# Patient Record
Sex: FEMALE | Race: WHITE | NOT HISPANIC OR LATINO | Employment: OTHER | URBAN - METROPOLITAN AREA
[De-identification: names, ages, dates, MRNs, and addresses within clinical notes are randomized per-mention and may not be internally consistent; named-entity substitution may affect disease eponyms.]

---

## 2017-01-24 DIAGNOSIS — Z00.00 PREVENTATIVE HEALTH CARE: ICD-10-CM

## 2017-01-24 DIAGNOSIS — I10 ESSENTIAL HYPERTENSION: Primary | ICD-10-CM

## 2017-01-24 DIAGNOSIS — E78.5 HYPERLIPIDEMIA, UNSPECIFIED HYPERLIPIDEMIA TYPE: ICD-10-CM

## 2017-01-29 ENCOUNTER — RESULTS ENCOUNTER (OUTPATIENT)
Dept: INTERNAL MEDICINE | Facility: CLINIC | Age: 57
End: 2017-01-29

## 2017-01-29 DIAGNOSIS — I10 ESSENTIAL HYPERTENSION: ICD-10-CM

## 2017-01-29 DIAGNOSIS — E78.5 HYPERLIPIDEMIA: ICD-10-CM

## 2017-01-29 DIAGNOSIS — N95.1 MENOPAUSAL STATE: ICD-10-CM

## 2017-01-29 DIAGNOSIS — E55.9 VITAMIN D DEFICIENCY: ICD-10-CM

## 2017-01-29 DIAGNOSIS — E78.5 HYPERLIPIDEMIA, UNSPECIFIED HYPERLIPIDEMIA TYPE: ICD-10-CM

## 2017-01-29 DIAGNOSIS — Z00.00 PREVENTATIVE HEALTH CARE: ICD-10-CM

## 2017-01-29 DIAGNOSIS — K21.00 GASTROESOPHAGEAL REFLUX DISEASE WITH ESOPHAGITIS: ICD-10-CM

## 2017-01-30 ENCOUNTER — APPOINTMENT (OUTPATIENT)
Dept: LAB | Facility: HOSPITAL | Age: 57
End: 2017-01-30

## 2017-01-30 ENCOUNTER — LAB (OUTPATIENT)
Dept: LAB | Facility: HOSPITAL | Age: 57
End: 2017-01-30

## 2017-01-30 DIAGNOSIS — M51.9 DISC DISORDER OF THORACIC REGION: ICD-10-CM

## 2017-01-30 DIAGNOSIS — R93.89 ABNORMAL RADIOGRAPHIC EXAMINATION: ICD-10-CM

## 2017-01-30 DIAGNOSIS — R94.31 ABNORMAL ELECTROCARDIOGRAM: ICD-10-CM

## 2017-01-30 DIAGNOSIS — R10.13 EPIGASTRIC PAIN: Primary | ICD-10-CM

## 2017-01-30 LAB
25(OH)D3 SERPL-MCNC: 19.3 NG/ML
ALBUMIN SERPL-MCNC: 4 G/DL (ref 3.5–5.2)
ALBUMIN/GLOB SERPL: 1.6 G/DL
ALP SERPL-CCNC: 66 U/L (ref 40–129)
ALT SERPL W P-5'-P-CCNC: 12 U/L (ref 5–33)
ANION GAP SERPL CALCULATED.3IONS-SCNC: 16 MMOL/L
AST SERPL-CCNC: 16 U/L (ref 5–32)
BASOPHILS # BLD AUTO: 0.06 10*3/MM3 (ref 0–0.2)
BASOPHILS NFR BLD AUTO: 1.1 % (ref 0–2)
BILIRUB SERPL-MCNC: 0.7 MG/DL (ref 0.2–1.2)
BUN BLD-MCNC: 11 MG/DL (ref 6–20)
BUN/CREAT SERPL: 15.1 (ref 7–25)
CALCIUM SPEC-SCNC: 9.6 MG/DL (ref 8.6–10.5)
CHLORIDE SERPL-SCNC: 99 MMOL/L (ref 98–107)
CHOLEST SERPL-MCNC: 152 MG/DL (ref 0–200)
CO2 SERPL-SCNC: 29 MMOL/L (ref 22–29)
CREAT BLD-MCNC: 0.73 MG/DL (ref 0.57–1)
DEPRECATED RDW RBC AUTO: 46.5 FL (ref 37–54)
EOSINOPHIL # BLD AUTO: 0.14 10*3/MM3 (ref 0.1–0.3)
EOSINOPHIL NFR BLD AUTO: 2.5 % (ref 0–4)
ERYTHROCYTE [DISTWIDTH] IN BLOOD BY AUTOMATED COUNT: 14.5 % (ref 11.5–14.5)
ERYTHROCYTE [SEDIMENTATION RATE] IN BLOOD: 16 MM/HR (ref 0–30)
GFR SERPL CREATININE-BSD FRML MDRD: 83 ML/MIN/1.73
GLOBULIN UR ELPH-MCNC: 2.5 GM/DL
GLUCOSE BLD-MCNC: 107 MG/DL (ref 65–99)
HCT VFR BLD AUTO: 37.4 % (ref 37–47)
HDLC SERPL-MCNC: 55 MG/DL (ref 40–60)
HGB BLD-MCNC: 12.6 G/DL (ref 12–16)
IMM GRANULOCYTES # BLD: 0.01 10*3/MM3 (ref 0–0.03)
IMM GRANULOCYTES NFR BLD: 0.2 % (ref 0–0.5)
LDLC SERPL CALC-MCNC: 70 MG/DL (ref 0–100)
LDLC/HDLC SERPL: 1.28 {RATIO}
LYMPHOCYTES # BLD AUTO: 2.66 10*3/MM3 (ref 0.6–4.8)
LYMPHOCYTES NFR BLD AUTO: 47.1 % (ref 20–45)
MCH RBC QN AUTO: 29.5 PG (ref 27–31)
MCHC RBC AUTO-ENTMCNC: 33.7 G/DL (ref 31–37)
MCV RBC AUTO: 87.6 FL (ref 81–99)
MONOCYTES # BLD AUTO: 0.28 10*3/MM3 (ref 0–1)
MONOCYTES NFR BLD AUTO: 5 % (ref 3–8)
NEUTROPHILS # BLD AUTO: 2.5 10*3/MM3 (ref 1.5–8.3)
NEUTROPHILS NFR BLD AUTO: 44.1 % (ref 45–70)
NRBC BLD MANUAL-RTO: 0 /100 WBC (ref 0–0)
PLATELET # BLD AUTO: 220 10*3/MM3 (ref 140–500)
PMV BLD AUTO: 9.5 FL (ref 7.4–10.4)
POTASSIUM BLD-SCNC: 2.8 MMOL/L (ref 3.5–5.2)
PROT SERPL-MCNC: 6.5 G/DL (ref 6–8.5)
RBC # BLD AUTO: 4.27 10*6/MM3 (ref 4.2–5.4)
SODIUM BLD-SCNC: 144 MMOL/L (ref 136–145)
T4 SERPL-MCNC: 8.64 MCG/DL (ref 4.5–11.7)
TRIGL SERPL-MCNC: 134 MG/DL (ref 0–150)
TSH SERPL DL<=0.05 MIU/L-ACNC: 3.5 MIU/ML (ref 0.27–4.2)
VLDLC SERPL-MCNC: 26.8 MG/DL (ref 7–27)
WBC NRBC COR # BLD: 5.65 10*3/MM3 (ref 4.8–10.8)

## 2017-01-30 PROCEDURE — 85025 COMPLETE CBC W/AUTO DIFF WBC: CPT

## 2017-01-30 PROCEDURE — 80053 COMPREHEN METABOLIC PANEL: CPT

## 2017-01-30 PROCEDURE — 82652 VIT D 1 25-DIHYDROXY: CPT | Performed by: NURSE PRACTITIONER

## 2017-01-30 PROCEDURE — 80061 LIPID PANEL: CPT

## 2017-01-30 PROCEDURE — 85651 RBC SED RATE NONAUTOMATED: CPT

## 2017-01-30 PROCEDURE — 36415 COLL VENOUS BLD VENIPUNCTURE: CPT

## 2017-01-30 PROCEDURE — 84443 ASSAY THYROID STIM HORMONE: CPT

## 2017-01-30 PROCEDURE — 82306 VITAMIN D 25 HYDROXY: CPT

## 2017-01-30 PROCEDURE — 84436 ASSAY OF TOTAL THYROXINE: CPT

## 2017-02-01 LAB — 1,25(OH)2D3 SERPL-MCNC: 57.4 PG/ML (ref 19.9–79.3)

## 2017-02-13 RX ORDER — ESOMEPRAZOLE MAGNESIUM 40 MG/1
CAPSULE, DELAYED RELEASE ORAL
Qty: 60 CAPSULE | Refills: 6 | Status: SHIPPED | OUTPATIENT
Start: 2017-02-13 | End: 2017-10-05 | Stop reason: SDUPTHER

## 2017-03-16 ENCOUNTER — OFFICE VISIT (OUTPATIENT)
Dept: INTERNAL MEDICINE | Facility: CLINIC | Age: 57
End: 2017-03-16

## 2017-03-16 VITALS
HEART RATE: 70 BPM | HEIGHT: 60 IN | BODY MASS INDEX: 33.38 KG/M2 | WEIGHT: 170 LBS | DIASTOLIC BLOOD PRESSURE: 82 MMHG | OXYGEN SATURATION: 98 % | SYSTOLIC BLOOD PRESSURE: 124 MMHG

## 2017-03-16 DIAGNOSIS — I10 ESSENTIAL HYPERTENSION: Primary | ICD-10-CM

## 2017-03-16 DIAGNOSIS — Z12.39 SCREENING FOR BREAST CANCER: ICD-10-CM

## 2017-03-16 DIAGNOSIS — K21.9 GASTROESOPHAGEAL REFLUX DISEASE WITHOUT ESOPHAGITIS: ICD-10-CM

## 2017-03-16 DIAGNOSIS — E78.5 HYPERLIPIDEMIA, UNSPECIFIED HYPERLIPIDEMIA TYPE: ICD-10-CM

## 2017-03-16 DIAGNOSIS — M85.80 OSTEOPENIA: ICD-10-CM

## 2017-03-16 PROCEDURE — 99214 OFFICE O/P EST MOD 30 MIN: CPT | Performed by: NURSE PRACTITIONER

## 2017-03-16 RX ORDER — ERGOCALCIFEROL 1.25 MG/1
50000 CAPSULE ORAL WEEKLY
Qty: 12 CAPSULE | Refills: 3 | Status: SHIPPED | OUTPATIENT
Start: 2017-03-16 | End: 2018-02-17 | Stop reason: SDUPTHER

## 2017-03-16 NOTE — PROGRESS NOTES
Chief Complaint   Patient presents with   • Hyperlipidemia   • Hypertension       Subjective     Shaylee Wadsworth is a 56 y.o. female being seen for a follow up appointment today regarding HTN, hyperlipidemia, osteopenia, and GERD. She had labs done 1-30-17. She has only been taking her potassium once daily. She has not been checking her BP at home. She denies CP, SOA, edema. She has not had any abd pain. Her reflux has been well controlled.       History of Present Illness     Allergies   Allergen Reactions   • Meloxicam Swelling     EYES AND FACE SWELLING  EYES AND FACE SWELLING         Current Outpatient Prescriptions:   •  calcium-vitamin D (OSCAL-500) 500-200 MG-UNIT per tablet, Take 1 tablet by mouth., Disp: , Rfl:   •  clindamycin (CLEOCIN) 300 MG capsule, Take 1 capsule by mouth 3 (three) times a day., Disp: 30 capsule, Rfl: 0  •  ENBREL SURECLICK 50 MG/ML solution auto-injector, , Disp: , Rfl:   •  esomeprazole (nexIUM) 40 MG capsule, Take one po bid, Disp: 60 capsule, Rfl: 6  •  estrogen, conjugated,-medroxyprogesterone (PREMPRO) 0.45-1.5 MG per tablet, Take 1 tablet by mouth daily., Disp: 84 tablet, Rfl: 3  •  estrogens, conjugated, (PREMARIN) 0.3 MG tablet, Take 0.3 mg by mouth., Disp: , Rfl:   •  etanercept (ENBREL) 50 MG/ML solution prefilled syringe injection, Inject 1 mL under the skin., Disp: , Rfl:   •  folic acid (FOLVITE) 1 MG tablet, Take by mouth., Disp: , Rfl:   •  HYDROcodone-acetaminophen (NORCO) 5-325 MG per tablet, Take 1-2 tablets by mouth every 6 (six) hours., Disp: , Rfl:   •  methotrexate 2.5 MG tablet, TAKE 8 TABS PO ONCE WEEKLY ON SAME DAY, Disp: 32 tablet, Rfl: 11  •  potassium chloride (K-DUR,KLOR-CON) 20 MEQ CR tablet, Take 1 tablet by mouth every 12 (twelve) hours., Disp: 180 tablet, Rfl: 3  •  POTASSIUM CHLORIDE PO, Take 20 mEq by mouth., Disp: , Rfl:   •  simvastatin (ZOCOR) 80 MG tablet, Take 1 tablet by mouth Every Night., Disp: 90 tablet, Rfl: 1  •   triamterene-hydrochlorothiazide (MAXZIDE-25) 37.5-25 MG per tablet, Take 1 tablet by mouth Daily., Disp: 90 tablet, Rfl: 1  •  vitamin D (ERGOCALCIFEROL) 65266 UNITS capsule capsule, Take 1 capsule (50,000 Units total) by mouth once a week., Disp: 30 capsule, Rfl: 2  •  folic acid (FOLVITE) 1 MG tablet, Take 1 mg by mouth., Disp: , Rfl:     The following portions of the patient's history were reviewed and updated as appropriate: allergies, current medications, past family history, past medical history, past social history, past surgical history and problem list.    Review of Systems   Constitutional: Negative.    HENT: Negative.    Eyes: Positive for visual disturbance.   Respiratory: Negative.    Cardiovascular: Negative for chest pain, palpitations and leg swelling.   Gastrointestinal: Negative.    Endocrine: Negative.    Genitourinary: Negative.    Musculoskeletal: Positive for arthralgias and neck pain. Negative for joint swelling.   Allergic/Immunologic: Positive for environmental allergies.   Neurological: Negative.    Hematological: Negative.    Psychiatric/Behavioral: Negative.        Assessment     Physical Exam   Constitutional: She is oriented to person, place, and time. She appears well-developed and well-nourished.   HENT:   Head: Normocephalic.   Right Ear: External ear normal.   Left Ear: External ear normal.   Mouth/Throat: Oropharynx is clear and moist.   Neck: Neck supple. No thyromegaly present.   Cardiovascular: Normal rate, regular rhythm and normal heart sounds.    No murmur heard.  Pulmonary/Chest: Effort normal and breath sounds normal. No respiratory distress. She has no wheezes.   Abdominal: Soft. Bowel sounds are normal. She exhibits no distension. There is no tenderness.   Musculoskeletal:        Right shoulder: She exhibits decreased range of motion. She exhibits normal pulse and normal strength.   Hands without swan neck deformity. Herbene's nodes present to both hands.   Neurological:  She is alert and oriented to person, place, and time.   Skin: Skin is dry.   Psychiatric: She has a normal mood and affect. Her behavior is normal.   Vitals reviewed.      Plan     Her fasting labs were reviewed with the patient from 1-30-17.     Shaylee was seen today for hyperlipidemia and hypertension.    Diagnoses and all orders for this visit:    Essential hypertension    Hyperlipidemia, unspecified hyperlipidemia type    Gastroesophageal reflux disease without esophagitis    Osteopenia  -     DEXA Bone Density Axial; Future  -     vitamin D (ERGOCALCIFEROL) 16996 UNITS capsule capsule; Take 1 capsule by mouth 1 (One) Time Per Week.    Screening for breast cancer  -     Mammo Screening Bilateral With CAD; Future       Diagnosis Plan   1. Essential hypertension  Comprehensive metabolic panel    Conv Lipid Panel w/ Chol/HDL Ratio    CBC & Differential   2. Hyperlipidemia, unspecified hyperlipidemia type  Comprehensive metabolic panel    Conv Lipid Panel w/ Chol/HDL Ratio    CBC & Differential   3. Gastroesophageal reflux disease without esophagitis  CBC & Differential   4. Osteopenia  DEXA Bone Density Axial    vitamin D (ERGOCALCIFEROL) 66994 UNITS capsule capsule    Vitamin D 1,25 Dihydroxy   5. Screening for breast cancer  Mammo Screening Bilateral With CAD     She will continue the other current medications she takes.

## 2017-03-21 DIAGNOSIS — Z00.00 PREVENTATIVE HEALTH CARE: ICD-10-CM

## 2017-03-21 DIAGNOSIS — I10 ESSENTIAL HYPERTENSION: Primary | ICD-10-CM

## 2017-03-21 DIAGNOSIS — K21.9 GASTROESOPHAGEAL REFLUX DISEASE, ESOPHAGITIS PRESENCE NOT SPECIFIED: ICD-10-CM

## 2017-03-21 DIAGNOSIS — E78.5 HYPERLIPIDEMIA, UNSPECIFIED HYPERLIPIDEMIA TYPE: ICD-10-CM

## 2017-03-22 ENCOUNTER — HOSPITAL ENCOUNTER (OUTPATIENT)
Dept: MAMMOGRAPHY | Facility: HOSPITAL | Age: 57
Discharge: HOME | End: 2017-03-22
Admitting: NURSE PRACTITIONER

## 2017-03-22 ENCOUNTER — APPOINTMENT (OUTPATIENT)
Dept: BONE DENSITY | Facility: HOSPITAL | Age: 57
End: 2017-03-22

## 2017-03-22 DIAGNOSIS — M85.80 OSTEOPENIA: ICD-10-CM

## 2017-03-22 DIAGNOSIS — Z12.39 SCREENING FOR BREAST CANCER: ICD-10-CM

## 2017-03-22 PROCEDURE — 77080 DXA BONE DENSITY AXIAL: CPT

## 2017-03-22 PROCEDURE — G0202 SCR MAMMO BI INCL CAD: HCPCS

## 2017-03-22 PROCEDURE — 77063 BREAST TOMOSYNTHESIS BI: CPT

## 2017-03-28 ENCOUNTER — TELEPHONE (OUTPATIENT)
Dept: INTERNAL MEDICINE | Facility: CLINIC | Age: 57
End: 2017-03-28

## 2017-03-28 NOTE — TELEPHONE ENCOUNTER
----- Message from KAITLYNN Choi sent at 3/27/2017 12:49 PM EDT -----  Slight bone thinning in lumber spine, but hip is in normal range. Repeat DXA scan in 2 years.   ----- Message -----     From: Interface, Rad Results [place] In     Sent: 3/23/2017   7:38 AM       To: KAITLYNN Choi

## 2017-03-30 ENCOUNTER — TELEPHONE (OUTPATIENT)
Dept: GASTROENTEROLOGY | Facility: CLINIC | Age: 57
End: 2017-03-30

## 2017-04-15 RX ORDER — TRIAMTERENE AND HYDROCHLOROTHIAZIDE 37.5; 25 MG/1; MG/1
TABLET ORAL
Qty: 90 TABLET | Refills: 0 | Status: CANCELLED | OUTPATIENT
Start: 2017-04-15

## 2017-04-15 RX ORDER — SIMVASTATIN 80 MG
TABLET ORAL
Qty: 90 TABLET | Refills: 0 | Status: CANCELLED | OUTPATIENT
Start: 2017-04-15

## 2017-04-24 RX ORDER — TRIAMTERENE AND HYDROCHLOROTHIAZIDE 37.5; 25 MG/1; MG/1
1 TABLET ORAL DAILY
Qty: 90 TABLET | Refills: 1 | Status: SHIPPED | OUTPATIENT
Start: 2017-04-24 | End: 2017-05-18

## 2017-04-24 RX ORDER — SIMVASTATIN 80 MG
80 TABLET ORAL NIGHTLY
Qty: 90 TABLET | Refills: 1 | Status: SHIPPED | OUTPATIENT
Start: 2017-04-24 | End: 2017-10-05 | Stop reason: SDUPTHER

## 2017-05-10 ENCOUNTER — TRANSCRIBE ORDERS (OUTPATIENT)
Dept: ADMINISTRATIVE | Facility: HOSPITAL | Age: 57
End: 2017-05-10

## 2017-05-10 ENCOUNTER — LAB (OUTPATIENT)
Dept: LAB | Facility: HOSPITAL | Age: 57
End: 2017-05-10

## 2017-05-10 ENCOUNTER — TELEPHONE (OUTPATIENT)
Dept: INTERNAL MEDICINE | Facility: CLINIC | Age: 57
End: 2017-05-10

## 2017-05-10 DIAGNOSIS — E78.5 HYPERLIPIDEMIA, UNSPECIFIED HYPERLIPIDEMIA TYPE: ICD-10-CM

## 2017-05-10 DIAGNOSIS — I10 ESSENTIAL HYPERTENSION: ICD-10-CM

## 2017-05-10 LAB
25(OH)D3 SERPL-MCNC: 23.4 NG/ML
ALBUMIN SERPL-MCNC: 4.1 G/DL (ref 3.5–5.2)
ALBUMIN/GLOB SERPL: 1.5 G/DL
ALP SERPL-CCNC: 64 U/L (ref 40–129)
ALT SERPL W P-5'-P-CCNC: 8 U/L (ref 5–33)
ANION GAP SERPL CALCULATED.3IONS-SCNC: 17.2 MMOL/L
AST SERPL-CCNC: 15 U/L (ref 5–32)
BILIRUB SERPL-MCNC: 0.5 MG/DL (ref 0.2–1.2)
BUN BLD-MCNC: 14 MG/DL (ref 6–20)
BUN/CREAT SERPL: 20.3 (ref 7–25)
CALCIUM SPEC-SCNC: 9.1 MG/DL (ref 8.6–10.5)
CHLORIDE SERPL-SCNC: 99 MMOL/L (ref 98–107)
CHOLEST SERPL-MCNC: 166 MG/DL (ref 0–200)
CO2 SERPL-SCNC: 26.8 MMOL/L (ref 22–29)
CREAT BLD-MCNC: 0.69 MG/DL (ref 0.57–1)
GFR SERPL CREATININE-BSD FRML MDRD: 89 ML/MIN/1.73
GLOBULIN UR ELPH-MCNC: 2.8 GM/DL
GLUCOSE BLD-MCNC: 104 MG/DL (ref 65–99)
HDLC SERPL-MCNC: 62 MG/DL (ref 40–60)
LDLC SERPL CALC-MCNC: 87 MG/DL (ref 0–100)
LDLC/HDLC SERPL: 1.4 {RATIO}
POTASSIUM BLD-SCNC: 2.9 MMOL/L (ref 3.5–5.2)
PROT SERPL-MCNC: 6.9 G/DL (ref 6–8.5)
SODIUM BLD-SCNC: 143 MMOL/L (ref 136–145)
TRIGL SERPL-MCNC: 86 MG/DL (ref 0–150)
VLDLC SERPL-MCNC: 17.2 MG/DL (ref 7–27)

## 2017-05-10 PROCEDURE — 80061 LIPID PANEL: CPT

## 2017-05-10 PROCEDURE — 82306 VITAMIN D 25 HYDROXY: CPT

## 2017-05-10 PROCEDURE — 36415 COLL VENOUS BLD VENIPUNCTURE: CPT

## 2017-05-10 PROCEDURE — 80053 COMPREHEN METABOLIC PANEL: CPT

## 2017-05-16 ENCOUNTER — RESULTS ENCOUNTER (OUTPATIENT)
Dept: INTERNAL MEDICINE | Facility: CLINIC | Age: 57
End: 2017-05-16

## 2017-05-16 ENCOUNTER — TELEPHONE (OUTPATIENT)
Dept: INTERNAL MEDICINE | Facility: CLINIC | Age: 57
End: 2017-05-16

## 2017-05-16 DIAGNOSIS — E78.5 HYPERLIPIDEMIA, UNSPECIFIED HYPERLIPIDEMIA TYPE: ICD-10-CM

## 2017-05-16 DIAGNOSIS — I10 ESSENTIAL HYPERTENSION: ICD-10-CM

## 2017-05-16 DIAGNOSIS — K21.9 GASTROESOPHAGEAL REFLUX DISEASE WITHOUT ESOPHAGITIS: ICD-10-CM

## 2017-05-18 ENCOUNTER — OFFICE VISIT (OUTPATIENT)
Dept: INTERNAL MEDICINE | Facility: CLINIC | Age: 57
End: 2017-05-18

## 2017-05-18 ENCOUNTER — APPOINTMENT (OUTPATIENT)
Dept: LAB | Facility: HOSPITAL | Age: 57
End: 2017-05-18

## 2017-05-18 VITALS
HEART RATE: 70 BPM | SYSTOLIC BLOOD PRESSURE: 128 MMHG | HEIGHT: 60 IN | WEIGHT: 172 LBS | OXYGEN SATURATION: 98 % | DIASTOLIC BLOOD PRESSURE: 84 MMHG | BODY MASS INDEX: 33.77 KG/M2

## 2017-05-18 DIAGNOSIS — M51.9 DISC DISORDER OF THORACIC REGION: Primary | ICD-10-CM

## 2017-05-18 DIAGNOSIS — Z00.00 HEALTHCARE MAINTENANCE: ICD-10-CM

## 2017-05-18 DIAGNOSIS — I10 ESSENTIAL HYPERTENSION: ICD-10-CM

## 2017-05-18 DIAGNOSIS — K21.9 GASTROESOPHAGEAL REFLUX DISEASE WITHOUT ESOPHAGITIS: ICD-10-CM

## 2017-05-18 DIAGNOSIS — E78.5 HYPERLIPIDEMIA, UNSPECIFIED HYPERLIPIDEMIA TYPE: ICD-10-CM

## 2017-05-18 LAB
BASOPHILS # BLD AUTO: 0.07 10*3/MM3 (ref 0–0.2)
BASOPHILS NFR BLD AUTO: 0.9 % (ref 0–2)
DEPRECATED RDW RBC AUTO: 44.6 FL (ref 37–54)
EOSINOPHIL # BLD AUTO: 0.15 10*3/MM3 (ref 0.1–0.3)
EOSINOPHIL NFR BLD AUTO: 2 % (ref 0–4)
ERYTHROCYTE [DISTWIDTH] IN BLOOD BY AUTOMATED COUNT: 14.3 % (ref 11.5–14.5)
HCT VFR BLD AUTO: 37.4 % (ref 37–47)
HGB BLD-MCNC: 12.9 G/DL (ref 12–16)
IMM GRANULOCYTES # BLD: 0.01 10*3/MM3 (ref 0–0.03)
IMM GRANULOCYTES NFR BLD: 0.1 % (ref 0–0.5)
LYMPHOCYTES # BLD AUTO: 2.77 10*3/MM3 (ref 0.6–4.8)
LYMPHOCYTES NFR BLD AUTO: 36.8 % (ref 20–45)
MCH RBC QN AUTO: 29.5 PG (ref 27–31)
MCHC RBC AUTO-ENTMCNC: 34.5 G/DL (ref 31–37)
MCV RBC AUTO: 85.6 FL (ref 81–99)
MONOCYTES # BLD AUTO: 0.52 10*3/MM3 (ref 0–1)
MONOCYTES NFR BLD AUTO: 6.9 % (ref 3–8)
NEUTROPHILS # BLD AUTO: 4.01 10*3/MM3 (ref 1.5–8.3)
NEUTROPHILS NFR BLD AUTO: 53.3 % (ref 45–70)
NRBC BLD MANUAL-RTO: 0 /100 WBC (ref 0–0)
PLATELET # BLD AUTO: 251 10*3/MM3 (ref 140–500)
PMV BLD AUTO: 9.8 FL (ref 7.4–10.4)
RBC # BLD AUTO: 4.37 10*6/MM3 (ref 4.2–5.4)
WBC NRBC COR # BLD: 7.53 10*3/MM3 (ref 4.8–10.8)

## 2017-05-18 PROCEDURE — 85025 COMPLETE CBC W/AUTO DIFF WBC: CPT | Performed by: NURSE PRACTITIONER

## 2017-05-18 PROCEDURE — 93000 ELECTROCARDIOGRAM COMPLETE: CPT | Performed by: NURSE PRACTITIONER

## 2017-05-18 PROCEDURE — 99396 PREV VISIT EST AGE 40-64: CPT | Performed by: NURSE PRACTITIONER

## 2017-05-18 PROCEDURE — 36415 COLL VENOUS BLD VENIPUNCTURE: CPT | Performed by: NURSE PRACTITIONER

## 2017-05-18 PROCEDURE — 90471 IMMUNIZATION ADMIN: CPT | Performed by: NURSE PRACTITIONER

## 2017-05-18 PROCEDURE — 90670 PCV13 VACCINE IM: CPT | Performed by: NURSE PRACTITIONER

## 2017-05-18 RX ORDER — LOSARTAN POTASSIUM 50 MG/1
50 TABLET ORAL DAILY
Qty: 30 TABLET | Refills: 1 | Status: SHIPPED | OUTPATIENT
Start: 2017-05-18 | End: 2017-05-23 | Stop reason: DRUGHIGH

## 2017-05-18 RX ORDER — OCTISALATE, AVOBENZONE, HOMOSALATE, AND OCTOCRYLENE 29.4; 29.4; 49; 25.48 MG/ML; MG/ML; MG/ML; MG/ML
1 LOTION TOPICAL DAILY
Qty: 30 CAPSULE | Refills: 1
Start: 2017-05-18 | End: 2017-07-03

## 2017-05-18 RX ORDER — POTASSIUM CHLORIDE 20 MEQ/1
40 TABLET, EXTENDED RELEASE ORAL DAILY
Qty: 180 TABLET | Refills: 3
Start: 2017-05-18 | End: 2017-10-09

## 2017-05-23 ENCOUNTER — TELEPHONE (OUTPATIENT)
Dept: INTERNAL MEDICINE | Facility: CLINIC | Age: 57
End: 2017-05-23

## 2017-05-23 RX ORDER — LOSARTAN POTASSIUM 100 MG/1
100 TABLET ORAL DAILY
Qty: 30 TABLET | Refills: 2 | Status: SHIPPED | OUTPATIENT
Start: 2017-05-23 | End: 2017-06-19 | Stop reason: DRUGHIGH

## 2017-06-13 ENCOUNTER — OFFICE VISIT (OUTPATIENT)
Dept: INTERNAL MEDICINE | Facility: CLINIC | Age: 57
End: 2017-06-13

## 2017-06-13 ENCOUNTER — LAB (OUTPATIENT)
Dept: LAB | Facility: HOSPITAL | Age: 57
End: 2017-06-13

## 2017-06-13 ENCOUNTER — HOSPITAL ENCOUNTER (EMERGENCY)
Facility: HOSPITAL | Age: 57
End: 2017-06-13

## 2017-06-13 ENCOUNTER — TRANSCRIBE ORDERS (OUTPATIENT)
Dept: ADMINISTRATIVE | Facility: HOSPITAL | Age: 57
End: 2017-06-13

## 2017-06-13 VITALS
DIASTOLIC BLOOD PRESSURE: 88 MMHG | OXYGEN SATURATION: 97 % | HEART RATE: 72 BPM | SYSTOLIC BLOOD PRESSURE: 142 MMHG | HEIGHT: 60 IN | BODY MASS INDEX: 33.57 KG/M2 | WEIGHT: 171 LBS

## 2017-06-13 DIAGNOSIS — M05.79 RHEUMATOID ARTHRITIS WITH RHEUMATOID FACTOR OF MULTIPLE SITES WITHOUT ORGAN OR SYSTEMS INVOLVEMENT (HCC): ICD-10-CM

## 2017-06-13 DIAGNOSIS — Z79.899 OTHER LONG TERM (CURRENT) DRUG THERAPY: ICD-10-CM

## 2017-06-13 DIAGNOSIS — Z79.899 OTHER LONG TERM (CURRENT) DRUG THERAPY: Primary | ICD-10-CM

## 2017-06-13 DIAGNOSIS — I10 ESSENTIAL HYPERTENSION: Primary | ICD-10-CM

## 2017-06-13 LAB
ALBUMIN SERPL-MCNC: 4 G/DL (ref 3.5–5.2)
ALP SERPL-CCNC: 66 U/L (ref 40–129)
ALT SERPL W P-5'-P-CCNC: 15 U/L (ref 5–33)
AST SERPL-CCNC: 17 U/L (ref 5–32)
BASOPHILS # BLD AUTO: 0.06 10*3/MM3 (ref 0–0.2)
BASOPHILS NFR BLD AUTO: 0.8 % (ref 0–2)
BILIRUB CONJ SERPL-MCNC: <0.2 MG/DL (ref 0.2–0.3)
BILIRUB INDIRECT SERPL-MCNC: ABNORMAL MG/DL
BILIRUB SERPL-MCNC: 0.5 MG/DL (ref 0.2–1.2)
DEPRECATED RDW RBC AUTO: 45 FL (ref 37–54)
EOSINOPHIL # BLD AUTO: 0.15 10*3/MM3 (ref 0.1–0.3)
EOSINOPHIL NFR BLD AUTO: 1.9 % (ref 0–4)
ERYTHROCYTE [DISTWIDTH] IN BLOOD BY AUTOMATED COUNT: 14.4 % (ref 11.5–14.5)
HCT VFR BLD AUTO: 37.9 % (ref 37–47)
HGB BLD-MCNC: 12.9 G/DL (ref 12–16)
IMM GRANULOCYTES # BLD: 0.01 10*3/MM3 (ref 0–0.03)
IMM GRANULOCYTES NFR BLD: 0.1 % (ref 0–0.5)
LYMPHOCYTES # BLD AUTO: 3.49 10*3/MM3 (ref 0.6–4.8)
LYMPHOCYTES NFR BLD AUTO: 43.8 % (ref 20–45)
MCH RBC QN AUTO: 29.3 PG (ref 27–31)
MCHC RBC AUTO-ENTMCNC: 34 G/DL (ref 31–37)
MCV RBC AUTO: 86.1 FL (ref 81–99)
MONOCYTES # BLD AUTO: 0.52 10*3/MM3 (ref 0–1)
MONOCYTES NFR BLD AUTO: 6.5 % (ref 3–8)
NEUTROPHILS # BLD AUTO: 3.74 10*3/MM3 (ref 1.5–8.3)
NEUTROPHILS NFR BLD AUTO: 46.9 % (ref 45–70)
NRBC BLD MANUAL-RTO: 0 /100 WBC (ref 0–0)
PLATELET # BLD AUTO: 284 10*3/MM3 (ref 140–500)
PMV BLD AUTO: 9.6 FL (ref 7.4–10.4)
PROT SERPL-MCNC: 7.1 G/DL (ref 6–8.5)
RBC # BLD AUTO: 4.4 10*6/MM3 (ref 4.2–5.4)
WBC NRBC COR # BLD: 7.97 10*3/MM3 (ref 4.8–10.8)

## 2017-06-13 PROCEDURE — 36415 COLL VENOUS BLD VENIPUNCTURE: CPT

## 2017-06-13 PROCEDURE — 80076 HEPATIC FUNCTION PANEL: CPT

## 2017-06-13 PROCEDURE — 85025 COMPLETE CBC W/AUTO DIFF WBC: CPT

## 2017-06-13 PROCEDURE — 99213 OFFICE O/P EST LOW 20 MIN: CPT | Performed by: NURSE PRACTITIONER

## 2017-06-13 RX ORDER — METOPROLOL SUCCINATE 50 MG/1
50 TABLET, EXTENDED RELEASE ORAL DAILY
Qty: 30 TABLET | Refills: 6 | Status: SHIPPED | OUTPATIENT
Start: 2017-06-13 | End: 2017-10-09 | Stop reason: SDUPTHER

## 2017-06-13 NOTE — PROGRESS NOTES
Chief Complaint   Patient presents with   • Hypertension       Subjective     Shaylee Wadsworth is a 56 y.o. female being seen for a follow up appointment today regarding HTN. She had stopped the Maxzide on 5- due to low potassium. She was started on losartan 100mg daily. BP is running up to 180/100. Last check at [facility] 160/96. Associted Headache. .       History of Present Illness     Allergies   Allergen Reactions   • Meloxicam Swelling     EYES AND FACE SWELLING  EYES AND FACE SWELLING         Current Outpatient Prescriptions:   •  calcium-vitamin D (OSCAL-500) 500-200 MG-UNIT per tablet, Take 1 tablet by mouth., Disp: , Rfl:   •  clindamycin (CLEOCIN) 300 MG capsule, Take 1 capsule by mouth 3 (three) times a day., Disp: 30 capsule, Rfl: 0  •  ENBREL SURECLICK 50 MG/ML solution auto-injector, , Disp: , Rfl:   •  esomeprazole (nexIUM) 40 MG capsule, Take one po bid, Disp: 60 capsule, Rfl: 6  •  estrogen, conjugated,-medroxyprogesterone (PREMPRO) 0.45-1.5 MG per tablet, Take 1 tablet by mouth daily., Disp: 84 tablet, Rfl: 3  •  folic acid (FOLVITE) 1 MG tablet, Take by mouth., Disp: , Rfl:   •  losartan (COZAAR) 100 MG tablet, Take 1 tablet by mouth Daily., Disp: 30 tablet, Rfl: 2  •  methotrexate 2.5 MG tablet, TAKE 4 TABS PO ONCE WEEKLY ON SAME DAY, Disp: 32 tablet, Rfl: 11  •  potassium chloride (K-DUR,KLOR-CON) 20 MEQ CR tablet, Take 2 tablets by mouth Daily., Disp: 180 tablet, Rfl: 3  •  Probiotic Product (ALIGN) 4 MG capsule, Take 1 capsule by mouth Daily., Disp: 30 capsule, Rfl: 1  •  simvastatin (ZOCOR) 80 MG tablet, Take 1 tablet by mouth Every Night., Disp: 90 tablet, Rfl: 1  •  vitamin D (ERGOCALCIFEROL) 36314 UNITS capsule capsule, Take 1 capsule by mouth 1 (One) Time Per Week., Disp: 12 capsule, Rfl: 3    The following portions of the patient's history were reviewed and updated as appropriate: allergies, current medications, past family history, past medical history, past social history, past  surgical history and problem list.    Review of Systems   Constitutional: Negative.    Eyes: Negative.    Respiratory: Negative.  Negative for shortness of breath, wheezing and stridor.    Cardiovascular: Negative for chest pain, palpitations and leg swelling.   Gastrointestinal: Negative.  Negative for abdominal distention and abdominal pain.   Endocrine: Negative.    Genitourinary: Negative.    Musculoskeletal: Positive for arthralgias.   Skin: Negative.    Neurological: Positive for headaches. Negative for dizziness and facial asymmetry.   Hematological: Negative.    Psychiatric/Behavioral: Negative.        Assessment     Physical Exam   Constitutional: She is oriented to person, place, and time. She appears well-developed and well-nourished.   HENT:   Head: Normocephalic.   Right Ear: External ear normal.   Left Ear: External ear normal.   Neck: Neck supple. No thyromegaly present.   Cardiovascular: Normal rate, regular rhythm and normal heart sounds.    No murmur heard.  Pulmonary/Chest: Effort normal and breath sounds normal. No respiratory distress. She has no wheezes.   Musculoskeletal: She exhibits no edema.   Neurological: She is alert and oriented to person, place, and time. No cranial nerve deficit.   Psychiatric: She has a normal mood and affect. Her behavior is normal.   Vitals reviewed.      Plan     Her fasting labs were reviewed with the patient from last week.     Shaylee was seen today for hypertension.    Diagnoses and all orders for this visit:    Essential hypertension  -     Comprehensive Metabolic Panel  -     metoprolol succinate XL (TOPROL XL) 50 MG 24 hr tablet; Take 1 tablet by mouth Daily.      Follow up next week as scheduled.

## 2017-06-16 ENCOUNTER — TELEPHONE (OUTPATIENT)
Dept: INTERNAL MEDICINE | Facility: CLINIC | Age: 57
End: 2017-06-16

## 2017-06-16 NOTE — TELEPHONE ENCOUNTER
----- Message from Amparo Stallworth MA sent at 6/15/2017  3:49 PM EDT -----  Regarding: FW: METOPROLOL  Per lalo pt has to be seen.  She can not address this over the phone  ----- Message -----     From: Monica Perry     Sent: 6/15/2017   3:39 PM       To: Grazyna Duffy [facility] Clinical Pool  Subject: METOPROLOL                                       MANOLO    BP METOPROLOL - having some issues with this new medication and doesn't know if it is something she needs to get used to?    Patient says she sent message to Lalo through [employer] today but hasn't heard anything.  I told her it may be after 4:30 before she gets called back.  She doesn't want to go into the weekend and not know.    Lalo is out of office on June 16th.    [phone]    PT WILL NOT ANSWER HER PHONE,  SHE WAS ANSWERED ON HER EMAIL NOTE THAT SHE HAD TO BE SEEN, MUST MAKE APPT

## 2017-06-19 ENCOUNTER — OFFICE VISIT (OUTPATIENT)
Dept: INTERNAL MEDICINE | Facility: CLINIC | Age: 57
End: 2017-06-19

## 2017-06-19 VITALS
DIASTOLIC BLOOD PRESSURE: 84 MMHG | OXYGEN SATURATION: 99 % | HEIGHT: 60 IN | WEIGHT: 175 LBS | HEART RATE: 73 BPM | BODY MASS INDEX: 34.36 KG/M2 | SYSTOLIC BLOOD PRESSURE: 144 MMHG

## 2017-06-19 DIAGNOSIS — I10 ESSENTIAL HYPERTENSION: Primary | ICD-10-CM

## 2017-06-19 DIAGNOSIS — E87.6 HYPOKALEMIA: ICD-10-CM

## 2017-06-19 LAB
ALBUMIN SERPL-MCNC: 4.2 G/DL (ref 3.5–5.2)
ALBUMIN/GLOB SERPL: 1.8 G/DL
ALP SERPL-CCNC: 66 U/L (ref 40–129)
ALT SERPL-CCNC: 8 U/L (ref 5–33)
AST SERPL-CCNC: 12 U/L (ref 5–32)
BILIRUB SERPL-MCNC: 0.5 MG/DL (ref 0.2–1.2)
BUN SERPL-MCNC: 12 MG/DL (ref 6–20)
BUN/CREAT SERPL: 18.5 (ref 7–25)
CALCIUM SERPL-MCNC: 9.1 MG/DL (ref 8.6–10.5)
CHLORIDE SERPL-SCNC: 102 MMOL/L (ref 98–107)
CO2 SERPL-SCNC: 26.3 MMOL/L (ref 22–29)
CREAT SERPL-MCNC: 0.65 MG/DL (ref 0.57–1)
GLOBULIN SER CALC-MCNC: 2.3 GM/DL
GLUCOSE SERPL-MCNC: 87 MG/DL (ref 65–99)
POTASSIUM SERPL-SCNC: 3.8 MMOL/L (ref 3.5–5.2)
PROT SERPL-MCNC: 6.5 G/DL (ref 6–8.5)
SODIUM SERPL-SCNC: 141 MMOL/L (ref 136–145)

## 2017-06-19 PROCEDURE — 99213 OFFICE O/P EST LOW 20 MIN: CPT | Performed by: NURSE PRACTITIONER

## 2017-06-19 RX ORDER — LOSARTAN POTASSIUM AND HYDROCHLOROTHIAZIDE 25; 100 MG/1; MG/1
1 TABLET ORAL DAILY
Qty: 30 TABLET | Refills: 0 | Status: SHIPPED | OUTPATIENT
Start: 2017-06-19 | End: 2017-07-13 | Stop reason: SDUPTHER

## 2017-06-19 NOTE — PROGRESS NOTES
Chief Complaint   Patient presents with   • Hypertension       Subjective     Shaylee Wadsworth is a 56 y.o. female being seen for a follow up appointment today regarding  HTN. She was switched from Maxzide to losartan with Toprol XL 50mg. Since starting the Toprol, she has been checking her BP, and it is running 140s/80s. Associted headaches. Denies fatigue. She had chest pressure last week before starting new medication, but this has resolved.       History of Present Illness     Allergies   Allergen Reactions   • Meloxicam Swelling     EYES AND FACE SWELLING  EYES AND FACE SWELLING         Current Outpatient Prescriptions:   •  calcium-vitamin D (OSCAL-500) 500-200 MG-UNIT per tablet, Take 1 tablet by mouth., Disp: , Rfl:   •  ENBREL SURECLICK 50 MG/ML solution auto-injector, , Disp: , Rfl:   •  esomeprazole (nexIUM) 40 MG capsule, Take one po bid, Disp: 60 capsule, Rfl: 6  •  estrogen, conjugated,-medroxyprogesterone (PREMPRO) 0.45-1.5 MG per tablet, Take 1 tablet by mouth daily., Disp: 84 tablet, Rfl: 3  •  folic acid (FOLVITE) 1 MG tablet, Take by mouth., Disp: , Rfl:   •  losartan (COZAAR) 100 MG tablet, Take 1 tablet by mouth Daily., Disp: 30 tablet, Rfl: 2  •  methotrexate 2.5 MG tablet, TAKE 4 TABS PO ONCE WEEKLY ON SAME DAY, Disp: 32 tablet, Rfl: 11  •  metoprolol succinate XL (TOPROL XL) 50 MG 24 hr tablet, Take 1 tablet by mouth Daily., Disp: 30 tablet, Rfl: 6  •  potassium chloride (K-DUR,KLOR-CON) 20 MEQ CR tablet, Take 2 tablets by mouth Daily., Disp: 180 tablet, Rfl: 3  •  Probiotic Product (ALIGN) 4 MG capsule, Take 1 capsule by mouth Daily., Disp: 30 capsule, Rfl: 1  •  simvastatin (ZOCOR) 80 MG tablet, Take 1 tablet by mouth Every Night., Disp: 90 tablet, Rfl: 1  •  vitamin D (ERGOCALCIFEROL) 26507 UNITS capsule capsule, Take 1 capsule by mouth 1 (One) Time Per Week., Disp: 12 capsule, Rfl: 3  •  clindamycin (CLEOCIN) 300 MG capsule, Take 1 capsule by mouth 3 (three) times a day., Disp: 30 capsule,  Rfl: 0    The following portions of the patient's history were reviewed and updated as appropriate: allergies, current medications, past family history, past medical history, past social history, past surgical history and problem list.    Review of Systems   Constitutional: Negative.  Negative for diaphoresis and fatigue.   HENT: Negative.    Eyes: Negative.    Respiratory: Negative.    Cardiovascular: Negative.  Negative for chest pain, palpitations and leg swelling.   Gastrointestinal: Negative.    Endocrine: Negative.    Genitourinary: Negative.    Musculoskeletal: Negative.    Neurological: Negative.    Hematological: Negative.  Negative for adenopathy. Does not bruise/bleed easily.   Psychiatric/Behavioral: Negative.        Assessment     Physical Exam   Constitutional: She is oriented to person, place, and time. She appears well-developed and well-nourished.   HENT:   Head: Normocephalic.   Right Ear: External ear normal.   Left Ear: External ear normal.   Neck: Neck supple. No thyromegaly present.   Cardiovascular: Normal rate, regular rhythm and normal heart sounds.    No murmur heard.  Pulmonary/Chest: Effort normal and breath sounds normal. No respiratory distress. She has no wheezes.   Neurological: She is alert and oriented to person, place, and time. No cranial nerve deficit.   Skin: Skin is warm and dry.   Psychiatric: She has a normal mood and affect. Her behavior is normal.   Vitals reviewed.      Plan     Her fasting labs were reviewed with the patient from last week.     Shaylee was seen today for hypertension.    Diagnoses and all orders for this visit:    Essential hypertension  -     Comprehensive Metabolic Panel  -     losartan-hydrochlorothiazide (HYZAAR) 100-25 MG per tablet; Take 1 tablet by mouth Daily.    Hypokalemia  -     Comprehensive Metabolic Panel    Will check her potassium today. Change Losartan 100 to Hyzaar 100/25mg daily. Change Toprol XL 50mg to a night time dosage. Repeat BP  check in 2 weeks.

## 2017-06-20 ENCOUNTER — TELEPHONE (OUTPATIENT)
Dept: INTERNAL MEDICINE | Facility: CLINIC | Age: 57
End: 2017-06-20

## 2017-06-20 DIAGNOSIS — E87.6 POTASSIUM (K) DEFICIENCY: Primary | ICD-10-CM

## 2017-06-20 NOTE — TELEPHONE ENCOUNTER
Pt. Notified     ----- Message from KAITLYNN Choi sent at 6/20/2017  7:50 AM EDT -----  Potassium level is normal. Reduce potassium to 20 meq once daily. Repeat CMP in 2 weeks.   ----- Message -----     From: Joel, Reflab Results In     Sent: 6/19/2017   8:08 PM       To: KAITLYNN Choi

## 2017-06-25 ENCOUNTER — RESULTS ENCOUNTER (OUTPATIENT)
Dept: INTERNAL MEDICINE | Facility: CLINIC | Age: 57
End: 2017-06-25

## 2017-06-25 DIAGNOSIS — E87.6 POTASSIUM (K) DEFICIENCY: ICD-10-CM

## 2017-06-29 ENCOUNTER — TELEPHONE (OUTPATIENT)
Dept: INTERNAL MEDICINE | Facility: CLINIC | Age: 57
End: 2017-06-29

## 2017-06-29 DIAGNOSIS — I10 ESSENTIAL HYPERTENSION: Primary | ICD-10-CM

## 2017-06-29 NOTE — TELEPHONE ENCOUNTER
done    ----- Message from Lily Whelan MA sent at 6/28/2017  4:49 PM EDT -----      ----- Message -----     From: Brianne Villa     Sent: 6/28/2017   3:57 PM       To: [facility] Clinical Pool    PATIENT NEEDS LAB ORDERS PUT IN TO BE DONE DOWNSTAIRS .  SHE IS COMING IN TO SEE RYANN ON Monday.  THESE NEED TO BE DONE ASAP.

## 2017-07-02 ENCOUNTER — LAB (OUTPATIENT)
Dept: LAB | Facility: HOSPITAL | Age: 57
End: 2017-07-02

## 2017-07-02 ENCOUNTER — APPOINTMENT (OUTPATIENT)
Dept: LAB | Facility: HOSPITAL | Age: 57
End: 2017-07-02

## 2017-07-02 DIAGNOSIS — Z79.899 DRUG THERAPY: Primary | ICD-10-CM

## 2017-07-02 LAB
ALBUMIN SERPL-MCNC: 4.1 G/DL (ref 3.5–5.2)
ALBUMIN/GLOB SERPL: 1.6 G/DL
ALP SERPL-CCNC: 66 U/L (ref 40–129)
ALT SERPL W P-5'-P-CCNC: 10 U/L (ref 5–33)
ANION GAP SERPL CALCULATED.3IONS-SCNC: 13.2 MMOL/L
AST SERPL-CCNC: 17 U/L (ref 5–32)
BILIRUB SERPL-MCNC: 0.6 MG/DL (ref 0.2–1.2)
BUN BLD-MCNC: 15 MG/DL (ref 6–20)
BUN/CREAT SERPL: 22.1 (ref 7–25)
CALCIUM SPEC-SCNC: 9.1 MG/DL (ref 8.6–10.5)
CHLORIDE SERPL-SCNC: 97 MMOL/L (ref 98–107)
CO2 SERPL-SCNC: 29.8 MMOL/L (ref 22–29)
CREAT BLD-MCNC: 0.68 MG/DL (ref 0.57–1)
GFR SERPL CREATININE-BSD FRML MDRD: 90 ML/MIN/1.73
GLOBULIN UR ELPH-MCNC: 2.6 GM/DL
GLUCOSE BLD-MCNC: 99 MG/DL (ref 65–99)
POTASSIUM BLD-SCNC: 3.2 MMOL/L (ref 3.5–5.2)
PROT SERPL-MCNC: 6.7 G/DL (ref 6–8.5)
SODIUM BLD-SCNC: 140 MMOL/L (ref 136–145)

## 2017-07-02 PROCEDURE — 80053 COMPREHEN METABOLIC PANEL: CPT

## 2017-07-02 PROCEDURE — 36415 COLL VENOUS BLD VENIPUNCTURE: CPT

## 2017-07-03 ENCOUNTER — OFFICE VISIT (OUTPATIENT)
Dept: INTERNAL MEDICINE | Facility: CLINIC | Age: 57
End: 2017-07-03

## 2017-07-03 VITALS
OXYGEN SATURATION: 96 % | HEIGHT: 60 IN | WEIGHT: 171.2 LBS | HEART RATE: 59 BPM | SYSTOLIC BLOOD PRESSURE: 116 MMHG | DIASTOLIC BLOOD PRESSURE: 78 MMHG | TEMPERATURE: 98 F | BODY MASS INDEX: 33.61 KG/M2

## 2017-07-03 DIAGNOSIS — I10 ESSENTIAL HYPERTENSION: Primary | ICD-10-CM

## 2017-07-03 DIAGNOSIS — E55.9 VITAMIN D DEFICIENCY: ICD-10-CM

## 2017-07-03 DIAGNOSIS — E87.6 HYPOKALEMIA: ICD-10-CM

## 2017-07-03 DIAGNOSIS — E78.5 HYPERLIPIDEMIA, UNSPECIFIED HYPERLIPIDEMIA TYPE: ICD-10-CM

## 2017-07-03 PROCEDURE — 99213 OFFICE O/P EST LOW 20 MIN: CPT | Performed by: NURSE PRACTITIONER

## 2017-07-03 NOTE — PROGRESS NOTES
Chief Complaint   Patient presents with   • Hyperlipidemia   • Hypertension       Subjective     Shaylee Wadsworth is a 56 y.o. female being seen for a follow up appointment today regarding  HTN. She is currently on Losartan 100/25mg and Toprol XL 50 mg once daily.  She was taken off Maxzide due to chronic hypokalemia. BP at home running 110s/70s. Denies headaches, muscle cramps. She is currently on Potassium 20 meq daily.       History of Present Illness     Allergies   Allergen Reactions   • Meloxicam Swelling     EYES AND FACE SWELLING  EYES AND FACE SWELLING         Current Outpatient Prescriptions:   •  calcium-vitamin D (OSCAL-500) 500-200 MG-UNIT per tablet, Take 1 tablet by mouth., Disp: , Rfl:   •  ENBREL SURECLICK 50 MG/ML solution auto-injector, , Disp: , Rfl:   •  esomeprazole (nexIUM) 40 MG capsule, Take one po bid, Disp: 60 capsule, Rfl: 6  •  estrogen, conjugated,-medroxyprogesterone (PREMPRO) 0.45-1.5 MG per tablet, Take 1 tablet by mouth daily., Disp: 84 tablet, Rfl: 3  •  folic acid (FOLVITE) 1 MG tablet, Take by mouth., Disp: , Rfl:   •  losartan-hydrochlorothiazide (HYZAAR) 100-25 MG per tablet, Take 1 tablet by mouth Daily., Disp: 30 tablet, Rfl: 0  •  methotrexate 2.5 MG tablet, TAKE 4 TABS PO ONCE WEEKLY ON SAME DAY, Disp: 32 tablet, Rfl: 11  •  metoprolol succinate XL (TOPROL XL) 50 MG 24 hr tablet, Take 1 tablet by mouth Daily., Disp: 30 tablet, Rfl: 6  •  potassium chloride (K-DUR,KLOR-CON) 20 MEQ CR tablet, Take 2 tablets by mouth Daily., Disp: 180 tablet, Rfl: 3  •  simvastatin (ZOCOR) 80 MG tablet, Take 1 tablet by mouth Every Night., Disp: 90 tablet, Rfl: 1  •  vitamin D (ERGOCALCIFEROL) 09621 UNITS capsule capsule, Take 1 capsule by mouth 1 (One) Time Per Week., Disp: 12 capsule, Rfl: 3    The following portions of the patient's history were reviewed and updated as appropriate: allergies, current medications, past family history, past medical history, past social history, past surgical  history and problem list.    Review of Systems   Constitutional: Negative.    Eyes: Negative.    Respiratory: Negative.    Cardiovascular: Negative.    Gastrointestinal: Negative.    Genitourinary: Negative.    Musculoskeletal: Negative.    Allergic/Immunologic: Negative.    Neurological: Negative.    Hematological: Negative.    Psychiatric/Behavioral: Negative.        Assessment     Physical Exam   Constitutional: She is oriented to person, place, and time. She appears well-developed and well-nourished.   HENT:   Head: Normocephalic.   Right Ear: External ear normal.   Left Ear: External ear normal.   Nose: Nose normal.   Mouth/Throat: Oropharynx is clear and moist. No oropharyngeal exudate.   Neck: Neck supple. No thyromegaly present.   Cardiovascular: Normal rate, regular rhythm and normal heart sounds.    No murmur heard.  Pulmonary/Chest: Effort normal and breath sounds normal. No respiratory distress. She has no wheezes.   Musculoskeletal: She exhibits no edema.   Neurological: She is alert and oriented to person, place, and time. No cranial nerve deficit.   Psychiatric: She has a normal mood and affect. Her behavior is normal.   Vitals reviewed.      Plan     Her fasting labs were reviewed with the patient from last week.     Shaylee was seen today for hyperlipidemia and hypertension.    Diagnoses and all orders for this visit:    Essential hypertension  -     Comprehensive Metabolic Panel; Future    Hypokalemia  -     Comprehensive Metabolic Panel; Future    Trial of Toprol XL reduction to 25 mg daily and check BP daily. Stay on Losartan HCTZ as prescribed. Check CMP in 2 weeks, then we will plan on stopping potassium.

## 2017-07-04 ENCOUNTER — RESULTS ENCOUNTER (OUTPATIENT)
Dept: INTERNAL MEDICINE | Facility: CLINIC | Age: 57
End: 2017-07-04

## 2017-07-04 DIAGNOSIS — I10 ESSENTIAL HYPERTENSION: ICD-10-CM

## 2017-07-13 DIAGNOSIS — I10 ESSENTIAL HYPERTENSION: ICD-10-CM

## 2017-07-13 RX ORDER — LOSARTAN POTASSIUM AND HYDROCHLOROTHIAZIDE 25; 100 MG/1; MG/1
TABLET ORAL
Qty: 30 TABLET | Refills: 6 | Status: SHIPPED | OUTPATIENT
Start: 2017-07-13 | End: 2018-01-08 | Stop reason: SDUPTHER

## 2017-07-25 ENCOUNTER — LAB (OUTPATIENT)
Dept: LAB | Facility: HOSPITAL | Age: 57
End: 2017-07-25

## 2017-07-25 DIAGNOSIS — I10 ESSENTIAL HYPERTENSION: ICD-10-CM

## 2017-07-25 DIAGNOSIS — E87.6 HYPOKALEMIA: ICD-10-CM

## 2017-07-25 LAB
ALBUMIN SERPL-MCNC: 4.1 G/DL (ref 3.5–5.2)
ALBUMIN/GLOB SERPL: 1.6 G/DL
ALP SERPL-CCNC: 67 U/L (ref 40–129)
ALT SERPL W P-5'-P-CCNC: 11 U/L (ref 5–33)
ANION GAP SERPL CALCULATED.3IONS-SCNC: 12.8 MMOL/L
AST SERPL-CCNC: 15 U/L (ref 5–32)
BILIRUB SERPL-MCNC: 0.5 MG/DL (ref 0.2–1.2)
BUN BLD-MCNC: 11 MG/DL (ref 6–20)
BUN/CREAT SERPL: 15.3 (ref 7–25)
CALCIUM SPEC-SCNC: 9.2 MG/DL (ref 8.6–10.5)
CHLORIDE SERPL-SCNC: 99 MMOL/L (ref 98–107)
CO2 SERPL-SCNC: 29.2 MMOL/L (ref 22–29)
CREAT BLD-MCNC: 0.72 MG/DL (ref 0.57–1)
GFR SERPL CREATININE-BSD FRML MDRD: 84 ML/MIN/1.73
GLOBULIN UR ELPH-MCNC: 2.5 GM/DL
GLUCOSE BLD-MCNC: 112 MG/DL (ref 65–99)
POTASSIUM BLD-SCNC: 2.9 MMOL/L (ref 3.5–5.2)
PROT SERPL-MCNC: 6.6 G/DL (ref 6–8.5)
SODIUM BLD-SCNC: 141 MMOL/L (ref 136–145)

## 2017-07-25 PROCEDURE — 80053 COMPREHEN METABOLIC PANEL: CPT

## 2017-07-27 ENCOUNTER — TELEPHONE (OUTPATIENT)
Dept: INTERNAL MEDICINE | Facility: CLINIC | Age: 57
End: 2017-07-27

## 2017-07-27 NOTE — TELEPHONE ENCOUNTER
Pt. Notified     ----- Message from KAITLYNN Choi sent at 7/26/2017 10:16 PM EDT -----  Regarding: FW:  Her potassium is dropping. I cannot wean her off the potassium pills. Verify her current dose of 40 meQ daily. (2, 20 meQ tabs).  ----- Message -----     From: Lab, Background User     Sent: 7/25/2017   5:18 AM       To: KAITLYNN Choi

## 2017-08-09 ENCOUNTER — TELEPHONE (OUTPATIENT)
Dept: INTERNAL MEDICINE | Facility: CLINIC | Age: 57
End: 2017-08-09

## 2017-08-09 ENCOUNTER — LAB (OUTPATIENT)
Dept: LAB | Facility: HOSPITAL | Age: 57
End: 2017-08-09

## 2017-08-09 DIAGNOSIS — R53.82 CHRONIC FATIGUE: Primary | ICD-10-CM

## 2017-08-09 DIAGNOSIS — R53.82 CHRONIC FATIGUE: ICD-10-CM

## 2017-08-09 LAB
25(OH)D3 SERPL-MCNC: 35.8 NG/ML
ALBUMIN SERPL-MCNC: 4.3 G/DL (ref 3.5–5.2)
ALBUMIN/GLOB SERPL: 1.7 G/DL
ALP SERPL-CCNC: 69 U/L (ref 40–129)
ALT SERPL W P-5'-P-CCNC: 10 U/L (ref 5–33)
ANION GAP SERPL CALCULATED.3IONS-SCNC: 12.8 MMOL/L
AST SERPL-CCNC: 14 U/L (ref 5–32)
BASOPHILS # BLD AUTO: 0.05 10*3/MM3 (ref 0–0.2)
BASOPHILS NFR BLD AUTO: 0.6 % (ref 0–2)
BILIRUB SERPL-MCNC: 0.4 MG/DL (ref 0.2–1.2)
BUN BLD-MCNC: 14 MG/DL (ref 6–20)
BUN/CREAT SERPL: 21.9 (ref 7–25)
CALCIUM SPEC-SCNC: 9.2 MG/DL (ref 8.6–10.5)
CHLORIDE SERPL-SCNC: 99 MMOL/L (ref 98–107)
CO2 SERPL-SCNC: 28.2 MMOL/L (ref 22–29)
CREAT BLD-MCNC: 0.64 MG/DL (ref 0.57–1)
DEPRECATED RDW RBC AUTO: 48 FL (ref 37–54)
EOSINOPHIL # BLD AUTO: 0.15 10*3/MM3 (ref 0.1–0.3)
EOSINOPHIL NFR BLD AUTO: 1.8 % (ref 0–4)
ERYTHROCYTE [DISTWIDTH] IN BLOOD BY AUTOMATED COUNT: 15 % (ref 11.5–14.5)
GFR SERPL CREATININE-BSD FRML MDRD: 97 ML/MIN/1.73
GLOBULIN UR ELPH-MCNC: 2.6 GM/DL
GLUCOSE BLD-MCNC: 105 MG/DL (ref 65–99)
HCT VFR BLD AUTO: 37.7 % (ref 37–47)
HGB BLD-MCNC: 12.6 G/DL (ref 12–16)
IMM GRANULOCYTES # BLD: 0.03 10*3/MM3 (ref 0–0.03)
IMM GRANULOCYTES NFR BLD: 0.4 % (ref 0–0.5)
LYMPHOCYTES # BLD AUTO: 3.76 10*3/MM3 (ref 0.6–4.8)
LYMPHOCYTES NFR BLD AUTO: 46.1 % (ref 20–45)
MCH RBC QN AUTO: 29.2 PG (ref 27–31)
MCHC RBC AUTO-ENTMCNC: 33.4 G/DL (ref 31–37)
MCV RBC AUTO: 87.5 FL (ref 81–99)
MONOCYTES # BLD AUTO: 0.56 10*3/MM3 (ref 0–1)
MONOCYTES NFR BLD AUTO: 6.9 % (ref 3–8)
NEUTROPHILS # BLD AUTO: 3.61 10*3/MM3 (ref 1.5–8.3)
NEUTROPHILS NFR BLD AUTO: 44.2 % (ref 45–70)
NRBC BLD MANUAL-RTO: 0 /100 WBC (ref 0–0)
PLATELET # BLD AUTO: 286 10*3/MM3 (ref 140–500)
PMV BLD AUTO: 9.6 FL (ref 7.4–10.4)
POTASSIUM BLD-SCNC: 3.2 MMOL/L (ref 3.5–5.2)
PROT SERPL-MCNC: 6.9 G/DL (ref 6–8.5)
RBC # BLD AUTO: 4.31 10*6/MM3 (ref 4.2–5.4)
SODIUM BLD-SCNC: 140 MMOL/L (ref 136–145)
TSH SERPL DL<=0.05 MIU/L-ACNC: 3.23 MIU/ML (ref 0.27–4.2)
VIT B12 BLD-MCNC: 323 PG/ML (ref 211–946)
WBC NRBC COR # BLD: 8.16 10*3/MM3 (ref 4.8–10.8)

## 2017-08-09 PROCEDURE — 80053 COMPREHEN METABOLIC PANEL: CPT

## 2017-08-09 PROCEDURE — 36415 COLL VENOUS BLD VENIPUNCTURE: CPT

## 2017-08-09 PROCEDURE — 82607 VITAMIN B-12: CPT

## 2017-08-09 PROCEDURE — 82306 VITAMIN D 25 HYDROXY: CPT

## 2017-08-09 PROCEDURE — 85025 COMPLETE CBC W/AUTO DIFF WBC: CPT

## 2017-08-09 PROCEDURE — 84443 ASSAY THYROID STIM HORMONE: CPT

## 2017-08-09 NOTE — TELEPHONE ENCOUNTER
Orders entered.  LM on patient VM to call and set up appt for labs.    ----- Message from Lily Whelan MA sent at 8/9/2017  9:07 AM EDT -----  Regarding: FW: WHEN TO HAVE LABS      ----- Message -----     From: KAITLYNN Choi     Sent: 8/9/2017   8:21 AM       To: MOVL  Subject: FW: WHEN TO HAVE LABS                            Have her come in for a CMP, CBC. TSH. B12 , Vit D  ----- Message -----     From: Lily Whelan MA     Sent: 8/8/2017   5:07 PM       To: KAITLYNN Choi  Subject: FW: WHEN TO HAVE LABS                                ----- Message -----     From: Monica Perry     Sent: 8/8/2017  12:30 PM       To: MOVL  Subject: WHEN TO HAVE LABS                                MANOLO    Patient phoned asking when Laura wants her to have lab work again.  She said she is feeling extremely tired all the time.    [phone]

## 2017-08-14 ENCOUNTER — TELEPHONE (OUTPATIENT)
Dept: INTERNAL MEDICINE | Facility: CLINIC | Age: 57
End: 2017-08-14

## 2017-08-14 DIAGNOSIS — F51.01 PRIMARY INSOMNIA: ICD-10-CM

## 2017-08-14 DIAGNOSIS — I10 ESSENTIAL HYPERTENSION: Primary | ICD-10-CM

## 2017-08-14 DIAGNOSIS — R06.83 PRIMARY SNORING: ICD-10-CM

## 2017-08-14 DIAGNOSIS — R53.82 CHRONIC FATIGUE: ICD-10-CM

## 2017-08-14 NOTE — TELEPHONE ENCOUNTER
Patient advised labs stable per Laura and that hospital will be calling her to set up sleep study.  Patient voiced understanding and agreed.    ----- Message from Lily Whelan MA sent at 8/14/2017  9:20 AM EDT -----  Regarding: FW: lab results please      ----- Message -----     From: KAITLYNN Choi     Sent: 8/14/2017   9:15 AM       To: [employer]  [facility] Clinical Pool  Subject: FW: lab results please                           Her labs are stable. Let's evaluate her fatigue, insomnia, snoring with a sleep study.  ----- Message -----     From: Lily Whelan MA     Sent: 8/11/2017   9:45 AM       To: KAITLYNN Choi  Subject: FW: lab results please                           Made sure patient was aware that Laura is not in office today and had not personally reviewed labs yet.  Reviewed lab values with patient.  Advised patient result values all basically within range with just a few out-of-range but that these were also close to normal range.    Questioned patient about sleep.  She states that she does snore a lot, wakes frequently during the night, and has a lot of daytime sleepiness.  She is agreeable to sleep study if you agree.  ----- Message -----     From: Lily Wehlan MA     Sent: 8/11/2017   9:39 AM       To: Lily Whelan MA  Subject: FW: lab results please                               ----- Message -----     From: Monica Perry     Sent: 8/11/2017   9:18 AM       To: [employer]  [employer]  Subject: lab results please                               MANOLO    [phone]    Please call patient on Monday with lab results from Wednesday's draw.  Laura hasn't reviewed results yet.

## 2017-08-29 ENCOUNTER — HOSPITAL ENCOUNTER (OUTPATIENT)
Dept: SLEEP MEDICINE | Facility: HOSPITAL | Age: 57
Discharge: HOME | End: 2017-08-29
Admitting: INTERNAL MEDICINE

## 2017-08-29 DIAGNOSIS — G47.33 OSA (OBSTRUCTIVE SLEEP APNEA): Primary | ICD-10-CM

## 2017-08-29 PROCEDURE — G0463 HOSPITAL OUTPT CLINIC VISIT: HCPCS

## 2017-08-29 NOTE — CONSULTS
[facility]  Telephone: [phone] / Fax: [phone] [place]  Telephone: [phone] / Fax: [phone] Claritza Baxter    Referring Physician: KAITLYNN Choi  PCP: KAITLYNN Choi    Reason for consult:  Sleep maintenance insomnia  Shaylee Wadsworth was seen in the Sleep Disorders Center today. 56-year-old female who has had difficulty staying asleep for several years.  Initially she ascribed to menopausal symptoms.  She was on hormone treatment for the same with relief of symptoms.  She was subsequently taken off HRT.  She feels this has made her symptoms worse.  She occasionally uses Tylenol PM to help her fall asleep.  She states her sleep time is 8 PM and wake time is 4 AM.  She falls asleep quickly but is unable to stay asleep.  She has multiple arousals at night but is able to fall back to sleep again.  However she feels her sleep is very restless and frequently interrupted.  She reports excessive sleepiness during the day.  She denies being drowsy while driving.  However she generally feels exhausted by the time she gets home at night.  She has gained 20 pounds last 5 years.  Her  reports snoring at night.  However there are no history of witnessed apneas.  She tends to have some hip pains at night and grinds her teeth at night.  Her medical history is significant for poor memory and high blood pressure.  She is a smoker and denies any history of COPD.  She previously smoked 1 pack of cigarettes a day since age 22.  She has recently cut down to half pack a day.  She plans to quit smoking at the end of this year.  She drinks one cup of coffee a day.  She denies use of alcohol.      Shaylee Wadsworth  has a past medical history of Anxiety; Lainez esophagus; Esophageal reflux; Fibromyositis; H/O colonoscopy; H/O mammogram (08/31/2011); bone density study (08/31/2011); Hyperlipidemia; Hyperparathyroidism; Hypertension; Menopause; Osteopenia; and Pancreatitis. - patient states  "Rheumatoid Arthritis    Current Medications:    Current Outpatient Prescriptions:   •  calcium-vitamin D (OSCAL-500) 500-200 MG-UNIT per tablet, Take 1 tablet by mouth., Disp: , Rfl:   •  ENBREL SURECLICK 50 MG/ML solution auto-injector, , Disp: , Rfl:   •  esomeprazole (nexIUM) 40 MG capsule, Take one po bid, Disp: 60 capsule, Rfl: 6  •  estrogen, conjugated,-medroxyprogesterone (PREMPRO) 0.45-1.5 MG per tablet, Take 1 tablet by mouth daily., Disp: 84 tablet, Rfl: 3  •  folic acid (FOLVITE) 1 MG tablet, Take by mouth., Disp: , Rfl:   •  losartan-hydrochlorothiazide (HYZAAR) 100-25 MG per tablet, TAKE ONE TABLET BY MOUTH ONCE DAILY, Disp: 30 tablet, Rfl: 6  •  methotrexate 2.5 MG tablet, TAKE 4 TABS PO ONCE WEEKLY ON SAME DAY, Disp: 32 tablet, Rfl: 11  •  metoprolol succinate XL (TOPROL XL) 50 MG 24 hr tablet, Take 1 tablet by mouth Daily., Disp: 30 tablet, Rfl: 6  •  potassium chloride (K-DUR,KLOR-CON) 20 MEQ CR tablet, Take 2 tablets by mouth Daily., Disp: 180 tablet, Rfl: 3  •  simvastatin (ZOCOR) 80 MG tablet, Take 1 tablet by mouth Every Night., Disp: 90 tablet, Rfl: 1  •  vitamin D (ERGOCALCIFEROL) 69687 UNITS capsule capsule, Take 1 capsule by mouth 1 (One) Time Per Week., Disp: 12 capsule, Rfl: 3   also listed in Sleep Questionnaire.    I have reviewed Past Medical History, Past Surgical History, Medication List, Social History and Family History as entered in Sleep Questionnaire and EPIC.     Pertinent Positive Review of Systems of ear pain, painful joints, diarrhea  Rest of Review of Systems was negative as recorded in Sleep Questionnaire.    Vital Signs: Ht. 61.5\"; Wt. 173lbs;  / 61; HR 70; BMI:32; Neck circumference: 14\"        General: Alert. Cooperative. Well developed. No acute distress.             Head:  Normocephalic. Symmetrical. Atraumatic.              Eyes: Sclera clear. No icterus. PERRLA. Normal EOM.             Ears: No deformities. Normal hearing.             Nose: No septal " deviation. No drainage.          Throat: No oral lesions. No thrush. Moist mucous membranes.    Tongue is normal and dentition is normal       Pharynx: Posterior pharyngeal pillars are wide with Mallampati score of Class III     Chest Wall:  Normal shape. Symmetric expansion with respiration. No tenderness.             Neck:  Trachea midline.           Lungs:  Clear to auscultation bilaterally. No wheezes. No rhonchi. No rales. Respirations regular, even and unlabored.            Heart:  Regular rhythm and normal rate. Normal S1 and S2. No murmur.     Abdomen:  Soft, non-tender and non-distended. Normal bowel sounds. No masses.  Extremities:  Moves all extremities well. No edema.           Pulses: Pulses palpable and equal bilaterally.               Skin: Dry. Intact. No bleeding. No rash.           Neuro: Moves all 4 extremities and cranial nerves grossly intact.  Psychiatric: Normal mood and affect.    Impression:  · Hypersomnolence, snoring  · Sleep maintenance insomnia  · Obesity  · Current cigarette smoker  · Hypertension that is well controlled    Plan:  Patient reports sleep maintenance insomnia as well as excessive daytime sleepiness.  Associated symptoms of snoring noted.  This is likely a consequence of sleep-related disordered breathing.  Unclear if this is due to obstructive sleep apnea or a consequence of lifelong smoking.  A home sleep test will allow determination of apneas and hypopneas.  It will also report any sleep-related hypoxemia.  Treatment options would include oral appliance, CPAP machine.  If sleep-related hypoxemia only then patient will require treatment with a supplemental oxygen.    Other causes for sleep maintenance insomnia are possible.  Patient relates some hip pain though this does not appear very severe.  These may require further investigation with an in lab polysomnogram study if above is unrevealing.    Weight loss would be beneficial especially in setting of obstructive sleep  apnea.  Patient appears to have gained weight recently.    Her hypertension is well controlled.  I discussed cardiovascular health effects of untreated sleep apnea.    This patient has typical features of obstructive sleep apnea with hypersomnolence snoring and apneas along with morbid obesity and classic oropharyngeal structure.  Likelihood of obstructive sleep apnea is high and a polysomnogram study will therefore be requested.      Possible diagnosis and pathophysiology of obstructive sleep apnea was discussed with the patient.  Health risks of untreated obstructive sleep apnea including cardiovascular risks were discussed.  Patient was cautioned about activities requiring full concentration especially driving at night or for longer distances, and until his hypersomnolence is corrected.    Results of sleep testing will be reviewed to see if patient is a candidate for CPAP machine.  Alternatives to therapy include oral mandibular advancing device (OMAD) were discussed. However OMAD is usually only beneficial in mild obstructive sleep apnea.  The benefit of weight loss in reducing severity of obstructive sleep apnea was discussed.  Patient would benefit from adhering to a strict diet to achieve ideal BMI.     She will return to the office after completion of home sleep test.    Thank you for allowing me to participate in your patient's care.    Kurt Covington MD  [facility] SLEEP LAB PROVIDER SCHEDULE  8/29/2017    Part of this note may be an electronic transcription/translation of spoken language to printed text using the Dragon Dictation System.

## 2017-09-06 ENCOUNTER — HOSPITAL ENCOUNTER (OUTPATIENT)
Dept: SLEEP MEDICINE | Facility: HOSPITAL | Age: 57
Discharge: HOME | End: 2017-09-06
Admitting: INTERNAL MEDICINE

## 2017-09-06 DIAGNOSIS — G47.33 OSA (OBSTRUCTIVE SLEEP APNEA): ICD-10-CM

## 2017-09-06 PROCEDURE — 95806 SLEEP STUDY UNATT&RESP EFFT: CPT

## 2017-09-12 ENCOUNTER — TELEPHONE (OUTPATIENT)
Dept: SLEEP MEDICINE | Facility: HOSPITAL | Age: 57
End: 2017-09-12

## 2017-09-26 ENCOUNTER — HOSPITAL ENCOUNTER (OUTPATIENT)
Dept: SLEEP MEDICINE | Facility: HOSPITAL | Age: 57
Discharge: HOME | End: 2017-09-26
Admitting: INTERNAL MEDICINE

## 2017-09-26 PROCEDURE — G0463 HOSPITAL OUTPT CLINIC VISIT: HCPCS

## 2017-09-29 NOTE — PROGRESS NOTES
[facility]  Telephone: [phone] / Fax: [phone] [place]  Telephone: [phone] / Fax: [phone] Claritza Baxter    PCP: KAITLYNN Choi    Reason for visit: DERRICK f/u    Shaylee Wadsworth was last seen at [facility] sleep lab on 8/29/17 for initial consultation and was scheduled for polysomnography.  Mild obstructive sleep apnea was identified on a home sleep study performed on the 9/8/17.  Patient is here today to discuss treatment options.  She goes to bed at 8 PM and awakens at 3:30 AM.  Her ESS is 6.  She prefers to try the oral mandibular advancement device option rather than CPAP device. Medications are unchanged.    Social history, smokes cigarettes, no alcohol, no energy drinks, 1 caffeine a day.    Review of systems, unremarkable    Current Medications:    Current Outpatient Prescriptions:   •  calcium-vitamin D (OSCAL-500) 500-200 MG-UNIT per tablet, Take 1 tablet by mouth., Disp: , Rfl:   •  ENBREL SURECLICK 50 MG/ML solution auto-injector, , Disp: , Rfl:   •  esomeprazole (nexIUM) 40 MG capsule, Take one po bid, Disp: 60 capsule, Rfl: 6  •  estrogen, conjugated,-medroxyprogesterone (PREMPRO) 0.45-1.5 MG per tablet, Take 1 tablet by mouth daily., Disp: 84 tablet, Rfl: 3  •  folic acid (FOLVITE) 1 MG tablet, Take by mouth., Disp: , Rfl:   •  losartan-hydrochlorothiazide (HYZAAR) 100-25 MG per tablet, TAKE ONE TABLET BY MOUTH ONCE DAILY, Disp: 30 tablet, Rfl: 6  •  methotrexate 2.5 MG tablet, TAKE 4 TABS PO ONCE WEEKLY ON SAME DAY, Disp: 32 tablet, Rfl: 11  •  metoprolol succinate XL (TOPROL XL) 50 MG 24 hr tablet, Take 1 tablet by mouth Daily., Disp: 30 tablet, Rfl: 6  •  potassium chloride (K-DUR,KLOR-CON) 20 MEQ CR tablet, Take 2 tablets by mouth Daily., Disp: 180 tablet, Rfl: 3  •  simvastatin (ZOCOR) 80 MG tablet, Take 1 tablet by mouth Every Night., Disp: 90 tablet, Rfl: 1  •  vitamin D (ERGOCALCIFEROL) 77609 UNITS capsule capsule, Take 1 capsule by mouth 1 (One) Time Per  Week., Disp: 12 capsule, Rfl: 3   also entered in Sleep Questionnaire    Patient  has a past medical history of Anxiety; Lainez esophagus; Esophageal reflux; Fibromyositis; H/O colonoscopy; H/O mammogram (08/31/2011); bone density study (08/31/2011); Hyperlipidemia; Hyperparathyroidism; Hypertension; Menopause; Osteopenia; and Pancreatitis.    I have reviewed the Past Medical History, Past Surgical History, Social History and Family History.             Vital Signs: Height 62 inches, weight 275 pounds, BMI 32, blood pressure 103/57, heart rate 66           General: Alert. Cooperative. Well developed. No acute distress.           Throat: No oral lesions. No thrush. Moist mucous membranes.           Pharynx- Class 3 Mallampati airway, large tongue, no evidence of redundant lateral pharyngeal tissue             Head:  Normocephalic. Symmetrical. Atraumatic.              Nose: No septal deviation. No drainage.            Chest Wall -Normal shape. Symmetric expansion with respiration. No tenderness.             Neck:  Trachea midline.           Lungs:  Clear to auscultation bilaterally. No wheezes. No rhonchi. No rales. Respirations regular, even and unlabored.            Heart:  Regular rhythm and normal rate. Normal S1 and S2. No murmur.     Abdomen:  Soft, non-tender and non-distended. Normal bowel sounds. No masses.  Extremities:  Moves all extremities well. No edema.    Psychiatric: Normal mood and affect.    Testing:  AHI 7.2 indicating mild obstructive sleep apnea.  In supine position AHI 11.9.  Snoring for 4.5% of total sleep time. Saturation remained above 88%.    Impression:  Mild sleep apnea  Obesity  Current smoker  Sleep maintenance insomnia  Hypertension    Plan:    She has overall mild sleep apnea without significant hypoxemia.  Saturation remained above 88% throughout her study.  She prefers to use the mouth appliance over the CPAP device.  I will refer her to sleep dentistry for evaluation.  She will  require follow-up sleep study after the mouth appliance has been maximally adjusted.  Hopefully sleep maintenance insomnia will improve after sleep apnea is appropriately treated.  She was strongly advised to quit smoking.  Her blood pressure is currently controlled. Caution during activities that require prolonged concentration is strongly advised if sleepiness returns.      Follow up in 4 months.    Thank you for allowing me to participate in your patient's care.      KAITLYNN Horn  Dictating for Dr. [facility]  Phone: [phone]      Part of this note may be an electronic transcription/translation of spoken language to printed text using the Dragon Dictation System.

## 2017-10-03 DIAGNOSIS — I10 ESSENTIAL HYPERTENSION: ICD-10-CM

## 2017-10-03 RX ORDER — POTASSIUM CHLORIDE 1500 MG/1
TABLET, EXTENDED RELEASE ORAL
Qty: 60 TABLET | Refills: 11 | Status: SHIPPED | OUTPATIENT
Start: 2017-10-03 | End: 2017-10-09 | Stop reason: SDUPTHER

## 2017-10-04 ENCOUNTER — TRANSCRIBE ORDERS (OUTPATIENT)
Dept: ADMINISTRATIVE | Facility: HOSPITAL | Age: 57
End: 2017-10-04

## 2017-10-04 ENCOUNTER — LAB (OUTPATIENT)
Dept: LAB | Facility: HOSPITAL | Age: 57
End: 2017-10-04

## 2017-10-04 ENCOUNTER — APPOINTMENT (OUTPATIENT)
Dept: LAB | Facility: HOSPITAL | Age: 57
End: 2017-10-04

## 2017-10-04 DIAGNOSIS — I10 ESSENTIAL HYPERTENSION: Primary | ICD-10-CM

## 2017-10-04 DIAGNOSIS — I10 ESSENTIAL HYPERTENSION: ICD-10-CM

## 2017-10-04 LAB
ALBUMIN SERPL-MCNC: 4.1 G/DL (ref 3.5–5.2)
ALBUMIN/GLOB SERPL: 1.5 G/DL
ALP SERPL-CCNC: 65 U/L (ref 40–129)
ALT SERPL W P-5'-P-CCNC: 10 U/L (ref 5–33)
ANION GAP SERPL CALCULATED.3IONS-SCNC: 13.5 MMOL/L
AST SERPL-CCNC: 14 U/L (ref 5–32)
BILIRUB SERPL-MCNC: 0.4 MG/DL (ref 0.2–1.2)
BUN BLD-MCNC: 14 MG/DL (ref 6–20)
BUN/CREAT SERPL: 21.2 (ref 7–25)
CALCIUM SPEC-SCNC: 8.9 MG/DL (ref 8.6–10.5)
CHLORIDE SERPL-SCNC: 100 MMOL/L (ref 98–107)
CO2 SERPL-SCNC: 28.5 MMOL/L (ref 22–29)
CREAT BLD-MCNC: 0.66 MG/DL (ref 0.57–1)
GFR SERPL CREATININE-BSD FRML MDRD: 93 ML/MIN/1.73
GLOBULIN UR ELPH-MCNC: 2.7 GM/DL
GLUCOSE BLD-MCNC: 114 MG/DL (ref 65–99)
POTASSIUM BLD-SCNC: 2.9 MMOL/L (ref 3.5–5.2)
PROT SERPL-MCNC: 6.8 G/DL (ref 6–8.5)
SODIUM BLD-SCNC: 142 MMOL/L (ref 136–145)

## 2017-10-04 PROCEDURE — 36415 COLL VENOUS BLD VENIPUNCTURE: CPT

## 2017-10-04 PROCEDURE — 80053 COMPREHEN METABOLIC PANEL: CPT

## 2017-10-05 RX ORDER — ESOMEPRAZOLE MAGNESIUM 40 MG/1
CAPSULE, DELAYED RELEASE ORAL
Qty: 60 CAPSULE | Refills: 6 | Status: SHIPPED | OUTPATIENT
Start: 2017-10-05 | End: 2018-11-03 | Stop reason: SDUPTHER

## 2017-10-05 RX ORDER — SIMVASTATIN 80 MG
TABLET ORAL
Qty: 90 TABLET | Refills: 1 | Status: SHIPPED | OUTPATIENT
Start: 2017-10-05 | End: 2018-04-04 | Stop reason: SDUPTHER

## 2017-10-09 ENCOUNTER — OFFICE VISIT (OUTPATIENT)
Dept: INTERNAL MEDICINE | Facility: CLINIC | Age: 57
End: 2017-10-09

## 2017-10-09 VITALS
HEART RATE: 82 BPM | DIASTOLIC BLOOD PRESSURE: 80 MMHG | SYSTOLIC BLOOD PRESSURE: 124 MMHG | BODY MASS INDEX: 34.95 KG/M2 | HEIGHT: 60 IN | RESPIRATION RATE: 18 BRPM | WEIGHT: 178 LBS | OXYGEN SATURATION: 100 %

## 2017-10-09 DIAGNOSIS — E87.6 HYPOKALEMIA: ICD-10-CM

## 2017-10-09 DIAGNOSIS — I10 ESSENTIAL HYPERTENSION: Primary | ICD-10-CM

## 2017-10-09 PROCEDURE — 99213 OFFICE O/P EST LOW 20 MIN: CPT | Performed by: NURSE PRACTITIONER

## 2017-10-09 PROCEDURE — 90686 IIV4 VACC NO PRSV 0.5 ML IM: CPT | Performed by: NURSE PRACTITIONER

## 2017-10-09 PROCEDURE — 90471 IMMUNIZATION ADMIN: CPT | Performed by: NURSE PRACTITIONER

## 2017-10-09 RX ORDER — METOPROLOL SUCCINATE 25 MG/1
25 TABLET, EXTENDED RELEASE ORAL DAILY
Qty: 30 TABLET | Refills: 3 | Status: SHIPPED | OUTPATIENT
Start: 2017-10-09 | End: 2017-11-06

## 2017-10-09 RX ORDER — POTASSIUM CHLORIDE 20 MEQ/1
20 TABLET, EXTENDED RELEASE ORAL 2 TIMES DAILY
Qty: 180 TABLET | Refills: 11 | Status: SHIPPED | OUTPATIENT
Start: 2017-10-09 | End: 2018-11-03 | Stop reason: SDUPTHER

## 2017-10-09 NOTE — PATIENT INSTRUCTIONS
Steps to Quit Smoking   Smoking tobacco can be harmful to your health and can affect almost every organ in your body. Smoking puts you, and those around you, at risk for developing many serious chronic diseases. Quitting smoking is difficult, but it is one of the best things that you can do for your health. It is never too late to quit.  WHAT ARE THE BENEFITS OF QUITTING SMOKING?  When you quit smoking, you lower your risk of developing serious diseases and conditions, such as:  · Lung cancer or lung disease, such as COPD.  · Heart disease.  · Stroke.  · Heart attack.  · Infertility.  · Osteoporosis and bone fractures.  Additionally, symptoms such as coughing, wheezing, and shortness of breath may get better when you quit. You may also find that you get sick less often because your body is stronger at fighting off colds and infections. If you are pregnant, quitting smoking can help to reduce your chances of having a baby of low birth weight.  HOW DO I GET READY TO QUIT?  When you decide to quit smoking, create a plan to make sure that you are successful. Before you quit:  · Pick a date to quit. Set a date within the next two weeks to give you time to prepare.  · Write down the reasons why you are quitting. Keep this list in places where you will see it often, such as on your bathroom mirror or in your car or wallet.  · Identify the people, places, things, and activities that make you want to smoke (triggers) and avoid them. Make sure to take these actions:    Throw away all cigarettes at home, at work, and in your car.    Throw away smoking accessories, such as ashtrays and lighters.    Clean your car and make sure to empty the ashtray.    Clean your home, including curtains and carpets.  · Tell your family, friends, and coworkers that you are quitting. Support from your loved ones can make quitting easier.  · Talk with your health care provider about your options for quitting smoking.  · Find out what treatment  "options are covered by your health insurance.  WHAT STRATEGIES CAN I USE TO QUIT SMOKING?   Talk with your healthcare provider about different strategies to quit smoking. Some strategies include:  · Quitting smoking altogether instead of gradually lessening how much you smoke over a period of time. Research shows that quitting \"cold turkey\" is more successful than gradually quitting.  · Attending in-person counseling to help you build problem-solving skills. You are more likely to have success in quitting if you attend several counseling sessions. Even short sessions of 10 minutes can be effective.  · Finding resources and support systems that can help you to quit smoking and remain smoke-free after you quit. These resources are most helpful when you use them often. They can include:    Online chats with a counselor.    Telephone quitlines.    Printed self-help materials.    Support groups or group counseling.    Text messaging programs.    Mobile phone applications.  · Taking medicines to help you quit smoking. (If you are pregnant or breastfeeding, talk with your health care provider first.) Some medicines contain nicotine and some do not. Both types of medicines help with cravings, but the medicines that include nicotine help to relieve withdrawal symptoms. Your health care provider may recommend:    Nicotine patches, gum, or lozenges.    Nicotine inhalers or sprays.    Non-nicotine medicine that is taken by mouth.  Talk with your health care provider about combining strategies, such as taking medicines while you are also receiving in-person counseling. Using these two strategies together makes you more likely to succeed in quitting than if you used either strategy on its own.  If you are pregnant or breastfeeding, talk with your health care provider about finding counseling or other support strategies to quit smoking. Do not take medicine to help you quit smoking unless told to do so by your health care " provider.  WHAT THINGS CAN I DO TO MAKE IT EASIER TO QUIT?  Quitting smoking might feel overwhelming at first, but there is a lot that you can do to make it easier. Take these important actions:  · Reach out to your family and friends and ask that they support and encourage you during this time. Call telephone quitlines, reach out to support groups, or work with a counselor for support.  · Ask people who smoke to avoid smoking around you.  · Avoid places that trigger you to smoke, such as bars, parties, or smoke-break areas at work.  · Spend time around people who do not smoke.  · Lessen stress in your life, because stress can be a smoking trigger for some people. To lessen stress, try:    Exercising regularly.    Deep-breathing exercises.    Yoga.    Meditating.    Performing a body scan. This involves closing your eyes, scanning your body from head to toe, and noticing which parts of your body are particularly tense. Purposefully relax the muscles in those areas.  · Download or purchase mobile phone or tablet apps (applications) that can help you stick to your quit plan by providing reminders, tips, and encouragement. There are many free apps, such as QuitGuide from the CDC (Centers for Disease Control and Prevention). You can find other support for quitting smoking (smoking cessation) through smokefree.gov and other websites.  HOW WILL I FEEL WHEN I QUIT SMOKING?  Within the first 24 hours of quitting smoking, you may start to feel some withdrawal symptoms. These symptoms are usually most noticeable 2-3 days after quitting, but they usually do not last beyond 2-3 weeks. Changes or symptoms that you might experience include:  · Mood swings.  · Restlessness, anxiety, or irritation.  · Difficulty concentrating.  · Dizziness.  · Strong cravings for sugary foods in addition to nicotine.  · Mild weight gain.  · Constipation.  · Nausea.  · Coughing or a sore throat.  · Changes in how your medicines work in your  body.  · A depressed mood.  · Difficulty sleeping (insomnia).  After the first 2-3 weeks of quitting, you may start to notice more positive results, such as:  · Improved sense of smell and taste.  · Decreased coughing and sore throat.  · Slower heart rate.  · Lower blood pressure.  · Clearer skin.  · The ability to breathe more easily.  · Fewer sick days.  Quitting smoking is very challenging for most people. Do not get discouraged if you are not successful the first time. Some people need to make many attempts to quit before they achieve long-term success. Do your best to stick to your quit plan, and talk with your health care provider if you have any questions or concerns.     This information is not intended to replace advice given to you by your health care provider. Make sure you discuss any questions you have with your health care provider.     Document Released: 12/12/2002 Document Revised: 05/03/2016 Document Reviewed: 05/03/2016  [employer] Interactive Patient Education ©2017 [employer]

## 2017-10-09 NOTE — PROGRESS NOTES
Chief Complaint   Patient presents with   • Hypertension       Subjective     Shaylee Wadsworth is a 56 y.o. female being seen for a follow up appointment today regarding HTN and hypokalemia. She  Has been checking her BP at home, and it is running 100/60. She has had bloating from Toprol XL.       History of Present Illness     Allergies   Allergen Reactions   • Meloxicam Swelling     EYES AND FACE SWELLING  EYES AND FACE SWELLING         Current Outpatient Prescriptions:   •  calcium-vitamin D (OSCAL-500) 500-200 MG-UNIT per tablet, Take 1 tablet by mouth., Disp: , Rfl:   •  ENBREL SURECLICK 50 MG/ML solution auto-injector, , Disp: , Rfl:   •  esomeprazole (nexIUM) 40 MG capsule, TAKE ONE CAPSULE BY MOUTH TWICE DAILY, Disp: 60 capsule, Rfl: 6  •  estrogen, conjugated,-medroxyprogesterone (PREMPRO) 0.45-1.5 MG per tablet, Take 1 tablet by mouth daily., Disp: 84 tablet, Rfl: 3  •  folic acid (FOLVITE) 1 MG tablet, Take by mouth., Disp: , Rfl:   •  KLOR-CON 20 MEQ CR tablet, TAKE ONE TABLET BY MOUTH EVERY 12 HOURS, Disp: 60 tablet, Rfl: 11  •  losartan-hydrochlorothiazide (HYZAAR) 100-25 MG per tablet, TAKE ONE TABLET BY MOUTH ONCE DAILY, Disp: 30 tablet, Rfl: 6  •  methotrexate 2.5 MG tablet, TAKE 4 TABS PO ONCE WEEKLY ON SAME DAY, Disp: 32 tablet, Rfl: 11  •  metoprolol succinate XL (TOPROL XL) 50 MG 24 hr tablet, Take 1 tablet by mouth Daily., Disp: 30 tablet, Rfl: 6  •  potassium chloride (K-DUR,KLOR-CON) 20 MEQ CR tablet, Take 2 tablets by mouth Daily., Disp: 180 tablet, Rfl: 3  •  simvastatin (ZOCOR) 80 MG tablet, TAKE ONE TABLET BY MOUTH ONCE DAILY IN THE EVENING, Disp: 90 tablet, Rfl: 1  •  vitamin D (ERGOCALCIFEROL) 78377 UNITS capsule capsule, Take 1 capsule by mouth 1 (One) Time Per Week., Disp: 12 capsule, Rfl: 3    The following portions of the patient's history were reviewed and updated as appropriate: allergies, current medications, past family history, past medical history, past social history, past  surgical history and problem list.    Review of Systems   Constitutional: Negative.    HENT: Negative.    Eyes: Negative.    Respiratory: Negative.    Cardiovascular: Negative.    Gastrointestinal: Positive for abdominal distention.   Endocrine: Negative.    Genitourinary: Negative.    Musculoskeletal: Positive for arthralgias.   Allergic/Immunologic: Negative.    Neurological: Negative.    Hematological: Negative.    Psychiatric/Behavioral: Negative.        Assessment     Physical Exam   Constitutional: She is oriented to person, place, and time. She appears well-developed and well-nourished.   Neck: Neck supple. No thyromegaly present.   Cardiovascular: Normal rate, regular rhythm and normal heart sounds.    No murmur heard.  Pulmonary/Chest: Effort normal and breath sounds normal. No respiratory distress. She has no wheezes.   Musculoskeletal: She exhibits no edema.   Neurological: She is alert and oriented to person, place, and time.   Skin: Skin is warm and dry.   Psychiatric: She has a normal mood and affect. Her behavior is normal.   Vitals reviewed.      Plan     Her fasting labs were reviewed with the patient from last week.     Shaylee was seen today for hypertension.    Diagnoses and all orders for this visit:    Essential hypertension  -     metoprolol succinate XL (TOPROL-XL) 25 MG 24 hr tablet; Take 1 tablet by mouth Daily.  -     potassium chloride (KLOR-CON) 20 MEQ CR tablet; Take 1 tablet by mouth 2 (Two) Times a Day.  -     Comprehensive Metabolic Panel; Future  -     Comprehensive metabolic panel; Future  -     Conv Lipid Panel w/ Chol/HDL Ratio; Future    Hypokalemia  -     potassium chloride (KLOR-CON) 20 MEQ CR tablet; Take 1 tablet by mouth 2 (Two) Times a Day.  -     Comprehensive Metabolic Panel; Future  -     Comprehensive metabolic panel; Future      Follow up in 1 month, and split K-dur to twice daily. I want her to increase walking to 5 miles a day.

## 2017-10-14 ENCOUNTER — RESULTS ENCOUNTER (OUTPATIENT)
Dept: INTERNAL MEDICINE | Facility: CLINIC | Age: 57
End: 2017-10-14

## 2017-10-14 DIAGNOSIS — E87.6 HYPOKALEMIA: ICD-10-CM

## 2017-10-14 DIAGNOSIS — I10 ESSENTIAL HYPERTENSION: ICD-10-CM

## 2017-11-03 ENCOUNTER — LAB (OUTPATIENT)
Dept: LAB | Facility: HOSPITAL | Age: 57
End: 2017-11-03

## 2017-11-03 DIAGNOSIS — M51.9 DISC DISORDER OF THORACIC REGION: ICD-10-CM

## 2017-11-03 DIAGNOSIS — R10.13 EPIGASTRIC PAIN: Primary | ICD-10-CM

## 2017-11-03 DIAGNOSIS — R94.31 ABNORMAL ELECTROCARDIOGRAM: ICD-10-CM

## 2017-11-03 DIAGNOSIS — R93.89 ABNORMAL RADIOGRAPHIC EXAMINATION: ICD-10-CM

## 2017-11-03 LAB
ALBUMIN SERPL-MCNC: 4 G/DL (ref 3.5–5.2)
ALBUMIN/GLOB SERPL: 1.6 G/DL
ALP SERPL-CCNC: 59 U/L (ref 40–129)
ALT SERPL W P-5'-P-CCNC: 9 U/L (ref 5–33)
ANION GAP SERPL CALCULATED.3IONS-SCNC: 14.8 MMOL/L
AST SERPL-CCNC: 13 U/L (ref 5–32)
BILIRUB SERPL-MCNC: 0.5 MG/DL (ref 0.2–1.2)
BUN BLD-MCNC: 18 MG/DL (ref 6–20)
BUN/CREAT SERPL: 24.3 (ref 7–25)
CALCIUM SPEC-SCNC: 9 MG/DL (ref 8.6–10.5)
CHLORIDE SERPL-SCNC: 98 MMOL/L (ref 98–107)
CHOLEST SERPL-MCNC: 175 MG/DL (ref 0–200)
CO2 SERPL-SCNC: 28.2 MMOL/L (ref 22–29)
CREAT BLD-MCNC: 0.74 MG/DL (ref 0.57–1)
GFR SERPL CREATININE-BSD FRML MDRD: 82 ML/MIN/1.73
GLOBULIN UR ELPH-MCNC: 2.5 GM/DL
GLUCOSE BLD-MCNC: 166 MG/DL (ref 65–99)
HDLC SERPL-MCNC: 56 MG/DL (ref 40–60)
LDLC SERPL CALC-MCNC: 88 MG/DL (ref 0–100)
LDLC/HDLC SERPL: 1.57 {RATIO}
POTASSIUM BLD-SCNC: 3 MMOL/L (ref 3.5–5.2)
PROT SERPL-MCNC: 6.5 G/DL (ref 6–8.5)
SODIUM BLD-SCNC: 141 MMOL/L (ref 136–145)
TRIGL SERPL-MCNC: 155 MG/DL (ref 0–150)
VLDLC SERPL-MCNC: 31 MG/DL (ref 7–27)

## 2017-11-03 PROCEDURE — 36415 COLL VENOUS BLD VENIPUNCTURE: CPT | Performed by: NURSE PRACTITIONER

## 2017-11-03 PROCEDURE — 80053 COMPREHEN METABOLIC PANEL: CPT | Performed by: NURSE PRACTITIONER

## 2017-11-03 PROCEDURE — 80061 LIPID PANEL: CPT

## 2017-11-06 ENCOUNTER — OFFICE VISIT (OUTPATIENT)
Dept: INTERNAL MEDICINE | Facility: CLINIC | Age: 57
End: 2017-11-06

## 2017-11-06 VITALS
WEIGHT: 177 LBS | DIASTOLIC BLOOD PRESSURE: 98 MMHG | BODY MASS INDEX: 33.42 KG/M2 | OXYGEN SATURATION: 96 % | HEIGHT: 61 IN | SYSTOLIC BLOOD PRESSURE: 130 MMHG | HEART RATE: 79 BPM | RESPIRATION RATE: 16 BRPM | TEMPERATURE: 98 F

## 2017-11-06 DIAGNOSIS — E87.6 HYPOKALEMIA: Primary | ICD-10-CM

## 2017-11-06 DIAGNOSIS — I10 ESSENTIAL HYPERTENSION: ICD-10-CM

## 2017-11-06 PROCEDURE — 99213 OFFICE O/P EST LOW 20 MIN: CPT | Performed by: NURSE PRACTITIONER

## 2017-11-06 NOTE — PROGRESS NOTES
Chief Complaint   Patient presents with   • Hypertension     stopped metoprolol due to fatigue-bp up   • Abnormal Lab     hypokalemia       Subjective     Shaylee Wadsworth is a 56 y.o. female being seen for a follow up appointment today regarding HTN and hypokalemia.  She also has a history of hyperparathyroidism and RA.  She stopped Toprol XL due to fatigue and weight gain. She is currently on losartan 100/25mg daily. Her BP at home in 130/80s. She tosin headaches. She does report joint pains, but assumes it is related to       History of Present Illness     Allergies   Allergen Reactions   • Meloxicam Swelling     EYES AND FACE SWELLING  EYES AND FACE SWELLING         Current Outpatient Prescriptions:   •  calcium-vitamin D (OSCAL-500) 500-200 MG-UNIT per tablet, Take 1 tablet by mouth., Disp: , Rfl:   •  ENBREL SURECLICK 50 MG/ML solution auto-injector, , Disp: , Rfl:   •  esomeprazole (nexIUM) 40 MG capsule, TAKE ONE CAPSULE BY MOUTH TWICE DAILY, Disp: 60 capsule, Rfl: 6  •  estrogen, conjugated,-medroxyprogesterone (PREMPRO) 0.45-1.5 MG per tablet, Take 1 tablet by mouth daily., Disp: 84 tablet, Rfl: 3  •  folic acid (FOLVITE) 1 MG tablet, Take by mouth., Disp: , Rfl:   •  losartan-hydrochlorothiazide (HYZAAR) 100-25 MG per tablet, TAKE ONE TABLET BY MOUTH ONCE DAILY, Disp: 30 tablet, Rfl: 6  •  methotrexate 2.5 MG tablet, TAKE 4 TABS PO ONCE WEEKLY ON SAME DAY, Disp: 32 tablet, Rfl: 11  •  potassium chloride (KLOR-CON) 20 MEQ CR tablet, Take 1 tablet by mouth 2 (Two) Times a Day., Disp: 180 tablet, Rfl: 11  •  simvastatin (ZOCOR) 80 MG tablet, TAKE ONE TABLET BY MOUTH ONCE DAILY IN THE EVENING, Disp: 90 tablet, Rfl: 1  •  vitamin D (ERGOCALCIFEROL) 05199 UNITS capsule capsule, Take 1 capsule by mouth 1 (One) Time Per Week., Disp: 12 capsule, Rfl: 3  •  metoprolol succinate XL (TOPROL-XL) 25 MG 24 hr tablet, Take 1 tablet by mouth Daily., Disp: 30 tablet, Rfl: 3    The following portions of the patient's history  were reviewed and updated as appropriate: allergies, current medications, past family history, past medical history, past social history, past surgical history and problem list.    Review of Systems   Constitutional: Positive for fatigue.   HENT: Negative.    Eyes: Negative.    Respiratory: Negative.    Cardiovascular: Negative.    Gastrointestinal: Negative.    Endocrine: Negative.    Genitourinary: Negative.    Musculoskeletal: Positive for arthralgias. Negative for myalgias and neck stiffness.   Skin: Negative.    Allergic/Immunologic: Negative.    Neurological: Negative.  Negative for dizziness.   Hematological: Negative.    Psychiatric/Behavioral: Negative.        Assessment     Physical Exam   Constitutional: She is oriented to person, place, and time. She appears well-developed and well-nourished.   HENT:   Head: Normocephalic.   Right Ear: External ear normal.   Left Ear: External ear normal.   Nose: Nose normal.   Mouth/Throat: Oropharynx is clear and moist.   Neck: Neck supple. No thyromegaly present.   Cardiovascular: Normal rate, regular rhythm and normal heart sounds.    No murmur heard.  Pulmonary/Chest: Effort normal and breath sounds normal. No respiratory distress. She has no wheezes.   Musculoskeletal: Normal range of motion. She exhibits no edema.   Lymphadenopathy:     She has no cervical adenopathy.   Neurological: She is alert and oriented to person, place, and time.   Skin: Skin is warm and dry.   Psychiatric: She has a normal mood and affect. Her behavior is normal.   Vitals reviewed.      Plan     Her fasting labs were reviewed with the patient from last week.     Shaylee was seen today for hypertension and abnormal lab.    Diagnoses and all orders for this visit:    Hypokalemia  -     Magnesium  -     Aldosterone  -     Ambulatory Referral to Nephrology    Essential hypertension  -     Magnesium  -     Aldosterone  -     Ambulatory Referral to Nephrology    Her potassium continues to run low  despite a potassium sparing diuretic and supplementation. I am going to get Dr. Willis's opinion on renal function.

## 2017-11-07 ENCOUNTER — TELEPHONE (OUTPATIENT)
Dept: INTERNAL MEDICINE | Facility: CLINIC | Age: 57
End: 2017-11-07

## 2017-11-07 DIAGNOSIS — E87.6 HYPOKALEMIA: Primary | ICD-10-CM

## 2017-11-09 ENCOUNTER — TRANSCRIBE ORDERS (OUTPATIENT)
Dept: ADMINISTRATIVE | Facility: HOSPITAL | Age: 57
End: 2017-11-09

## 2017-11-09 ENCOUNTER — LAB (OUTPATIENT)
Dept: LAB | Facility: HOSPITAL | Age: 57
End: 2017-11-09
Attending: INTERNAL MEDICINE

## 2017-11-09 DIAGNOSIS — I10 ESSENTIAL HYPERTENSION, MALIGNANT: ICD-10-CM

## 2017-11-09 DIAGNOSIS — E87.6 HYPOPOTASSEMIA: Primary | ICD-10-CM

## 2017-11-09 DIAGNOSIS — E87.6 HYPOPOTASSEMIA: ICD-10-CM

## 2017-11-09 LAB
ALDOST SERPL-MCNC: 1.3 NG/DL (ref 0–30)
ANION GAP SERPL CALCULATED.3IONS-SCNC: 10.5 MMOL/L
BACTERIA UR QL AUTO: ABNORMAL /HPF
BILIRUB UR QL STRIP: ABNORMAL
BUN BLD-MCNC: 11 MG/DL (ref 6–20)
BUN/CREAT SERPL: 13.9 (ref 7–25)
CALCIUM SPEC-SCNC: 9.7 MG/DL (ref 8.6–10.5)
CHLORIDE SERPL-SCNC: 99 MMOL/L (ref 98–107)
CLARITY UR: CLEAR
CO2 SERPL-SCNC: 33.5 MMOL/L (ref 22–29)
COLOR UR: YELLOW
CREAT BLD-MCNC: 0.79 MG/DL (ref 0.57–1)
GFR SERPL CREATININE-BSD FRML MDRD: 76 ML/MIN/1.73
GLUCOSE BLD-MCNC: 94 MG/DL (ref 65–99)
GLUCOSE UR STRIP-MCNC: NEGATIVE MG/DL
HGB UR QL STRIP.AUTO: ABNORMAL
HYALINE CASTS UR QL AUTO: ABNORMAL /LPF
KETONES UR QL STRIP: ABNORMAL
LEUKOCYTE ESTERASE UR QL STRIP.AUTO: ABNORMAL
MAGNESIUM SERPL-MCNC: 1.6 MG/DL (ref 1.7–2.5)
MAGNESIUM SERPL-MCNC: 1.8 MG/DL (ref 1.7–2.5)
NITRITE UR QL STRIP: NEGATIVE
PH UR STRIP.AUTO: 5.5 [PH] (ref 4.5–8)
POTASSIUM BLD-SCNC: 3.1 MMOL/L (ref 3.5–5.2)
PROT UR QL STRIP: NEGATIVE
RBC # UR: ABNORMAL /HPF
REF LAB TEST METHOD: ABNORMAL
SODIUM BLD-SCNC: 143 MMOL/L (ref 136–145)
SP GR UR STRIP: 1.02 (ref 1–1.03)
SQUAMOUS #/AREA URNS HPF: ABNORMAL /HPF
UROBILINOGEN UR QL STRIP: ABNORMAL
WBC UR QL AUTO: ABNORMAL /HPF

## 2017-11-09 PROCEDURE — 80048 BASIC METABOLIC PNL TOTAL CA: CPT

## 2017-11-09 PROCEDURE — 87147 CULTURE TYPE IMMUNOLOGIC: CPT

## 2017-11-09 PROCEDURE — 36415 COLL VENOUS BLD VENIPUNCTURE: CPT

## 2017-11-09 PROCEDURE — 87086 URINE CULTURE/COLONY COUNT: CPT

## 2017-11-09 PROCEDURE — 81001 URINALYSIS AUTO W/SCOPE: CPT

## 2017-11-09 PROCEDURE — 83735 ASSAY OF MAGNESIUM: CPT

## 2017-11-11 LAB
BACTERIA SPEC AEROBE CULT: ABNORMAL
STREP GROUPING: ABNORMAL

## 2017-11-17 ENCOUNTER — TELEPHONE (OUTPATIENT)
Dept: INTERNAL MEDICINE | Facility: CLINIC | Age: 57
End: 2017-11-17

## 2017-11-17 NOTE — TELEPHONE ENCOUNTER
Faxed.   ----- Message from KAITLYNN Choi sent at 11/14/2017  8:20 AM EST -----  Potassium level in urine is normal. Fax to nephrology for appt.   ----- Message -----     From: Rajni Maldonado Results In     Sent: 11/13/2017   4:14 PM       To: KAITLYNN Choi

## 2017-11-30 ENCOUNTER — LAB (OUTPATIENT)
Dept: LAB | Facility: HOSPITAL | Age: 57
End: 2017-11-30
Attending: INTERNAL MEDICINE

## 2017-11-30 ENCOUNTER — TRANSCRIBE ORDERS (OUTPATIENT)
Dept: ADMINISTRATIVE | Facility: HOSPITAL | Age: 57
End: 2017-11-30

## 2017-11-30 DIAGNOSIS — E87.6 HYPOKALEMIA: ICD-10-CM

## 2017-11-30 DIAGNOSIS — E87.6 HYPOKALEMIA: Primary | ICD-10-CM

## 2017-11-30 DIAGNOSIS — I10 ESSENTIAL HYPERTENSION, MALIGNANT: ICD-10-CM

## 2017-11-30 DIAGNOSIS — E55.9 VITAMIN D DEFICIENCY DISEASE: ICD-10-CM

## 2017-11-30 LAB
ANION GAP SERPL CALCULATED.3IONS-SCNC: 10.7 MMOL/L
BASOPHILS # BLD AUTO: 0.05 10*3/MM3 (ref 0–0.2)
BASOPHILS NFR BLD AUTO: 0.7 % (ref 0–2)
BUN BLD-MCNC: 14 MG/DL (ref 6–20)
BUN/CREAT SERPL: 21.2 (ref 7–25)
CALCIUM SPEC-SCNC: 9.4 MG/DL (ref 8.6–10.5)
CHLORIDE SERPL-SCNC: 101 MMOL/L (ref 98–107)
CO2 SERPL-SCNC: 28.3 MMOL/L (ref 22–29)
CREAT BLD-MCNC: 0.66 MG/DL (ref 0.57–1)
DEPRECATED RDW RBC AUTO: 46.6 FL (ref 37–54)
EOSINOPHIL # BLD AUTO: 0.1 10*3/MM3 (ref 0.1–0.3)
EOSINOPHIL NFR BLD AUTO: 1.5 % (ref 0–4)
ERYTHROCYTE [DISTWIDTH] IN BLOOD BY AUTOMATED COUNT: 14.3 % (ref 11.5–14.5)
GFR SERPL CREATININE-BSD FRML MDRD: 93 ML/MIN/1.73
GLUCOSE BLD-MCNC: 107 MG/DL (ref 65–99)
HCT VFR BLD AUTO: 37.1 % (ref 37–47)
HGB BLD-MCNC: 12.5 G/DL (ref 12–16)
IMM GRANULOCYTES # BLD: 0.01 10*3/MM3 (ref 0–0.03)
IMM GRANULOCYTES NFR BLD: 0.1 % (ref 0–0.5)
LYMPHOCYTES # BLD AUTO: 3.21 10*3/MM3 (ref 0.6–4.8)
LYMPHOCYTES NFR BLD AUTO: 47.8 % (ref 20–45)
MCH RBC QN AUTO: 30.4 PG (ref 27–31)
MCHC RBC AUTO-ENTMCNC: 33.7 G/DL (ref 31–37)
MCV RBC AUTO: 90.3 FL (ref 81–99)
MONOCYTES # BLD AUTO: 0.46 10*3/MM3 (ref 0–1)
MONOCYTES NFR BLD AUTO: 6.9 % (ref 3–8)
NEUTROPHILS # BLD AUTO: 2.88 10*3/MM3 (ref 1.5–8.3)
NEUTROPHILS NFR BLD AUTO: 43 % (ref 45–70)
NRBC BLD MANUAL-RTO: 0 /100 WBC (ref 0–0)
PLATELET # BLD AUTO: 223 10*3/MM3 (ref 140–500)
PMV BLD AUTO: 9.4 FL (ref 7.4–10.4)
POTASSIUM BLD-SCNC: 3.9 MMOL/L (ref 3.5–5.2)
RBC # BLD AUTO: 4.11 10*6/MM3 (ref 4.2–5.4)
SODIUM BLD-SCNC: 140 MMOL/L (ref 136–145)
WBC NRBC COR # BLD: 6.71 10*3/MM3 (ref 4.8–10.8)

## 2017-11-30 PROCEDURE — 80048 BASIC METABOLIC PNL TOTAL CA: CPT

## 2017-11-30 PROCEDURE — 36415 COLL VENOUS BLD VENIPUNCTURE: CPT

## 2017-11-30 PROCEDURE — 85025 COMPLETE CBC W/AUTO DIFF WBC: CPT

## 2017-12-04 ENCOUNTER — LAB (OUTPATIENT)
Dept: LAB | Facility: HOSPITAL | Age: 57
End: 2017-12-04
Attending: INTERNAL MEDICINE

## 2017-12-04 ENCOUNTER — TRANSCRIBE ORDERS (OUTPATIENT)
Dept: ADMINISTRATIVE | Facility: HOSPITAL | Age: 57
End: 2017-12-04

## 2017-12-04 DIAGNOSIS — I10 ESSENTIAL HYPERTENSION, MALIGNANT: Primary | ICD-10-CM

## 2017-12-04 DIAGNOSIS — E87.6 HYPOPOTASSEMIA: ICD-10-CM

## 2017-12-04 DIAGNOSIS — E55.9 AVITAMINOSIS D: ICD-10-CM

## 2017-12-04 DIAGNOSIS — I10 ESSENTIAL HYPERTENSION, MALIGNANT: ICD-10-CM

## 2017-12-04 PROCEDURE — 81050 URINALYSIS VOLUME MEASURE: CPT

## 2017-12-04 PROCEDURE — 84560 ASSAY OF URINE/URIC ACID: CPT

## 2017-12-05 LAB
URATE 24H UR-MRATE: 453.2 MG/24 HR (ref 250–750)
URATE UR-MCNC: 41.2 MG/DL

## 2017-12-16 DIAGNOSIS — N95.1 MENOPAUSAL STATE: ICD-10-CM

## 2017-12-18 RX ORDER — ESTROGEN,CON/M-PROGEST ACET 0.45-1.5MG
TABLET ORAL
Qty: 28 TABLET | Refills: 11 | Status: SHIPPED | OUTPATIENT
Start: 2017-12-18 | End: 2019-06-03 | Stop reason: SDUPTHER

## 2018-01-08 DIAGNOSIS — I10 ESSENTIAL HYPERTENSION: ICD-10-CM

## 2018-01-08 RX ORDER — LOSARTAN POTASSIUM 100 MG/1
TABLET ORAL
Qty: 30 TABLET | Refills: 2 | OUTPATIENT
Start: 2018-01-08

## 2018-01-08 RX ORDER — LOSARTAN POTASSIUM AND HYDROCHLOROTHIAZIDE 25; 100 MG/1; MG/1
1 TABLET ORAL DAILY
Qty: 30 TABLET | Refills: 6 | Status: SHIPPED | OUTPATIENT
Start: 2018-01-08 | End: 2018-01-09 | Stop reason: DRUGHIGH

## 2018-01-09 ENCOUNTER — TELEPHONE (OUTPATIENT)
Dept: INTERNAL MEDICINE | Facility: CLINIC | Age: 58
End: 2018-01-09

## 2018-01-09 RX ORDER — LOSARTAN POTASSIUM 100 MG/1
100 TABLET ORAL DAILY
Qty: 90 TABLET | Refills: 1 | Status: SHIPPED | OUTPATIENT
Start: 2018-01-09 | End: 2018-03-30 | Stop reason: SINTOL

## 2018-01-09 NOTE — TELEPHONE ENCOUNTER
----- Message from KAITLYNN Choi sent at 1/9/2018 11:16 AM EST -----  Yes, the HCTZ should be removed. Please correct script for Losartan without diuretic  ----- Message -----     From: Amparo Stallworth MA     Sent: 1/9/2018  10:46 AM       To: KAITLYNN Choi    PT CALLED TO FIND OUT IF SHE IS SUPPOSE TO BE TAKING  LOSARTAN W/HCTZ THAT WE SENT TO PHARMACY.  SPECIALIST HAS  ORDER  LOSARTAN  W/O  HCTZ AND SHE DOES NOT DO VERY WELL ON THE LOSARTAN W/HCTZ    Pt aware,   Corrected   losartan

## 2018-02-05 RX ORDER — AMLODIPINE BESYLATE 5 MG/1
TABLET ORAL
Qty: 30 TABLET | Refills: 2 | OUTPATIENT
Start: 2018-02-05

## 2018-02-05 NOTE — TELEPHONE ENCOUNTER
Received refill request for Amlodipine, but she is not a pt of Dr Lara.  It looks like request should have been sent to Alvarado Velasquez with Nephrology.  Sent request back to pharmacy.

## 2018-02-12 RX ORDER — AMLODIPINE BESYLATE 5 MG/1
TABLET ORAL
Qty: 30 TABLET | Refills: 2 | OUTPATIENT
Start: 2018-02-12

## 2018-02-16 ENCOUNTER — TELEPHONE (OUTPATIENT)
Dept: INTERNAL MEDICINE | Facility: CLINIC | Age: 58
End: 2018-02-16

## 2018-02-16 RX ORDER — AMLODIPINE BESYLATE 5 MG/1
5 TABLET ORAL DAILY
Qty: 30 TABLET | Refills: 5 | Status: SHIPPED | OUTPATIENT
Start: 2018-02-16 | End: 2018-04-10 | Stop reason: SDUPTHER

## 2018-02-16 NOTE — TELEPHONE ENCOUNTER
----- Message from KAITLYNN Choi sent at 2/16/2018  7:50 AM EST -----  Regarding: FW: AMLODOPINE / NORVASC  Contact: [phone]  This is not on her med list. Please verify medication and dosage from pharmacy. We can refill enough for 6 months after clarification.   ----- Message -----     From: Amparo Stallworth MA     Sent: 2/15/2018   4:20 PM       To: KAITLYNN Choi  Subject: FW: AMLODOPINE / NORVASC                             ----- Message -----     From: Jimmy Crocker     Sent: 2/15/2018   4:00 PM       To: [facility]  Subject: AMLODOPINE / NORVASC                             [facility] PT    Patient called to see if lalo would write her a script for this med. She was getting it from dr mac and now they refused to fill it.    [facility] [place]    Thanks!  jimmy      Called and  Verified  From  Kathy at [facility]   amlodopine   5 mg take one po daily    Sent to pharmacy pt aware

## 2018-02-17 DIAGNOSIS — N95.1 MENOPAUSAL STATE: ICD-10-CM

## 2018-02-17 DIAGNOSIS — M85.80 OSTEOPENIA: ICD-10-CM

## 2018-02-19 RX ORDER — ESTROGEN,CON/M-PROGEST ACET 0.45-1.5MG
TABLET ORAL
Qty: 28 TABLET | Refills: 11 | Status: SHIPPED | OUTPATIENT
Start: 2018-02-19 | End: 2018-04-10 | Stop reason: SDUPTHER

## 2018-02-19 RX ORDER — ERGOCALCIFEROL 1.25 MG/1
CAPSULE ORAL
Qty: 12 CAPSULE | Refills: 3 | Status: SHIPPED | OUTPATIENT
Start: 2018-02-19 | End: 2018-12-23 | Stop reason: SDUPTHER

## 2018-03-22 ENCOUNTER — TRANSCRIBE ORDERS (OUTPATIENT)
Dept: ADMINISTRATIVE | Facility: HOSPITAL | Age: 58
End: 2018-03-22

## 2018-03-22 ENCOUNTER — LAB (OUTPATIENT)
Dept: LAB | Facility: HOSPITAL | Age: 58
End: 2018-03-22

## 2018-03-22 DIAGNOSIS — E87.6 HYPOKALEMIA: Primary | ICD-10-CM

## 2018-03-22 DIAGNOSIS — E87.6 HYPOKALEMIA: ICD-10-CM

## 2018-03-22 LAB
ALBUMIN SERPL-MCNC: 4 G/DL (ref 3.5–5.2)
ALBUMIN/GLOB SERPL: 1.4 G/DL
ALP SERPL-CCNC: 73 U/L (ref 40–129)
ALT SERPL W P-5'-P-CCNC: 9 U/L (ref 5–33)
ANION GAP SERPL CALCULATED.3IONS-SCNC: 10.5 MMOL/L
AST SERPL-CCNC: 11 U/L (ref 5–32)
BILIRUB SERPL-MCNC: 0.2 MG/DL (ref 0.2–1.2)
BUN BLD-MCNC: 11 MG/DL (ref 6–20)
BUN/CREAT SERPL: 16.9 (ref 7–25)
CALCIUM SPEC-SCNC: 9.6 MG/DL (ref 8.6–10.5)
CHLORIDE SERPL-SCNC: 104 MMOL/L (ref 98–107)
CO2 SERPL-SCNC: 29.5 MMOL/L (ref 22–29)
CREAT BLD-MCNC: 0.65 MG/DL (ref 0.57–1)
GFR SERPL CREATININE-BSD FRML MDRD: 95 ML/MIN/1.73
GLOBULIN UR ELPH-MCNC: 2.8 GM/DL
GLUCOSE BLD-MCNC: 101 MG/DL (ref 65–99)
POTASSIUM BLD-SCNC: 3.2 MMOL/L (ref 3.5–5.2)
PROT SERPL-MCNC: 6.8 G/DL (ref 6–8.5)
SODIUM BLD-SCNC: 144 MMOL/L (ref 136–145)

## 2018-03-22 PROCEDURE — 80053 COMPREHEN METABOLIC PANEL: CPT

## 2018-03-22 PROCEDURE — 36415 COLL VENOUS BLD VENIPUNCTURE: CPT

## 2018-03-30 ENCOUNTER — TELEPHONE (OUTPATIENT)
Dept: INTERNAL MEDICINE | Facility: CLINIC | Age: 58
End: 2018-03-30

## 2018-03-30 RX ORDER — VALSARTAN 320 MG/1
320 TABLET ORAL DAILY
Qty: 30 TABLET | Refills: 3 | Status: SHIPPED | OUTPATIENT
Start: 2018-03-30 | End: 2018-04-10 | Stop reason: SDUPTHER

## 2018-03-30 NOTE — TELEPHONE ENCOUNTER
PT AWARE,   START DIOVAN INT HE MORNING    ----- Message from KAITLYNN Choi sent at 3/30/2018 11:02 AM EDT -----  Stay on Diovan WITH Amlodipine    ----- Message -----  From: Amparo Stallowrth MA  Sent: 3/30/2018   9:50 AM  To: KAITLYNN Choi    DO YOU WANT HER TO STOP THE AMLODIPINE  5MG OR STAY ON THIS W/THE DIOVAN  ----- Message -----  From: KAITLYNN Choi  Sent: 3/30/2018   9:17 AM  To: Amparo Stallworth MA    Stop losartan 100mg to     Diovan 320mg   Si po once daily  Disp #30, 3 refills    BP Follow up will be needed in 2 weeks.    ----- Message -----  From: Brianne Villa  Sent: 3/30/2018   9:03 AM  To: KAITLYNN Choi    PATIENT CALLED TO SAY YOU CHANGED YOU CHANGED HER BP MEDS  AND SOMETHING ISN'T RIGHT BECAUSE HER BLOOD PRESSURE IS RUNNING 160/100 AND SHE IS WAKING UP WITH HEADACHES EVERYDAY NOW.      [phone]

## 2018-04-04 ENCOUNTER — APPOINTMENT (OUTPATIENT)
Dept: CT IMAGING | Facility: HOSPITAL | Age: 58
End: 2018-04-04

## 2018-04-04 ENCOUNTER — HOSPITAL ENCOUNTER (EMERGENCY)
Facility: HOSPITAL | Age: 58
Discharge: HOME | End: 2018-04-04
Attending: EMERGENCY MEDICINE | Admitting: EMERGENCY MEDICINE

## 2018-04-04 ENCOUNTER — TELEPHONE (OUTPATIENT)
Dept: INTERNAL MEDICINE | Facility: CLINIC | Age: 58
End: 2018-04-04

## 2018-04-04 ENCOUNTER — APPOINTMENT (OUTPATIENT)
Dept: MRI IMAGING | Facility: HOSPITAL | Age: 58
End: 2018-04-04

## 2018-04-04 VITALS
HEIGHT: 61 IN | DIASTOLIC BLOOD PRESSURE: 82 MMHG | OXYGEN SATURATION: 100 % | HEART RATE: 67 BPM | SYSTOLIC BLOOD PRESSURE: 146 MMHG | WEIGHT: 180 LBS | RESPIRATION RATE: 18 BRPM | TEMPERATURE: 98 F | BODY MASS INDEX: 33.99 KG/M2

## 2018-04-04 DIAGNOSIS — I10 HYPERTENSION, UNSPECIFIED TYPE: ICD-10-CM

## 2018-04-04 DIAGNOSIS — R42 LOSS OF EQUILIBRIUM: Primary | ICD-10-CM

## 2018-04-04 LAB
ALBUMIN SERPL-MCNC: 4.5 G/DL (ref 3.5–5.2)
ALBUMIN/GLOB SERPL: 1.8 G/DL
ALP SERPL-CCNC: 70 U/L (ref 40–129)
ALT SERPL W P-5'-P-CCNC: 11 U/L (ref 5–33)
ANION GAP SERPL CALCULATED.3IONS-SCNC: 13.4 MMOL/L
APTT PPP: 40.6 SECONDS (ref 24.3–38.1)
AST SERPL-CCNC: 13 U/L (ref 5–32)
BACTERIA UR QL AUTO: ABNORMAL /HPF
BASOPHILS # BLD AUTO: 0.05 10*3/MM3 (ref 0–0.2)
BASOPHILS NFR BLD AUTO: 0.6 % (ref 0–2)
BILIRUB SERPL-MCNC: 0.3 MG/DL (ref 0.2–1.2)
BILIRUB UR QL STRIP: NEGATIVE
BUN BLD-MCNC: 13 MG/DL (ref 6–20)
BUN/CREAT SERPL: 20.6 (ref 7–25)
CALCIUM SPEC-SCNC: 9.6 MG/DL (ref 8.6–10.5)
CHLORIDE SERPL-SCNC: 102 MMOL/L (ref 98–107)
CLARITY UR: CLEAR
CO2 SERPL-SCNC: 25.6 MMOL/L (ref 22–29)
COLOR UR: YELLOW
CREAT BLD-MCNC: 0.63 MG/DL (ref 0.57–1)
DEPRECATED RDW RBC AUTO: 45 FL (ref 37–54)
EOSINOPHIL # BLD AUTO: 0.11 10*3/MM3 (ref 0.1–0.3)
EOSINOPHIL NFR BLD AUTO: 1.4 % (ref 0–4)
ERYTHROCYTE [DISTWIDTH] IN BLOOD BY AUTOMATED COUNT: 14 % (ref 11.5–14.5)
GFR SERPL CREATININE-BSD FRML MDRD: 98 ML/MIN/1.73
GLOBULIN UR ELPH-MCNC: 2.5 GM/DL
GLUCOSE BLD-MCNC: 104 MG/DL (ref 65–99)
GLUCOSE BLDC GLUCOMTR-MCNC: 105 MG/DL (ref 70–130)
GLUCOSE UR STRIP-MCNC: NEGATIVE MG/DL
HCT VFR BLD AUTO: 37.5 % (ref 37–47)
HGB BLD-MCNC: 12.8 G/DL (ref 12–16)
HGB UR QL STRIP.AUTO: ABNORMAL
HYALINE CASTS UR QL AUTO: ABNORMAL /LPF
IMM GRANULOCYTES # BLD: 0.01 10*3/MM3 (ref 0–0.03)
IMM GRANULOCYTES NFR BLD: 0.1 % (ref 0–0.5)
INR PPP: 1 (ref 0.9–1.1)
KETONES UR QL STRIP: NEGATIVE
LEUKOCYTE ESTERASE UR QL STRIP.AUTO: ABNORMAL
LYMPHOCYTES # BLD AUTO: 3.49 10*3/MM3 (ref 0.6–4.8)
LYMPHOCYTES NFR BLD AUTO: 45.2 % (ref 20–45)
MCH RBC QN AUTO: 30.3 PG (ref 27–31)
MCHC RBC AUTO-ENTMCNC: 34.1 G/DL (ref 31–37)
MCV RBC AUTO: 88.9 FL (ref 81–99)
MONOCYTES # BLD AUTO: 0.49 10*3/MM3 (ref 0–1)
MONOCYTES NFR BLD AUTO: 6.3 % (ref 3–8)
NEUTROPHILS # BLD AUTO: 3.57 10*3/MM3 (ref 1.5–8.3)
NEUTROPHILS NFR BLD AUTO: 46.4 % (ref 45–70)
NITRITE UR QL STRIP: NEGATIVE
NRBC BLD MANUAL-RTO: 0 /100 WBC (ref 0–0)
PH UR STRIP.AUTO: 5.5 [PH] (ref 4.5–8)
PLATELET # BLD AUTO: 260 10*3/MM3 (ref 140–500)
PMV BLD AUTO: 9.5 FL (ref 7.4–10.4)
POTASSIUM BLD-SCNC: 4 MMOL/L (ref 3.5–5.2)
PROT SERPL-MCNC: 7 G/DL (ref 6–8.5)
PROT UR QL STRIP: NEGATIVE
PROTHROMBIN TIME: 13.2 SECONDS (ref 12.1–15)
RBC # BLD AUTO: 4.22 10*6/MM3 (ref 4.2–5.4)
RBC # UR: ABNORMAL /HPF
REF LAB TEST METHOD: ABNORMAL
SODIUM BLD-SCNC: 141 MMOL/L (ref 136–145)
SP GR UR STRIP: 1.01 (ref 1–1.03)
SQUAMOUS #/AREA URNS HPF: ABNORMAL /HPF
TROPONIN T SERPL-MCNC: <0.01 NG/ML (ref 0–0.03)
UROBILINOGEN UR QL STRIP: ABNORMAL
WBC NRBC COR # BLD: 7.72 10*3/MM3 (ref 4.8–10.8)
WBC UR QL AUTO: ABNORMAL /HPF

## 2018-04-04 PROCEDURE — 93005 ELECTROCARDIOGRAM TRACING: CPT | Performed by: EMERGENCY MEDICINE

## 2018-04-04 PROCEDURE — 85025 COMPLETE CBC W/AUTO DIFF WBC: CPT | Performed by: EMERGENCY MEDICINE

## 2018-04-04 PROCEDURE — 85730 THROMBOPLASTIN TIME PARTIAL: CPT | Performed by: EMERGENCY MEDICINE

## 2018-04-04 PROCEDURE — 99285 EMERGENCY DEPT VISIT HI MDM: CPT | Performed by: EMERGENCY MEDICINE

## 2018-04-04 PROCEDURE — 80053 COMPREHEN METABOLIC PANEL: CPT | Performed by: EMERGENCY MEDICINE

## 2018-04-04 PROCEDURE — 99284 EMERGENCY DEPT VISIT MOD MDM: CPT

## 2018-04-04 PROCEDURE — 84484 ASSAY OF TROPONIN QUANT: CPT | Performed by: EMERGENCY MEDICINE

## 2018-04-04 PROCEDURE — 85610 PROTHROMBIN TIME: CPT | Performed by: EMERGENCY MEDICINE

## 2018-04-04 PROCEDURE — 70450 CT HEAD/BRAIN W/O DYE: CPT

## 2018-04-04 PROCEDURE — 70551 MRI BRAIN STEM W/O DYE: CPT

## 2018-04-04 PROCEDURE — 93010 ELECTROCARDIOGRAM REPORT: CPT | Performed by: INTERNAL MEDICINE

## 2018-04-04 PROCEDURE — 82962 GLUCOSE BLOOD TEST: CPT

## 2018-04-04 PROCEDURE — 81001 URINALYSIS AUTO W/SCOPE: CPT | Performed by: EMERGENCY MEDICINE

## 2018-04-04 RX ORDER — METHYLPREDNISOLONE 4 MG/1
TABLET ORAL
Qty: 21 TABLET | Refills: 0 | Status: SHIPPED | OUTPATIENT
Start: 2018-04-04 | End: 2018-04-10

## 2018-04-04 RX ORDER — SIMVASTATIN 80 MG
TABLET ORAL
Qty: 90 TABLET | Refills: 1 | Status: SHIPPED | OUTPATIENT
Start: 2018-04-04 | End: 2018-07-20 | Stop reason: ALTCHOICE

## 2018-04-04 RX ORDER — ASPIRIN 325 MG
325 TABLET ORAL ONCE
Status: COMPLETED | OUTPATIENT
Start: 2018-04-04 | End: 2018-04-04

## 2018-04-04 RX ORDER — SODIUM CHLORIDE 0.9 % (FLUSH) 0.9 %
10 SYRINGE (ML) INJECTION AS NEEDED
Status: DISCONTINUED | OUTPATIENT
Start: 2018-04-04 | End: 2018-04-04 | Stop reason: HOSPADM

## 2018-04-04 RX ORDER — MECLIZINE HYDROCHLORIDE 25 MG/1
25 TABLET ORAL 3 TIMES DAILY PRN
Qty: 20 TABLET | Refills: 0 | Status: SHIPPED | OUTPATIENT
Start: 2018-04-04 | End: 2018-04-10

## 2018-04-04 RX ORDER — MECLIZINE HYDROCHLORIDE 25 MG/1
25 TABLET ORAL ONCE
Status: COMPLETED | OUTPATIENT
Start: 2018-04-04 | End: 2018-04-04

## 2018-04-04 RX ADMIN — ASPIRIN 325 MG: 325 TABLET, COATED ORAL at 14:13

## 2018-04-04 RX ADMIN — MECLIZINE HYDROCHLORIDE 25 MG: 25 TABLET ORAL at 14:12

## 2018-04-04 NOTE — ED PROVIDER NOTES
"Subjective   History of Present Illness  History of Present Illness    Chief complaint: Weakness, difficulty walking, \"I don't feel right\"    Location: None    Quality/Severity:  Mild to moderate symptoms    Timing/Duration: \"Sometime this morning after I started at work.\"    Modifying Factors: None    Narrative: This patient presents for evaluation of some vague symptoms that concerned her this morning.  She said that she woke up feeling fine and started her normal routine.  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  Sometime after shutting her work day she began to feel \"not right.\"  She said that she had the feeling of some generalized weakness all over her body and as she was talking to people on the phone, one person asked her if she \"was all right\" as if to indicate the patient was not speaking very well.  The patient does not recall any specific instance where her speech was slurred or she could not produce the words that she wanted to say.  She did remark that she felt \"weak all over her body\" and when she was trying to walk around at work she felt like she kept veering off to the left side.  She also complained of some head \"pressure.\"  She says the hip pressure is still present.  She denies any chest pain or abdominal pain or nausea or vomiting symptoms.  She denies any vision changes.  She denies any recent fevers or illnesses.  She tells me that she has been having some troubles with her blood pressure management in the past few months.  Apparently she has had multiple doctors appointments to change her pressure medications and also to address her potassium levels.  She tells me that her potassium level has been persistently low in recent months.  She just started taking a new medication, valsartan, on Saturday for her blood pressure also.  She has never had any heart attacks or strokes.    Associated Symptoms: As above    Review of Systems   Constitutional: Positive for fatigue. " Negative for activity change, appetite change, diaphoresis and fever.   Eyes: Negative for pain and visual disturbance.   Respiratory: Negative for cough and shortness of breath.    Cardiovascular: Negative for chest pain.   Gastrointestinal: Negative for abdominal pain, nausea and vomiting.   Genitourinary: Negative for dysuria and flank pain.   Musculoskeletal: Negative for back pain and neck pain.   Skin: Negative for color change and rash.   Neurological: Positive for weakness (all over), light-headedness and headaches. Negative for tremors, seizures, syncope, facial asymmetry, speech difficulty (Not that she can recall) and numbness.   Psychiatric/Behavioral: Negative for behavioral problems. The patient is nervous/anxious.    All other systems reviewed and are negative.      Past Medical History:   Diagnosis Date   • Anxiety    • Arzate esophagus    • Esophageal reflux    • Fibromyositis    • H/O colonoscopy    • H/O mammogram 08/31/2011    NORMAL    • Hx of bone density study 08/31/2011    OSTEOPENIA    • Hyperlipidemia    • Hyperparathyroidism    • Hypertension    • Menopause    • Osteopenia    • Pancreatitis        Allergies   Allergen Reactions   • Meloxicam Swelling     EYES AND FACE SWELLING  EYES AND FACE SWELLING       Past Surgical History:   Procedure Laterality Date   • CHOLECYSTECTOMY     • COLONOSCOPY  2014   • DILATATION AND CURETTAGE     • HYSTERECTOMY     • MOUTH SURGERY      TOOTH EXTRACTION   • OTHER SURGICAL HISTORY  03/27/2015    DIAGNOSTIC ESOPHAGOSCOPY TRANSNASAL FLEXIBLE WITH BIOPSY; ARZATE ESO. REPEAT EGD 2 YR    • OVARY SURGERY Left     NORMAL    • PAP SMEAR  08/23/2010    NORMAL    • PARATHYROIDECTOMY Right 08/17/2016    Dr. Muchael Flynn at [place]       Family History   Problem Relation Age of Onset   • Diabetes Mother    • Hyperlipidemia Mother    • Hypertension Mother    • Arthritis Father    • Anxiety disorder Father    • COPD Father    • Glaucoma Father    • Hyperlipidemia  Father    • Hypertension Father    • Heart disease Sister        Social History     Social History   • Marital status:      Social History Main Topics   • Smoking status: Current Every Day Smoker     Packs/day: 0.50     Years: 40.00   • Alcohol use No   • Drug use: No     Other Topics Concern   • Not on file     ED Triage Vitals [04/04/18 1138]   Temp Heart Rate Resp BP SpO2   98.1 °F (36.7 °C) 76 18 154/85 100 %      Temp src Heart Rate Source Patient Position BP Location FiO2 (%)   -- -- -- -- --           Objective   Physical Exam   Constitutional: She is oriented to person, place, and time. She appears well-developed and well-nourished. No distress.   HENT:   Head: Normocephalic and atraumatic.   Eyes: EOM are normal. Pupils are equal, round, and reactive to light. Right eye exhibits no discharge. Left eye exhibits no discharge.   Neck: Normal range of motion. Neck supple. No tracheal deviation present.   Cardiovascular: Normal rate, regular rhythm, normal heart sounds and intact distal pulses.  Exam reveals no gallop and no friction rub.    No murmur heard.  Pulmonary/Chest: Effort normal. No respiratory distress. She has no wheezes. She has no rales. She exhibits no tenderness.   Abdominal: Soft. She exhibits no mass. There is no tenderness. There is no rebound and no guarding.   Musculoskeletal: Normal range of motion. She exhibits no edema, tenderness or deformity.   Neurological: She is alert and oriented to person, place, and time. No cranial nerve deficit. She exhibits normal muscle tone. Coordination normal.   No focal deficits evident anywhere on her neurologic evaluation.  The patient has 5 out of 5 motor strength to all 4 extremities.  There is a mild tremor noted to both upper extremities during motor function testing but no obvious deficit apparent. There is no facial droop or weakness at all.  Her sensory exam is entirely normal throughout the whole body.  She has a normal finger to nose to  finger bilaterally as well as a normal heel-to-shin maneuver bilaterally.  Her speech is clear and coherent without any impairment at all   Skin: Skin is warm and dry. No rash noted. She is not diaphoretic. No erythema. No pallor.   Psychiatric: She has a normal mood and affect. Her behavior is normal. Judgment and thought content normal.   Nursing note and vitals reviewed.    EKG           EKG time/Interp time: 1156/1158  Rhythm/Rate: Normal sinus rhythm, 68 bpm  P waves and NE: P waves are present, 184 ms  QRS, axis: 86 ms, normal axis   ST and T waves: No acute ischemic changes are evident     Independently interpreted by me contemporaneously with treatment    Results for orders placed or performed during the hospital encounter of 04/04/18   Comprehensive Metabolic Panel   Result Value Ref Range    Glucose 104 (H) 65 - 99 mg/dL    BUN 13 6 - 20 mg/dL    Creatinine 0.63 0.57 - 1.00 mg/dL    Sodium 141 136 - 145 mmol/L    Potassium 4.0 3.5 - 5.2 mmol/L    Chloride 102 98 - 107 mmol/L    CO2 25.6 22.0 - 29.0 mmol/L    Calcium 9.6 8.6 - 10.5 mg/dL    Total Protein 7.0 6.0 - 8.5 g/dL    Albumin 4.50 3.50 - 5.20 g/dL    ALT (SGPT) 11 5 - 33 U/L    AST (SGOT) 13 5 - 32 U/L    Alkaline Phosphatase 70 40 - 129 U/L    Total Bilirubin 0.3 0.2 - 1.2 mg/dL    eGFR Non African Amer 98 >60 mL/min/1.73    Globulin 2.5 gm/dL    A/G Ratio 1.8 g/dL    BUN/Creatinine Ratio 20.6 7.0 - 25.0    Anion Gap 13.4 mmol/L   Protime-INR   Result Value Ref Range    Protime 13.2 12.1 - 15.0 Seconds    INR 1.00 0.90 - 1.10   aPTT   Result Value Ref Range    PTT 40.6 (H) 24.3 - 38.1 seconds   Troponin   Result Value Ref Range    Troponin T <0.010 0.000 - 0.030 ng/mL   Urinalysis With / Culture If Indicated - Urine, Clean Catch   Result Value Ref Range    Color, UA Yellow Yellow, Straw    Appearance, UA Clear Clear    pH, UA 5.5 4.5 - 8.0    Specific Gravity, UA 1.010 1.003 - 1.030    Glucose, UA Negative Negative    Ketones, UA Negative Negative,  80 mg/dL (3+), >=160 mg/dL (4+)    Bilirubin, UA Negative Negative    Blood, UA Moderate (2+) (A) Negative    Protein, UA Negative Negative    Leuk Esterase, UA Trace (A) Negative    Nitrite, UA Negative Negative    Urobilinogen, UA 0.2 E.U./dL 0.2 - 1.0 E.U./dL   CBC Auto Differential   Result Value Ref Range    WBC 7.72 4.80 - 10.80 10*3/mm3    RBC 4.22 4.20 - 5.40 10*6/mm3    Hemoglobin 12.8 12.0 - 16.0 g/dL    Hematocrit 37.5 37.0 - 47.0 %    MCV 88.9 81.0 - 99.0 fL    MCH 30.3 27.0 - 31.0 pg    MCHC 34.1 31.0 - 37.0 g/dL    RDW 14.0 11.5 - 14.5 %    RDW-SD 45.0 37.0 - 54.0 fl    MPV 9.5 7.4 - 10.4 fL    Platelets 260 140 - 500 10*3/mm3    Neutrophil % 46.4 45.0 - 70.0 %    Lymphocyte % 45.2 (H) 20.0 - 45.0 %    Monocyte % 6.3 3.0 - 8.0 %    Eosinophil % 1.4 0.0 - 4.0 %    Basophil % 0.6 0.0 - 2.0 %    Immature Grans % 0.1 0.0 - 0.5 %    Neutrophils, Absolute 3.57 1.50 - 8.30 10*3/mm3    Lymphocytes, Absolute 3.49 0.60 - 4.80 10*3/mm3    Monocytes, Absolute 0.49 0.00 - 1.00 10*3/mm3    Eosinophils, Absolute 0.11 0.10 - 0.30 10*3/mm3    Basophils, Absolute 0.05 0.00 - 0.20 10*3/mm3    Immature Grans, Absolute 0.01 0.00 - 0.03 10*3/mm3    nRBC 0.0 0.0 - 0.0 /100 WBC   Urinalysis, Microscopic Only - Urine, Clean Catch   Result Value Ref Range    RBC, UA 13-20 (A) None Seen /HPF    WBC, UA 3-5 (A) None Seen /HPF    Bacteria, UA Trace (A) None Seen /HPF    Squamous Epithelial Cells, UA 7-12 (A) None Seen, 0-2 /HPF    Hyaline Casts, UA None Seen None Seen /LPF    Methodology Manual Light Microscopy    POC Glucose Once   Result Value Ref Range    Glucose 105 70 - 130 mg/dL         RADIOLOGY        Study: CT head without contrast    Findings: FINDINGS:  No acute intracranial hemorrhage, mass lesion, mass effect or midline  shift. Gray matter-white matter junction distinction is preserved,  without CT evidence of acute or evolving infarct. Normal ventricular  configuration. Paranasal sinuses and mastoid air cells are  clear.  Calvarium is within normal limits.   Report Conclusions  IMPRESSION:   No acute intracranial findings.    This report was finalized on 4/4/2018 12:33 PM by Dr. Rosamaria Fenton MD.     Interpreted contemporaneously with treatment by Dr. Fenton, independently viewed by me        RADIOLOGY        Study: MRI brain without contrast    Findings: IMPRESSION:   1. No evidence of the recent or evolving ischemic insult.  2. Mild FLAIR signal intensity changes in the deep white matter of the  brain are nonspecific and may reflect changes of mild chronic  microvascular disease. Other etiologies such as demyelinating process,  changes related to migraine headache, Lyme's disease, etc., could have a  similar appearance, but are statistically less likely.  3. No acute findings.    This report was finalized on 4/4/2018 2:16 PM by Dr. Rosamaria Fenton MD.     Interpreted contemporaneously with treatment by Dr. Fenton, independently viewed by me          Procedures         ED Course  ED Course   Comment By Time   I have reviewed the EKG and labs and head CT and MRI of the brain.  At this point, I am satisfied that there is no acute ischemic central neurologic problem behind her symptoms today.  Rather I think she is having some nonspecific disequilibrium possibly related to an inner ear disturbance.  The patient would like to go home at this time.  I think that is a reasonable disposition given her findings.  Her blood pressure has been a little bit elevated here but not to a worrisome level.  I do note that she is recently started a new hypertension medicine, and I spoke with her about the possibility that some of her symptoms are related to a side effect of that medication.  However I am not inclined to stop her medicine just yet.  I would prefer that she talk with her primary care provider about her medications before they make any further adjustments.  We'll give her prescription for meclizine and a Medrol Dosepak.  I  "discussed at length with her and her  all of the usual \"return to ER\" instructions for any worsening signs or symptoms.  Patient agreeable to this plan as outlined.  Discharged home in good condition. Harjit Vu MD 04/04 [id number]                  [facility]    Final diagnoses:   Loss of equilibrium   Hypertension, unspecified type            Harjit Vu MD  04/04/18 [id number]    "

## 2018-04-04 NOTE — TELEPHONE ENCOUNTER
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  Spoke with patient.  She states that the dizziness and weakness just started today.  BP issue has been going on for a while.  Advised patient to proceed to  for eval, as this may be a viral issue rather than related to BP.  Patient voiced understanding and agreed.      ----- Message from Lily Whelan MA sent at 4/4/2018 10:01 AM EDT -----  Regarding: FW: BP ISSUES ?      ----- Message -----  From: Monica Perry  Sent: 4/4/2018   9:29 AM  To: Grazyna Zarate [facility] Clinical Pool  Subject: BP ISSUES ?                                      MANOLO    BP running 140/95 in mornings and slowly lowers through the day.  Patient takes BP med in morning.    Patient is feeling light headed and weak.  She has f/up on BP with Laura on 4/13.      [phone]    She is available to come in this week, was hoping for today.

## 2018-04-04 NOTE — DISCHARGE INSTRUCTIONS
Rest as needed. Please return to the ER for any worsening pain, fevers, weakness, numbness, vision changes, dizziness, speech difficulties or any other concerns.

## 2018-04-10 ENCOUNTER — OFFICE VISIT (OUTPATIENT)
Dept: INTERNAL MEDICINE | Facility: CLINIC | Age: 58
End: 2018-04-10

## 2018-04-10 VITALS
OXYGEN SATURATION: 97 % | HEART RATE: 86 BPM | HEIGHT: 61 IN | WEIGHT: 182 LBS | TEMPERATURE: 98.4 F | RESPIRATION RATE: 16 BRPM | BODY MASS INDEX: 34.36 KG/M2 | DIASTOLIC BLOOD PRESSURE: 90 MMHG | SYSTOLIC BLOOD PRESSURE: 162 MMHG

## 2018-04-10 DIAGNOSIS — I10 ESSENTIAL HYPERTENSION: Primary | ICD-10-CM

## 2018-04-10 DIAGNOSIS — R25.1 TREMORS OF NERVOUS SYSTEM: ICD-10-CM

## 2018-04-10 PROCEDURE — 99214 OFFICE O/P EST MOD 30 MIN: CPT | Performed by: NURSE PRACTITIONER

## 2018-04-10 PROCEDURE — 99406 BEHAV CHNG SMOKING 3-10 MIN: CPT | Performed by: NURSE PRACTITIONER

## 2018-04-10 RX ORDER — VALSARTAN 320 MG/1
320 TABLET ORAL NIGHTLY
Qty: 30 TABLET | Refills: 3
Start: 2018-04-10 | End: 2018-04-27 | Stop reason: SDUPTHER

## 2018-04-10 RX ORDER — AMLODIPINE BESYLATE 5 MG/1
5 TABLET ORAL DAILY
Qty: 30 TABLET | Refills: 5
Start: 2018-04-10 | End: 2019-01-10 | Stop reason: ALTCHOICE

## 2018-04-10 RX ORDER — BISOPROLOL FUMARATE AND HYDROCHLOROTHIAZIDE 5; 6.25 MG/1; MG/1
1 TABLET ORAL DAILY
Qty: 30 TABLET | Refills: 0 | Status: SHIPPED | OUTPATIENT
Start: 2018-04-10 | End: 2018-04-26 | Stop reason: SDUPTHER

## 2018-04-10 NOTE — PATIENT INSTRUCTIONS
Parkinson Disease  Parkinson disease is a long-term (chronic) condition that gets worse over time (is progressive). Parkinson disease limits your ability to control your movements and move your body normally. This condition is a type of movement disorder.  Each person with Parkinson disease is affected differently. The condition can range from mild to severe. Parkinson disease tends to progress slowly over several years.  What are the causes?  Parkinson disease results from a loss of brain cells (neuTremor  A tremor is trembling or shaking that you cannot control. Most tremors affect the hands or arms. Tremors can also affect the head, vocal cords, face, and other parts of the body. There are many types of tremors. Common types include:  · Essential tremor. These usually occur in people over the age of 40. It may run in families and can happen in otherwise healthy people.  · Resting tremor. These occur when the muscles are at rest, such as when your hands are resting in your lap. People with Parkinson disease often have resting tremors.  · Postural tremor. These occur when you try to hold a pose, such as keeping your hands outstretched.  · Kinetic tremor. These occur during purposeful movement, such as trying to touch a finger to your nose.  · Task-specific tremor. These may occur when you perform tasks such as handwriting, speaking, or standing.  · Psychogenic tremor. These dramatically lessen or disappear when you are distracted. They can happen in people of all ages.  Some types of tremors have no known cause. Tremors can also be a symptom of nervous system problems (neurological disorders) that may occur with aging. Some tremors go away with treatment while others do not.  Follow these instructions at home:  Watch your tremor for any changes. The following actions may help to lessen any discomfort you are feeling:  · Take medicines only as directed by your health care provider.  · Limit alcohol intake to no more  than 1 drink per day for nonpregnant women and 2 drinks per day for men. One drink equals 12 oz of beer, 5 oz of wine, or 1½ oz of hard liquor.  · Do not use any tobacco products, including cigarettes, chewing tobacco, or electronic cigarettes. If you need help quitting, ask your health care provider.  · Avoid extreme heat or cold.  · Limit the amount of caffeine you consume as directed by your health care provider.  · Try to get 8 hours of sleep each night.  · Find ways to manage your stress, such as meditation or yoga.  · Keep all follow-up visits as directed by your health care provider. This is important.  Contact a health care provider if:  · You start having a tremor after starting a new medicine.  · You have tremor with other symptoms such as:  ¨ Numbness.  ¨ Tingling.  ¨ Pain.  ¨ Weakness.  · Your tremor gets worse.  · Your tremor interferes with your day-to-day life.  This information is not intended to replace advice given to you by your health care provider. Make sure you discuss any questions you have with your health care provider.  Document Released: 12/08/2003 Document Revised: 08/20/2017 Document Reviewed: 06/15/2015  [employer] Interactive Patient Education © 2017 [employer]  rons) in a specific part of the brain (substantia nigra). Some of the neurons in the substantia nigra make an important brain chemical (dopamine). Dopamine is needed to control movement. As the condition gets worse, neurons make less dopamine. This makes it hard to move or control your movements.  The exact cause of why neurons are lost or produce less dopamine is not known. Genetic and environmental factors may contribute to the cause of Parkinson disease.  What increases the risk?  This condition is more likely to develop in:  · Men.  · People who are 60 years of age or older.  · People who have a family history of Parkinson disease.  What are the signs or symptoms?  Symptoms of this condition can vary from person to person.  The main (primary) symptoms are related to movement (motor symptoms). These include:  · Uncontrolled shaking movements (tremor). Tremors usually start in a hand or foot when you are resting (resting tremor). The tremor may stop when you move around.  · Slowing of movement. You may lose facial expression and have trouble making the small movements that are needed to button clothing or brush your teeth. You may walk with short, shuffling steps.  · Stiff movement (rigidity). This mostly affects your arms, legs, neck, and upper body. You may walk without swinging your arms. Rigidity can be painful.  · Loss of balance and stability when standing. You may sway, fall backward, and have trouble making turns.  Secondary motor symptoms of this condition include:  · Shrinking handwriting.  · Stooped posture.  · Slowed speech.  · Trouble swallowing.  · Drooling.  · Sexual dysfunction.  · Muscle cramps.  · Loss of smell.  Additional symptoms that are not related to movement include:  · Constipation.  · Mood swings.  · Depression or anxiety.  · Sleep disturbances.  · Confusion.  · Loss of mental abilities (dementia).  · Low blood pressure.  · Trouble concentrating.  How is this diagnosed?  Parkinson disease can be hard to diagnose in its early stages. A diagnosis may be made based on symptoms, a medical history, and physical exam. During your exam, your health care provider will look for:  · Lack of facial expression.  · Resting tremor.  · Stiffness in your neck, arms, and legs.  · Abnormal walk.  · Trouble with balance.  You may have brain imaging tests done to check for a loss of dopamine-producing areas of the brain. Your healthcare provider may also grade the severity of your condition as mild, moderate, or advanced. Parkinson disease progression is different for everyone. You may not progress to the advanced stage.  Mild Parkinson disease involves:  · Movement problems that do not affect daily activities.  · Movement  problems on one side of the body.  · Movement problems that are controlled with medicines.  · Good response to exercise.  Moderate Parkinson disease involves:  · Movement problems on both sides of the body.  · Slowing of movement.  · Coordination and balance problems.  · Less of a response to medicine.  · More side effects from medicines.  Advanced Parkinson disease involves:  · Extreme difficulty walking.  · Inability to live alone safely.  · Signs of dementia.  · Difficulty controlling symptoms with medicine.  How is this treated?  There is no cure for Parkinson disease. Treatment focuses on relieving your symptoms. Treatment may include:  · Medicines.  · Speech, occupational, and physical therapy.  · Surgery.  Everyone responds to medicines differently. Your response may change over time. Work with your health care provider to find the best medicines for you. These may include:  · Dopamine replacement drugs. These are the most effective medicines. A long-term side effect of these medicines is uncontrolled movements (dyskinesias).  · Dopamine agonists. These drugs act like dopamine to stimulate dopamine receptors in the brain. Side effects include nausea and sleepiness, but they cause less dyskinesia.  · Other medicines to reduce tremor, prevent dopamine breakdown, reduce dyskinesia, and reduce dementia that is related to Parkinson disease.  Another treatment is deep brain stimulation surgery to reduce tremors and dyskinesia. This procedure involves placing electrodes in the brain. The electrodes are attached to an electric pulse generator that acts like a pacemaker for your brain. This may be an option if you have had the condition for at least four years and are not responding well to medicines.  Follow these instructions at home:  · Take over-the-counter and prescription medicines only as told by your health care provider.  · Install grab bars and railings in your home to prevent falls.  · Follow instructions  from your health care provider about eating or drinking restrictions.  · Return to your normal activities as told by your health care provider. Ask your health care provider what activities are safe for you.  · Get regular exercise as told by your health care provider or a physical therapist.  · Keep all follow-up visits as told by your health care provider. This is important. These include any visits with a speech therapist or occupational therapist.  · Consider joining a support group for people with Parkinson disease.  Contact a health care provider if:  · Medicines do not help your symptoms.  · You are unsteady or have fallen at home.  · You need more support to function well at home.  · You have trouble swallowing.  · You have severe constipation.  · You are struggling with side effects from your medicines.  · You see or hear things that are not real (hallucinate).  · You feel confused, anxious, or depressed.  Get help right away if:  · You are injured after a fall.  · You cannot swallow without choking.  · You have chest pain or trouble breathing.  · You do not feel safe at home.  This information is not intended to replace advice given to you by your health care provider. Make sure you discuss any questions you have with your health care provider.  Document Released: 12/15/2001 Document Revised: 05/22/2017 Document Reviewed: 10/07/2016  [employer] Interactive Patient Education © 2017 [employer]

## 2018-04-10 NOTE — PROGRESS NOTES
"Chief Complaint   Patient presents with   • Follow-up     Hospital f/u    • Hypertension   • Dizziness       Subjective     Shaylee Wadsworth is a 57 y.o. female being seen for a follow up appointment today regarding HTN, tremors, and \"not feeling right.\"  She  Was in the ER 4-4-2018 complaining of \"i just don't feel right.\" It started while she was at work on the morning of 4-4-2018 as some generalized weakness and \"veering off to her left\" while walking. Denied changes in speech, unilateral weakness, or visual changes at that time. In the ER, an MRI and CT brain showed NO acute ischemic or acute neurological problems. She was sent home on a Medrol dose pack and meclizine for vestibular dizziness. She is currently on Diovan 320 mg and norvasc 5 mg for BP control. She has tried triamterene/HCTZ in the past but her potassium kept dropping. Of note, her potassium was 4.0 in the ER, troponin negative.     Today, she is complaining of right hand shaking. She reports that it began 4-4-2018, and has increased in intensity, so much that she can no longer write or work. She works in a dental office. Tremors worse at rest and with steroid pack. Denies any difficulty walking since ER visit, but she was \"walking to the left\" on 4-4-2018. Associated headaches. Denies dizziness, visual changes, unilateral weakness, ear pain, gait disturabnce. Denies CP, SOA. She has 1 cup of coffee a day. She is a daily smoker ogf 1/2 PPD. No known FH of parkinson's disease.       History of Present Illness     Allergies   Allergen Reactions   • Meloxicam Swelling     EYES AND FACE SWELLING  EYES AND FACE SWELLING         Current Outpatient Prescriptions:   •  amLODIPine (NORVASC) 5 MG tablet, Take 1 tablet by mouth Daily., Disp: 30 tablet, Rfl: 5  •  calcium-vitamin D (OSCAL-500) 500-200 MG-UNIT per tablet, Take 1 tablet by mouth., Disp: , Rfl:   •  ENBREL SURECLICK 50 MG/ML solution auto-injector, , Disp: , Rfl:   •  esomeprazole (nexIUM) 40 MG " capsule, TAKE ONE CAPSULE BY MOUTH TWICE DAILY (Patient taking differently: TAKE ONE CAPSULE BY MOUTH DAILY), Disp: 60 capsule, Rfl: 6  •  folic acid (FOLVITE) 1 MG tablet, Take by mouth., Disp: , Rfl:   •  meclizine (ANTIVERT) 25 MG tablet, Take 1 tablet by mouth 3 (Three) Times a Day As Needed for dizziness., Disp: 20 tablet, Rfl: 0  •  methotrexate 2.5 MG tablet, TAKE 4 TABS PO ONCE WEEKLY ON SAME DAY, Disp: 32 tablet, Rfl: 11  •  methotrexate 2.5 MG tablet, TAKE 8 TABLETS BY MOUTH ONCE WEEKLY ON THE SAME DAY, Disp: 32 tablet, Rfl: 11  •  MethylPREDNISolone (MEDROL, SHAUNA,) 4 MG tablet, Take as directed on package instructions., Disp: 21 tablet, Rfl: 0  •  potassium chloride (KLOR-CON) 20 MEQ CR tablet, Take 1 tablet by mouth 2 (Two) Times a Day., Disp: 180 tablet, Rfl: 11  •  PREMPRO 0.45-1.5 MG per tablet, TAKE ONE TABLET BY MOUTH ONCE DAILY (Patient taking differently: TAKE ONE TABLET BY MOUTH on mon wed fri), Disp: 28 tablet, Rfl: 11  •  simvastatin (ZOCOR) 80 MG tablet, TAKE ONE TABLET BY MOUTH IN THE EVENING, Disp: 90 tablet, Rfl: 1  •  valsartan (DIOVAN) 320 MG tablet, Take 1 tablet by mouth Daily., Disp: 30 tablet, Rfl: 3  •  vitamin D (ERGOCALCIFEROL) 69290 units capsule capsule, TAKE ONE CAPSULE BY MOUTH ONCE A WEEK, Disp: 12 capsule, Rfl: 3    The following portions of the patient's history were reviewed and updated as appropriate: allergies, current medications, past family history, past medical history, past social history, past surgical history and problem list.    Review of Systems   Constitutional: Negative.    Eyes: Negative for photophobia, redness and visual disturbance.   Respiratory: Negative.  Negative for cough, choking, chest tightness and wheezing.    Cardiovascular: Positive for palpitations and leg swelling. Negative for chest pain.   Gastrointestinal: Negative.    Endocrine: Negative.    Genitourinary: Negative.    Musculoskeletal: Positive for arthralgias.   Skin: Negative.     Allergic/Immunologic: Negative.    Neurological: Positive for headaches. Negative for light-headedness and numbness.   Hematological: Negative.    Psychiatric/Behavioral: Negative.        Assessment     Physical Exam   Constitutional: She is oriented to person, place, and time. She appears well-developed and well-nourished.   HENT:   Head: Normocephalic.   Right Ear: External ear normal.   Left Ear: External ear normal.   Nose: Nose normal.   Mouth/Throat: Oropharynx is clear and moist.   Neck: Neck supple. No thyromegaly present.   Cardiovascular: Normal rate, regular rhythm and normal heart sounds.    No murmur heard.  Pulmonary/Chest: Effort normal and breath sounds normal. No respiratory distress. She has no wheezes.   Musculoskeletal: She exhibits edema.   Lymphadenopathy:     She has no cervical adenopathy.   Neurological: She is alert and oriented to person, place, and time. She has normal reflexes. She displays normal reflexes. She exhibits normal muscle tone. Coordination normal.   Speech clear. Facial movements symmetrical. Right upper extremity with fine motor tremors. Positive for cogwheel rigidity.    Skin: Skin is warm and dry.   Psychiatric: She has a normal mood and affect. Her behavior is normal.   Vitals reviewed.      Plan     Her MRI and CT brain and labs were reviewed with the patient from 4-4-2018.      Diagnosis Plan   1. Essential hypertension  bisoprolol-hydrochlorothiazide (ZIAC) 5-6.25 MG per tablet    valsartan (DIOVAN) 320 MG tablet    amLODIPine (NORVASC) 5 MG tablet   2. Tremors of nervous system  Ambulatory Referral to Neurology     Spent 3 minutes discussing smoking cessation.     Switch Diovan to night time dosing. Smoking cessation, avoid caffeine to reduce trmors. Follow up in 2 weeks.

## 2018-04-26 DIAGNOSIS — I10 ESSENTIAL HYPERTENSION: ICD-10-CM

## 2018-04-26 RX ORDER — BISOPROLOL FUMARATE AND HYDROCHLOROTHIAZIDE 5; 6.25 MG/1; MG/1
TABLET ORAL
Qty: 30 TABLET | Refills: 6 | Status: SHIPPED | OUTPATIENT
Start: 2018-04-26 | End: 2018-04-27 | Stop reason: SDUPTHER

## 2018-04-27 ENCOUNTER — OFFICE VISIT (OUTPATIENT)
Dept: INTERNAL MEDICINE | Facility: CLINIC | Age: 58
End: 2018-04-27

## 2018-04-27 VITALS
HEART RATE: 55 BPM | SYSTOLIC BLOOD PRESSURE: 124 MMHG | TEMPERATURE: 98 F | WEIGHT: 189 LBS | RESPIRATION RATE: 16 BRPM | HEIGHT: 61 IN | OXYGEN SATURATION: 98 % | DIASTOLIC BLOOD PRESSURE: 74 MMHG | BODY MASS INDEX: 35.68 KG/M2

## 2018-04-27 DIAGNOSIS — I10 ESSENTIAL HYPERTENSION: Primary | ICD-10-CM

## 2018-04-27 PROCEDURE — 99213 OFFICE O/P EST LOW 20 MIN: CPT | Performed by: NURSE PRACTITIONER

## 2018-04-27 RX ORDER — BISOPROLOL FUMARATE AND HYDROCHLOROTHIAZIDE 5; 6.25 MG/1; MG/1
1 TABLET ORAL DAILY
Qty: 90 TABLET | Refills: 3 | Status: SHIPPED | OUTPATIENT
Start: 2018-04-27 | End: 2018-11-13 | Stop reason: HOSPADM

## 2018-04-27 RX ORDER — TOPIRAMATE 50 MG/1
TABLET, FILM COATED ORAL
COMMUNITY
Start: 2018-04-26 | End: 2018-07-20

## 2018-04-27 RX ORDER — VALSARTAN 320 MG/1
320 TABLET ORAL NIGHTLY
Qty: 90 TABLET | Refills: 3 | Status: SHIPPED | OUTPATIENT
Start: 2018-04-27 | End: 2018-07-20 | Stop reason: RX

## 2018-04-27 NOTE — PROGRESS NOTES
"Chief Complaint   Patient presents with   • Follow-up   • Hypertension       Subjective     Shaylee Wadsworth is a 57 y.o. female being seen for a follow up appointment today regarding  HTN and tremors. She was in the office 2 weeks ago after an ER visit for \"just not feeling right.\" and tremors in her right hand. I asked her to switch her Diovan to night time dosing and add bisprolol HCTZ 5/6.25 to medication regimen. She was seen by neurology yesterday, and he wants her to get a Holter and carotid doppler, but her does not believe she has Parkinson's disease. He started her on Topamax 50mg to help, but she has not started it yet.  She is tolerating the Ziac well, and her BP is 120s/70s.       History of Present Illness     Allergies   Allergen Reactions   • Meloxicam Swelling     EYES AND FACE SWELLING  EYES AND FACE SWELLING         Current Outpatient Prescriptions:   •  amLODIPine (NORVASC) 5 MG tablet, Take 1 tablet by mouth Daily., Disp: 30 tablet, Rfl: 5  •  aspirin 81 MG tablet, Take 81 mg by mouth Daily., Disp: , Rfl:   •  bisoprolol-hydrochlorothiazide (ZIAC) 5-6.25 MG per tablet, TAKE 1 TABLET BY MOUTH ONCE DAILY, Disp: 30 tablet, Rfl: 6  •  calcium-vitamin D (OSCAL-500) 500-200 MG-UNIT per tablet, Take 1 tablet by mouth., Disp: , Rfl:   •  esomeprazole (nexIUM) 40 MG capsule, TAKE ONE CAPSULE BY MOUTH TWICE DAILY (Patient taking differently: TAKE ONE CAPSULE BY MOUTH DAILY), Disp: 60 capsule, Rfl: 6  •  folic acid (FOLVITE) 1 MG tablet, Take by mouth., Disp: , Rfl:   •  methotrexate 2.5 MG tablet, TAKE 4 TABS PO ONCE WEEKLY ON SAME DAY, Disp: 32 tablet, Rfl: 11  •  potassium chloride (KLOR-CON) 20 MEQ CR tablet, Take 1 tablet by mouth 2 (Two) Times a Day., Disp: 180 tablet, Rfl: 11  •  PREMPRO 0.45-1.5 MG per tablet, TAKE ONE TABLET BY MOUTH ONCE DAILY (Patient taking differently: TAKE ONE TABLET BY MOUTH on mon wed fri), Disp: 28 tablet, Rfl: 11  •  simvastatin (ZOCOR) 80 MG tablet, TAKE ONE TABLET BY MOUTH " IN THE EVENING, Disp: 90 tablet, Rfl: 1  •  valsartan (DIOVAN) 320 MG tablet, Take 1 tablet by mouth Every Night., Disp: 30 tablet, Rfl: 3  •  vitamin D (ERGOCALCIFEROL) 21686 units capsule capsule, TAKE ONE CAPSULE BY MOUTH ONCE A WEEK, Disp: 12 capsule, Rfl: 3  •  ENBREL SURECLICK 50 MG/ML solution auto-injector, , Disp: , Rfl:   •  topiramate (TOPAMAX) 50 MG tablet, , Disp: , Rfl:     The following portions of the patient's history were reviewed and updated as appropriate: allergies, current medications, past family history, past medical history, past social history, past surgical history and problem list.    Review of Systems   Constitutional: Negative.    Eyes: Negative.    Respiratory: Negative.    Cardiovascular: Negative.  Negative for chest pain, palpitations and leg swelling.   Gastrointestinal: Negative.    Endocrine: Negative.    Genitourinary: Negative.    Musculoskeletal: Positive for arthralgias.   Skin: Negative.    Allergic/Immunologic: Negative.    Neurological: Positive for tremors and headaches. Negative for dizziness, seizures and weakness.   Psychiatric/Behavioral: Negative.        Assessment     Physical Exam   Constitutional: She is oriented to person, place, and time. She appears well-developed and well-nourished.   HENT:   Head: Normocephalic.   Right Ear: External ear normal.   Left Ear: External ear normal.   Nose: Nose normal.   Mouth/Throat: Oropharynx is clear and moist.   Neck: Neck supple. No thyromegaly present.   Cardiovascular: Normal rate, regular rhythm and normal heart sounds.    No murmur heard.  Pulmonary/Chest: Effort normal and breath sounds normal. No respiratory distress. She has no wheezes.   Neurological: She is alert and oriented to person, place, and time.   Right hand with fine motor tremors   Psychiatric: She has a normal mood and affect. Her behavior is normal.   Vitals reviewed.      Plan     Her fasting labs were reviewed with the patient from last week.      Shaylee was seen today for follow-up and hypertension.    Diagnoses and all orders for this visit:    Essential hypertension  -     Comprehensive Metabolic Panel       Diagnosis Plan   1. Essential hypertension  Comprehensive Metabolic Panel    bisoprolol-hydrochlorothiazide (ZIAC) 5-6.25 MG per tablet    valsartan (DIOVAN) 320 MG tablet     Follow up in 3 months

## 2018-04-30 ENCOUNTER — TELEPHONE (OUTPATIENT)
Dept: INTERNAL MEDICINE | Facility: CLINIC | Age: 58
End: 2018-04-30

## 2018-04-30 DIAGNOSIS — I10 HYPERTENSION, ESSENTIAL: ICD-10-CM

## 2018-04-30 DIAGNOSIS — Z20.5 EXPOSURE TO HEPATITIS A: Primary | ICD-10-CM

## 2018-04-30 NOTE — TELEPHONE ENCOUNTER
Orders entered.  Patient advised.    ----- Message from KAITLYNN Choi sent at 4/30/2018  9:33 AM EDT -----  Yes, she usually gets labs at the hospital. Please confirm CMP, and Hep A IgM.  ----- Message -----  From: Lily Whelan MA  Sent: 4/30/2018   9:26 AM  To: KAITLYNN Choi    I'm not finding a Hep A antibody test in the system....  ----- Message -----  From: Brianne Villa  Sent: 4/30/2018   9:13 AM  To: Lily Whelan MA    PATIENT HAS LABS TO BE DONE DOWN STAIRS IN THE MORNING HOWEVER SHE ATE AT A [facility] OVER THE WEEKEND WITH OUTBREAK OF HEP A.  SHE WANTS US TO ADD TO HER ORDERS TO BE TESTED FOR THIS PLEASE.  SHE IS DOING ALL OF THIS IN THE EARLY AM TOMORROW.

## 2018-05-01 ENCOUNTER — TELEPHONE (OUTPATIENT)
Dept: INTERNAL MEDICINE | Facility: CLINIC | Age: 58
End: 2018-05-01

## 2018-05-01 ENCOUNTER — LAB (OUTPATIENT)
Dept: LAB | Facility: HOSPITAL | Age: 58
End: 2018-05-01

## 2018-05-01 DIAGNOSIS — Z20.5 EXPOSURE TO HEPATITIS A: ICD-10-CM

## 2018-05-01 DIAGNOSIS — I10 HYPERTENSION, ESSENTIAL: ICD-10-CM

## 2018-05-01 LAB
ALBUMIN SERPL-MCNC: 3.8 G/DL (ref 3.5–5.2)
ALBUMIN/GLOB SERPL: 1.3 G/DL
ALP SERPL-CCNC: 66 U/L (ref 40–129)
ALT SERPL W P-5'-P-CCNC: 9 U/L (ref 5–33)
ANION GAP SERPL CALCULATED.3IONS-SCNC: 11.5 MMOL/L
AST SERPL-CCNC: 11 U/L (ref 5–32)
BILIRUB SERPL-MCNC: 0.3 MG/DL (ref 0.2–1.2)
BUN BLD-MCNC: 13 MG/DL (ref 6–20)
BUN/CREAT SERPL: 20 (ref 7–25)
CALCIUM SPEC-SCNC: 9 MG/DL (ref 8.6–10.5)
CHLORIDE SERPL-SCNC: 104 MMOL/L (ref 98–107)
CO2 SERPL-SCNC: 27.5 MMOL/L (ref 22–29)
CREAT BLD-MCNC: 0.65 MG/DL (ref 0.57–1)
GFR SERPL CREATININE-BSD FRML MDRD: 95 ML/MIN/1.73
GLOBULIN UR ELPH-MCNC: 3 GM/DL
GLUCOSE BLD-MCNC: 122 MG/DL (ref 65–99)
POTASSIUM BLD-SCNC: 3.8 MMOL/L (ref 3.5–5.2)
PROT SERPL-MCNC: 6.8 G/DL (ref 6–8.5)
SODIUM BLD-SCNC: 143 MMOL/L (ref 136–145)

## 2018-05-01 PROCEDURE — 36415 COLL VENOUS BLD VENIPUNCTURE: CPT

## 2018-05-01 PROCEDURE — 80053 COMPREHEN METABOLIC PANEL: CPT

## 2018-05-01 NOTE — TELEPHONE ENCOUNTER
----- Message from KAITLYNN Choi sent at 5/1/2018  8:21 AM EDT -----  Her potassium remains normal.    Pt given lab results.katie

## 2018-05-03 ENCOUNTER — TRANSCRIBE ORDERS (OUTPATIENT)
Dept: ADMINISTRATIVE | Facility: HOSPITAL | Age: 58
End: 2018-05-03

## 2018-05-03 DIAGNOSIS — R55 SYNCOPE AND COLLAPSE: Primary | ICD-10-CM

## 2018-05-08 ENCOUNTER — HOSPITAL ENCOUNTER (OUTPATIENT)
Dept: CARDIOLOGY | Facility: HOSPITAL | Age: 58
Discharge: HOME | End: 2018-05-08

## 2018-05-08 ENCOUNTER — HOSPITAL ENCOUNTER (OUTPATIENT)
Dept: CARDIOLOGY | Facility: HOSPITAL | Age: 58
Discharge: HOME | End: 2018-05-08
Admitting: PSYCHIATRY & NEUROLOGY

## 2018-05-08 DIAGNOSIS — R55 SYNCOPE AND COLLAPSE: ICD-10-CM

## 2018-05-08 LAB
BH CV XLRA MEAS LEFT DIST CCA EDV: 23.5 CM/SEC
BH CV XLRA MEAS LEFT DIST CCA PSV: 66.8 CM/SEC
BH CV XLRA MEAS LEFT DIST ICA EDV: -27 CM/SEC
BH CV XLRA MEAS LEFT DIST ICA PSV: -87.9 CM/SEC
BH CV XLRA MEAS LEFT MID ICA EDV: -28.7 CM/SEC
BH CV XLRA MEAS LEFT MID ICA PSV: -61.6 CM/SEC
BH CV XLRA MEAS LEFT PROX CCA EDV: 27 CM/SEC
BH CV XLRA MEAS LEFT PROX CCA PSV: 96.2 CM/SEC
BH CV XLRA MEAS LEFT PROX ECA EDV: -15.2 CM/SEC
BH CV XLRA MEAS LEFT PROX ECA PSV: -78.6 CM/SEC
BH CV XLRA MEAS LEFT PROX ICA EDV: -24.6 CM/SEC
BH CV XLRA MEAS LEFT PROX ICA PSV: -59.8 CM/SEC
BH CV XLRA MEAS LEFT PROX SCLA PSV: 107 CM/SEC
BH CV XLRA MEAS LEFT VERTEBRAL A EDV: 21.1 CM/SEC
BH CV XLRA MEAS LEFT VERTEBRAL A PSV: 56.9 CM/SEC
BH CV XLRA MEAS RIGHT DIST CCA EDV: -24.6 CM/SEC
BH CV XLRA MEAS RIGHT DIST CCA PSV: -71.5 CM/SEC
BH CV XLRA MEAS RIGHT DIST ICA EDV: -29.9 CM/SEC
BH CV XLRA MEAS RIGHT DIST ICA PSV: -78 CM/SEC
BH CV XLRA MEAS RIGHT MID ICA EDV: 21.1 CM/SEC
BH CV XLRA MEAS RIGHT MID ICA PSV: 70.9 CM/SEC
BH CV XLRA MEAS RIGHT PROX CCA EDV: -19.3 CM/SEC
BH CV XLRA MEAS RIGHT PROX CCA PSV: -83.3 CM/SEC
BH CV XLRA MEAS RIGHT PROX ECA EDV: -13.5 CM/SEC
BH CV XLRA MEAS RIGHT PROX ECA PSV: -66.8 CM/SEC
BH CV XLRA MEAS RIGHT PROX ICA EDV: -19.9 CM/SEC
BH CV XLRA MEAS RIGHT PROX ICA PSV: -68 CM/SEC
BH CV XLRA MEAS RIGHT PROX SCLA PSV: 86.2 CM/SEC
BH CV XLRA MEAS RIGHT VERTEBRAL A EDV: 16.4 CM/SEC
BH CV XLRA MEAS RIGHT VERTEBRAL A PSV: 55.1 CM/SEC
LEFT ARM BP: NORMAL MMHG
RIGHT ARM BP: NORMAL MMHG

## 2018-05-08 PROCEDURE — 93880 EXTRACRANIAL BILAT STUDY: CPT

## 2018-05-08 PROCEDURE — 93225 XTRNL ECG REC<48 HRS REC: CPT

## 2018-05-08 PROCEDURE — 93226 XTRNL ECG REC<48 HR SCAN A/R: CPT

## 2018-05-11 PROCEDURE — 93227 XTRNL ECG REC<48 HR R&I: CPT | Performed by: INTERNAL MEDICINE

## 2018-06-01 ENCOUNTER — TELEPHONE (OUTPATIENT)
Dept: INTERNAL MEDICINE | Facility: CLINIC | Age: 58
End: 2018-06-01

## 2018-06-01 RX ORDER — NAPROXEN 500 MG/1
500 TABLET ORAL 2 TIMES DAILY WITH MEALS
Qty: 60 TABLET | Refills: 0 | Status: SHIPPED | OUTPATIENT
Start: 2018-06-01 | End: 2018-07-20

## 2018-06-01 NOTE — TELEPHONE ENCOUNTER
PER  PT SHE CAN TAKE ALEVE.  SENT TO [facility]          ----- Message from KAITLYNN Choi sent at 2018  2:49 PM EDT -----   Naproxen 500mg  Si po q12hrs prn back pain  Disp #60  ----- Message -----  From: Amparo Stallworth MA  Sent: 2018   2:46 PM  To: KAITLYNN Choi    WILL YOU LOOK AT THIS    ----- Message -----  From: Jodi Aguilera MD  Sent: 2018  12:43 PM  To: Amparo Stallworth MA    If she can take NSAID's like Motrin or Aleve, I would just have her take Aleve or Naproxen 1 tablet PO BID x 7 days with food and water and stop if stomach irritation. Apply heat to area and try to stretch as tolerated. Avoid exercises that hurt her back. If not better, then needs to be seen next week.     ----- Message -----  From: Amparo Stallworth MA  Sent: 2018  11:55 AM  To: Jodi Aguilera MD        ----- Message -----  From: Brianne Villa  Sent: 2018  11:33 AM  To: Amparo Stallworth MA    PATIENT SAID SHE IS ALLERGIC TO MELOXICAN, SO SHE CAN NOT GET THAT ONE.    ----- Message -----  From: Brianne Villa  Sent: 2018   8:11 AM  To: PO Oh PT PULLED MUSCLE IN BACK AND WANTS TO SEE IF WE CAN CALL MELOX[facility] TO [facility] IN [place] OR WILL SHE NEED TO BE SEEN?    [phone]

## 2018-06-12 ENCOUNTER — OFFICE VISIT (OUTPATIENT)
Dept: INTERNAL MEDICINE | Facility: CLINIC | Age: 58
End: 2018-06-12

## 2018-06-12 VITALS
BODY MASS INDEX: 33.23 KG/M2 | OXYGEN SATURATION: 98 % | RESPIRATION RATE: 18 BRPM | TEMPERATURE: 98.1 F | SYSTOLIC BLOOD PRESSURE: 144 MMHG | WEIGHT: 176 LBS | DIASTOLIC BLOOD PRESSURE: 90 MMHG | HEART RATE: 62 BPM | HEIGHT: 61 IN

## 2018-06-12 DIAGNOSIS — G43.009 MIGRAINE WITHOUT AURA AND WITHOUT STATUS MIGRAINOSUS, NOT INTRACTABLE: ICD-10-CM

## 2018-06-12 DIAGNOSIS — M05.9 RHEUMATOID ARTHRITIS WITH POSITIVE RHEUMATOID FACTOR, INVOLVING UNSPECIFIED SITE (HCC): ICD-10-CM

## 2018-06-12 DIAGNOSIS — R25.1 TREMORS OF NERVOUS SYSTEM: Primary | ICD-10-CM

## 2018-06-12 DIAGNOSIS — R25.1 TREMOR: Primary | ICD-10-CM

## 2018-06-12 PROCEDURE — 96372 THER/PROPH/DIAG INJ SC/IM: CPT | Performed by: INTERNAL MEDICINE

## 2018-06-12 PROCEDURE — 99214 OFFICE O/P EST MOD 30 MIN: CPT | Performed by: INTERNAL MEDICINE

## 2018-06-12 RX ORDER — CYANOCOBALAMIN 1000 UG/ML
1000 INJECTION, SOLUTION INTRAMUSCULAR; SUBCUTANEOUS
Status: DISCONTINUED | OUTPATIENT
Start: 2018-06-12 | End: 2019-01-11

## 2018-06-12 RX ADMIN — CYANOCOBALAMIN 1000 MCG: 1000 INJECTION, SOLUTION INTRAMUSCULAR; SUBCUTANEOUS at 16:32

## 2018-06-12 NOTE — PROGRESS NOTES
Shaylee Wadsworth is a 57 y.o. female, who presents with a chief complaint of   Chief Complaint   Patient presents with   • Tremors       HPI   58 yo female wt tremor she claims started in April. She initially went to [facility] 4/4/18. Has underlying arthritis on enbrel. Her medication was stopped due to concerns about MS.   She did see Dr. Salazar who is adjusting her topamax for underlying headache. Since that visit she went from R arm tremor to pronounced axial tremor. Her  has asked for referral to movement disorder clinic.    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     No pain in feet or hands, but does get tingling in her hands intermittent with numbness and in changing locations.   That just started in last 3 days.  She does have access to SDS sheets.   No headache. No family history.        The following portions of the patient's history were reviewed and updated as appropriate: allergies, current medications, past family history, past medical history, past social history, past surgical history and problem list.    Allergies: Meloxicam    Current Outpatient Prescriptions:   •  amLODIPine (NORVASC) 5 MG tablet, Take 1 tablet by mouth Daily., Disp: 30 tablet, Rfl: 5  •  aspirin 81 MG tablet, Take 81 mg by mouth Daily., Disp: , Rfl:   •  bisoprolol-hydrochlorothiazide (ZIAC) 5-6.25 MG per tablet, Take 1 tablet by mouth Daily., Disp: 90 tablet, Rfl: 3  •  calcium-vitamin D (OSCAL-500) 500-200 MG-UNIT per tablet, Take 1 tablet by mouth., Disp: , Rfl:   •  esomeprazole (nexIUM) 40 MG capsule, TAKE ONE CAPSULE BY MOUTH TWICE DAILY (Patient taking differently: TAKE ONE CAPSULE BY MOUTH DAILY), Disp: 60 capsule, Rfl: 6  •  folic acid (FOLVITE) 1 MG tablet, Take by mouth., Disp: , Rfl:   •  methotrexate 2.5 MG tablet, TAKE 4 TABS PO ONCE WEEKLY ON SAME DAY, Disp: 32 tablet, Rfl: 11  •  naproxen (NAPROSYN) 500 MG tablet, Take 1 tablet by mouth 2 (Two) Times a Day With Meals., Disp: 60 tablet,  "Rfl: 0  •  potassium chloride (KLOR-CON) 20 MEQ CR tablet, Take 1 tablet by mouth 2 (Two) Times a Day., Disp: 180 tablet, Rfl: 11  •  PREMPRO 0.45-1.5 MG per tablet, TAKE ONE TABLET BY MOUTH ONCE DAILY (Patient taking differently: TAKE ONE TABLET BY MOUTH on mon wed fri), Disp: 28 tablet, Rfl: 11  •  simvastatin (ZOCOR) 80 MG tablet, TAKE ONE TABLET BY MOUTH IN THE EVENING, Disp: 90 tablet, Rfl: 1  •  topiramate (TOPAMAX) 50 MG tablet, , Disp: , Rfl:   •  valsartan (DIOVAN) 320 MG tablet, Take 1 tablet by mouth Every Night., Disp: 90 tablet, Rfl: 3  •  vitamin D (ERGOCALCIFEROL) 82694 units capsule capsule, TAKE ONE CAPSULE BY MOUTH ONCE A WEEK, Disp: 12 capsule, Rfl: 3  Medications Discontinued During This Encounter   Medication Reason   • ENBREL SURECLICK 50 MG/ML solution auto-injector        Review of Systems   Constitutional: Negative.  Negative for appetite change, fatigue, fever and unexpected weight change.   HENT: Negative.  Negative for sinus pressure and trouble swallowing.    Respiratory: Negative.  Negative for cough and chest tightness.    Cardiovascular: Negative.  Negative for chest pain, palpitations and leg swelling.   Gastrointestinal: Negative.  Negative for constipation and diarrhea.   Genitourinary: Negative for dysuria, frequency, hematuria, pelvic pain and vaginal discharge.   Musculoskeletal: Negative.    Skin: Negative.    Neurological: Positive for tremors. Negative for dizziness, weakness, light-headedness and headaches.   Hematological: Negative.    Psychiatric/Behavioral: Negative.              /90   Pulse 62   Temp 98.1 °F (36.7 °C)   Resp 18   Ht 154.9 cm (61\")   Wt 79.8 kg (176 lb)   SpO2 98%   BMI 33.25 kg/m²       Physical Exam   Constitutional: She is oriented to person, place, and time. She appears well-developed and well-nourished.   HENT:   Head: Normocephalic and atraumatic.   Eyes: EOM are normal. Pupils are equal, round, and reactive to light.   Neck: Normal " range of motion. No thyromegaly present.   Cardiovascular: Normal rate and regular rhythm.    No murmur heard.  Pulmonary/Chest: Effort normal and breath sounds normal. No respiratory distress.   Abdominal: Soft. Bowel sounds are normal. She exhibits no distension.   Musculoskeletal: She exhibits no edema.   Neurological: She is alert and oriented to person, place, and time.   Resting tremor not pill with R hand, axial tremor   Skin: Skin is warm. Capillary refill takes less than 2 seconds. No rash noted.   Psychiatric: She has a normal mood and affect. Her behavior is normal.   Tearful today   Nursing note and vitals reviewed.      Lab Results (most recent)     None          Results for orders placed or performed during the hospital encounter of 05/08/18   Duplex carotid ultrasound CAR   Result Value Ref Range    Prox CCA PSV 96.2 cm/sec    Prox CCA PSV -83.3 cm/sec    Prox CCA EDV 27.0 cm/sec    Prox CCA EDV -19.3 cm/sec    Dist CCA PSV 66.8 cm/sec    Dist CCA PSV -71.5 cm/sec    Dist CCA EDV 23.5 cm/sec    Dist CCA EDV -24.6 cm/sec    Prox ECA PSV -78.6 cm/sec    Prox ECA PSV -66.8 cm/sec    Prox ECA EDV -15.2 cm/sec    Prox ECA EDV -13.5 cm/sec    Prox ICA PSV -59.8 cm/sec    Prox ICA PSV -68.0 cm/sec    Prox ICA EDV -24.6 cm/sec    Prox ICA EDV -19.9 cm/sec    Mid ICA PSV -61.6 cm/sec    Mid ICA PSV 70.9 cm/sec    Mid ICA EDV -28.7 cm/sec    Mid ICA EDV 21.1 cm/sec    Dist ICA PSV -87.9 cm/sec    Dist ICA PSV -78.0 cm/sec    Dist ICA EDV -27.0 cm/sec    Dist ICA EDV -29.9 cm/sec    Vertebral A PSV 56.9 cm/sec    Vertebral A PSV 55.1 cm/sec    Vertebral A EDV 21.1 cm/sec    Vertebral A EDV 16.4 cm/sec    Prox SCLA .0 cm/sec    Prox SCLA PSV 86.2 cm/sec    Left arm /64 mmHg    Right arm /75 mmHg     MRI witih some demyelinating disease.       Shaylee was seen today for tremors.    Diagnoses and all orders for this visit:    Tremors of nervous system  -     Vitamin B12  -     Ehrlichia Antibody  Panel  -     [facility] (IgG / M)  -     Lyme Disease IgG/IgM Antibodies  -     Ambulatory Referral to Neurology    Rheumatoid arthritis with positive rheumatoid factor, involving unspecified site    Migraine without aura and without status migrainosus, not intractable    Weight loss may be due to increased tremor.  Still holding enbrel  Very tearful today  FMLA with [employer] pending  Will check B12 and give shot today so she doesnot have to return.  Refer Samir Spring with movement disorder clinic per patient request.   Get Dr. Cosme records for review.     Return if symptoms worsen or fail to improve.    Saurabh Ferreira MD  06/12/2018

## 2018-06-14 ENCOUNTER — TELEPHONE (OUTPATIENT)
Dept: INTERNAL MEDICINE | Facility: CLINIC | Age: 58
End: 2018-06-14

## 2018-06-14 NOTE — TELEPHONE ENCOUNTER
Last 3 office visits have been printed, along with most recent labs. Pt informed and will pick them up at the office today.     ----- Message from Lily Whelan MA sent at 6/13/2018  4:34 PM EDT -----  Regarding: FW: FRIDAY APPT WITH DR SOLER      ----- Message -----  From: Monica Perry  Sent: 6/13/2018   4:25 PM  To: Grazyna Zarate [facility]  Subject: FRIDAY APPT WITH DR RYDER SANDERSON    Patient calls to tell Dr. Sanderson that she is going to try and come by and get records from our office for her Friday with Samir Soler at [facility].    Patient is at [phone] if Dr Sanderson wants to call her.    I explained to patient if we referred her to Dr. Soler that any records they needed would be sent.  She stated she thought it would be good to take as much of her records to him as she could.

## 2018-06-15 LAB
A PHAGOCYTOPH IGG TITR SER IF: NEGATIVE {TITER}
A PHAGOCYTOPH IGM TITR SER IF: NEGATIVE {TITER}
E CHAFFEENSIS IGG TITR SER IF: NEGATIVE {TITER}
E CHAFFEENSIS IGM TITR SER IF: NEGATIVE {TITER}
R RICKETTSI IGG SER QL IA: NEGATIVE
R RICKETTSI IGM SER-ACNC: 1.39 INDEX (ref 0–0.89)
VIT B12 SERPL-MCNC: 412 PG/ML

## 2018-06-15 RX ORDER — DOXYCYCLINE HYCLATE 50 MG/1
100 CAPSULE ORAL EVERY 12 HOURS SCHEDULED
Qty: 40 CAPSULE | Refills: 2 | Status: SHIPPED | OUTPATIENT
Start: 2018-06-15 | End: 2018-07-20

## 2018-06-19 DIAGNOSIS — A77.0 RMSF (ROCKY MOUNTAIN SPOTTED FEVER): Primary | ICD-10-CM

## 2018-06-20 ENCOUNTER — TELEPHONE (OUTPATIENT)
Dept: INTERNAL MEDICINE | Facility: CLINIC | Age: 58
End: 2018-06-20

## 2018-06-20 NOTE — TELEPHONE ENCOUNTER
----- Message from KAITLYNN Choi sent at 6/20/2018  8:46 AM EDT -----  Regarding: FW: REQUESTING CALL BACK FROM [facility]  I spoke with Dr. Salazar, and he apologized for any mis communication or delayed appointments for Mrs. Wadsworth.  ----- Message -----  From: Amparo Stallworth MA  Sent: 6/15/2018   9:57 AM  To: KAITLYNN Choi  Subject: FW: REQUESTING CALL BACK FROM [facility]                   ----- Message -----  From: Jimmy Crocker  Sent: 6/15/2018   9:06 AM  To: Grazyna Duffy [address]  Subject: REQUESTING CALL BACK FROM [facility] PT    I received a call back from Macey, at dr Cee's office. Dr cee would like for lalo to call him back at [phone] regarding this patient.    Thanks!  jimmy

## 2018-06-29 ENCOUNTER — TELEPHONE (OUTPATIENT)
Dept: INTERNAL MEDICINE | Facility: CLINIC | Age: 58
End: 2018-06-29

## 2018-06-29 NOTE — TELEPHONE ENCOUNTER
7/2/18 Patient called and states that [facility] never received the 5/4/18 FMLA fax.  Refaxed today and advised patient to check with them to make sure they received it today.      ----- Message from Lily Whelan MA sent at 6/29/2018  1:58 PM EDT -----  Received another FMLA form from [facility] [employer].  Completed and faxed same form on 5/4/2018.  Patient to check with employer to see if this form is necessary or just duplicate. Copy scanned to Media tab.

## 2018-07-02 ENCOUNTER — TELEPHONE (OUTPATIENT)
Dept: INTERNAL MEDICINE | Facility: CLINIC | Age: 58
End: 2018-07-02

## 2018-07-02 NOTE — TELEPHONE ENCOUNTER
Copy already resent.    ----- Message from Monica Perry sent at 7/2/2018 12:10 PM EDT -----  Regarding: que  No need to send QUE paperwork again, she received it.

## 2018-07-11 ENCOUNTER — OFFICE VISIT (OUTPATIENT)
Dept: INFECTIOUS DISEASES | Facility: CLINIC | Age: 58
End: 2018-07-11

## 2018-07-11 ENCOUNTER — APPOINTMENT (OUTPATIENT)
Dept: LAB | Facility: HOSPITAL | Age: 58
End: 2018-07-11

## 2018-07-11 VITALS
BODY MASS INDEX: 33.38 KG/M2 | DIASTOLIC BLOOD PRESSURE: 88 MMHG | WEIGHT: 176.8 LBS | TEMPERATURE: 97.7 F | SYSTOLIC BLOOD PRESSURE: 150 MMHG | HEIGHT: 61 IN | HEART RATE: 59 BPM

## 2018-07-11 DIAGNOSIS — R25.1 TREMOR: ICD-10-CM

## 2018-07-11 DIAGNOSIS — A77.0 RMSF (ROCKY MOUNTAIN SPOTTED FEVER): Primary | ICD-10-CM

## 2018-07-11 PROCEDURE — 86618 LYME DISEASE ANTIBODY: CPT | Performed by: INTERNAL MEDICINE

## 2018-07-11 PROCEDURE — 99204 OFFICE O/P NEW MOD 45 MIN: CPT | Performed by: INTERNAL MEDICINE

## 2018-07-11 PROCEDURE — 36415 COLL VENOUS BLD VENIPUNCTURE: CPT | Performed by: INTERNAL MEDICINE

## 2018-07-11 PROCEDURE — 86757 RICKETTSIA ANTIBODY: CPT | Performed by: INTERNAL MEDICINE

## 2018-07-11 NOTE — PROGRESS NOTES
Referring Provider: Saurabh Ferreira MD  [address] [place] [address]    Reason for Consultation: RMSF + IgM    History of present illness:  Ms Wadsworth is a very nice 58 YO who I am asked to evaluate and give opinion for  RMSF + IgM. History is obtained from the patient and review of the old medical records which I summarize/synthesize as follows: She says that on April 4th, 2018 she began to have some lightheadedness and right-sided neglect. A co-worker told her she wasn't quite acting like herself. She went to [facility] and a clear cause was not found. Since that time she has had a R hand tremor that comes and goes and also has some tremor in her voice and trunk. She has seen 2 neurologists and was diagnosed with tremor and was started on Topamax without much benefit over about a 3 week period so stopped it. She went to another neurologist who diagnosed her with FND or Functional Neurologic Disorder. The tremor will only stop when she distracts her self by performing another fine motor skill.    There is some concern that Enbrel for her RA could have caused the tremor so she is not longer taking that.    On 6/12 she saw her PCP's partner who ordered RMSF, Ehrlichia AB panel, and a Lyme disease test. The RMSF IgM was positive at 1.39 so started on doxycycline 100 mg PO BID which she has been on for nearly 1 month now.     She does not recall any tick bites. No fevers or sweats. She has not had any rash.    PMH:  Arzate's esophagus  Tremor  GERD  Fibromyositis  Osteopenia  HTN  Hyperparathyroidism  Pancreatitis  Anxiety    Past Surgical History:   Procedure Laterality Date   • CHOLECYSTECTOMY     • COLONOSCOPY  2014   • DILATATION AND CURETTAGE     • HYSTERECTOMY     • MOUTH SURGERY      TOOTH EXTRACTION   • OTHER SURGICAL HISTORY  03/27/2015    DIAGNOSTIC ESOPHAGOSCOPY TRANSNASAL FLEXIBLE WITH BIOPSY; ARZATE ESO. REPEAT EGD 2 YR    • OVARY SURGERY Left     NORMAL    • PAP SMEAR  08/23/2010     NORMAL    • PARATHYROIDECTOMY Right 2016    Dr. Muchael Flynn at [place]       Social History:  Works in 3-D printing  Lived in [place] all of life except about 18 years  5 cats   1 dog    Family History:  Mom: DM2, pacemaker present  Dad: , COPD, OA    Allergies:  meloxicam    Medications:    Current Outpatient Prescriptions:   •  amLODIPine (NORVASC) 5 MG tablet, Take 1 tablet by mouth Daily., Disp: 30 tablet, Rfl: 5  •  aspirin 81 MG tablet, Take 81 mg by mouth Daily., Disp: , Rfl:   •  bisoprolol-hydrochlorothiazide (ZIAC) 5-6.25 MG per tablet, Take 1 tablet by mouth Daily., Disp: 90 tablet, Rfl: 3  •  calcium-vitamin D (OSCAL-500) 500-200 MG-UNIT per tablet, Take 1 tablet by mouth., Disp: , Rfl:   •  doxycycline (VIBRAMYCIN) 50 MG capsule, Take 2 capsules by mouth Every 12 (Twelve) Hours., Disp: 40 capsule, Rfl: 2  •  esomeprazole (nexIUM) 40 MG capsule, TAKE ONE CAPSULE BY MOUTH TWICE DAILY (Patient taking differently: TAKE ONE CAPSULE BY MOUTH DAILY), Disp: 60 capsule, Rfl: 6  •  folic acid (FOLVITE) 1 MG tablet, Take by mouth., Disp: , Rfl:   •  methotrexate 2.5 MG tablet, TAKE 4 TABS PO ONCE WEEKLY ON SAME DAY, Disp: 32 tablet, Rfl: 11  •  naproxen (NAPROSYN) 500 MG tablet, Take 1 tablet by mouth 2 (Two) Times a Day With Meals., Disp: 60 tablet, Rfl: 0  •  potassium chloride (KLOR-CON) 20 MEQ CR tablet, Take 1 tablet by mouth 2 (Two) Times a Day., Disp: 180 tablet, Rfl: 11  •  PREMPRO 0.45-1.5 MG per tablet, TAKE ONE TABLET BY MOUTH ONCE DAILY (Patient taking differently: TAKE ONE TABLET BY MOUTH on ), Disp: 28 tablet, Rfl: 11  •  simvastatin (ZOCOR) 80 MG tablet, TAKE ONE TABLET BY MOUTH IN THE EVENING, Disp: 90 tablet, Rfl: 1  •  topiramate (TOPAMAX) 50 MG tablet, , Disp: , Rfl:   •  valsartan (DIOVAN) 320 MG tablet, Take 1 tablet by mouth Every Night., Disp: 90 tablet, Rfl: 3  •  vitamin D (ERGOCALCIFEROL) 94587 units capsule capsule, TAKE ONE CAPSULE BY MOUTH ONCE A WEEK, Disp: 12  "capsule, Rfl: 3    Current Facility-Administered Medications:   •  cyanocobalamin injection 1,000 mcg, 1,000 mcg, Intramuscular, Q28 Days, Saurabh Ferreira MD, 1,000 mcg at 06/12/18 1632      Review of Systems  All systems were reviewed and are negative unless otherwise stated above in the HPI    Objective   Vital Signs   /88   Pulse 59   Temp 97.7 °F (36.5 °C)   Ht 154.9 cm (60.98\")   Wt 80.2 kg (176 lb 12.8 oz)   BMI 33.42 kg/m²     Physical Exam:   General: awake, alert, NAD   Head: Normocephalic  Eyes:  no scleral icterus  ENT: MMM, OP clear, no thrush.   Neck: Supple, no visible thyromegaly  Cardiovascular: mild bradycardia, RR, no murmurs, rubs, or gallops; no LE edema  Respiratory:  normal work of breathing on ambient air  GI: Abdomen is soft, non-tender, non-distended  : no Hayward catheter present  Musculoskeletal: normal musculature  Skin: No rashes, lesions, or embolic phenomenon  Neurological: Alert and oriented x 3,  motor strength 5/5 in all four extremities, + tremor  Psychiatric: Normal mood and affect   Vasc: no cyanosis;    Labs:     Lab Results   Component Value Date    WBC 7.72 04/04/2018    HGB 12.8 04/04/2018    HCT 37.5 04/04/2018    MCV 88.9 04/04/2018     04/04/2018       Lab Results   Component Value Date    GLUCOSE 122 (H) 05/01/2018    BUN 13 05/01/2018    CREATININE 0.65 05/01/2018    EGFRIFNONA 95 05/01/2018    EGFRIFAFRI 115 06/19/2017    BCR 20.0 05/01/2018    CO2 27.5 05/01/2018    CALCIUM 9.0 05/01/2018    PROTENTOTREF 6.5 06/19/2017    ALBUMIN 3.80 05/01/2018    LABIL2 1.3 05/01/2018    AST 11 05/01/2018    ALT 9 05/01/2018 6/12/18:   RMSF IgM 1.39  RMSF IgG negative  Ehrlichia and Anaplasmosis Ab negative    Radiology (personally reviewed images/report):  No relevant imaging    Assessment/Plan   1. Tremor  2. RMSF + IgM    I doubt she has or had RMSF based on history. No fevers, headaches, rash, or myalgias above her baseline. I am not familiar with RMSF " causing tremors. The IgM was slightly positive but this is a test with poor specificity. Additionally, she has already received nearly one month of doxycycline which is above and beyond the 7-14 days normally given as treatment course. We can test convalescent serologies today. If the IgG is highly positive this would confirm the diagnosis but there would be nothing more to do. I think neuroborreliosis is unlikely in [place] but will try to help her as much as I can from an ID standpoint and will order Lyme serologies since they weren't sent with her original testing for some reason (ordered but not drawn).     I did suggest she consider going to an academic quaternary referral center for further evaluation re: her tremor as it is causing significant changes to her quality of life.      Thank you for this consult. RTC as needed. I will call her with test results.

## 2018-07-12 LAB
B BURGDOR IGG+IGM SER-ACNC: <0.91 ISR (ref 0–0.9)
B BURGDOR IGM SER-ACNC: <0.8 INDEX (ref 0–0.79)

## 2018-07-13 LAB
R RICKETTSI IGG SER QL IA: NEGATIVE
R RICKETTSI IGM TITR SER: 1.68 INDEX (ref 0–0.89)

## 2018-07-17 ENCOUNTER — TELEPHONE (OUTPATIENT)
Dept: INFECTIOUS DISEASES | Facility: CLINIC | Age: 58
End: 2018-07-17

## 2018-07-18 ENCOUNTER — LAB (OUTPATIENT)
Dept: LAB | Facility: HOSPITAL | Age: 58
End: 2018-07-18

## 2018-07-18 ENCOUNTER — TRANSCRIBE ORDERS (OUTPATIENT)
Dept: ADMINISTRATIVE | Facility: HOSPITAL | Age: 58
End: 2018-07-18

## 2018-07-18 ENCOUNTER — APPOINTMENT (OUTPATIENT)
Dept: LAB | Facility: HOSPITAL | Age: 58
End: 2018-07-18

## 2018-07-18 DIAGNOSIS — Z79.899 DRUG THERAPY: ICD-10-CM

## 2018-07-18 DIAGNOSIS — M05.79 SEROPOSITIVE RHEUMATOID ARTHRITIS OF MULTIPLE SITES (HCC): Primary | ICD-10-CM

## 2018-07-18 DIAGNOSIS — M05.79 SEROPOSITIVE RHEUMATOID ARTHRITIS OF MULTIPLE SITES (HCC): ICD-10-CM

## 2018-07-18 LAB
ALBUMIN SERPL-MCNC: 4.2 G/DL (ref 3.5–5.2)
ALP SERPL-CCNC: 72 U/L (ref 40–129)
ALT SERPL W P-5'-P-CCNC: 9 U/L (ref 5–33)
AST SERPL-CCNC: 14 U/L (ref 5–32)
BASOPHILS # BLD AUTO: 0.07 10*3/MM3 (ref 0–0.2)
BASOPHILS NFR BLD AUTO: 0.9 % (ref 0–2)
BILIRUB CONJ SERPL-MCNC: <0.2 MG/DL (ref 0.2–0.3)
BILIRUB INDIRECT SERPL-MCNC: ABNORMAL MG/DL
BILIRUB SERPL-MCNC: 0.3 MG/DL (ref 0.2–1.2)
DEPRECATED RDW RBC AUTO: 50.1 FL (ref 37–54)
EOSINOPHIL # BLD AUTO: 0.12 10*3/MM3 (ref 0.1–0.3)
EOSINOPHIL NFR BLD AUTO: 1.6 % (ref 0–4)
ERYTHROCYTE [DISTWIDTH] IN BLOOD BY AUTOMATED COUNT: 14.8 % (ref 11.5–14.5)
HCT VFR BLD AUTO: 38.6 % (ref 37–47)
HGB BLD-MCNC: 12.7 G/DL (ref 12–16)
IMM GRANULOCYTES # BLD: 0.01 10*3/MM3 (ref 0–0.03)
IMM GRANULOCYTES NFR BLD: 0.1 % (ref 0–0.5)
LYMPHOCYTES # BLD AUTO: 3.05 10*3/MM3 (ref 0.6–4.8)
LYMPHOCYTES NFR BLD AUTO: 40.7 % (ref 20–45)
MCH RBC QN AUTO: 30 PG (ref 27–31)
MCHC RBC AUTO-ENTMCNC: 32.9 G/DL (ref 31–37)
MCV RBC AUTO: 91 FL (ref 81–99)
MONOCYTES # BLD AUTO: 0.46 10*3/MM3 (ref 0–1)
MONOCYTES NFR BLD AUTO: 6.1 % (ref 3–8)
NEUTROPHILS # BLD AUTO: 3.78 10*3/MM3 (ref 1.5–8.3)
NEUTROPHILS NFR BLD AUTO: 50.6 % (ref 45–70)
NRBC BLD MANUAL-RTO: 0 /100 WBC (ref 0–0)
PLATELET # BLD AUTO: 274 10*3/MM3 (ref 140–500)
PMV BLD AUTO: 9.9 FL (ref 7.4–10.4)
PROT SERPL-MCNC: 7.1 G/DL (ref 6–8.5)
RBC # BLD AUTO: 4.24 10*6/MM3 (ref 4.2–5.4)
WBC NRBC COR # BLD: 7.49 10*3/MM3 (ref 4.8–10.8)

## 2018-07-18 PROCEDURE — 36415 COLL VENOUS BLD VENIPUNCTURE: CPT

## 2018-07-18 PROCEDURE — 80076 HEPATIC FUNCTION PANEL: CPT

## 2018-07-18 PROCEDURE — 85025 COMPLETE CBC W/AUTO DIFF WBC: CPT

## 2018-07-20 ENCOUNTER — OFFICE VISIT (OUTPATIENT)
Dept: INTERNAL MEDICINE | Facility: CLINIC | Age: 58
End: 2018-07-20

## 2018-07-20 VITALS
TEMPERATURE: 97.8 F | BODY MASS INDEX: 33.61 KG/M2 | OXYGEN SATURATION: 98 % | HEART RATE: 63 BPM | HEIGHT: 61 IN | RESPIRATION RATE: 16 BRPM | DIASTOLIC BLOOD PRESSURE: 82 MMHG | SYSTOLIC BLOOD PRESSURE: 138 MMHG | WEIGHT: 178 LBS

## 2018-07-20 DIAGNOSIS — E78.2 MIXED HYPERLIPIDEMIA: ICD-10-CM

## 2018-07-20 DIAGNOSIS — F44.4 FUNCTIONAL NEUROLOGICAL SYMPTOM DISORDER WITH ABNORMAL MOVEMENT: ICD-10-CM

## 2018-07-20 DIAGNOSIS — F17.200 NEEDS SMOKING CESSATION EDUCATION: ICD-10-CM

## 2018-07-20 DIAGNOSIS — I10 ESSENTIAL HYPERTENSION: Primary | ICD-10-CM

## 2018-07-20 DIAGNOSIS — Z11.59 NEED FOR HEPATITIS C SCREENING TEST: ICD-10-CM

## 2018-07-20 DIAGNOSIS — E53.8 B12 DEFICIENCY: ICD-10-CM

## 2018-07-20 DIAGNOSIS — M06.9 RHEUMATOID ARTHRITIS INVOLVING MULTIPLE SITES, UNSPECIFIED RHEUMATOID FACTOR PRESENCE: ICD-10-CM

## 2018-07-20 PROCEDURE — 99214 OFFICE O/P EST MOD 30 MIN: CPT | Performed by: NURSE PRACTITIONER

## 2018-07-20 PROCEDURE — 96372 THER/PROPH/DIAG INJ SC/IM: CPT | Performed by: NURSE PRACTITIONER

## 2018-07-20 RX ORDER — CYANOCOBALAMIN 1000 UG/ML
1000 INJECTION, SOLUTION INTRAMUSCULAR; SUBCUTANEOUS ONCE
Status: COMPLETED | OUTPATIENT
Start: 2018-07-20 | End: 2018-07-20

## 2018-07-20 RX ORDER — CYANOCOBALAMIN 1000 UG/ML
INJECTION, SOLUTION INTRAMUSCULAR; SUBCUTANEOUS
COMMUNITY
Start: 2018-06-12 | End: 2018-11-13 | Stop reason: HOSPADM

## 2018-07-20 RX ORDER — OLMESARTAN MEDOXOMIL 40 MG/1
40 TABLET ORAL DAILY
Qty: 90 TABLET | Refills: 3 | Status: SHIPPED | OUTPATIENT
Start: 2018-07-20 | End: 2019-01-10 | Stop reason: ALTCHOICE

## 2018-07-20 RX ORDER — BUPROPION HYDROCHLORIDE 150 MG/1
150 TABLET ORAL EVERY MORNING
Qty: 30 TABLET | Refills: 6 | Status: SHIPPED | OUTPATIENT
Start: 2018-07-20 | End: 2019-01-11

## 2018-07-20 RX ADMIN — CYANOCOBALAMIN 1000 MCG: 1000 INJECTION, SOLUTION INTRAMUSCULAR; SUBCUTANEOUS at 13:21

## 2018-07-20 NOTE — PROGRESS NOTES
Chief Complaint   Patient presents with   • Follow-up   • Tremors       Subjective     Shaylee Wadsworth is a 57 y.o. female being seen for a follow up appointment today regarding  ***. She ***.       History of Present Illness     Allergies   Allergen Reactions   • Meloxicam Swelling     EYES AND FACE SWELLING  EYES AND FACE SWELLING         Current Outpatient Prescriptions:   •  amLODIPine (NORVASC) 5 MG tablet, Take 1 tablet by mouth Daily., Disp: 30 tablet, Rfl: 5  •  aspirin 81 MG tablet, Take 81 mg by mouth Daily., Disp: , Rfl:   •  bisoprolol-hydrochlorothiazide (ZIAC) 5-6.25 MG per tablet, Take 1 tablet by mouth Daily., Disp: 90 tablet, Rfl: 3  •  cyanocobalamin 1000 MCG/ML injection, Inject  into the appropriate muscle as directed by prescriber., Disp: , Rfl:   •  esomeprazole (nexIUM) 40 MG capsule, TAKE ONE CAPSULE BY MOUTH TWICE DAILY (Patient taking differently: TAKE ONE CAPSULE BY MOUTH DAILY), Disp: 60 capsule, Rfl: 6  •  folic acid (FOLVITE) 1 MG tablet, Take by mouth., Disp: , Rfl:   •  methotrexate 2.5 MG tablet, TAKE 4 TABS PO ONCE WEEKLY ON SAME DAY, Disp: 32 tablet, Rfl: 11  •  potassium chloride (KLOR-CON) 20 MEQ CR tablet, Take 1 tablet by mouth 2 (Two) Times a Day., Disp: 180 tablet, Rfl: 11  •  PREMPRO 0.45-1.5 MG per tablet, TAKE ONE TABLET BY MOUTH ONCE DAILY (Patient taking differently: TAKE ONE TABLET BY MOUTH on mon wed fri), Disp: 28 tablet, Rfl: 11  •  simvastatin (ZOCOR) 80 MG tablet, TAKE ONE TABLET BY MOUTH IN THE EVENING, Disp: 90 tablet, Rfl: 1  •  valsartan (DIOVAN) 320 MG tablet, Take 1 tablet by mouth Every Night., Disp: 90 tablet, Rfl: 3  •  vitamin D (ERGOCALCIFEROL) 91676 units capsule capsule, TAKE ONE CAPSULE BY MOUTH ONCE A WEEK, Disp: 12 capsule, Rfl: 3    Current Facility-Administered Medications:   •  cyanocobalamin injection 1,000 mcg, 1,000 mcg, Intramuscular, Q28 Days, Saurabh Ferreira MD, 1,000 mcg at 06/12/18 1632    {Common H&P Review Areas:65971}    Review of  Systems    Assessment     Physical Exam    Plan     Her fasting labs were reviewed with the patient from last week.     {Assess/Plan SmartLinks:39327}    No Follow-up on file.

## 2018-07-20 NOTE — PROGRESS NOTES
"CC: follow up on HTN    Subjective     Shaylee Wadsworth is a 57 y.o. female being seen for a follow up appointment today regarding  HTN, Hyperiodemia, tremor of CNS. Since her last visit here, she has been seen by 2 neurologists for tremors. She saw Dr. Vinny butler, who tried Topomax with side effects. This prompted her to get a second opinion form Dr. Spring with [facility].  He diagnosed her with functional movement disorder, and sent her to PT and the [facility] ceneer at [facility].     She has also been to ID recently due to a recent positive RMSF panel with ID. On 7-, she saw Dr. Boykin, who did not believe that she had RMSF was contributing to the tremors, and believed this was a false positive result on serology for RMSF.     Today, her main concern is controlling tremors. Her tremors are effecting the \"entire body.\" She is having pausing with speech, difficulty picking her feet. She has not fallen, but has a \"slower walk.\" Tremors are Worse with poor sleep, stress. She has not found any relieving factors.       History of Present Illness     Allergies   Allergen Reactions   • Meloxicam Swelling     EYES AND FACE SWELLING  EYES AND FACE SWELLING         Current Outpatient Prescriptions:   •  amLODIPine (NORVASC) 5 MG tablet, Take 1 tablet by mouth Daily., Disp: 30 tablet, Rfl: 5  •  aspirin 81 MG tablet, Take 81 mg by mouth Daily., Disp: , Rfl:   •  bisoprolol-hydrochlorothiazide (ZIAC) 5-6.25 MG per tablet, Take 1 tablet by mouth Daily., Disp: 90 tablet, Rfl: 3  •  calcium-vitamin D (OSCAL-500) 500-200 MG-UNIT per tablet, Take 1 tablet by mouth., Disp: , Rfl:   •  doxycycline (VIBRAMYCIN) 50 MG capsule, Take 2 capsules by mouth Every 12 (Twelve) Hours., Disp: 40 capsule, Rfl: 2  •  esomeprazole (nexIUM) 40 MG capsule, TAKE ONE CAPSULE BY MOUTH TWICE DAILY (Patient taking differently: TAKE ONE CAPSULE BY MOUTH DAILY), Disp: 60 capsule, Rfl: 6  •  folic acid (FOLVITE) 1 MG tablet, " Take by mouth., Disp: , Rfl:   •  methotrexate 2.5 MG tablet, TAKE 4 TABS PO ONCE WEEKLY ON SAME DAY, Disp: 32 tablet, Rfl: 11  •  naproxen (NAPROSYN) 500 MG tablet, Take 1 tablet by mouth 2 (Two) Times a Day With Meals., Disp: 60 tablet, Rfl: 0  •  potassium chloride (KLOR-CON) 20 MEQ CR tablet, Take 1 tablet by mouth 2 (Two) Times a Day., Disp: 180 tablet, Rfl: 11  •  PREMPRO 0.45-1.5 MG per tablet, TAKE ONE TABLET BY MOUTH ONCE DAILY (Patient taking differently: TAKE ONE TABLET BY MOUTH on mon wed fri), Disp: 28 tablet, Rfl: 11  •  simvastatin (ZOCOR) 80 MG tablet, TAKE ONE TABLET BY MOUTH IN THE EVENING, Disp: 90 tablet, Rfl: 1  •  topiramate (TOPAMAX) 50 MG tablet, , Disp: , Rfl:   •  valsartan (DIOVAN) 320 MG tablet, Take 1 tablet by mouth Every Night., Disp: 90 tablet, Rfl: 3  •  vitamin D (ERGOCALCIFEROL) 96109 units capsule capsule, TAKE ONE CAPSULE BY MOUTH ONCE A WEEK, Disp: 12 capsule, Rfl: 3    Current Facility-Administered Medications:   •  cyanocobalamin injection 1,000 mcg, 1,000 mcg, Intramuscular, Q28 Days, Saurabh Ferreira MD, 1,000 mcg at 06/12/18 1632    The following portions of the patient's history were reviewed and updated as appropriate: allergies, current medications, past family history, past medical history, past social history, past surgical history and problem list.    Review of Systems   Constitutional: Negative.    HENT: Negative.    Eyes: Negative.    Respiratory: Negative for cough, shortness of breath and stridor.    Cardiovascular: Negative.  Negative for chest pain, palpitations and leg swelling.   Gastrointestinal: Negative.    Endocrine: Negative.    Genitourinary: Negative.  Negative for difficulty urinating.   Musculoskeletal: Positive for arthralgias and gait problem. Negative for back pain, myalgias, neck pain and neck stiffness.   Skin: Negative.    Neurological: Positive for tremors, syncope and speech difficulty. Negative for dizziness, seizures, facial asymmetry and  weakness.   Hematological: Negative.  Negative for adenopathy.   Psychiatric/Behavioral: Negative.        Assessment     Physical Exam   Constitutional: She is oriented to person, place, and time. She appears well-developed and well-nourished.   HENT:   Head: Normocephalic.   Right Ear: External ear normal.   Left Ear: External ear normal.   Nose: Nose normal.   Mouth/Throat: Oropharynx is clear and moist. No oropharyngeal exudate.   Neck: Neck supple. No thyromegaly present.   Cardiovascular: Normal rate, regular rhythm and normal heart sounds.    No murmur heard.  Pulmonary/Chest: Effort normal and breath sounds normal. No respiratory distress. She has no wheezes.   Musculoskeletal: She exhibits no edema.   Neurological: She is alert and oriented to person, place, and time. She displays normal reflexes. No cranial nerve deficit. She exhibits normal muscle tone. Coordination abnormal.   Skin: Skin is warm and dry.   Psychiatric: Her behavior is normal. Judgment normal. Her mood appears anxious. Her speech is not rapid and/or pressured. Cognition and memory are normal.   Gross and fine motor tremors. Speech with robotic pauses.    Vitals reviewed.      Plan     Her fasting labs were reviewed with the patient from last week.     Shaylee was seen today for follow-up and tremors.    Diagnoses and all orders for this visit:    Essential hypertension  -     olmesartan (BENICAR) 40 MG tablet; Take 1 tablet by mouth Daily.  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    Need for hepatitis C screening test  -     Hepatitis C Antibody; Future      Hold Zocor 80 mg to see if it affects the tremors.     I do not want her working overtime for the next 4 weeks.     Due to her tremors, I have highly recommended smoking cessation and caffeine throttling. Smoking cessation for 3 minutues. Will use Wellbutrin daily.     Follow up in 4 weeks

## 2018-07-23 ENCOUNTER — HOSPITAL ENCOUNTER (OUTPATIENT)
Dept: PHYSICAL THERAPY | Facility: HOSPITAL | Age: 58
Setting detail: THERAPIES SERIES
Discharge: HOME | End: 2018-07-23

## 2018-07-23 ENCOUNTER — HOSPITAL ENCOUNTER (OUTPATIENT)
Dept: OCCUPATIONAL THERAPY | Facility: HOSPITAL | Age: 58
Setting detail: THERAPIES SERIES
Discharge: HOME | End: 2018-07-23

## 2018-07-23 DIAGNOSIS — G25.9 FUNCTIONAL MOVEMENT DISORDER: Primary | ICD-10-CM

## 2018-07-23 PROCEDURE — 97163 PT EVAL HIGH COMPLEX 45 MIN: CPT

## 2018-07-23 PROCEDURE — 97167 OT EVAL HIGH COMPLEX 60 MIN: CPT

## 2018-07-23 NOTE — THERAPY EVALUATION
Outpatient Occupational Therapy Neuro Initial Evaluation   [place]     Patient Name: Shaylee Wadsworth  : 1960  MRN: [id number]  Today's Date: 2018      Visit Date: 2018    Patient Active Problem List   Diagnosis   • Epigastric pain   • Abnormal electrocardiogram   • Abnormal radiographic examination   • Disc disorder of thoracic region   • Gastroesophageal reflux disease   • Chronic gastritis   • Hyperlipidemia   • Hypertension   • Menopausal symptom   • Migraine   • Osteoarthritis of shoulder   • Osteopenia   • Rheumatoid arthritis (CMS/HCC)   • Menopausal state   • Multiple pulmonary nodules determined by computed tomography of lung   • H/O hyperparathyroidism   • Tendinitis of left shoulder   • Healthcare maintenance   • Hypokalemia   • Tremors of nervous system        Past Medical History:   Diagnosis Date   • Anxiety    • Arzate esophagus    • Esophageal reflux    • Fibromyositis    • Functional movement disorder    • H/O colonoscopy    • H/O mammogram 2011    NORMAL    • Hx of bone density study 2011    OSTEOPENIA    • Hyperlipidemia    • Hyperparathyroidism (CMS/HCC)    • Hypertension    • Menopause    • Osteopenia    • Pancreatitis         Past Surgical History:   Procedure Laterality Date   • CHOLECYSTECTOMY     • COLONOSCOPY     • DILATATION AND CURETTAGE     • HYSTERECTOMY     • MOUTH SURGERY      TOOTH EXTRACTION   • OTHER SURGICAL HISTORY  2015    DIAGNOSTIC ESOPHAGOSCOPY TRANSNASAL FLEXIBLE WITH BIOPSY; ARZATE ESO. REPEAT EGD 2 YR    • OVARY SURGERY Left     NORMAL    • PAP SMEAR  2010    NORMAL    • PARATHYROIDECTOMY Right 2016    Dr. Muchael Flynn at [facility]         Visit Dx:      ICD-10-CM ICD-9-CM   1. Functional movement disorder G25.9 333.99             Patient History     Row Name 18 1100          History    Chief Complaint Other 1 (comment)   tremors  -EN     Date Current Problem(s) Began 18  -EN     Brief Description of  "Current Complaint Patient reported in April of this year she had sudden onset of R hand tremors.  Patient went to the ED and had a CT and MRI which came back normal.  Since then has seen 2 neurologist and was given the diagnosis of Functional Movement Disorder.  [place] [facility] [place] has been unable to make an appointment.  Patient reported over time her right hand tremor has worsened and now includes her entire body.  Patient stated sometimes she has no tremors or shaking (yesterday up to 8 hours without sxs).  Patient reported the neurologist put her on Topamax for her tremors but she took herself off of it due to worsening sxs.   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  Reports she uses a computer most of the day.  Patient reported she is able to fulfill her job duties but going to meetings and tours is \"emotional\".  -EN     Patient seeing anyone else for problem(s)? Patient has seen 2 neurologist and a \"neurological chiropractor.  Patient reported she has tried to get in to the [facility] which specializes in FMD.    -EN     How has patient tried to help current problem? Placed on Topamax however took self off due to worsening symptoms.    -EN     What clinical tests have you had for this problem? CT scan;MRI  -EN        Pain     Pain Location Hip   B Hip pain with touch only  -EN     Pain at Present 0  -EN     Pain at Best 0  -EN     Pain at Worst 4  -EN     Pain Frequency --   only with touch  -EN     Pain Description Tender  -EN     What Performance Factors Make the Current Problem(s) WORSE? Patient reports anything that makes her anxious or nervous worsens tremors  -EN     What Performance Factors Make the Current " "Problem(s) BETTER? Fine motor acitivites usually stop the tremors and sometimes she has no tremors at all.  -EN     Is your sleep disturbed? No   reported spouse stated no shaking or tremors when asleep  -EN     Difficulties with ADL's? Patient reports she is independent with All ADLs and IADLs.    -EN        Fall Risk Assessment    Any falls in the past year: No  -EN        Daily Activities    Primary Language English  -EN     Are you able to read Yes  -EN     Are you able to write Yes  -EN     How does patient learn best? Listening  -EN     Patient is concerned about/has problems with --   tremors worsening  -EN     Does patient have problems with the following? Other (comment)   \"emotional problems\"  -EN     Barriers to learning None  -EN     Pt Participated in POC and Goals Yes  -EN        Safety    Are you being hurt, hit, or frightened by anyone at home or in your life? No  -EN     Are you being neglected by a caregiver No  -EN       User Key  (r) = Recorded By, (t) = Taken By, (c) = Cosigned By    Initials Name Provider Type     Megan Parham, PT Physical Therapist    EN Danica Mehta OTR Occupational Therapist                OT Neuro     Row Name 07/23/18 1100             Subjective Comments    Subjective Comments Patient reports she will continue to call to get an appointment at the [facility].    -EN         Precautions and Contraindications    Precautions/Limitations fall precautions  -EN         Subjective Pain    Able to rate subjective pain? yes  -EN      Pre-Treatment Pain Level 0  -EN      Post-Treatment Pain Level 0  -EN      Subjective Pain Comment Reports pain in hips to touch only.  -EN         Home Living    Living Arrangements house  -EN      Home Accessibility stairs within home  -EN      Stairs, Within Home, Primary --   flight of steps up to bedroom  -EN      Living Environment Comment Patient lives in a 2 level home with a flight of steps with a handrail up to bedroom.  " Patient lives with spouse and her mother.  -EN         Vision- Basic    Current Vision No visual deficits  -EN         Cognitive Assessment/Intervention    Current Cognitive/Communication Assessment functional  -EN      Follows Commands (Cognition) WNL  -EN      Cognition Comments Patient reported no changes in cognition.  Able to perform job duties.  -EN         Sensation    Sensation WNL? WNL  -EN      Light Touch No apparent deficits   [place]-Ed intact monofilament 3.61 throughout B hands  -EN         Posture/Observations    Observations Other (comment)   tremors/shaking  -EN      Posture/Observations Comments Patient with involuntary hand tremors.  Patient's head and trunk shaking throughout evaluation.  -EN         Coordination    Coordination Observations Resting Tremor;Involuntary Movements  -EN      Resting Tremor Bilteral:   worse in right hand  -EN      Involuntary Movements Bilteral:  -EN      Other Coordination Observations Good control and coordination in B hands during fine motor acitivies and when using theraputty.  -EN      Coordination Tests 9-Hole Peg  -EN      9-Hole Peg Left 21.63  -EN      9-Hole Peg Right 20.12  -EN         General ROM    GENERAL ROM COMMENTS B UE AROM WFL  -EN         General Assessment (Manual Muscle Testing)    Comment, General Manual Muscle Testing (MMT) Assessment B UE strength 5/5  -EN        User Key  (r) = Recorded By, (t) = Taken By, (c) = Cosigned By    Initials Name Provider Type    YEMI Mehta, OTR Occupational Therapist             Hand Therapy (last 24 hours)      Hand Eval     Row Name 07/23/18 1500              Strength Right    # Reps 1  -EN      Right Rung 2  -EN      Right  Test 1 64  -EN       Strength Average Right 64  -EN          Strength Left    # Reps 1  -EN      Left Rung 2  -EN      Left  Test 1 70  -EN       Strength Average Left 70  -EN         Right Hand Strength - Pinch (lbs)    Lateral 8 lbs  -EN          Left Hand Strength - Pinch (lbs)    Lateral 4 lbs  -EN         Therapy Education    Education Details Discussed compensatory strategies if tremors worsen and patient has difficulty with ADL/IADL tasks.  Attempted use of weighted wrist cuffs however involuntary tremors did not improve.  Issued yellow theraputty for hand strengthening. Patient encouraged to continue to call to be seen at the [facility] which specializes in Functional Movement Disorder.  Patient to call back in one week to to notify therapist if she will be returning here for therapy or at [facility].  -EN      Given HEP  -EN      Program New  -EN      How Provided Verbal;Demonstration  -EN      Provided to Patient  -EN      Level of Understanding Verbalized;Demonstrated;Teach back education performed  -EN        User Key  (r) = Recorded By, (t) = Taken By, (c) = Cosigned By    Initials Name Provider Type    YEMI Mehta OTR Occupational Therapist              Therapy Education  Education Details: Discussed compensatory strategies if tremors worsen and patient has difficulty with ADL/IADL tasks.  Attempted use of weighted wrist cuffs however involuntary tremors did not improve.  Issued yellow theraputty for hand strengthening. Patient encouraged to continue to call to be seen at the [facility] clinic which specializes in Functional Movement Disorder.  Patient to call back in one week to to notify therapist if she will be returning here for therapy or at [facility].  Given: HEP  Program: New  How Provided: Verbal, Demonstration  Provided to: Patient  Level of Understanding: Verbalized, Demonstrated, Teach back education performed              OT Assessment/Plan     Row Name 07/23/18 1604          OT Assessment    Functional Limitations Performance in work activities   Reports emotional stress when tours come in and when in meetings at work  -EN     Impairments Other (comment)   tremors and entire body shaking  -EN     Assessment Comments   Ananth presents with a diagnosis of Functional Movement Disorder with onset of tremors 4/4/18.   Patient reports tremors started in April but have worsened and now has entire body shaking which seems to worsen when she is nervous.  Patient's UE AROM and strength was WNL.    When performing fine motor activities patient did not have tremors but did have head and neck shaking.  Patient reported sometimes she has no shaking or tremors (yesterday had no symptoms for 8 hours).   Weighted wrist cuffs attempted to reduce hand tremors but symptoms did not improve.  Educated patient on compensatory strategies if tremors worsen and patient has difficulty with ADL/IADL tasks.  Patient has been [facility] where they specialize in this disorder and is waiting for an appointment with them.  She would benefit most from specialists in this area.  -EN     Patient/caregiver participated in establishment of treatment plan and goals Yes  -EN        OT Plan    OT Plan Comments Patient to continue to call [facility] and try to get appointment .  Patient unsure if she will return here for therapy or not but will call in approximately one week and let us know.  Hold on developing goals/plan of care until further contact from patient  -EN       User Key  (r) = Recorded By, (t) = Taken By, (c) = Cosigned By    Initials Name Provider Type    YEMI Mehta, OTR Occupational Therapist                  Outcome Measure Options: Quick DASH  9 Hole Peg  9-Hole Peg Left: 21.63  9-Hole Peg Right: 20.12  Quick DASH  Open a tight or new jar.: Moderate Difficulty  Do heavy household chores (e.g., wash walls, wash floors): Moderate Difficulty  Carry a shopping bag or briefcase: Mild Difficulty  Use a knife to cut food: No Difficulty  Recreational activities in which you take some force or impact through your arm, should or hand (e.g. golf, hammering, tennis, etc.): Mild Difficulty  During the past week, to what  extent has your arm, shoulder, or hand problem interfered with your normal social activites with family, friends, neighbors or groups?: Quite a bit  During the past week, were you limited in your work or other regular daily activities as a result of your arm, shoulder or hand problem?: Moderately Limited  Arm, Shoulder, or hand pain: None  Tingling (pins and needles) in your arm, shoulder, or hand: None  During the past week, how much difficulty have you had sleeping because of the pain in your arm, shoulder or hand?: No difficulty  Number of Questions Answered: 10  Quick DASH Score: 27.5         Time Calculation:   OT Start Time: 1111  OT Stop Time: 1200  OT Time Calculation (min): 49 min     Therapy Suggested Charges     Code   Minutes Charges    None             Therapy Charges for Today     Code Description Service Date Service Provider Modifiers Qty    [id number]  OT EVAL HIGH COMPLEXITY 3 7/23/2018 MARTÍN Torres GO 1                     MARTÍN Rivera  7/23/2018

## 2018-07-23 NOTE — THERAPY EVALUATION
.Outpatient Physical Therapy Neuro Initial Evaluation   [place]     Patient Name: Shaylee Wadsworth  : 1960  MRN: [id number]  Today's Date: 2018      Visit Date: 2018    Patient Active Problem List   Diagnosis   • Epigastric pain   • Abnormal electrocardiogram   • Abnormal radiographic examination   • Disc disorder of thoracic region   • Gastroesophageal reflux disease   • Chronic gastritis   • Hyperlipidemia   • Hypertension   • Menopausal symptom   • Migraine   • Osteoarthritis of shoulder   • Osteopenia   • Rheumatoid arthritis (CMS/HCC)   • Menopausal state   • Multiple pulmonary nodules determined by computed tomography of lung   • H/O hyperparathyroidism   • Tendinitis of left shoulder   • Healthcare maintenance   • Hypokalemia   • Tremors of nervous system        Past Medical History:   Diagnosis Date   • Anxiety    • Arzate esophagus    • Esophageal reflux    • Fibromyositis    • Functional movement disorder    • H/O colonoscopy    • H/O mammogram 2011    NORMAL    • Hx of bone density study 2011    OSTEOPENIA    • Hyperlipidemia    • Hyperparathyroidism (CMS/HCC)    • Hypertension    • Menopause    • Osteopenia    • Pancreatitis         Past Surgical History:   Procedure Laterality Date   • CHOLECYSTECTOMY     • COLONOSCOPY     • DILATATION AND CURETTAGE     • HYSTERECTOMY     • MOUTH SURGERY      TOOTH EXTRACTION   • OTHER SURGICAL HISTORY  2015    DIAGNOSTIC ESOPHAGOSCOPY TRANSNASAL FLEXIBLE WITH BIOPSY; ARZATE ESO. REPEAT EGD 2 YR    • OVARY SURGERY Left     NORMAL    • PAP SMEAR  2010    NORMAL    • PARATHYROIDECTOMY Right 2016    Dr. Muchael Flynn at [place]         Visit Dx:     ICD-10-CM ICD-9-CM   1. Functional movement disorder G25.9 333.99             Patient History     Row Name 18 1300 18 1100          History    Chief Complaint Other 1 (comment)   tremor  -LH Other 1 (comment)   tremors  -EN     Date Current Problem(s) Began  "04/04/18  - 04/04/18  -EN     Brief Description of Current Complaint Patient reports sudden onset of right hand tremor 4/4/18 while at work.  Went to ER and CT scan and MRI all normal.  Since then, has seen 2 neurologists and was diagnosed with Functional Movement Disorder.  Tremor has worsened significantly and now includes entire body.  Patient reports neurologist referred her to [facility] who specialize in this disorder, however she has not been able to get appointment yet, so she requested referral here for PT/OT.  Patient reports tremor medication made tremors worse so she took herself off of those meds.   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  Reports she uses a computer most of the day and does walk long distances.  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  Reports she uses a computer most of the day.    -EN     Patient seeing anyone else for problem(s)?  -- Patient has seen 2 neurologist and a \"neurological chiropractor.  Patient reported she has tried to get in to the [facility] which specializes in FMD.    -EN     How has patient tried to help current problem?  -- Placed on Topamax however took self off due to worsening symptoms.    -EN     What clinical tests have you had for this problem?  -- CT scan;MRI  -EN        Pain     Pain Location Hip  -LH Hip   B Hip pain with touch only  -EN     Pain at Present 0  -LH 0  -EN     Pain at Best 0  -LH 0  -EN     Pain " "at Worst 4  -LH 4  -EN     Pain Frequency Other (Comment);Intermittent   only with touch  -LH --   only with touch  -EN     Pain Description Tender  -LH Tender  -EN     What Performance Factors Make the Current Problem(s) WORSE? In regards to tremor - patient reports anything that causes her to be nervous or anxious worsens tremor, having to sit still worsens tremor, \"anything awkward\" worsens tremor  -LH  --     What Performance Factors Make the Current Problem(s) BETTER? fine motor tasks usually stop tremor in hands.  Intermittently, she will have days with almost no tremor at all.  Yesterday no tremors for 8 hours.  She has kept a journal and reports no pattern or consistency with when tremor occurs and when it doesn't.  -LH  --     Is your sleep disturbed? No   reports no tremors when asleep  - No   reported spouse stated no shaking or tremors when asleep  -EN        Fall Risk Assessment    Any falls in the past year: No  -LH No  -EN        Daily Activities    Primary Language English  -  --     Are you able to read Yes  -LH  --     Are you able to write Yes  -LH  --     How does patient learn best? Listening  -LH  --     Patient is concerned about/has problems with Walking;Performing home management (household chores, shopping, care of dependents);Performing job responsibilities/community activities (work, school,   due to tremors  -LH  --     Does patient have problems with the following? Other (comment)   \"emotional problems\"  -LH  --     Barriers to learning None  -LH  --     Pt Participated in POC and Goals Yes  -LH  --        Safety    Are you being hurt, hit, or frightened by anyone at home or in your life? No  -LH  --     Are you being neglected by a caregiver No  -LH  --       User Key  (r) = Recorded By, (t) = Taken By, (c) = Cosigned By    Initials Name Provider Type    LH Megan Parham, PT Physical Therapist    EN Danica Mehta, OTR Occupational Therapist                    PT Neuro     " "Row Name 07/23/18 1300             Precautions and Contraindications    Precautions/Limitations fall precautions  -         Home Living    Living Arrangements house  -      Home Accessibility stairs within home  -      Living Environment Comment lives with  in 2 level home, bedroom is upstairs.  There is one handrail.  -         Cognition    Overall Cognitive Status WFL  -      Comments Emotional, becoming tearful when discussing impact of tremors on her job/life  -         Sensation    Sensation WNL? WNL  -      Light Touch No apparent deficits  -         Proprioception    Proprioception intact bilateral ankles  -         DTR- Lower Quarter Clearing    Patellar tendon (L2-4) Bilateral:;2- Normal response  -         Posture/Observations    Posture/Observations Comments Mild forward head and bilateral rounded shoulders.  Stands with wide base of support.  Dislikes standing still - prefers to constantly weight shift left to right \"to hold my balance\"  With cues and CGA, was able to stand statically x 60 seconds with feet together continued trunk/upper body tremors noted  -         Coordination    Coordination WNL? --   heel-shin impaired bilaterally. Improved with 4# ankleweight  -      Coordination Observations Resting Tremor;Involuntary Movements  -      Resting Tremor Bilteral:;Yes   UE, trunk and head/neck, voice  -      Coordination Tests Rapid Alternating  -      Rapid Alternating Bilteral:;Impaired   hypometric. Improved with 4# ankle weight.  -         General ROM    GENERAL ROM COMMENTS AROM B LE WFL, no deficits noted  -         General Assessment (Manual Muscle Testing)    Comment, General Manual Muscle Testing (MMT) Assessment B LE grossly 5/5 with exception of bilateral hip abduction and adduction 4/5  -         Transfers    Sit-Stand [place] (Transfers) independent  -      Stand-Sit [place] (Transfers) independent  -      Comment (Transfers) " prefers UE assist, but able to perform without it upon request.  -         Gait/Stairs Assessment/Training    Jack Level (Gait) independent  -      Deviations/Abnormal Patterns (Gait) ataxic;base of support, wide;alina decreased;gait speed decreased;stride length decreased  -      Comment (Gait/Stairs) Patient ambulates with wide base of support, variable foot placement.  Decreased heel strike bilaterally.  Trunk/upper body tremor noted throughout with patient extending arms out to side at times for additional balance.  Backward walking:  decreased speed and step length, requires CGA/,min A due to tremor/instability.  4# ankle weights placed and gait forward/back repeated - decline in gait mechanics noted with decreased speed, widened base of support, increased trunk movements.  -         Balance Skills Training    Rhomberg Feet together eyes open and eyes closed on solid surface with CGA - increased sway/trunk tremor noted.  Patient prefers to shift weight left/right in stance but with cues was able to still this movement and stand statically with continued upper body tremors.  -        User Key  (r) = Recorded By, (t) = Taken By, (c) = Cosigned By    Initials Name Provider Type     Megan Parham, PT Physical Therapist          Therapy Education  Education Details: Discussed possible PT plan of care to include hip strengthening, BBTW vest.  However, discussed areas of expertise/specialization and no therapist in this clinic specializes in Functional Movement Disorder, as they do at the [facility] where patient is waiting to be seen.  REcommend patient contact them again in attempt to get appointment scheduled.  Patient in agreement - will call back in approximately one week to notify therapist if she will be returning here or not.  How Provided: Verbal  Provided to: Patient  Level of Understanding: Verbalized              PT Assessment/Plan     Row Name 07/23/18 1300          PT  Assessment    Functional Limitations Decreased safety during functional activities;Impaired gait  -     Impairments Balance;Gait;Other (comment)   tremors  -     Assessment Comments Mrs. Wadsworth presents with a diagnosis of Functional Movement Disorder with onset of tremors 4/4/18.  As a result of these tremors, she demonstrates decreased standing balance and impaired gait.  Gait and balance were worsened with trial of ankle weights for increased proprioceptive input.  She demonstrates good LE strength bilaterally with only mild hip weakness in abduction and adduction.  Discussed possible trial of Balance Based Torso Weighted Vest.  Mrs. Wadsworth has been referred to [facility] where they specialize in this disorder and is waiting for an appointment with them.  She would benefit most from specialists this area.    -     Please refer to paper survey for additional self-reported information Yes  -     Rehab Potential Fair  -        PT Plan    PT Plan Comments Patient reports she is going to continue to call [facility] and try to get appointment with them.  She is unsure if she will return here for therapy or not but will call in approximately one week and let us know.  Hold on developing goals/plan of care until further contact from patient.  -       User Key  (r) = Recorded By, (t) = Taken By, (c) = Cosigned By    Initials Name Provider Type     Megan Parham, ERIKA Physical Therapist             Time Calculation:   Start Time: 1035  Stop Time: 1110  Time Calculation (min): 35 min   Therapy Suggested Charges     Code   Minutes Charges    None           Therapy Charges for Today     Code Description Service Date Service Provider Modifiers Qty    06354299425 HC PT EVAL HIGH COMPLEXITY 2 7/23/2018 Megan Parham, PT GP 1                    Megan Parham PT  7/23/2018

## 2018-08-06 ENCOUNTER — OFFICE VISIT (OUTPATIENT)
Dept: INTERNAL MEDICINE | Facility: CLINIC | Age: 58
End: 2018-08-06

## 2018-08-06 VITALS
HEIGHT: 61 IN | TEMPERATURE: 98.3 F | RESPIRATION RATE: 16 BRPM | WEIGHT: 169 LBS | OXYGEN SATURATION: 98 % | HEART RATE: 57 BPM | BODY MASS INDEX: 31.91 KG/M2 | SYSTOLIC BLOOD PRESSURE: 152 MMHG | DIASTOLIC BLOOD PRESSURE: 80 MMHG

## 2018-08-06 DIAGNOSIS — E53.8 B12 DEFICIENCY: ICD-10-CM

## 2018-08-06 DIAGNOSIS — E78.5 HYPERLIPIDEMIA LDL GOAL <100: ICD-10-CM

## 2018-08-06 DIAGNOSIS — R25.1 ANXIETY RELATED TREMOR: ICD-10-CM

## 2018-08-06 DIAGNOSIS — G25.9 FUNCTIONAL MOVEMENT DISORDER: Primary | ICD-10-CM

## 2018-08-06 DIAGNOSIS — F41.9 ANXIETY RELATED TREMOR: ICD-10-CM

## 2018-08-06 PROCEDURE — 99214 OFFICE O/P EST MOD 30 MIN: CPT | Performed by: NURSE PRACTITIONER

## 2018-08-06 PROCEDURE — 96372 THER/PROPH/DIAG INJ SC/IM: CPT | Performed by: NURSE PRACTITIONER

## 2018-08-06 RX ORDER — CYANOCOBALAMIN 1000 UG/ML
1000 INJECTION, SOLUTION INTRAMUSCULAR; SUBCUTANEOUS ONCE
Status: COMPLETED | OUTPATIENT
Start: 2018-08-06 | End: 2018-08-06

## 2018-08-06 RX ORDER — ATORVASTATIN CALCIUM 40 MG/1
40 TABLET, FILM COATED ORAL DAILY
Qty: 90 TABLET | Refills: 3 | Status: SHIPPED | OUTPATIENT
Start: 2018-08-06 | End: 2019-07-28 | Stop reason: SDUPTHER

## 2018-08-06 RX ORDER — DIAZEPAM 5 MG/1
5 TABLET ORAL NIGHTLY PRN
Qty: 10 TABLET | Refills: 0 | Status: ON HOLD | OUTPATIENT
Start: 2018-08-06 | End: 2018-11-13 | Stop reason: SDUPTHER

## 2018-08-06 RX ADMIN — CYANOCOBALAMIN 1000 MCG: 1000 INJECTION, SOLUTION INTRAMUSCULAR; SUBCUTANEOUS at 09:26

## 2018-08-06 NOTE — PROGRESS NOTES
"Chief Complaint   Patient presents with   • Follow-up   • Tremors       Subjective     Shaylee Wadsworth is a 57 y.o. female being seen for a follow up appointment today regarding Tremors.  She has been diagnosed with functional movement disorder by Dr. Spring at the [facility]. She was in the office here on 7-, and advised to stop smoking and caffeine. She was stated on Wellbutrin  mg daily to aid in smoking cessation. She continues to smoke.  She was also sent for PT/OT eval. She had 1 PT appt here, but prefers to go to the [facility] clinic for movement disorder. She is waiting for a call back from [facility], and she is to be contacted today. Additionally, she was removed from Overtime at work for 4 weeks, and her Zocor was held. She was also started on B12 injections.     She self referred to a hypnotist and accupunturist which she reports \"it helped me for about 20 minutes, I stopped shaking.\" She is considering CBD oil. She also has an appt with Dr. Gonzalez (psych) to discuss stress.       History of Present Illness     Allergies   Allergen Reactions   • Meloxicam Swelling     EYES AND FACE SWELLING  EYES AND FACE SWELLING         Current Outpatient Prescriptions:   •  amLODIPine (NORVASC) 5 MG tablet, Take 1 tablet by mouth Daily., Disp: 30 tablet, Rfl: 5  •  aspirin 81 MG tablet, Take 81 mg by mouth Daily., Disp: , Rfl:   •  bisoprolol-hydrochlorothiazide (ZIAC) 5-6.25 MG per tablet, Take 1 tablet by mouth Daily., Disp: 90 tablet, Rfl: 3  •  buPROPion XL (WELLBUTRIN XL) 150 MG 24 hr tablet, Take 1 tablet by mouth Every Morning., Disp: 30 tablet, Rfl: 6  •  cyanocobalamin 1000 MCG/ML injection, Inject  into the appropriate muscle as directed by prescriber., Disp: , Rfl:   •  esomeprazole (nexIUM) 40 MG capsule, TAKE ONE CAPSULE BY MOUTH TWICE DAILY (Patient taking differently: TAKE ONE CAPSULE BY MOUTH DAILY), Disp: 60 capsule, Rfl: 6  •  folic acid (FOLVITE) 1 MG tablet, Take by mouth., " Disp: , Rfl:   •  methotrexate 2.5 MG tablet, TAKE 4 TABS PO ONCE WEEKLY ON SAME DAY, Disp: 32 tablet, Rfl: 11  •  olmesartan (BENICAR) 40 MG tablet, Take 1 tablet by mouth Daily., Disp: 90 tablet, Rfl: 3  •  potassium chloride (KLOR-CON) 20 MEQ CR tablet, Take 1 tablet by mouth 2 (Two) Times a Day., Disp: 180 tablet, Rfl: 11  •  PREMPRO 0.45-1.5 MG per tablet, TAKE ONE TABLET BY MOUTH ONCE DAILY (Patient taking differently: TAKE ONE TABLET BY MOUTH on mon wed fri), Disp: 28 tablet, Rfl: 11  •  vitamin D (ERGOCALCIFEROL) 33596 units capsule capsule, TAKE ONE CAPSULE BY MOUTH ONCE A WEEK, Disp: 12 capsule, Rfl: 3    Current Facility-Administered Medications:   •  cyanocobalamin injection 1,000 mcg, 1,000 mcg, Intramuscular, Q28 Days, Saurabh Ferreira MD, 1,000 mcg at 06/12/18 1632    The following portions of the patient's history were reviewed and updated as appropriate: allergies, current medications, past family history, past medical history, past social history, past surgical history and problem list.    Review of Systems   Constitutional: Positive for activity change.   HENT: Negative.    Eyes: Negative.    Respiratory: Negative.    Cardiovascular: Negative.  Negative for chest pain, palpitations and leg swelling.   Endocrine: Negative.    Genitourinary: Negative.    Musculoskeletal: Negative.    Allergic/Immunologic: Negative.    Neurological: Positive for tremors and speech difficulty. Negative for seizures and numbness.   Hematological: Negative.        Assessment     Physical Exam   Constitutional: She is oriented to person, place, and time. She appears well-developed and well-nourished.   HENT:   Head: Normocephalic.   Right Ear: External ear normal.   Left Ear: External ear normal.   Nose: Nose normal.   Mouth/Throat: Oropharynx is clear and moist.   Cardiovascular: Normal rate, regular rhythm and normal heart sounds.    No murmur heard.  Pulmonary/Chest: Effort normal and breath sounds normal. No  respiratory distress. She has no wheezes.   Musculoskeletal: She exhibits no edema.   Neurological: She is alert and oriented to person, place, and time. She displays normal reflexes. No cranial nerve deficit or sensory deficit. She exhibits normal muscle tone. Coordination abnormal.   Gait antalgic with slapping of right foot. Gross motor tremor to head and RUE. Tremors resolve with fine motor movement in hands.   Psychiatric: She has a normal mood and affect. Her behavior is normal.   Vitals reviewed.      Plan         Shaylee was seen today for follow-up and tremors.    Diagnoses and all orders for this visit:    Functional movement disorder    Hyperlipidemia LDL goal <100  -     atorvastatin (LIPITOR) 40 MG tablet; Take 1 tablet by mouth Daily.    Anxiety related tremor  -     diazePAM (VALIUM) 5 MG tablet; Take 1 tablet by mouth At Night As Needed for Anxiety.    B12 deficiency  -     cyanocobalamin injection 1,000 mcg; Inject 1 mL into the appropriate muscle as directed by prescriber 1 (One) Time.      Will resume Lipitor instead of Zocor due to Norvasc use and lack of benefit from holding.     Smoking cessation discussed again in relation to tremors. She will continue Wellbutrin.    Trial of Valium for tremors as needed.     A head to toe isometric chair weight lifting regimen discussed with a gait training program for maneuvering steps.     Follow up in 4 weeks after PT and psych eval.

## 2018-08-07 ENCOUNTER — DOCUMENTATION (OUTPATIENT)
Dept: OCCUPATIONAL THERAPY | Facility: HOSPITAL | Age: 58
End: 2018-08-07

## 2018-08-07 DIAGNOSIS — G25.9 FUNCTIONAL MOVEMENT DISORDER: Primary | ICD-10-CM

## 2018-08-07 NOTE — THERAPY DISCHARGE NOTE
Outpatient Occupational Therapy Discharge Summary         Patient Name: Shaylee Wadsworth  : 1960  MRN: [id number]    Today's Date: 2018      Visit Date: 2018           OP OT Discharge Summary  Date of Discharge: 18  Reason for Discharge: other (comment) (Patient was encouraged at OT evaluation to continue to call to be seen at the [facility] clinic which specializes in Functional Movement Disorder. Patient was to call back in one week to to notify therapist if she will be returning here for therapy or be seen at the [facility].   Pt has not called to schedule further OT appointments.  Outcomes Achieved:  (evaluation only)  Discharge Instructions: see reason for discharge      Time Calculation:         Therapy Suggested Charges     Code   Minutes Charges    None                             Danica Mehta, OTR   2018

## 2018-08-14 DIAGNOSIS — F44.4 FUNCTIONAL NEUROLOGICAL SYMPTOM DISORDER WITH ABNORMAL MOVEMENT: Primary | ICD-10-CM

## 2018-08-17 ENCOUNTER — RESULTS ENCOUNTER (OUTPATIENT)
Dept: INTERNAL MEDICINE | Facility: CLINIC | Age: 58
End: 2018-08-17

## 2018-08-17 DIAGNOSIS — M06.9 RHEUMATOID ARTHRITIS INVOLVING MULTIPLE SITES, UNSPECIFIED RHEUMATOID FACTOR PRESENCE: ICD-10-CM

## 2018-08-20 ENCOUNTER — DOCUMENTATION (OUTPATIENT)
Dept: PHYSICAL THERAPY | Facility: HOSPITAL | Age: 58
End: 2018-08-20

## 2018-08-20 ENCOUNTER — RESULTS ENCOUNTER (OUTPATIENT)
Dept: INTERNAL MEDICINE | Facility: CLINIC | Age: 58
End: 2018-08-20

## 2018-08-20 DIAGNOSIS — G25.9 FUNCTIONAL MOVEMENT DISORDER: Primary | ICD-10-CM

## 2018-08-20 DIAGNOSIS — F44.4 FUNCTIONAL NEUROLOGICAL SYMPTOM DISORDER WITH ABNORMAL MOVEMENT: ICD-10-CM

## 2018-08-20 DIAGNOSIS — Z11.59 NEED FOR HEPATITIS C SCREENING TEST: ICD-10-CM

## 2018-08-20 DIAGNOSIS — I10 ESSENTIAL HYPERTENSION: ICD-10-CM

## 2018-08-20 DIAGNOSIS — E78.2 MIXED HYPERLIPIDEMIA: ICD-10-CM

## 2018-08-20 RX ORDER — AMLODIPINE BESYLATE 5 MG/1
TABLET ORAL
Qty: 90 TABLET | Refills: 1 | Status: SHIPPED | OUTPATIENT
Start: 2018-08-20 | End: 2018-09-05 | Stop reason: SDUPTHER

## 2018-08-20 NOTE — THERAPY DISCHARGE NOTE
Outpatient Physical Therapy Discharge Summary         Patient Name: Shaylee Wadsworth  : 1960  MRN: [id number]    Today's Date: 2018    Visit Dx:    ICD-10-CM ICD-9-CM   1. Functional movement disorder G25.9 333.99           OP PT Discharge Summary  Date of Discharge: 18  Reason for Discharge: Transfer to other facility/level of care  Discharge Instructions/Additional Comments: Patient seen for PT evaluation only.  At evaluation, encouraged to continue contacting [facility] to schedule therapy evaluations - due to their specialization in Functional Movement Disorder.  patient was to call back in one week to discuss plan and notify therapists if she would be returning here or transferring care to [facility].  Patient did not call back to schedule any additional appointments.      Megan Parham, PT  2018

## 2018-08-30 ENCOUNTER — APPOINTMENT (OUTPATIENT)
Dept: LAB | Facility: HOSPITAL | Age: 58
End: 2018-08-30

## 2018-08-30 LAB
ALBUMIN SERPL-MCNC: 4.1 G/DL (ref 3.5–5.2)
ALBUMIN/GLOB SERPL: 1.5 G/DL
ALP SERPL-CCNC: 79 U/L (ref 40–129)
ALT SERPL W P-5'-P-CCNC: 12 U/L (ref 5–33)
ANION GAP SERPL CALCULATED.3IONS-SCNC: 13.6 MMOL/L
AST SERPL-CCNC: 15 U/L (ref 5–32)
BASOPHILS # BLD AUTO: 0.05 10*3/MM3 (ref 0–0.2)
BASOPHILS NFR BLD AUTO: 0.8 % (ref 0–2)
BILIRUB SERPL-MCNC: 0.4 MG/DL (ref 0.2–1.2)
BUN BLD-MCNC: 17 MG/DL (ref 6–20)
BUN/CREAT SERPL: 21.5 (ref 7–25)
CALCIUM SPEC-SCNC: 9.1 MG/DL (ref 8.6–10.5)
CHLORIDE SERPL-SCNC: 100 MMOL/L (ref 98–107)
CHOLEST SERPL-MCNC: 153 MG/DL (ref 0–200)
CO2 SERPL-SCNC: 27.4 MMOL/L (ref 22–29)
CREAT BLD-MCNC: 0.79 MG/DL (ref 0.57–1)
DEPRECATED RDW RBC AUTO: 44.5 FL (ref 37–54)
EOSINOPHIL # BLD AUTO: 0.08 10*3/MM3 (ref 0.1–0.3)
EOSINOPHIL NFR BLD AUTO: 1.3 % (ref 0–4)
ERYTHROCYTE [DISTWIDTH] IN BLOOD BY AUTOMATED COUNT: 14.2 % (ref 11.5–14.5)
GFR SERPL CREATININE-BSD FRML MDRD: 76 ML/MIN/1.73
GLOBULIN UR ELPH-MCNC: 2.7 GM/DL
GLUCOSE BLD-MCNC: 122 MG/DL (ref 65–99)
HCT VFR BLD AUTO: 33.9 % (ref 37–47)
HCV AB SER DONR QL: NORMAL
HDLC SERPL QL: 2.78
HDLC SERPL-MCNC: 55 MG/DL (ref 40–60)
HGB BLD-MCNC: 11.6 G/DL (ref 12–16)
IMM GRANULOCYTES # BLD: 0.01 10*3/MM3 (ref 0–0.03)
IMM GRANULOCYTES NFR BLD: 0.2 % (ref 0–0.5)
LDLC SERPL CALC-MCNC: 66 MG/DL (ref 0–100)
LYMPHOCYTES # BLD AUTO: 2.8 10*3/MM3 (ref 0.6–4.8)
LYMPHOCYTES NFR BLD AUTO: 44.6 % (ref 20–45)
MCH RBC QN AUTO: 30 PG (ref 27–31)
MCHC RBC AUTO-ENTMCNC: 34.2 G/DL (ref 31–37)
MCV RBC AUTO: 87.6 FL (ref 81–99)
MONOCYTES # BLD AUTO: 0.57 10*3/MM3 (ref 0–1)
MONOCYTES NFR BLD AUTO: 9.1 % (ref 3–8)
NEUTROPHILS # BLD AUTO: 2.77 10*3/MM3 (ref 1.5–8.3)
NEUTROPHILS NFR BLD AUTO: 44 % (ref 45–70)
NRBC BLD MANUAL-RTO: 0 /100 WBC (ref 0–0)
PLATELET # BLD AUTO: 279 10*3/MM3 (ref 140–500)
PMV BLD AUTO: 9.4 FL (ref 7.4–10.4)
POTASSIUM BLD-SCNC: 3.2 MMOL/L (ref 3.5–5.2)
PROT SERPL-MCNC: 6.8 G/DL (ref 6–8.5)
RBC # BLD AUTO: 3.87 10*6/MM3 (ref 4.2–5.4)
SODIUM BLD-SCNC: 141 MMOL/L (ref 136–145)
T4 SERPL-MCNC: 8.78 MCG/DL (ref 4.5–11.7)
TRIGL SERPL-MCNC: 161 MG/DL (ref 0–150)
TSH SERPL DL<=0.05 MIU/L-ACNC: 2.99 MIU/ML (ref 0.27–4.2)
VLDLC SERPL-MCNC: 32.2 MG/DL (ref 7–27)
WBC NRBC COR # BLD: 6.28 10*3/MM3 (ref 4.8–10.8)

## 2018-08-30 PROCEDURE — 80061 LIPID PANEL: CPT | Performed by: NURSE PRACTITIONER

## 2018-08-30 PROCEDURE — 86803 HEPATITIS C AB TEST: CPT | Performed by: NURSE PRACTITIONER

## 2018-08-30 PROCEDURE — 84466 ASSAY OF TRANSFERRIN: CPT | Performed by: NURSE PRACTITIONER

## 2018-08-30 PROCEDURE — 80053 COMPREHEN METABOLIC PANEL: CPT | Performed by: NURSE PRACTITIONER

## 2018-08-30 PROCEDURE — 82607 VITAMIN B-12: CPT | Performed by: NURSE PRACTITIONER

## 2018-08-30 PROCEDURE — 85025 COMPLETE CBC W/AUTO DIFF WBC: CPT | Performed by: NURSE PRACTITIONER

## 2018-08-30 PROCEDURE — 84443 ASSAY THYROID STIM HORMONE: CPT | Performed by: NURSE PRACTITIONER

## 2018-08-30 PROCEDURE — 36415 COLL VENOUS BLD VENIPUNCTURE: CPT | Performed by: NURSE PRACTITIONER

## 2018-08-30 PROCEDURE — 84436 ASSAY OF TOTAL THYROXINE: CPT | Performed by: NURSE PRACTITIONER

## 2018-08-30 PROCEDURE — 83540 ASSAY OF IRON: CPT | Performed by: NURSE PRACTITIONER

## 2018-08-31 DIAGNOSIS — D64.9 ANEMIA, UNSPECIFIED TYPE: Primary | ICD-10-CM

## 2018-09-05 ENCOUNTER — OFFICE VISIT (OUTPATIENT)
Dept: INTERNAL MEDICINE | Facility: CLINIC | Age: 58
End: 2018-09-05

## 2018-09-05 VITALS
TEMPERATURE: 98 F | OXYGEN SATURATION: 98 % | DIASTOLIC BLOOD PRESSURE: 62 MMHG | BODY MASS INDEX: 33.04 KG/M2 | SYSTOLIC BLOOD PRESSURE: 130 MMHG | HEIGHT: 61 IN | WEIGHT: 175 LBS | RESPIRATION RATE: 16 BRPM | HEART RATE: 54 BPM

## 2018-09-05 DIAGNOSIS — G25.9 FUNCTIONAL MOVEMENT DISORDER: ICD-10-CM

## 2018-09-05 DIAGNOSIS — E78.5 HYPERLIPIDEMIA LDL GOAL <100: Primary | ICD-10-CM

## 2018-09-05 DIAGNOSIS — I10 ESSENTIAL HYPERTENSION: ICD-10-CM

## 2018-09-05 PROCEDURE — 99214 OFFICE O/P EST MOD 30 MIN: CPT | Performed by: NURSE PRACTITIONER

## 2018-09-05 RX ORDER — AMOXICILLIN AND CLAVULANATE POTASSIUM 875; 125 MG/1; MG/1
TABLET, FILM COATED ORAL
COMMUNITY
Start: 2018-08-30 | End: 2018-10-03

## 2018-09-05 RX ORDER — PREDNISONE 1 MG/1
TABLET ORAL
COMMUNITY
Start: 2018-08-23 | End: 2018-09-05

## 2018-09-05 NOTE — PROGRESS NOTES
Chief Complaint   Patient presents with   • Follow-up   • Tremors       Subjective     Shaylee Wadsworth is a 57 y.o. female being seen for a follow up appointment today regarding Functional movement disorder. She was in the office 1 month ago to discuss tremors related to this. I asked her to hold Zocor in case this was worsening her tremors . I added Wellbutrin XL for smoking cessation, and she has not smoked since 10-.  I restrited her from [employer] work due to stress. She is starting neuro rehab next week for initial evaluation. She has noticed an improvement in ambulation, and a decrease in choppiness of speech. She is having less frequency of symptoms.      She had called me on call last week, and Augmentin was started for a cat scratch, which has healed.     She is also here to follow up on HTN, hyperlipidemia. She is on Ziac, Benicar for HTN. She was also switched from  Liiotr to Zocor for cholesterol lowering.       History of Present Illness     Allergies   Allergen Reactions   • Meloxicam Swelling     EYES AND FACE SWELLING  EYES AND FACE SWELLING         Current Outpatient Prescriptions:   •  amLODIPine (NORVASC) 5 MG tablet, Take 1 tablet by mouth Daily., Disp: 30 tablet, Rfl: 5  •  amoxicillin-clavulanate (AUGMENTIN) 875-125 MG per tablet, , Disp: , Rfl:   •  aspirin 81 MG tablet, Take 81 mg by mouth Daily., Disp: , Rfl:   •  atorvastatin (LIPITOR) 40 MG tablet, Take 1 tablet by mouth Daily., Disp: 90 tablet, Rfl: 3  •  bisoprolol-hydrochlorothiazide (ZIAC) 5-6.25 MG per tablet, Take 1 tablet by mouth Daily., Disp: 90 tablet, Rfl: 3  •  buPROPion XL (WELLBUTRIN XL) 150 MG 24 hr tablet, Take 1 tablet by mouth Every Morning., Disp: 30 tablet, Rfl: 6  •  cyanocobalamin 1000 MCG/ML injection, Inject  into the appropriate muscle as directed by prescriber., Disp: , Rfl:   •  diazePAM (VALIUM) 5 MG tablet, Take 1 tablet by mouth At Night As Needed for Anxiety., Disp: 10 tablet, Rfl: 0  •  esomeprazole  (nexIUM) 40 MG capsule, TAKE ONE CAPSULE BY MOUTH TWICE DAILY (Patient taking differently: TAKE ONE CAPSULE BY MOUTH DAILY), Disp: 60 capsule, Rfl: 6  •  folic acid (FOLVITE) 1 MG tablet, Take by mouth., Disp: , Rfl:   •  methotrexate 2.5 MG tablet, TAKE 4 TABS PO ONCE WEEKLY ON SAME DAY, Disp: 32 tablet, Rfl: 11  •  olmesartan (BENICAR) 40 MG tablet, Take 1 tablet by mouth Daily., Disp: 90 tablet, Rfl: 3  •  potassium chloride (KLOR-CON) 20 MEQ CR tablet, Take 1 tablet by mouth 2 (Two) Times a Day., Disp: 180 tablet, Rfl: 11  •  PREMPRO 0.45-1.5 MG per tablet, TAKE ONE TABLET BY MOUTH ONCE DAILY (Patient taking differently: TAKE ONE TABLET BY MOUTH on mon wed fri), Disp: 28 tablet, Rfl: 11  •  vitamin D (ERGOCALCIFEROL) 08341 units capsule capsule, TAKE ONE CAPSULE BY MOUTH ONCE A WEEK, Disp: 12 capsule, Rfl: 3    Current Facility-Administered Medications:   •  cyanocobalamin injection 1,000 mcg, 1,000 mcg, Intramuscular, Q28 Days, Saurabh Ferreira MD, 1,000 mcg at 06/12/18 1632    The following portions of the patient's history were reviewed and updated as appropriate: allergies, current medications, past family history, past medical history, past social history, past surgical history and problem list.    Review of Systems   Constitutional: Negative.    HENT: Negative.    Eyes: Negative.    Respiratory: Negative.    Cardiovascular: Negative.  Negative for chest pain, palpitations and leg swelling.   Gastrointestinal: Negative.  Negative for abdominal distention and abdominal pain.   Endocrine: Negative.    Musculoskeletal: Negative.  Negative for arthralgias.   Skin: Negative.    Allergic/Immunologic: Negative.  Negative for environmental allergies.   Neurological: Negative.  Negative for dizziness, tremors, seizures, facial asymmetry, speech difficulty and numbness.   Hematological: Negative.  Negative for adenopathy. Does not bruise/bleed easily.   Psychiatric/Behavioral: Negative.  Negative for sleep  disturbance.       Assessment     Physical Exam   Constitutional: She is oriented to person, place, and time. She appears well-developed and well-nourished.   HENT:   Head: Normocephalic.   Right Ear: External ear normal.   Left Ear: External ear normal.   Nose: Nose normal.   Mouth/Throat: Oropharynx is clear and moist.   Neck: Neck supple. No thyromegaly present.   Cardiovascular: Normal rate, regular rhythm, normal heart sounds and intact distal pulses.    No murmur heard.  Pulmonary/Chest: Effort normal and breath sounds normal. No respiratory distress. She has no wheezes.   Abdominal: Soft.   Musculoskeletal: She exhibits no edema.   Neurological: She is alert and oriented to person, place, and time. She displays tremor. She displays no atrophy. No cranial nerve deficit.   Speech is clear, without pausing. Tremor of RUE, head and neck. Resolved with movement.    Skin: Skin is warm and dry.   Psychiatric: She has a normal mood and affect. Her behavior is normal.   Vitals reviewed.      Plan     Her fasting labs were reviewed with the patient from last week.     Shaylee was seen today for follow-up and tremors.    Diagnoses and all orders for this visit:    Hyperlipidemia LDL goal <100  -     Comprehensive metabolic panel; Future  -     Conv Lipid Panel w/ Chol/HDL Ratio; Future  -     CBC & Differential; Future    Essential hypertension  -     Comprehensive metabolic panel; Future  -     Conv Lipid Panel w/ Chol/HDL Ratio; Future  -     CBC & Differential; Future    Functional movement disorder    I want he rto continue Wellbutrin XL to help smoking cessation.     She will start neuro PT/OT rehab.    She will follow up in 3 months with fasting labs.

## 2018-09-14 ENCOUNTER — RESULTS ENCOUNTER (OUTPATIENT)
Dept: INTERNAL MEDICINE | Facility: CLINIC | Age: 58
End: 2018-09-14

## 2018-09-14 DIAGNOSIS — M06.9 RHEUMATOID ARTHRITIS INVOLVING MULTIPLE SITES, UNSPECIFIED RHEUMATOID FACTOR PRESENCE: ICD-10-CM

## 2018-10-03 ENCOUNTER — HOSPITAL ENCOUNTER (OUTPATIENT)
Dept: GENERAL RADIOLOGY | Facility: HOSPITAL | Age: 58
Discharge: HOME | End: 2018-10-03
Admitting: NURSE PRACTITIONER

## 2018-10-03 ENCOUNTER — OFFICE VISIT (OUTPATIENT)
Dept: INTERNAL MEDICINE | Facility: CLINIC | Age: 58
End: 2018-10-03

## 2018-10-03 VITALS
SYSTOLIC BLOOD PRESSURE: 142 MMHG | TEMPERATURE: 98.3 F | RESPIRATION RATE: 16 BRPM | DIASTOLIC BLOOD PRESSURE: 92 MMHG | BODY MASS INDEX: 33.04 KG/M2 | OXYGEN SATURATION: 97 % | WEIGHT: 175 LBS | HEART RATE: 55 BPM | HEIGHT: 61 IN

## 2018-10-03 DIAGNOSIS — M54.6 ACUTE RIGHT-SIDED THORACIC BACK PAIN: Primary | ICD-10-CM

## 2018-10-03 DIAGNOSIS — M54.6 ACUTE RIGHT-SIDED THORACIC BACK PAIN: ICD-10-CM

## 2018-10-03 PROCEDURE — 72072 X-RAY EXAM THORAC SPINE 3VWS: CPT

## 2018-10-03 PROCEDURE — 99213 OFFICE O/P EST LOW 20 MIN: CPT | Performed by: NURSE PRACTITIONER

## 2018-10-03 RX ORDER — NAPROXEN 375 MG/1
375 TABLET ORAL 2 TIMES DAILY PRN
COMMUNITY
End: 2018-10-03

## 2018-10-03 RX ORDER — GABAPENTIN 100 MG/1
100 CAPSULE ORAL 2 TIMES DAILY
Qty: 60 CAPSULE | Refills: 0 | Status: SHIPPED | OUTPATIENT
Start: 2018-10-03 | End: 2018-11-15

## 2018-10-03 RX ORDER — MEDROXYPROGESTERONE ACETATE 150 MG/ML
INJECTION, SUSPENSION INTRAMUSCULAR
COMMUNITY
Start: 2018-09-18

## 2018-10-03 RX ORDER — CYCLOBENZAPRINE HCL 10 MG
TABLET ORAL
COMMUNITY
Start: 2018-09-14 | End: 2018-10-03

## 2018-10-03 NOTE — PROGRESS NOTES
Chief Complaint   Patient presents with   • Follow-up   • Back Pain     Upper X1 MO       Subjective     Shaylee Wadsworth is a 57 y.o. female being seen today regarding back pain she has been experiencing for a month or so. She reports that her back pain is located in her middle back and that it radiates to her right axillary line.  She describes it as sharp, stabbing pain that is worse in the mornings and when she is sitting.  She states that it is a 6-7 out of 10 on pain scale in the mornings and eases to a 2 in the afternoons.  She has taken aspirin, Naproxen, flexeril, biofreeze, and heat but that none of these relieve the pain completely.  She denies injury or shoulder pain but reports that she avoids using right arm when she is experiencing the pain.  Activities such as driving do aggravate her condition.      Back Pain   This is a new problem. The current episode started more than 1 month ago. The symptoms are aggravated by sitting. Stiffness is present in the morning. Pertinent negatives include no abdominal pain, bladder incontinence, bowel incontinence, chest pain, dysuria, fever, headaches, leg pain, numbness, paresis, paresthesias, pelvic pain, perianal numbness, tingling, weakness or weight loss. Risk factors include history of osteoporosis, history of steroid use, lack of exercise and menopause.        Allergies   Allergen Reactions   • Meloxicam Swelling     EYES AND FACE SWELLING  EYES AND FACE SWELLING         Current Outpatient Prescriptions:   •  amLODIPine (NORVASC) 5 MG tablet, Take 1 tablet by mouth Daily., Disp: 30 tablet, Rfl: 5  •  aspirin 81 MG tablet, Take 81 mg by mouth Daily., Disp: , Rfl:   •  atorvastatin (LIPITOR) 40 MG tablet, Take 1 tablet by mouth Daily., Disp: 90 tablet, Rfl: 3  •  bisoprolol-hydrochlorothiazide (ZIAC) 5-6.25 MG per tablet, Take 1 tablet by mouth Daily., Disp: 90 tablet, Rfl: 3  •  buPROPion XL (WELLBUTRIN XL) 150 MG 24 hr tablet, Take 1 tablet by mouth Every  Morning., Disp: 30 tablet, Rfl: 6  •  cyanocobalamin 1000 MCG/ML injection, Inject  into the appropriate muscle as directed by prescriber., Disp: , Rfl:   •  cyclobenzaprine (FLEXERIL) 10 MG tablet, , Disp: , Rfl:   •  diazePAM (VALIUM) 5 MG tablet, Take 1 tablet by mouth At Night As Needed for Anxiety., Disp: 10 tablet, Rfl: 0  •  ENBREL SURECLICK 50 MG/ML solution auto-injector, , Disp: , Rfl:   •  esomeprazole (nexIUM) 40 MG capsule, TAKE ONE CAPSULE BY MOUTH TWICE DAILY (Patient taking differently: TAKE ONE CAPSULE BY MOUTH DAILY), Disp: 60 capsule, Rfl: 6  •  folic acid (FOLVITE) 1 MG tablet, Take by mouth., Disp: , Rfl:   •  methotrexate 2.5 MG tablet, TAKE 4 TABS PO ONCE WEEKLY ON SAME DAY, Disp: 32 tablet, Rfl: 11  •  naproxen (NAPROSYN) 375 MG tablet, Take 375 mg by mouth 2 (Two) Times a Day As Needed for Mild Pain ., Disp: , Rfl:   •  olmesartan (BENICAR) 40 MG tablet, Take 1 tablet by mouth Daily., Disp: 90 tablet, Rfl: 3  •  potassium chloride (KLOR-CON) 20 MEQ CR tablet, Take 1 tablet by mouth 2 (Two) Times a Day., Disp: 180 tablet, Rfl: 11  •  PREMPRO 0.45-1.5 MG per tablet, TAKE ONE TABLET BY MOUTH ONCE DAILY (Patient taking differently: TAKE ONE TABLET BY MOUTH on mon wed fri), Disp: 28 tablet, Rfl: 11  •  vitamin D (ERGOCALCIFEROL) 04107 units capsule capsule, TAKE ONE CAPSULE BY MOUTH ONCE A WEEK, Disp: 12 capsule, Rfl: 3    Current Facility-Administered Medications:   •  cyanocobalamin injection 1,000 mcg, 1,000 mcg, Intramuscular, Q28 Days, Saurabh Ferreira MD, 1,000 mcg at 06/12/18 1632    The following portions of the patient's history were reviewed and updated as appropriate: allergies, current medications, past medical history, past social history, past surgical history and problem list.    Review of Systems   Constitutional: Negative for appetite change, fever and weight loss.   HENT: Negative for congestion and facial swelling.    Eyes: Negative.    Respiratory: Negative for cough,  shortness of breath and wheezing.    Cardiovascular: Negative for chest pain and leg swelling.   Gastrointestinal: Negative for abdominal pain and bowel incontinence.   Endocrine: Negative.    Genitourinary: Negative for bladder incontinence, dysuria and pelvic pain.   Musculoskeletal: Positive for back pain.   Skin: Negative for color change and rash.   Allergic/Immunologic: Negative for environmental allergies.   Neurological: Negative for tingling, weakness, numbness, headaches and paresthesias.   Psychiatric/Behavioral: Positive for sleep disturbance.       Assessment     Physical Exam   Constitutional: She is oriented to person, place, and time. Vital signs are normal. She appears well-developed and well-nourished. She is cooperative.  Non-toxic appearance. She does not have a sickly appearance. She does not appear ill. No distress.   HENT:   Head: Normocephalic.   Eyes: Right eye exhibits no discharge. Left eye exhibits no discharge.   Neck: Normal range of motion.   Cardiovascular: Normal rate, regular rhythm and normal heart sounds.    No murmur heard.  Musculoskeletal: Normal range of motion. She exhibits no edema, tenderness or deformity.        Right shoulder: She exhibits normal range of motion, no tenderness, no swelling, no crepitus, no deformity, no pain and normal strength.        Thoracic back: She exhibits normal range of motion, no tenderness, no bony tenderness, no edema, no deformity and no pain.        Arms:  Neurological: She is alert and oriented to person, place, and time.   Skin: Skin is warm. She is not diaphoretic.   Psychiatric: She has a normal mood and affect. Her behavior is normal. Thought content normal.   Nursing note and vitals reviewed.      Plan      Diagnosis Plan   1. Acute right-sided thoracic back pain  XR spine thoracic 3 vw    gabapentin (NEURONTIN) 100 MG capsule       Discussed likely cause of back pain as either being compression fracture or nerve related.  Will have  xrays performed today to further evaluate spine and determine next steps.  Patient to begin gabapentin tonight and to take dose each evening to help with pain she is experiencing in the mornings.  If pain is not controlled she can add an additional pill every 12 hours.  Encouraged her to call if she is experiencing pain that is not controlled with this regimen.    Advised follow up will be based on results from xrays today.     Assessment, medical plan, and documentation reviewed and approved by NAYE Choi.

## 2018-10-04 DIAGNOSIS — M54.6 ACUTE RIGHT-SIDED THORACIC BACK PAIN: Primary | ICD-10-CM

## 2018-10-12 ENCOUNTER — RESULTS ENCOUNTER (OUTPATIENT)
Dept: INTERNAL MEDICINE | Facility: CLINIC | Age: 58
End: 2018-10-12

## 2018-10-12 DIAGNOSIS — M06.9 RHEUMATOID ARTHRITIS INVOLVING MULTIPLE SITES, UNSPECIFIED RHEUMATOID FACTOR PRESENCE: ICD-10-CM

## 2018-11-03 DIAGNOSIS — I10 ESSENTIAL HYPERTENSION: ICD-10-CM

## 2018-11-03 DIAGNOSIS — E87.6 HYPOKALEMIA: ICD-10-CM

## 2018-11-05 RX ORDER — POTASSIUM CHLORIDE 1500 MG/1
TABLET, EXTENDED RELEASE ORAL
Qty: 180 TABLET | Refills: 1 | Status: SHIPPED | OUTPATIENT
Start: 2018-11-05 | End: 2019-11-29 | Stop reason: SDUPTHER

## 2018-11-05 RX ORDER — ESOMEPRAZOLE MAGNESIUM 40 MG/1
CAPSULE, DELAYED RELEASE ORAL
Qty: 180 CAPSULE | Refills: 1 | Status: SHIPPED | OUTPATIENT
Start: 2018-11-05 | End: 2018-12-21

## 2018-11-09 ENCOUNTER — RESULTS ENCOUNTER (OUTPATIENT)
Dept: INTERNAL MEDICINE | Facility: CLINIC | Age: 58
End: 2018-11-09

## 2018-11-09 DIAGNOSIS — M06.9 RHEUMATOID ARTHRITIS INVOLVING MULTIPLE SITES, UNSPECIFIED RHEUMATOID FACTOR PRESENCE: ICD-10-CM

## 2018-11-11 ENCOUNTER — HOSPITAL ENCOUNTER (OUTPATIENT)
Facility: HOSPITAL | Age: 58
Setting detail: OBSERVATION
Discharge: HOME HEALTH | End: 2018-11-13
Attending: EMERGENCY MEDICINE | Admitting: EMERGENCY MEDICINE

## 2018-11-11 ENCOUNTER — APPOINTMENT (OUTPATIENT)
Dept: CT IMAGING | Facility: HOSPITAL | Age: 58
End: 2018-11-11

## 2018-11-11 DIAGNOSIS — R25.1 TREMORS OF NERVOUS SYSTEM: ICD-10-CM

## 2018-11-11 DIAGNOSIS — F41.9 ANXIETY RELATED TREMOR: ICD-10-CM

## 2018-11-11 DIAGNOSIS — R42 DIZZINESS: ICD-10-CM

## 2018-11-11 DIAGNOSIS — R25.1 ANXIETY RELATED TREMOR: ICD-10-CM

## 2018-11-11 DIAGNOSIS — R26.2 DIFFICULTY WALKING: Primary | ICD-10-CM

## 2018-11-11 DIAGNOSIS — G25.9 FUNCTIONAL MOVEMENT DISORDER: ICD-10-CM

## 2018-11-11 DIAGNOSIS — I10 HYPERTENSION, UNSPECIFIED TYPE: ICD-10-CM

## 2018-11-11 LAB
ALBUMIN SERPL-MCNC: 4.2 G/DL (ref 3.5–5.2)
ALBUMIN/GLOB SERPL: 1.4 G/DL
ALP SERPL-CCNC: 77 U/L (ref 40–129)
ALT SERPL W P-5'-P-CCNC: 16 U/L (ref 5–33)
AMPHET+METHAMPHET UR QL: NEGATIVE
AMPHETAMINES UR QL: NEGATIVE
ANION GAP SERPL CALCULATED.3IONS-SCNC: 12.1 MMOL/L
AST SERPL-CCNC: 18 U/L (ref 5–32)
BACTERIA UR QL AUTO: ABNORMAL /HPF
BARBITURATES UR QL SCN: NEGATIVE
BASOPHILS # BLD AUTO: 0.08 10*3/MM3 (ref 0–0.2)
BASOPHILS NFR BLD AUTO: 1.1 % (ref 0–2)
BENZODIAZ UR QL SCN: NEGATIVE
BILIRUB SERPL-MCNC: 0.6 MG/DL (ref 0.2–1.2)
BILIRUB UR QL STRIP: NEGATIVE
BUN BLD-MCNC: 12 MG/DL (ref 6–20)
BUN/CREAT SERPL: 14.5 (ref 7–25)
BUPRENORPHINE SERPL-MCNC: NEGATIVE NG/ML
CALCIUM SPEC-SCNC: 9.5 MG/DL (ref 8.6–10.5)
CANNABINOIDS SERPL QL: NEGATIVE
CHLORIDE SERPL-SCNC: 100 MMOL/L (ref 98–107)
CLARITY UR: ABNORMAL
CO2 SERPL-SCNC: 29.9 MMOL/L (ref 22–29)
COCAINE UR QL: NEGATIVE
COLOR UR: ABNORMAL
CREAT BLD-MCNC: 0.83 MG/DL (ref 0.57–1)
DEPRECATED RDW RBC AUTO: 45.1 FL (ref 37–54)
EOSINOPHIL # BLD AUTO: 0.12 10*3/MM3 (ref 0.1–0.3)
EOSINOPHIL NFR BLD AUTO: 1.7 % (ref 0–4)
ERYTHROCYTE [DISTWIDTH] IN BLOOD BY AUTOMATED COUNT: 14.3 % (ref 11.5–14.5)
GFR SERPL CREATININE-BSD FRML MDRD: 71 ML/MIN/1.73
GLOBULIN UR ELPH-MCNC: 3 GM/DL
GLUCOSE BLD-MCNC: 144 MG/DL (ref 65–99)
GLUCOSE UR STRIP-MCNC: NEGATIVE MG/DL
HCT VFR BLD AUTO: 37.5 % (ref 37–47)
HGB BLD-MCNC: 12.7 G/DL (ref 12–16)
HGB UR QL STRIP.AUTO: ABNORMAL
HYALINE CASTS UR QL AUTO: ABNORMAL /LPF
IMM GRANULOCYTES # BLD: 0.01 10*3/MM3 (ref 0–0.03)
IMM GRANULOCYTES NFR BLD: 0.1 % (ref 0–0.5)
KETONES UR QL STRIP: NEGATIVE
LEUKOCYTE ESTERASE UR QL STRIP.AUTO: ABNORMAL
LYMPHOCYTES # BLD AUTO: 3.07 10*3/MM3 (ref 0.6–4.8)
LYMPHOCYTES NFR BLD AUTO: 43.7 % (ref 20–45)
MCH RBC QN AUTO: 29.5 PG (ref 27–31)
MCHC RBC AUTO-ENTMCNC: 33.9 G/DL (ref 31–37)
MCV RBC AUTO: 87.2 FL (ref 81–99)
METHADONE UR QL SCN: NEGATIVE
MONOCYTES # BLD AUTO: 0.54 10*3/MM3 (ref 0–1)
MONOCYTES NFR BLD AUTO: 7.7 % (ref 3–8)
NEUTROPHILS # BLD AUTO: 3.2 10*3/MM3 (ref 1.5–8.3)
NEUTROPHILS NFR BLD AUTO: 45.7 % (ref 45–70)
NITRITE UR QL STRIP: NEGATIVE
NRBC BLD MANUAL-RTO: 0 /100 WBC (ref 0–0)
OPIATES UR QL: NEGATIVE
OXYCODONE UR QL SCN: NEGATIVE
PCP UR QL SCN: NEGATIVE
PH UR STRIP.AUTO: 7 [PH] (ref 4.5–8)
PLATELET # BLD AUTO: 279 10*3/MM3 (ref 140–500)
PMV BLD AUTO: 9.5 FL (ref 7.4–10.4)
POTASSIUM BLD-SCNC: 3.2 MMOL/L (ref 3.5–5.2)
PROPOXYPH UR QL: NEGATIVE
PROT SERPL-MCNC: 7.2 G/DL (ref 6–8.5)
PROT UR QL STRIP: NEGATIVE
RBC # BLD AUTO: 4.3 10*6/MM3 (ref 4.2–5.4)
RBC # UR: ABNORMAL /HPF
REF LAB TEST METHOD: ABNORMAL
SODIUM BLD-SCNC: 142 MMOL/L (ref 136–145)
SP GR UR STRIP: 1.01 (ref 1–1.03)
SQUAMOUS #/AREA URNS HPF: ABNORMAL /HPF
TRICYCLICS UR QL SCN: NEGATIVE
TROPONIN T SERPL-MCNC: <0.01 NG/ML (ref 0–0.03)
UROBILINOGEN UR QL STRIP: ABNORMAL
WBC NRBC COR # BLD: 7.02 10*3/MM3 (ref 4.8–10.8)
WBC UR QL AUTO: ABNORMAL /HPF

## 2018-11-11 PROCEDURE — 25010000002 ENOXAPARIN PER 10 MG: Performed by: INTERNAL MEDICINE

## 2018-11-11 PROCEDURE — G0378 HOSPITAL OBSERVATION PER HR: HCPCS

## 2018-11-11 PROCEDURE — 84484 ASSAY OF TROPONIN QUANT: CPT | Performed by: INTERNAL MEDICINE

## 2018-11-11 PROCEDURE — 80053 COMPREHEN METABOLIC PANEL: CPT | Performed by: EMERGENCY MEDICINE

## 2018-11-11 PROCEDURE — 96372 THER/PROPH/DIAG INJ SC/IM: CPT

## 2018-11-11 PROCEDURE — 81001 URINALYSIS AUTO W/SCOPE: CPT | Performed by: EMERGENCY MEDICINE

## 2018-11-11 PROCEDURE — 85025 COMPLETE CBC W/AUTO DIFF WBC: CPT | Performed by: EMERGENCY MEDICINE

## 2018-11-11 PROCEDURE — 80306 DRUG TEST PRSMV INSTRMNT: CPT | Performed by: EMERGENCY MEDICINE

## 2018-11-11 PROCEDURE — 99284 EMERGENCY DEPT VISIT MOD MDM: CPT

## 2018-11-11 PROCEDURE — 70450 CT HEAD/BRAIN W/O DYE: CPT

## 2018-11-11 PROCEDURE — 99285 EMERGENCY DEPT VISIT HI MDM: CPT | Performed by: EMERGENCY MEDICINE

## 2018-11-11 PROCEDURE — 93005 ELECTROCARDIOGRAM TRACING: CPT | Performed by: EMERGENCY MEDICINE

## 2018-11-11 RX ORDER — MECLIZINE HYDROCHLORIDE 25 MG/1
25 TABLET ORAL 3 TIMES DAILY PRN
Status: DISCONTINUED | OUTPATIENT
Start: 2018-11-11 | End: 2018-11-13 | Stop reason: HOSPADM

## 2018-11-11 RX ORDER — ACETAMINOPHEN 325 MG/1
650 TABLET ORAL EVERY 6 HOURS PRN
Status: DISCONTINUED | OUTPATIENT
Start: 2018-11-11 | End: 2018-11-13 | Stop reason: HOSPADM

## 2018-11-11 RX ORDER — SODIUM CHLORIDE 0.9 % (FLUSH) 0.9 %
3 SYRINGE (ML) INJECTION EVERY 12 HOURS SCHEDULED
Status: DISCONTINUED | OUTPATIENT
Start: 2018-11-11 | End: 2018-11-13 | Stop reason: HOSPADM

## 2018-11-11 RX ORDER — PANTOPRAZOLE SODIUM 40 MG/1
40 TABLET, DELAYED RELEASE ORAL
Status: DISCONTINUED | OUTPATIENT
Start: 2018-11-12 | End: 2018-11-13

## 2018-11-11 RX ORDER — SODIUM CHLORIDE 0.9 % (FLUSH) 0.9 %
3-10 SYRINGE (ML) INJECTION AS NEEDED
Status: DISCONTINUED | OUTPATIENT
Start: 2018-11-11 | End: 2018-11-13 | Stop reason: HOSPADM

## 2018-11-11 RX ORDER — DOCUSATE SODIUM 100 MG/1
100 CAPSULE, LIQUID FILLED ORAL 2 TIMES DAILY
Status: DISCONTINUED | OUTPATIENT
Start: 2018-11-12 | End: 2018-11-13 | Stop reason: HOSPADM

## 2018-11-11 RX ORDER — MECLIZINE HYDROCHLORIDE 25 MG/1
50 TABLET ORAL ONCE
Status: COMPLETED | OUTPATIENT
Start: 2018-11-11 | End: 2018-11-11

## 2018-11-11 RX ORDER — SODIUM CHLORIDE 9 MG/ML
40 INJECTION, SOLUTION INTRAVENOUS AS NEEDED
Status: DISCONTINUED | OUTPATIENT
Start: 2018-11-11 | End: 2018-11-13 | Stop reason: HOSPADM

## 2018-11-11 RX ORDER — SODIUM CHLORIDE 0.9 % (FLUSH) 0.9 %
10 SYRINGE (ML) INJECTION AS NEEDED
Status: DISCONTINUED | OUTPATIENT
Start: 2018-11-11 | End: 2018-11-13 | Stop reason: HOSPADM

## 2018-11-11 RX ADMIN — DOCUSATE SODIUM 100 MG: 100 CAPSULE, LIQUID FILLED ORAL at 23:58

## 2018-11-11 RX ADMIN — MECLIZINE HYDROCHLORIDE 50 MG: 25 TABLET ORAL at 21:22

## 2018-11-11 RX ADMIN — ENOXAPARIN SODIUM 40 MG: 40 INJECTION SUBCUTANEOUS at 23:58

## 2018-11-12 ENCOUNTER — APPOINTMENT (OUTPATIENT)
Dept: MRI IMAGING | Facility: HOSPITAL | Age: 58
End: 2018-11-12

## 2018-11-12 LAB
ALBUMIN SERPL-MCNC: 3.6 G/DL (ref 3.5–5.2)
ALBUMIN/GLOB SERPL: 1.3 G/DL
ALP SERPL-CCNC: 67 U/L (ref 40–129)
ALT SERPL W P-5'-P-CCNC: 14 U/L (ref 5–33)
ANION GAP SERPL CALCULATED.3IONS-SCNC: 9.6 MMOL/L
AST SERPL-CCNC: 15 U/L (ref 5–32)
BASOPHILS # BLD AUTO: 0.06 10*3/MM3 (ref 0–0.2)
BASOPHILS NFR BLD AUTO: 1.1 % (ref 0–2)
BILIRUB SERPL-MCNC: 0.5 MG/DL (ref 0.2–1.2)
BUN BLD-MCNC: 11 MG/DL (ref 6–20)
BUN/CREAT SERPL: 15.9 (ref 7–25)
CALCIUM SPEC-SCNC: 9.1 MG/DL (ref 8.6–10.5)
CHLORIDE SERPL-SCNC: 102 MMOL/L (ref 98–107)
CO2 SERPL-SCNC: 30.4 MMOL/L (ref 22–29)
CREAT BLD-MCNC: 0.69 MG/DL (ref 0.57–1)
DEPRECATED RDW RBC AUTO: 45.8 FL (ref 37–54)
EOSINOPHIL # BLD AUTO: 0.11 10*3/MM3 (ref 0.1–0.3)
EOSINOPHIL NFR BLD AUTO: 1.9 % (ref 0–4)
ERYTHROCYTE [DISTWIDTH] IN BLOOD BY AUTOMATED COUNT: 14.2 % (ref 11.5–14.5)
GFR SERPL CREATININE-BSD FRML MDRD: 88 ML/MIN/1.73
GLOBULIN UR ELPH-MCNC: 2.8 GM/DL
GLUCOSE BLD-MCNC: 104 MG/DL (ref 65–99)
HCT VFR BLD AUTO: 34.6 % (ref 37–47)
HGB BLD-MCNC: 11.8 G/DL (ref 12–16)
IMM GRANULOCYTES # BLD: 0.01 10*3/MM3 (ref 0–0.03)
IMM GRANULOCYTES NFR BLD: 0.2 % (ref 0–0.5)
LYMPHOCYTES # BLD AUTO: 2.45 10*3/MM3 (ref 0.6–4.8)
LYMPHOCYTES NFR BLD AUTO: 43.4 % (ref 20–45)
MAGNESIUM SERPL-MCNC: 1.7 MG/DL (ref 1.7–2.5)
MCH RBC QN AUTO: 30 PG (ref 27–31)
MCHC RBC AUTO-ENTMCNC: 34.1 G/DL (ref 31–37)
MCV RBC AUTO: 88 FL (ref 81–99)
MONOCYTES # BLD AUTO: 0.4 10*3/MM3 (ref 0–1)
MONOCYTES NFR BLD AUTO: 7.1 % (ref 3–8)
NEUTROPHILS # BLD AUTO: 2.62 10*3/MM3 (ref 1.5–8.3)
NEUTROPHILS NFR BLD AUTO: 46.3 % (ref 45–70)
NRBC BLD MANUAL-RTO: 0 /100 WBC (ref 0–0)
PLATELET # BLD AUTO: 242 10*3/MM3 (ref 140–500)
PMV BLD AUTO: 9.5 FL (ref 7.4–10.4)
POTASSIUM BLD-SCNC: 3.2 MMOL/L (ref 3.5–5.2)
POTASSIUM BLD-SCNC: 4.2 MMOL/L (ref 3.5–5.2)
PROT SERPL-MCNC: 6.4 G/DL (ref 6–8.5)
RBC # BLD AUTO: 3.93 10*6/MM3 (ref 4.2–5.4)
SODIUM BLD-SCNC: 142 MMOL/L (ref 136–145)
T4 FREE SERPL-MCNC: 1.28 NG/DL (ref 0.93–1.7)
TROPONIN T SERPL-MCNC: <0.01 NG/ML (ref 0–0.03)
TSH SERPL DL<=0.05 MIU/L-ACNC: 4.62 MIU/ML (ref 0.27–4.2)
WBC NRBC COR # BLD: 5.65 10*3/MM3 (ref 4.8–10.8)

## 2018-11-12 PROCEDURE — 84132 ASSAY OF SERUM POTASSIUM: CPT | Performed by: INTERNAL MEDICINE

## 2018-11-12 PROCEDURE — 96365 THER/PROPH/DIAG IV INF INIT: CPT

## 2018-11-12 PROCEDURE — 85025 COMPLETE CBC W/AUTO DIFF WBC: CPT | Performed by: INTERNAL MEDICINE

## 2018-11-12 PROCEDURE — 84484 ASSAY OF TROPONIN QUANT: CPT | Performed by: INTERNAL MEDICINE

## 2018-11-12 PROCEDURE — A9577 INJ MULTIHANCE: HCPCS | Performed by: INTERNAL MEDICINE

## 2018-11-12 PROCEDURE — 84443 ASSAY THYROID STIM HORMONE: CPT | Performed by: INTERNAL MEDICINE

## 2018-11-12 PROCEDURE — G0378 HOSPITAL OBSERVATION PER HR: HCPCS

## 2018-11-12 PROCEDURE — 84439 ASSAY OF FREE THYROXINE: CPT | Performed by: HOSPITALIST

## 2018-11-12 PROCEDURE — 80053 COMPREHEN METABOLIC PANEL: CPT | Performed by: INTERNAL MEDICINE

## 2018-11-12 PROCEDURE — 0 GADOBENATE DIMEGLUMINE 529 MG/ML SOLUTION: Performed by: INTERNAL MEDICINE

## 2018-11-12 PROCEDURE — 70553 MRI BRAIN STEM W/O & W/DYE: CPT

## 2018-11-12 PROCEDURE — G8979 MOBILITY GOAL STATUS: HCPCS

## 2018-11-12 PROCEDURE — 96366 THER/PROPH/DIAG IV INF ADDON: CPT

## 2018-11-12 PROCEDURE — G8978 MOBILITY CURRENT STATUS: HCPCS

## 2018-11-12 PROCEDURE — 25010000002 MAGNESIUM SULFATE 2 GM/50ML SOLUTION: Performed by: HOSPITALIST

## 2018-11-12 PROCEDURE — 25010000002 METHYLPREDNISOLONE PER 40 MG: Performed by: PSYCHIATRY & NEUROLOGY

## 2018-11-12 PROCEDURE — 70544 MR ANGIOGRAPHY HEAD W/O DYE: CPT

## 2018-11-12 PROCEDURE — 99219 PR INITIAL OBSERVATION CARE/DAY 50 MINUTES: CPT | Performed by: HOSPITALIST

## 2018-11-12 PROCEDURE — 83735 ASSAY OF MAGNESIUM: CPT | Performed by: HOSPITALIST

## 2018-11-12 PROCEDURE — 96375 TX/PRO/DX INJ NEW DRUG ADDON: CPT

## 2018-11-12 PROCEDURE — 94799 UNLISTED PULMONARY SVC/PX: CPT

## 2018-11-12 PROCEDURE — 97165 OT EVAL LOW COMPLEX 30 MIN: CPT

## 2018-11-12 PROCEDURE — 97161 PT EVAL LOW COMPLEX 20 MIN: CPT

## 2018-11-12 RX ORDER — FOLIC ACID 1 MG/1
1 TABLET ORAL DAILY
Status: DISCONTINUED | OUTPATIENT
Start: 2018-11-13 | End: 2018-11-13 | Stop reason: HOSPADM

## 2018-11-12 RX ORDER — POTASSIUM CHLORIDE 1.5 G/1.77G
40 POWDER, FOR SOLUTION ORAL AS NEEDED
Status: DISCONTINUED | OUTPATIENT
Start: 2018-11-12 | End: 2018-11-13 | Stop reason: HOSPADM

## 2018-11-12 RX ORDER — POTASSIUM CHLORIDE 20 MEQ/1
40 TABLET, EXTENDED RELEASE ORAL AS NEEDED
Status: DISCONTINUED | OUTPATIENT
Start: 2018-11-12 | End: 2018-11-13 | Stop reason: HOSPADM

## 2018-11-12 RX ORDER — AMLODIPINE BESYLATE 5 MG/1
5 TABLET ORAL DAILY
Status: DISCONTINUED | OUTPATIENT
Start: 2018-11-13 | End: 2018-11-13 | Stop reason: HOSPADM

## 2018-11-12 RX ORDER — MAGNESIUM SULFATE HEPTAHYDRATE 40 MG/ML
4 INJECTION, SOLUTION INTRAVENOUS AS NEEDED
Status: DISCONTINUED | OUTPATIENT
Start: 2018-11-12 | End: 2018-11-13 | Stop reason: HOSPADM

## 2018-11-12 RX ORDER — ATORVASTATIN CALCIUM 40 MG/1
40 TABLET, FILM COATED ORAL DAILY
Status: DISCONTINUED | OUTPATIENT
Start: 2018-11-13 | End: 2018-11-13 | Stop reason: HOSPADM

## 2018-11-12 RX ORDER — MAGNESIUM SULFATE HEPTAHYDRATE 40 MG/ML
2 INJECTION, SOLUTION INTRAVENOUS AS NEEDED
Status: DISCONTINUED | OUTPATIENT
Start: 2018-11-12 | End: 2018-11-13 | Stop reason: HOSPADM

## 2018-11-12 RX ORDER — METHYLPREDNISOLONE SODIUM SUCCINATE 40 MG/ML
40 INJECTION, POWDER, LYOPHILIZED, FOR SOLUTION INTRAMUSCULAR; INTRAVENOUS ONCE
Status: COMPLETED | OUTPATIENT
Start: 2018-11-12 | End: 2018-11-12

## 2018-11-12 RX ORDER — POTASSIUM CHLORIDE 7.45 MG/ML
10 INJECTION INTRAVENOUS
Status: DISCONTINUED | OUTPATIENT
Start: 2018-11-12 | End: 2018-11-13 | Stop reason: HOSPADM

## 2018-11-12 RX ORDER — LOSARTAN POTASSIUM 50 MG/1
100 TABLET ORAL
Status: DISCONTINUED | OUTPATIENT
Start: 2018-11-13 | End: 2018-11-13 | Stop reason: HOSPADM

## 2018-11-12 RX ORDER — MAGNESIUM SULFATE 1 G/100ML
1 INJECTION INTRAVENOUS AS NEEDED
Status: DISCONTINUED | OUTPATIENT
Start: 2018-11-12 | End: 2018-11-13 | Stop reason: HOSPADM

## 2018-11-12 RX ORDER — ASPIRIN 81 MG/1
81 TABLET ORAL DAILY
Status: DISCONTINUED | OUTPATIENT
Start: 2018-11-13 | End: 2018-11-13 | Stop reason: HOSPADM

## 2018-11-12 RX ORDER — POTASSIUM CHLORIDE 20 MEQ/1
20 TABLET, EXTENDED RELEASE ORAL 2 TIMES DAILY
Status: DISCONTINUED | OUTPATIENT
Start: 2018-11-12 | End: 2018-11-13 | Stop reason: HOSPADM

## 2018-11-12 RX ORDER — PANTOPRAZOLE SODIUM 40 MG/1
40 TABLET, DELAYED RELEASE ORAL
Status: DISCONTINUED | OUTPATIENT
Start: 2018-11-13 | End: 2018-11-13 | Stop reason: HOSPADM

## 2018-11-12 RX ORDER — CLONAZEPAM 0.5 MG/1
0.5 TABLET ORAL 2 TIMES DAILY
Status: DISCONTINUED | OUTPATIENT
Start: 2018-11-12 | End: 2018-11-13 | Stop reason: HOSPADM

## 2018-11-12 RX ADMIN — POTASSIUM CHLORIDE 40 MEQ: 1500 TABLET, EXTENDED RELEASE ORAL at 17:21

## 2018-11-12 RX ADMIN — PANTOPRAZOLE SODIUM 40 MG: 40 TABLET, DELAYED RELEASE ORAL at 05:05

## 2018-11-12 RX ADMIN — DOCUSATE SODIUM 100 MG: 100 CAPSULE, LIQUID FILLED ORAL at 09:28

## 2018-11-12 RX ADMIN — Medication 3 ML: at 22:30

## 2018-11-12 RX ADMIN — GADOBENATE DIMEGLUMINE 16 ML: 529 INJECTION, SOLUTION INTRAVENOUS at 11:30

## 2018-11-12 RX ADMIN — POTASSIUM CHLORIDE 40 MEQ: 1500 TABLET, EXTENDED RELEASE ORAL at 13:07

## 2018-11-12 RX ADMIN — Medication 3 ML: at 00:03

## 2018-11-12 RX ADMIN — Medication 3 ML: at 09:28

## 2018-11-12 RX ADMIN — DOCUSATE SODIUM 100 MG: 100 CAPSULE, LIQUID FILLED ORAL at 22:29

## 2018-11-12 RX ADMIN — CLONAZEPAM 0.5 MG: 0.5 TABLET ORAL at 22:29

## 2018-11-12 RX ADMIN — METHYLPREDNISOLONE SODIUM SUCCINATE 40 MG: 40 INJECTION, POWDER, FOR SOLUTION INTRAMUSCULAR; INTRAVENOUS at 14:00

## 2018-11-12 RX ADMIN — MAGNESIUM SULFATE IN WATER 2 G: 40 INJECTION, SOLUTION INTRAVENOUS at 15:10

## 2018-11-12 NOTE — ED NOTES
Pt having shaking in arms, states this is not new going on since April. Tonight pt became so dizzy that she is having difficulty walking..Has been dizzy past several days did see an ENT specialist.      Lacey Kwok, RN  11/11/18 6383

## 2018-11-12 NOTE — NURSING NOTE
Discharge Planning Assessment  [facility]     Patient Name: Shaylee Wadsworth  MRN: [id number]  Today's Date: 11/12/2018    Admit Date: 11/11/2018    Discharge Needs Assessment     Row Name 11/12/18 1233       Living Environment    Lives With  spouse;parent(s)    Name(s) of Who Lives With Patient   - All Wadsworth; Kaylene Farnsworth - mother    Current Living Arrangements  home/apartment/condo    Primary Care Provided by  self    Provides Primary Care For  no one, unable/limited ability to care for self    Family Caregiver if Needed  spouse;parent(s)    Quality of Family Relationships  helpful;involved;supportive    Able to Return to Prior Arrangements  yes       Resource/Environmental Concerns    Resource/Environmental Concerns  none       Transition Planning    Patient/Family Anticipates Transition to  home with family    Transportation Anticipated  family or friend will provide       Discharge Needs Assessment    Readmission Within the Last 30 Days  no previous admission in last 30 days    Concerns to be Addressed  no discharge needs identified        Discharge Plan     Row Name 11/12/18 1235       Plan    Plan  Home when stable    Patient/Family in Agreement with Plan  yes    Plan Comments  Spoke with Mrs Wadsworth at bedside.  She lives with her  and mother in a house in [place].   She has not had home health, DME or oxygen.  She is independent with her ADL's and drives herself. Her pharmacy is [facility] in [place] and there are no issues with paying for her prescriptions.  She states she has an advanced directive but we do not have a copy but one was requested.  Plan is home when stable.          Destination      No service coordination in this encounter.      Durable Medical Equipment      No service coordination in this encounter.      Dialysis/Infusion      No service coordination in this encounter.      Home Medical Care      No service coordination in this encounter.      Community Resources      No  service coordination in this encounter.          Demographic Summary     Row Name 11/12/18 1234       General Information    Reason for Consult  discharge planning    Preferred Language  English       Contact Information    Permission Granted to Share Info With      Row Name 11/12/18 3061       General Information    Admission Type  observation    Arrived From  home    Referral Source  admission list    Preferred Language  English        Functional Status    No documentation.       Psychosocial    No documentation.       Abuse/Neglect    No documentation.       Legal    No documentation.       Substance Abuse    No documentation.       Patient Forms    No documentation.           Jenn Russ RN

## 2018-11-12 NOTE — PLAN OF CARE
Problem: Patient Care Overview  Goal: Plan of Care Review  Outcome: Ongoing (interventions implemented as appropriate)   11/12/18 0249   Coping/Psychosocial   Plan of Care Reviewed With patient   Plan of Care Review   Progress no change   OTHER   Outcome Summary Patient rested comfortably overnight. Reports a decrease in dizzy sensation. Chronic tremors continue       Problem: Fall Risk (Adult)  Goal: Identify Related Risk Factors and Signs and Symptoms  Outcome: Ongoing (interventions implemented as appropriate)    Goal: Absence of Fall  Outcome: Ongoing (interventions implemented as appropriate)

## 2018-11-12 NOTE — H&P
"[facility] HOSPITALIST     Laura Ayala APRN    CHIEF COMPLAINT: Dizziness and difficulty walking    HISTORY OF PRESENT ILLNESS:    56 y/o female with a Functional/psychogenic movement disorder, tremor, HTN, Dyslipidemia, RA, GERD and Lainez's esophagus, hyperparathyroidism with prior parathyroidectomy and anxiety presented to the ER secondary to dizziness and difficulty walking. The patient notes she has been having issues with tremor and movement problems since April. She has also had this \"foggy\" headed feeling since that time and some mild imbalance per her report. She has seen 3 neurologists outpatient (the most recent in [employer]). She was referred to the MORE program at [facility] for functional movement d/o but appt isn't until February and she has been referred to a specialty cognitive-movement therapist but has not yet had her first appt per patient and  report. In all prior evaluations the patient's strength has not been affected by her issues. She notes 2 weeks ago at about 11PM she was in bed and rolled to her right and suddenly became severely dizzy with nausea and vomited. These symptoms did improve and were only present when she would lie down and turn a certain way. She saw ENT last week and notes she was given instructions on the Eply's manuver but noted the ENT was not able to reproduce her symptoms in the office. Then she notes at 8PM last night she sat up in bed and her head was \"spinning\" and she felt really \"foggy headed\" and she knew she would not be able to walk and she called for her husbands help and came to the ER for evaluation. She denies any focal weakness, speech or swallowing issues. Just general imbalance when trying to walk and not falling to one side or the other. She denies nay recent cough, SOA, CP, N/V/D (except to the episode of N/V she had 2 weeks ago with her dizziness). She denies any F/C. She denies any numbness or tingling.       Past Medical " History:   Diagnosis Date   • Anxiety    • Arthritis 2014    RA   • Arzate esophagus    • Cholelithiasis     Removed   • Esophageal reflux    • Fibromyositis    • Functional movement disorder    • H/O colonoscopy    • H/O mammogram 2011    NORMAL    • Hx of bone density study 2011    OSTEOPENIA    • Hyperlipidemia    • Hyperparathyroidism (CMS/HCC)    • Hypertension    • Menopause    • Osteopenia    • Pancreatitis    • Tremor 2018     Past Surgical History:   Procedure Laterality Date   • CHOLECYSTECTOMY     • COLONOSCOPY     • DILATATION AND CURETTAGE     • HYSTERECTOMY     • MOUTH SURGERY      TOOTH EXTRACTION   • OTHER SURGICAL HISTORY  2015    DIAGNOSTIC ESOPHAGOSCOPY TRANSNASAL FLEXIBLE WITH BIOPSY; ARZATE ESO. REPEAT EGD 2 YR    • OVARY SURGERY Left     NORMAL    • PAP SMEAR  2010    NORMAL    • PARATHYROIDECTOMY Right 2016    Dr. Muchael Flynn at [place]     Family History   Problem Relation Age of Onset   • Diabetes Mother    • Hyperlipidemia Mother    • Hypertension Mother    • Heart disease Mother    • Arthritis Father    • Anxiety disorder Father    • COPD Father    • Glaucoma Father    • Hyperlipidemia Father    • Hypertension Father    • Vision loss Father    • Heart disease Sister      Social History     Tobacco Use   • Smoking status: Current Every Day Smoker     Packs/day: 0.50     Years: 20.00     Pack years: 10.00     Last attempt to quit: 2018     Years since quittin.2   • Smokeless tobacco: Never Used   Substance Use Topics   • Alcohol use: No   • Drug use: No     Facility-Administered Medications Prior to Admission   Medication Dose Route Frequency Provider Last Rate Last Dose   • cyanocobalamin injection 1,000 mcg  1,000 mcg Intramuscular Q28 Days Saurabh Ferreira MD   1,000 mcg at 18 1632     Medications Prior to Admission   Medication Sig Dispense Refill Last Dose   • amLODIPine (NORVASC) 5 MG tablet Take 1 tablet by mouth Daily.  30 tablet 5 Taking   • aspirin 81 MG tablet Take 81 mg by mouth Daily.   Taking   • atorvastatin (LIPITOR) 40 MG tablet Take 1 tablet by mouth Daily. 90 tablet 3 Taking   • bisoprolol-hydrochlorothiazide (ZIAC) 5-6.25 MG per tablet Take 1 tablet by mouth Daily. 90 tablet 3 Taking   • buPROPion XL (WELLBUTRIN XL) 150 MG 24 hr tablet Take 1 tablet by mouth Every Morning. (Patient not taking: Reported on 11/12/2018) 30 tablet 6 Not Taking at Unknown time   • cyanocobalamin 1000 MCG/ML injection Inject  into the appropriate muscle as directed by prescriber.   Taking   • diazePAM (VALIUM) 5 MG tablet Take 1 tablet by mouth At Night As Needed for Anxiety. 10 tablet 0 Taking   • ENBREL SURECLICK 50 MG/ML solution auto-injector    Taking   • esomeprazole (nexIUM) 40 MG capsule TAKE ONE CAPSULE BY MOUTH TWICE DAILY 180 capsule 1    • folic acid (FOLVITE) 1 MG tablet Take by mouth.   Taking   • gabapentin (NEURONTIN) 100 MG capsule Take 1 capsule by mouth 2 (Two) Times a Day. 60 capsule 0    • KLOR-CON 20 MEQ CR tablet TAKE ONE TABLET BY MOUTH TWICE DAILY 180 tablet 1    • methotrexate 2.5 MG tablet TAKE 4 TABS PO ONCE WEEKLY ON SAME DAY 32 tablet 11 Taking   • olmesartan (BENICAR) 40 MG tablet Take 1 tablet by mouth Daily. 90 tablet 3 Taking   • PREMPRO 0.45-1.5 MG per tablet TAKE ONE TABLET BY MOUTH ONCE DAILY (Patient taking differently: TAKE ONE TABLET BY MOUTH on mon wed fri) 28 tablet 11 Taking   • vitamin D (ERGOCALCIFEROL) 68195 units capsule capsule TAKE ONE CAPSULE BY MOUTH ONCE A WEEK 12 capsule 3 Taking     Allergies:  Meloxicam    REVIEW OF SYSTEMS:  Please see the above history of present illness for pertinent positives and negatives.  The remainder of the patient's systems have been reviewed and are negative.     Vital Signs  Temp:  [97.2 °F (36.2 °C)-98.6 °F (37 °C)] 97.9 °F (36.6 °C)  Heart Rate:  [45-61] 45  Resp:  [16-18] 18  BP: ()/() 140/75  Oxygen Therapy  SpO2: 98 %  Pulse Oximetry Type:  "Intermittent  Device (Oxygen Therapy): room air  Body mass index is 33.29 kg/m².     Flowsheet Rows      First Filed Value   Admission Height  154.9 cm (61\") Documented at 11/11/2018 2051   Admission Weight  81.6 kg (180 lb) Documented at 11/11/2018 2051             Physical Exam:  Physical Exam   Constitutional: Patient appears well-developed, obese and in no acute distress   HEENT:   Head: Normocephalic and atraumatic.   Eyes:  Pupils are equal, round, and reactive to light. EOM are intact. Sclerae are anicteric and noninjected.  Mouth and Throat: Patient has moist mucous membranes. Oropharynx is clear of any erythema or exudate.     Neck: Neck supple. No JVD present. No thyromegaly present. No lymphadenopathy present.  Cardiovascular: Regular rate, regular rhythm, S1 normal and S2 normal.  Exam reveals no gallop and no friction rub.  No murmur heard.  Pulmonary/Chest: Lungs are clear to auscultation bilaterally. No respiratory distress. No wheezes. No rhonchi. No rales.   Abdominal: Soft. Bowel sounds are normal. No distension and no mass. There is no hepatosplenomegaly. There is no tenderness.   Musculoskeletal: Normal muscle tone  Extremities: No edema. Pulses are palpable in all 4 extremities.  Neurological: Patient is alert and oriented to person, place, and time. Cranial nerves II-XII are grossly intact with no focal deficits. Strength is 5/5 throughout and LIA intact. Resting tremor of head and RUE.  Skin: Skin is warm. No rash noted. Nails show no clubbing.  No cyanosis or erythema.    Emotional Behavior:    Judgement and Insight: appears NL   Mental Status:  Alertness  NL   Memory:  NL   Mood and Affect:         Depression  None               Anxiety  None    Debilities:   Physical Weakness  None   Handicaps  None   Disabilities  Possibly Related to tremor   Agitation  None     Results Review:    I reviewed the patient's new clinical results.  Lab Results (most recent)     Procedure Component Value Units " Date/Time    Troponin [[id number]]  (Normal) Collected:  11/12/18 0551    Specimen:  Blood Updated:  11/12/18 0637     Troponin T <0.010 ng/mL     Narrative:       Troponin T Reference Ranges:  Less than 0.03 ng/mL:    Negative for AMI  0.03 to 0.09 ng/mL:      Indeterminant for AMI  Greater than 0.09 ng/mL: Positive for AMI    TSH [[id number]]  (Abnormal) Collected:  11/12/18 0551    Specimen:  Blood Updated:  11/12/18 0637     TSH 4.620 mIU/mL     Comprehensive Metabolic Panel [[id number]]  (Abnormal) Collected:  11/12/18 0551    Specimen:  Blood Updated:  11/12/18 0631     Glucose 104 mg/dL      BUN 11 mg/dL      Creatinine 0.69 mg/dL      Sodium 142 mmol/L      Potassium 3.2 mmol/L      Chloride 102 mmol/L      CO2 30.4 mmol/L      Calcium 9.1 mg/dL      Total Protein 6.4 g/dL      Albumin 3.60 g/dL      ALT (SGPT) 14 U/L      AST (SGOT) 15 U/L      Alkaline Phosphatase 67 U/L      Total Bilirubin 0.5 mg/dL      eGFR Non African Amer 88 mL/min/1.73      Globulin 2.8 gm/dL      A/G Ratio 1.3 g/dL      BUN/Creatinine Ratio 15.9     Anion Gap 9.6 mmol/L     CBC Auto Differential [[id number]]  (Abnormal) Collected:  11/12/18 0551    Specimen:  Blood Updated:  11/12/18 0612     WBC 5.65 10*3/mm3      RBC 3.93 10*6/mm3      Hemoglobin 11.8 g/dL      Hematocrit 34.6 %      MCV 88.0 fL      MCH 30.0 pg      MCHC 34.1 g/dL      RDW 14.2 %      RDW-SD 45.8 fl      MPV 9.5 fL      Platelets 242 10*3/mm3      Neutrophil % 46.3 %      Lymphocyte % 43.4 %      Monocyte % 7.1 %      Eosinophil % 1.9 %      Basophil % 1.1 %      Immature Grans % 0.2 %      Neutrophils, Absolute 2.62 10*3/mm3      Lymphocytes, Absolute 2.45 10*3/mm3      Monocytes, Absolute 0.40 10*3/mm3      Eosinophils, Absolute 0.11 10*3/mm3      Basophils, Absolute 0.06 10*3/mm3      Immature Grans, Absolute 0.01 10*3/mm3      nRBC 0.0 /100 WBC     Troponin [[id number]]  (Normal) Collected:  11/11/18 2331    Specimen:  Blood Updated:  11/11/18 2354     Troponin T  <0.010 ng/mL     Narrative:       Troponin T Reference Ranges:  Less than 0.03 ng/mL:    Negative for AMI  0.03 to 0.09 ng/mL:      Indeterminant for AMI  Greater than 0.09 ng/mL: Positive for AMI    Urine Drug Screen - Urine, Clean Catch [[id number]]  (Normal) Collected:  11/11/18 2138    Specimen:  Urine, Clean Catch Updated:  11/11/18 2202     THC, Screen, Urine Negative     Phencyclidine (PCP), Urine Negative     Cocaine Screen, Urine Negative     Methamphetamine, Urine Negative     Opiate Screen Negative     Amphetamine Screen, Urine Negative     Benzodiazepine Screen, Urine Negative     Tricyclic Antidepressants Screen Negative     Methadone Screen, Urine Negative     Barbiturates Screen, Urine Negative     Oxycodone Screen, Urine Negative     Propoxyphene Screen Negative     Buprenorphine, Screen, Urine Negative    Narrative:       Urine drug screen results are to be used for medical purposes only.  They are not to be used for legal purposes such as employment testing.  Negative results do not necessarily mean the complete absence of a subtance, but rather that the result is less than the cutoff for that substance.  Positive results are unconfirmed and considered Preliminary Positive.  [facility] does not automatically confirm Postitive Unconfirmed results.  The physician may request (order) an Unconfirmed Positive result to be sent out for confirmation.      Negative Thresholds for Drugs Screened:    THC screen, urine                          50 ng/ml  Phenycyclidine (PCP), urine                25 ng/ml  Cocaine screen, urine                     150 ng/ml  Methamphetamine, urine                    500 ng/ml  Opiate screen, urine                      100 ng/ml  Amphetamine screen, urine                 500 ng/ml  Benzodiazepine screen, urine              150 ng/ml  Tricyclic Antidepressants screen, urine   300 ng/ml  Methadone screen, urine                   200 ng/ml  Barbiturates screen, urine                 200 ng/ml  Oxycodone screen, urine                   100 ng/ml  Propoxyphene screen, urine                300 ng/ml  Buprenorphine screen, urine                10 ng/ml    Urinalysis, Microscopic Only - Urine, Clean Catch [[id number]]  (Abnormal) Collected:  11/11/18 2138    Specimen:  Urine, Clean Catch Updated:  11/11/18 2150     RBC, UA 0-2 /HPF      WBC, UA 6-12 /HPF      Bacteria, UA Trace /HPF      Squamous Epithelial Cells, UA 3-6 /HPF      Hyaline Casts, UA None Seen /LPF      Methodology Manual Light Microscopy    Urinalysis With Microscopic If Indicated (No Culture) - Urine, Clean Catch [[id number]]  (Abnormal) Collected:  11/11/18 2138    Specimen:  Urine, Clean Catch Updated:  11/11/18 2150     Color, UA Straw     Appearance, UA Slightly Cloudy     pH, UA 7.0     Specific Gravity, UA 1.010     Glucose, UA Negative     Ketones, UA Negative     Bilirubin, UA Negative     Blood, UA Small (1+)     Protein, UA Negative     Leuk Esterase, UA Moderate (2+)     Nitrite, UA Negative     Urobilinogen, UA 0.2 E.U./dL    Comprehensive Metabolic Panel [[id number]]  (Abnormal) Collected:  11/11/18 2117    Specimen:  Blood Updated:  11/11/18 2147     Glucose 144 mg/dL      BUN 12 mg/dL      Creatinine 0.83 mg/dL      Sodium 142 mmol/L      Potassium 3.2 mmol/L      Chloride 100 mmol/L      CO2 29.9 mmol/L      Calcium 9.5 mg/dL      Total Protein 7.2 g/dL      Albumin 4.20 g/dL      ALT (SGPT) 16 U/L      AST (SGOT) 18 U/L      Alkaline Phosphatase 77 U/L      Total Bilirubin 0.6 mg/dL      eGFR Non African Amer 71 mL/min/1.73      Globulin 3.0 gm/dL      A/G Ratio 1.4 g/dL      BUN/Creatinine Ratio 14.5     Anion Gap 12.1 mmol/L     CBC & Differential [[id number]] Collected:  11/11/18 2117    Specimen:  Blood Updated:  11/11/18 2128    Narrative:       The following orders were created for panel order CBC & Differential.  Procedure                               Abnormality         Status                      ---------                               -----------         ------                     CBC Auto Differential[[id number]]        Normal              Final result                 Please view results for these tests on the individual orders.    CBC Auto Differential [[id number]]  (Normal) Collected:  11/11/18 2117    Specimen:  Blood Updated:  11/11/18 2128     WBC 7.02 10*3/mm3      RBC 4.30 10*6/mm3      Hemoglobin 12.7 g/dL      Hematocrit 37.5 %      MCV 87.2 fL      MCH 29.5 pg      MCHC 33.9 g/dL      RDW 14.3 %      RDW-SD 45.1 fl      MPV 9.5 fL      Platelets 279 10*3/mm3      Neutrophil % 45.7 %      Lymphocyte % 43.7 %      Monocyte % 7.7 %      Eosinophil % 1.7 %      Basophil % 1.1 %      Immature Grans % 0.1 %      Neutrophils, Absolute 3.20 10*3/mm3      Lymphocytes, Absolute 3.07 10*3/mm3      Monocytes, Absolute 0.54 10*3/mm3      Eosinophils, Absolute 0.12 10*3/mm3      Basophils, Absolute 0.08 10*3/mm3      Immature Grans, Absolute 0.01 10*3/mm3      nRBC 0.0 /100 WBC           Imaging Results (most recent)     Procedure Component Value Units Date/Time    CT Head Without Contrast [[id number]] Collected:  11/12/18 0710     Updated:  11/12/18 0714    Narrative:       CT HEAD WO CONTRAST-: 11/11/2018 9:55 PM     HISTORY:   Dizziness for several days. Difficulty walking today.     TECHNIQUE:   Axial unenhanced head CT. Radiation dose reduction techniques included  automated exposure control or exposure modulation based on body size.  Radiation audit for number of CT and nuclear cardiology exams performed  in the last year: 1.       COMPARISON:   4/4/2018     FINDINGS:   No intracranial hemorrhage, mass, or infarct. No hydrocephalus or  extra-axial fluid collection. Brain parenchymal density is normal. The  skull base, calvarium, and extracranial soft tissues are normal.       Impression:       Normal, negative unenhanced head CT.        A preliminary report was provided by Dr. Landry at 2236 hours  on  11/11/2018.     This report was finalized on 11/12/2018 7:12 AM by Dr. Jarocho Betancourt MD.             ECG/EMG Results (most recent)     Procedure Component Value Units Date/Time    ECG 12 Lead [[id number]] Collected:  11/11/18 2127     Updated:  11/12/18 0607            Assessment/Plan      1. Probable Vertigo: patient seen by Dr. Winston today. MRI/MRI brain were negative. He notes some left peripheral vestibulopathy and gave dose of IV solumedrol and started BID Klonopin. PT/OT worked with patient and recommended walker and HH and will re-eval in AM.     2. Functional/psychogenic movement disorder with tremor: Patient awaiting further PT that is more specialized per patient's report. Would recommend continuing to follow with outpatient neurologist.      3. HTN: BP mostly WNL today off of home medications. Will resume home Norvasc and Benicar in AM, Hold bisoprolol/HCTZ for now.     4. Dyslipidemia: REsume home lipitor.      5. RA: Resume home methotrexate and Enbrel at outpatient. No acute issues here.     6. GERD and Lainez's esophagus: No acute issues here, resume PPI (protonix will be substituted for nexium here)     7. Hyperparathyroidism with prior parathyroidectomy: No acute issues here.      8. Anxiety: Patient now on Klonopin per Dr. Winston so will hold PM Valium here.     9. Subclinical hyperthyroidism: Will need PCP F/U. Mild, no treatment indicated at this time.     10. Hypokalemia: Replace, check mag and resume home supplementation.     I discussed the patients findings and my recommendations with patient.     Monica Osorio MD  11/12/18  8:05 AM

## 2018-11-12 NOTE — THERAPY EVALUATION
Acute Care - Physical Therapy Initial Evaluation   Claritza Baxter     Patient Name: Shaylee Wadsworth  : 1960  MRN: [id number]  Today's Date: 2018   Onset of Illness/Injury or Date of Surgery: 18  Date of Referral to PT: 18  Referring Physician: Dr. Osorio      Admit Date: 2018    Visit Dx:     ICD-10-CM ICD-9-CM   1. Difficulty walking R26.2 719.7   2. Dizziness R42 780.4   3. Hypertension, unspecified type I10 401.9   4. Tremors of nervous system R25.1 781.0     Patient Active Problem List   Diagnosis   • Epigastric pain   • Abnormal electrocardiogram   • Abnormal radiographic examination   • Disc disorder of thoracic region   • Gastroesophageal reflux disease   • Chronic gastritis   • Hyperlipidemia LDL goal <100   • Hypertension   • Menopausal symptom   • Migraine   • Osteoarthritis of shoulder   • Osteopenia   • Rheumatoid arthritis (CMS/HCC)   • Menopausal state   • Multiple pulmonary nodules determined by computed tomography of lung   • H/O hyperparathyroidism   • Tendinitis of left shoulder   • Healthcare maintenance   • Hypokalemia   • Anxiety related tremor   • Functional movement disorder   • Difficulty walking     Past Medical History:   Diagnosis Date   • Anxiety    • Arthritis 2014    RA   • Arzate esophagus    • Cholelithiasis     Removed   • Esophageal reflux    • Fibromyositis    • Functional movement disorder    • H/O colonoscopy    • H/O mammogram 2011    NORMAL    • Hx of bone density study 2011    OSTEOPENIA    • Hyperlipidemia    • Hyperparathyroidism (CMS/HCC)    • Hypertension    • Menopause    • Osteopenia    • Pancreatitis    • Tremor 2018     Past Surgical History:   Procedure Laterality Date   • CHOLECYSTECTOMY     • COLONOSCOPY     • DILATATION AND CURETTAGE     • HYSTERECTOMY     • MOUTH SURGERY      TOOTH EXTRACTION   • OTHER SURGICAL HISTORY  2015    DIAGNOSTIC ESOPHAGOSCOPY TRANSNASAL FLEXIBLE WITH BIOPSY; ARZATE ESO.  REPEAT EGD 2 YR    • OVARY SURGERY Left     NORMAL    • PAP SMEAR  08/23/2010    NORMAL    • PARATHYROIDECTOMY Right 08/17/2016    Dr. Muchael Flynn at [place]        PT ASSESSMENT (last 12 hours)      Physical Therapy Evaluation     Row Name 11/12/18 1313          PT Evaluation Time/Intention    Subjective Information  complains of;dizziness  -JW (r) ORESTES (t) JW (c)     Document Type  evaluation  -JW (r) ORESTES (t) JW (c)     Mode of Treatment  physical therapy  -JW (r) ORESTES (t) JW (c)     Patient Effort  adequate  -JW (r) ORESTES (t) JW (c)     Symptoms Noted During/After Treatment  dizziness  -JW (r) ORESTES (t) JW (c)     Comment  Pt complains of new balance deficit that started around 8pm last night. Pt demonstrated tremors during mobility as well as jerking movement of her head throughout evaluation  -JW (r) ORESTES (t) JW (c)     Row Name 11/12/18 5072          General Information    Patient Profile Reviewed?  yes  -JW (r) ORESTES (t) JW (c)     Onset of Illness/Injury or Date of Surgery  11/11/18  -JW (r) ORESTES (t) JW (c)     Referring Physician  Dr. Osorio  -JW (r) ORESTES (t) JW (c)     Patient Observations  cooperative;alert;agree to therapy  -JW (r) ORESTES (t) JW (c)     Patient/Family Observations  Pt found supine with HOB elevated  -JW (r) ORESTES (t) JW (c)     Prior Level of Function  independent:;all household mobility;community mobility;gait;ADL's  -JW (r) ORESTES (t) JW (c)     Equipment Currently Used at Home  none  -JW (r) ORESTES (t) JW (c)     Pertinent History of Current Functional Problem  Pt was admitted to the hospital on 11/11/18 for dizziness and difficulty walking. Pt has a history of functional movement disorder. Pt lives in a two story home with  and mother, currently does not use any equipment. Pt has a job that requires her to be active.   -JW (r) ORESTES (t) JW (c)     Existing Precautions/Restrictions  fall  -JW (r) ORESTES (t) JW (c)     Risks Reviewed  patient:;LOB;dizziness;increased discomfort;change in vital signs  -JW (r) ORESTES (t)  JW (c)     Benefits Reviewed  patient:;improve function;increase balance  -JW (r) ORESTES (t) JW (c)     Barriers to Rehab  previous functional deficit  -JW (r) ORESTES (t) JW (c)     Row Name 11/12/18 1318          Relationship/Environment    Primary Source of Support/Comfort  spouse  -JW (r) ORESTES (t) JW (c)     Lives With  spouse;parent(s) mother  -JW (r) ORESTES (t) JW (c)     Family Caregiver if Needed  spouse  -JW (r) ORESTES (t) JW (c)     Row Name 11/12/18 1318          Resource/Environmental Concerns    Current Living Arrangements  home/apartment/condo  -JW (r) ORESTES (t) JW (c)     Resource/Environmental Concerns  none  -JW (r) ORESTES (t) JW (c)     Row Name 11/12/18 1318          Home Main Entrance    Number of Stairs, Main Entrance  -- 10-15  -JW (r) ORESTES (t) JW (c)     Stair Railings, Main Entrance  railing on right side (ascending)  -JW (r) ORESTES (t) JW (c)     Row Name 11/12/18 1318          Stairs Within Home, Primary    Number of Stairs, Within Home, Primary  -- 15  -JW (r) ORESTES (t) JW (c)     Stair Railings, Within Home, Primary  railing on right side (ascending)  -JW (r) ORESTES (t) JW (c)     Stairs Comment, Within Home, Primary  bedroom is on the second floor  -JW (r) ORESTES (t) JW (c)     Row Name 11/12/18 1318          Cognitive Assessment/Intervention- PT/OT    Orientation Status (Cognition)  oriented x 3  -JW (r) ORESTES (t) JW (c)     Follows Commands (Cognition)  WFL  -JW (r) ORESTES (t) JW (c)     Personal Safety Interventions  gait belt;nonskid shoes/slippers when out of bed;supervised activity  -JW (r) ORESTES (t) JW (c)     Row Name 11/12/18 1318          Safety Issues, Functional Mobility    Safety Issues Affecting Function (Mobility)  other (see comments) body tremors, head jerking  -JW (r) ORESTES (t) JW (c)     Comment, Safety Issues/Impairments (Mobility)  Pt demonstrated tremors and jerking head movements during functional mobility tasks  -JW (r) ORESTES (t) JW (c)     Row Name 11/12/18 1319          Bed Mobility Assessment/Treatment    Bed Mobility  Assessment/Treatment  sit-supine;supine-sit  -JW (r) ORESTES (t) JW (c)     Supine-Sit [place] (Bed Mobility)  supervision  -JW (r) ORESTES (t) JW (c)     Sit-Supine [place] (Bed Mobility)  supervision  -JW (r) ORESTES (t) JW (c)     Assistive Device (Bed Mobility)  bed rails;head of bed elevated  -JW (r) ORESTES (t) JW (c)     Comment (Bed Mobility)  Increased time to complete  -JW (r) ORESTES (t) JW (c)     Row Name 11/12/18 1318          Transfer Assessment/Treatment    Transfer Assessment/Treatment  sit-stand transfer;stand-sit transfer  -JW (r) ORESTES (t) JW (c)     Sit-Stand [place] (Transfers)  contact guard;verbal cues cues for hand placement  -JW (r) ORESTES (t) JW (c)     Stand-Sit [place] (Transfers)  verbal cues;contact guard  -JW (r) ORESTES (t) JW (c)     Row Name 11/12/18 1318          Sit-Stand Transfer    Assistive Device (Sit-Stand Transfers)  walker, front-wheeled  -JW (r) ORESTES (t) JW (c)     Row Name 11/12/18 1318          Stand-Sit Transfer    Assistive Device (Stand-Sit Transfers)  walker, front-wheeled cues for hand placement and walker management  -JW (r) ORESTES (t) JW (c)     Row Name 11/12/18 1318          Gait/Stairs Assessment/Training    [place] Level (Gait)  contact guard;verbal cues;1 person assist  -JW (r) ORESTES (t) JW (c)     Assistive Device (Gait)  walker, front-wheeled  -JW (r) ORESTES (t) JW (c)     Distance in Feet (Gait)  20  -JW (r) ORESTES (t) JW (c)     Pattern (Gait)  step-to  -JW (r) ORESTES (t) JW (c)     Deviations/Abnormal Patterns (Gait)  bilateral deviations;gait speed decreased;stride length decreased  -JW (r) ORESTES (t) JW (c)     Bilateral Gait Deviations  forward flexed posture;heel strike decreased  -JW (r) ORESTES (t) JW (c)     Comment (Gait/Stairs)  Pt performs gait activities with FWW and CGA of one x 20 feet. During gait activities, pt demonstrates whole body tremors and displays jerking movements with bilateral LEs. Gait distance limited by patient report of dizziness.    -JEREMIAH (r) ORESTES (t) JEREMIAH (c)     Row  "Name 11/12/18 1318          General ROM    GENERAL ROM COMMENTS  bilat LE ROM WFL  -JW (r) ORESTES (t) JEREMIAH (c)     Row Name 11/12/18 1318          MMT (Manual Muscle Testing)    General MMT Comments  Bilat LE strength 5/5, with exception of bilat hip flexors 4+/5  -JW (r) ORESTES (t) JEREMIAH (c)     Row Name 11/12/18 1318          Sensory Assessment/Intervention    Sensory General Assessment  no sensation deficits identified  -JEREMIAH (r) ORESTES (t) JEREMIAH (c)     Row Name 11/12/18 1318          Pain Scale: Numbers Pre/Post-Treatment    Pain Scale: Numbers, Pretreatment  0/10 - no pain  -JW (r) ORESTES (t) JEREMIAH (c)     Pain Scale: Numbers, Post-Treatment  0/10 - no pain  -JW (r) ORESTES (t) JEREMIAH (c)     Row Name 11/12/18 1318          Coping    Observed Emotional State  accepting;cooperative  -JEREMIAH (ramirez) ORESTES (anusha) JEREMIAH Muñozc)     Verbalized Emotional State  acceptance  -JEREMIAH (r) ORESTES (anusha) JEREMIAH (c)     Row Name 11/12/18 1318          Plan of Care Review    Plan of Care Reviewed With  patient  -JEREMIAH (ramirez) ORESTES (anusha) JEREMIAH (c)     Row Name 11/12/18 1318          Physical Therapy Clinical Impression    Date of Referral to PT  11/12/18  -JEREMIAH (ramirez) ORESTES (anusha) JEREMIAH (david)     Patient/Family Goals Statement (PT Clinical Impression)  \"go home and return to work\"  -JEREMIAH (ramirez) ORESTES (anusha) JEREMIAH (david)     Criteria for Skilled Interventions Met (PT Clinical Impression)  yes;treatment indicated  -JEREMIAH evans) ORESTES (anusha) JEREMIAH cordero)     Impairments Found (describe specific impairments)  gait, locomotion, and balance  -JEREMIAH (ramirez) ORESTES (anusha) JEREMIAH (david)     Rehab Potential (PT Clinical Summary)  fair, will monitor progress closely  -JEREMIAH (ramirez) ORESTES (anusha) JEREMIAH (david)     Predicted Duration of Therapy (PT)  2 days  -JEREMIAH (r) ORESTES (anusha) JEREMIAH (c)     Care Plan Review (PT)  care plan/treatment goals reviewed;risks/benefits reviewed;patient/other agree to care plan;evaluation/treatment results reviewed  -JEREMIAH (r) ORESTES (anusha) JEREMIAH (c)     Row Name 11/12/18 1318          Vital Signs    Pre Systolic BP Rehab  125 supine  -JEREMIAH (r) ORESTES (t) JEREMIAH (c)     Pre Treatment Diastolic BP  71  -JEREMIAH (ramirez) ORESTES (t) JEREMIAH " (c)     Intra Systolic BP Rehab  128 seated EOB  -JW (r) ORESTES (t) JW (c)     Intra Treatment Diastolic BP  78  -JW (r) ORESTES (t) JW (c)     Post Systolic BP Rehab  121 standing  -JW (r) ORESTES (t) JW (c)     Post Treatment Diastolic BP  79  -JW (r) ORESTES (t) JW (c)     Row Name 11/12/18 1318          Physical Therapy Goals    Transfer Goal Selection (PT)  transfer, PT goal 1  -JW (r) ORESTES (t) JW (c)     Gait Training Goal Selection (PT)  gait training, PT goal 1  -JW (r) ORESTES (t) JW (c)     Row Name 11/12/18 1318          Transfer Goal 1 (PT)    Activity/Assistive Device (Transfer Goal 1, PT)  transfers, all  -JW (r) ORESTES (t) JW (c)     [place] Level/Cues Needed (Transfer Goal 1, PT)  conditional independence with appropriate assistive device  -JW (r) ORESTES (t) JW (c)     Time Frame (Transfer Goal 1, PT)  2 days  -JW (r) ORESTES (t) JW (c)     Barriers (Transfers Goal 1, PT)  tremors  -JW (r) ORESTES (t) JW (c)     Progress/Outcome (Transfer Goal 1, PT)  goal ongoing  -JW (r) ORESTES (t) JW (c)     Row Name 11/12/18 1318          Gait Training Goal 1 (PT)    Activity/Assistive Device (Gait Training Goal 1, PT)  gait (walking locomotion)  -JW (r) ORESTES (t) JW (c)     [place] Level (Gait Training Goal 1, PT)  conditional independence with appropriate assistive device  -JW (r) ORESTES (t) JW (c)     Distance (Gait Goal 1, PT)  200 ft  -JW (r) ORESTES (t) JW (c)     Time Frame (Gait Training Goal 1, PT)  2 days  -JW (r) ORESTES (t) JW (c)     Barriers (Gait Training Goal 1, PT)  tremors  -JW (r) ORESTES (t) JW (c)     Progress/Outcome (Gait Training Goal 1, PT)  goal ongoing  -JW (r) ORESTES (t) JW (c)     Row Name 11/12/18 1318          Positioning and Restraints    Pre-Treatment Position  in bed  -JW (r) ORESTES (t) JW (c)     Post Treatment Position  bed  -JW (r) ORESTES (t) JW (c)     In Bed  supine;call light within reach;encouraged to call for assist  -JEREMIAH evans) ORESTES (anusha) JEREMIAH cordero)     Row Name 11/12/18 1325          Living Environment    Home Accessibility  stairs to enter home;stairs  within home  -JEREMIAH (r) ORESTES (t) JEREMIAH (c)       User Key  (r) = Recorded By, (t) = Taken By, (c) = Cosigned By    Initials Name Provider Type    Kareen Gonzalez, PT Physical Therapist    Martha Newell, PT Student PT Student        Physical Therapy Education     Title: PT OT SLP Therapies (Done)     Topic: Physical Therapy (Done)     Point: Mobility training (Done)     Learning Progress Summary           Patient Acceptance, E, VU by ORESTES at 11/12/2018  2:54 PM                               User Key     Initials Effective Dates Name Provider Type Discipline    ORESTES 10/10/18 -  Martha Mejia, PT Student PT Student PT              PT Recommendation and Plan  Anticipated Discharge Disposition (PT): home with home health  Planned Therapy Interventions (PT Eval): strengthening, transfer training, gait training  Therapy Frequency (PT Clinical Impression): daily  Outcome Summary/Treatment Plan (PT)  Anticipated Equipment Needs at Discharge (PT): front wheeled walker  Anticipated Discharge Disposition (PT): home with home health  Plan of Care Reviewed With: patient  Outcome Summary: PT evaluation completed. Pt requires supervision with bed mobility activities, requires increased time to complete. Pt requires CGA from sit to supine and supine to sit, and complains of dizziness upon sitting upright. Pt demonstrates jerking movements of her head when upright, as well as LE tremors with gait activities. Pt performs gait activities with FWW and CGA of one x 20 feet. Gait distance was limited by patient report of dizziness. PT plan to see daily to address the above deficits. Recommend use of FWW for safety and stability and home with home health at discharge from facility, but will continue to assess.     Outcome Measures     Row Name 11/12/18 1400 11/12/18 1318          How much help from another person do you currently need...    Turning from your back to your side while in flat bed without using bedrails?  --  4   -JW (r) ORESTES (t) JW (c)     Moving from lying on back to sitting on the side of a flat bed without bedrails?  --  4  -JW (r) ORESTES (t) JW (c)     Moving to and from a bed to a chair (including a wheelchair)?  --  3  -JW (r) ORESTES (t) JW (c)     Standing up from a chair using your arms (e.g., wheelchair, bedside chair)?  --  3  -JW (r) ORESTES (t) JW (c)     Climbing 3-5 steps with a railing?  --  2  -JW (r) ORESTES (t) JW (c)     To walk in hospital room?  --  3  -JW (r) ORESTES (t) JW (c)     AM-PAC 6 Clicks Score  --  19  -JW (r) ORESTES (t)        How much help from another is currently needed...    Putting on and taking off regular lower body clothing?  3  -EN  --     Bathing (including washing, rinsing, and drying)  3  -EN  --     Toileting (which includes using toilet bed pan or urinal)  3  -EN  --     Putting on and taking off regular upper body clothing  4  -EN  --     Taking care of personal grooming (such as brushing teeth)  4  -EN  --     Eating meals  4  -EN  --     Score  21  -EN  --        Functional Assessment    Outcome Measure Options  AM-PAC 6 Clicks Daily Activity (OT)  -EN  AM-[facility] 6 Clicks Basic Mobility (PT)  -JW (r) ORESTES (t) JW (c)       User Key  (r) = Recorded By, (t) = Taken By, (c) = Cosigned By    Initials Name Provider Type    Danica Fierro, OTR Occupational Therapist    Kareen Gonzalez, PT Physical Therapist    Martha Newell, PT Student PT Student         Time Calculation:   PT Charges     Row Name 11/12/18 1459             Time Calculation    Start Time  1318  -JW (r) ORESTES (t) JW (c)      Stop Time  1340  -JW (r) ORESTES (t) JW (c)      Time Calculation (min)  22 min  -JW (r) ORESTES (t)        User Key  (r) = Recorded By, (t) = Taken By, (c) = Cosigned By    Initials Name Provider Type    Kareen Gonzalez, PT Physical Therapist    Martha Newell, PT Student PT Student        Therapy Suggested Charges     Code   Minutes Charges    None           Therapy Charges for Today     Code  Description Service Date Service Provider Modifiers Qty    39187673884 HC PT EVAL LOW COMPLEXITY 1 11/12/2018 Martha Mejia, PT Student GP 1          PT G-Codes  PT Professional Judgement Used?: Yes  Outcome Measure Options: AM-PAC 6 Clicks Daily Activity (OT)  AM-PAC 6 Clicks Score: 19  Score: 21  Functional Limitation: Mobility: Walking and moving around  Mobility: Walking and Moving Around Current Status (): At least 20 percent but less than 40 percent impaired, limited or restricted  Mobility: Walking and Moving Around Goal Status (): At least 1 percent but less than 20 percent impaired, limited or restricted      Martha Mejia, PT Student  11/12/2018

## 2018-11-12 NOTE — ED NOTES
After walking B/P went down to 94/78. Dr. Vu aware of B/ P changes     Lacey Kwok, RN  11/11/18 5524

## 2018-11-12 NOTE — ED PROVIDER NOTES
Subjective   History of Present Illness  History of Present Illness    Chief complaint: Dizziness, difficulties walking    Location: None    Quality/Severity:  Moderate symptoms    Timing/Duration: Dizziness for about 2 weeks.  Worsened tonight for the past few hours    Modifying Factors: Worse with attempts at walking    Narrative: This patient presents for evaluation of worsening dizziness and difficulties walking tonight.  She has some form of underlying diagnosis with tremors that she has been evaluated by multiple neurologists this year.  She is not currently undergoing any treatment for that disorder.  She says that for the past 2 weeks she has had some vertigo symptoms with the sensation of dizziness and unsteadiness.  In fact she saw an ENT doctor last week for some of the symptoms as well.  However, tonight, she has had worsening sensation of dizziness and now new difficulties walking.  She says that when she is trying to walk at home she feels very unsteady and uncoordinated.  She has not fallen.  She has not fainted.  She says it does not seem like the room is spinning at this time but it seems that her body is difficult to control with her walking.  Normally, she is able to ambulate unassisted at home.  She has had a mild headache.  She denies any nausea or vomiting.  She denies any vision changes right now.  She is not taking any new medications.    Associated Symptoms: As above    Review of Systems   Constitutional: Negative for activity change, diaphoresis and fever.   HENT: Negative for facial swelling.    Eyes: Negative for pain and visual disturbance.   Respiratory: Negative for cough and shortness of breath.    Cardiovascular: Negative for chest pain.   Gastrointestinal: Negative for abdominal pain, nausea and vomiting.   Musculoskeletal: Positive for gait problem.   Skin: Negative for color change and rash.   Neurological: Positive for dizziness, tremors and headaches. Negative for seizures,  syncope, facial asymmetry, speech difficulty, weakness and numbness.   All other systems reviewed and are negative.      Past Medical History:   Diagnosis Date   • Anxiety    • Arthritis 2014    RA   • Arzate esophagus    • Cholelithiasis     Removed   • Esophageal reflux    • Fibromyositis    • Functional movement disorder    • H/O colonoscopy    • H/O mammogram 2011    NORMAL    • Hx of bone density study 2011    OSTEOPENIA    • Hyperlipidemia    • Hyperparathyroidism (CMS/HCC)    • Hypertension    • Menopause    • Osteopenia    • Pancreatitis    • Tremor 2018       Allergies   Allergen Reactions   • Meloxicam Swelling     EYES AND FACE SWELLING  EYES AND FACE SWELLING       Past Surgical History:   Procedure Laterality Date   • CHOLECYSTECTOMY     • COLONOSCOPY     • DILATATION AND CURETTAGE     • HYSTERECTOMY     • MOUTH SURGERY      TOOTH EXTRACTION   • OTHER SURGICAL HISTORY  2015    DIAGNOSTIC ESOPHAGOSCOPY TRANSNASAL FLEXIBLE WITH BIOPSY; ARZATE ESO. REPEAT EGD 2 YR    • OVARY SURGERY Left     NORMAL    • PAP SMEAR  2010    NORMAL    • PARATHYROIDECTOMY Right 2016    Dr. Muchael Flynn at [place]       Family History   Problem Relation Age of Onset   • Diabetes Mother    • Hyperlipidemia Mother    • Hypertension Mother    • Heart disease Mother    • Arthritis Father    • Anxiety disorder Father    • COPD Father    • Glaucoma Father    • Hyperlipidemia Father    • Hypertension Father    • Vision loss Father    • Heart disease Sister        Social History     Socioeconomic History   • Marital status:      Spouse name: Not on file   • Number of children: Not on file   • Years of education: Not on file   • Highest education level: Not on file   Tobacco Use   • Smoking status: Current Every Day Smoker     Packs/day: 0.50     Years: 20.00     Pack years: 10.00     Last attempt to quit: 2018     Years since quittin.2   • Smokeless tobacco: Never Used    Substance and Sexual Activity   • Alcohol use: No   • Drug use: No   • Sexual activity: No     ED Triage Vitals   Temp Heart Rate Resp BP SpO2   11/11/18 2057 11/11/18 2101 11/11/18 2101 11/11/18 2101 11/11/18 2101   98.6 °F (37 °C) 61 18 (!) 140/105 98 %      Temp src Heart Rate Source Patient Position BP Location FiO2 (%)   11/11/18 2057 11/11/18 2101 11/11/18 2101 11/11/18 2101 --   Oral Monitor Lying Right arm        Objective   Physical Exam   Constitutional: She is oriented to person, place, and time. She appears well-developed and well-nourished.   HENT:   Head: Normocephalic and atraumatic.   Right Ear: External ear normal.   Left Ear: External ear normal.   Both tympanic membranes are normal   Eyes: EOM are normal. Pupils are equal, round, and reactive to light. Right eye exhibits no discharge. Left eye exhibits no discharge.   No nystagmus   Neck: Normal range of motion. Neck supple.   Cardiovascular: Normal rate, regular rhythm, normal heart sounds and intact distal pulses. Exam reveals no gallop and no friction rub.   No murmur heard.  Pulmonary/Chest: Effort normal. No respiratory distress. She has no wheezes.   Abdominal: Soft. She exhibits no distension and no mass. There is no tenderness. There is no guarding.   Musculoskeletal: Normal range of motion. She exhibits no edema or deformity.   Neurological: She is alert and oriented to person, place, and time. No cranial nerve deficit or sensory deficit. She exhibits normal muscle tone.   Seemingly non-volitional tremors noted with both arms/hands and neck/head area symmetrically.  No tremors of legs noted.    No motor deficits apparent as pt moves all 4 extremities with completely normal strength.  No facial asymmetry.  Speech is clear and articulate. Critical thinking skills appropriate.  No pronator drift noted - pt maintains normal elevation of both arms but with the noted tremors.  Pt demonstrates completely normal cerebellar signs bilaterally  with accurate finger-nose-finger movements and heel-shin movements.    On gait testing, pt is able to bear weight and step with each foot on her own power, but with some apparent compromise in coordination.  Myself and her RN stood on each side of her offering light support under the arms to steady her steps as she swayed with body tremors uncontrollably.     Skin: Skin is warm and dry. No rash noted. She is not diaphoretic. No erythema. No pallor.   Psychiatric: She has a normal mood and affect. Her behavior is normal. Judgment and thought content normal.   Nursing note and vitals reviewed.      EKG           EKG time/Interp time: 2127/2130  Rhythm/Rate: Sinus rhythm, 56 bpm  P waves and MN: P waves present, 184 ms  QRS, axis: 116 ms, normal axis   ST and T waves: No acute ischemic changes notable.  Nonspecific T-wave flattening apparent     Independently interpreted by me contemporaneously with treatment    Results for orders placed or performed during the hospital encounter of 11/11/18   Comprehensive Metabolic Panel   Result Value Ref Range    Glucose 144 (H) 65 - 99 mg/dL    BUN 12 6 - 20 mg/dL    Creatinine 0.83 0.57 - 1.00 mg/dL    Sodium 142 136 - 145 mmol/L    Potassium 3.2 (L) 3.5 - 5.2 mmol/L    Chloride 100 98 - 107 mmol/L    CO2 29.9 (H) 22.0 - 29.0 mmol/L    Calcium 9.5 8.6 - 10.5 mg/dL    Total Protein 7.2 6.0 - 8.5 g/dL    Albumin 4.20 3.50 - 5.20 g/dL    ALT (SGPT) 16 5 - 33 U/L    AST (SGOT) 18 5 - 32 U/L    Alkaline Phosphatase 77 40 - 129 U/L    Total Bilirubin 0.6 0.2 - 1.2 mg/dL    eGFR Non African Amer 71 >60 mL/min/1.73    Globulin 3.0 gm/dL    A/G Ratio 1.4 g/dL    BUN/Creatinine Ratio 14.5 7.0 - 25.0    Anion Gap 12.1 mmol/L   Urinalysis With Microscopic If Indicated (No Culture) - Urine, Clean Catch   Result Value Ref Range    Color, UA Straw Yellow, Straw    Appearance, UA Slightly Cloudy (A) Clear    pH, UA 7.0 4.5 - 8.0    Specific Gravity, UA 1.010 1.003 - 1.030    Glucose, UA Negative  Negative    Ketones, UA Negative Negative, 80 mg/dL (3+), >=160 mg/dL (4+)    Bilirubin, UA Negative Negative    Blood, UA Small (1+) (A) Negative    Protein, UA Negative Negative    Leuk Esterase, UA Moderate (2+) (A) Negative    Nitrite, UA Negative Negative    Urobilinogen, UA 0.2 E.U./dL 0.2 - 1.0 E.U./dL   Urine Drug Screen - Urine, Clean Catch   Result Value Ref Range    THC, Screen, Urine Negative Negative    Phencyclidine (PCP), Urine Negative Negative    Cocaine Screen, Urine Negative Negative    Methamphetamine, Urine Negative Negative    Opiate Screen Negative Negative    Amphetamine Screen, Urine Negative Negative    Benzodiazepine Screen, Urine Negative Negative    Tricyclic Antidepressants Screen Negative Negative    Methadone Screen, Urine Negative Negative    Barbiturates Screen, Urine Negative Negative    Oxycodone Screen, Urine Negative Negative    Propoxyphene Screen Negative Negative    Buprenorphine, Screen, Urine Negative Negative   CBC Auto Differential   Result Value Ref Range    WBC 7.02 4.80 - 10.80 10*3/mm3    RBC 4.30 4.20 - 5.40 10*6/mm3    Hemoglobin 12.7 12.0 - 16.0 g/dL    Hematocrit 37.5 37.0 - 47.0 %    MCV 87.2 81.0 - 99.0 fL    MCH 29.5 27.0 - 31.0 pg    MCHC 33.9 31.0 - 37.0 g/dL    RDW 14.3 11.5 - 14.5 %    RDW-SD 45.1 37.0 - 54.0 fl    MPV 9.5 7.4 - 10.4 fL    Platelets 279 140 - 500 10*3/mm3    Neutrophil % 45.7 45.0 - 70.0 %    Lymphocyte % 43.7 20.0 - 45.0 %    Monocyte % 7.7 3.0 - 8.0 %    Eosinophil % 1.7 0.0 - 4.0 %    Basophil % 1.1 0.0 - 2.0 %    Immature Grans % 0.1 0.0 - 0.5 %    Neutrophils, Absolute 3.20 1.50 - 8.30 10*3/mm3    Lymphocytes, Absolute 3.07 0.60 - 4.80 10*3/mm3    Monocytes, Absolute 0.54 0.00 - 1.00 10*3/mm3    Eosinophils, Absolute 0.12 0.10 - 0.30 10*3/mm3    Basophils, Absolute 0.08 0.00 - 0.20 10*3/mm3    Immature Grans, Absolute 0.01 0.00 - 0.03 10*3/mm3    nRBC 0.0 0.0 - 0.0 /100 WBC   Urinalysis, Microscopic Only - Urine, Clean Catch   Result  Value Ref Range    RBC, UA 0-2 (A) None Seen /HPF    WBC, UA 6-12 (A) None Seen /HPF    Bacteria, UA Trace (A) None Seen /HPF    Squamous Epithelial Cells, UA 3-6 (A) None Seen, 0-2 /HPF    Hyaline Casts, UA None Seen None Seen /LPF    Methodology Manual Light Microscopy    Troponin   Result Value Ref Range    Troponin T <0.010 0.000 - 0.030 ng/mL         RADIOLOGY        Study: CT head without contrast    Findings: Nothing acute    Interpreted contemporaneously with treatment by Dr. Landry, independently viewed by me            Procedures           ED Course  ED Course as of Nov 12 0015   Mon Nov 12, 2018   0009 I reviewed the EKG and labs and head CT from today's visit.  There are no specific findings on her diagnostics that would explain the patient's presentation today.  She has been feeling dizzy for the past 2 weeks and it seems that her symptoms have amplified tonight.  She does not have any true cerebellar deficits on physical exam and given that her symptoms have been present for 2 weeks now I do not consider this to be concerning for an acute cerebrovascular ischemia event.  Rather, it is possible that there is some other underlying neurologic entity going on which could explain her tremors for several months and now this new difficulty with ambulating normally.  Unfortunately we do not have MRI available at this hour.  I do think the patient may potentially benefit from MRI of the brain, however, and I recommended that we should admit her to the [facility] for further observation and testing and perhaps consultation with neurology as soon as possible.  She was agreeable to this plan.  We'll continue her on her regular daily medications for now.  [SARAH]      ED Course User Index  [SARAH] Harjit Vu MD                  MDM  Number of Diagnoses or Management Options     Amount and/or Complexity of Data Reviewed  Clinical lab tests: reviewed and ordered  Tests in the radiology section of CPT®:  ordered and reviewed  Decide to obtain previous medical records or to obtain history from someone other than the patient: yes  Review and summarize past medical records: yes  Independent visualization of images, tracings, or specimens: yes    Risk of Complications, Morbidity, and/or Mortality  Presenting problems: moderate  Diagnostic procedures: moderate  Management options: moderate          Final diagnoses:   Difficulty walking   Dizziness   Hypertension, unspecified type   Tremors of nervous system            Harjit Vu MD  11/12/18 0014       Harjit Vu MD  11/12/18 0014       Harjit Vu MD  11/12/18 0015

## 2018-11-12 NOTE — PROGRESS NOTES
Spoke with patient while on on Med Surg. Pt did screen positive for Sleep apnea and does have consult appt with Dr Kurt Covington on Tues 12/4/18 at 3pm.

## 2018-11-12 NOTE — PLAN OF CARE
Problem: Patient Care Overview  Goal: Discharge Needs Assessment  Outcome: Ongoing (interventions implemented as appropriate)   11/11/18 6450 11/12/18 1231   Disability   Equipment Currently Used at Home none --    Discharge Needs Assessment   Readmission Within the Last 30 Days --  no previous admission in last 30 days   Concerns to be Addressed --  no discharge needs identified   Patient/Family Anticipates Transition to --  home with family   Patient/Family Anticipated Services at Transition durable medical equipment --    Transportation Concerns car, none --    Transportation Anticipated --  family or friend will provide

## 2018-11-12 NOTE — PLAN OF CARE
Problem: Patient Care Overview  Goal: Plan of Care Review   11/12/18 7349   OTHER   Outcome Summary OT evaluation completed. Patient performed bed mobility with supervision. Patient reported dizziness following supine to sit and sit to stand. BP from supine was 125/71, sitting was 128/78, standing was 121/79. From sitting patient had occasional UE/LE tremors and excessive head nodding. Symptoms intensified with mobiltiy and patient required CGA with the rolling walker (20 ft) for safety. Patient's UE AROM and strength were WNL. Patient sat at EOB and don/doffed socks independently. Patient reported increased dizziness with bending over, OT to provide patient with reacher for extending reach during ADL/IADL routine. Patient plans on returning home at discharge. Stated mother is home 24/7 to assist and spouse home at night. Patient would benefit from a rolling walker for safety.

## 2018-11-12 NOTE — PLAN OF CARE
Problem: Patient Care Overview  Goal: Plan of Care Review  Outcome: Ongoing (interventions implemented as appropriate)   11/12/18 1560   OTHER   Outcome Summary PT evaluation completed. Pt requires supervision with bed mobility activities, requires increased time to complete. Pt requires CGA from sit to supine and supine to sit, and complains of dizziness upon sitting upright. Pt demonstrates jerking movements of her head when upright, as well as LE tremors with gait activities. Pt performs gait activities with FWW and CGA of one x 20 feet. Gait distance was limited by patient report of dizziness. PT plan to see daily to address the above deficits. Recommend use of FWW for safety and stability and home with home health at discharge from facility, but will continue to assess.

## 2018-11-12 NOTE — CONSULTS
Patient Identification:  NAME:  Shaylee Wadsworth  Age:  57 y.o.   Sex:  female   :  1960   MRN:  [id number]       Chief complaint: My legs wouldn't work, reason for consult leg weakness    History of present illness:  This patient is a 57-year-old right-handed white female with history of hypertension hyperlipidemia previous history of fibromyositis and functional movement disorder for the last 2 weeks duration she's had an episode when she turns to the right while lying flat in bed of vertigo it lasts for a few seconds and then goes away location when turning to the right duration is noted quality is described modifying factors holding her head still recently she got out of bed and upon sitting up felt lightheaded and felt like her legs were frozen and wouldn't move she still thinks they might be weak but is able to move them in bed.  No new paresthesias paralysis context patient who's MRI scan and by my independent eyeball review is normal no bowel bladder changes and no true focal paresthesias or paralysis at this time she definitely wants to go home today and asked me about this.      Past medical history:  Past Medical History:   Diagnosis Date   • Anxiety    • Arthritis 2014    RA   • Arzate esophagus    • Cholelithiasis     Removed   • Esophageal reflux    • Fibromyositis    • Functional movement disorder    • H/O colonoscopy    • H/O mammogram 2011    NORMAL    • Hx of bone density study 2011    OSTEOPENIA    • Hyperlipidemia    • Hyperparathyroidism (CMS/HCC)    • Hypertension    • Menopause    • Osteopenia    • Pancreatitis    • Tremor 2018       Past surgical history:  Past Surgical History:   Procedure Laterality Date   • CHOLECYSTECTOMY     • COLONOSCOPY     • DILATATION AND CURETTAGE     • HYSTERECTOMY     • MOUTH SURGERY      TOOTH EXTRACTION   • OTHER SURGICAL HISTORY  2015    DIAGNOSTIC ESOPHAGOSCOPY TRANSNASAL FLEXIBLE WITH BIOPSY; ARZATE ESO. REPEAT EGD 2  YR    • OVARY SURGERY Left     NORMAL    • PAP SMEAR  08/23/2010    NORMAL    • PARATHYROIDECTOMY Right 08/17/2016    Dr. Muchael Flynn at [place]       Allergies:  Meloxicam    Home medications:  Facility-Administered Medications Prior to Admission   Medication Dose Route Frequency Provider Last Rate Last Dose   • cyanocobalamin injection 1,000 mcg  1,000 mcg Intramuscular Q28 Days Saurabh Ferreira MD   1,000 mcg at 06/12/18 1632     Medications Prior to Admission   Medication Sig Dispense Refill Last Dose   • amLODIPine (NORVASC) 5 MG tablet Take 1 tablet by mouth Daily. 30 tablet 5 Taking   • aspirin 81 MG tablet Take 81 mg by mouth Daily.   Taking   • atorvastatin (LIPITOR) 40 MG tablet Take 1 tablet by mouth Daily. 90 tablet 3 Taking   • bisoprolol-hydrochlorothiazide (ZIAC) 5-6.25 MG per tablet Take 1 tablet by mouth Daily. 90 tablet 3 Taking   • buPROPion XL (WELLBUTRIN XL) 150 MG 24 hr tablet Take 1 tablet by mouth Every Morning. (Patient not taking: Reported on 11/12/2018) 30 tablet 6 Not Taking at Unknown time   • cyanocobalamin 1000 MCG/ML injection Inject  into the appropriate muscle as directed by prescriber.   Taking   • diazePAM (VALIUM) 5 MG tablet Take 1 tablet by mouth At Night As Needed for Anxiety. 10 tablet 0 Taking   • ENBREL SURECLICK 50 MG/ML solution auto-injector    Taking   • esomeprazole (nexIUM) 40 MG capsule TAKE ONE CAPSULE BY MOUTH TWICE DAILY 180 capsule 1    • folic acid (FOLVITE) 1 MG tablet Take by mouth.   Taking   • gabapentin (NEURONTIN) 100 MG capsule Take 1 capsule by mouth 2 (Two) Times a Day. 60 capsule 0    • KLOR-CON 20 MEQ CR tablet TAKE ONE TABLET BY MOUTH TWICE DAILY 180 tablet 1    • methotrexate 2.5 MG tablet TAKE 4 TABS PO ONCE WEEKLY ON SAME DAY 32 tablet 11 Taking   • olmesartan (BENICAR) 40 MG tablet Take 1 tablet by mouth Daily. 90 tablet 3 Taking   • PREMPRO 0.45-1.5 MG per tablet TAKE ONE TABLET BY MOUTH ONCE DAILY (Patient taking differently: TAKE ONE TABLET  BY MOUTH on ) 28 tablet 11 Taking   • vitamin D (ERGOCALCIFEROL) 94277 units capsule capsule TAKE ONE CAPSULE BY MOUTH ONCE A WEEK 12 capsule 3 Taking        Hospital medications:    docusate sodium 100 mg Oral BID   enoxaparin 40 mg Subcutaneous Q24H   pantoprazole 40 mg Oral Q AM   sodium chloride 3 mL Intravenous Q12H        •  acetaminophen  •  magnesium sulfate **OR** magnesium sulfate in D5W 1g/100mL (PREMIX) **OR** magnesium sulfate  •  meclizine  •  potassium chloride **OR** potassium chloride **OR** potassium chloride  •  [COMPLETED] Insert peripheral IV **AND** sodium chloride  •  sodium chloride  •  sodium chloride    Family history:  Family History   Problem Relation Age of Onset   • Diabetes Mother    • Hyperlipidemia Mother    • Hypertension Mother    • Heart disease Mother    • Arthritis Father    • Anxiety disorder Father    • COPD Father    • Glaucoma Father    • Hyperlipidemia Father    • Hypertension Father    • Vision loss Father    • Heart disease Sister        Social history:  Social History     Tobacco Use   • Smoking status: Current Every Day Smoker     Packs/day: 0.50     Years: 20.00     Pack years: 10.00     Last attempt to quit: 2018     Years since quittin.2   • Smokeless tobacco: Never Used   Substance Use Topics   • Alcohol use: No   • Drug use: No       Review of systems:    No ringing in the ears no unilateral hearing loss when she turns her head to the right she gets a swimming sensation for about 2 weeks no current hair eyes ears nose throat skin bone joint  lymphatic hematologic or oncologic complaints no neck pain chest pain abdominal pain bowel bladder incontinence fever chills or rash her loved 1 in the room still feels like her legs may be weak but she is wanting to go home and work with physical therapy.    Objective:  Vitals Ranges:   Temp:  [96.9 °F (36.1 °C)-98.6 °F (37 °C)] 96.9 °F (36.1 °C)  Heart Rate:  [45-61] 53  Resp:  [16-18] 18  BP:  (94163)/() 137/62      Physical Exam:  Patient is awake alert oriented ×3 in no distress fund of knowledge fair attention span and concentration good recent and remote memory good language function normal well-developed well-nourished in no distress she does look older than her stated age she has a head tremor some facial myoclonus and extremity tremors.  Pupils 1-1/2 constricting to 1 normal disc retinas visual fields extraocular movements are full without nystagmus nasolabial folds palate tongue symmetrical normal hearing facial sensation head turning shoulder shrug motor no rigidity atrophy fasciculations or resting tremor a believe all 4 extremities move well in bed reflexes absent throughout toes downgoing bilaterally sensation normal light touch face both arms both legs coordination superimposed tremor in the upper extremities.  Station and gait was not tested at this time for fear of would let her stand up and pass out.  Heart regular without murmur neck supple without bruits extremities no clubbing cyanosis edema visual acuity normal at 3 feet    Results review:   I reviewed the patient's new clinical results.    Data review:  Lab Results (last 24 hours)     Procedure Component Value Units Date/Time    Magnesium [[id number]]  (Normal) Collected:  11/12/18 0551    Specimen:  Blood Updated:  11/12/18 1002     Magnesium 1.7 mg/dL     Troponin [[id number]]  (Normal) Collected:  11/12/18 0551    Specimen:  Blood Updated:  11/12/18 0637     Troponin T <0.010 ng/mL     Narrative:       Troponin T Reference Ranges:  Less than 0.03 ng/mL:    Negative for AMI  0.03 to 0.09 ng/mL:      Indeterminant for AMI  Greater than 0.09 ng/mL: Positive for AMI    TSH [[id number]]  (Abnormal) Collected:  11/12/18 0551    Specimen:  Blood Updated:  11/12/18 0637     TSH 4.620 mIU/mL     Comprehensive Metabolic Panel [[id number]]  (Abnormal) Collected:  11/12/18 0551    Specimen:  Blood Updated:  11/12/18 0631     Glucose 104  mg/dL      BUN 11 mg/dL      Creatinine 0.69 mg/dL      Sodium 142 mmol/L      Potassium 3.2 mmol/L      Chloride 102 mmol/L      CO2 30.4 mmol/L      Calcium 9.1 mg/dL      Total Protein 6.4 g/dL      Albumin 3.60 g/dL      ALT (SGPT) 14 U/L      AST (SGOT) 15 U/L      Alkaline Phosphatase 67 U/L      Total Bilirubin 0.5 mg/dL      eGFR Non African Amer 88 mL/min/1.73      Globulin 2.8 gm/dL      A/G Ratio 1.3 g/dL      BUN/Creatinine Ratio 15.9     Anion Gap 9.6 mmol/L     CBC Auto Differential [[id number]]  (Abnormal) Collected:  11/12/18 0551    Specimen:  Blood Updated:  11/12/18 0612     WBC 5.65 10*3/mm3      RBC 3.93 10*6/mm3      Hemoglobin 11.8 g/dL      Hematocrit 34.6 %      MCV 88.0 fL      MCH 30.0 pg      MCHC 34.1 g/dL      RDW 14.2 %      RDW-SD 45.8 fl      MPV 9.5 fL      Platelets 242 10*3/mm3      Neutrophil % 46.3 %      Lymphocyte % 43.4 %      Monocyte % 7.1 %      Eosinophil % 1.9 %      Basophil % 1.1 %      Immature Grans % 0.2 %      Neutrophils, Absolute 2.62 10*3/mm3      Lymphocytes, Absolute 2.45 10*3/mm3      Monocytes, Absolute 0.40 10*3/mm3      Eosinophils, Absolute 0.11 10*3/mm3      Basophils, Absolute 0.06 10*3/mm3      Immature Grans, Absolute 0.01 10*3/mm3      nRBC 0.0 /100 WBC     Troponin [[id number]]  (Normal) Collected:  11/11/18 2331    Specimen:  Blood Updated:  11/11/18 2354     Troponin T <0.010 ng/mL     Narrative:       Troponin T Reference Ranges:  Less than 0.03 ng/mL:    Negative for AMI  0.03 to 0.09 ng/mL:      Indeterminant for AMI  Greater than 0.09 ng/mL: Positive for AMI    Urine Drug Screen - Urine, Clean Catch [[id number]]  (Normal) Collected:  11/11/18 2138    Specimen:  Urine, Clean Catch Updated:  11/11/18 2202     THC, Screen, Urine Negative     Phencyclidine (PCP), Urine Negative     Cocaine Screen, Urine Negative     Methamphetamine, Urine Negative     Opiate Screen Negative     Amphetamine Screen, Urine Negative     Benzodiazepine Screen, Urine  Negative     Tricyclic Antidepressants Screen Negative     Methadone Screen, Urine Negative     Barbiturates Screen, Urine Negative     Oxycodone Screen, Urine Negative     Propoxyphene Screen Negative     Buprenorphine, Screen, Urine Negative    Narrative:       Urine drug screen results are to be used for medical purposes only.  They are not to be used for legal purposes such as employment testing.  Negative results do not necessarily mean the complete absence of a subtance, but rather that the result is less than the cutoff for that substance.  Positive results are unconfirmed and considered Preliminary Positive.  [facility] does not automatically confirm Postitive Unconfirmed results.  The physician may request (order) an Unconfirmed Positive result to be sent out for confirmation.      Negative Thresholds for Drugs Screened:    THC screen, urine                          50 ng/ml  Phenycyclidine (PCP), urine                25 ng/ml  Cocaine screen, urine                     150 ng/ml  Methamphetamine, urine                    500 ng/ml  Opiate screen, urine                      100 ng/ml  Amphetamine screen, urine                 500 ng/ml  Benzodiazepine screen, urine              150 ng/ml  Tricyclic Antidepressants screen, urine   300 ng/ml  Methadone screen, urine                   200 ng/ml  Barbiturates screen, urine                200 ng/ml  Oxycodone screen, urine                   100 ng/ml  Propoxyphene screen, urine                300 ng/ml  Buprenorphine screen, urine                10 ng/ml    Urinalysis, Microscopic Only - Urine, Clean Catch [[id number]]  (Abnormal) Collected:  11/11/18 2138    Specimen:  Urine, Clean Catch Updated:  11/11/18 2150     RBC, UA 0-2 /HPF      WBC, UA 6-12 /HPF      Bacteria, UA Trace /HPF      Squamous Epithelial Cells, UA 3-6 /HPF      Hyaline Casts, UA None Seen /LPF      Methodology Manual Light Microscopy    Urinalysis With Microscopic If Indicated  (No Culture) - Urine, Clean Catch [[id number]]  (Abnormal) Collected:  11/11/18 2138    Specimen:  Urine, Clean Catch Updated:  11/11/18 2150     Color, UA Straw     Appearance, UA Slightly Cloudy     pH, UA 7.0     Specific Gravity, UA 1.010     Glucose, UA Negative     Ketones, UA Negative     Bilirubin, UA Negative     Blood, UA Small (1+)     Protein, UA Negative     Leuk Esterase, UA Moderate (2+)     Nitrite, UA Negative     Urobilinogen, UA 0.2 E.U./dL    Comprehensive Metabolic Panel [[id number]]  (Abnormal) Collected:  11/11/18 2117    Specimen:  Blood Updated:  11/11/18 2147     Glucose 144 mg/dL      BUN 12 mg/dL      Creatinine 0.83 mg/dL      Sodium 142 mmol/L      Potassium 3.2 mmol/L      Chloride 100 mmol/L      CO2 29.9 mmol/L      Calcium 9.5 mg/dL      Total Protein 7.2 g/dL      Albumin 4.20 g/dL      ALT (SGPT) 16 U/L      AST (SGOT) 18 U/L      Alkaline Phosphatase 77 U/L      Total Bilirubin 0.6 mg/dL      eGFR Non African Amer 71 mL/min/1.73      Globulin 3.0 gm/dL      A/G Ratio 1.4 g/dL      BUN/Creatinine Ratio 14.5     Anion Gap 12.1 mmol/L     CBC & Differential [[id number]] Collected:  11/11/18 2117    Specimen:  Blood Updated:  11/11/18 2128    Narrative:       The following orders were created for panel order CBC & Differential.  Procedure                               Abnormality         Status                     ---------                               -----------         ------                     CBC Auto Differential[[id number]]        Normal              Final result                 Please view results for these tests on the individual orders.    CBC Auto Differential [[id number]]  (Normal) Collected:  11/11/18 2117    Specimen:  Blood Updated:  11/11/18 2128     WBC 7.02 10*3/mm3      RBC 4.30 10*6/mm3      Hemoglobin 12.7 g/dL      Hematocrit 37.5 %      MCV 87.2 fL      MCH 29.5 pg      MCHC 33.9 g/dL      RDW 14.3 %      RDW-SD 45.1 fl      MPV 9.5 fL      Platelets 279 10*3/mm3       Neutrophil % 45.7 %      Lymphocyte % 43.7 %      Monocyte % 7.7 %      Eosinophil % 1.7 %      Basophil % 1.1 %      Immature Grans % 0.1 %      Neutrophils, Absolute 3.20 10*3/mm3      Lymphocytes, Absolute 3.07 10*3/mm3      Monocytes, Absolute 0.54 10*3/mm3      Eosinophils, Absolute 0.12 10*3/mm3      Basophils, Absolute 0.08 10*3/mm3      Immature Grans, Absolute 0.01 10*3/mm3      nRBC 0.0 /100 WBC            Imaging:  Imaging Results (last 24 hours)     Procedure Component Value Units Date/Time    MRI Angiogram Head Without Contrast [[id number]] Collected:  11/12/18 1207     Updated:  11/12/18 1218    Narrative:       MRA brain     INDICATION: Vertigo for the last 3 weeks. Tremors with movement disorder  diagnosed April 2018.     COMPARISON: None.     TECHNIQUE: Noncontrast 3-D time-of-flight images were obtained with MIP  reconstructions.     FINDINGS: There is no intracranial flow limiting stenosis by NASCET  criteria. No focal vessel cut off is seen. No aneurysm identified. No  vascular malformation.       Impression:       Negative intracranial MRA.     This report was finalized on 11/12/2018 12:16 PM by Dr. Jarocho Betancourt MD.       MRI Brain With & Without Contrast [[id number]] Collected:  11/12/18 1200     Updated:  11/12/18 1209    Narrative:       INDICATION: Vertigo for the last 3 weeks. Tremors with movement disorder  diagnosed April 2018.     COMPARISON: 4/4/2018.     TECHNIQUE: Multisequence multiplanar imaging was performed through the  brain before and after the IV administration of 16 cc of MultiHance  contrast.     FINDINGS: Diffusion images show no acute or subacute infarct.  Ventricular size and configuration are normal. White matter changes  described previously are stable and likely reflect chronic small vessel  ischemic disease. No masses or pathologic contrast enhancement are  identified. Craniovertebral junction is normal. Major intracranial flow  voids are maintained.        "Impression:       No acute findings in the brain. No significant change from  prior.     This report was finalized on 11/12/2018 12:07 PM by Dr. Jarocho Betancourt MD.       CT Head Without Contrast [[id number]] Collected:  11/12/18 0710     Updated:  11/12/18 0714    Narrative:       CT HEAD WO CONTRAST-: 11/11/2018 9:55 PM     HISTORY:   Dizziness for several days. Difficulty walking today.     TECHNIQUE:   Axial unenhanced head CT. Radiation dose reduction techniques included  automated exposure control or exposure modulation based on body size.  Radiation audit for number of CT and nuclear cardiology exams performed  in the last year: 1.       COMPARISON:   4/4/2018     FINDINGS:   No intracranial hemorrhage, mass, or infarct. No hydrocephalus or  extra-axial fluid collection. Brain parenchymal density is normal. The  skull base, calvarium, and extracranial soft tissues are normal.       Impression:       Normal, negative unenhanced head CT.        A preliminary report was provided by Dr. Landry at 2236 hours on  11/11/2018.     This report was finalized on 11/12/2018 7:12 AM by Dr. Jarocho Betancourt MD.                Assessment and Plan:     This patient has suffered brief vertebrobasilar insufficiency secondary to orthostatic hypotension arising from the flat position to an upright position as she was getting out of bed.  I believe the majority of her lower extremity weakness now is functional.  She even states \"can I go home today\" and I told her that this will be if physical therapy works with her.  I have reviewed the MRI with an independent eyeball review and it is normal.  She does, by the way, appear to have left peripheral vestibulopathy which is about 2 weeks in duration at this time and I would like to give her a single dose of Solu-Medrol and scheduled Klonopin 0.5 mg by mouth twice a day this would be very beneficial also for her head tremor/movement disorder, as well as her anxiety and the vertigo. "  I will sign off in follow-up when necessary reconsult thanks      Roberto Winston MD  11/12/18  12:51 PM

## 2018-11-12 NOTE — ED NOTES
Pt amb. With assistance. Pt states did not feel dizzy. Just feels like the brains not communicating with the rest of my body.      Lacey Kwok, RN  11/11/18 6063

## 2018-11-12 NOTE — THERAPY EVALUATION
Acute Care - Occupational Therapy Initial Evaluation  FRANCHESKA Novoa     Patient Name: Shaylee Wadsworth  : 1960  MRN: [id number]  Today's Date: 2018  Onset of Illness/Injury or Date of Surgery: 18  Date of Referral to OT: 18  Referring Physician: Dr. Osorio    Admit Date: 2018       ICD-10-CM ICD-9-CM   1. Difficulty walking R26.2 719.7   2. Dizziness R42 780.4   3. Hypertension, unspecified type I10 401.9   4. Tremors of nervous system R25.1 781.0     Patient Active Problem List   Diagnosis   • Epigastric pain   • Abnormal electrocardiogram   • Abnormal radiographic examination   • Disc disorder of thoracic region   • Gastroesophageal reflux disease   • Chronic gastritis   • Hyperlipidemia LDL goal <100   • Hypertension   • Menopausal symptom   • Migraine   • Osteoarthritis of shoulder   • Osteopenia   • Rheumatoid arthritis (CMS/HCC)   • Menopausal state   • Multiple pulmonary nodules determined by computed tomography of lung   • H/O hyperparathyroidism   • Tendinitis of left shoulder   • Healthcare maintenance   • Hypokalemia   • Anxiety related tremor   • Functional movement disorder   • Difficulty walking     Past Medical History:   Diagnosis Date   • Anxiety    • Arthritis 2014    RA   • Arzate esophagus    • Cholelithiasis 2007    Removed   • Esophageal reflux    • Fibromyositis    • Functional movement disorder    • H/O colonoscopy    • H/O mammogram 2011    NORMAL    • Hx of bone density study 2011    OSTEOPENIA    • Hyperlipidemia    • Hyperparathyroidism (CMS/HCC)    • Hypertension    • Menopause    • Osteopenia    • Pancreatitis    • Tremor 2018     Past Surgical History:   Procedure Laterality Date   • CHOLECYSTECTOMY     • COLONOSCOPY     • DILATATION AND CURETTAGE     • HYSTERECTOMY     • MOUTH SURGERY      TOOTH EXTRACTION   • OTHER SURGICAL HISTORY  2015    DIAGNOSTIC ESOPHAGOSCOPY TRANSNASAL FLEXIBLE WITH BIOPSY; ARZATE ESO. REPEAT EGD  2 YR    • OVARY SURGERY Left     NORMAL    • PAP SMEAR  08/23/2010    NORMAL    • PARATHYROIDECTOMY Right 08/17/2016    Dr. Muchael Flynn at [place]          OT ASSESSMENT FLOWSHEET (last 72 hours)      Occupational Therapy Evaluation     Row Name 11/12/18 2940                   OT Evaluation Time/Intention    Subjective Information  complains of;dizziness  -EN        Document Type  discharge evaluation/summary  -EN        Mode of Treatment  occupational therapy  -EN        Patient Effort  adequate  -EN        Symptoms Noted During/After Treatment  dizziness  -EN        Comment  Patient with tremors during mobility and repeated head jerking.    -EN           General Information    Patient Profile Reviewed?  yes  -EN        Onset of Illness/Injury or Date of Surgery  11/11/18  -EN        Referring Physician  Dr. Osorio  -EN        Patient Observations  cooperative;agree to therapy  -EN        Patient/Family Observations  Patient reclined in bed, agreed to evaluation.  -EN        Prior Level of Function  independent:;all household mobility;community mobility;ADL's;home management;driving;work  -EN        Equipment Currently Used at Home  -- none  -EN        Pertinent History of Current Functional Problem  Patient presented to the ED secondary to dizziness and difficulty walking.  Patient with history functional/psychogenic movement disorder and tremors.  Patient reported she is on a wait list for [facility] for [facility].  -EN        Existing Precautions/Restrictions  fall  -EN        Limitations/Impairments  other (see comments) tremors  -EN        Risks Reviewed  patient:;change in vital signs;LOB  -EN        Benefits Reviewed  patient:;improve function  -EN        Barriers to Rehab  previous functional deficit  -EN           Relationship/Environment    Lives With  spouse;other (see comments) mother  -EN        Family Caregiver if Needed  spouse mother  -EN           Home Main Entrance    Number of Stairs, Main  Entrance  other (see comments) 10- 15  -EN        Stair Railings, Main Entrance  railing on right side (ascending)  -EN           Stairs Within Home, Primary    Stairs, Within Home, Primary  yes  -EN        Number of Stairs, Within Home, Primary  other (see comments) 15  -EN           Cognitive Assessment/Intervention- PT/OT    Orientation Status (Cognition)  oriented x 4  -EN        Follows Commands (Cognition)  WNL  -EN           Safety Issues, Functional Mobility    Safety Issues Affecting Function (Mobility)  -- tremors, excessive head movements  -EN        Comment, Safety Issues/Impairments (Mobility)  Patient with tremors and excessive head movements during functional mobility.    -EN           Bed Mobility Assessment/Treatment    Bed Mobility Assessment/Treatment  supine-sit;sit-supine  -EN        Supine-Sit [place] (Bed Mobility)  conditional independence  -EN        Sit-Supine [place] (Bed Mobility)  conditional independence  -EN           Functional Mobility    Functional Mobility- Ind. Level  contact guard assist  -EN        Functional Mobility- Device  rolling walker  -EN        Functional Mobility-Distance (Feet)  20  -EN           Transfer Assessment/Treatment    Transfer Assessment/Treatment  sit-stand transfer;stand-sit transfer  -EN        Comment (Transfers)  verbal cues for hand placment   -EN           Sit-Stand Transfer    Sit-Stand [place] (Transfers)  contact guard;verbal cues  -EN        Assistive Device (Sit-Stand Transfers)  walker, front-wheeled  -EN           Stand-Sit Transfer    Stand-Sit [place] (Transfers)  contact guard;verbal cues  -EN        Assistive Device (Stand-Sit Transfers)  walker, front-wheeled  -EN           Bathing Assessment/Intervention    Comment (Bathing)  Patient has a shower chair at home.  Advised patient to use until symptoms resolve.  Patient also advised to have someone with her for bathing for safety.  Patient stated mother is home 24/7.  (spouse works during day).  -EN           Lower Body Dressing Assessment/Training    Comment (Lower Body Dressing)  ind with don/doffing socks.  S for LBD for safety at this time  -EN           General ROM    GENERAL ROM COMMENTS  B UE AROM WFL  -EN           MMT (Manual Muscle Testing)    General MMT Comments  B UE strength 5/5  -EN           Sensory Assessment/Intervention    Sensory General Assessment  no sensation deficits identified per report no sensation deficits  -EN           Positioning and Restraints    Pre-Treatment Position  in bed  -EN        Post Treatment Position  bed  -EN        In Bed  supine;call light within reach;encouraged to call for assist  -EN           Pain Assessment    Additional Documentation  Pain Scale: Numbers Pre/Post-Treatment (Group)  -EN           Pain Scale: Numbers Pre/Post-Treatment    Pain Scale: Numbers, Pretreatment  0/10 - no pain  -EN        Pain Scale: Numbers, Post-Treatment  0/10 - no pain  -EN           Plan of Care Review    Plan of Care Reviewed With  patient  -EN           Clinical Impression (OT)    Date of Referral to OT  11/12/18  -EN        Functional Level at Time of Evaluation (OT Eval)  Patient presents with tremors in arms and legs and excessive head movements during functional mobility and occasionally when sitting at EOB.  Patient's B UE AROM and strength were WFL.  Patient with history of functional/psychogenic movement disorder and symptoms intensified yesterday.   Patient donned and doffed socks from sitting independently and reported no difficulty managing ADLs at home.  Patient has a shower chair and has mother at home that can assist as needed.  OT recommended supervision with bathing and use of shower chair.    -EN        Patient/Family Goals Statement (OT Eval)  To return home, has concerns about returning to work.  -EN        Criteria for Skilled Therapeutic Interventions Met (OT Eval)  other (see comments) one visit for ed on reacher (increased  dizziness bending ove  -EN        Therapy Frequency (OT Eval)  other (see comments) one visit   -EN        Care Plan Review (OT)  evaluation/treatment results reviewed  -EN           Vital Signs    Pre Systolic BP Rehab  125 supine  -EN        Pre Treatment Diastolic BP  71  -EN        Intra Systolic BP Rehab  128 sitting EOB  -EN        Intra Treatment Diastolic BP  78  -EN        Post Systolic BP Rehab  121 standing  -EN        Post Treatment Diastolic BP  79  -EN        Pre Patient Position  Supine  -EN        Intra Patient Position  Sitting  -EN        Post Patient Position  Standing  -EN           Planned OT Interventions    Planned Therapy Interventions (OT Eval)  adaptive equipment training  -EN        Adaptive Equipment Training  x  -EN           Patient Education Goal (OT)    Activity (Patient Education Goal, OT)  Patient to demonstrate ind with reacher for improved safety for retrieving items from floor level.  -EN        Vonore/Cues/Accuracy (Memory Goal 2, OT)  demonstrates adequately  -EN        Time Frame (Patient Education Goal, OT)  short term goal (STG);1 day  -EN        Progress/Outcome (Patient Education Goal, OT)  other (see comments) new goal  -EN           Living Environment    Home Accessibility  stairs to enter home;stairs within home  -EN          User Key  (r) = Recorded By, (t) = Taken By, (c) = Cosigned By    Initials Name Effective Dates    Danica Fierro, OTR 06/22/16 -          Occupational Therapy Education     Title: PT OT SLP Therapies (Done)     Topic: Occupational Therapy (Done)     Point: ADL training (Done)     Description: Instruct learner(s) on proper safety adaptation and remediation techniques during self care or transfers.   Instruct in proper use of assistive devices.    Learning Progress Summary           Patient Snu, СЕРГЕЙ VU by EN at 11/12/2018  2:22 PM    Comment:  Patient educated on safety during functional mobility and transfers.                                User Key     Initials Effective Dates Name Provider Type Discipline    EN 06/22/16 -  Danica Mehta OTR Occupational Therapist OT                  OT Recommendation and Plan  Planned Therapy Interventions (OT Eval): adaptive equipment training  Therapy Frequency (OT Eval): other (see comments)(one visit )  Plan of Care Review  Plan of Care Reviewed With: patient  Plan of Care Reviewed With: patient  Outcome Summary: OT evaluation completed.  Patient performed bed mobility with supervision.  Patient reported dizziness following supine to sit and sit to stand.  BP from supine was 125/71, sitting was 128/78, standing was 121/79.  From sitting patient had occasional UE/LE tremors and excessive head nodding.  Symptoms intensified with mobiltiy and patient required CGA with the rolling walker (20 ft) for safety.  Patient's UE AROM and strength were WNL.  Patient sat at EOB and don/doffed socks independently.  Patient reported increased dizziness with bending over,  OT to provide patient with reacher for extending reach during ADL/IADL routine.  Patient plans on returning home at discharge.  Stated mother is home 24/7 to assist and spouse home at night.  Patient would benefit from a rolling walker for safety.    Outcome Measures     Row Name 11/12/18 1400             How much help from another is currently needed...    Putting on and taking off regular lower body clothing?  3  -EN      Bathing (including washing, rinsing, and drying)  3  -EN      Toileting (which includes using toilet bed pan or urinal)  3  -EN      Putting on and taking off regular upper body clothing  4  -EN      Taking care of personal grooming (such as brushing teeth)  4  -EN      Eating meals  4  -EN      Score  21  -EN         Functional Assessment    Outcome Measure Options  AM-PAC 6 Clicks Daily Activity (OT)  -EN        User Key  (r) = Recorded By, (t) = Taken By, (c) = Cosigned By    Initials Name Provider Type    EN Tyson  MARTÍN Gama Occupational Therapist          Time Calculation:   Time Calculation- OT     Row Name 11/12/18 1434             Time Calculation- OT    OT Start Time  1319  -EN      OT Stop Time  1340  -EN      OT Time Calculation (min)  21 min  -EN        User Key  (r) = Recorded By, (t) = Taken By, (c) = Cosigned By    Initials Name Provider Type    Danica Fierro OTR Occupational Therapist        Therapy Suggested Charges     Code   Minutes Charges    None           Therapy Charges for Today     Code Description Service Date Service Provider Modifiers Qty    [id number]  OT EVAL LOW COMPLEXITY 1 11/12/2018 Danica Mehta OTR GO 1               MARTÍN Rivera  11/12/2018

## 2018-11-13 VITALS
DIASTOLIC BLOOD PRESSURE: 73 MMHG | BODY MASS INDEX: 33.27 KG/M2 | HEIGHT: 61 IN | HEART RATE: 64 BPM | SYSTOLIC BLOOD PRESSURE: 123 MMHG | WEIGHT: 176.19 LBS | RESPIRATION RATE: 18 BRPM | TEMPERATURE: 97.9 F | OXYGEN SATURATION: 98 %

## 2018-11-13 LAB
ANION GAP SERPL CALCULATED.3IONS-SCNC: 12.2 MMOL/L
BUN BLD-MCNC: 16 MG/DL (ref 6–20)
BUN/CREAT SERPL: 24.2 (ref 7–25)
CALCIUM SPEC-SCNC: 9 MG/DL (ref 8.6–10.5)
CHLORIDE SERPL-SCNC: 106 MMOL/L (ref 98–107)
CO2 SERPL-SCNC: 23.8 MMOL/L (ref 22–29)
CREAT BLD-MCNC: 0.66 MG/DL (ref 0.57–1)
GFR SERPL CREATININE-BSD FRML MDRD: 93 ML/MIN/1.73
GLUCOSE BLD-MCNC: 144 MG/DL (ref 65–99)
MAGNESIUM SERPL-MCNC: 2.1 MG/DL (ref 1.7–2.5)
POTASSIUM BLD-SCNC: 4.5 MMOL/L (ref 3.5–5.2)
SODIUM BLD-SCNC: 142 MMOL/L (ref 136–145)

## 2018-11-13 PROCEDURE — 97116 GAIT TRAINING THERAPY: CPT

## 2018-11-13 PROCEDURE — G0378 HOSPITAL OBSERVATION PER HR: HCPCS

## 2018-11-13 PROCEDURE — 80048 BASIC METABOLIC PNL TOTAL CA: CPT | Performed by: HOSPITALIST

## 2018-11-13 PROCEDURE — 99217 PR OBSERVATION CARE DISCHARGE MANAGEMENT: CPT | Performed by: HOSPITALIST

## 2018-11-13 PROCEDURE — 83735 ASSAY OF MAGNESIUM: CPT | Performed by: INTERNAL MEDICINE

## 2018-11-13 PROCEDURE — 94799 UNLISTED PULMONARY SVC/PX: CPT

## 2018-11-13 PROCEDURE — 97530 THERAPEUTIC ACTIVITIES: CPT

## 2018-11-13 RX ORDER — DIAZEPAM 5 MG/1
5 TABLET ORAL NIGHTLY PRN
Qty: 10 TABLET | Refills: 0
Start: 2018-11-13 | End: 2018-11-15

## 2018-11-13 RX ORDER — CLONAZEPAM 0.5 MG/1
0.5 TABLET ORAL 2 TIMES DAILY PRN
Qty: 6 TABLET | Refills: 0 | Status: SHIPPED | OUTPATIENT
Start: 2018-11-13 | End: 2018-11-16

## 2018-11-13 RX ADMIN — Medication 10 ML: at 08:37

## 2018-11-13 RX ADMIN — DOCUSATE SODIUM 100 MG: 100 CAPSULE, LIQUID FILLED ORAL at 08:35

## 2018-11-13 RX ADMIN — POTASSIUM CHLORIDE 20 MEQ: 1500 TABLET, EXTENDED RELEASE ORAL at 08:35

## 2018-11-13 RX ADMIN — ATORVASTATIN CALCIUM 40 MG: 40 TABLET, FILM COATED ORAL at 08:35

## 2018-11-13 RX ADMIN — AMLODIPINE BESYLATE 5 MG: 5 TABLET ORAL at 08:35

## 2018-11-13 RX ADMIN — ASPIRIN 81 MG: 81 TABLET, COATED ORAL at 08:35

## 2018-11-13 RX ADMIN — PANTOPRAZOLE SODIUM 40 MG: 40 TABLET, DELAYED RELEASE ORAL at 06:31

## 2018-11-13 RX ADMIN — CLONAZEPAM 0.5 MG: 0.5 TABLET ORAL at 08:41

## 2018-11-13 RX ADMIN — FOLIC ACID 1 MG: 1 TABLET ORAL at 08:34

## 2018-11-13 NOTE — PLAN OF CARE
"Problem: Patient Care Overview  Goal: Plan of Care Review  Outcome: Ongoing (interventions implemented as appropriate)   11/13/18 7240   OTHER   Outcome Summary PT: Pt reports that her symptoms are better than this morning d/t receiving medicine. Pt continues to demonstrate inconsistent head and body tremors intermittently with mobility. Pt was able to perform gait activities with CGA of one, FWW, and one person to manage equipment x 120ft with one seated rest break. Pt states that she feels she \"would be safer at driving than walking.\" Explained to patient that it would be safest for her to have someone at home with her 24/7 due to the unpredictability of her symptoms in order to avoid a fall, pt verbalizes understanding.          "

## 2018-11-13 NOTE — PLAN OF CARE
Problem: Patient Care Overview  Goal: Plan of Care Review  Outcome: Ongoing (interventions implemented as appropriate)   11/13/18 0641   OTHER   Outcome Summary PT: Pt performs gait activities with FWW, CGA, and one person to manage equipment x 36ft. Pt demonstrates whole body tremors and excessive head movements throughout gait activities. Patient with fluctuations in head and UE tremors that are lessened when dual cognitive task is introduced.  Patient with no episodes of knee buckling during mobility, and demonstrates consistent ability to maintain UE contact with walker throughout mobility.  Tremors appear to be inconsistent throughout mobility tasks.  Further gait distance not attempted due to safety concerns.

## 2018-11-13 NOTE — THERAPY TREATMENT NOTE
Acute Care - Physical Therapy Treatment Note  FRANCHESKA Novoa     Patient Name: Shaylee Wadsworth  : 1960  MRN: [id number]  Today's Date: 2018  Onset of Illness/Injury or Date of Surgery: 18  Date of Referral to PT: 18  Referring Physician: Dr. Osorio    Admit Date: 2018    Visit Dx:    ICD-10-CM ICD-9-CM   1. Difficulty walking R26.2 719.7   2. Dizziness R42 780.4   3. Hypertension, unspecified type I10 401.9   4. Tremors of nervous system R25.1 781.0     Patient Active Problem List   Diagnosis   • Epigastric pain   • Abnormal electrocardiogram   • Abnormal radiographic examination   • Disc disorder of thoracic region   • Gastroesophageal reflux disease   • Chronic gastritis   • Hyperlipidemia LDL goal <100   • Hypertension   • Menopausal symptom   • Migraine   • Osteoarthritis of shoulder   • Osteopenia   • Rheumatoid arthritis (CMS/HCC)   • Menopausal state   • Multiple pulmonary nodules determined by computed tomography of lung   • H/O hyperparathyroidism   • Tendinitis of left shoulder   • Healthcare maintenance   • Hypokalemia   • Anxiety related tremor   • Functional movement disorder   • Difficulty walking       Therapy Treatment    Rehabilitation Treatment Summary     Row Name 18 1313 18 1103 18 0902       Treatment Time/Intention    Discipline  physical therapist  -ORESTES DANIELS JW2  occupational therapist  -EN  physical therapist  -ORESTES DANIELS JW2    Document Type  therapy note (daily note)  -ORESTES DANIELS JW2  therapy note (daily note)  -EN  therapy note (daily note)  -ORESTES DANIELS JW2    Subjective Information  complains of;dizziness tremors  -ORESTES DANIELS JW2  complains of tremors  -EN  complains of;dizziness tremors  -ORESTES DANIELS JW2    Mode of Treatment  physical therapy  -ORESTES DANIELS JW2  occupational therapy  -EN  physical therapy  -ORESTES DANIELS JW2    Patient/Family Observations  Pt supine with HOB elevated, no tremors present  -ORESTES DANIELS JW2  Patient supine in bed, agreed to OT  -EN  Pt supine with HOB  "elevated,  present  -ORESTES DANIELS JW2    Care Plan Review  care plan/treatment goals reviewed;risks/benefits reviewed;patient/other agree to care plan  -ORESTES DANIELS JW2  care plan/treatment goals reviewed;risks/benefits reviewed;current/potential barriers reviewed  -EN  care plan/treatment goals reviewed;risks/benefits reviewed;patient/other agree to care plan  -ORESTES DANIELS JW2    Care Plan Review, Other Participant(s)  --  --  spouse  -ORESTES DANIELS,ALEJANDRINA    Patient Effort  adequate  -ORESTES DANIELS JW2  adequate  -EN  adequate  -ORESTES DANIELS JW2    Comment  Pt reports feeling better than this morning, still demonstrates intermittent tremors, pt is unable to idenify causitive factor  -ORESTES DANIELS JW2  Patient reported she felt better than she did earlier when she worked with PT.    -EN  Pt complains of new balance deficit that began about 20 minutes before PT arrived. Pt states that she cannot stop her head and body from shaking throughout treatment.    -ORESTES DANIELS JW2    Existing Precautions/Restrictions  fall  -ORESTES DANIELS JW2  fall  -EN  fall  -ORESTES DANIELS JW2    Treatment Considerations/Comments  --  --  \"My hands and head aren't coordinating right\"  -ORESTES DANIELS,JW2    Recorded by [JEREMIAH,ORESTES,JW2] Kareen June, PT (r) Martha Mejia, PT Student (t) Kareen June, PT (c) 11/13/18 1403 [EN] Danica Mehta, OTR 11/13/18 1356 [ORESTES DANIELS,JW2] Kareen June, PT (r) Martha Mejia, PT Student (t) Kareen June, PT (c) 11/13/18 0950    Row Name 11/13/18 1313 11/13/18 1103 11/13/18 0902       Cognitive Assessment/Intervention- PT/OT    Personal Safety Interventions  gait belt;nonskid shoes/slippers when out of bed;supervised activity  -ORESTES DANIELS JW2  gait belt;nonskid shoes/slippers when out of bed  -EN  gait belt;nonskid shoes/slippers when out of bed;supervised activity  -ORESTES DANIELS JW2    Recorded by [ORESTES DANIELS,JW2] Kareen June, PT (r) Martha Mejia, PT Student (t) Kareen June, PT (c) 11/13/18 1403 [EN] Danica Mehta, OTR 11/13/18 1358 " [ORESTES DANIELS,JW2] Kareen June, PT (r) Martha Mejia, PT Student (t) Kareen June, PT (c) 11/13/18 0950    Row Name 11/13/18 1313 11/13/18 1103 11/13/18 0902       Safety Issues, Functional Mobility    Safety Issues Affecting Function (Mobility)  --  other (see comments) tremors, head jerking  -EN  --    Comment, Safety Issues/Impairments (Mobility)  Pt with intermittent head and body tremors throughout session  -ORESTES DANIELSJW2  Patient continues to have tremors and excessive head movement during functional mobility.  -EN  Patient with tremors and excessive head movements during functional mobility tasks  -ORESTES DANIELS,JW2    Recorded by [ORESTES DANIELS,JW2] Kareen June, PT (r) Martha Mejia, PT Student (t) Kareen June, PT (c) 11/13/18 1403 [EN] Danica Mehta OTR 11/13/18 1356 [JEREMIAH,ORESTES,JW2] Kareen June, PT (r) Martha Mejia, PT Student (t) Kareen June, PT (c) 11/13/18 0950    Row Name 11/13/18 1313 11/13/18 1103 11/13/18 0902       Bed Mobility Assessment/Treatment    Supine-Sit [place] (Bed Mobility)  supervision;verbal cues  -ORESTES DANIELSJW2  supervision  -EN  supervision;verbal cues  -ORESTES DANIELS,JEREMIAH2    Sit-Supine [place] (Bed Mobility)  verbal cues;supervision  -ORESTES DANIELS,JW2  --  verbal cues;supervision  -ORESTES DANIELS,JW2    Assistive Device (Bed Mobility)  head of bed elevated;bed rails  -ORESTES DANIELS,JW2  --  head of bed elevated;bed rails  -ORESTES DANIELS,JW2    Comment (Bed Mobility)  increased time to complete  -ORESTES DANIELS,JEREMIAH2  --  Increased time to complete  -ORESTES DANIELSJW2    Recorded by [ORESTES DANIELS,JW2] Kareen June, PT (r) Martha Mejia, PT Student (t) Kareen June, PT (c) 11/13/18 1403 [EN] Danica Mehta OTR 11/13/18 1356 [JEREMIAH,ORESTES,JW2] Kareen June, ERIKA (r) Martha Mejia PT Student (t) Kareen June, ERIKA (c) 11/13/18 0950    Row Name 11/13/18 1103             Functional Mobility    Functional Mobility- Ind. Level  contact guard assist  -EN      Functional Mobility- Device   rolling walker  -EN      Functional Mobility-Distance (Feet)  200  -EN      Recorded by [EN] Danica Mehta, OTR 11/13/18 1356      Row Name 11/13/18 1313 11/13/18 1103 11/13/18 0902       Sit-Stand Transfer    Sit-Stand [place] (Transfers)  contact guard;verbal cues  -JEREMIAH,ORESTES,JW2  contact guard  -EN  contact guard;verbal cues;1 person assist  -JEREMIAH,ORESTES,JW2    Assistive Device (Sit-Stand Transfers)  walker, front-wheeled  -JW,ORESTES,JW2  walker, front-wheeled  -EN  walker, front-wheeled  -JW,ORESTES,JW2    Recorded by [JEREMIAH,ORESTES,JW2] Kareen June, PT (r) Martha Mejia, PT Student (t) Kareen June, PT (c) 11/13/18 1403 [EN] Danica Mehta, OTR 11/13/18 1356 [JEREMIAH,ORESTES,JW2] Kareen June, PT (r) Martha Mejia, PT Student (t) Kareen June, PT (c) 11/13/18 0950    Row Name 11/13/18 1313 11/13/18 1103 11/13/18 0902       Stand-Sit Transfer    Stand-Sit [place] (Transfers)  contact guard;verbal cues  -JEREMIAH,ORESTES,JW2  contact guard  -EN  contact guard;verbal cues;1 person assist  -JEREMIAH,ORESTES,JW2    Assistive Device (Stand-Sit Transfers)  walker, front-wheeled  -JW,ORESTES,JW2  walker, front-wheeled  -EN  walker, front-wheeled  -JW,ORESTES,JW2    Recorded by [JEREMIAH,ORESTES,JW2] Kareen June, PT (r) Martha Mejia, PT Student (t) Kareen June, PT (c) 11/13/18 1403 [EN] Danica Mehta, OTR 11/13/18 1356 [JW,ORESTES,JW2] Kareen June, PT (r) Martha Mejia, PT Student (t) Kareen June, PT (c) 11/13/18 0950    Row Name 11/13/18 1313 11/13/18 0902          Gait/Stairs Assessment/Training    [place] Level (Gait)  contact guard;verbal cues  -ORESTES DANIELS JW2  contact guard;verbal cues;1 person assist;1 person to manage equipment  -ORESTES DANIELS JW2     Assistive Device (Gait)  walker, front-wheeled  -ORESTES DANIELS JW2  walker, front-wheeled  -ORESTES DANIELS JW2     Distance in Feet (Gait)  120 1 seated rest break  -ORESTES DANIELS JW2  36  -ORESTES DANIELS,ALEJANDRINA     Pattern (Gait)  step-through  -ORESTES DANIELS JW2  step-through  -ORESTES DANIELS,JW2      Deviations/Abnormal Patterns (Gait)  bilateral deviations;gait speed decreased  -ORESTES DANIELS,JW2  bilateral deviations;alina decreased  -JEREMIAH,ORESTES,JW2     Bilateral Gait Deviations  forward flexed posture;heel strike decreased  -ORESTES DANIELS,JW2  forward flexed posture;heel strike decreased;weight shift ability decreased  -ORESTES DANIELS,JW2     Comment (Gait/Stairs)  Pt was able to perform gait activities with CGA of one, FWW, and one person to manage equipment x 120ft with one seated rest break. Pt continues to demonstrate head and body tremors intermittently and is unable to identify a causative factor.  -ORESTES DANIELS,JW2  Pt performs gait activities with FWW, CGA, and one person to manage equipment x 36ft. Pt demonstrates whole body tremors as well as head tremors throughout gait, that cause a deficit in safety. The tremors limited gait distance d/t the safety concern.  -ORESTES DANIELS,JW2     Recorded by [ORESTES DANIELS,JW2] Kareen June, PT (r) Martha Mejia PT Student (t) Kareen June, PT (c) 11/13/18 1403 [ORESTES DANIELS,JW2] Kareen June, PT (r) Martha Mejia PT Student (t) Kareen June, PT (c) 11/13/18 0950     Row Name 11/13/18 1103 11/13/18 0902          Lower Body Dressing Assessment/Training    Comment (Lower Body Dressing)  Patient sat at EOB and reached down to adjust sock with no difficulty or tremors.  Once patient returned to sitting, tremors and excessive head movements returned.  Patient issued reacher and educated on use.  -EN  Pt requires SBA with donning non-slip socks  -ORESTES DANIELS,JW2     Recorded by [EN] Danica Mehta OTR 11/13/18 1356 [ORESTES DANIELS,JW2] Kareen June PT (r) Martha Mejia PT Student (t) Kareen June, PT (c) 11/13/18 0950     Row Name 11/13/18 1313 11/13/18 1103 11/13/18 0902       Positioning and Restraints    Pre-Treatment Position  in bed  -ORESTES DANIELS JW2  in bed  -EN  in bed  -ORESTES DANIELS,JW2    Post Treatment Position  bed  -JEREMIAH,ORESTES,JW2  chair  -EN  bed  -JEREMIAH,ORESTES,JW2    In Bed  supine;call light  within reach;encouraged to call for assist  -ORESTES DANIELS JW2  --  supine;call light within reach;encouraged to call for assist;with family/caregiver  -ORESTES DANIELS JW2    In Chair  --  reclined;call light within reach;encouraged to call for assist once reclined in chair, no tremors or head jerking occured  -EN  --    Recorded by [JEREMIAH,ORESTES,JW2] Kareen June, PT (r) Martha Mejia, PT Student (t) Kareen June, PT (c) 11/13/18 1403 [EN] Danica Mehta, OTR 11/13/18 1356 [JEREMIAH,ORESTES,JW2] Kareen June, PT (r) Martha Mejia, PT Student (t) Kareen June, PT (c) 11/13/18 0950    Row Name 11/13/18 1313 11/13/18 1103 11/13/18 0902       Pain Scale: Numbers Pre/Post-Treatment    Pain Scale: Numbers, Pretreatment  0/10 - no pain  -ORESTES DANIELS JW2  0/10 - no pain  -EN  0/10 - no pain  -ORESTES DANIELSJW2    Pain Scale: Numbers, Post-Treatment  --  0/10 - no pain  -EN  --    Recorded by [ORESTES DANIELS,JW2] Kareen June, PT (r) Martha Mejia, PT Student (t) Kareen June, PT (c) 11/13/18 1403 [EN] Danica Mehta, OTR 11/13/18 1356 [JEREMIAH,ORESTES,JW2] Kareen June, PT (r) Martha Mejia, PT Student (t) Kareen June, PT (c) 11/13/18 0950    Row Name 11/13/18 1313 11/13/18 0902          Coping    Observed Emotional State  cooperative  -ORESTES DANIELS,JW2  anxious;crying continuous/inconsolable  -ORESTES DANIELS,JW2     Verbalized Emotional State  frustration  -ORESTES DANIELS,JW2  frustration  -ORESTES DANIELS,JW2     Recorded by [ORESTES DANIELS,JW2] Kareen June, PT (r) Martha Mejia, PT Student (t) Kareen June, PT (c) 11/13/18 1403 [JEREMIAH,ORESTES,JW2] Kareen June, PT (r) Martha Mejia, PT Student (t) Kareen June, PT (c) 11/13/18 0950     Row Name 11/13/18 1313 11/13/18 1103 11/13/18 0902       Plan of Care Review    Plan of Care Reviewed With  patient  -ORESTES DANIELS,JEREMIAH2  patient  -EN  patient;spouse  -JEREMIAH,ORESTES,JW2    Recorded by [ORESTES DANIELS,JW2] Kareen June, ERIKA (r) Martha Mejia, PT Student (t) Kareen June, PT (c) 11/13/18 1402  [EN] Danica Mehta, OTR 11/13/18 1356 [JEREMIAH,ORESTES,JW2] Kareen June, PT (r) Martha Mejia, PT Student (t) Kareen June, PT (c) 11/13/18 0950    Row Name 11/13/18 1103             Outcome Summary/Treatment Plan (OT)    Daily Summary of Progress (OT)  progress toward functional goals as expected  -EN      Barriers to Overall Progress (OT)  excessive head movements and tremors  -EN      Plan for Continued Treatment (OT)  No further OT needs  -EN      Anticipated Discharge Disposition (OT)  home with 24/7 care;home with home health  -EN      Patient/Family Concerns, Anticipated Discharge Disposition (OT)  Recommended patient limit falls risks such as use of the rolling walker and assist with functional mobility/ADLs.    -EN      Recorded by [EN] Danica Mehta, OTR 11/13/18 1356      Row Name 11/13/18 1313 11/13/18 0902          Outcome Summary/Treatment Plan (PT)    Daily Summary of Progress (PT)  progress toward functional goals is gradual  -ORESTES DANIELSJW2  progress toward functional goals is gradual  -ORESTES DANIELS,JW2     Barriers to Overall Progress (PT)  unpredictability of tremors  -ORESTES DANIELS,JW2  tremors, anxiety about movement  -JEREMIAHORESTES,JW2     Anticipated Discharge Disposition (PT)  home with 24/7 care discussed with patient, verbalized understanding  -ORESTES DANIELS,JW2  home with home health  -ORESTES DANIELS,JW2     Recorded by [JEREMIAH,ORESTES,JW2] Kareen June, PT (r) Martha Mejia, PT Student (t) Kareen June, PT (c) 11/13/18 1403 [JEREMIAH,ORESTES,JW2] Kareen June, PT (r) Martha Mejia, PT Student (t) Kareen June, PT (c) 11/13/18 0950       User Key  (r) = Recorded By, (t) = Taken By, (c) = Cosigned By    Initials Name Effective Dates Discipline    EN Danica Mehta, OTR 06/22/16 -  OT    Kareen Gonzalez, PT 04/03/18 -  PT    Martha Newell, PT Student 10/10/18 -  PT                   Physical Therapy Education     Title: PT OT SLP Therapies (Done)     Topic: Physical Therapy (Done)   "   Point: Mobility training (Done)     Learning Progress Summary           Patient Acceptance, E, VU by ORESTES at 11/13/2018  1:50 PM    Acceptance, E, VU by ORESTES at 11/13/2018  9:33 AM    Acceptance, E, VU by ORESTES at 11/12/2018  2:54 PM                               User Key     Initials Effective Dates Name Provider Type Discipline    ORESTES 10/10/18 -  Martha Mejia, PT Student PT Student PT                PT Recommendation and Plan  Anticipated Discharge Disposition (PT): home with 24/7 care  Planned Therapy Interventions (PT Eval): strengthening, transfer training, gait training  Therapy Frequency (PT Clinical Impression): daily  Outcome Summary/Treatment Plan (PT)  Daily Summary of Progress (PT): progress toward functional goals is gradual  Barriers to Overall Progress (PT): unpredictability of tremors  Anticipated Equipment Needs at Discharge (PT): front wheeled walker  Anticipated Discharge Disposition (PT): home with 24/7 care  Plan of Care Reviewed With: patient  Outcome Summary: PT: Pt reports that her symptoms are better than this morning d/t receiving medicine. Pt continues to demonstrate inconsistent head and body tremors intermittently. Pt was able to perform gait activities with CGA of one, FWW, and one person to manage equipment x 120ft with one seated rest break. Pt states that she feels she \"would be safer at driving than walking.\" Explained to patient that it would be safest for her to have someone at home with her 24/7 due to the unpredictability of her symptoms in order to avoid a fall, pt verbalizes understanding.   Outcome Measures     Row Name 11/13/18 1313 11/13/18 0902 11/12/18 1400       How much help from another person do you currently need...    Turning from your back to your side while in flat bed without using bedrails?  4  -JW (r) ORESTES (t) JW (c)  4  -JW (r) ORESTES (t) JW (c)  --    Moving from lying on back to sitting on the side of a flat bed without bedrails?  4  -JW (r) ORESTES (t) JW (c)  4 "  -JW (r) ORESTES (t) JW (c)  --    Moving to and from a bed to a chair (including a wheelchair)?  3  -JW (r) ORESTES (t) JW (c)  3  -JW (r) ORESTES (t) JW (c)  --    Standing up from a chair using your arms (e.g., wheelchair, bedside chair)?  3  -JW (r) ORESTES (t) JW (c)  3  -JW (r) ORESTES (t) JW (c)  --    Climbing 3-5 steps with a railing?  2  -JW (r) ORESTES (t) JW (c)  2  -JW (r) ORESTES (t) JW (c)  --    To walk in hospital room?  3  -JW (r) ORESTES (t) JW (c)  3  -JW (r) ORESTES (t) JW (c)  --    AM-[facility] 6 Clicks Score  19  -JW (r) ORESTES (t)  19  -JW (r) ORESTES (t)  --       How much help from another is currently needed...    Putting on and taking off regular lower body clothing?  --  --  3  -EN    Bathing (including washing, rinsing, and drying)  --  --  3  -EN    Toileting (which includes using toilet bed pan or urinal)  --  --  3  -EN    Putting on and taking off regular upper body clothing  --  --  4  -EN    Taking care of personal grooming (such as brushing teeth)  --  --  4  -EN    Eating meals  --  --  4  -EN    Score  --  --  21  -EN       Functional Assessment    Outcome Measure Options  AM-PAC 6 Clicks Basic Mobility (PT)  -JW (r) ORESTES (t) JW (c)  AM-[facility] 6 Clicks Basic Mobility (PT)  -JW (r) ORESTES (t) JW (c)  AM-[facility] 6 Clicks Daily Activity (OT)  -EN    Row Name 11/12/18 1026             How much help from another person do you currently need...    Turning from your back to your side while in flat bed without using bedrails?  4  -JW (r) ORESTES (t) JW (c)      Moving from lying on back to sitting on the side of a flat bed without bedrails?  4  -JW (r) ORESTES (t) JW (c)      Moving to and from a bed to a chair (including a wheelchair)?  3  -JW (r) ORESTES (t) JEREMIAH (c)      Standing up from a chair using your arms (e.g., wheelchair, bedside chair)?  3  -JEREMIAH (r) ORESTES (t) JEREMIAH (c)      Climbing 3-5 steps with a railing?  2  -JEREMIAH (r) ORESTES (t) JEREMIAH (c)      To walk in hospital room?  3  -JEREMIAH (r) ORESTES (t) JEREMIAH (c)      AM-PAC 6 Clicks Score  19  -JEREMIAH (ramirez) ORESTES (t)         Functional Assessment     Outcome Measure Options  AM-PAC 6 Clicks Basic Mobility (PT)  -JW (r) ORESTES (t) JW (c)        User Key  (r) = Recorded By, (t) = Taken By, (c) = Cosigned By    Initials Name Provider Type    Danica Fierro, OTR Occupational Therapist    Kareen Gonzalez, PT Physical Therapist    Martha Newell, PT Student PT Student         Time Calculation:   PT Charges     Row Name 11/13/18 1353 11/13/18 0936          Time Calculation    Start Time  1313  -JW (r) ORESTES (t) JW (c)  0902  -JW (r) ORESTES (t) JW (c)     Stop Time  1327  -JW (r) ORESTES (t) JW (c)  0919  -JW (r) ORESTES (t) JW (c)     Time Calculation (min)  14 min  -JW (r) ORESTES (t)  17 min  -JW (r) ORESTES (t)     PT Received On  11/13/18  -JW (r) ORESTES (t) JW (c)  11/13/18  -JW (r) ORESTES (t) JW (c)     PT - Next Appointment  11/14/18  -JW (r) ORESTES (t) JW (c)  11/14/18  -JW (r) ORESTES (t) JW (c)        Timed Charges    44501 - Gait Training Minutes   14  -JW (r) ORESTES (t) JW (c)  17  -JW (r) ORESTES (t) JW (c)       User Key  (r) = Recorded By, (t) = Taken By, (c) = Cosigned By    Initials Name Provider Type    Kareen Gonzalez, PT Physical Therapist    Martha Newell, PT Student PT Student        Therapy Suggested Charges     Code   Minutes Charges    19381 (CPT®) Hc Pt Neuromusc Re Education Ea 15 Min      10586 (CPT®) Hc Pt Ther Proc Ea 15 Min      62709 (CPT®) Hc Gait Training Ea 15 Min 14 1    70660 (CPT®) Hc Pt Therapeutic Act Ea 15 Min      29665 (CPT®) Hc Pt Manual Therapy Ea 15 Min      92336 (CPT®) Hc Pt Iontophoresis Ea 15 Min      57687 (CPT®) Hc Pt Elec Stim Ea-Per 15 Min      97158 (CPT®) Hc Pt Ultrasound Ea 15 Min      45735 (CPT®) Hc Pt Self Care/Mgmt/Train Ea 15 Min      83511 (CPT®) Hc Pt Prosthetic (S) Train Initial Encounter, Each 15 Min      30670 (CPT®) Hc Pt Orthotic(S)/Prosthetic(S) Encounter, Each 15 Min      59862 (CPT®) Hc Orthotic(S) Mgmt/Train Initial Encounter, Each 15min      Total  14 1        Therapy Charges for Today     Code Description  Service Date Service Provider Modifiers Qty    80679037517 HC PT EVAL LOW COMPLEXITY 1 11/12/2018 Martha Mejia, PT Student GP 1    08952234405 HC GAIT TRAINING EA 15 MIN 11/13/2018 Martha Mejia, PT Student GP 1    57230845520 HC GAIT TRAINING EA 15 MIN 11/13/2018 Martha Mejia, PT Student GP 1          PT G-Codes  PT Professional Judgement Used?: Yes  Outcome Measure Options: AM-PAC 6 Clicks Basic Mobility (PT)  AM-PAC 6 Clicks Score: 19  Score: 21  Functional Limitation: Mobility: Walking and moving around  Mobility: Walking and Moving Around Current Status (): At least 20 percent but less than 40 percent impaired, limited or restricted  Mobility: Walking and Moving Around Goal Status (): At least 1 percent but less than 20 percent impaired, limited or restricted    Martha Mejia, PT Student  11/13/2018

## 2018-11-13 NOTE — DISCHARGE SUMMARY
Shaylee Wadsworth  1960  [id number]        Hospitalists Discharge Summary    Date of Admission: 11/11/2018  Date of Discharge:  11/13/2018    Primary Discharge Diagnoses:   1. Probable Vertigo with some left peripheral vestibulopathy      Secondary Discharge Diagnoses:   2. Functional/psychogenic movement disorder with tremor   3. HTN   4. Dyslipidemia   5. RA   6. GERD and Lainez's esophagus  7. Hyperparathyroidism with prior parathyroidectomy   8. Anxiety   9. Subclinical hyperthyroidism   10. Hypokalemia          PCP  Patient Care Team:  Laura Ayala APRN as PCP - General  Laura Ayala APRN as PCP - Family Medicine    Consults:   Consults     Date and Time Order Name Status Description    11/11/2018 2328 Inpatient Neurology Consult General Completed           Operations and Procedures Performed:       Ct Head Without Contrast    Result Date: 11/12/2018  Narrative: CT HEAD WO CONTRAST-: 11/11/2018 9:55 PM  HISTORY: Dizziness for several days. Difficulty walking today.  TECHNIQUE: Axial unenhanced head CT. Radiation dose reduction techniques included automated exposure control or exposure modulation based on body size. Radiation audit for number of CT and nuclear cardiology exams performed in the last year: 1.   COMPARISON: 4/4/2018  FINDINGS: No intracranial hemorrhage, mass, or infarct. No hydrocephalus or extra-axial fluid collection. Brain parenchymal density is normal. The skull base, calvarium, and extracranial soft tissues are normal.      Impression: Normal, negative unenhanced head CT.   A preliminary report was provided by Dr. Landry at 2236 hours on 11/11/2018.  This report was finalized on 11/12/2018 7:12 AM by Dr. Jarocho Betancourt MD.      Mri Angiogram Head Without Contrast    Result Date: 11/12/2018  Narrative: MRA brain  INDICATION: Vertigo for the last 3 weeks. Tremors with movement disorder diagnosed April 2018.  COMPARISON: None.  TECHNIQUE: Noncontrast 3-D time-of-flight images were  obtained with MIP reconstructions.  FINDINGS: There is no intracranial flow limiting stenosis by NASCET criteria. No focal vessel cut off is seen. No aneurysm identified. No vascular malformation.      Impression: Negative intracranial MRA.  This report was finalized on 11/12/2018 12:16 PM by Dr. Jarocho Betancourt MD.      Mri Brain With & Without Contrast    Result Date: 11/12/2018  Narrative: INDICATION: Vertigo for the last 3 weeks. Tremors with movement disorder diagnosed April 2018.  COMPARISON: 4/4/2018.  TECHNIQUE: Multisequence multiplanar imaging was performed through the brain before and after the IV administration of 16 cc of MultiHance contrast.  FINDINGS: Diffusion images show no acute or subacute infarct. Ventricular size and configuration are normal. White matter changes described previously are stable and likely reflect chronic small vessel ischemic disease. No masses or pathologic contrast enhancement are identified. Craniovertebral junction is normal. Major intracranial flow voids are maintained.      Impression: No acute findings in the brain. No significant change from prior.  This report was finalized on 11/12/2018 12:07 PM by Dr. Jarocho Betancourt MD.        Allergies:  is allergic to meloxicam.    Nathaniel  Reviewed (home diazepam last filled in August and Neurontin in October)    Discharge Medications:     Discharge Medications      New Medications      Instructions Start Date   clonazePAM 0.5 MG tablet  Commonly known as:  KlonoPIN   0.5 mg, Oral, 2 Times Daily PRN         Changes to Medications      Instructions Start Date   diazePAM 5 MG tablet  Commonly known as:  VALIUM  What changed:  additional instructions   5 mg, Oral, Nightly PRN, Do not take while on Klonopin.      PREMPRO 0.45-1.5 MG per tablet  Generic drug:  estrogen (conjugated)-medroxyprogesterone  What changed:    · how much to take  · how to take this  · when to take this   TAKE ONE TABLET BY MOUTH ONCE DAILY         Continue  "These Medications      Instructions Start Date   amLODIPine 5 MG tablet  Commonly known as:  NORVASC   5 mg, Oral, Daily      aspirin 81 MG tablet   81 mg, Oral, Daily      atorvastatin 40 MG tablet  Commonly known as:  LIPITOR   40 mg, Oral, Daily      buPROPion  MG 24 hr tablet  Commonly known as:  WELLBUTRIN XL   150 mg, Oral, Every Morning      ENBREL SURECLICK 50 MG/ML solution auto-injector  Generic drug:  Etanercept   No dose, route, or frequency recorded.      esomeprazole 40 MG capsule  Commonly known as:  nexIUM   TAKE ONE CAPSULE BY MOUTH TWICE DAILY      folic acid 1 MG tablet  Commonly known as:  FOLVITE   Oral      gabapentin 100 MG capsule  Commonly known as:  NEURONTIN   100 mg, Oral, 2 Times Daily      KLOR-CON 20 MEQ CR tablet  Generic drug:  potassium chloride   TAKE ONE TABLET BY MOUTH TWICE DAILY      methotrexate 2.5 MG tablet   TAKE 4 TABS PO ONCE WEEKLY ON SAME DAY      olmesartan 40 MG tablet  Commonly known as:  BENICAR   40 mg, Oral, Daily      vitamin D 11238 units capsule capsule  Commonly known as:  ERGOCALCIFEROL   TAKE ONE CAPSULE BY MOUTH ONCE A WEEK         Stop These Medications    bisoprolol-hydrochlorothiazide 5-6.25 MG per tablet  Commonly known as:  ZIAC     cyanocobalamin 1000 MCG/ML injection            History of Present Illness: (from H&P)  58 y/o female with a Functional/psychogenic movement disorder, tremor, HTN, Dyslipidemia, RA, GERD and Lainez's esophagus, hyperparathyroidism with prior parathyroidectomy and anxiety presented to the ER secondary to dizziness and difficulty walking. The patient notes she has been having issues with tremor and movement problems since April. She has also had this \"foggy\" headed feeling since that time and some mild imbalance per her report. She has seen 3 neurologists outpatient (the most recent in [place]). She was referred to the [facility] program at [facility] for functional movement d/o but appt isn't until February and she has been " "referred to a specialty cognitive-movement therapist but has not yet had her first appt per patient and  report. In all prior evaluations the patient's strength has not been affected by her issues. She notes 2 weeks ago at about 11PM she was in bed and rolled to her right and suddenly became severely dizzy with nausea and vomited. These symptoms did improve and were only present when she would lie down and turn a certain way. She saw ENT last week and notes she was given instructions on the Eply's manuver but noted the ENT was not able to reproduce her symptoms in the office. Then she notes at 8PM last night she sat up in bed and her head was \"spinning\" and she felt really \"foggy headed\" and she knew she would not be able to walk and she called for her husbands help and came to the ER for evaluation. She denies any focal weakness, speech or swallowing issues. Just general imbalance when trying to walk and not falling to one side or the other. She denies nay recent cough, SOA, CP, N/V/D (except to the episode of N/V she had 2 weeks ago with her dizziness). She denies any F/C. She denies any numbness or tingling.     Hospital Course  1. Probable Vertigo with some left peripheral vestibulopathy: Patient seen by Dr. Winston with neurology here. MRI/MRI brain were negative. He notes some left peripheral vestibulopathy and gave dose of IV solumedrol and started BID Klonopin (which the patient can use PRN at home. PT/OT worked with patient and recommended walker and HH which has been arranged.      2. Functional/psychogenic movement disorder with tremor: Patient awaiting further PT that is more specialized per patient's report. Would recommend continuing to follow with outpatient neurologist. A large majority of patient's symptoms at presentation seem to be related to this. Patient and  voice significant frustration with this diagnosis and the lack of treatment options.  Explained we have no new or different " treatment options here for this condition. Patient trying to get in with Movement specialist at [facility] currently.        3. HTN: BP WNL on home Norvasc and Benicar, Held bisoprolol/HCTZ secondary to low HR/intermittent bradycardia.  Will continue to hold at discharge and patient to F/U with PCP.       4. Dyslipidemia: On home lipitor.       5. RA: Resume home methotrexate and Enbrel at outpatient. No acute issues here.      6. GERD and Lainez's esophagus: No acute issues here, On PPI (protonix will be substituted for nexium here)      7. Hyperparathyroidism with prior parathyroidectomy: No acute issues here.       8. Anxiety: Patient now on Klonopin per Dr. Winston so will hold PM Valium. Patient instructed to hold Valium if taking PRN Klonopin at home.     9. Subclinical hyperthyroidism: Will need PCP F/U. Mild, no treatment indicated at this time.      10. Hypokalemia: Replaced, Mag WNL and resumed home supplementation.       Last Lab Results:   Lab Results (most recent)     Procedure Component Value Units Date/Time    Basic Metabolic Panel [[id number]]  (Abnormal) Collected:  11/13/18 0349    Specimen:  Blood Updated:  11/13/18 0454     Glucose 144 mg/dL      BUN 16 mg/dL      Creatinine 0.66 mg/dL      Sodium 142 mmol/L      Potassium 4.5 mmol/L      Chloride 106 mmol/L      CO2 23.8 mmol/L      Calcium 9.0 mg/dL      eGFR Non African Amer 93 mL/min/1.73      BUN/Creatinine Ratio 24.2     Anion Gap 12.2 mmol/L     Narrative:       GFR Normal >60  Chronic Kidney Disease <60  Kidney Failure <15    Magnesium [[id number]]  (Normal) Collected:  11/13/18 0349    Specimen:  Blood Updated:  11/13/18 0454     Magnesium 2.1 mg/dL     Potassium [[id number]]  (Normal) Collected:  11/12/18 2116    Specimen:  Blood Updated:  11/12/18 2140     Potassium 4.2 mmol/L     T4, Free [[id number]]  (Normal) Collected:  11/12/18 0551    Specimen:  Blood Updated:  11/12/18 1712     Free T4 1.28 ng/dL     Magnesium [[id number]]  (Normal)  Collected:  11/12/18 0551    Specimen:  Blood Updated:  11/12/18 1002     Magnesium 1.7 mg/dL     Troponin [[id number]]  (Normal) Collected:  11/12/18 0551    Specimen:  Blood Updated:  11/12/18 0637     Troponin T <0.010 ng/mL     Narrative:       Troponin T Reference Ranges:  Less than 0.03 ng/mL:    Negative for AMI  0.03 to 0.09 ng/mL:      Indeterminant for AMI  Greater than 0.09 ng/mL: Positive for AMI    TSH [[id number]]  (Abnormal) Collected:  11/12/18 0551    Specimen:  Blood Updated:  11/12/18 0637     TSH 4.620 mIU/mL     Comprehensive Metabolic Panel [[id number]]  (Abnormal) Collected:  11/12/18 0551    Specimen:  Blood Updated:  11/12/18 0631     Glucose 104 mg/dL      BUN 11 mg/dL      Creatinine 0.69 mg/dL      Sodium 142 mmol/L      Potassium 3.2 mmol/L      Chloride 102 mmol/L      CO2 30.4 mmol/L      Calcium 9.1 mg/dL      Total Protein 6.4 g/dL      Albumin 3.60 g/dL      ALT (SGPT) 14 U/L      AST (SGOT) 15 U/L      Alkaline Phosphatase 67 U/L      Total Bilirubin 0.5 mg/dL      eGFR Non African Amer 88 mL/min/1.73      Globulin 2.8 gm/dL      A/G Ratio 1.3 g/dL      BUN/Creatinine Ratio 15.9     Anion Gap 9.6 mmol/L     CBC Auto Differential [[id number]]  (Abnormal) Collected:  11/12/18 0551    Specimen:  Blood Updated:  11/12/18 0612     WBC 5.65 10*3/mm3      RBC 3.93 10*6/mm3      Hemoglobin 11.8 g/dL      Hematocrit 34.6 %      MCV 88.0 fL      MCH 30.0 pg      MCHC 34.1 g/dL      RDW 14.2 %      RDW-SD 45.8 fl      MPV 9.5 fL      Platelets 242 10*3/mm3      Neutrophil % 46.3 %      Lymphocyte % 43.4 %      Monocyte % 7.1 %      Eosinophil % 1.9 %      Basophil % 1.1 %      Immature Grans % 0.2 %      Neutrophils, Absolute 2.62 10*3/mm3      Lymphocytes, Absolute 2.45 10*3/mm3      Monocytes, Absolute 0.40 10*3/mm3      Eosinophils, Absolute 0.11 10*3/mm3      Basophils, Absolute 0.06 10*3/mm3      Immature Grans, Absolute 0.01 10*3/mm3      nRBC 0.0 /100 WBC     Troponin [[id number]]  (Normal)  Collected:  11/11/18 2331    Specimen:  Blood Updated:  11/11/18 2354     Troponin T <0.010 ng/mL     Narrative:       Troponin T Reference Ranges:  Less than 0.03 ng/mL:    Negative for AMI  0.03 to 0.09 ng/mL:      Indeterminant for AMI  Greater than 0.09 ng/mL: Positive for AMI    Urine Drug Screen - Urine, Clean Catch [[id number]]  (Normal) Collected:  11/11/18 2138    Specimen:  Urine, Clean Catch Updated:  11/11/18 2202     THC, Screen, Urine Negative     Phencyclidine (PCP), Urine Negative     Cocaine Screen, Urine Negative     Methamphetamine, Urine Negative     Opiate Screen Negative     Amphetamine Screen, Urine Negative     Benzodiazepine Screen, Urine Negative     Tricyclic Antidepressants Screen Negative     Methadone Screen, Urine Negative     Barbiturates Screen, Urine Negative     Oxycodone Screen, Urine Negative     Propoxyphene Screen Negative     Buprenorphine, Screen, Urine Negative    Narrative:       Urine drug screen results are to be used for medical purposes only.  They are not to be used for legal purposes such as employment testing.  Negative results do not necessarily mean the complete absence of a subtance, but rather that the result is less than the cutoff for that substance.  Positive results are unconfirmed and considered Preliminary Positive.  [facility] does not automatically confirm Postitive Unconfirmed results.  The physician may request (order) an Unconfirmed Positive result to be sent out for confirmation.      Negative Thresholds for Drugs Screened:    THC screen, urine                          50 ng/ml  Phenycyclidine (PCP), urine                25 ng/ml  Cocaine screen, urine                     150 ng/ml  Methamphetamine, urine                    500 ng/ml  Opiate screen, urine                      100 ng/ml  Amphetamine screen, urine                 500 ng/ml  Benzodiazepine screen, urine              150 ng/ml  Tricyclic Antidepressants screen, urine   300  ng/ml  Methadone screen, urine                   200 ng/ml  Barbiturates screen, urine                200 ng/ml  Oxycodone screen, urine                   100 ng/ml  Propoxyphene screen, urine                300 ng/ml  Buprenorphine screen, urine                10 ng/ml    Urinalysis, Microscopic Only - Urine, Clean Catch [[id number]]  (Abnormal) Collected:  11/11/18 2138    Specimen:  Urine, Clean Catch Updated:  11/11/18 2150     RBC, UA 0-2 /HPF      WBC, UA 6-12 /HPF      Bacteria, UA Trace /HPF      Squamous Epithelial Cells, UA 3-6 /HPF      Hyaline Casts, UA None Seen /LPF      Methodology Manual Light Microscopy    Urinalysis With Microscopic If Indicated (No Culture) - Urine, Clean Catch [[id number]]  (Abnormal) Collected:  11/11/18 2138    Specimen:  Urine, Clean Catch Updated:  11/11/18 2150     Color, UA Straw     Appearance, UA Slightly Cloudy     pH, UA 7.0     Specific Gravity, UA 1.010     Glucose, UA Negative     Ketones, UA Negative     Bilirubin, UA Negative     Blood, UA Small (1+)     Protein, UA Negative     Leuk Esterase, UA Moderate (2+)     Nitrite, UA Negative     Urobilinogen, UA 0.2 E.U./dL    Comprehensive Metabolic Panel [[id number]]  (Abnormal) Collected:  11/11/18 2117    Specimen:  Blood Updated:  11/11/18 2147     Glucose 144 mg/dL      BUN 12 mg/dL      Creatinine 0.83 mg/dL      Sodium 142 mmol/L      Potassium 3.2 mmol/L      Chloride 100 mmol/L      CO2 29.9 mmol/L      Calcium 9.5 mg/dL      Total Protein 7.2 g/dL      Albumin 4.20 g/dL      ALT (SGPT) 16 U/L      AST (SGOT) 18 U/L      Alkaline Phosphatase 77 U/L      Total Bilirubin 0.6 mg/dL      eGFR Non African Amer 71 mL/min/1.73      Globulin 3.0 gm/dL      A/G Ratio 1.4 g/dL      BUN/Creatinine Ratio 14.5     Anion Gap 12.1 mmol/L     CBC & Differential [[id number]] Collected:  11/11/18 2117    Specimen:  Blood Updated:  11/11/18 2128    Narrative:       The following orders were created for panel order CBC &  Differential.  Procedure                               Abnormality         Status                     ---------                               -----------         ------                     CBC Auto Differential[[id number]]        Normal              Final result                 Please view results for these tests on the individual orders.    CBC Auto Differential [[id number]]  (Normal) Collected:  11/11/18 2117    Specimen:  Blood Updated:  11/11/18 2128     WBC 7.02 10*3/mm3      RBC 4.30 10*6/mm3      Hemoglobin 12.7 g/dL      Hematocrit 37.5 %      MCV 87.2 fL      MCH 29.5 pg      MCHC 33.9 g/dL      RDW 14.3 %      RDW-SD 45.1 fl      MPV 9.5 fL      Platelets 279 10*3/mm3      Neutrophil % 45.7 %      Lymphocyte % 43.7 %      Monocyte % 7.7 %      Eosinophil % 1.7 %      Basophil % 1.1 %      Immature Grans % 0.1 %      Neutrophils, Absolute 3.20 10*3/mm3      Lymphocytes, Absolute 3.07 10*3/mm3      Monocytes, Absolute 0.54 10*3/mm3      Eosinophils, Absolute 0.12 10*3/mm3      Basophils, Absolute 0.08 10*3/mm3      Immature Grans, Absolute 0.01 10*3/mm3      nRBC 0.0 /100 WBC         Imaging Results (most recent)     Procedure Component Value Units Date/Time    SCANNED - IMAGING [[id number]] Resulted:  11/11/18      Updated:  11/13/18 1210    MRI Angiogram Head Without Contrast [[id number]] Collected:  11/12/18 1207     Updated:  11/12/18 1218    Narrative:       MRA brain     INDICATION: Vertigo for the last 3 weeks. Tremors with movement disorder  diagnosed April 2018.     COMPARISON: None.     TECHNIQUE: Noncontrast 3-D time-of-flight images were obtained with MIP  reconstructions.     FINDINGS: There is no intracranial flow limiting stenosis by NASCET  criteria. No focal vessel cut off is seen. No aneurysm identified. No  vascular malformation.       Impression:       Negative intracranial MRA.     This report was finalized on 11/12/2018 12:16 PM by Dr. Jarocho Betancourt MD.       MRI Brain With & Without  Contrast [[id number]] Collected:  11/12/18 1200     Updated:  11/12/18 1209    Narrative:       INDICATION: Vertigo for the last 3 weeks. Tremors with movement disorder  diagnosed April 2018.     COMPARISON: 4/4/2018.     TECHNIQUE: Multisequence multiplanar imaging was performed through the  brain before and after the IV administration of 16 cc of MultiHance  contrast.     FINDINGS: Diffusion images show no acute or subacute infarct.  Ventricular size and configuration are normal. White matter changes  described previously are stable and likely reflect chronic small vessel  ischemic disease. No masses or pathologic contrast enhancement are  identified. Craniovertebral junction is normal. Major intracranial flow  voids are maintained.       Impression:       No acute findings in the brain. No significant change from  prior.     This report was finalized on 11/12/2018 12:07 PM by Dr. Jarocho Betancourt MD.       CT Head Without Contrast [[id number]] Collected:  11/12/18 0710     Updated:  11/12/18 0714    Narrative:       CT HEAD WO CONTRAST-: 11/11/2018 9:55 PM     HISTORY:   Dizziness for several days. Difficulty walking today.     TECHNIQUE:   Axial unenhanced head CT. Radiation dose reduction techniques included  automated exposure control or exposure modulation based on body size.  Radiation audit for number of CT and nuclear cardiology exams performed  in the last year: 1.       COMPARISON:   4/4/2018     FINDINGS:   No intracranial hemorrhage, mass, or infarct. No hydrocephalus or  extra-axial fluid collection. Brain parenchymal density is normal. The  skull base, calvarium, and extracranial soft tissues are normal.       Impression:       Normal, negative unenhanced head CT.        A preliminary report was provided by Dr. Landry at 2236 hours on  11/11/2018.     This report was finalized on 11/12/2018 7:12 AM by Dr. Jarocho Betancourt MD.             PROCEDURES      Condition on Discharge:  Stable    Physical  Exam at Discharge  Vital Signs  Temp:  [97 °F (36.1 °C)-98.1 °F (36.7 °C)] 97.9 °F (36.6 °C)  Heart Rate:  [61-70] 64  Resp:  [18-19] 18  BP: (114-123)/(59-73) 123/73    Physical Exam:  Physical Exam   Constitutional: Patient appears well-developed, obese and in no acute distress (patient does become tearful at time talking about her condition).  HEENT:   Head: Normocephalic and atraumatic.   Eyes:  Pupils are equal, round, and reactive to light. EOM are intact. Sclerae are anicteric and noninjected.  Mouth and Throat: Patient has moist mucous membranes.     Cardiovascular: Regular rate, regular rhythm, S1 normal and S2 normal.  Exam reveals no gallop and no friction rub.  No murmur heard.  Pulmonary/Chest: Lungs are clear to auscultation bilaterally. No respiratory distress. No wheezes. No rhonchi. No rales.   Abdominal: Obese. Soft. Bowel sounds are normal.  There is no tenderness.   Musculoskeletal: Normal muscle tone  Extremities: No edema.   Neurological: Patient is alert and oriented to person, place, and time. Cranial nerves II-XII are grossly intact with no focal deficits. Strength is 5/5 throughout and LIA intact. Resting tremor of head and RUE still present intermittently (patient was examined by me twice this day and one visit the tremor was present and the other visit tremor was not present).  Skin: Skin is warm.  No cyanosis or erythema.    Discharge Disposition  Home with HH    Visiting Nurse:    Yes     Home PT/OT:  Yes     Home Safety Evaluation:  Yes     DME  Rolling walker    Discharge Diet:      Dietary Orders (From admission, onward)    Start     Ordered    11/12/18 0945  Diet Regular; Cardiac  Diet Effective Now     Question Answer Comment   Diet Texture / Consistency Regular    Common Modifiers Cardiac        11/12/18 0945          Activity at Discharge:  With Walker    Pre-discharge education  Medications and follow-up      Follow-up Appointments  Future Appointments   Date Time Provider  Department Center   12/4/2018  3:00 PM Kurt Covington MD [facility] None   12/6/2018 12:45 PM Laura Ayala APRN [facility] None     Additional Instructions for the Follow-ups that You Need to Schedule     Discharge Follow-up with PCP   As directed       Currently Documented PCP:    Laura Ayala APRN    PCP Phone Number:    [phone]     Follow Up Details:  1 week         Discharge Follow-up with Specialty: Neurology (Patient has seen 3 since April of this year); 2 Weeks   As directed      Specialty:  Neurology (Patient has seen 3 since April of this year)    Follow Up:  2 Weeks               Test Results Pending at Discharge       Monica Osorio MD  11/13/18  3:50 PM    Time: Discharge >30 min (Secondary to long discussion with patient and PT regarding gait and need to continued  PT and abnormalities present, exam, order and documentation)

## 2018-11-13 NOTE — THERAPY DISCHARGE NOTE
Acute Care - Occupational Therapy Treatment Note/Discharge  FRANCHESKA Novoa     Patient Name: Shaylee Wadsworth  : 1960  MRN: [id number]  Today's Date: 2018  Onset of Illness/Injury or Date of Surgery: 18  Date of Referral to OT: 18  Referring Physician: Dr. Osorio      Admit Date: 2018    Visit Dx:     ICD-10-CM ICD-9-CM   1. Difficulty walking R26.2 719.7   2. Dizziness R42 780.4   3. Hypertension, unspecified type I10 401.9   4. Tremors of nervous system R25.1 781.0     Patient Active Problem List   Diagnosis   • Epigastric pain   • Abnormal electrocardiogram   • Abnormal radiographic examination   • Disc disorder of thoracic region   • Gastroesophageal reflux disease   • Chronic gastritis   • Hyperlipidemia LDL goal <100   • Hypertension   • Menopausal symptom   • Migraine   • Osteoarthritis of shoulder   • Osteopenia   • Rheumatoid arthritis (CMS/HCC)   • Menopausal state   • Multiple pulmonary nodules determined by computed tomography of lung   • H/O hyperparathyroidism   • Tendinitis of left shoulder   • Healthcare maintenance   • Hypokalemia   • Anxiety related tremor   • Functional movement disorder   • Difficulty walking       Therapy Treatment    Rehabilitation Treatment Summary     Row Name 18 1103       Treatment Time/Intention    Discipline  occupational therapist  -EN    Document Type  therapy note (daily note)  -EN    Subjective Information  complains of tremors  -EN    Mode of Treatment  occupational therapy  -EN    Patient/Family Observations  Patient supine in bed, agreed to OT  -EN    Care Plan Review  care plan/treatment goals reviewed;risks/benefits reviewed;current/potential barriers reviewed  -EN    Care Plan Review, Other Participant(s)  --    Patient Effort  adequate  -EN    Comment  Patient reported she felt better than she did earlier when she worked with PT.    -EN    Existing Precautions/Restrictions  fall  -EN    Treatment Considerations/Comments  --     Recorded by [EN] Danica Mehta, OTR 11/13/18 1356    Row Name 11/13/18 1103       Cognitive Assessment/Intervention- PT/OT    Personal Safety Interventions  gait belt;nonskid shoes/slippers when out of bed  -EN    Recorded by [EN] Danica Mehta, OTR 11/13/18 1356       Safety Issues, Functional Mobility    Safety Issues Affecting Function (Mobility)  other (see comments) tremors, head jerking  -EN    Comment, Safety Issues/Impairments (Mobility)  Patient continues to have tremors and excessive head movement during functional mobility.  -EN    Recorded by [EN] Danica Mehta, OTR 11/13/18 1356    Row Name 11/13/18 1103       Bed Mobility Assessment/Treatment    Supine-Sit [place] (Bed Mobility)  supervision  -EN    Sit-Supine [place] (Bed Mobility)  --    Assistive Device (Bed Mobility)  --    Comment (Bed Mobility)  --    Recorded by [EN] Danica Mehta OTR 11/13/18 1356    Row Name 11/13/18 1103          Functional Mobility- Ind. Level  contact guard assist  -EN    Functional Mobility- Device  rolling walker  -EN    Functional Mobility-Distance (Feet)  200  -EN    Recorded by [EN] Danica Mehta, OTR 11/13/18 1356    Row Name 11/13/18 1103       Sit-Stand Transfer    Sit-Stand [place] (Transfers)  contact guard  -EN    Assistive Device (Sit-Stand Transfers)  walker, front-wheeled  -EN    Recorded by [EN] Danica Mehta OTR 11/13/18 1356    Row Name 11/13/18 1103       Stand-Sit Transfer    Stand-Sit [place] (Transfers)  contact guard  -EN    Assistive Device (Stand-Sit Transfers)  walker, front-wheeled  -EN    Recorded by [EN] Danica Mehta, OTR 11/13/18 1356    Row Name 11/13/18 1103          Comment (Lower Body Dressing)  Patient sat at EOB and reached down to adjust sock with no difficulty or tremors.  Once patient returned to sitting, tremors and excessive head movements returned.  Patient issued reacher and educated on use.  -EN    Recorded  by [EN] Danica Mehta, OTR 11/13/18 1356    Row Name 11/13/18 1103       Positioning and Restraints    Pre-Treatment Position  in bed  -EN    Post Treatment Position  chair  -EN    In Bed  --    In Chair  reclined;call light within reach;encouraged to call for assist once reclined in chair, no tremors or head jerking occured  -EN    Recorded by [EN] Danica Mehta, OTR 11/13/18 1356    Row Name 11/13/18 1103       Pain Scale: Numbers Pre/Post-Treatment    Pain Scale: Numbers, Pretreatment  0/10 - no pain  -EN    Pain Scale: Numbers, Post-Treatment  0/10 - no pain  -EN    Recorded by [EN] Danica Mehta, OTR 11/13/18 [facility]    Row Name 11/13/18 1103       Plan of Care Review    Plan of Care Reviewed With  patient  -EN    Recorded by [EN] Danica Mehta, OTR 11/13/18 [facility]    Row Name 11/13/18 1103             Outcome Summary/Treatment Plan (OT)    Daily Summary of Progress (OT)  progress toward functional goals as expected  -EN      Barriers to Overall Progress (OT)  excessive head movements and tremors  -EN      Plan for Continued Treatment (OT)  No further OT needs  -EN      Anticipated Discharge Disposition (OT)  home with /7 care;home with home health  -EN      Patient/Family Concerns, Anticipated Discharge Disposition (OT)  Recommended patient limit falls risks such as use of the rolling walker and assist with functional mobility/ADLs.    -EN      Recorded by [EN] Danica Mehta, OTR 11/13/18 1359        User Key  (r) = Recorded By, (t) = Taken By, (c) = Cosigned By    Initials Name Effective Dates Discipline    EN Danica Mehta, OTR 06/22/16 -  OT    Kareen Gonzalez, PT 04/03/18 -  PT    Martha Newell, PT Student 10/10/18 -  PT             OT Rehab Goals     Row Name 11/13/18 1103             Patient Education Goal (OT)    Activity (Patient Education Goal, OT)  Patient to demonstrate ind with reacher for improved safety for retrieving items from floor level.   -EN      [place]/Cues/Accuracy (Memory Goal 2, OT)  demonstrates adequately  -EN      Time Frame (Patient Education Goal, OT)  short term goal (STG);1 day  -EN      Progress/Outcome (Patient Education Goal, OT)  goal met  -EN        User Key  (r) = Recorded By, (t) = Taken By, (c) = Cosigned By    Initials Name Provider Type Discipline    Danica Fierro OTR Occupational Therapist OT          Occupational Therapy Education     Title: PT OT SLP Therapies (Done)     Topic: Occupational Therapy (Resolved)     Point: ADL training (Resolved)     Description: Instruct learner(s) on proper safety adaptation and remediation techniques during self care or transfers.   Instruct in proper use of assistive devices.    Learning Progress Summary           Patient Acceptance, E, VU by YEMI at 11/13/2018  1:57 PM    Comment:  Patient issued reacher and educated on use.  Educated on recommendation of assist at home with mobility/ADLs and use of walker due to falls risk.    Acceptance, E, VU by ORESTES at 11/13/2018  1:50 PM    Acceptance, E, VU by YEMI at 11/12/2018  2:22 PM    Comment:  Patient educated on safety during functional mobility and transfers.                               User Key     Initials Effective Dates Name Provider Type Discipline    YEMI 06/22/16 -  Danica Mehta OTAMANDA Occupational Therapist OT    ORESTES 10/10/18 -  Martha Mejia, PT Student PT Student PT                OT Recommendation and Plan  Outcome Summary/Treatment Plan (OT)  Daily Summary of Progress (OT): progress toward functional goals as expected  Barriers to Overall Progress (OT): excessive head movements and tremors  Plan for Continued Treatment (OT): No further OT needs  Anticipated Discharge Disposition (OT): home with 24/7 care, home with home health  Patient/Family Concerns, Anticipated Discharge Disposition (OT): Recommended patient limit falls risks such as use of the rolling walker and assist with functional mobility/ADLs.     Planned Therapy Interventions (OT Eval): adaptive equipment training  Therapy Frequency (OT Eval): other (see comments)(one visit )  Daily Summary of Progress (OT): progress toward functional goals as expected  Plan of Care Review  Plan of Care Reviewed With: patient  Plan of Care Reviewed With: patient  Outcome Summary: OT-  .  Patient continues to demonstrate excessive head movements and tremors intermittently (cause remains unknown).  Patient requried CGA for functional mobility with the rolling walker.  Patient issued reacher and advised to use to avoid bending (reported bending over increases dizziness).  Educated patient on need for 24/7 assist and use of rolling walker due to intermittent symptoms and falls risk.  No further skilled OT needs at this time.    Outcome Measures     Row Name 11/13/18 1313 11/13/18 1103 11/13/18 0902       How much help from another person do you currently need...    Turning from your back to your side while in flat bed without using bedrails?  4  -JW (r) ORESTES (t) JW (c)  --  4  -JW (r) ORESTES (t) JW (c)    Moving from lying on back to sitting on the side of a flat bed without bedrails?  4  -JW (r) ORESTES (t) JW (c)  --  4  -JW (r) ORESTES (t) JW (c)    Moving to and from a bed to a chair (including a wheelchair)?  3  -JW (r) ORESTES (t) JW (c)  --  3  -JW (r) ORESTES (t) JW (c)    Standing up from a chair using your arms (e.g., wheelchair, bedside chair)?  3  -JW (r) ORESTES (t) JW (c)  --  3  -JW (r) ORESTES (t) JW (c)    Climbing 3-5 steps with a railing?  2  -JW (r) ORESTES (t) JW (c)  --  2  -JW (r) ORESTES (t) JW (c)    To walk in hospital room?  3  -JW (r) ORESTES (t) JW (c)  --  3  -JW (r) ORESTES (t) JW (c)    AM-PAC 6 Clicks Score  19  -JW (r) ORESTES (t)  --  19  -JW (r) ORESTES (t)       How much help from another is currently needed...    Putting on and taking off regular lower body clothing?  --  3  -EN  --    Bathing (including washing, rinsing, and drying)  --  3  -EN  --    Toileting (which includes using toilet bed pan or  urinal)  --  3  -EN  --    Putting on and taking off regular upper body clothing  --  4  -EN  --    Taking care of personal grooming (such as brushing teeth)  --  4  -EN  --    Eating meals  --  4  -EN  --    Score  --  21  -EN  --       Functional Assessment    Outcome Measure Options  AM-PAC 6 Clicks Basic Mobility (PT)  -JW (r) ORESTES (t) JW (c)  --  AM-PAC 6 Clicks Basic Mobility (PT)  -JW (r) ORESTES (t) JW (c)    Row Name 11/12/18 [facility] 11/12/18 1318          How much help from another person do you currently need...    Turning from your back to your side while in flat bed without using bedrails?  --  4  -JW (r) ORESTES (t) JW (c)     Moving from lying on back to sitting on the side of a flat bed without bedrails?  --  4  -JW (r) ORESTES (t) JW (c)     Moving to and from a bed to a chair (including a wheelchair)?  --  3  -JW (r) ORESTES (t) JW (c)     Standing up from a chair using your arms (e.g., wheelchair, bedside chair)?  --  3  -JW (r) ORESTES (t) JW (c)     Climbing 3-5 steps with a railing?  --  2  -JW (r) ORESTES (t) JW (c)     To walk in hospital room?  --  3  -JW (r) ORESTES (t) JW (c)     AM-[facility] 6 Clicks Score  --  19  -JW (r) ORESTES (t)        How much help from another is currently needed...    Putting on and taking off regular lower body clothing?  3  -EN  --     Bathing (including washing, rinsing, and drying)  3  -EN  --     Toileting (which includes using toilet bed pan or urinal)  3  -EN  --     Putting on and taking off regular upper body clothing  4  -EN  --     Taking care of personal grooming (such as brushing teeth)  4  -EN  --     Eating meals  4  -EN  --     Score  21  -EN  --        Functional Assessment    Outcome Measure Options  AM-PAC 6 Clicks Daily Activity (OT)  -EN  AM-PAC 6 Clicks Basic Mobility (PT)  -JW (r) ORESTES (t) JW (c)       User Key  (r) = Recorded By, (t) = Taken By, (c) = Cosigned By    Initials Name Provider Type    Danica Fierro OTR Occupational Therapist    Kareen Gonzalez PT Physical Therapist     Martha Newell, PT Student PT Student           Time Calculation:    Time Calculation- OT     Row Name 11/13/18 1410 11/13/18 1353 11/13/18 0936       Time Calculation- OT    OT Start Time  1103  -EN  --  --    OT Stop Time  1119  -EN  --  --    OT Time Calculation (min)  16 min  -EN  --  --       Timed Charges    19088 - Gait Training Minutes   --  14  -JEREMIAH (r) ORESTES (t) JEREMIAH (c)  17  -JEREMIAH (r) ORESTES (t) JEREMIAH (c)    88973 - OT Therapeutic Activity Minutes  16  -EN  --  --      User Key  (r) = Recorded By, (t) = Taken By, (c) = Cosigned By    Initials Name Provider Type    EN Danica Mehta OTR Occupational Therapist    Kareen Gonzalez, PT Physical Therapist    Martha Newell, PT Student PT Student        Therapy Suggested Charges     Code   Minutes Charges    54811 (CPT®) Hc Ot Neuromusc Re Education Ea 15 Min      66562 (CPT®) Hc Ot Ther Proc Ea 15 Min      09343 (CPT®) Hc Ot Therapeutic Act Ea 15 Min 16 1    17540 (CPT®) Hc Ot Manual Therapy Ea 15 Min      39378 (CPT®) Hc Ot Iontophoresis Ea 15 Min      24484 (CPT®) Hc Ot Elec Stim Ea-Per 15 Min      63278 (CPT®) Hc Ot Ultrasound Ea 15 Min      46011 (CPT®) Hc Ot Self Care/Mgmt/Train Ea 15 Min      Total  16 1        Therapy Charges for Today     Code Description Service Date Service Provider Modifiers Qty    55652673528 HC OT EVAL LOW COMPLEXITY 1 11/12/2018 Danica Mehta OTR GO 1    85357824322 HC OT THERAPEUTIC ACT EA 15 MIN 11/13/2018 Danica Mehta OTR GO 1               OT Discharge Summary  Anticipated Discharge Disposition (OT): home with 24/7 care, home with home health  Reason for Discharge: All goals achieved  Outcomes Achieved: Able to achieve all goals within established timeline  Discharge Destination: Home with assist, Home with home health    MARTÍN Rivera  11/13/2018

## 2018-11-13 NOTE — THERAPY TREATMENT NOTE
Acute Care - Physical Therapy Treatment Note  FRANCHESKA Novoa     Patient Name: Shaylee Wadsworth  : 1960  MRN: [id number]  Today's Date: 2018  Onset of Illness/Injury or Date of Surgery: 18  Date of Referral to PT: 18  Referring Physician: Dr. Osorio    Admit Date: 2018    Visit Dx:    ICD-10-CM ICD-9-CM   1. Difficulty walking R26.2 719.7   2. Dizziness R42 780.4   3. Hypertension, unspecified type I10 401.9   4. Tremors of nervous system R25.1 781.0     Patient Active Problem List   Diagnosis   • Epigastric pain   • Abnormal electrocardiogram   • Abnormal radiographic examination   • Disc disorder of thoracic region   • Gastroesophageal reflux disease   • Chronic gastritis   • Hyperlipidemia LDL goal <100   • Hypertension   • Menopausal symptom   • Migraine   • Osteoarthritis of shoulder   • Osteopenia   • Rheumatoid arthritis (CMS/HCC)   • Menopausal state   • Multiple pulmonary nodules determined by computed tomography of lung   • H/O hyperparathyroidism   • Tendinitis of left shoulder   • Healthcare maintenance   • Hypokalemia   • Anxiety related tremor   • Functional movement disorder   • Difficulty walking       Therapy Treatment    Rehabilitation Treatment Summary     Row Name 18 0902             Treatment Time/Intention    Discipline  physical therapist  -ORESTES DANIELS JW2      Document Type  therapy note (daily note)  -ORESTES DANIELS JW2      Subjective Information  complains of;dizziness tremors  -ORESTES DANIELS JW2      Mode of Treatment  physical therapy  -ORESTES DANIELSJW2      Patient/Family Observations  Pt supine with HOB elevated,  present  -ORESTES DANIELS JW2      Care Plan Review  care plan/treatment goals reviewed;risks/benefits reviewed;patient/other agree to care plan  -ORESTES DANIELS JW2      Care Plan Review, Other Participant(s)  spouse  -ORESTES DANIELS,JW2      Patient Effort  adequate  -ORESTES DANIELS JW2      Comment  Pt complains of new balance deficit that began about 20 minutes before PT arrived. Pt states that  "she cannot stop her head and body from shaking throughout treatment.    -ORESTES DANIELS,JW2      Existing Precautions/Restrictions  fall  -ORESTES DANIELS,JW2      Treatment Considerations/Comments  \"My hands and head aren't coordinating right\"  -ORESTES DANIELS,JW2      Recorded by [ORESTES DANIELS,JW2] Kareen June, PT (r) Martha Mejia, PT Student (t) Kareen June, PT (c) 11/13/18 0950      Row Name 11/13/18 0902             Cognitive Assessment/Intervention- PT/OT    Personal Safety Interventions  gait belt;nonskid shoes/slippers when out of bed;supervised activity  -JEREMIAH,ORESTES,JW2      Recorded by [ORESTES DANIELS,JW2] Kareen June, PT (r) Martha Mejia, PT Student (t) Kareen June, PT (c) 11/13/18 0950      Row Name 11/13/18 0902             Safety Issues, Functional Mobility    Comment, Safety Issues/Impairments (Mobility)  Patient with tremors and excessive head movements during functional mobility tasks  -ORESTES DANIELS,JW2      Recorded by [ORESTES DANIELS,JW2] Kareen June, PT (r) Martha Mejia, PT Student (t) Kareen June, PT (c) 11/13/18 0950      Row Name 11/13/18 0902             Bed Mobility Assessment/Treatment    Supine-Sit [place] (Bed Mobility)  supervision;verbal cues  -ORESTES DANIELS JW2      Sit-Supine [place] (Bed Mobility)  verbal cues;supervision  -ORESTES DANIELS JW2      Assistive Device (Bed Mobility)  head of bed elevated;bed rails  -ORESTES DANIELS JW2      Comment (Bed Mobility)  Increased time to complete  -ORESTES DANIELS,JW2      Recorded by [JEREMIAH,ORESTES,JW2] Kareen June, PT (r) Martha Mejia, PT Student (t) Kareen June, PT (c) 11/13/18 0950      Row Name 11/13/18 0902             Sit-Stand Transfer    Sit-Stand [place] (Transfers)  contact guard;verbal cues;1 person assist  -ORESTES DANIELS,JW2      Assistive Device (Sit-Stand Transfers)  walker, front-wheeled  -ORESTES DANIELS,JW2      Recorded by [ORESTES DANIELS,JW2] Kareen June, PT (r) Martha Mejia, PT Student (t) Kareen June, PT (c) 11/13/18 0950      Row Name 11/13/18 " 0902             Stand-Sit Transfer    Stand-Sit [place] (Transfers)  contact guard;verbal cues;1 person assist  -ORESTES DANIELS,JW2      Assistive Device (Stand-Sit Transfers)  walker, front-wheeled  -ORESTES DANIELS,JW2      Recorded by [ORESTES DANIELS,JW2] Kareen June, PT (r) Martha Mejia, PT Student (t) Kareen June, PT (c) 11/13/18 0950      Row Name 11/13/18 0902             Gait/Stairs Assessment/Training    [place] Level (Gait)  contact guard;verbal cues;1 person assist;1 person to manage equipment  -ORESTES DANIELSJW2      Assistive Device (Gait)  walker, front-wheeled  -ORESTES DANIELS JW2      Distance in Feet (Gait)  36  -ORESTES DANIELS JW2      Pattern (Gait)  step-through  -ORESTES DANIELS JW2      Deviations/Abnormal Patterns (Gait)  bilateral deviations;al[place] decreased  -ORESTES DANIELS JW2      Bilateral Gait Deviations  forward flexed posture;heel strike decreased;weight shift ability decreased  -ORESTES DANIELS JW2      Comment (Gait/Stairs)  Pt performs gait activities with FWW, CGA, and one person to manage equipment x 36ft. Pt demonstrates whole body tremors as well as head tremors throughout gait, that cause a deficit in safety. The tremors limited gait distance d/t the safety concern.  -ORESTES DANIELS,JW2      Recorded by [ORESTES DANIELS,JW2] Kareen June, PT (r) Martha Mejia, PT Student (t) Kareen June, PT (c) 11/13/18 0950      Row Name 11/13/18 0902             Lower Body Dressing Assessment/Training    Comment (Lower Body Dressing)  Pt requires SBA with donning non-slip socks  -ORESTES DANIELS,JW2      Recorded by [ORESTES DANIELS,JW2] Kareen June PT (r) Martha Mejia PT Student (t) Kareen June, PT (c) 11/13/18 0950      Row Name 11/13/18 0902             Positioning and Restraints    Pre-Treatment Position  in bed  -ORESTES DANIELS JW2      Post Treatment Position  bed  -ORESTES DANIELS JW2      In Bed  supine;call light within reach;encouraged to call for assist;with family/caregiver  -ORESTES DANIELS,JW2      Recorded by [JEREMIAH,ORESTES,JW2] Kareen June, ERIKA (ramirez) Roberto,  Martha Hayes, PT Student (t) Kareen June, PT (c) 11/13/18 0950      Row Name 11/13/18 0902             Pain Scale: Numbers Pre/Post-Treatment    Pain Scale: Numbers, Pretreatment  0/10 - no pain  -ORESTES DANIELS,JW2      Recorded by [ORESTES DANIELS,JW2] Kareen June, PT (r) Martha Mejia, PT Student (t) Kareen June, PT (c) 11/13/18 0950      Row Name 11/13/18 0902             Coping    Observed Emotional State  anxious;crying continuous/inconsolable  -ORESTES DANIELS,JW2      Verbalized Emotional State  frustration  -ORESTES DANIELS,JW2      Recorded by [ORESTES DANIELS,JW2] Kareen June, PT (r) Martha Mejia, PT Student (t) Kareen June, PT (c) 11/13/18 0950      Row Name 11/13/18 0902             Plan of Care Review    Plan of Care Reviewed With  patient;spouse  -ORESTES DANIELS,JW2      Recorded by [ORESTES DANIELS,JW2] Kareen June, PT (r) Martha Mejia, PT Student (t) Kareen June, PT (c) 11/13/18 0950      Row Name 11/13/18 0902             Outcome Summary/Treatment Plan (PT)    Daily Summary of Progress (PT)  progress toward functional goals is gradual  -ORESTES DANIELS,JW2      Barriers to Overall Progress (PT)  tremors, anxiety about movement  -ORESTES DANIELS,JW2      Anticipated Discharge Disposition (PT)  home with home health  -ORESTES DANIELS,JW2      Recorded by [ORESTES DANIELS,JW2] Kareen June, PT (r) Martha Mejia, PT Student (t) Kareen June, PT (c) 11/13/18 0950        User Key  (r) = Recorded By, (t) = Taken By, (c) = Cosigned By    Initials Name Effective Dates Discipline    Kareen Gonzalez, PT 04/03/18 -  PT    Martha Newell, PT Student 10/10/18 -  PT                   Physical Therapy Education     Title: PT OT SLP Therapies (Done)     Topic: Physical Therapy (Done)     Point: Mobility training (Done)     Learning Progress Summary           Patient Acceptance, E, VU by ORESTES at 11/13/2018  9:33 AM    Acceptance, СЕРГЕЙ VU by ORESTES at 11/12/2018  2:54 PM                               User Key     Initials Effective Dates  Name Provider Type Discipline     10/10/18 -  Martha Mejia, PT Student PT Student PT                PT Recommendation and Plan  Anticipated Discharge Disposition (PT): home with home health  Planned Therapy Interventions (PT Eval): strengthening, transfer training, gait training  Therapy Frequency (PT Clinical Impression): daily  Outcome Summary/Treatment Plan (PT)  Daily Summary of Progress (PT): progress toward functional goals is gradual  Barriers to Overall Progress (PT): tremors, anxiety about movement  Anticipated Equipment Needs at Discharge (PT): front wheeled walker  Anticipated Discharge Disposition (PT): home with home health  Plan of Care Reviewed With: patient, spouse  Outcome Summary: PT: Pt performs gait activities with FWW, CGA, and one person to manage equipment x 36ft. Pt demonstrates whole body tremors and excessive head movements throughout gait activities. The tremors limited gait distance, as there was a concern for safety.    Outcome Measures     Row Name 11/13/18 0902 11/12/18 1400 11/12/18 1318       How much help from another person do you currently need...    Turning from your back to your side while in flat bed without using bedrails?  4  -JW (r) ORESTES (t) JW (c)  --  4  -JW (r) ORESTES (t) JW (c)    Moving from lying on back to sitting on the side of a flat bed without bedrails?  4  -JW (r) ORESTES (t) JW (c)  --  4  -JW (r) ORESTES (t) JW (c)    Moving to and from a bed to a chair (including a wheelchair)?  3  -JW (r) ORESTES (t) JW (c)  --  3  -JW (r) ORESTES (t) JW (c)    Standing up from a chair using your arms (e.g., wheelchair, bedside chair)?  3  -JW (r) ORESTES (t) JW (c)  --  3  -JW (r) ORESTES (t) JW (c)    Climbing 3-5 steps with a railing?  2  -JW (r) ORESTES (t) JW (c)  --  2  -JW (r) ORESTES (t) JW (c)    To walk in hospital room?  3  -JW (r) ORESTES (t) JW (c)  --  3  -JW (r) ORESTES (t) JW (c)    AM-PAC 6 Clicks Score  19  -JW (r) ORESTES (t)  --  19  -JEREMIAH (r) ORESTES (t)       How much help from another is currently needed...     Putting on and taking off regular lower body clothing?  --  3  -EN  --    Bathing (including washing, rinsing, and drying)  --  3  -EN  --    Toileting (which includes using toilet bed pan or urinal)  --  3  -EN  --    Putting on and taking off regular upper body clothing  --  4  -EN  --    Taking care of personal grooming (such as brushing teeth)  --  4  -EN  --    Eating meals  --  4  -EN  --    Score  --  21  -EN  --       Functional Assessment    Outcome Measure Options  AM-PAC 6 Clicks Basic Mobility (PT)  -JW (r) ORESTES (t) JW (c)  AM-PAC 6 Clicks Daily Activity (OT)  -EN  AM-PAC 6 Clicks Basic Mobility (PT)  -JW (r) ORESTES (t) JEREMIAH (c)      User Key  (r) = Recorded By, (t) = Taken By, (c) = Cosigned By    Initials Name Provider Type    EN Danica Mehta, OTR Occupational Therapist    Kareen Gonzalez, PT Physical Therapist    Martha Newell, PT Student PT Student         Time Calculation:   PT Charges     Row Name 11/13/18 0936             Time Calculation    Start Time  0902  -JW (r) ORESTES (t) JW (c)      Stop Time  0919  -JW (r) ORESTES (t) JW (c)      Time Calculation (min)  17 min  -JW (r) ORESTES (t)      PT Received On  11/13/18  -JW (r) ORESTES (t) JW (c)      PT - Next Appointment  11/14/18  -JW (r) ORESTES (t) JW (c)         Timed Charges    03616 - Gait Training Minutes   17  -JW (r) ORESTES (t) JEREMIAH (c)        User Key  (r) = Recorded By, (t) = Taken By, (c) = Cosigned By    Initials Name Provider Type    Kareen Gonzalez, PT Physical Therapist    Martha Newell, PT Student PT Student        Therapy Suggested Charges     Code   Minutes Charges    13515 (CPT®) Hc Pt Neuromusc Re Education Ea 15 Min      55875 (CPT®) Hc Pt Ther Proc Ea 15 Min      18720 (CPT®) Hc Gait Training Ea 15 Min 17 1    46285 (CPT®) Hc Pt Therapeutic Act Ea 15 Min      54536 (CPT®) Hc Pt Manual Therapy Ea 15 Min      39037 (CPT®) Hc Pt Iontophoresis Ea 15 Min      21602 (CPT®) Hc Pt Elec Stim Ea-Per 15 Min      44707 (CPT®) Hc  Pt Ultrasound Ea 15 Min      13324 (CPT®) Hc Pt Self Care/Mgmt/Train Ea 15 Min      98847 (CPT®) Hc Pt Prosthetic (S) Train Initial Encounter, Each 15 Min      41676 (CPT®) Hc Pt Orthotic(S)/Prosthetic(S) Encounter, Each 15 Min      43928 (CPT®) Hc Orthotic(S) Mgmt/Train Initial Encounter, Each 15min      Total  17 1        Therapy Charges for Today     Code Description Service Date Service Provider Modifiers Qty    08331653431 HC PT EVAL LOW COMPLEXITY 1 11/12/2018 Martha Mejia, PT Student GP 1    12815628202 HC GAIT TRAINING EA 15 MIN 11/13/2018 Martha Mejia, PT Student GP 1          PT G-Codes  PT Professional Judgement Used?: Yes  Outcome Measure Options: AM-PAC 6 Clicks Basic Mobility (PT)  AM-PAC 6 Clicks Score: 19  Score: 21  Functional Limitation: Mobility: Walking and moving around  Mobility: Walking and Moving Around Current Status (): At least 20 percent but less than 40 percent impaired, limited or restricted  Mobility: Walking and Moving Around Goal Status (): At least 1 percent but less than 20 percent impaired, limited or restricted    Martha Mejia, PT Student  11/13/2018

## 2018-11-13 NOTE — NURSING NOTE
L/M w/ Case Mgt (Jenn Sullivan) to f/u with Home Health /PT on Wednesday 11/14/18 for the Patient home health needs.

## 2018-11-13 NOTE — PLAN OF CARE
Problem: Patient Care Overview  Goal: Plan of Care Review   11/13/18 4963   OTHER   Outcome Summary OT- . Patient continues to demonstrate excessive head movements and tremors intermittently (cause remains unknown). Patient requried CGA for functional mobility with the rolling walker. Patient issued reacher and advised to use to avoid bending (reported bending over increases dizziness). Educated patient on need for 24/7 assist and use of rolling walker due to intermittent symptoms and falls risk. No further skilled OT needs at this time.

## 2018-11-14 ENCOUNTER — READMISSION MANAGEMENT (OUTPATIENT)
Dept: CALL CENTER | Facility: HOSPITAL | Age: 58
End: 2018-11-14

## 2018-11-14 ENCOUNTER — TELEPHONE (OUTPATIENT)
Dept: INTERNAL MEDICINE | Facility: CLINIC | Age: 58
End: 2018-11-14

## 2018-11-14 DIAGNOSIS — H81.90 PERIPHERAL VESTIBULOPATHY, UNSPECIFIED LATERALITY: ICD-10-CM

## 2018-11-14 DIAGNOSIS — R42 VERTIGO: Primary | ICD-10-CM

## 2018-11-14 NOTE — TELEPHONE ENCOUNTER
Order has been placed and Donya has been notified.     ----- Message from KAITLYNN Choi sent at 11/14/2018 10:42 AM EST -----  Regarding: RE: Requesting HH  Contact: [phone]  Okay to place order, but she was not home bound at last visit. I am not sure insurance will cover.   ----- Message -----  From: Bradley Olivera MA  Sent: 11/14/2018   9:41 AM  To: KAITLYNN Choi  Subject: Requesting                                     Donya with [facility] called,     Pt was in ER on 11/11/18 and discharged yesterday. Dr. Osorio was requesting  for this pt nursing PT and OT.     Ok to place orders?

## 2018-11-14 NOTE — NURSING NOTE
Continued Stay Note  FRANCHESKA Novoa     Patient Name: Shaylee Wadsworth  MRN: [id number]  Today's Date: 11/14/2018    Admit Date: 11/11/2018    Discharge Plan     Row Name 11/14/18 0910       Plan    Plan Comments  Per note from GIGI Russ CM, has arranged [facility].  Spoke with Whitley @[facility] and she will check notifying patient.      Row Name 11/14/18 0907       Plan    Final Discharge Disposition Code  06 - home with home health care    Final Note  Patient discharged home with home health services.         Discharge Codes    No documentation.       Expected Discharge Date and Time     Expected Discharge Date Expected Discharge Time    Nov 13, 2018             Aminah Flores RN

## 2018-11-14 NOTE — NURSING NOTE
Case Management Discharge Note    Final Note: Patient discharged home with home health services.     Destination      No service has been selected for the patient.      Durable Medical Equipment      No service has been selected for the patient.      Dialysis/Infusion      No service has been selected for the patient.      Home Medical Care      No service has been selected for the patient.      Community Resources      No service has been selected for the patient.             Final Discharge Disposition Code: 06 - home with home health care

## 2018-11-15 ENCOUNTER — OFFICE VISIT (OUTPATIENT)
Dept: INTERNAL MEDICINE | Facility: CLINIC | Age: 58
End: 2018-11-15

## 2018-11-15 VITALS
HEIGHT: 61 IN | OXYGEN SATURATION: 97 % | WEIGHT: 175 LBS | TEMPERATURE: 97.9 F | SYSTOLIC BLOOD PRESSURE: 122 MMHG | HEART RATE: 55 BPM | BODY MASS INDEX: 33.04 KG/M2 | DIASTOLIC BLOOD PRESSURE: 84 MMHG | RESPIRATION RATE: 16 BRPM

## 2018-11-15 DIAGNOSIS — G25.9 FUNCTIONAL MOVEMENT DISORDER: Primary | ICD-10-CM

## 2018-11-15 PROCEDURE — 99214 OFFICE O/P EST MOD 30 MIN: CPT | Performed by: NURSE PRACTITIONER

## 2018-11-15 NOTE — PROGRESS NOTES
"Chief Complaint   Patient presents with   • Follow-up     Hospital f/u       Subjective     Shaylee Wadsworth is a 57 y.o. female being seen for a follow up appointment today regarding functional movement disorder. She went to the ER 11- for dizziness and trouble walking. She reports that she woke up on Sunday night and could not walk due to \" imbalance.\" She reports \"I did not feel dizzy.\" She denies vertigo or sensation of room spinning, falls, or limb weakness. She was admitted for observation and  neuro evaluation by Dr. Winston. CT head wo contrast, ECG, UA, CBC, CMP were completed. MRI brain was repeated.    She was evaluated prior to this ER visit by ENT and four neurologists, and sent to [facility] for Functional movement disorder. She is also pending a [facility] evaluation in February.       History of Present Illness     Allergies   Allergen Reactions   • Meloxicam Swelling     EYES AND FACE SWELLING  EYES AND FACE SWELLING         Current Outpatient Medications:   •  amLODIPine (NORVASC) 5 MG tablet, Take 1 tablet by mouth Daily., Disp: 30 tablet, Rfl: 5  •  aspirin 81 MG tablet, Take 81 mg by mouth Daily., Disp: , Rfl:   •  atorvastatin (LIPITOR) 40 MG tablet, Take 1 tablet by mouth Daily., Disp: 90 tablet, Rfl: 3  •  clonazePAM (KlonoPIN) 0.5 MG tablet, Take 1 tablet by mouth 2 (Two) Times a Day As Needed (Dizziness) for up to 3 days., Disp: 6 tablet, Rfl: 0  •  ENBREL SURECLICK 50 MG/ML solution auto-injector, , Disp: , Rfl:   •  esomeprazole (nexIUM) 40 MG capsule, TAKE ONE CAPSULE BY MOUTH TWICE DAILY, Disp: 180 capsule, Rfl: 1  •  folic acid (FOLVITE) 1 MG tablet, Take by mouth., Disp: , Rfl:   •  KLOR-CON 20 MEQ CR tablet, TAKE ONE TABLET BY MOUTH TWICE DAILY, Disp: 180 tablet, Rfl: 1  •  methotrexate 2.5 MG tablet, TAKE 4 TABS PO ONCE WEEKLY ON SAME DAY, Disp: 32 tablet, Rfl: 11  •  PREMPRO 0.45-1.5 MG per tablet, TAKE ONE TABLET BY MOUTH ONCE DAILY (Patient taking differently: TAKE ONE " TABLET BY MOUTH on mon wed fri), Disp: 28 tablet, Rfl: 11  •  vitamin D (ERGOCALCIFEROL) 65746 units capsule capsule, TAKE ONE CAPSULE BY MOUTH ONCE A WEEK, Disp: 12 capsule, Rfl: 3  •  buPROPion XL (WELLBUTRIN XL) 150 MG 24 hr tablet, Take 1 tablet by mouth Every Morning., Disp: 30 tablet, Rfl: 6  •  olmesartan (BENICAR) 40 MG tablet, Take 1 tablet by mouth Daily., Disp: 90 tablet, Rfl: 3    Current Facility-Administered Medications:   •  cyanocobalamin injection 1,000 mcg, 1,000 mcg, Intramuscular, Q28 Days, Saurabh Ferreira MD, 1,000 mcg at 06/12/18 1632    The following portions of the patient's history were reviewed and updated as appropriate: allergies, current medications, past family history, past medical history, past social history, past surgical history and problem list.    Review of Systems   Constitutional: Negative.  Negative for fever and unexpected weight change.   HENT: Negative.    Eyes: Negative for photophobia and visual disturbance.   Respiratory: Negative.    Cardiovascular: Negative.    Gastrointestinal: Negative.    Endocrine: Negative.    Genitourinary: Negative.    Musculoskeletal: Positive for gait problem.   Neurological: Positive for tremors and speech difficulty. Negative for dizziness, seizures, syncope, facial asymmetry, weakness, light-headedness and headaches.   Psychiatric/Behavioral: The patient is nervous/anxious.        Assessment     Physical Exam   Constitutional: She is oriented to person, place, and time. She appears well-developed and well-nourished. No distress.   Cardiovascular: Normal rate, regular rhythm and normal heart sounds.   Pulmonary/Chest: Effort normal and breath sounds normal. No respiratory distress.   Musculoskeletal: She exhibits no edema.   Neurological: She is alert and oriented to person, place, and time. She displays normal reflexes. A cranial nerve deficit is present. No sensory deficit. She exhibits normal muscle tone. Coordination abnormal.   Gait is  slow, shuffling with right slap foot. Gross motor tremors in head. Tremors only occur while walking, resolve at rest. Strength 2 plus    Skin: Skin is warm and dry. She is not diaphoretic.   Psychiatric: Her behavior is normal. Her mood appears anxious.   Vitals reviewed.      Plan     Her fasting labs were reviewed with the patient from last week.     Shaylee was seen today for follow-up.    Diagnoses and all orders for this visit:    Functional movement disorder  -     Thyroid Peroxidase Antibody  -     Lyme, Total Antibody Test / Reflex  -     Comprehensive Metabolic Panel  -     CBC & Differential  -     Lead Standard Profile, Blood      She is going to [facility] for evaluation. In the meantime, she will start vestibular rehab and stay off work. Follow up in 2 weeks her in office. I am going to eval for neurotoxins and heavy metals.

## 2018-11-15 NOTE — OUTREACH NOTE
Prep Survey      Responses   Facility patient discharged from?  [facility]   Is LACE score < 7 ?  Yes   Is patient eligible?  Yes   Discharge diagnosis  Dizzy   Does the patient have one of the following disease processes/diagnoses(primary or secondary)?  Other   Does the patient have Home health ordered?  Yes   What is the Home health agency?   [facility]   Is there a DME ordered?  Yes   What DME was ordered?  Patient given a rolling walker.    Prep survey completed?  Yes          Susan Banda RN

## 2018-11-16 ENCOUNTER — READMISSION MANAGEMENT (OUTPATIENT)
Dept: CALL CENTER | Facility: HOSPITAL | Age: 58
End: 2018-11-16

## 2018-11-16 ENCOUNTER — NURSE TRIAGE (OUTPATIENT)
Dept: CALL CENTER | Facility: HOSPITAL | Age: 58
End: 2018-11-16

## 2018-11-16 NOTE — OUTREACH NOTE
LAG < 7 Survey      Responses   Facility patient discharged from?  [facility]   Does the patient have one of the following disease processes/diagnoses(primary or secondary)?  Other   Is there a successful TCM telephone encounter documented?  No   BHLAG <7 Attempt successful?  No   Unsuccessful attempts  Attempt 1          Renee Fields RN

## 2018-11-16 NOTE — TELEPHONE ENCOUNTER
Gave caller the phone number to [facility] [phone] and explained they are given 72 hours to contact patient from the time they receive referral and they got the referral on 18.     Reason for Disposition  • [1] Recent medical visit within 24 hours AND [2] condition/symptoms SAME (unchanged) AND [3] caller has additional questions triager can answer    Additional Information  • Negative: Shock suspected (e.g., cold/pale/clammy skin, too weak to stand, low BP, rapid pulse)  • Negative: Difficult to awaken or acting confused (e.g., disoriented, slurred speech)  • Negative: Sounds like a life-threatening emergency to the triager  • Negative: Recent (within last 24 hours) medical visit for an injury  • Negative: Recent surgery or surgical procedure  • Negative: Recent discharge from the hospital  • Negative: Asthma attack diagnosed recently  • Negative: Flu (influenza) diagnosed recently  • Negative: Ear infection (otitis media, middle ear infection) diagnosed recently  • Negative: Ear infection (otitis externa, swimmer's ear) diagnosed recently  • Negative: [1] Sinus infection AND [2] taking an antibiotic  • Negative: Skin infection (cellulitis) diagnosed recently  • Negative: Strep throat (strep pharyngitis) diagnosed recently  • Negative: Threatened miscarriage (threatened ) recently diagnosed  • Negative: Urine infection (FEMALE; cystitis, pyelonephritis, urethritis) ) diagnosed recently  • Negative: Urine infection (MALE; cystitis, pyelonephritis, prostatitis, epidydimitis, orchitis, urethritis) diagnosed recently  • Negative: Taking antibiotic for other infection  • Negative: More than 24 hours since medical visit  • Negative: [1] Drinking very little AND [2] dehydration suspected (e.g., no urine > 12 hours, very dry mouth, very lightheaded)  • Negative: Patient sounds very sick or weak to the triager  • Negative: Fever > 104 F (40 C)  • Negative: SEVERE pain (e.g.,  "excruciating, pain scale 8-10) AND [2] not improved after pain medications  • Negative: [1] Recent medical visit within 24 hours AND [2] condition/symptoms WORSE  • Negative: [1] Recent medical visit within 24 hours AND [2] NEW symptom AND [2] that could be serious  • Negative: [1] Caller has URGENT question (includes prescribed medication questions) AND [2] triager unable to answer question  • Negative: [1] Recent medical visit within 24 hours AND [2] NEW onset of fever AND [3] HCP said to call if this occurred  • Negative: [1] Caller has NON-URGENT question (includes prescribed medication questions) AND [2] triager unable to answer    Answer Assessment - Initial Assessment Questions  1. MAIN CONCERN OR SYMPTOM:  \"What is your main concern right now?\" \"What question do you have?\" \"What's the main symptom you're worried about?\" (e.g., breathing difficulty, cough, fever. pain)      When will her home health services begin?   2. ONSET: \"When did the  ________  start?\"      Discharged on 11/13/18  3. BETTER-SAME-WORSE: \"Are you getting better, staying the same, or getting worse compared to how you felt at your last visit to the doctor (most recent medical visit)\"?      Same  4. VISIT DATE: \"When were you seen?\" (Date)      Admitted 11/11-11/13  5. VISIT DOCTOR: \"What is the name of the doctor taking care of you now?\"      PCP Dr. Ayala  6. VISIT DIAGNOSIS:  \"What was the main symptom or problem that you were seen for?\" \"Were you given a diagnosis?\"      Vertigo, functional/psychogenic movement disorder with tremor  7. VISIT MEDICATIONS: \"Did the physician order any new medicines for you to use?\" If yes: \"Have you filled the prescription and started taking the medicine?\"    Start Klonopin, d/c ziac, cyanocobalamin injection, diazepam.  8. NEXT APPOINTMENT: \"Have you scheduled a follow-up appointment with your doctor?\"      11/29/18  9. PAIN: \"Is there any pain?\" If so, ask: \"How bad is it?\"  (Scale 1-10; or mild, " "moderate, severe)      NA  10. FEVER: \"Do you have a fever?\" If so, ask: \"What is it, how was it measured  and when did it start?\"        NA  11. OTHER SYMPTOMS: \"Do you have any other symptoms?\"        Same    Protocols used: RECENT MEDICAL VISIT FOR ILLNESS FOLLOW-UP CALL-ADULT-      "

## 2018-11-19 ENCOUNTER — READMISSION MANAGEMENT (OUTPATIENT)
Dept: CALL CENTER | Facility: HOSPITAL | Age: 58
End: 2018-11-19

## 2018-11-19 NOTE — OUTREACH NOTE
Medical Week 2 Survey      Responses   Facility patient discharged from?  [place]   Does the patient have one of the following disease processes/diagnoses(primary or secondary)?  Other          Laura Pennington RN

## 2018-11-20 LAB
ALBUMIN SERPL-MCNC: 4.2 G/DL (ref 3.5–5.2)
ALBUMIN/GLOB SERPL: 1.6 G/DL
ALP SERPL-CCNC: 76 U/L (ref 40–129)
ALT SERPL-CCNC: 13 U/L (ref 5–33)
AST SERPL-CCNC: 13 U/L (ref 5–32)
B BURGDOR IGG+IGM SER-ACNC: <0.91 ISR (ref 0–0.9)
BASOPHILS # BLD AUTO: 0.08 10*3/MM3 (ref 0–0.2)
BASOPHILS NFR BLD AUTO: 1 % (ref 0–2)
BILIRUB SERPL-MCNC: 0.4 MG/DL (ref 0.2–1.2)
BUN SERPL-MCNC: 14 MG/DL (ref 6–20)
BUN/CREAT SERPL: 17.9 (ref 7–25)
CALCIUM SERPL-MCNC: 9.7 MG/DL (ref 8.6–10.5)
CHLORIDE SERPL-SCNC: 100 MMOL/L (ref 98–107)
CO2 SERPL-SCNC: 30.1 MMOL/L (ref 22–29)
CREAT SERPL-MCNC: 0.78 MG/DL (ref 0.57–1)
EOSINOPHIL # BLD AUTO: 0.15 10*3/MM3 (ref 0.1–0.3)
EOSINOPHIL NFR BLD AUTO: 1.9 % (ref 0–4)
ERYTHROCYTE [DISTWIDTH] IN BLOOD BY AUTOMATED COUNT: 14.5 % (ref 11.5–14.5)
FEP BLD-MCNC: 25 UG/DL (ref 0–100)
GLOBULIN SER CALC-MCNC: 2.6 GM/DL
GLUCOSE SERPL-MCNC: 95 MG/DL (ref 65–99)
HCT VFR BLD AUTO: 39.6 % (ref 37–47)
HGB BLD-MCNC: 13.1 G/DL (ref 12–16)
IMM GRANULOCYTES # BLD: 0.01 10*3/MM3 (ref 0–0.03)
IMM GRANULOCYTES NFR BLD: 0.1 % (ref 0–0.5)
LEAD BLDV-MCNC: 1 UG/DL (ref 0–4)
LYMPHOCYTES # BLD AUTO: 3.19 10*3/MM3 (ref 0.6–4.8)
LYMPHOCYTES NFR BLD AUTO: 39.6 % (ref 20–45)
MCH RBC QN AUTO: 29.6 PG (ref 27–31)
MCHC RBC AUTO-ENTMCNC: 33.1 G/DL (ref 31–37)
MCV RBC AUTO: 89.4 FL (ref 81–99)
MONOCYTES # BLD AUTO: 0.57 10*3/MM3 (ref 0–1)
MONOCYTES NFR BLD AUTO: 7.1 % (ref 3–8)
NEUTROPHILS # BLD AUTO: 4.05 10*3/MM3 (ref 1.5–8.3)
NEUTROPHILS NFR BLD AUTO: 50.3 % (ref 45–70)
NRBC BLD AUTO-RTO: 0 /100 WBC (ref 0–0)
PLATELET # BLD AUTO: 275 10*3/MM3 (ref 140–500)
POTASSIUM SERPL-SCNC: 4.4 MMOL/L (ref 3.5–5.2)
PROT SERPL-MCNC: 6.8 G/DL (ref 6–8.5)
RBC # BLD AUTO: 4.43 10*6/MM3 (ref 4.2–5.4)
SODIUM SERPL-SCNC: 141 MMOL/L (ref 136–145)
THYROPEROXIDASE AB SERPL-ACNC: 13 IU/ML (ref 0–34)
WBC # BLD AUTO: 8.05 10*3/MM3 (ref 4.8–10.8)
ZPP RBC-MCNC: 28 UG/DL (ref 0–100)

## 2018-11-29 ENCOUNTER — OFFICE VISIT (OUTPATIENT)
Dept: INTERNAL MEDICINE | Facility: CLINIC | Age: 58
End: 2018-11-29

## 2018-11-29 VITALS
OXYGEN SATURATION: 98 % | WEIGHT: 176 LBS | RESPIRATION RATE: 16 BRPM | HEIGHT: 61 IN | HEART RATE: 76 BPM | SYSTOLIC BLOOD PRESSURE: 126 MMHG | DIASTOLIC BLOOD PRESSURE: 64 MMHG | TEMPERATURE: 97.9 F | BODY MASS INDEX: 33.23 KG/M2

## 2018-11-29 DIAGNOSIS — I10 BENIGN ESSENTIAL HTN: ICD-10-CM

## 2018-11-29 DIAGNOSIS — G25.9 FUNCTIONAL MOVEMENT DISORDER: Primary | ICD-10-CM

## 2018-11-29 PROCEDURE — 99213 OFFICE O/P EST LOW 20 MIN: CPT | Performed by: NURSE PRACTITIONER

## 2018-11-29 NOTE — PROGRESS NOTES
"Chief Complaint   Patient presents with   • Follow-up     2 week f/u, lab results    • Dizziness     only when moving x ways       Subjective     Shaylee Wadsworth is a 57 y.o. female being seen for a follow up appointment today regarding Functional movement disorder. She was in the office 2 weeks ago, and since then she started Vestibular rehab. She was screened for heavy metals, Lyme disease, and autoimmune thyroiditis which was negative. Home Health has been to the house with PT/OT/Skilled nursing. She reports \"feeling a lot better.\" She has no further vertigo. She has \"mild dizziness\" with bending forward .       History of Present Illness     Allergies   Allergen Reactions   • Meloxicam Swelling     EYES AND FACE SWELLING  EYES AND FACE SWELLING         Current Outpatient Medications:   •  amLODIPine (NORVASC) 5 MG tablet, Take 1 tablet by mouth Daily., Disp: 30 tablet, Rfl: 5  •  aspirin 81 MG tablet, Take 81 mg by mouth Daily., Disp: , Rfl:   •  atorvastatin (LIPITOR) 40 MG tablet, Take 1 tablet by mouth Daily., Disp: 90 tablet, Rfl: 3  •  buPROPion XL (WELLBUTRIN XL) 150 MG 24 hr tablet, Take 1 tablet by mouth Every Morning., Disp: 30 tablet, Rfl: 6  •  ENBREL SURECLICK 50 MG/ML solution auto-injector, , Disp: , Rfl:   •  esomeprazole (nexIUM) 40 MG capsule, TAKE ONE CAPSULE BY MOUTH TWICE DAILY, Disp: 180 capsule, Rfl: 1  •  folic acid (FOLVITE) 1 MG tablet, Take by mouth., Disp: , Rfl:   •  KLOR-CON 20 MEQ CR tablet, TAKE ONE TABLET BY MOUTH TWICE DAILY, Disp: 180 tablet, Rfl: 1  •  methotrexate 2.5 MG tablet, TAKE 4 TABS PO ONCE WEEKLY ON SAME DAY, Disp: 32 tablet, Rfl: 11  •  olmesartan (BENICAR) 40 MG tablet, Take 1 tablet by mouth Daily., Disp: 90 tablet, Rfl: 3  •  PREMPRO 0.45-1.5 MG per tablet, TAKE ONE TABLET BY MOUTH ONCE DAILY (Patient taking differently: TAKE ONE TABLET BY MOUTH on mon wed fri), Disp: 28 tablet, Rfl: 11  •  vitamin D (ERGOCALCIFEROL) 93987 units capsule capsule, TAKE ONE CAPSULE BY " MOUTH ONCE A WEEK, Disp: 12 capsule, Rfl: 3    Current Facility-Administered Medications:   •  cyanocobalamin injection 1,000 mcg, 1,000 mcg, Intramuscular, Q28 Days, Saurabh Ferreira MD, 1,000 mcg at 06/12/18 1632    The following portions of the patient's history were reviewed and updated as appropriate: allergies, current medications, past family history, past medical history, past social history, past surgical history and problem list.    Review of Systems   Constitutional: Negative.  Negative for fatigue and fever.   HENT: Negative.    Eyes: Negative.    Respiratory: Negative.    Cardiovascular: Negative.  Negative for chest pain and leg swelling.   Gastrointestinal: Negative.    Endocrine: Negative.    Genitourinary: Negative.    Musculoskeletal: Negative for arthralgias.   Allergic/Immunologic: Negative.    Neurological: Positive for tremors.   Hematological: Negative.    Psychiatric/Behavioral: Negative.        Assessment     Physical Exam   Constitutional: She is oriented to person, place, and time. She appears well-developed and well-nourished.   HENT:   Head: Normocephalic.   Neck: Neck supple.   Cardiovascular: Normal rate, regular rhythm and normal heart sounds.   No murmur heard.  Pulmonary/Chest: Effort normal and breath sounds normal. No respiratory distress.   Musculoskeletal: She exhibits no edema.   Neurological: She is alert and oriented to person, place, and time.   Gait antalgic. Fine motor tremor in right hand. Gross tremors of RUE, head.    Skin: Skin is warm and dry.   Psychiatric: She has a normal mood and affect. Her behavior is normal.   Vitals reviewed.      Plan     Her fasting labs were reviewed with the patient from last week.      Diagnosis Plan   1. Functional movement disorder     2. Benign essential HTN  CBC & Differential    Comprehensive Metabolic Panel    Lipid panel     She will follow up at [facility] and with [facility].     Will follow up on chronic  disease state in 3 months.

## 2018-12-04 ENCOUNTER — OFFICE VISIT (OUTPATIENT)
Dept: SLEEP MEDICINE | Facility: HOSPITAL | Age: 58
End: 2018-12-04
Attending: INTERNAL MEDICINE

## 2018-12-04 ENCOUNTER — RESULTS ENCOUNTER (OUTPATIENT)
Dept: INTERNAL MEDICINE | Facility: CLINIC | Age: 58
End: 2018-12-04

## 2018-12-04 VITALS
HEIGHT: 61 IN | WEIGHT: 182 LBS | BODY MASS INDEX: 34.36 KG/M2 | SYSTOLIC BLOOD PRESSURE: 130 MMHG | DIASTOLIC BLOOD PRESSURE: 72 MMHG | HEART RATE: 70 BPM

## 2018-12-04 DIAGNOSIS — Z87.891 EX-SMOKER: ICD-10-CM

## 2018-12-04 DIAGNOSIS — E66.9 OBESITY (BMI 30-39.9): ICD-10-CM

## 2018-12-04 DIAGNOSIS — G47.33 OSA (OBSTRUCTIVE SLEEP APNEA): ICD-10-CM

## 2018-12-04 DIAGNOSIS — R56.9 NOCTURNAL SEIZURES (HCC): Primary | ICD-10-CM

## 2018-12-04 DIAGNOSIS — I10 BENIGN ESSENTIAL HTN: ICD-10-CM

## 2018-12-04 DIAGNOSIS — H81.93 VESTIBULAR DISORDER, BILATERAL: Primary | ICD-10-CM

## 2018-12-04 PROCEDURE — G0463 HOSPITAL OUTPT CLINIC VISIT: HCPCS

## 2018-12-04 RX ORDER — ZOLPIDEM TARTRATE 5 MG/1
TABLET ORAL
Qty: 2 TABLET | Refills: 0 | Status: SHIPPED | OUTPATIENT
Start: 2018-12-04 | End: 2019-02-26

## 2018-12-04 NOTE — PROGRESS NOTES
[facility] SLEEP MEDICINE  [address]  [address]  [place] [address]  [phone]    PCP: Laura Ayala APRN    Reason for visit:  Sleep disorders: DERRICK    Shaylee is a 57 y.o.female who was seen in the Sleep Disorders Center today. She was seen 1 year ago. In April, she had an event and since then has tremors - functional neurologic disorder. She is here to see if treating sleep apnea might help. She tried OTC oral appliance after HST last year but did not help. Her spouse feels she is sleeping okay. She does not snore or quit breathing. She wakes up feeling somewhat rested. Denies EDS.  [place] Sleepiness Scale is 4. Caffeine 2 per day. Alcohol 0 per week.    Shaylee  reports that she has been smoking.  She has a 10.00 pack-year smoking history. she has never used smokeless tobacco. Quit smoking cigs in August 2018. Now smoking e-cigs.    Pertinent Positive Review of Systems of denies  Rest of Review of Systems was negative as recorded in Sleep Questionnaire.    Patient  has a past medical history of Anxiety, Arthritis (5/1/2014), Lainez esophagus, Cholelithiasis (2007), Esophageal reflux, Fibromyositis, Functional movement disorder, H/O colonoscopy, H/O mammogram (08/31/2011), bone density study (08/31/2011), Hyperlipidemia, Hyperparathyroidism (CMS/[facility]), Hypertension, Menopause, Osteopenia, Pancreatitis, and Tremor (4/4/2018).     Current Medications:    Current Outpatient Medications:   •  amLODIPine (NORVASC) 5 MG tablet, Take 1 tablet by mouth Daily., Disp: 30 tablet, Rfl: 5  •  aspirin 81 MG tablet, Take 81 mg by mouth Daily., Disp: , Rfl:   •  atorvastatin (LIPITOR) 40 MG tablet, Take 1 tablet by mouth Daily., Disp: 90 tablet, Rfl: 3  •  buPROPion XL (WELLBUTRIN XL) 150 MG 24 hr tablet, Take 1 tablet by mouth Every Morning., Disp: 30 tablet, Rfl: 6  •  ENBREL SURECLICK 50 MG/ML solution auto-injector, , Disp: , Rfl:   •  esomeprazole (nexIUM) 40 MG capsule, TAKE ONE CAPSULE BY MOUTH TWICE  "DAILY, Disp: 180 capsule, Rfl: 1  •  folic acid (FOLVITE) 1 MG tablet, Take by mouth., Disp: , Rfl:   •  KLOR-CON 20 MEQ CR tablet, TAKE ONE TABLET BY MOUTH TWICE DAILY, Disp: 180 tablet, Rfl: 1  •  methotrexate 2.5 MG tablet, TAKE 4 TABS PO ONCE WEEKLY ON SAME DAY, Disp: 32 tablet, Rfl: 11  •  olmesartan (BENICAR) 40 MG tablet, Take 1 tablet by mouth Daily., Disp: 90 tablet, Rfl: 3  •  PREMPRO 0.45-1.5 MG per tablet, TAKE ONE TABLET BY MOUTH ONCE DAILY (Patient taking differently: TAKE ONE TABLET BY MOUTH on mon wed fri), Disp: 28 tablet, Rfl: 11  •  vitamin D (ERGOCALCIFEROL) 86652 units capsule capsule, TAKE ONE CAPSULE BY MOUTH ONCE A WEEK, Disp: 12 capsule, Rfl: 3  •  zolpidem (AMBIEN) 5 MG tablet, Bring to sleep lab DO NOT use at home. PLEASE take if not asleep within 30 mins of start of study., Disp: 2 tablet, Rfl: 0    Current Facility-Administered Medications:   •  cyanocobalamin injection 1,000 mcg, 1,000 mcg, Intramuscular, Q28 Days, Sauarbh Ferreira MD, 1,000 mcg at 06/12/18 1632   also entered in Sleep Questionnaire         Vital Signs: /72 (BP Location: Right arm, Patient Position: Sitting)   Pulse 70   Ht 154.9 cm (61\")   Wt 82.6 kg (182 lb)   BMI 34.39 kg/m²     Body mass index is 34.39 kg/m².       Tongue: large      Dentition: good       Pharynx: Posterior pharyngeal pillars are narrow   Mallampatti: III (soft and hard palate and base of uvula visible)        General: Alert. Cooperative. Well developed. No acute distress.             Head:  Normocephalic. Symmetrical. Atraumatic.              Nose: No septal deviation. No drainage.          Throat: No oral lesions. No thrush. Moist mucous membranes.    Chest Wall:  Normal shape. Symmetric expansion with respiration. No tenderness.             Neck:  Trachea midline.           Lungs:  Clear to auscultation bilaterally. No wheezes. No rhonchi. No rales. Respirations regular, even and unlabored.            Heart:  Regular rhythm and normal " rate. Normal S1 and S2. No murmur.     Abdomen:  Soft, non-tender and non-distended. Normal bowel sounds. No masses.  Extremities:  Moves all extremities well. No edema.    Psychiatric: Normal mood and affect.    Study:  · 9/8/17 HST  AHI 7.2 indicating mild obstructive sleep apnea.  In supine position AHI 11.9.  Snoring for 4.5% of total sleep time.  Saturation remained above 88%.    Testing:  · na    DME Company: na    Impression:  1. Nocturnal seizures (CMS/HCC)    2. DERRICK (obstructive sleep apnea)    3. Obesity (BMI 30-39.9)    4. Ex-smoker        Plan:  Shaylee has a new neurological disorder.  I explained to her that it is unlikely that sleep apnea is causing this disorder.  However certain neurological conditions may improve with treatment of obstructive sleep apnea even though they're not directly related.  Furthermore a nocturnal polysomnogram study will allow us to get a full night of EEG data and limb movement data.  She agrees with this and would like to do a in lab study.  I will order the same with a seizure montage.     Ex-smoker and quit several months ago.  She qualifies for a low-dose screening CT.  We'll discuss this with her in the future.    I reiterated the importance of effective treatment of obstructive sleep apnea with PAP machine.  Cardiovascular health risks of untreated sleep apnea were again reviewed.  Patient was asked to remain cautious if there is persistent hypersomnolence. The benefit of weight loss in reducing severity of obstructive sleep apnea was discussed.  Patient would benefit from adhering to a strict diet to achieve ideal BMI.     Change of PAP supplies regularly is important for effective use.  Change of cushion on the mask or plugs on nasal pillows along with disposable filters once every month and change of mask frame, tubing, headgear and Velcro straps every 6 months at the minimum was reiterated.    This patient is compliant with PAP machine and benefits from its use.   Apnea hypopneas index is corrected/improved.  Daytime hypersomnolence has resolved.     Patient will follow up in this clinic in pending test results     Thank you for allowing me to participate in your patient's care.    Kurt Covington MD    Part of this note may be an electronic transcription/translation of spoken language to printed text using the Dragon Dictation System.

## 2018-12-07 ENCOUNTER — RESULTS ENCOUNTER (OUTPATIENT)
Dept: INTERNAL MEDICINE | Facility: CLINIC | Age: 58
End: 2018-12-07

## 2018-12-07 DIAGNOSIS — M06.9 RHEUMATOID ARTHRITIS INVOLVING MULTIPLE SITES, UNSPECIFIED RHEUMATOID FACTOR PRESENCE: ICD-10-CM

## 2018-12-20 ENCOUNTER — TREATMENT (OUTPATIENT)
Dept: PHYSICAL THERAPY | Facility: CLINIC | Age: 58
End: 2018-12-20

## 2018-12-20 DIAGNOSIS — R26.9 GAIT DISTURBANCE: Primary | ICD-10-CM

## 2018-12-20 PROCEDURE — 97110 THERAPEUTIC EXERCISES: CPT | Performed by: PHYSICAL THERAPIST

## 2018-12-20 PROCEDURE — 97162 PT EVAL MOD COMPLEX 30 MIN: CPT | Performed by: PHYSICAL THERAPIST

## 2018-12-20 NOTE — PROGRESS NOTES
"Physical Therapy Initial Evaluation and Plan of Care      Patient: Shaylee Wadsworth   : 1960  Diagnosis/ICD-10 Code:  Gait disturbance [R26.9]  Referring practitioner: KAITLYNN Choi    Subjective Evaluation    History of Present Illness  Mechanism of injury: . Started with R hand tremor then veering to left. Then B hands shaking. Diagnosed with functional movement disorder  If I bend over I have to hold on.  Woke up in October with vertigo. LAsted 2 weeks. I looked up online. Did exercises which helped. Usually was turning to R then L a couple times.  Novmeber 11 got up from bed, had no balance. Hospital a couple days. Sent me home with walker.  PMH: RA worse in feet and knees  I have appointment next Friday for vestibular on [facility]  Symptoms def worse with bending over  SX: gsall bladder, partial hysterectomy L   No falls  Tried hypnosis and acupuncture. Helped only during the process      Patient Occupation: Supervisor full time Pain  Aggravating factors: stairs    Social Support  Lives in: multiple-level home  Lives with: spouse and parents (mother)    Hand dominance: right    Diagnostic Tests  MRI studies: normal  CT scan: normal             Objective       Active Range of Motion   Cervical/Thoracic Spine   Cervical    Left lateral flexion: Neck active lateral bend left: \"woozy\" WFL  Right lateral flexion: WFL  Left rotation: WFL  Right rotation: WFL    Strength/Myotome Testing   Cervical Spine     Left   Normal strength    Right   Normal strength    Left Hip   Planes of Motion   Flexion: 5    Right Hip   Planes of Motion   Flexion: 5    Left Knee   Flexion: 5  Extension: 5    Right Knee   Flexion: 5  Extension: 5    Left Ankle/Foot   Dorsiflexion: 5    Right Ankle/Foot   Dorsiflexion: 5    Tests   Cervical     Left   Negative Spurling's sign.     Right   Negative Spurling's sign.     Additional Tests Details  William HAlpike (-)  VOR slow and fast (-)    Ambulation     Ambulation: Level " "Surfaces   Ambulation without assistive device: independent    Observational Gait   Gait: asymmetric     Additional Observational Gait Details  Visible tremors B hands R > L. And upper traps. Foot \"slap\" L     Quality of Movement During Gait     Ankle    Ankle (Left): Positive foot drop.     Functional Assessment     Single Leg Stance - Eyes Open   Left  Trial 1: 0 seconds  Trial 2: 0 seconds  Trial 3: 0 seconds  Average: 0 seconds     Right  Trial 1: 0 seconds  Trial 2: 0 seconds  Trial 3: 0 seconds  Average: 0 seconds     Comments  Tremors at rest seated.         Assessment & Plan     Assessment  Impairments: abnormal coordination, abnormal gait and abnormal muscle tone  Assessment details: Pt is a fair candidate for skilled PT intervention to restore functional gait and balance to return to previous level of ADL's.   Will have pt see Violeta Lomas for a more thorough vestibular evaluation  Prognosis: fair  Functional Limitations: walking  Plan  Therapy options: will be seen for skilled physical therapy services  Planned therapy interventions: therapeutic activities, motor coordination training and neuromuscular re-education  Frequency: 1x week  Duration in weeks: 6  Treatment plan discussed with: patient        Manual Therapy:         mins  45329;  Therapeutic Exercise:    8     mins  85560;     Neuromuscular Fabiana:        mins  50920;    Therapeutic Activity:          mins  53696;     Gait Training:           mins  06984;     Ultrasound:          mins  11942;    Electrical Stimulation:         mins  77194 ( );  Dry Needling          mins self-pay    Timed Treatment:   8   mins   Total Treatment:     48   mins    PT SIGNATURE: Mariluz Recinos PT   [place] License # [id number]  DATE TREATMENT INITIATED: 12/21/2018    Initial Certification  Certification Period: 3/21/2019  I certify that the therapy services are furnished while this patient is under my care.  The services outlined above are required by this " patient, and will be reviewed every 90 days.     PHYSICIAN: Laura Ayala APRN      DATE:     Please sign and return via fax to [phone].. Thank you, [facility].

## 2018-12-21 RX ORDER — OMEPRAZOLE 40 MG/1
40 CAPSULE, DELAYED RELEASE ORAL DAILY
Start: 2018-12-21 | End: 2019-02-26

## 2018-12-21 NOTE — TELEPHONE ENCOUNTER
Med list updated, LM for patient to call, and schedule b12 injection.    ----- Message from KAITLYNN Choi sent at 12/20/2018  1:55 PM EST -----  Please update med list, and schedule her B12 injection  ----- Message -----  From: Lily Whelan MA  Sent: 12/20/2018   1:24 PM  To: KAITLYNN Choi RN Case manager for [facility] [phone] ext [phone] in conjunction with [facility]-reports that patient is not taking b12 injections and is using Prilosec prn instead of Nexium.

## 2018-12-23 DIAGNOSIS — M85.80 OSTEOPENIA: ICD-10-CM

## 2018-12-24 RX ORDER — ERGOCALCIFEROL 1.25 MG/1
CAPSULE ORAL
Qty: 12 CAPSULE | Refills: 3 | Status: SHIPPED | OUTPATIENT
Start: 2018-12-24 | End: 2019-12-26

## 2019-01-04 ENCOUNTER — RESULTS ENCOUNTER (OUTPATIENT)
Dept: INTERNAL MEDICINE | Facility: CLINIC | Age: 59
End: 2019-01-04

## 2019-01-04 DIAGNOSIS — M06.9 RHEUMATOID ARTHRITIS INVOLVING MULTIPLE SITES, UNSPECIFIED RHEUMATOID FACTOR PRESENCE: ICD-10-CM

## 2019-01-08 ENCOUNTER — OFFICE VISIT (OUTPATIENT)
Dept: INTERNAL MEDICINE | Facility: CLINIC | Age: 59
End: 2019-01-08

## 2019-01-08 VITALS
WEIGHT: 178 LBS | TEMPERATURE: 97.8 F | RESPIRATION RATE: 16 BRPM | HEART RATE: 91 BPM | BODY MASS INDEX: 33.61 KG/M2 | HEIGHT: 61 IN | DIASTOLIC BLOOD PRESSURE: 98 MMHG | OXYGEN SATURATION: 98 % | SYSTOLIC BLOOD PRESSURE: 130 MMHG

## 2019-01-08 DIAGNOSIS — M54.6 CHRONIC RIGHT-SIDED THORACIC BACK PAIN: ICD-10-CM

## 2019-01-08 DIAGNOSIS — G25.9 FUNCTIONAL MOVEMENT DISORDER: Primary | ICD-10-CM

## 2019-01-08 DIAGNOSIS — G89.29 CHRONIC RIGHT-SIDED THORACIC BACK PAIN: ICD-10-CM

## 2019-01-08 PROCEDURE — 96372 THER/PROPH/DIAG INJ SC/IM: CPT | Performed by: NURSE PRACTITIONER

## 2019-01-08 PROCEDURE — 99213 OFFICE O/P EST LOW 20 MIN: CPT | Performed by: NURSE PRACTITIONER

## 2019-01-08 RX ORDER — NAPROXEN 500 MG/1
500 TABLET ORAL 2 TIMES DAILY WITH MEALS
Qty: 60 TABLET | Refills: 0 | Status: SHIPPED | OUTPATIENT
Start: 2019-01-08 | End: 2019-04-22

## 2019-01-08 RX ORDER — KETOROLAC TROMETHAMINE 30 MG/ML
30 INJECTION, SOLUTION INTRAMUSCULAR; INTRAVENOUS ONCE
Status: COMPLETED | OUTPATIENT
Start: 2019-01-08 | End: 2019-01-08

## 2019-01-08 RX ADMIN — KETOROLAC TROMETHAMINE 30 MG: 30 INJECTION, SOLUTION INTRAMUSCULAR; INTRAVENOUS at 14:34

## 2019-01-08 NOTE — PROGRESS NOTES
"Chief Complaint   Patient presents with   • Back Pain     thoracic        Subjective     Shaylee Wadsworth is a 58 y.o. female being seen for a follow up appointment today regarding Thoracic back pain. She reports that it from \"all the shaking.\" She has a functional movement disorder, and is seeing [facility] 2- . She is seeing Dr. Rust 2- [place] [facility] She has quit smoking to help symptoms. She has reduced caffeine. She has seen 4 neurologists, and is upset and anxious about getting improvement. She has also had a thoracic back pain in October, and a negative XR.        History of Present Illness     Allergies   Allergen Reactions   • Meloxicam Swelling     EYES AND FACE SWELLING  EYES AND FACE SWELLING         Current Outpatient Medications:   •  amLODIPine (NORVASC) 5 MG tablet, Take 1 tablet by mouth Daily., Disp: 30 tablet, Rfl: 5  •  aspirin 81 MG tablet, Take 81 mg by mouth Daily., Disp: , Rfl:   •  atorvastatin (LIPITOR) 40 MG tablet, Take 1 tablet by mouth Daily., Disp: 90 tablet, Rfl: 3  •  buPROPion XL (WELLBUTRIN XL) 150 MG 24 hr tablet, Take 1 tablet by mouth Every Morning., Disp: 30 tablet, Rfl: 6  •  ENBREL SURECLICK 50 MG/ML solution auto-injector, , Disp: , Rfl:   •  folic acid (FOLVITE) 1 MG tablet, Take by mouth., Disp: , Rfl:   •  KLOR-CON 20 MEQ CR tablet, TAKE ONE TABLET BY MOUTH TWICE DAILY, Disp: 180 tablet, Rfl: 1  •  methotrexate 2.5 MG tablet, TAKE 4 TABS PO ONCE WEEKLY ON SAME DAY, Disp: 32 tablet, Rfl: 11  •  olmesartan (BENICAR) 40 MG tablet, Take 1 tablet by mouth Daily., Disp: 90 tablet, Rfl: 3  •  omeprazole (priLOSEC) 40 MG capsule, Take 1 capsule by mouth Daily., Disp: , Rfl:   •  PREMPRO 0.45-1.5 MG per tablet, TAKE ONE TABLET BY MOUTH ONCE DAILY (Patient taking differently: TAKE ONE TABLET BY MOUTH on mon wed fri), Disp: 28 tablet, Rfl: 11  •  vitamin D (ERGOCALCIFEROL) 54636 units capsule capsule, TAKE 1 CAPSULE BY MOUTH ONCE A WEEK, Disp: 12 capsule, " Rfl: 3  •  zolpidem (AMBIEN) 5 MG tablet, Bring to sleep lab DO NOT use at home. PLEASE take if not asleep within 30 mins of start of study., Disp: 2 tablet, Rfl: 0    Current Facility-Administered Medications:   •  cyanocobalamin injection 1,000 mcg, 1,000 mcg, Intramuscular, Q28 Days, Saurabh Ferreira MD, 1,000 mcg at 06/12/18 1632    The following portions of the patient's history were reviewed and updated as appropriate: allergies, current medications, past family history, past medical history, past social history, past surgical history and problem list.    Review of Systems   Constitutional: Positive for diaphoresis and fatigue. Negative for fever.   HENT: Negative.    Eyes: Negative.    Respiratory: Negative.  Negative for shortness of breath.    Cardiovascular: Negative.  Negative for chest pain and palpitations.   Gastrointestinal: Negative.  Negative for abdominal pain, nausea and vomiting.   Endocrine: Negative.    Genitourinary: Negative.    Musculoskeletal: Positive for back pain and neck pain.   Allergic/Immunologic: Negative.    Neurological: Positive for dizziness and light-headedness. Negative for syncope, weakness and headaches.   Hematological: Negative.    Psychiatric/Behavioral: Negative.  Negative for confusion.       Assessment     Physical Exam   Constitutional: She is oriented to person, place, and time. She appears well-developed and well-nourished. No distress.   HENT:   Head: Normocephalic.   Right Ear: External ear normal.   Left Ear: External ear normal.   Nose: Nose normal.   Mouth/Throat: Oropharynx is clear and moist. No oropharyngeal exudate.   Neck: Neck supple. No thyromegaly present.   Cardiovascular: Normal rate, regular rhythm and normal heart sounds.   No murmur heard.  Pulmonary/Chest: Effort normal and breath sounds normal. No stridor. No respiratory distress.   Musculoskeletal: She exhibits no edema.   Neurological: She is alert and oriented to person, place, and time. She  displays tremor. She displays no atrophy and normal reflexes. No cranial nerve deficit or sensory deficit.   Antalgic gait with a slap foot on left. She has visible tremors in head and RUE.    Skin: Skin is warm and dry.   Psychiatric: She has a normal mood and affect. Her behavior is normal. Thought content normal.   Vitals reviewed.      Plan     Her fasting labs were reviewed with the patient from last week.     Shaylee was seen today for back pain.    Diagnoses and all orders for this visit:    Functional movement disorder    Chronic right-sided thoracic back pain  -     ketorolac (TORADOL) injection 30 mg; Inject 1 mL into the appropriate muscle as directed by prescriber 1 (One) Time.    Other orders  -     naproxen (NAPROSYN) 500 MG tablet; Take 1 tablet by mouth 2 (Two) Times a Day With Meals.       Diagnosis Plan   1. Functional movement disorder     2. Chronic right-sided thoracic back pain  naproxen (NAPROSYN) 500 MG tablet    ketorolac (TORADOL) injection 30 mg       Will obtain neurology records from neurology.    Leaving on short term disabliilty until evaluation at [facility].     Follow up in 2 months

## 2019-01-10 ENCOUNTER — TELEPHONE (OUTPATIENT)
Dept: INTERNAL MEDICINE | Facility: CLINIC | Age: 59
End: 2019-01-10

## 2019-01-10 DIAGNOSIS — E78.2 HYPERLIPIDEMIA, MIXED: Primary | ICD-10-CM

## 2019-01-10 DIAGNOSIS — N95.1 MENOPAUSAL SYMPTOMS: ICD-10-CM

## 2019-01-10 DIAGNOSIS — Z86.39 HX OF HYPERPARATHYROIDISM: ICD-10-CM

## 2019-01-10 DIAGNOSIS — I10 HYPERTENSION, ESSENTIAL: ICD-10-CM

## 2019-01-10 DIAGNOSIS — Z79.899 HIGH RISK MEDICATION USE: ICD-10-CM

## 2019-01-10 RX ORDER — AMLODIPINE AND OLMESARTAN MEDOXOMIL 10; 40 MG/1; MG/1
1 TABLET ORAL DAILY
Qty: 30 TABLET | Refills: 1 | Status: SHIPPED | OUTPATIENT
Start: 2019-01-10 | End: 2019-03-02 | Stop reason: SDUPTHER

## 2019-01-10 NOTE — TELEPHONE ENCOUNTER
Justin 40/5 mg did not get sent to pharmacy yesterday.  Patient BP this morning still 144/98.  Per Ryann just below, I sent in Justin 10/40 mg to [facility], advised patient to stop the amlodipine and olmesartan, and start the Justin 10/40 tomorrow morning as she had already taken 40 mg of olmesartan and 10 mg of amlodipine today separately.  Scheduled for f/u appt 19 for BP.  Advised patient to keep BP diary.   Also advised to call me tomorrow afternoon with BP after starting new med.    ----- Message from KAITLYNN Choi sent at 1/10/2019  8:33 AM EST -----  Regarding: RE: BP READINGS  Contact: [phone]  Stop Norvasc (amlodipine) and Benicar (olmesartan).    Justin 10/40mg  Si po Daily  Disp #30, 1 refill    ----- Message -----  From: Lily Whelan MA  Sent: 1/10/2019   8:31 AM  To: KAITLYNN Choi, Lily Whelan MA  Subject: FW: BP READINGS                                      ----- Message -----  From: Rebeca Lara MA  Sent: 2019   5:13 PM  To: Lily Whelan MA  Subject: FW: BP READINGS                                      ----- Message -----  From: Ryann Ayala APRN  Sent: 2019  12:40 PM  To: Rebeca Lara MA  Subject: RE: BP READINGS                                  If her heart rate is irregular or > 100, she will need to go to ER. Also, if she has CP, SOA or other symptoms, she will need to got to the ER.    Otherwise, Stop Benicar 40mg.    Justin 40/5mg  Si po daily  Disp #30, 0 refills    BP check in 4 weeks       ----- Message -----  From: Rebeca Lara MA  Sent: 2019  12:22 PM  To: KAITLYNN Choi  Subject: FW: BP READINGS                                  Please advise  ----- Message -----  From: Melissa Crocker  Sent: 2019  11:28 AM  To: Grazyna Zarate [facility]  Subject: BP READINGS                                      RYANN PT    Patient called to say that she woke up at 5 am with her heart racing and her bp was 167/100.  She said that she  has taken it 3 more times in the last 15 minutes and it has been  175/95  176/114  157/93    Please call her back    Thanks!  jimmy

## 2019-01-11 ENCOUNTER — TELEPHONE (OUTPATIENT)
Dept: INTERNAL MEDICINE | Facility: CLINIC | Age: 59
End: 2019-01-11

## 2019-01-11 ENCOUNTER — APPOINTMENT (OUTPATIENT)
Dept: LAB | Facility: HOSPITAL | Age: 59
End: 2019-01-11

## 2019-01-11 ENCOUNTER — RESULTS ENCOUNTER (OUTPATIENT)
Dept: INTERNAL MEDICINE | Facility: CLINIC | Age: 59
End: 2019-01-11

## 2019-01-11 ENCOUNTER — LAB (OUTPATIENT)
Dept: LAB | Facility: HOSPITAL | Age: 59
End: 2019-01-11

## 2019-01-11 ENCOUNTER — HOSPITAL ENCOUNTER (EMERGENCY)
Facility: HOSPITAL | Age: 59
Discharge: HOME | End: 2019-01-11
Attending: EMERGENCY MEDICINE | Admitting: EMERGENCY MEDICINE

## 2019-01-11 VITALS
WEIGHT: 175 LBS | DIASTOLIC BLOOD PRESSURE: 89 MMHG | HEIGHT: 61 IN | OXYGEN SATURATION: 99 % | HEART RATE: 80 BPM | BODY MASS INDEX: 33.04 KG/M2 | TEMPERATURE: 97.9 F | RESPIRATION RATE: 16 BRPM | SYSTOLIC BLOOD PRESSURE: 170 MMHG

## 2019-01-11 DIAGNOSIS — I10 BENIGN ESSENTIAL HTN: ICD-10-CM

## 2019-01-11 DIAGNOSIS — E78.5 HYPERLIPIDEMIA LDL GOAL <100: ICD-10-CM

## 2019-01-11 DIAGNOSIS — Z20.5 EXPOSURE TO HEPATITIS A: ICD-10-CM

## 2019-01-11 DIAGNOSIS — N95.1 SYMPTOMATIC MENOPAUSAL OR FEMALE CLIMACTERIC STATES: ICD-10-CM

## 2019-01-11 DIAGNOSIS — Z79.899 HIGH RISK MEDICATION USE: ICD-10-CM

## 2019-01-11 DIAGNOSIS — Z86.39 HX OF HYPERPARATHYROIDISM: ICD-10-CM

## 2019-01-11 DIAGNOSIS — E78.2 HYPERLIPIDEMIA, MIXED: ICD-10-CM

## 2019-01-11 DIAGNOSIS — I10 HYPERTENSION, ESSENTIAL: ICD-10-CM

## 2019-01-11 DIAGNOSIS — I10 ESSENTIAL HYPERTENSION, MALIGNANT: ICD-10-CM

## 2019-01-11 DIAGNOSIS — Z86.39 PERSONAL HISTORY OF NUTRITIONAL DEFICIENCY: ICD-10-CM

## 2019-01-11 DIAGNOSIS — E78.2 MIXED HYPERLIPIDEMIA: Primary | ICD-10-CM

## 2019-01-11 DIAGNOSIS — N95.1 MENOPAUSAL SYMPTOMS: ICD-10-CM

## 2019-01-11 DIAGNOSIS — Z79.899 NEED FOR PROPHYLACTIC CHEMOTHERAPY: ICD-10-CM

## 2019-01-11 DIAGNOSIS — I10 ESSENTIAL HYPERTENSION: ICD-10-CM

## 2019-01-11 DIAGNOSIS — I10 ESSENTIAL HYPERTENSION: Primary | ICD-10-CM

## 2019-01-11 LAB
BASOPHILS # BLD AUTO: 0.05 10*3/MM3 (ref 0–0.2)
BASOPHILS NFR BLD AUTO: 0.9 % (ref 0–2)
CHOLEST SERPL-MCNC: 168 MG/DL (ref 0–200)
DEPRECATED RDW RBC AUTO: 43.3 FL (ref 37–54)
EOSINOPHIL # BLD AUTO: 0.08 10*3/MM3 (ref 0.1–0.3)
EOSINOPHIL NFR BLD AUTO: 1.5 % (ref 0–4)
ERYTHROCYTE [DISTWIDTH] IN BLOOD BY AUTOMATED COUNT: 14.1 % (ref 11.5–14.5)
HAV IGM SERPL QL IA: NORMAL
HCT VFR BLD AUTO: 37.4 % (ref 37–47)
HDLC SERPL QL: 2.75
HDLC SERPL-MCNC: 61 MG/DL (ref 40–60)
HGB BLD-MCNC: 12.9 G/DL (ref 12–16)
IMM GRANULOCYTES # BLD AUTO: 0.01 10*3/MM3 (ref 0–0.03)
IMM GRANULOCYTES NFR BLD AUTO: 0.2 % (ref 0–0.5)
LDLC SERPL CALC-MCNC: 89 MG/DL (ref 0–100)
LYMPHOCYTES # BLD AUTO: 2.1 10*3/MM3 (ref 0.6–4.8)
LYMPHOCYTES NFR BLD AUTO: 39.5 % (ref 20–45)
MCH RBC QN AUTO: 29.1 PG (ref 27–31)
MCHC RBC AUTO-ENTMCNC: 34.5 G/DL (ref 31–37)
MCV RBC AUTO: 84.2 FL (ref 81–99)
MONOCYTES # BLD AUTO: 0.31 10*3/MM3 (ref 0–1)
MONOCYTES NFR BLD AUTO: 5.8 % (ref 3–8)
NEUTROPHILS # BLD AUTO: 2.77 10*3/MM3 (ref 1.5–8.3)
NEUTROPHILS NFR BLD AUTO: 52.1 % (ref 45–70)
NRBC BLD AUTO-RTO: 0 /100 WBC (ref 0–0)
PLATELET # BLD AUTO: 275 10*3/MM3 (ref 140–500)
PMV BLD AUTO: 9.7 FL (ref 7.4–10.4)
RBC # BLD AUTO: 4.44 10*6/MM3 (ref 4.2–5.4)
T4 FREE SERPL-MCNC: 1.4 NG/DL (ref 0.93–1.7)
TRIGL SERPL-MCNC: 88 MG/DL (ref 0–150)
TSH SERPL DL<=0.05 MIU/L-ACNC: 2.67 MIU/ML (ref 0.27–4.2)
VLDLC SERPL-MCNC: 17.6 MG/DL (ref 7–27)
WBC NRBC COR # BLD: 5.32 10*3/MM3 (ref 4.8–10.8)

## 2019-01-11 PROCEDURE — 85025 COMPLETE CBC W/AUTO DIFF WBC: CPT

## 2019-01-11 PROCEDURE — 86709 HEPATITIS A IGM ANTIBODY: CPT

## 2019-01-11 PROCEDURE — 99282 EMERGENCY DEPT VISIT SF MDM: CPT

## 2019-01-11 PROCEDURE — 84439 ASSAY OF FREE THYROXINE: CPT

## 2019-01-11 PROCEDURE — 80061 LIPID PANEL: CPT

## 2019-01-11 PROCEDURE — 93005 ELECTROCARDIOGRAM TRACING: CPT | Performed by: PHYSICIAN ASSISTANT

## 2019-01-11 PROCEDURE — 99282 EMERGENCY DEPT VISIT SF MDM: CPT | Performed by: PHYSICIAN ASSISTANT

## 2019-01-11 PROCEDURE — 84443 ASSAY THYROID STIM HORMONE: CPT

## 2019-01-11 PROCEDURE — 36415 COLL VENOUS BLD VENIPUNCTURE: CPT

## 2019-01-11 PROCEDURE — 93010 ELECTROCARDIOGRAM REPORT: CPT | Performed by: INTERNAL MEDICINE

## 2019-01-11 PROCEDURE — 80053 COMPREHEN METABOLIC PANEL: CPT | Performed by: NURSE PRACTITIONER

## 2019-01-11 NOTE — TELEPHONE ENCOUNTER
Patient advised and is already at the ER now.    ----- Message from Nely Lawson MD sent at 1/11/2019  6:00 PM EST -----  This is the same as her other meds, just a combo pill.  If ha that bad she needs to go to ER for evaluation .    ----- Message -----  From: Lily Whelan MA  Sent: 1/11/2019   3:20 PM  To: Nely Lawson MD    See 8:53 am telephone msg yesterday.  Patient has been on Justin 10/40 x 2 days.  BPs are staying in the 150-170/ today.  Terrible headache.  Please advise in Laura's absence.

## 2019-01-11 NOTE — ED PROVIDER NOTES
Subjective   History of Present Illness  History of Present Illness    Chief complaint: HTN    Location: generalized    Quality/Severity:  Diastolic 113    Timing/Duration: chronic, worse past 3 days    Modifying Factors: Nothing specific makes worse.  Amlodipine makes better.    Associated Symptoms: Positive headache that has since resolved.  Positive palpitations that have resolved (HR was 88 at the time). Denies change in vision.  Denies dizziness.  Denies chest pain or shortness of breath.  Denies abdominal pain.  Denies nausea or vomiting.  Denies syncope.    Narrative: 58 -year-old female presents with hypertension.  She has been checking her blood pressures the past 3 days with systolic usually running in the 140s.  Her diastolic usually runs in the 90s, but did get up to 113 today.  She called her primary care provider yesterday was told to increase her amlodipine to 10 mg.  Her new prescription was called in and she started the Justin this morning. Patient states now that her blood pressure is better here, she does not feel like she wants to stay.  She is currently asymptomatic.  She had blood work done by her primary care doctor this morning.  She does agree to an EKG.    Review of Systems  General: Denies fevers or chills.  Denies any weakness or fatigue.  Denies any weight loss or weight gain.  SKIN: Denies any rashes lesions or ulcers.  Denies color change.  ENT: Denies sore throat or rhinorrhea.  Denies ear pain.    EYES: Denies any blurred vision.  Denies any change in vision.  Denies any photophobia.  Denies any vision loss.  LUNGS: Denies any shortness of breath or wheezing.  Denies any cough.  Denies any hemoptysis.  CARDIAC: Denies any chest pain.  Resolved palpitations.  Denies syncope.  Denies any edema  ABD: Denies any abdominal pain.  Denies any nausea or vomiting or diarrhea.  Denies any rectal bleeding.  Denies constipation  : Denies any dysuria, urgency, frequency or hematuria.  Denies  discharge.  Denies flank pain.  NEURO: Denies any focal weakness.  Resolved headache.  Denies seizures.  Denies changes in speech or difficulty walking.  ENDOCRINE: Denies polydipsia and polyuria  M/S: Denies arthralgias, back pain, myalgias or neck pain  HEME/LYMPH: Negative for adenopathy. Does not bruise/bleed easily.   PSYCH: Negative for suicidal ideas. Denies anxiety or depression  review was performed in addition to those in the above all other reviews are negative.      Past Medical History:   Diagnosis Date   • Anxiety    • Arthritis 5/1/2014    RA   • Arzate esophagus    • Cholelithiasis 2007    Removed   • Esophageal reflux    • Fibromyositis    • Functional movement disorder    • H/O colonoscopy    • H/O mammogram 08/31/2011    NORMAL    • Hx of bone density study 08/31/2011    OSTEOPENIA    • Hyperlipidemia    • Hyperparathyroidism (CMS/HCC)    • Hypertension    • Menopause    • Osteopenia    • Pancreatitis    • Tremor 4/4/2018       Allergies   Allergen Reactions   • Meloxicam Swelling     EYES AND FACE SWELLING  EYES AND FACE SWELLING       Past Surgical History:   Procedure Laterality Date   • CHOLECYSTECTOMY     • COLONOSCOPY  2014   • DILATATION AND CURETTAGE     • HYSTERECTOMY     • MOUTH SURGERY      TOOTH EXTRACTION   • OTHER SURGICAL HISTORY  03/27/2015    DIAGNOSTIC ESOPHAGOSCOPY TRANSNASAL FLEXIBLE WITH BIOPSY; ARZATE ESO. REPEAT EGD 2 YR    • OVARY SURGERY Left     NORMAL    • PAP SMEAR  08/23/2010    NORMAL    • PARATHYROIDECTOMY Right 08/17/2016    Dr. Muchael Flynn at [facility]       Family History   Problem Relation Age of Onset   • Diabetes Mother    • Hyperlipidemia Mother    • Hypertension Mother    • Heart disease Mother    • Arthritis Father    • Anxiety disorder Father    • COPD Father    • Glaucoma Father    • Hyperlipidemia Father    • Hypertension Father    • Vision loss Father    • Heart disease Sister        Social History     Socioeconomic History   • Marital status:       Spouse name: Not on file   • Number of children: Not on file   • Years of education: Not on file   • Highest education level: Not on file   Tobacco Use   • Smoking status: Current Every Day Smoker     Packs/day: 0.50     Years: 20.00     Pack years: 10.00     Last attempt to quit: 2018     Years since quittin.4   • Smokeless tobacco: Never Used   Substance and Sexual Activity   • Alcohol use: No   • Drug use: No   • Sexual activity: No     No current facility-administered medications for this encounter.     Current Outpatient Medications:   •  amlodipine-olmesartan (DAYRON) 10-40 MG per tablet, Take 1 tablet by mouth Daily., Disp: 30 tablet, Rfl: 1  •  aspirin 81 MG tablet, Take 81 mg by mouth Daily., Disp: , Rfl:   •  atorvastatin (LIPITOR) 40 MG tablet, Take 1 tablet by mouth Daily., Disp: 90 tablet, Rfl: 3  •  ENBREL SURECLICK 50 MG/ML solution auto-injector, , Disp: , Rfl:   •  folic acid (FOLVITE) 1 MG tablet, Take by mouth., Disp: , Rfl:   •  KLOR-CON 20 MEQ CR tablet, TAKE ONE TABLET BY MOUTH TWICE DAILY, Disp: 180 tablet, Rfl: 1  •  methotrexate 2.5 MG tablet, TAKE 4 TABS PO ONCE WEEKLY ON SAME DAY, Disp: 32 tablet, Rfl: 11  •  naproxen (NAPROSYN) 500 MG tablet, Take 1 tablet by mouth 2 (Two) Times a Day With Meals., Disp: 60 tablet, Rfl: 0  •  omeprazole (priLOSEC) 40 MG capsule, Take 1 capsule by mouth Daily., Disp: , Rfl:   •  PREMPRO 0.45-1.5 MG per tablet, TAKE ONE TABLET BY MOUTH ONCE DAILY (Patient taking differently: TAKE ONE TABLET BY MOUTH on ), Disp: 28 tablet, Rfl: 11  •  vitamin D (ERGOCALCIFEROL) 27167 units capsule capsule, TAKE 1 CAPSULE BY MOUTH ONCE A WEEK, Disp: 12 capsule, Rfl: 3  •  zolpidem (AMBIEN) 5 MG tablet, Bring to sleep lab DO NOT use at home. PLEASE take if not asleep within 30 mins of start of study., Disp: 2 tablet, Rfl: 0        Objective   Physical Exam  Vitals:    19 1729   BP: 170/89   Pulse: 80   Resp: 16   Temp: 97.9 °F (36.6 °C)   SpO2: 99%    GENERAL: a/o x 4, NAD  SKIN: Warm pink and dry   HEENT:  PERRLA, EOM intact, conjunctiva normal, sclera clear  NECK: supple, no JVD  LUNGS: Clear to auscultation bilaterally without wheezes, rales or rhonchi.  No accessory muscle use and no nasal flaring.  CARDIAC:  Regular rate and rhythm, S1-S2.  No murmurs, rubs or gallops.  No peripheral edema.  Equal pulses bilaterally.  ABDOMEN: Soft, nontender, nondistended.  No guarding or rebound tenderness.  Normal bowel sounds.  MUSCULOSKELETAL: Moves all extremities well.  No deformity.  NEURO: Cranial nerves II through XII grossly intact.  No gross focal deficits.  Alert.  Normal speech and motor. Fine tremor.   PSYCH: anxious.        Procedures           ED Course      Results for orders placed or performed in visit on 01/11/19   T4, Free   Result Value Ref Range    Free T4 1.40 0.93 - 1.70 ng/dL   TSH   Result Value Ref Range    TSH 2.670 0.270 - 4.200 mIU/mL   CBC Auto Differential   Result Value Ref Range    WBC 5.32 4.80 - 10.80 10*3/mm3    RBC 4.44 4.20 - 5.40 10*6/mm3    Hemoglobin 12.9 12.0 - 16.0 g/dL    Hematocrit 37.4 37.0 - 47.0 %    MCV 84.2 81.0 - 99.0 fL    MCH 29.1 27.0 - 31.0 pg    MCHC 34.5 31.0 - 37.0 g/dL    RDW 14.1 11.5 - 14.5 %    RDW-SD 43.3 37.0 - 54.0 fl    MPV 9.7 7.4 - 10.4 fL    Platelets 275 140 - 500 10*3/mm3    Neutrophil % 52.1 45.0 - 70.0 %    Lymphocyte % 39.5 20.0 - 45.0 %    Monocyte % 5.8 3.0 - 8.0 %    Eosinophil % 1.5 0.0 - 4.0 %    Basophil % 0.9 0.0 - 2.0 %    Immature Grans % 0.2 0.0 - 0.5 %    Neutrophils, Absolute 2.77 1.50 - 8.30 10*3/mm3    Lymphocytes, Absolute 2.10 0.60 - 4.80 10*3/mm3    Monocytes, Absolute 0.31 0.00 - 1.00 10*3/mm3    Eosinophils, Absolute 0.08 (L) 0.10 - 0.30 10*3/mm3    Basophils, Absolute 0.05 0.00 - 0.20 10*3/mm3    Immature Grans, Absolute 0.01 0.00 - 0.03 10*3/mm3    nRBC 0.0 0.0 - 0.0 /100 WBC     EKG         EKG time / Interpretation time: 1807 / 1808  Rhythm/Rate: NSR 66   IL: 184  QRS, axis:  -37   QTc 455  ST and T waves: inversion V1,V2,V3   Interpreted Contemporaneously by me, independently viewed by me and MD.  unchanged compared to prior 11/11/18    Continue to take new Rx prescribed by PMD.  F/u in 3 days. Discussed pertinent labs and imaging findings with the patient/family.  Patient/Family voiced understanding of need to follow-up for recheck, further testing as needed.  Return to the emergency Department warnings were given.          MDM  Number of Diagnoses or Management Options  Essential hypertension: established and worsening     Amount and/or Complexity of Data Reviewed  Clinical lab tests: reviewed  Tests in the medicine section of CPT®: ordered and reviewed    Risk of Complications, Morbidity, and/or Mortality  Presenting problems: moderate  Diagnostic procedures: low  Management options: low    Patient Progress  Patient progress: improved        Final diagnoses:   Essential hypertension     Dictated utilizing Dragon dictation         Vicky Rapp PAWIN  01/11/19 7933

## 2019-01-14 ENCOUNTER — TELEPHONE (OUTPATIENT)
Dept: INTERNAL MEDICINE | Facility: CLINIC | Age: 59
End: 2019-01-14

## 2019-01-14 RX ORDER — METOPROLOL SUCCINATE 25 MG/1
25 TABLET, EXTENDED RELEASE ORAL DAILY
Qty: 30 TABLET | Refills: 1 | Status: SHIPPED | OUTPATIENT
Start: 2019-01-14 | End: 2019-01-23 | Stop reason: SDUPTHER

## 2019-01-14 NOTE — TELEPHONE ENCOUNTER
RX sent in, advised patient as per below, and she voiced understanding.    ----- Message from KAITLYNN Choi sent at 2019  3:34 PM EST -----  Regarding: RE: BP READINGS  Contact: [phone]  Add   Toprol XL 25 mg  Si po daily   Disp #30, 1 refill    Continue Dayron 10/40mg daily  ----- Message -----  From: Lily Whelan MA  Sent: 2019   1:30 PM  To: KAITLYNN Choi  Subject: FW: BP READINGS                                  See my  telephone note.  DAYRON 10/40 has not made any change in patient's BP's.  She had a severe HA on Friday.  Per Dr. Lawson, patient sent to ER for eval.  No new meds RX'd then.  Patient BP's today:  Prior to med this mornin/89, 2 hrs post med: 153/101, and 11:30: 141.95.   Change med or continue to see if Dayron 10/40 levels out reading?  ----- Message -----  From: Andrew Crocker  Sent: 2019  11:54 AM  To: Lily Whelan MA  Subject: BP READINGS                                      PLEASE CALL PATIENT BACK FOR BP READINGS    THANKS!  ANDREW

## 2019-01-15 ENCOUNTER — TELEPHONE (OUTPATIENT)
Dept: INTERNAL MEDICINE | Facility: CLINIC | Age: 59
End: 2019-01-15

## 2019-01-15 DIAGNOSIS — I10 ESSENTIAL HYPERTENSION: Primary | ICD-10-CM

## 2019-01-15 LAB
ALBUMIN SERPL-MCNC: 4.4 G/DL (ref 3.5–5.2)
ALBUMIN/GLOB SERPL: 1.6 G/DL
ALP SERPL-CCNC: 61 U/L (ref 40–129)
ALT SERPL W P-5'-P-CCNC: 15 U/L (ref 5–33)
ANION GAP SERPL CALCULATED.3IONS-SCNC: 12.3 MMOL/L
AST SERPL-CCNC: 19 U/L (ref 5–32)
BILIRUB SERPL-MCNC: 0.6 MG/DL (ref 0.2–1.2)
BUN BLD-MCNC: 13 MG/DL (ref 6–20)
BUN/CREAT SERPL: 20.6 (ref 7–25)
CALCIUM SPEC-SCNC: 9.4 MG/DL (ref 8.6–10.5)
CHLORIDE SERPL-SCNC: 101 MMOL/L (ref 98–107)
CO2 SERPL-SCNC: 26.7 MMOL/L (ref 22–29)
CREAT BLD-MCNC: 0.63 MG/DL (ref 0.57–1)
GFR SERPL CREATININE-BSD FRML MDRD: 98 ML/MIN/1.73
GLOBULIN UR ELPH-MCNC: 2.8 GM/DL
GLUCOSE BLD-MCNC: 82 MG/DL (ref 65–99)
POTASSIUM BLD-SCNC: 4.2 MMOL/L (ref 3.5–5.2)
PROT SERPL-MCNC: 7.2 G/DL (ref 6–8.5)
SODIUM BLD-SCNC: 140 MMOL/L (ref 136–145)

## 2019-01-15 NOTE — TELEPHONE ENCOUNTER
----- Message from KAITLYNN Choi sent at 1/15/2019 12:58 PM EST -----  I spoke to her regarding her BP meds. She will stay on Toprol XL 25 mg daily. She will call us Thursday with BP readings.    Please have the lab add a CMP to recent labs, drawn in hospital, order is in.     ----- Message -----  From: Amparo Stallworth MA  Sent: 1/15/2019  11:30 AM  To: KAITLYNN Choi        ----- Message -----  From: Jodi Gonzales  Sent: 1/15/2019  11:26 AM  To: Grazyna Zarate [facility]    Pt states she is wanting to talk to lalo. States her BP has been out of control. It was 153/93 just now with new meds. She states sabrina was to call her today. Wants to know if she is supposed to get a CMP?  Call back is [phone].

## 2019-01-15 NOTE — TELEPHONE ENCOUNTER
rosio at   Labs going to  Add  cmp          ----- Message from KAITLYNN Choi sent at 1/15/2019 12:58 PM EST -----  I spoke to her regarding her BP meds. She will stay on Toprol XL 25 mg daily. She will call us Thursday with BP readings.    Please have the lab add a CMP to recent labs, drawn in hospital, order is in.     ----- Message -----  From: Amparo Stallworth MA  Sent: 1/15/2019  11:30 AM  To: KAITLYNN Choi        ----- Message -----  From: Jodi Gonzales  Sent: 1/15/2019  11:26 AM  To: Grazyna Zarate [facility] [place]    Pt states she is wanting to talk to lalo. States her BP has been out of control. It was 153/93 just now with new meds. She states sabrina was to call her today. Wants to know if she is supposed to get a CMP?  Call back is [phone].

## 2019-01-15 NOTE — TELEPHONE ENCOUNTER
Talked to tanya at labs and she is adding   cmp        ----- Message from KAITLYNN Choi sent at 1/15/2019 12:58 PM EST -----  I spoke to her regarding her BP meds. She will stay on Toprol XL 25 mg daily. She will call us Thursday with BP readings.    Please have the lab add a CMP to recent labs, drawn in hospital, order is in.     ----- Message -----  From: Amparo Stallworth MA  Sent: 1/15/2019  11:30 AM  To: KAITLYNN Choi        ----- Message -----  From: Jodi Gonzales  Sent: 1/15/2019  11:26 AM  To: Grazyna Zarate [facility] [place]    Pt states she is wanting to talk to lalo. States her BP has been out of control. It was 153/93 just now with new meds. She states sabrina was to call her today. Wants to know if she is supposed to get a CMP?  Call back is [phone].

## 2019-01-23 ENCOUNTER — OFFICE VISIT (OUTPATIENT)
Dept: INTERNAL MEDICINE | Facility: CLINIC | Age: 59
End: 2019-01-23

## 2019-01-23 ENCOUNTER — TRANSCRIBE ORDERS (OUTPATIENT)
Dept: OTHER | Facility: HOSPITAL | Age: 59
End: 2019-01-23

## 2019-01-23 VITALS
HEIGHT: 61 IN | TEMPERATURE: 98.4 F | HEART RATE: 100 BPM | RESPIRATION RATE: 16 BRPM | OXYGEN SATURATION: 99 % | BODY MASS INDEX: 33.42 KG/M2 | WEIGHT: 177 LBS | DIASTOLIC BLOOD PRESSURE: 90 MMHG | SYSTOLIC BLOOD PRESSURE: 130 MMHG

## 2019-01-23 DIAGNOSIS — M51.9 DISC DISORDER OF THORACIC REGION: Primary | ICD-10-CM

## 2019-01-23 DIAGNOSIS — M54.05 PANNICULITIS INVOLVING THORACOLUMBAR REGION: ICD-10-CM

## 2019-01-23 DIAGNOSIS — R26.2 DIFFICULTY WALKING: ICD-10-CM

## 2019-01-23 DIAGNOSIS — M54.04 PANNICULITIS INVOLVING THORACIC REGION: ICD-10-CM

## 2019-01-23 DIAGNOSIS — R26.0 STAGGERING GAIT: Primary | ICD-10-CM

## 2019-01-23 DIAGNOSIS — I10 ESSENTIAL HYPERTENSION: ICD-10-CM

## 2019-01-23 DIAGNOSIS — G25.9 FUNCTIONAL MOVEMENT DISORDER: ICD-10-CM

## 2019-01-23 PROCEDURE — 99213 OFFICE O/P EST LOW 20 MIN: CPT | Performed by: NURSE PRACTITIONER

## 2019-01-23 RX ORDER — METOPROLOL SUCCINATE 50 MG/1
50 TABLET, EXTENDED RELEASE ORAL DAILY
Qty: 30 TABLET | Refills: 3 | Status: SHIPPED | OUTPATIENT
Start: 2019-01-23 | End: 2019-06-01 | Stop reason: SDUPTHER

## 2019-01-23 NOTE — PROGRESS NOTES
"Chief Complaint   Patient presents with   • Hypertension     been running high despite change in meds   • Tremors     nic, dr. Eden, adjusted cervical spine-improvement in movement-he has ordered cervical/thoracic MRI       Subjective     Shaylee Wadsworth is a 58 y.o. female being seen for a follow up appointment today regarding functional movment disorder and HTN. She has been to [facility], and she found out that if she holds her \"head up and presses down\", then she reports \"I can walk a straight line.\" She has an MRI scheduled of cervical and thoracic spine.  She is reporting daily thoracic pain rated 6 of 10. She denies numbness in arms. She has history of RA.     She is currently taking Toprol XL 25 mg and Dayron 10/40 mg. Her BP is running 140s/90s. She denies CP, SOA.       History of Present Illness     Allergies   Allergen Reactions   • Meloxicam Swelling     EYES AND FACE SWELLING  EYES AND FACE SWELLING         Current Outpatient Medications:   •  amlodipine-olmesartan (DAYRON) 10-40 MG per tablet, Take 1 tablet by mouth Daily., Disp: 30 tablet, Rfl: 1  •  aspirin 81 MG tablet, Take 81 mg by mouth Daily., Disp: , Rfl:   •  atorvastatin (LIPITOR) 40 MG tablet, Take 1 tablet by mouth Daily., Disp: 90 tablet, Rfl: 3  •  ENBREL SURECLICK 50 MG/ML solution auto-injector, , Disp: , Rfl:   •  folic acid (FOLVITE) 1 MG tablet, Take by mouth., Disp: , Rfl:   •  KLOR-CON 20 MEQ CR tablet, TAKE ONE TABLET BY MOUTH TWICE DAILY, Disp: 180 tablet, Rfl: 1  •  methotrexate 2.5 MG tablet, TAKE 4 TABS PO ONCE WEEKLY ON SAME DAY, Disp: 32 tablet, Rfl: 11  •  metoprolol succinate XL (TOPROL XL) 25 MG 24 hr tablet, Take 1 tablet by mouth Daily., Disp: 30 tablet, Rfl: 1  •  naproxen (NAPROSYN) 500 MG tablet, Take 1 tablet by mouth 2 (Two) Times a Day With Meals., Disp: 60 tablet, Rfl: 0  •  omeprazole (priLOSEC) 40 MG capsule, Take 1 capsule by mouth Daily., Disp: , Rfl:   •  PREMPRO 0.45-1.5 MG per tablet, TAKE ONE TABLET BY " MOUTH ONCE DAILY (Patient taking differently: TAKE ONE TABLET BY MOUTH on mon wed fri), Disp: 28 tablet, Rfl: 11  •  vitamin D (ERGOCALCIFEROL) 12510 units capsule capsule, TAKE 1 CAPSULE BY MOUTH ONCE A WEEK, Disp: 12 capsule, Rfl: 3  •  zolpidem (AMBIEN) 5 MG tablet, Bring to sleep lab DO NOT use at home. PLEASE take if not asleep within 30 mins of start of study., Disp: 2 tablet, Rfl: 0    The following portions of the patient's history were reviewed and updated as appropriate: allergies, current medications, past family history, past medical history, past social history, past surgical history and problem list.    Review of Systems   Constitutional: Negative.    HENT: Negative.    Eyes: Negative.    Respiratory: Negative.    Cardiovascular: Negative.    Gastrointestinal: Negative.    Endocrine: Negative.    Genitourinary: Negative.    Musculoskeletal: Negative.    Skin: Negative.    Allergic/Immunologic: Negative.    Neurological: Positive for tremors.   Hematological: Negative.    Psychiatric/Behavioral: Negative.        Assessment     Physical Exam   Constitutional: She is oriented to person, place, and time. She appears well-developed and well-nourished. No distress.   HENT:   Head: Normocephalic.   Right Ear: External ear normal.   Left Ear: External ear normal.   Nose: Nose normal.   Mouth/Throat: Oropharynx is clear and moist.   Eyes: Pupils are equal, round, and reactive to light.   Neck: Neck supple. No thyromegaly present.   Cardiovascular: Normal rate, regular rhythm and normal heart sounds.   No murmur heard.  Pulmonary/Chest: Effort normal and breath sounds normal. No stridor. No respiratory distress. She has no wheezes.   Abdominal: Soft. Bowel sounds are normal.   Musculoskeletal: She exhibits no edema.        Cervical back: She exhibits decreased range of motion and tenderness (C 4-5 ). She exhibits no bony tenderness and no swelling.   She can stop her tremors with looking up and to the right.     Neurological: She is alert and oriented to person, place, and time.   Skin: Skin is warm and dry. She is not diaphoretic.   Psychiatric: She has a normal mood and affect. Her behavior is normal. Thought content normal.   Vitals reviewed.      Plan     Her fasting labs were reviewed with the patient from last week.     Shaylee was seen today for hypertension and tremors.    Diagnoses and all orders for this visit:    Disc disorder of thoracic region    Functional movement disorder    Difficulty walking    Other orders  -     metoprolol succinate XL (TOPROL-XL) 50 MG 24 hr tablet; Take 1 tablet by mouth Daily.       Diagnosis Plan   1. Disc disorder of thoracic region     2. Functional movement disorder     3. Difficulty walking     4. Essential hypertension  metoprolol succinate XL (TOPROL-XL) 50 MG 24 hr tablet       Will await cervical and thoracic MRI results.     Follow up in 3 months

## 2019-01-25 ENCOUNTER — HOSPITAL ENCOUNTER (OUTPATIENT)
Dept: MRI IMAGING | Facility: HOSPITAL | Age: 59
Discharge: HOME | End: 2019-01-25
Admitting: CHIROPRACTOR

## 2019-01-25 ENCOUNTER — HOSPITAL ENCOUNTER (OUTPATIENT)
Dept: MRI IMAGING | Facility: HOSPITAL | Age: 59
Discharge: HOME | End: 2019-01-25

## 2019-01-25 DIAGNOSIS — M54.05 PANNICULITIS INVOLVING THORACOLUMBAR REGION: ICD-10-CM

## 2019-01-25 DIAGNOSIS — R26.0 STAGGERING GAIT: ICD-10-CM

## 2019-01-25 DIAGNOSIS — M54.04 PANNICULITIS INVOLVING THORACIC REGION: ICD-10-CM

## 2019-01-25 PROCEDURE — 72157 MRI CHEST SPINE W/O & W/DYE: CPT

## 2019-01-25 PROCEDURE — 72156 MRI NECK SPINE W/O & W/DYE: CPT

## 2019-01-25 PROCEDURE — 0 GADOBENATE DIMEGLUMINE 529 MG/ML SOLUTION: Performed by: CHIROPRACTOR

## 2019-01-25 PROCEDURE — A9577 INJ MULTIHANCE: HCPCS | Performed by: CHIROPRACTOR

## 2019-01-25 RX ADMIN — GADOBENATE DIMEGLUMINE 17 ML: 529 INJECTION, SOLUTION INTRAVENOUS at 14:58

## 2019-01-31 DIAGNOSIS — G25.9 MOVEMENT DISORDER: Primary | ICD-10-CM

## 2019-01-31 DIAGNOSIS — R25.1 TREMOR: ICD-10-CM

## 2019-02-12 ENCOUNTER — HOSPITAL ENCOUNTER (EMERGENCY)
Facility: HOSPITAL | Age: 59
Discharge: HOME | End: 2019-02-12
Attending: EMERGENCY MEDICINE | Admitting: EMERGENCY MEDICINE

## 2019-02-12 ENCOUNTER — APPOINTMENT (OUTPATIENT)
Dept: GENERAL RADIOLOGY | Facility: HOSPITAL | Age: 59
End: 2019-02-12

## 2019-02-12 VITALS
HEART RATE: 53 BPM | OXYGEN SATURATION: 97 % | WEIGHT: 193 LBS | BODY MASS INDEX: 36.44 KG/M2 | HEIGHT: 61 IN | SYSTOLIC BLOOD PRESSURE: 122 MMHG | TEMPERATURE: 98.2 F | RESPIRATION RATE: 12 BRPM | DIASTOLIC BLOOD PRESSURE: 68 MMHG

## 2019-02-12 DIAGNOSIS — R07.89 ATYPICAL CHEST PAIN: Primary | ICD-10-CM

## 2019-02-12 LAB
ALBUMIN SERPL-MCNC: 4.5 G/DL (ref 3.5–5.2)
ALBUMIN/GLOB SERPL: 1.6 G/DL
ALP SERPL-CCNC: 76 U/L (ref 40–129)
ALT SERPL W P-5'-P-CCNC: 15 U/L (ref 5–33)
ANION GAP SERPL CALCULATED.3IONS-SCNC: 13.4 MMOL/L
AST SERPL-CCNC: 15 U/L (ref 5–32)
BASOPHILS # BLD AUTO: 0.05 10*3/MM3 (ref 0–0.2)
BASOPHILS NFR BLD AUTO: 0.7 % (ref 0–1.5)
BILIRUB SERPL-MCNC: 0.5 MG/DL (ref 0.2–1.2)
BUN BLD-MCNC: 14 MG/DL (ref 6–20)
BUN/CREAT SERPL: 17.1 (ref 7–25)
CALCIUM SPEC-SCNC: 10 MG/DL (ref 8.6–10.5)
CHLORIDE SERPL-SCNC: 104 MMOL/L (ref 98–107)
CO2 SERPL-SCNC: 25.6 MMOL/L (ref 22–29)
CREAT BLD-MCNC: 0.82 MG/DL (ref 0.57–1)
DEPRECATED RDW RBC AUTO: 43.5 FL (ref 37–54)
EOSINOPHIL # BLD AUTO: 0.07 10*3/MM3 (ref 0–0.4)
EOSINOPHIL NFR BLD AUTO: 0.9 % (ref 0.3–6.2)
ERYTHROCYTE [DISTWIDTH] IN BLOOD BY AUTOMATED COUNT: 14.1 % (ref 12.3–15.4)
GFR SERPL CREATININE-BSD FRML MDRD: 72 ML/MIN/1.73
GLOBULIN UR ELPH-MCNC: 2.9 GM/DL
GLUCOSE BLD-MCNC: 107 MG/DL (ref 65–99)
HCT VFR BLD AUTO: 39.1 % (ref 34–46.6)
HGB BLD-MCNC: 13 G/DL (ref 12–15.9)
IMM GRANULOCYTES # BLD AUTO: 0.01 10*3/MM3 (ref 0–0.05)
IMM GRANULOCYTES NFR BLD AUTO: 0.1 % (ref 0–0.5)
LYMPHOCYTES # BLD AUTO: 3.19 10*3/MM3 (ref 0.7–3.1)
LYMPHOCYTES NFR BLD AUTO: 43.1 % (ref 19.6–45.3)
MCH RBC QN AUTO: 28.6 PG (ref 26.6–33)
MCHC RBC AUTO-ENTMCNC: 33.2 G/DL (ref 31.5–35.7)
MCV RBC AUTO: 85.9 FL (ref 79–97)
MONOCYTES # BLD AUTO: 0.55 10*3/MM3 (ref 0.1–0.9)
MONOCYTES NFR BLD AUTO: 7.4 % (ref 5–12)
NEUTROPHILS # BLD AUTO: 3.53 10*3/MM3 (ref 1.4–7)
NEUTROPHILS NFR BLD AUTO: 47.8 % (ref 42.7–76)
NRBC BLD AUTO-RTO: 0 /100 WBC (ref 0–0)
PLATELET # BLD AUTO: 258 10*3/MM3 (ref 140–450)
PMV BLD AUTO: 9.5 FL (ref 6–12)
POTASSIUM BLD-SCNC: 4.2 MMOL/L (ref 3.5–5.2)
PROT SERPL-MCNC: 7.4 G/DL (ref 6–8.5)
RBC # BLD AUTO: 4.55 10*6/MM3 (ref 3.77–5.28)
SODIUM BLD-SCNC: 143 MMOL/L (ref 136–145)
TROPONIN T SERPL-MCNC: <0.01 NG/ML (ref 0–0.03)
TROPONIN T SERPL-MCNC: <0.01 NG/ML (ref 0–0.03)
WBC NRBC COR # BLD: 7.4 10*3/MM3 (ref 3.4–10.8)

## 2019-02-12 PROCEDURE — 80053 COMPREHEN METABOLIC PANEL: CPT | Performed by: EMERGENCY MEDICINE

## 2019-02-12 PROCEDURE — 93010 ELECTROCARDIOGRAM REPORT: CPT | Performed by: INTERNAL MEDICINE

## 2019-02-12 PROCEDURE — 93005 ELECTROCARDIOGRAM TRACING: CPT | Performed by: EMERGENCY MEDICINE

## 2019-02-12 PROCEDURE — 71046 X-RAY EXAM CHEST 2 VIEWS: CPT

## 2019-02-12 PROCEDURE — 85025 COMPLETE CBC W/AUTO DIFF WBC: CPT | Performed by: EMERGENCY MEDICINE

## 2019-02-12 PROCEDURE — 99284 EMERGENCY DEPT VISIT MOD MDM: CPT | Performed by: EMERGENCY MEDICINE

## 2019-02-12 PROCEDURE — 84484 ASSAY OF TROPONIN QUANT: CPT | Performed by: EMERGENCY MEDICINE

## 2019-02-12 PROCEDURE — 99284 EMERGENCY DEPT VISIT MOD MDM: CPT

## 2019-02-12 RX ORDER — SODIUM CHLORIDE 0.9 % (FLUSH) 0.9 %
10 SYRINGE (ML) INJECTION AS NEEDED
Status: DISCONTINUED | OUTPATIENT
Start: 2019-02-12 | End: 2019-02-12 | Stop reason: HOSPADM

## 2019-02-12 RX ORDER — SUCRALFATE ORAL 1 G/10ML
1 SUSPENSION ORAL 4 TIMES DAILY
Qty: 420 ML | Refills: 0 | Status: SHIPPED | OUTPATIENT
Start: 2019-02-12 | End: 2019-03-04 | Stop reason: HOSPADM

## 2019-02-12 NOTE — ED PROVIDER NOTES
Subjective   History of Present Illness  History of Present Illness    Chief complaint: Chest pain    Location: Anterior chest wall, nonradiating    Quality/Severity: 5/5 at its worst, 0/5 currently, burning    Timing/Onset/Duration: Intermittent for the last week, most recent onset at 10 AM, lasted 2-3 minutes    Modifying Factors: It gets better with time, nothing makes it worse    Associated Symptoms: No fever chills or cough.  No shortness of breath.  No nausea or vomiting.  Patient got mildly diaphoretic.  No abdominal pain.    Narrative: This 58-year-old white female who is a smoker with hypertension, presents with anterior chest wall pain that she characterizes as burning.  She does have a history of reflux.  The patient had a stress test 8 years ago that was normal.  She has never had a cardiac catheterization.  Patient's mother had an MI at age 75.  The patient is not diabetic.  The patient traveled to [facility] this week and by car to evaluate a movement disorder.  The patient has never had a blood clot in her leg or along.  No recent surgeries.  No bleeding or clotting problems.  No cancer.    PCP: Natali Valentine      Review of Systems   Constitutional: Negative for chills and fever.   HENT: Negative for ear pain and sore throat.    Respiratory: Negative for cough, chest tightness and shortness of breath.    Cardiovascular: Positive for chest pain. Negative for palpitations and leg swelling.   Gastrointestinal: Negative for abdominal pain, blood in stool, diarrhea, nausea and vomiting.   Genitourinary: Negative for decreased urine volume, difficulty urinating and hematuria.   Musculoskeletal: Negative for back pain.   Skin: Negative for rash.   Neurological: Negative for headaches.   Psychiatric/Behavioral: Negative.  Negative for confusion.        Medication List      START taking these medications    sucralfate 1 GM/10ML suspension  Commonly known as:  CARAFATE  Take 10 mL by mouth 4 (Four) Times a  Day.        CHANGE how you take these medications    PREMPRO 0.45-1.5 MG per tablet  Generic drug:  estrogen (conjugated)-medroxyprogesterone  TAKE ONE TABLET BY MOUTH ONCE DAILY  What changed:    how much to take  how to take this  when to take this        CONTINUE taking these medications    amlodipine-olmesartan 10-40 MG per tablet  Commonly known as:  DAYRON  Take 1 tablet by mouth Daily.     aspirin 81 MG tablet     atorvastatin 40 MG tablet  Commonly known as:  LIPITOR  Take 1 tablet by mouth Daily.     ENBREL SURECLICK 50 MG/ML solution auto-injector  Generic drug:  Etanercept     folic acid 1 MG tablet  Commonly known as:  FOLVITE     KLOR-CON 20 MEQ CR tablet  Generic drug:  potassium chloride  TAKE ONE TABLET BY MOUTH TWICE DAILY     methotrexate 2.5 MG tablet  TAKE 4 TABS PO ONCE WEEKLY ON SAME DAY     metoprolol succinate XL 50 MG 24 hr tablet  Commonly known as:  TOPROL XL  Take 1 tablet by mouth Daily.     naproxen 500 MG tablet  Commonly known as:  NAPROSYN  Take 1 tablet by mouth 2 (Two) Times a Day With Meals.     omeprazole 40 MG capsule  Commonly known as:  priLOSEC  Take 1 capsule by mouth Daily.     vitamin D 77676 units capsule capsule  Commonly known as:  ERGOCALCIFEROL  TAKE 1 CAPSULE BY MOUTH ONCE A WEEK     zolpidem 5 MG tablet  Commonly known as:  AMBIEN  Bring to sleep lab DO NOT use at home. PLEASE take if not asleep within 30   mins of start of study.          Past Medical History:   Diagnosis Date   • Anxiety    • Arthritis 5/1/2014    RA   • Lainez esophagus    • Cholelithiasis 2007    Removed   • Esophageal reflux    • Fibromyositis    • Functional movement disorder    • H/O colonoscopy    • H/O mammogram 08/31/2011    NORMAL    • Hx of bone density study 08/31/2011    OSTEOPENIA    • Hyperlipidemia    • Hyperparathyroidism (CMS/HCC)    • Hypertension    • Menopause    • Osteopenia    • Pancreatitis    • Tremor 4/4/2018       Allergies   Allergen Reactions   • Meloxicam Swelling      EYES AND FACE SWELLING  EYES AND FACE SWELLING       Past Surgical History:   Procedure Laterality Date   • CHOLECYSTECTOMY     • COLONOSCOPY     • DILATATION AND CURETTAGE     • HYSTERECTOMY     • MOUTH SURGERY      TOOTH EXTRACTION   • OTHER SURGICAL HISTORY  2015    DIAGNOSTIC ESOPHAGOSCOPY TRANSNASAL FLEXIBLE WITH BIOPSY; ARZATE ESO. REPEAT EGD 2 YR    • OVARY SURGERY Left     NORMAL    • PAP SMEAR  2010    NORMAL    • PARATHYROIDECTOMY Right 2016    Dr. Muchael Flynn at [place]       Family History   Problem Relation Age of Onset   • Diabetes Mother    • Hyperlipidemia Mother    • Hypertension Mother    • Heart disease Mother    • Arthritis Father    • Anxiety disorder Father    • COPD Father    • Glaucoma Father    • Hyperlipidemia Father    • Hypertension Father    • Vision loss Father    • Heart disease Sister        Social History     Socioeconomic History   • Marital status:      Spouse name: Not on file   • Number of children: Not on file   • Years of education: Not on file   • Highest education level: Not on file   Tobacco Use   • Smoking status: Current Every Day Smoker     Packs/day: 0.50     Years: 20.00     Pack years: 10.00     Types: Electronic Cigarette     Last attempt to quit: 2018     Years since quittin.5   • Smokeless tobacco: Never Used   Substance and Sexual Activity   • Alcohol use: No   • Drug use: No   • Sexual activity: No           Objective   Physical Exam   Constitutional: She is oriented to person, place, and time. She appears well-developed and well-nourished. No distress.   ED Triage Vitals (19 1034)  Temp: 98.3 °F (36.8 °C)  Heart Rate: 70  Resp: 16  BP: 141/82  SpO2: 100 %  Temp src: Oral  Heart Rate Source: Monitor  Patient Position: Lying  BP Location: Left arm  FiO2 (%): n/a    The patient's vitals were reviewed by me.  Unless otherwise noted they are within normal limits.     HENT:   Head: Normocephalic and atraumatic.   Right Ear:  External ear normal.   Left Ear: External ear normal.   Nose: Nose normal.   Mouth/Throat: Oropharynx is clear and moist.   Eyes: Conjunctivae and EOM are normal. Pupils are equal, round, and reactive to light. Right eye exhibits no discharge. Left eye exhibits no discharge.   Neck: Normal range of motion. Neck supple. No JVD present. No tracheal deviation present. No thyromegaly present.   Cardiovascular: Normal rate, regular rhythm, normal heart sounds and intact distal pulses. Exam reveals no gallop and no friction rub.   No murmur heard.  Pulmonary/Chest: Effort normal and breath sounds normal. No stridor. No respiratory distress. She has no wheezes. She has no rales. She exhibits no tenderness.   Abdominal: Soft. Bowel sounds are normal. She exhibits no distension and no mass. There is no tenderness. There is no rebound and no guarding. No hernia.   Musculoskeletal: Normal range of motion. She exhibits no edema or deformity.   Lymphadenopathy:     She has no cervical adenopathy.   Neurological: She is alert and oriented to person, place, and time.   The patient has a resting tremor.   Skin: Skin is warm and dry. No rash noted. She is not diaphoretic. No erythema. No pallor.   Psychiatric: Her behavior is normal.   Nursing note and vitals reviewed.      Procedures           ED Course  ED Course as of Feb 12 1442   Tue Feb 12, 2019   1141 The laboratory values were reviewed by me.  The serum glucose is 107.  Laboratory values are otherwise unremarkable.  [RC]   1426 The repeat troponin is normal.  [RC]      ED Course User Index  [RC] Eugene Kuhn MD      2:42 PM, 02/12/19:  EKG was obtained at 1031.  EKG was read by me at 1033.  EKG shows a normal sinus rhythm with rate of 74.  There is a normal axis with no hypertrophy.  The CT, QRS, QT intervals are unremarkable.  There is no ectopy.  There is no acute ST elevation or depression.    2:42 PM, 02/12/19:  The repeat EKG was obtained at 1402.  The EKG shows a  sinus bradycardia with a rate of 55.  There is a normal axis with no hypertrophy.  There is inferior Q waves.  There is no ectopy.  The [place], QRS, QT intervals are unremarkable.  The EKG is essentially unchanged from an EKG dated February 12, 2019 at 10:43 a.m.    The patient was reassessed.  She has had no further episodes of chest pain here in the emergency department after the GI cocktail.  Her vital signs were reviewed and are stable.    The patient's diagnosis of atypical chest pain was discussed with her.  Since she did respond to the GI cocktail, I will prescribe her Carafate in addition to her proton pump inhibitor.  The patient should avoid spicy and fatty foods.  The patient should follow-up with Dr. Valentine within 1 week.  The patient should return to the emergency department if she has worsening pain, shortness of breath, worse in any way at all.  All the patient's questions were answered the patient will be discharged in good condition.              MDM    XR Chest 2 View   ED Interpretation   The chest x-ray is read by Dr. Abel Narvaez shows no active disease.      Final Result   No active disease.       This report was finalized on 2/12/2019 11:05 AM by Dr. Abel Webber MD.            Labs Reviewed   COMPREHENSIVE METABOLIC PANEL - Abnormal; Notable for the following components:       Result Value    Glucose 107 (*)     All other components within normal limits   CBC WITH AUTO DIFFERENTIAL - Abnormal; Notable for the following components:    Lymphocytes, Absolute 3.19 (*)     All other components within normal limits   TROPONIN (IN-HOUSE) - Normal    Narrative:     Troponin T Reference Range:  <= 0.03 ng/mL-   Negative for AMI  >0.03 ng/mL-     Abnormal for myocardial necrosis.  Clinicians would have to utilize clinical acumen, EKG, Troponin and serial changes to determine if it is an Acute Myocardial Infarction or myocardial injury due to an underlying chronic condition.    TROPONIN  "(IN-HOUSE) - Normal    Narrative:     Troponin T Reference Range:  <= 0.03 ng/mL-   Negative for AMI  >0.03 ng/mL-     Abnormal for myocardial necrosis.  Clinicians would have to utilize clinical acumen, EKG, Troponin and serial changes to determine if it is an Acute Myocardial Infarction or myocardial injury due to an underlying chronic condition.    CBC AND DIFFERENTIAL    Narrative:     The following orders were created for panel order CBC & Differential.  Procedure                               Abnormality         Status                     ---------                               -----------         ------                     CBC Auto Differential[[id number]]        Abnormal            Final result                 Please view results for these tests on the individual orders.     Xr Chest 2 View    Result Date: 2/12/2019  Narrative: CHEST X-RAY, 2/12/2019     HISTORY: 58-year-old female the ED complaining of 1 week history mid upper chest pain. Smoker.  TECHNIQUE: PA and lateral upright chest x-ray.  FINDINGS: Heart size and vascularity are normal. The lungs are hyperexpanded, suggesting COPD. No visible pulmonary infiltrate, pneumothorax or pleural effusion.      Impression: No active disease.  This report was finalized on 2/12/2019 11:05 AM by Dr. Abel Webber MD.      Mri Cervical Spine With & Without Contrast    Result Date: 1/26/2019  Narrative: EXAM:CERVICAL SPINE MRI WITH AND WITHOUT CONTRAST 01/25/2019  HISTORY: 9 month history of right-sided shaking and tremors, difficulty walking and \"unsteady balance\". Patient also notes neck tightness and back pain.  TECHNIQUE: Cervical spine MRI with and without contrast.  COMPARISON:None  FINDINGS: Cervical spine alignment is normal. Bone marrow signal and cord signal are normal. The posterior fossa and its contents are normal. The central canal within the cord is fairly perceptible in the mid to lower cervical region but findings are not felt sufficient to " suggest the presence of a syrinx.  Postcontrast images show no evidence of abnormal enhancement within the spinal cord, spinal canal, spinal column, or paraspinous soft tissues. The paraspinous soft tissues appear unremarkable.  At C2-3, the canal and foramina are normal.  At C3-4, the canal and foramina are normal.  At C4-5, the canal and foramina are normal.  At C5-6, there is a slight disc bulge, but no more than minimal canal narrowing. The right foramen is normal and there is minimal left foraminal narrowing.  At C6-7 there is a disc and osteophyte complex and mild canal stenosis but no cord compression, and there is mild bilateral foraminal narrowing.  At C7-T1, the canal and foramina are normal.      Impression: Minimal degenerative change, no more than mild canal or foraminal narrowing at any level. Otherwise normal cervical spine MRI with and without contrast.  This report was finalized on 1/26/2019 6:49 AM by Dr. Maciel Ham MD.      Mri Thoracic Spine With & Without Contrast    Result Date: 1/26/2019  Narrative: EXAM:THORACIC SPINE MRI WITH AND WITHOUT CONTRAST 01/25/2019.  HISTORY: Unsteady gait, difficulty walking, loss of balance, and right-sided shaking and tremors since 04/20/2018  TECHNIQUE: Routine thoracic spine MRI with and without contrast  COMPARISON:None  FINDINGS: There is slight mid thoracic dextroscoliosis. Alignment is otherwise normal. Bone marrow signal is normal, and cord signal is normal. Postcontrast images show no abnormal enhancement within the spinal cord, spinal canal, spinal column, paraspinous soft tissues.  There is a tiny disc protrusion at T8-9 which contacts the ventral cord and slightly displaces and deforms it, but there is no actual canal stenosis or cord compression, as there is still a mantle of CSF posterior to the cord at this level. There is no other canal compromise.      Impression: Minimal discogenic change at T8-9, otherwise normal thoracic spine MRI with and  without contrast.  This report was finalized on 1/26/2019 6:52 AM by Dr. Maciel Ham MD.        Final diagnoses:   Atypical chest pain         ED Medications:  Medications   sodium chloride 0.9 % flush 10 mL (not administered)       New Medications:     Medication List      START taking these medications    sucralfate 1 GM/10ML suspension  Commonly known as:  CARAFATE  Take 10 mL by mouth 4 (Four) Times a Day.        CHANGE how you take these medications    PREMPRO 0.45-1.5 MG per tablet  Generic drug:  estrogen (conjugated)-medroxyprogesterone  TAKE ONE TABLET BY MOUTH ONCE DAILY  What changed:    how much to take  how to take this  when to take this        CONTINUE taking these medications    amlodipine-olmesartan 10-40 MG per tablet  Commonly known as:  DAYRON  Take 1 tablet by mouth Daily.     aspirin 81 MG tablet     atorvastatin 40 MG tablet  Commonly known as:  LIPITOR  Take 1 tablet by mouth Daily.     ENBREL SURECLICK 50 MG/ML solution auto-injector  Generic drug:  Etanercept     folic acid 1 MG tablet  Commonly known as:  FOLVITE     KLOR-CON 20 MEQ CR tablet  Generic drug:  potassium chloride  TAKE ONE TABLET BY MOUTH TWICE DAILY     methotrexate 2.5 MG tablet  TAKE 4 TABS PO ONCE WEEKLY ON SAME DAY     metoprolol succinate XL 50 MG 24 hr tablet  Commonly known as:  TOPROL XL  Take 1 tablet by mouth Daily.     naproxen 500 MG tablet  Commonly known as:  NAPROSYN  Take 1 tablet by mouth 2 (Two) Times a Day With Meals.     omeprazole 40 MG capsule  Commonly known as:  priLOSEC  Take 1 capsule by mouth Daily.     vitamin D 48914 units capsule capsule  Commonly known as:  ERGOCALCIFEROL  TAKE 1 CAPSULE BY MOUTH ONCE A WEEK     zolpidem 5 MG tablet  Commonly known as:  AMBIEN  Bring to sleep lab DO NOT use at home. PLEASE take if not asleep within 30   mins of start of study.          Stopped Medications:     Medication List      START taking these medications    sucralfate 1 GM/10ML suspension  Commonly  known as:  CARAFATE  Take 10 mL by mouth 4 (Four) Times a Day.        CHANGE how you take these medications    PREMPRO 0.45-1.5 MG per tablet  Generic drug:  estrogen (conjugated)-medroxyprogesterone  TAKE ONE TABLET BY MOUTH ONCE DAILY  What changed:    how much to take  how to take this  when to take this        CONTINUE taking these medications    amlodipine-olmesartan 10-40 MG per tablet  Commonly known as:  DAYRON  Take 1 tablet by mouth Daily.     aspirin 81 MG tablet     atorvastatin 40 MG tablet  Commonly known as:  LIPITOR  Take 1 tablet by mouth Daily.     ENBREL SURECLICK 50 MG/ML solution auto-injector  Generic drug:  Etanercept     folic acid 1 MG tablet  Commonly known as:  FOLVITE     KLOR-CON 20 MEQ CR tablet  Generic drug:  potassium chloride  TAKE ONE TABLET BY MOUTH TWICE DAILY     methotrexate 2.5 MG tablet  TAKE 4 TABS PO ONCE WEEKLY ON SAME DAY     metoprolol succinate XL 50 MG 24 hr tablet  Commonly known as:  TOPROL XL  Take 1 tablet by mouth Daily.     naproxen 500 MG tablet  Commonly known as:  NAPROSYN  Take 1 tablet by mouth 2 (Two) Times a Day With Meals.     omeprazole 40 MG capsule  Commonly known as:  priLOSEC  Take 1 capsule by mouth Daily.     vitamin D 60426 units capsule capsule  Commonly known as:  ERGOCALCIFEROL  TAKE 1 CAPSULE BY MOUTH ONCE A WEEK     zolpidem 5 MG tablet  Commonly known as:  AMBIEN  Bring to sleep lab DO NOT use at home. PLEASE take if not asleep within 30   mins of start of study.            Final diagnoses:   Atypical chest pain            Eugene Kuhn MD  02/12/19 1640

## 2019-02-12 NOTE — DISCHARGE INSTRUCTIONS
Avoid spicy and fatty foods.  Follow-up with Laura Valentine within 1 week.  Return to the emergency department if there is worsening pain, shortness of breath, worse in any way at all.

## 2019-02-14 ENCOUNTER — HOSPITAL ENCOUNTER (OUTPATIENT)
Dept: SLEEP MEDICINE | Facility: HOSPITAL | Age: 59
Discharge: HOME | End: 2019-02-14
Attending: INTERNAL MEDICINE | Admitting: INTERNAL MEDICINE

## 2019-02-14 ENCOUNTER — OFFICE VISIT (OUTPATIENT)
Dept: INTERNAL MEDICINE | Facility: CLINIC | Age: 59
End: 2019-02-14

## 2019-02-14 VITALS
SYSTOLIC BLOOD PRESSURE: 148 MMHG | RESPIRATION RATE: 16 BRPM | BODY MASS INDEX: 33.99 KG/M2 | DIASTOLIC BLOOD PRESSURE: 80 MMHG | HEART RATE: 75 BPM | WEIGHT: 180 LBS | TEMPERATURE: 98.1 F | OXYGEN SATURATION: 100 % | HEIGHT: 61 IN

## 2019-02-14 DIAGNOSIS — R56.9 NOCTURNAL SEIZURES (HCC): ICD-10-CM

## 2019-02-14 DIAGNOSIS — I10 ESSENTIAL HYPERTENSION: ICD-10-CM

## 2019-02-14 DIAGNOSIS — H81.13 BENIGN PAROXYSMAL POSITIONAL VERTIGO DUE TO BILATERAL VESTIBULAR DISORDER: ICD-10-CM

## 2019-02-14 DIAGNOSIS — G24.9 DYSTONIA: ICD-10-CM

## 2019-02-14 DIAGNOSIS — R07.9 CHEST PAIN OF UNKNOWN ETIOLOGY: Primary | ICD-10-CM

## 2019-02-14 DIAGNOSIS — G47.33 OSA (OBSTRUCTIVE SLEEP APNEA): ICD-10-CM

## 2019-02-14 PROBLEM — H81.10 BPPV (BENIGN PAROXYSMAL POSITIONAL VERTIGO): Status: ACTIVE | Noted: 2019-02-14

## 2019-02-14 PROCEDURE — 99214 OFFICE O/P EST MOD 30 MIN: CPT | Performed by: NURSE PRACTITIONER

## 2019-02-14 PROCEDURE — 95810 POLYSOM 6/> YRS 4/> PARAM: CPT

## 2019-02-14 NOTE — PATIENT INSTRUCTIONS
Dystonia  Dystonia is a condition that makes your muscles contract without warning (muscle spasms). It can make doing everyday tasks hard. There are different forms of dystonia. The condition can affect just one part of your body, or it can affect larger areas of your body. Dystonia affects people in different ways. In some people, it is mild and goes away over time, while others may need treatment. Although there is no cure for dystonia, you can manage the condition with treatment.  What are the causes?  Dystonia may be caused by:  · Genetics. This means you inherited the genes that cause you to be at risk for dystonia.  · An abnormality in the part of your brain that controls movement (basal ganglia).    Dystonia may also be acquired. If you have acquired dystonia, you developed the condition after:  · Brain injury.  · Infection.  · Drug reaction.    The cause of dystonia may also not be known (idiopathic dystonia).  What are the signs or symptoms?  Signs and symptoms of dystonia can depend on which type of the condition you have. Common signs and symptoms include:  · Muscle twitches or spasms around your eyes (blepharospasm).  · Foot cramping or dragging.  · Pulling of your neck to one side (torticollis).  · Muscles spasms of the face.  · Spasms of the voice box.  · Tremors.  · Awkward and painful positions.  · Muscle cramping after muscle use.    How is this diagnosed?  Your health care provider can diagnose dystonia based on your symptoms and medical history. Your health care provider will also do a physical exam. You may also have:  · A blood test to check for genes that cause dystonia.  · Brain imaging tests to rule out other causes of your symptoms.    There are no tests that can diagnose other causes of dystonia.  How is this treated?  There are no treatments that can cure or prevent dystonia. Treatment to manage dystonia may include:  · Injecting the affected muscles with a chemical (botulinum) that blocks  muscle spasms. This treatment can block spasms for a few days to a few months.  · Medicines to relax muscles.    Follow these instructions at home:  · Physical therapy to improve muscle strength and movement may be suggested by your health care provider. Continue your physical therapy exercises at home as instructed by your physical therapist.  · Make sure you have a good support system. Let your health care provider know if you are struggling with stress or anxiety.  · Keep all follow-up visits as directed by your health care provider. This is important.  · Take medicines only as directed by your health care provider.  Contact a health care provider if:  · Your condition is changing or getting worse.  · You need more support at home.  This information is not intended to replace advice given to you by your health care provider. Make sure you discuss any questions you have with your health care provider.  Document Released: 12/08/2003 Document Revised: 05/25/2017 Document Reviewed: 02/11/2015  [employer] Interactive Patient Education © 2018 [employer]  Benign Positional Vertigo  Vertigo is the feeling that you or your surroundings are moving when they are not. Benign positional vertigo is the most common form of vertigo. The cause of this condition is not serious (is benign). This condition is triggered by certain movements and positions (is positional). This condition can be dangerous if it occurs while you are doing something that could endanger you or others, such as driving.  What are the causes?  In many cases, the cause of this condition is not known. It may be caused by a disturbance in an area of the inner ear that helps your brain to sense movement and balance. This disturbance can be caused by a viral infection (labyrinthitis), head injury, or repetitive motion.  What increases the risk?  This condition is more likely to develop in:  · Women.  · People who are 50 years of age or older.    What are the  signs or symptoms?  Symptoms of this condition usually happen when you move your head or your eyes in different directions. Symptoms may start suddenly, and they usually last for less than a minute. Symptoms may include:  · Loss of balance and falling.  · Feeling like you are spinning or moving.  · Feeling like your surroundings are spinning or moving.  · Nausea and vomiting.  · Blurred vision.  · Dizziness.  · Involuntary eye movement (nystagmus).    Symptoms can be mild and cause only slight annoyance, or they can be severe and interfere with daily life. Episodes of benign positional vertigo may return (recur) over time, and they may be triggered by certain movements. Symptoms may improve over time.  How is this diagnosed?  This condition is usually diagnosed by medical history and a physical exam of the head, neck, and ears. You may be referred to a health care provider who specializes in ear, nose, and throat (ENT) problems (otolaryngologist) or a provider who specializes in disorders of the nervous system (neurologist). You may have additional testing, including:  · MRI.  · A CT scan.  · Eye movement tests. Your health care provider may ask you to change positions quickly while he or she watches you for symptoms of benign positional vertigo, such as nystagmus. Eye movement may be tested with an electronystagmogram (ENG), caloric stimulation, the Julien-Hallpike test, or the roll test.  · An electroencephalogram (EEG). This records electrical activity in your brain.  · Hearing tests.    How is this treated?  Usually, your health care provider will treat this by moving your head in specific positions to adjust your inner ear back to normal. Surgery may be needed in severe cases, but this is rare. In some cases, benign positional vertigo may resolve on its own in 2-4 weeks.  Follow these instructions at home:  Safety  · Move slowly.Avoid sudden body or head movements.  · Avoid driving.  · Avoid operating heavy  machinery.  · Avoid doing any tasks that would be dangerous to you or others if a vertigo episode would occur.  · If you have trouble walking or keeping your balance, try using a cane for stability. If you feel dizzy or unstable, sit down right away.  · Return to your normal activities as told by your health care provider. Ask your health care provider what activities are safe for you.  General instructions  · Take over-the-counter and prescription medicines only as told by your health care provider.  · Avoid certain positions or movements as told by your health care provider.  · Drink enough fluid to keep your urine clear or pale yellow.  · Keep all follow-up visits as told by your health care provider. This is important.  Contact a health care provider if:  · You have a fever.  · Your condition gets worse or you develop new symptoms.  · Your family or friends notice any behavioral changes.  · Your nausea or vomiting gets worse.  · You have numbness or a “pins and needles” sensation.  Get help right away if:  · You have difficulty speaking or moving.  · You are always dizzy.  · You faint.  · You develop severe headaches.  · You have weakness in your legs or arms.  · You have changes in your hearing or vision.  · You develop a stiff neck.  · You develop sensitivity to light.  This information is not intended to replace advice given to you by your health care provider. Make sure you discuss any questions you have with your health care provider.  Document Released: 09/25/2007 Document Revised: 05/25/2017 Document Reviewed: 04/11/2016  [employer] Interactive Patient Education © 2018 [employer]

## 2019-02-14 NOTE — PROGRESS NOTES
"Chief Complaint   Patient presents with   • Hypertension     in ER 2 days ago   • Tremors     improved with Chiropractice       Subjective     Shaylee Wadsworth is a 58 y.o. female being seen for a follow up appointment today regarding CP. She was in the ER 2- for \"burning Chest pain\" that started 2-3-2019. In ER, ECG was normal and serial troponins were negative. She is currently on Dayron 10/40mg and Toprol XL 50mg daily for HTN. She is still reporting mid chest pain, radiates across chest. Denies jaw pain. Described as a burning pain, rated 5 of 10. It comes and goes intermittently, occurring every few hours, lasts about 2 minutes resolves on it's own. Denies nausea, SOA. She cannot identify any triggers, reporting \"it even wakes me while I sleep.\" She was given Carafate in the ER without help. BP at home 120/80s.     She was also evaluated at [facility] for Functional Movement disorder. The neurologist thinks it may be related to Dystonia and BPPV. She will start PT at [facility].       History of Present Illness     Allergies   Allergen Reactions   • Meloxicam Swelling     EYES AND FACE SWELLING  EYES AND FACE SWELLING         Current Outpatient Medications:   •  amlodipine-olmesartan (DAYRON) 10-40 MG per tablet, Take 1 tablet by mouth Daily., Disp: 30 tablet, Rfl: 1  •  aspirin 81 MG tablet, Take 81 mg by mouth Daily., Disp: , Rfl:   •  atorvastatin (LIPITOR) 40 MG tablet, Take 1 tablet by mouth Daily., Disp: 90 tablet, Rfl: 3  •  ENBREL SURECLICK 50 MG/ML solution auto-injector, , Disp: , Rfl:   •  folic acid (FOLVITE) 1 MG tablet, Take by mouth., Disp: , Rfl:   •  KLOR-CON 20 MEQ CR tablet, TAKE ONE TABLET BY MOUTH TWICE DAILY, Disp: 180 tablet, Rfl: 1  •  methotrexate 2.5 MG tablet, TAKE 4 TABS PO ONCE WEEKLY ON SAME DAY, Disp: 32 tablet, Rfl: 11  •  metoprolol succinate XL (TOPROL-XL) 50 MG 24 hr tablet, Take 1 tablet by mouth Daily., Disp: 30 tablet, Rfl: 3  •  naproxen (NAPROSYN) 500 MG tablet, Take " 1 tablet by mouth 2 (Two) Times a Day With Meals., Disp: 60 tablet, Rfl: 0  •  omeprazole (priLOSEC) 40 MG capsule, Take 1 capsule by mouth Daily., Disp: , Rfl:   •  PREMPRO 0.45-1.5 MG per tablet, TAKE ONE TABLET BY MOUTH ONCE DAILY (Patient taking differently: TAKE ONE TABLET BY MOUTH on mon wed fri), Disp: 28 tablet, Rfl: 11  •  sucralfate (CARAFATE) 1 GM/10ML suspension, Take 10 mL by mouth 4 (Four) Times a Day., Disp: 420 mL, Rfl: 0  •  vitamin D (ERGOCALCIFEROL) 77654 units capsule capsule, TAKE 1 CAPSULE BY MOUTH ONCE A WEEK, Disp: 12 capsule, Rfl: 3  •  zolpidem (AMBIEN) 5 MG tablet, Bring to sleep lab DO NOT use at home. PLEASE take if not asleep within 30 mins of start of study., Disp: 2 tablet, Rfl: 0    The following portions of the patient's history were reviewed and updated as appropriate: allergies, current medications, past family history, past medical history, past social history, past surgical history and problem list.    Review of Systems   Constitutional: Negative.  Negative for fever.   HENT: Negative.    Eyes: Negative.    Respiratory: Negative.    Cardiovascular: Positive for chest pain.   Gastrointestinal: Negative.  Negative for abdominal pain.   Endocrine: Negative.    Genitourinary: Negative.  Negative for dysuria and pelvic pain.   Musculoskeletal: Positive for back pain.   Skin: Negative.    Allergic/Immunologic: Negative.    Neurological: Positive for tremors. Negative for weakness, numbness and headaches.   Hematological: Negative.    Psychiatric/Behavioral: Negative.        Assessment     Physical Exam   Constitutional: She is oriented to person, place, and time. She appears well-developed and well-nourished. No distress.   HENT:   Head: Normocephalic.   Right Ear: External ear normal.   Left Ear: External ear normal.   Nose: Nose normal.   Mouth/Throat: Oropharynx is clear and moist. No oropharyngeal exudate.   Neck: Neck supple. No thyromegaly present.   Cardiovascular: Normal rate,  regular rhythm and normal heart sounds.   No murmur heard.  Pulmonary/Chest: Effort normal and breath sounds normal. No stridor. No respiratory distress.   Musculoskeletal: She exhibits no edema.   Neurological: She is alert and oriented to person, place, and time. She displays normal reflexes.   Gross motor tremors of head and RUE. Atalagic gait.    Skin: Skin is warm and dry.   Psychiatric: She has a normal mood and affect. Her behavior is normal.   Vitals reviewed.      Plan     Her ER records were reviewed with the patient from last week and ECG were reviewed as well.     Shaylee was seen today for hypertension and tremors.    Diagnoses and all orders for this visit:    Chest pain of unknown etiology  -     Cancel: Stress test with myocardial perfusion; Future  -     Treadmill Stress Test; Future    Essential hypertension  -     Treadmill Stress Test; Future    Benign paroxysmal positional vertigo due to bilateral vestibular disorder    Dystonia      [facility] records reviewed and discussed with her. She will FU with [facility]an for PT and cervical traction.     Continue current BP medications.    Follow up in 4 weeks.

## 2019-02-19 ENCOUNTER — HOSPITAL ENCOUNTER (OUTPATIENT)
Dept: CARDIOLOGY | Facility: HOSPITAL | Age: 59
Discharge: HOME | End: 2019-02-19
Admitting: NURSE PRACTITIONER

## 2019-02-19 DIAGNOSIS — R07.9 CHEST PAIN OF UNKNOWN ETIOLOGY: ICD-10-CM

## 2019-02-19 DIAGNOSIS — I10 ESSENTIAL HYPERTENSION: ICD-10-CM

## 2019-02-19 LAB
BH CV STRESS BP STAGE 1: NORMAL
BH CV STRESS BP STAGE 2: NORMAL
BH CV STRESS DURATION MIN STAGE 1: 3
BH CV STRESS DURATION MIN STAGE 2: 2
BH CV STRESS DURATION SEC STAGE 1: 0
BH CV STRESS DURATION SEC STAGE 2: 6
BH CV STRESS GRADE STAGE 1: 10
BH CV STRESS GRADE STAGE 2: 12
BH CV STRESS HR STAGE 1: 126
BH CV STRESS HR STAGE 2: 143
BH CV STRESS METS STAGE 1: 5
BH CV STRESS METS STAGE 2: 7.5
BH CV STRESS PROTOCOL 1: NORMAL
BH CV STRESS RECOVERY BP: NORMAL MMHG
BH CV STRESS RECOVERY HR: 103 BPM
BH CV STRESS SPEED STAGE 1: 1.7
BH CV STRESS SPEED STAGE 2: 2.5
BH CV STRESS STAGE 1: 1
BH CV STRESS STAGE 2: 2
MAXIMAL PREDICTED HEART RATE: 164 BPM
PERCENT MAX PREDICTED HR: 87.2 %
STRESS BASELINE BP: NORMAL MMHG
STRESS BASELINE HR: 78 BPM
STRESS O2 SAT REST: 99 %
STRESS PERCENT HR: 103 %
STRESS POST ESTIMATED WORKLOAD: 6.7 METS
STRESS POST EXERCISE DUR MIN: 5 MIN
STRESS POST EXERCISE DUR SEC: 7 SEC
STRESS POST O2 SAT PEAK: 99 %
STRESS POST PEAK BP: NORMAL MMHG
STRESS POST PEAK HR: 143 BPM
STRESS TARGET HR: 139 BPM

## 2019-02-19 PROCEDURE — 93016 CV STRESS TEST SUPVJ ONLY: CPT | Performed by: INTERNAL MEDICINE

## 2019-02-19 PROCEDURE — 93017 CV STRESS TEST TRACING ONLY: CPT

## 2019-02-19 PROCEDURE — 93018 CV STRESS TEST I&R ONLY: CPT | Performed by: INTERNAL MEDICINE

## 2019-02-20 DIAGNOSIS — R94.39 ABNORMAL STRESS ECG WITH TREADMILL: Primary | ICD-10-CM

## 2019-02-22 ENCOUNTER — TELEPHONE (OUTPATIENT)
Dept: INTERNAL MEDICINE | Facility: CLINIC | Age: 59
End: 2019-02-22

## 2019-02-22 NOTE — TELEPHONE ENCOUNTER
Told to arrive @ 10:30 on Monday.    ----- Message from Amparo Stallworth MA sent at 2/22/2019 10:12 AM EST -----  Regarding: FW: REQUEST TO COME IN SOONER  Contact: [phone]      ----- Message -----  From: Jimmy Crocker  Sent: 2/22/2019   8:39 AM  [facility]  Subject: REQUEST TO COME IN SOONER                        [facility]    Patient called to ask if she could come in sooner because the appointment that she had on 02/26 at 12:45 was not going to give her time to get to her cardiologist appointment on that same day at 2:00.  I moved her from 12:45 to 10:45 appointment on the same day, Tuesday, but she would like to come in Monday if possible.  She is still in a lot of pain.    Please call patient    Thanks!  jimmy

## 2019-02-25 ENCOUNTER — OFFICE VISIT (OUTPATIENT)
Dept: INTERNAL MEDICINE | Facility: CLINIC | Age: 59
End: 2019-02-25

## 2019-02-25 VITALS
BODY MASS INDEX: 33.99 KG/M2 | RESPIRATION RATE: 16 BRPM | HEIGHT: 61 IN | WEIGHT: 180 LBS | DIASTOLIC BLOOD PRESSURE: 90 MMHG | TEMPERATURE: 97.8 F | OXYGEN SATURATION: 100 % | HEART RATE: 85 BPM | SYSTOLIC BLOOD PRESSURE: 130 MMHG

## 2019-02-25 DIAGNOSIS — R94.39 ABNORMAL CARDIOVASCULAR STRESS TEST: ICD-10-CM

## 2019-02-25 DIAGNOSIS — M51.9 DISC DISORDER OF THORACIC REGION: ICD-10-CM

## 2019-02-25 DIAGNOSIS — I10 BENIGN ESSENTIAL HTN: ICD-10-CM

## 2019-02-25 DIAGNOSIS — F44.4 FUNCTIONAL NEUROLOGICAL SYMPTOM DISORDER WITH ABNORMAL MOVEMENT: Primary | ICD-10-CM

## 2019-02-25 PROCEDURE — 99214 OFFICE O/P EST MOD 30 MIN: CPT | Performed by: NURSE PRACTITIONER

## 2019-02-25 RX ORDER — TRAMADOL HYDROCHLORIDE 50 MG/1
50 TABLET ORAL EVERY 8 HOURS PRN
Qty: 30 TABLET | Refills: 0 | Status: SHIPPED | OUTPATIENT
Start: 2019-02-25 | End: 2019-04-29 | Stop reason: SDUPTHER

## 2019-02-25 NOTE — PROGRESS NOTES
Chief Complaint   Patient presents with   • Back Pain     cervical & thoracic-much worse-sched w/ortho per RA md   • Tremors     exacerbated   • Chest Pain     sched w/cardio tomorrow per stress test   • Anxiety     work issues       Subjective     Shaylee Wadsworth is a 58 y.o. female being seen for a follow up appointment today regarding back pain, CP, and functional movement disorder. She was in the office 2- complaining of chest pains, referred for a stress test completed on 2-. Her stress test came back abnormal, and she is seeing Dr. Dumas tomorrow for an evaluation.     She is very upset today, and tearful. She feels like her tremors are worse. She recently saw neuro, Dr. Leonard, [facility], and she is to start PT for FND. She is starting physical therapy this week, awaiting a call today.     She is having mid back pain around thoracic spine. She had MRI of thoracic spine, cervical spine on 1-. She is scheduled with [facility] to discuss back pain.  Pain 8 of 10 today. She is on Gabapentin and a muscle relaxer.      Her short term disability is over 3-. She is awaiting sleep study results.       History of Present Illness     Allergies   Allergen Reactions   • Meloxicam Swelling     EYES AND FACE SWELLING  EYES AND FACE SWELLING         Current Outpatient Medications:   •  amlodipine-olmesartan (DAYRON) 10-40 MG per tablet, Take 1 tablet by mouth Daily., Disp: 30 tablet, Rfl: 1  •  aspirin 81 MG tablet, Take 81 mg by mouth Daily., Disp: , Rfl:   •  atorvastatin (LIPITOR) 40 MG tablet, Take 1 tablet by mouth Daily., Disp: 90 tablet, Rfl: 3  •  ENBREL SURECLICK 50 MG/ML solution auto-injector, , Disp: , Rfl:   •  folic acid (FOLVITE) 1 MG tablet, Take by mouth., Disp: , Rfl:   •  KLOR-CON 20 MEQ CR tablet, TAKE ONE TABLET BY MOUTH TWICE DAILY, Disp: 180 tablet, Rfl: 1  •  methotrexate 2.5 MG tablet, TAKE 4 TABS PO ONCE WEEKLY ON SAME DAY, Disp: 32  tablet, Rfl: 11  •  metoprolol succinate XL (TOPROL-XL) 50 MG 24 hr tablet, Take 1 tablet by mouth Daily., Disp: 30 tablet, Rfl: 3  •  naproxen (NAPROSYN) 500 MG tablet, Take 1 tablet by mouth 2 (Two) Times a Day With Meals., Disp: 60 tablet, Rfl: 0  •  omeprazole (priLOSEC) 40 MG capsule, Take 1 capsule by mouth Daily., Disp: , Rfl:   •  PREMPRO 0.45-1.5 MG per tablet, TAKE ONE TABLET BY MOUTH ONCE DAILY (Patient taking differently: TAKE ONE TABLET BY MOUTH on mon wed fri), Disp: 28 tablet, Rfl: 11  •  sucralfate (CARAFATE) 1 GM/10ML suspension, Take 10 mL by mouth 4 (Four) Times a Day., Disp: 420 mL, Rfl: 0  •  vitamin D (ERGOCALCIFEROL) 39931 units capsule capsule, TAKE 1 CAPSULE BY MOUTH ONCE A WEEK, Disp: 12 capsule, Rfl: 3  •  zolpidem (AMBIEN) 5 MG tablet, Bring to sleep lab DO NOT use at home. PLEASE take if not asleep within 30 mins of start of study., Disp: 2 tablet, Rfl: 0    The following portions of the patient's history were reviewed and updated as appropriate: allergies, current medications, past family history, past medical history, past social history, past surgical history and problem list.    Review of Systems   Constitutional: Negative.    HENT: Negative.    Eyes: Negative.    Respiratory: Negative.    Cardiovascular: Positive for chest pain.   Gastrointestinal: Negative.    Endocrine: Negative.    Genitourinary: Negative.    Musculoskeletal: Positive for back pain.        Cervical/thoracic   Skin: Negative.    Allergic/Immunologic: Negative.    Neurological: Positive for tremors.   Hematological: Negative.    Psychiatric/Behavioral: The patient is nervous/anxious.        Assessment     Physical Exam   Constitutional: She is oriented to person, place, and time. She appears well-developed and well-nourished.   HENT:   Head: Normocephalic.   Right Ear: External ear normal.   Left Ear: External ear normal.   Nose: Nose normal.   Mouth/Throat: Oropharynx is clear and moist. No oropharyngeal exudate.    Neck: Neck supple. No thyromegaly present.   Cardiovascular: Normal rate, regular rhythm and normal heart sounds. Exam reveals no friction rub.   No murmur heard.  Pulmonary/Chest: Effort normal and breath sounds normal.   Musculoskeletal: She exhibits no edema.        Thoracic back: She exhibits decreased range of motion, tenderness and pain (T6-7 right side). She exhibits no bony tenderness, no swelling, no edema, no deformity and no laceration.   Neurological: She is alert and oriented to person, place, and time. She has normal reflexes. She displays tremor. She displays no atrophy. No sensory deficit.   Gait antalgic. No slap foot gait. Speech normal, voice quality shaky, but not slurred. Gross motor tremors of BUE.    Psychiatric: She has a normal mood and affect. Her behavior is normal.   Vitals reviewed.      Plan     Her neurology records and cardiac stress test were reviewed with the patient from last week. She moraima see cardio tomorrow.      Diagnosis Plan   1. Functional neurological symptom disorder with abnormal movement     2. Abnormal cardiovascular stress test     3. Disc disorder of thoracic region  traMADol (ULTRAM) 50 MG tablet    Ambulatory Referral to Hospital Pain Management Department   4. Benign essential HTN       Long term disability paperwork filled out today. Discussed permanent disability.    Will try to achieve better pain control with epidural treatment in conjunction with PT for FMD.     The patient has read and signed the [facility] Controlled Substance Contract.  I will continue to see patient for regular follow up appointments.  They are well controlled on their medication.  [place] is updated every 3 months. The patient is aware of the potential for addiction and dependence.    Follow up monthly for chronic disease progress.

## 2019-02-26 ENCOUNTER — LAB (OUTPATIENT)
Dept: LAB | Facility: HOSPITAL | Age: 59
End: 2019-02-26

## 2019-02-26 ENCOUNTER — OFFICE VISIT (OUTPATIENT)
Dept: CARDIOLOGY | Facility: CLINIC | Age: 59
End: 2019-02-26

## 2019-02-26 ENCOUNTER — TRANSCRIBE ORDERS (OUTPATIENT)
Dept: CARDIOLOGY | Facility: CLINIC | Age: 59
End: 2019-02-26

## 2019-02-26 VITALS
BODY MASS INDEX: 34.74 KG/M2 | HEIGHT: 61 IN | HEART RATE: 65 BPM | WEIGHT: 184 LBS | SYSTOLIC BLOOD PRESSURE: 124 MMHG | DIASTOLIC BLOOD PRESSURE: 72 MMHG

## 2019-02-26 DIAGNOSIS — Z13.6 SCREENING FOR CARDIOVASCULAR CONDITION: Primary | ICD-10-CM

## 2019-02-26 DIAGNOSIS — E78.5 HYPERLIPIDEMIA LDL GOAL <100: ICD-10-CM

## 2019-02-26 DIAGNOSIS — Z01.810 PREPROCEDURAL CARDIOVASCULAR EXAMINATION: ICD-10-CM

## 2019-02-26 DIAGNOSIS — R94.39 ABNORMAL STRESS TEST: Primary | ICD-10-CM

## 2019-02-26 DIAGNOSIS — I10 ESSENTIAL HYPERTENSION: ICD-10-CM

## 2019-02-26 DIAGNOSIS — R94.39 ABNORMAL STRESS TEST: ICD-10-CM

## 2019-02-26 DIAGNOSIS — R07.9 CHEST PAIN OF UNKNOWN ETIOLOGY: ICD-10-CM

## 2019-02-26 DIAGNOSIS — Z13.6 SCREENING FOR CARDIOVASCULAR CONDITION: ICD-10-CM

## 2019-02-26 LAB
ANION GAP SERPL CALCULATED.3IONS-SCNC: 13.4 MMOL/L
BASOPHILS # BLD AUTO: 0.07 10*3/MM3 (ref 0–0.2)
BASOPHILS NFR BLD AUTO: 0.8 % (ref 0–1.5)
BUN BLD-MCNC: 12 MG/DL (ref 6–20)
BUN/CREAT SERPL: 12.4 (ref 7–25)
CALCIUM SPEC-SCNC: 9.6 MG/DL (ref 8.6–10.5)
CHLORIDE SERPL-SCNC: 103 MMOL/L (ref 98–107)
CO2 SERPL-SCNC: 25.6 MMOL/L (ref 22–29)
CREAT BLD-MCNC: 0.97 MG/DL (ref 0.57–1)
DEPRECATED RDW RBC AUTO: 44.5 FL (ref 37–54)
EOSINOPHIL # BLD AUTO: 0.1 10*3/MM3 (ref 0–0.4)
EOSINOPHIL NFR BLD AUTO: 1.2 % (ref 0.3–6.2)
ERYTHROCYTE [DISTWIDTH] IN BLOOD BY AUTOMATED COUNT: 14.1 % (ref 12.3–15.4)
GFR SERPL CREATININE-BSD FRML MDRD: 59 ML/MIN/1.73
GLUCOSE BLD-MCNC: 102 MG/DL (ref 65–99)
HCT VFR BLD AUTO: 36.3 % (ref 34–46.6)
HGB BLD-MCNC: 11.9 G/DL (ref 12–15.9)
IMM GRANULOCYTES # BLD AUTO: 0.01 10*3/MM3 (ref 0–0.05)
IMM GRANULOCYTES NFR BLD AUTO: 0.1 % (ref 0–0.5)
LYMPHOCYTES # BLD AUTO: 3.39 10*3/MM3 (ref 0.7–3.1)
LYMPHOCYTES NFR BLD AUTO: 41.1 % (ref 19.6–45.3)
MCH RBC QN AUTO: 28.6 PG (ref 26.6–33)
MCHC RBC AUTO-ENTMCNC: 32.8 G/DL (ref 31.5–35.7)
MCV RBC AUTO: 87.3 FL (ref 79–97)
MONOCYTES # BLD AUTO: 0.54 10*3/MM3 (ref 0.1–0.9)
MONOCYTES NFR BLD AUTO: 6.5 % (ref 5–12)
NEUTROPHILS # BLD AUTO: 4.14 10*3/MM3 (ref 1.4–7)
NEUTROPHILS NFR BLD AUTO: 50.3 % (ref 42.7–76)
NRBC BLD AUTO-RTO: 0 /100 WBC (ref 0–0)
PLATELET # BLD AUTO: 288 10*3/MM3 (ref 140–450)
PMV BLD AUTO: 9.5 FL (ref 6–12)
POTASSIUM BLD-SCNC: 3.7 MMOL/L (ref 3.5–5.2)
RBC # BLD AUTO: 4.16 10*6/MM3 (ref 3.77–5.28)
SODIUM BLD-SCNC: 142 MMOL/L (ref 136–145)
WBC NRBC COR # BLD: 8.25 10*3/MM3 (ref 3.4–10.8)

## 2019-02-26 PROCEDURE — 85025 COMPLETE CBC W/AUTO DIFF WBC: CPT

## 2019-02-26 PROCEDURE — 93000 ELECTROCARDIOGRAM COMPLETE: CPT | Performed by: INTERNAL MEDICINE

## 2019-02-26 PROCEDURE — 36415 COLL VENOUS BLD VENIPUNCTURE: CPT

## 2019-02-26 PROCEDURE — 99204 OFFICE O/P NEW MOD 45 MIN: CPT | Performed by: INTERNAL MEDICINE

## 2019-02-26 PROCEDURE — 80048 BASIC METABOLIC PNL TOTAL CA: CPT

## 2019-02-26 NOTE — PROGRESS NOTES
Subjective:     Encounter Date:02/26/2019      Patient ID: Shaylee Wadsworth is a 58 y.o. female.    Chief Complaint:  History of Present Illness    This is a 58-year-old with a history of hypertension, hyperlipidemia, rheumatoid arthritis, dystonia and BPPV resulting in a right-sided tremor, who presents for evaluation of chest pain and an abnormal treadmill stress test.    The patient reports that she has been under a lot of stress recently.  Starting last April/2018 the patient developed a right-sided tremor and balance issues.  She is been to several different doctors including most recently to the [facility].  There she was seen both in the functional movement disorder clinic and an ENT who feel that her symptoms may be due to dystonia and BPPV.  I recommended that she start physical therapy for this.  Patient reports that she is been through a few sessions already.  After she returned from her [facility] visit the patient developed recurrent chest burning symptoms on 2/12/2019 which prompted her to go to the emergency room.  She reported that the symptoms woke her up from sleep and lasted about 2 minutes each and resolved on their own.  She had about 3 episodes before she went to the emergency room.  Workup in the emergency room was apparently unremarkable.  Prior to that she had 2 or 3 episodes about a week or so prior.  She denies any associated shortness of breath, nausea, or vomiting.  She was given Carafate in the emergency room which she has been taking about once daily in addition to daily omeprazole which she has been on chronically.  She was seen by KAITLYNN Osei on 2/14/2019 and was set up with a treadmill stress test.  This was performed on 2/21/2019.  She only exercised about 5 minutes and had no significant changes with exercise nor did she have any symptoms.  However in recovery the patient developed ST segment elevation and her inferior leads with reciprocal ST depression in 1 and  aVL.  These findings resolved at the end of the recovery period.  She had no symptoms at this time.  Patient denies any further episodes of burning chest pain in the last couple of weeks.    Review of Systems   Constitution: Negative for fever, weakness, malaise/fatigue and weight loss.   HENT: Negative for hearing loss, hoarse voice, nosebleeds and sore throat.    Eyes: Negative for pain.   Cardiovascular: Positive for chest pain. Negative for claudication, cyanosis, dyspnea on exertion, irregular heartbeat, leg swelling, near-syncope, orthopnea, palpitations, paroxysmal nocturnal dyspnea and syncope.   Respiratory: Negative for shortness of breath and snoring.    Endocrine: Negative for cold intolerance, heat intolerance, polydipsia, polyphagia and polyuria.   Skin: Negative for itching and rash.   Musculoskeletal: Positive for back pain. Negative for arthritis, falls, joint pain, joint swelling, muscle cramps, muscle weakness and myalgias.   Gastrointestinal: Positive for bowel incontinence. Negative for abdominal pain, constipation, diarrhea, dysphagia, heartburn, hematemesis, hematochezia, melena, nausea and vomiting.   Genitourinary: Negative for bladder incontinence, dysuria, frequency, hematuria, hesitancy and pelvic pain.   Neurological: Positive for dizziness, headaches and tremors. Negative for excessive daytime sleepiness, light-headedness and numbness.   Psychiatric/Behavioral: Negative for depression. The patient is not nervous/anxious.           Current Outpatient Medications:   •  amlodipine-olmesartan (DAYRON) 10-40 MG per tablet, Take 1 tablet by mouth Daily., Disp: 30 tablet, Rfl: 1  •  aspirin 81 MG tablet, Take 81 mg by mouth Daily., Disp: , Rfl:   •  atorvastatin (LIPITOR) 40 MG tablet, Take 1 tablet by mouth Daily., Disp: 90 tablet, Rfl: 3  •  ENBREL SURECLICK 50 MG/ML solution auto-injector, , Disp: , Rfl:   •  folic acid (FOLVITE) 1 MG tablet, Take by mouth., Disp: , Rfl:   •  KLOR-CON 20 MEQ  CR tablet, TAKE ONE TABLET BY MOUTH TWICE DAILY, Disp: 180 tablet, Rfl: 1  •  methotrexate 2.5 MG tablet, TAKE 4 TABS PO ONCE WEEKLY ON SAME DAY, Disp: 32 tablet, Rfl: 11  •  metoprolol succinate XL (TOPROL-XL) 50 MG 24 hr tablet, Take 1 tablet by mouth Daily., Disp: 30 tablet, Rfl: 3  •  naproxen (NAPROSYN) 500 MG tablet, Take 1 tablet by mouth 2 (Two) Times a Day With Meals., Disp: 60 tablet, Rfl: 0  •  PREMPRO 0.45-1.5 MG per tablet, TAKE ONE TABLET BY MOUTH ONCE DAILY (Patient taking differently: TAKE ONE TABLET BY MOUTH on mon wed fri), Disp: 28 tablet, Rfl: 11  •  sucralfate (CARAFATE) 1 GM/10ML suspension, Take 10 mL by mouth 4 (Four) Times a Day., Disp: 420 mL, Rfl: 0  •  traMADol (ULTRAM) 50 MG tablet, Take 1 tablet by mouth Every 8 (Eight) Hours As Needed for Severe Pain ., Disp: 30 tablet, Rfl: 0  •  vitamin D (ERGOCALCIFEROL) 61755 units capsule capsule, TAKE 1 CAPSULE BY MOUTH ONCE A WEEK, Disp: 12 capsule, Rfl: 3    Past Medical History:   Diagnosis Date   • Anxiety    • Arthritis 5/1/2014    RA   • Arzate esophagus    • Benign paroxysmal positional vertigo due to bilateral vestibular disorder    • Chest pain    • Cholelithiasis 2007    Removed   • Dystonia    • Esophageal reflux    • Fibromyositis    • Functional movement disorder    • H/O colonoscopy    • H/O mammogram 08/31/2011    NORMAL    • Hx of bone density study 08/31/2011    OSTEOPENIA    • Hyperlipidemia    • Hyperparathyroidism (CMS/HCC)    • Hypertension    • Menopause    • Osteopenia    • Pancreatitis    • Tremor 4/4/2018     Past Surgical History:   Procedure Laterality Date   • CHOLECYSTECTOMY     • COLONOSCOPY  2014   • DILATATION AND CURETTAGE     • HYSTERECTOMY     • MOUTH SURGERY      TOOTH EXTRACTION   • OTHER SURGICAL HISTORY  03/27/2015    DIAGNOSTIC ESOPHAGOSCOPY TRANSNASAL FLEXIBLE WITH BIOPSY; ARZATE ESO. REPEAT EGD 2 YR    • OVARY SURGERY Left     NORMAL    • PAP SMEAR  08/23/2010    NORMAL    • PARATHYROIDECTOMY Right  "2016    Dr. Muchael Flynn at [place]     Family History   Problem Relation Age of Onset   • Diabetes Mother    • Hyperlipidemia Mother    • Hypertension Mother    • Heart disease Mother    • Arthritis Father    • Anxiety disorder Father    • COPD Father    • Glaucoma Father    • Hyperlipidemia Father    • Hypertension Father    • Vision loss Father    • Heart disease Sister      Social History     Tobacco Use   • Smoking status: Current Every Day Smoker     Packs/day: 0.50     Years: 20.00     Pack years: 10.00     Types: Electronic Cigarette     Last attempt to quit: 2018     Years since quittin.5   • Smokeless tobacco: Never Used   Substance Use Topics   • Alcohol use: No   • Drug use: No           ECG 12 Lead  Date/Time: 2019 4:45 PM  Performed by: Jackelyn Dumas MD  Authorized by: Jackelyn Dumas MD   Comparison: compared with previous ECG   Similar to previous ECG  Rhythm: sinus rhythm  T flattening: V1, V3, V2 and V4                 Objective:         Visit Vitals  /72   Pulse 65   Ht 154.9 cm (61\")   Wt 83.5 kg (184 lb)   BMI 34.77 kg/m²        Physical Exam   Constitutional: She is oriented to person, place, and time. She appears well-developed and well-nourished.   HENT:   Head: Normocephalic and atraumatic.   Eyes: Conjunctivae, EOM and lids are normal. Pupils are equal, round, and reactive to light.   Neck: Normal range of motion and full passive range of motion without pain. Neck supple. No JVD present. Carotid bruit is not present.   Cardiovascular: Normal rate, regular rhythm, S1 normal and S2 normal. Exam reveals no gallop.   No murmur heard.  Pulses:       Radial pulses are 2+ on the right side, and 2+ on the left side.   No bilateral lower extremity edema   Pulmonary/Chest: Effort normal and breath sounds normal.   Abdominal: Soft. Normal appearance.   Lymphadenopathy:     She has no cervical adenopathy.   Neurological: She is alert and oriented to person, place, and " time. She displays tremor.   Skin: Skin is warm, dry and intact.   Psychiatric: She has a normal mood and affect.       Lab Review:       Assessment:          Diagnosis Plan   1. Abnormal stress test  Case Request Cath Lab: Coronary angiography, Left heart cath, Left ventriculography   2. Chest pain of unknown etiology     3. Essential hypertension     4. Hyperlipidemia LDL goal <100            Plan:       1.  Abnormal stress test.  I am concerned about the EKG findings particularly the ST elevations in inferior leads and reciprocal ST changes although this occurred only in recovery and not with exercise.  Fortunately she has not had any recurrent symptoms lately.  Regardless I think we need to find out definitively if these findings are due to significant coronary disease and I recommended that we proceed with a cardiac catheterization.  I explained the procedure, risks, and benefits with the patient at length and she agrees to proceed.  Right radial access may be difficult because of her tremors but per the patient and her  her tremors resolved when she is asleep.  I am hopeful that if we sedate her before prepping her when she gets to the catheterization laboratory that this will not be an issue.  2.  Chest pain.  As per #1.  3.  Hypertension.  Well controlled on her current medications.  4.  Hyperlipidemia  5.  Right-sided tremors  6.  Rheumatoid arthritis    Further recommendations based on the results of her cardiac catheterization.

## 2019-03-04 ENCOUNTER — HOSPITAL ENCOUNTER (OUTPATIENT)
Facility: HOSPITAL | Age: 59
Setting detail: HOSPITAL OUTPATIENT SURGERY
Discharge: HOME | End: 2019-03-04
Attending: INTERNAL MEDICINE | Admitting: INTERNAL MEDICINE

## 2019-03-04 VITALS
DIASTOLIC BLOOD PRESSURE: 78 MMHG | RESPIRATION RATE: 16 BRPM | SYSTOLIC BLOOD PRESSURE: 141 MMHG | HEART RATE: 58 BPM | HEIGHT: 61 IN | WEIGHT: 180 LBS | TEMPERATURE: 97.7 F | BODY MASS INDEX: 33.99 KG/M2 | OXYGEN SATURATION: 97 %

## 2019-03-04 DIAGNOSIS — R94.39 ABNORMAL STRESS TEST: ICD-10-CM

## 2019-03-04 PROCEDURE — C1874 STENT, COATED/COV W/DEL SYS: HCPCS | Performed by: INTERNAL MEDICINE

## 2019-03-04 PROCEDURE — 0 IOPAMIDOL PER 1 ML: Performed by: INTERNAL MEDICINE

## 2019-03-04 PROCEDURE — 25010000002 MIDAZOLAM PER 1 MG: Performed by: INTERNAL MEDICINE

## 2019-03-04 PROCEDURE — C1725 CATH, TRANSLUMIN NON-LASER: HCPCS | Performed by: INTERNAL MEDICINE

## 2019-03-04 PROCEDURE — 99153 MOD SED SAME PHYS/QHP EA: CPT | Performed by: INTERNAL MEDICINE

## 2019-03-04 PROCEDURE — 99152 MOD SED SAME PHYS/QHP 5/>YRS: CPT | Performed by: INTERNAL MEDICINE

## 2019-03-04 PROCEDURE — C1894 INTRO/SHEATH, NON-LASER: HCPCS | Performed by: INTERNAL MEDICINE

## 2019-03-04 PROCEDURE — 93458 L HRT ARTERY/VENTRICLE ANGIO: CPT | Performed by: INTERNAL MEDICINE

## 2019-03-04 PROCEDURE — 93010 ELECTROCARDIOGRAM REPORT: CPT | Performed by: INTERNAL MEDICINE

## 2019-03-04 PROCEDURE — C1769 GUIDE WIRE: HCPCS | Performed by: INTERNAL MEDICINE

## 2019-03-04 PROCEDURE — 25010000002 FENTANYL CITRATE (PF) 100 MCG/2ML SOLUTION: Performed by: INTERNAL MEDICINE

## 2019-03-04 PROCEDURE — 85347 COAGULATION TIME ACTIVATED: CPT

## 2019-03-04 PROCEDURE — 93005 ELECTROCARDIOGRAM TRACING: CPT | Performed by: INTERNAL MEDICINE

## 2019-03-04 PROCEDURE — C9600 PERC DRUG-EL COR STENT SING: HCPCS | Performed by: INTERNAL MEDICINE

## 2019-03-04 PROCEDURE — 25010000002 HEPARIN (PORCINE) PER 1000 UNITS: Performed by: INTERNAL MEDICINE

## 2019-03-04 PROCEDURE — C1887 CATHETER, GUIDING: HCPCS | Performed by: INTERNAL MEDICINE

## 2019-03-04 PROCEDURE — 92928 PRQ TCAT PLMT NTRAC ST 1 LES: CPT | Performed by: INTERNAL MEDICINE

## 2019-03-04 DEVICE — XIENCE SIERRA™ EVEROLIMUS ELUTING CORONARY STENT SYSTEM 3.50 MM X 23 MM / RAPID-EXCHANGE
Type: IMPLANTABLE DEVICE | Status: FUNCTIONAL
Brand: XIENCE SIERRA™

## 2019-03-04 RX ORDER — CLOPIDOGREL BISULFATE 75 MG/1
75 TABLET ORAL DAILY
Qty: 30 TABLET | Refills: 11 | Status: SHIPPED | OUTPATIENT
Start: 2019-03-04 | End: 2020-02-28

## 2019-03-04 RX ORDER — CLOPIDOGREL BISULFATE 75 MG/1
TABLET ORAL AS NEEDED
Status: DISCONTINUED | OUTPATIENT
Start: 2019-03-04 | End: 2019-03-04 | Stop reason: HOSPADM

## 2019-03-04 RX ORDER — MIDAZOLAM HYDROCHLORIDE 1 MG/ML
INJECTION INTRAMUSCULAR; INTRAVENOUS AS NEEDED
Status: DISCONTINUED | OUTPATIENT
Start: 2019-03-04 | End: 2019-03-04 | Stop reason: HOSPADM

## 2019-03-04 RX ORDER — SODIUM CHLORIDE 9 MG/ML
100 INJECTION, SOLUTION INTRAVENOUS CONTINUOUS
Status: DISCONTINUED | OUTPATIENT
Start: 2019-03-04 | End: 2019-03-04 | Stop reason: HOSPADM

## 2019-03-04 RX ORDER — LIDOCAINE HYDROCHLORIDE 20 MG/ML
INJECTION, SOLUTION INFILTRATION; PERINEURAL AS NEEDED
Status: DISCONTINUED | OUTPATIENT
Start: 2019-03-04 | End: 2019-03-04 | Stop reason: HOSPADM

## 2019-03-04 RX ORDER — SODIUM CHLORIDE 9 MG/ML
75 INJECTION, SOLUTION INTRAVENOUS CONTINUOUS
Status: DISCONTINUED | OUTPATIENT
Start: 2019-03-04 | End: 2019-03-04

## 2019-03-04 RX ORDER — SODIUM CHLORIDE 0.9 % (FLUSH) 0.9 %
3-10 SYRINGE (ML) INJECTION AS NEEDED
Status: DISCONTINUED | OUTPATIENT
Start: 2019-03-04 | End: 2019-03-04 | Stop reason: HOSPADM

## 2019-03-04 RX ORDER — FENTANYL CITRATE 50 UG/ML
INJECTION, SOLUTION INTRAMUSCULAR; INTRAVENOUS AS NEEDED
Status: DISCONTINUED | OUTPATIENT
Start: 2019-03-04 | End: 2019-03-04 | Stop reason: HOSPADM

## 2019-03-04 RX ORDER — ALUMINA, MAGNESIA, AND SIMETHICONE 2400; 2400; 240 MG/30ML; MG/30ML; MG/30ML
SUSPENSION ORAL AS NEEDED
Status: DISCONTINUED | OUTPATIENT
Start: 2019-03-04 | End: 2019-03-04 | Stop reason: HOSPADM

## 2019-03-04 RX ORDER — LIDOCAINE HYDROCHLORIDE 10 MG/ML
0.1 INJECTION, SOLUTION EPIDURAL; INFILTRATION; INTRACAUDAL; PERINEURAL ONCE AS NEEDED
Status: DISCONTINUED | OUTPATIENT
Start: 2019-03-04 | End: 2019-03-04 | Stop reason: HOSPADM

## 2019-03-04 RX ORDER — SODIUM CHLORIDE 0.9 % (FLUSH) 0.9 %
3 SYRINGE (ML) INJECTION EVERY 12 HOURS SCHEDULED
Status: DISCONTINUED | OUTPATIENT
Start: 2019-03-04 | End: 2019-03-04 | Stop reason: HOSPADM

## 2019-03-04 RX ORDER — AMLODIPINE AND OLMESARTAN MEDOXOMIL 10; 40 MG/1; MG/1
TABLET ORAL
Qty: 90 TABLET | Refills: 1 | Status: SHIPPED | OUTPATIENT
Start: 2019-03-04 | End: 2019-04-18 | Stop reason: DRUGHIGH

## 2019-03-04 RX ORDER — HEPARIN SODIUM 1000 [USP'U]/ML
INJECTION, SOLUTION INTRAVENOUS; SUBCUTANEOUS AS NEEDED
Status: DISCONTINUED | OUTPATIENT
Start: 2019-03-04 | End: 2019-03-04 | Stop reason: HOSPADM

## 2019-03-04 RX ADMIN — SODIUM CHLORIDE 75 ML/HR: 9 INJECTION, SOLUTION INTRAVENOUS at 09:22

## 2019-03-04 NOTE — CONSULTS
"Met with patient and , discussed benefits of cardiac rehab. Provided phase II information packet, which includes; general information about cardiac rehab, \[facility]\"" Cardiac Rehab Programs handout and [facility] letter article entitled “Cardiac Rehab is often the Best Medicine for Recovery\", stresses the importance of cardiac rehab after a heart event.   I provided the contact information for cardiac rehab here at [facility] and encouraged her to call when discharged.   Instructed to bring a copy of After Visit Summary to initial assessment.   "

## 2019-03-04 NOTE — DISCHARGE INSTRUCTIONS
[facility]  [address] [place] [address]    Coronary Angiogram with Stent (Radial Approach) After Care    Refer to this sheet in the next few weeks. These instructions provide you with information on caring for yourself after your procedure. Your health care provider may also give you more specific instructions. Your treatment has been planned according to current medical practices, but problems sometimes occur. Call your health care provider if you have any problems or questions after your procedure.       Home Care Instructions:  · You may shower the day after the procedure. Remove the bandage (dressing) and gently wash the site with plain soap and water. Gently pat the site dry. You may apply a band aid daily for 2 days if desired.    · Do not apply powder or lotion to the site.  · Do not submerge the affected site in water for 3 to 5 days or until the site is completely healed.   · Do not flex or bend the affected arm for 24 hours.  · Do not lift, push or pull anything over 10 pounds for 2 days after your procedure.  · Do not operate machinery or power tools for 24 hours.  · Inspect the site at least twice daily. You may notice some bruising at the site and it may be tender for 1 to 2 weeks.     · Increase your fluid intake for the next 2 days.    · Keep arm elevated for 24 hours.  For the remainder of the day, keep your arm in the “Pledge of Allegiance” position when up and about.    · Limit your activity for the next 48 hours and avoid strenuous activity as directed by your physician.   · Cardiac Rehab may or may not be ordered.  Please consult with your physician  · You may drive 24 hours after the procedure unless otherwise instructed by your caregiver.  · A responsible adult should be with you for the first 24 hours after you arrive home.   · Do not make any important legal decisions or sign legal papers for 24 hours. Do not drink alcohol for 24 hours.    · Take medicines only as  directed by your health care provider.  Blood thinners may be prescribed after your procedure to improve blood flow through the stent.    · Metformin or any medications containing Metformin should not be taken for 48 hours after your procedure.    · Eat a heart-healthy diet. This should include plenty of fresh fruits and vegetables. Meat should be lean cuts. Avoid the following types of food:    ¨ Food that is high in salt.    ¨ Canned or highly processed food.    ¨ Food that is high in saturated fat or sugar.    ¨ Fried food.    · Make any other lifestyle changes recommended by your health care provider. This may include:    ¨ Not using any tobacco products including cigarettes, chewing tobacco, or electronic cigarettes.   ¨ Managing your weight.    ¨ Getting regular exercise.    ¨ Managing your blood pressure.    ¨ Limiting your alcohol intake.    ¨ Managing other health problems, such as diabetes.    · If you need an MRI after your heart stent was placed, be sure to tell the health care provider who orders the MRI that you have a heart stent.    · Keep all follow-up visits as directed by your health care provider.    ·   Call Your Doctor If:  · You have unusual pain at the radial/ulnar (wrist) site.  · You have redness, warmth, swelling, or pain at the radial/ulnar (wrist) site.  · You have drainage (other than a small amount of blood on the dressing).  · `You have chills or a fever > 101.  · Your arm becomes pale or dark, cool, tingly, or numb.  · You develop chest pain, shortness of breath, feel faint or pass out.    · You have heavy bleeding from the site, hold pressure on the site for 20 minutes.  If the bleeding stops, apply a fresh bandage and call your cardiologist.  However, if you continue to have bleeding, call 911.        Make Sure You:   · Understand these instructions.  · Will watch your condition.  · Will get help right away if you are not doing well or get worse.

## 2019-03-05 ENCOUNTER — TELEPHONE (OUTPATIENT)
Dept: INTERNAL MEDICINE | Facility: CLINIC | Age: 59
End: 2019-03-05

## 2019-03-05 LAB — ACT BLD: 340 SECONDS (ref 82–152)

## 2019-03-05 NOTE — TELEPHONE ENCOUNTER
Advised patient to keep 3/25 appt, as it is for 30 minutes as per below.  Advised patient I would have paperwork complete tomorrow and will call her when completed.  Patient voiced understanding and is satisfied with this.    ----- Message from Lily Whelan MA sent at 3/5/2019 11:43 AM EST -----  Regarding: FW: 2 QUESTIONS  Contact: [phone]      ----- Message -----  From: Laura Ayala APRN  Sent: 3/5/2019   9:43 AM  To: [facility]  Subject: FW: 2 QUESTIONS                                  Okay to wait until end of the month, but will need 30 minute appt.   ----- Message -----  From: Amparo Stallworth MA  Sent: 3/5/2019   8:36 AM  To: KAITLYNN Choi  Subject: FW: 2 QUESTIONS                                      ----- Message -----  From: Monica Perry  Sent: 3/5/2019   8:07 AM  To: [facility]  Subject: 2 QUESTIONS                                      MANOLO    Patient had cardiac cath [facility] 3/4.  She was told to follow up with her PCP. She has an appt later in month and asks if it needs to be moved up or if it is okay where it's at.        LTD paperwork as well - patient checking status of as it is due this week.

## 2019-03-06 ENCOUNTER — TELEPHONE (OUTPATIENT)
Dept: INTERNAL MEDICINE | Facility: CLINIC | Age: 59
End: 2019-03-06

## 2019-03-06 PROBLEM — I25.118 CORONARY ARTERY DISEASE OF NATIVE HEART WITH STABLE ANGINA PECTORIS: Status: ACTIVE | Noted: 2019-03-06

## 2019-03-06 NOTE — TELEPHONE ENCOUNTER
Advised paperwork complete.  Will notify her tomorrow morning as soon as it is signed.    ----- Message from Monica Perry sent at 3/6/2019 11:57 AM EST -----  Regarding: [facility] QUESTION  Contact: [phone]  MANOLO    Patient asking if you can call her and give her an idea of what time the [facility] paperwork will be ready today.

## 2019-03-08 PROBLEM — M05.79 RHEUMATOID ARTHRITIS OF MULTIPLE SITES WITHOUT ORGAN OR SYSTEM INVOLVEMENT WITH POSITIVE RHEUMATOID FACTOR: Status: ACTIVE | Noted: 2019-03-08

## 2019-03-08 PROBLEM — R25.1 TREMOR: Status: ACTIVE | Noted: 2018-06-15

## 2019-03-08 PROBLEM — G25.9 FUNCTIONAL MOVEMENT DISORDER: Status: ACTIVE | Noted: 2018-06-15

## 2019-03-08 PROBLEM — Z92.25 PERSONAL HISTORY OF IMMUNOSUPRESSION THERAPY: Status: ACTIVE | Noted: 2019-03-08

## 2019-03-08 PROBLEM — F44.4: Status: ACTIVE | Noted: 2018-06-15

## 2019-03-08 PROBLEM — M47.14 OTHER SPONDYLOSIS WITH MYELOPATHY, THORACIC REGION: Status: ACTIVE | Noted: 2019-03-08

## 2019-03-08 PROBLEM — Z79.899 OTHER LONG TERM (CURRENT) DRUG THERAPY: Status: ACTIVE | Noted: 2019-03-08

## 2019-03-08 NOTE — PROGRESS NOTES
Date of Office Visit: 2019  Encounter Provider: KAITLYNN Palomo  Place of Service: [facility] CARDIOLOGY  Patient Name: Shaylee Wadsworth  :1960      Subjective:     Chief Complaint:  F/U CAD s/p KARLY to distal left circumflex    History of Present Illness:  Shaylee Wadsworth is a pleasant 58 y.o. female who is new to me.  Outside records have been obtained and reviewed by me.     This is a patient with a pretty extensive past medical history including hypertension, hyperlipidemia, rheumatoid arthritis, dystonia and BPPV resulting in right-sided tremor.  She was initially evaluated by Dr. Dumas on 19 for an abnormal treadmill stress test.    Apparently she had been under a lot of stress recently.  In 2018 she developed right-sided tremor and balance issues.  She has been to several doctors including most recently to the [facility].  She has been seen by the functional movement disorder clinic and ENT who felt that her symptoms may be due to dystonia and BPPV.  Physical therapy was recommended.  After she returned from her [facility] visit and had been participating in a few sessions of physical therapy she developed recurrent chest burning symptoms on 19 which prompted her to go to the emergency room.  She reported that the symptoms workup from her sleep lasted about 2 minutes each and resolved on their own.  She had about 3 episodes before she went to the emergency room.  Her workup in the ED was apparently unremarkable.  Her that she had 2 or 3 episodes about a week or so prior.  She denied any associated shortness of breath nausea or vomiting.  She was given Carafate in the emergency room which she has been taking about once daily in addition to daily omeprazole which she has been on chronically.  Her PCP saw her on 19 and set up a treadmill stress test.  This was performed on 19.  She only exercised about 5 minutes and had no  significant changes with exercise nor did she have any symptoms however in recovery she developed ST segment elevation in inferior leads with reciprocal ST depression in 1 and aVL.  They resolved by the end of the recovery.  She had no symptoms at that time.  She denied any further episodes of burning chest pain in the last couple weeks.  Her Gondi wanted to proceed with cardiac catheterization to find out definitively if her chest pain is related to coronary disease especially with her ST segment changes on her stress test.  It was reported that she may need to be sedated as her cath site access might be difficult because of her tremors.    On 3/4/19 the patient underwent cardiac catheterization which revealed normal left main 10-20% proximal LAD stenosis with a normal but small first diagonal branch.  There was a focal 30% eccentric stenosis in the mid LAD and the remainder of the mid to distal LAD had 0 stenosis.  Circumflex had 0% proximal to mid stenosis and gave rise to a small first obtuse marginal branch with 0 stenosis.  Just after the origin of the first obtuse marginal there is an 80% long tubular stenosis.  The distal circumflex gave rise to a moderate-sized second and third obtuse marginal was 0% stenosis.  RCA had 0% proximal to distal stenosis with a moderate sized PDA and KAYLAN with no stenosis.  She had normal left ventricular systolic function and wall motion with EF of greater than 60%.  Diagnosed with severe single-vessel coronary artery disease status post KARLY of the distal left circumflex.  Secondary prevention was recommended. She was discharged home same day on Plavix 75 mg daily.    She is here today for a 1 week hospital follow-up visit post PCI.  Since her procedure she has not had any additional chest pain.  She denies complaints of shortness of breath palpitations or swelling of her extremities.  She reports occasionally when she is lying down at night her heart rate will read in the upper  40s on her Fitbit.  She denies feeling significant fatigue, dizziness or lightheadedness with this.  She denies any syncope or near syncope.  She has not monitored her blood pressure at home in a few weeks.  Her blood pressure today is 140/84.  She was inquiring about decreasing her Toprol-XL dose because of her low heart rates in the evenings.  Her heart rate today is 67.  She is tolerating her dual antiplatelet therapy well without any issues or reports of bleeding.      Past Medical History:   Diagnosis Date   • Anxiety    • Arthritis 5/1/2014    RA   • Lainez esophagus    • Benign paroxysmal positional vertigo due to bilateral vestibular disorder    • Chest pain    • Cholelithiasis 2007    Removed   • Dystonia    • Esophageal reflux    • Fibromyositis    • Functional movement disorder    • H/O colonoscopy    • H/O mammogram 08/31/2011    NORMAL    • Hx of bone density study 08/31/2011    OSTEOPENIA    • Hyperlipidemia    • Hyperparathyroidism (CMS/HCC)    • Hypertension    • Menopause    • Osteopenia    • Pancreatitis    • Tremor 4/4/2018     Past Surgical History:   Procedure Laterality Date   • CARDIAC CATHETERIZATION N/A 3/4/2019    Procedure: Coronary angiography;  Surgeon: Jackelyn Dumas MD;  Location:  [facility];  Service: Cardiovascular   • CARDIAC CATHETERIZATION N/A 3/4/2019    Procedure: Left heart cath;  Surgeon: Jackelyn Dumas MD;  Location:  [facility];  Service: Cardiovascular   • CARDIAC CATHETERIZATION N/A 3/4/2019    Procedure: Left ventriculography;  Surgeon: Jackelyn Dumas MD;  Location:  [facility];  Service: Cardiovascular   • CARDIAC CATHETERIZATION N/A 3/4/2019    Procedure: Stent KARLY coronary;  Surgeon: Jackelyn Dumas MD;  Location:  [facility];  Service: Cardiovascular   • CHOLECYSTECTOMY     • COLONOSCOPY  2014   • DILATATION AND CURETTAGE     • HYSTERECTOMY     • MOUTH SURGERY      TOOTH EXTRACTION   •  OTHER SURGICAL HISTORY  03/27/2015    DIAGNOSTIC ESOPHAGOSCOPY TRANSNASAL FLEXIBLE WITH BIOPSY; ARZATE ESO. REPEAT EGD 2 YR    • OVARY SURGERY Left     NORMAL    • PAP SMEAR  08/23/2010    NORMAL    • PARATHYROIDECTOMY Right 08/17/2016    Dr. Muchael Flynn at [place]     Outpatient Medications Prior to Visit   Medication Sig Dispense Refill   • amlodipine-olmesartan (DAYRON) 10-40 MG per tablet TAKE 1 TABLET BY MOUTH ONCE DAILY 90 tablet 1   • aspirin 81 MG tablet Take 81 mg by mouth Daily.     • atorvastatin (LIPITOR) 40 MG tablet Take 1 tablet by mouth Daily. 90 tablet 3   • clopidogrel (PLAVIX) 75 MG tablet Take 1 tablet by mouth Daily. 30 tablet 11   • ENBREL SURECLICK 50 MG/ML solution auto-injector      • esomeprazole (NEXIUM) 40 MG capsule Take 1 capsule by mouth Daily.     • folic acid (FOLVITE) 1 MG tablet Take 1 mg by mouth Daily.     • KLOR-CON 20 MEQ CR tablet TAKE ONE TABLET BY MOUTH TWICE DAILY 180 tablet 1   • methotrexate 2.5 MG tablet TAKE 8 TABLETS BY MOUTH ONCE WEEKLY ON THE SAME DAY 32 tablet 11   • metoprolol succinate XL (TOPROL-XL) 50 MG 24 hr tablet Take 1 tablet by mouth Daily. 30 tablet 3   • naproxen (NAPROSYN) 500 MG tablet Take 1 tablet by mouth 2 (Two) Times a Day With Meals. (Patient taking differently: Take 500 mg by mouth 2 (Two) Times a Day As Needed for Mild Pain .) 60 tablet 0   • PREMPRO 0.45-1.5 MG per tablet TAKE ONE TABLET BY MOUTH ONCE DAILY (Patient taking differently: TAKE ONE TABLET BY MOUTH on mon wed fri) 28 tablet 11   • traMADol (ULTRAM) 50 MG tablet Take 1 tablet by mouth Every 8 (Eight) Hours As Needed for Severe Pain . 30 tablet 0   • vitamin D (ERGOCALCIFEROL) 93305 units capsule capsule TAKE 1 CAPSULE BY MOUTH ONCE A WEEK 12 capsule 3     No facility-administered medications prior to visit.        Allergies as of 03/13/2019 - Reviewed 03/13/2019   Allergen Reaction Noted   • Meloxicam Swelling 01/27/2016     Social History     Socioeconomic History   • Marital  status:      Spouse name: Not on file   • Number of children: Not on file   • Years of education: Not on file   • Highest education level: Not on file   Social Needs   • Financial resource strain: Not on file   • Food insecurity - worry: Not on file   • Food insecurity - inability: Not on file   • Transportation needs - medical: Not on file   • Transportation needs - non-medical: Not on file   Occupational History   • Not on file   Tobacco Use   • Smoking status: Current Every Day Smoker     Packs/day: 0.50     Years: 20.00     Pack years: 10.00     Types: Electronic Cigarette     Last attempt to quit: 2018     Years since quittin.5   • Smokeless tobacco: Never Used   • Tobacco comment: caffeine - 2 daily   Substance and Sexual Activity   • Alcohol use: No   • Drug use: No   • Sexual activity: No   Other Topics Concern   • Not on file   Social History Narrative   • Not on file     Family History   Problem Relation Age of Onset   • Diabetes Mother    • Hyperlipidemia Mother    • Hypertension Mother    • Heart disease Mother    • Arthritis Father    • Anxiety disorder Father    • COPD Father    • Glaucoma Father    • Hyperlipidemia Father    • Hypertension Father    • Vision loss Father    • Heart disease Sister      Review of Systems   Constitution: Positive for malaise/fatigue. Negative for chills and fever.   HENT: Negative for ear pain, hearing loss, nosebleeds and sore throat.    Eyes: Negative for double vision, pain, vision loss in left eye and vision loss in right eye.   Cardiovascular: Negative for chest pain, claudication, dyspnea on exertion, irregular heartbeat, leg swelling, near-syncope, orthopnea, palpitations, paroxysmal nocturnal dyspnea and syncope.   Respiratory: Negative for cough, shortness of breath, snoring and wheezing.    Endocrine: Negative for cold intolerance and heat intolerance.   Hematologic/Lymphatic: Negative for bleeding problem.   Skin: Negative for color change,  "itching, rash and unusual hair distribution.   Musculoskeletal: Positive for arthritis, back pain and joint pain. Negative for joint swelling.   Gastrointestinal: Negative for abdominal pain, diarrhea, hematochezia, melena, nausea and vomiting.   Genitourinary: Negative for decreased libido, frequency, hematuria, hesitancy and incomplete emptying.   Neurological: Positive for dizziness and tremors. Negative for excessive daytime sleepiness, headaches, light-headedness, loss of balance, numbness, paresthesias and seizures.   Psychiatric/Behavioral: Negative for depression.          Objective:     Vitals:    03/13/19 1313   BP: 140/84   Pulse: 67   Resp: 18   Weight: 83.5 kg (184 lb)   Height: 154.9 cm (61\")     Body mass index is 34.77 kg/m².    PHYSICAL EXAM:  Physical Exam   Constitutional: She is oriented to person, place, and time. She appears well-developed and well-nourished. No distress.   Obese   HENT:   Head: Normocephalic and atraumatic.   Neck: Carotid bruit is not present.   Cardiovascular: Normal rate, regular rhythm, normal heart sounds and intact distal pulses.   No murmur heard.  Pulses:       Radial pulses are 2+ on the right side, and 2+ on the left side.        Posterior tibial pulses are 2+ on the right side, and 2+ on the left side.   Right radial cath site well healed without erythema or ecchymosis, palpable proximal and distal pulses, good capillary refill, good motor function of hand, no thrill detected, site is soft and non-tender with no hematoma, no sensory deficits noted.     Pulmonary/Chest: Effort normal and breath sounds normal. No accessory muscle usage. No tachypnea. No respiratory distress. She has no decreased breath sounds. She has no wheezes. She has no rhonchi. She has no rales. She exhibits no tenderness.   Abdominal: Soft. Bowel sounds are normal. She exhibits no distension. There is no tenderness. There is no rebound.   Obese   Neurological: She is alert and oriented to " person, place, and time. She displays tremor (right sided- severe).   Skin: Skin is warm, dry and intact. She is not diaphoretic. No erythema.   Psychiatric: Her speech is normal and behavior is normal. Judgment and thought content normal. Cognition and memory are normal.   Patient tearful at times.    Nursing note and vitals reviewed.        ECG 12 Lead  Date/Time: 3/13/2019 1:15 PM  Performed by: Lakesha Prasad APRN  Authorized by: Lakesha Prasad APRN   Comparison: compared with previous ECG from 3/4/2019  Similar to previous ECG  Comparison to previous ECG: Artifact. Patient has underlying tremor.  Previous ant-lat T abnormalities improved  Reviewed with Dr. Dumas  Rhythm: sinus rhythm  Rate: normal  BPM: 67  Q waves: III    QRS axis: normal  Other findings: non-specific ST-T wave changes and T wave abnormality    Clinical impression: abnormal EKG  Comments: Nonspecific ST-T changes inferior leads with borderline inferior Q-waves  Indication: CAD s/p KARLY              3/4/19 Cardiac Catheterization:  FINDINGS:     CORONARY ANGIOGRAPHY:   1.  Left main: 0% stenosis.  The vessel bifurcates into left anterior descending and left circumflex arteries.  2.  Left anterior descending artery: 10-20% proximal stenosis.  The vessel gives rise to a small first diagonal branch with 0% stenosis.  There is a 30% focal, eccentric stenosis in the mid LAD.  The remainder of the mid to distal LAD has 0% stenosis.  3.  Left circumflex artery: 0% proximal to mid stenosis.  Mid vessel gives rise to a small branching first obtuse marginal branch with 0% stenosis.  Just after the origin of the first obtuse marginal branch there is an 80% long, tubular stenosis.  The distal circumflex then gives rise to a moderate-sized second and third obtuse marginal branches both with 0% stenosis.  4.  Right coronary artery: 0% proximal to distal stenosis.  The vessel bifurcates into a moderate size posterior descending and posterior lateral  branches both with 0% stenosis.  This is a right dominant system.     LEFT VENTRICULAR HEMODYNAMICS:   1.  Left ventricular pressure: 134/12  2.  Aortic pressure: 126/64     LEFT VENTRICULOGRAPHY:   Normal left ventricular systolic function and wall motion with an estimated ejection fraction of greater than 60%.     CORONARY INTERVENTION FINDINGS:  PCI CORONARY SEGMENT:  Distal left circumflex artery  PRE-STENOSIS: 80%  POST-STENOSIS: 0%  LESION TYPE: B1  MARVEL FLOW PRE/POST: 3/3  CULPRIT LESION: yes  DISSECTION: none        POST-PROCEDURE DIAGNOSIS:   1. Severe single vessel coronary artery disease status post PCI of the distal left circumflex artery     RECOMMENDATIONS:   Dual antiplatelet therapy for 1 year.  Secondary cardiac risk factor management. Discharge later today with 1 week office follow up.     2/19/19 Treadmill Stress Test:  · The patient reported shortness of breath during the stress test.  · Arrhythmias were not significant.  · Findings consistent with an abnormal ECG stress test.    Assessment:       Diagnosis Plan   1. Coronary artery disease of native artery of native heart with stable angina pectoris (CMS/[facility])  ECG 12 Lead    Ambulatory Referral to Cardiac Rehab   2. Abnormal stress test  ECG 12 Lead   3. S/P drug eluting coronary stent placement  ECG 12 Lead    Ambulatory Referral to Cardiac Rehab   4. Essential hypertension     5. Hyperlipidemia LDL goal <70     6. Tremor     7. Rheumatoid arthritis with positive rheumatoid factor, involving unspecified site (CMS/[facility])         Plan:     1.  Coronary Artery Disease  Assessment  • The patient has no angina    Plan  • Lifestyle modifications discussed include adhering to a heart healthy diet, avoidance of tobacco products, maintenance of a healthy weight, medication compliance, regular exercise and regular monitoring of cholesterol and blood pressure    Subjective - Objective  • There has been a previous stent procedure using KARLY on or around  3/4/2019  • Current antiplatelet therapy includes aspirin 81 mg and clopidogrel 75 mg  • The patient qualifies for cardiac rehabilitation, and has been referred to cardiac rehab        Normal LV function on LV gram.  Will refer to cardiac rehab at [place].      2. Hyperlipidemia: On Atorvastatin 40 mg daily. Managed by PCP.     3. Hypertension: On Toprol XL 50 mg daily, amlodipine-olmesartan 10-10 mg daily. Blood pressure reasonable today at 140/84. Continue current regimen. She will monitor her b/p and HR at home to see if she needs adjustments in her Toprol XL dosing.  As long as she is not having any symptoms with her heart rate in the upper 40s at nighttime I will not make any med changes at this current time.    4. Tremors: Right sided. Felt to be secondary to dystonia and BPPV. Has been evaluated at [facility].     5.  Rheumatoid Arthritis:  On immunosuppressant therapy.     Advised on the importance of medication compliance, good blood pressure, blood sugar and cholesterol control, as well as regular exercise and weight loss and avoidance of tobacco for cardiovascular disease risk factor modification.       Follow up with Dr. Dumas on 4/5/19, unless otherwise needed sooner.  I advised the patient to contact our office with any questions or concerns.         Your medication list           Accurate as of 3/13/19  2:09 PM. If you have any questions, ask your nurse or doctor.               CHANGE how you take these medications      Instructions Last Dose Given Next Dose Due   naproxen 500 MG tablet  Commonly known as:  NAPROSYN  What changed:    · when to take this  · reasons to take this      Take 1 tablet by mouth 2 (Two) Times a Day With Meals.       PREMPRO 0.45-1.5 MG per tablet  Generic drug:  estrogen (conjugated)-medroxyprogesterone  What changed:    · how much to take  · how to take this  · when to take this      TAKE ONE TABLET BY MOUTH ONCE DAILY          CONTINUE taking these medications       Instructions Last Dose Given Next Dose Due   amlodipine-olmesartan 10-40 MG per tablet  Commonly known as:  DAYRON      TAKE 1 TABLET BY MOUTH ONCE DAILY       aspirin 81 MG tablet      Take 81 mg by mouth Daily.       atorvastatin 40 MG tablet  Commonly known as:  LIPITOR      Take 1 tablet by mouth Daily.       clopidogrel 75 MG tablet  Commonly known as:  PLAVIX      Take 1 tablet by mouth Daily.       ENBREL SURECLICK 50 MG/ML solution auto-injector  Generic drug:  Etanercept           folic acid 1 MG tablet  Commonly known as:  FOLVITE      Take 1 mg by mouth Daily.       KLOR-CON 20 MEQ CR tablet  Generic drug:  potassium chloride      TAKE ONE TABLET BY MOUTH TWICE DAILY       methotrexate 2.5 MG tablet      TAKE 8 TABLETS BY MOUTH ONCE WEEKLY ON THE SAME DAY       metoprolol succinate XL 50 MG 24 hr tablet  Commonly known as:  TOPROL XL      Take 1 tablet by mouth Daily.       NEXIUM 40 MG capsule  Generic drug:  esomeprazole      Take 1 capsule by mouth Daily.       traMADol 50 MG tablet  Commonly known as:  ULTRAM      Take 1 tablet by mouth Every 8 (Eight) Hours As Needed for Severe Pain .       vitamin D 13793 units capsule capsule  Commonly known as:  ERGOCALCIFEROL      TAKE 1 CAPSULE BY MOUTH ONCE A WEEK              The above medication changes may not have been made by this provider.  Medication list was updated to reflect medications patient is currently taking including medication changes and discontinuations made by other healthcare providers.       It has been a pleasure to participate in this patient's care. Please feel free to contact me with any questions or concerns.     Lakesha Prasad, APRN  03/13/2019       Dictated utilizing Dragon Dictation System.

## 2019-03-12 RX ORDER — METHOTREXATE 2.5 MG/1
TABLET ORAL
Qty: 32 TABLET | Refills: 11 | Status: SHIPPED | OUTPATIENT
Start: 2019-03-12 | End: 2020-04-09 | Stop reason: DRUGHIGH

## 2019-03-13 ENCOUNTER — OFFICE VISIT (OUTPATIENT)
Dept: CARDIOLOGY | Facility: CLINIC | Age: 59
End: 2019-03-13

## 2019-03-13 VITALS
SYSTOLIC BLOOD PRESSURE: 140 MMHG | RESPIRATION RATE: 18 BRPM | DIASTOLIC BLOOD PRESSURE: 84 MMHG | HEART RATE: 67 BPM | WEIGHT: 184 LBS | BODY MASS INDEX: 34.74 KG/M2 | HEIGHT: 61 IN

## 2019-03-13 DIAGNOSIS — I25.118 CORONARY ARTERY DISEASE OF NATIVE ARTERY OF NATIVE HEART WITH STABLE ANGINA PECTORIS (HCC): Primary | ICD-10-CM

## 2019-03-13 DIAGNOSIS — R94.39 ABNORMAL STRESS TEST: ICD-10-CM

## 2019-03-13 DIAGNOSIS — Z95.5 S/P DRUG ELUTING CORONARY STENT PLACEMENT: ICD-10-CM

## 2019-03-13 DIAGNOSIS — E78.5 HYPERLIPIDEMIA LDL GOAL <70: ICD-10-CM

## 2019-03-13 DIAGNOSIS — I10 ESSENTIAL HYPERTENSION: ICD-10-CM

## 2019-03-13 DIAGNOSIS — R25.1 TREMOR: ICD-10-CM

## 2019-03-13 DIAGNOSIS — M05.9 RHEUMATOID ARTHRITIS WITH POSITIVE RHEUMATOID FACTOR, INVOLVING UNSPECIFIED SITE (HCC): Chronic | ICD-10-CM

## 2019-03-13 PROCEDURE — 93000 ELECTROCARDIOGRAM COMPLETE: CPT | Performed by: NURSE PRACTITIONER

## 2019-03-13 PROCEDURE — 99214 OFFICE O/P EST MOD 30 MIN: CPT | Performed by: NURSE PRACTITIONER

## 2019-03-13 RX ORDER — ESOMEPRAZOLE MAGNESIUM 40 MG/1
1 CAPSULE, DELAYED RELEASE ORAL DAILY
COMMUNITY
Start: 2017-10-05 | End: 2020-11-30

## 2019-03-25 ENCOUNTER — OFFICE VISIT (OUTPATIENT)
Dept: INTERNAL MEDICINE | Facility: CLINIC | Age: 59
End: 2019-03-25

## 2019-03-25 VITALS
HEART RATE: 70 BPM | HEIGHT: 61 IN | DIASTOLIC BLOOD PRESSURE: 62 MMHG | SYSTOLIC BLOOD PRESSURE: 124 MMHG | RESPIRATION RATE: 16 BRPM | TEMPERATURE: 98.3 F | WEIGHT: 182 LBS | OXYGEN SATURATION: 97 % | BODY MASS INDEX: 34.36 KG/M2

## 2019-03-25 DIAGNOSIS — M05.79 RHEUMATOID ARTHRITIS OF MULTIPLE SITES WITHOUT ORGAN OR SYSTEM INVOLVEMENT WITH POSITIVE RHEUMATOID FACTOR (HCC): ICD-10-CM

## 2019-03-25 DIAGNOSIS — M51.9 DISC DISORDER OF THORACIC REGION: ICD-10-CM

## 2019-03-25 DIAGNOSIS — Z95.5 S/P DRUG ELUTING CORONARY STENT PLACEMENT: ICD-10-CM

## 2019-03-25 DIAGNOSIS — E78.5 HYPERLIPIDEMIA LDL GOAL <70: ICD-10-CM

## 2019-03-25 DIAGNOSIS — K21.9 GASTROESOPHAGEAL REFLUX DISEASE WITHOUT ESOPHAGITIS: ICD-10-CM

## 2019-03-25 DIAGNOSIS — I10 BENIGN ESSENTIAL HTN: Primary | ICD-10-CM

## 2019-03-25 DIAGNOSIS — I25.118 CORONARY ARTERY DISEASE OF NATIVE ARTERY OF NATIVE HEART WITH STABLE ANGINA PECTORIS (HCC): ICD-10-CM

## 2019-03-25 DIAGNOSIS — G24.9 DYSTONIA: ICD-10-CM

## 2019-03-25 PROBLEM — R07.9 CHEST PAIN OF UNKNOWN ETIOLOGY: Status: RESOLVED | Noted: 2019-02-14 | Resolved: 2019-03-25

## 2019-03-25 PROBLEM — Z79.899 OTHER LONG TERM (CURRENT) DRUG THERAPY: Status: RESOLVED | Noted: 2019-03-08 | Resolved: 2019-03-25

## 2019-03-25 PROBLEM — G25.9 FUNCTIONAL MOVEMENT DISORDER: Status: RESOLVED | Noted: 2018-08-06 | Resolved: 2019-03-25

## 2019-03-25 PROBLEM — R94.39 ABNORMAL STRESS TEST: Status: RESOLVED | Noted: 2019-02-26 | Resolved: 2019-03-25

## 2019-03-25 PROCEDURE — 99214 OFFICE O/P EST MOD 30 MIN: CPT | Performed by: NURSE PRACTITIONER

## 2019-03-25 NOTE — PROGRESS NOTES
Chief Complaint   Patient presents with   • Follow-up       Subjective     Shaylee Wadsworth is a 58 y.o. female being seen for a follow up appointment today regarding CAD, HTN, Hyperlipidemia, GERD,  and Functional movement disorder of unknown origin. She has been evaluated by [facility] as well as multiple neurologists for tremor of right arm and head with abnormal gait.  She is currently seeing the ENT for BPPV, and she is starting [facility] rehab April 2nd for evaluation of FMD.      Since last visit here she here, she underwent a cardiac cath 3-4-2019. She had a stent placed in distal left circumflex by . She has followed up with cardio last week, and she is doing well. She is starting cardiac rehab April 3rd. She is currently on plavix 75 mg for antiplatelet therapy.     She is currently on Dayron 10/40mg, Toprol XL 50mg for HTN. BP 120s/70s at home. She denies any CP, SOA, swelling in legs. She is taking Lipitor 40mg nightly.    She has thoracic disc disorder. She is on Tramadol for back pain, and followed by Dr. Garcia pain management. Daily pain varied based on activity and tremor.         History of Present Illness     Allergies   Allergen Reactions   • Meloxicam Swelling     EYES AND FACE SWELLING  EYES AND FACE SWELLING         Current Outpatient Medications:   •  amlodipine-olmesartan (DAYRON) 10-40 MG per tablet, TAKE 1 TABLET BY MOUTH ONCE DAILY, Disp: 90 tablet, Rfl: 1  •  aspirin 81 MG tablet, Take 81 mg by mouth Daily., Disp: , Rfl:   •  atorvastatin (LIPITOR) 40 MG tablet, Take 1 tablet by mouth Daily., Disp: 90 tablet, Rfl: 3  •  clopidogrel (PLAVIX) 75 MG tablet, Take 1 tablet by mouth Daily., Disp: 30 tablet, Rfl: 11  •  ENBREL SURECLICK 50 MG/ML solution auto-injector, , Disp: , Rfl:   •  esomeprazole (NEXIUM) 40 MG capsule, Take 1 capsule by mouth Daily., Disp: , Rfl:   •  folic acid (FOLVITE) 1 MG tablet, Take 1 mg by mouth Daily., Disp: , Rfl:   •  KLOR-CON 20 MEQ CR tablet, TAKE ONE  TABLET BY MOUTH TWICE DAILY, Disp: 180 tablet, Rfl: 1  •  methotrexate 2.5 MG tablet, TAKE 8 TABLETS BY MOUTH ONCE WEEKLY ON THE SAME DAY, Disp: 32 tablet, Rfl: 11  •  metoprolol succinate XL (TOPROL-XL) 50 MG 24 hr tablet, Take 1 tablet by mouth Daily., Disp: 30 tablet, Rfl: 3  •  naproxen (NAPROSYN) 500 MG tablet, Take 1 tablet by mouth 2 (Two) Times a Day With Meals. (Patient taking differently: Take 500 mg by mouth 2 (Two) Times a Day As Needed for Mild Pain .), Disp: 60 tablet, Rfl: 0  •  PREMPRO 0.45-1.5 MG per tablet, TAKE ONE TABLET BY MOUTH ONCE DAILY (Patient taking differently: TAKE ONE TABLET BY MOUTH on mon wed fri), Disp: 28 tablet, Rfl: 11  •  traMADol (ULTRAM) 50 MG tablet, Take 1 tablet by mouth Every 8 (Eight) Hours As Needed for Severe Pain ., Disp: 30 tablet, Rfl: 0  •  vitamin D (ERGOCALCIFEROL) 80032 units capsule capsule, TAKE 1 CAPSULE BY MOUTH ONCE A WEEK, Disp: 12 capsule, Rfl: 3    The following portions of the patient's history were reviewed and updated as appropriate: allergies, current medications, past family history, past medical history, past social history, past surgical history and problem list.    Review of Systems   Constitutional: Negative.    HENT: Negative.  Negative for sinus pressure and sneezing.    Eyes: Negative.    Respiratory: Positive for apnea (being evaluated with Dr. Covington).    Cardiovascular: Negative.  Negative for chest pain, palpitations and leg swelling.   Gastrointestinal: Negative.    Endocrine: Negative.    Genitourinary: Negative for vaginal bleeding, vaginal discharge and vaginal pain.   Musculoskeletal: Positive for arthralgias and back pain.   Skin: Negative.  Negative for color change.   Allergic/Immunologic: Positive for environmental allergies.   Neurological: Positive for dizziness (with bending forward) and tremors. Negative for syncope, speech difficulty, weakness, light-headedness and numbness.   Hematological: Negative for adenopathy.    Psychiatric/Behavioral: Positive for agitation. Negative for self-injury, sleep disturbance and suicidal ideas. The patient is not nervous/anxious.        Assessment     Physical Exam   Constitutional: She is oriented to person, place, and time. She appears well-developed and well-nourished. No distress.   HENT:   Head: Normocephalic.   Right Ear: External ear normal.   Left Ear: External ear normal.   Nose: Nose normal.   Mouth/Throat: Oropharynx is clear and moist.   Eyes: Pupils are equal, round, and reactive to light.   Neck: Neck supple. No thyromegaly present.   Cardiovascular: Normal rate, regular rhythm and normal heart sounds.   No murmur heard.  Pulmonary/Chest: Effort normal and breath sounds normal. No stridor. No respiratory distress.   Abdominal: Soft. Bowel sounds are normal.   Musculoskeletal: She exhibits no edema.        Thoracic back: She exhibits decreased range of motion, tenderness (T7 on right side) and pain. She exhibits no swelling, no edema, no deformity, no laceration, no spasm and normal pulse.   Neurological: She is alert and oriented to person, place, and time. She has normal strength and normal reflexes. No sensory deficit. She exhibits normal muscle tone. Gait abnormal.   Speech is clear. Gait antalgic with slap foot on left side. Gross tremors of RUE and head.    Skin: Skin is warm and dry. Capillary refill takes less than 2 seconds.   Psychiatric: She has a normal mood and affect. Her behavior is normal. Judgment and thought content normal.   Vitals reviewed.      Plan     Her fasting labs were reviewed with the patient from last week.     Shaylee was seen today for follow-up.    Diagnoses and all orders for this visit:    Benign essential HTN  -     Comprehensive metabolic panel; Future  -     Conv Lipid Panel w/ Chol/HDL Ratio; Future  -     CBC & Differential; Future    Hyperlipidemia LDL goal <70  -     Comprehensive metabolic panel; Future  -     Conv Lipid Panel w/ Chol/HDL  Ratio; Future  -     CBC & Differential; Future    Coronary artery disease of native artery of native heart with stable angina pectoris (CMS/HCC)  -     Comprehensive metabolic panel; Future  -     Conv Lipid Panel w/ Chol/HDL Ratio; Future  -     CBC & Differential; Future    S/P drug eluting coronary stent placement  -     Comprehensive metabolic panel; Future  -     Conv Lipid Panel w/ Chol/HDL Ratio; Future  -     CBC & Differential; Future    Dystonia    Rheumatoid arthritis of multiple sites without organ or system involvement with positive rheumatoid factor (CMS/[facility])  -     Comprehensive metabolic panel; Future  -     CBC & Differential; Future    Disc disorder of thoracic region    Gastroesophageal reflux disease without esophagitis      She will continue current medications. I want her to start cardiac rehab and PT.     She will flolow up here in 4 weeks with fasting labs

## 2019-03-26 ENCOUNTER — OFFICE VISIT (OUTPATIENT)
Dept: SLEEP MEDICINE | Facility: HOSPITAL | Age: 59
End: 2019-03-26

## 2019-03-26 VITALS
HEART RATE: 77 BPM | BODY MASS INDEX: 34.55 KG/M2 | WEIGHT: 183 LBS | DIASTOLIC BLOOD PRESSURE: 88 MMHG | SYSTOLIC BLOOD PRESSURE: 149 MMHG | HEIGHT: 61 IN

## 2019-03-26 DIAGNOSIS — G47.33 OBSTRUCTIVE SLEEP APNEA: Primary | ICD-10-CM

## 2019-03-26 DIAGNOSIS — Z72.0 TOBACCO ABUSE: ICD-10-CM

## 2019-03-26 DIAGNOSIS — E66.9 OBESITY (BMI 30-39.9): ICD-10-CM

## 2019-03-26 PROCEDURE — G0463 HOSPITAL OUTPT CLINIC VISIT: HCPCS

## 2019-03-26 NOTE — PROGRESS NOTES
[facility]  [address]  [address]  [place] [address]  [phone]    PCP: Laura Ayala APRN    Reason for visit:  Sleep disorders: DERRICK    Shaylee is a 58 y.o.female who was seen in the [facility] today. Here to discuss PSG results. Since then she had chest pain - cad on cath and recent stent. Sleeps from 10p to 4a. She denies much EDS. She is smoking E-cigs.  [place] Sleepiness Scale is 4. Caffeine 2 per day. Alcohol 0 per week.    Shaylee  reports that she has been smoking electronic cigarette.  She has a 15.00 pack-year smoking history. She has never used smokeless tobacco.    Pertinent Positive Review of Systems of denies  Rest of Review of Systems was negative as recorded in Sleep Questionnaire.    Patient  has a past medical history of Anxiety, Arthritis (5/1/2014), Lainez esophagus, Benign paroxysmal positional vertigo due to bilateral vestibular disorder, Chest pain, Cholelithiasis (2007), Dystonia, Esophageal reflux, Fibromyositis, Functional movement disorder, H/O colonoscopy, H/O mammogram (08/31/2011), bone density study (08/31/2011), Hyperlipidemia, Hyperparathyroidism (CMS/[facility]), Hypertension, Low back pain, Menopause, Osteopenia, Pancreatitis, and Tremor (4/4/2018).     Current Medications:    Current Outpatient Medications:   •  amlodipine-olmesartan (DAYRON) 10-40 MG per tablet, TAKE 1 TABLET BY MOUTH ONCE DAILY, Disp: 90 tablet, Rfl: 1  •  aspirin 81 MG tablet, Take 81 mg by mouth Daily., Disp: , Rfl:   •  atorvastatin (LIPITOR) 40 MG tablet, Take 1 tablet by mouth Daily., Disp: 90 tablet, Rfl: 3  •  clopidogrel (PLAVIX) 75 MG tablet, Take 1 tablet by mouth Daily., Disp: 30 tablet, Rfl: 11  •  ENBREL SURECLICK 50 MG/ML solution auto-injector, , Disp: , Rfl:   •  esomeprazole (NEXIUM) 40 MG capsule, Take 1 capsule by mouth Daily., Disp: , Rfl:   •  folic acid (FOLVITE) 1 MG tablet, Take 1 mg by mouth Daily., Disp: , Rfl:   •  KLOR-CON 20 MEQ CR tablet,  "TAKE ONE TABLET BY MOUTH TWICE DAILY, Disp: 180 tablet, Rfl: 1  •  methotrexate 2.5 MG tablet, TAKE 8 TABLETS BY MOUTH ONCE WEEKLY ON THE SAME DAY, Disp: 32 tablet, Rfl: 11  •  metoprolol succinate XL (TOPROL-XL) 50 MG 24 hr tablet, Take 1 tablet by mouth Daily., Disp: 30 tablet, Rfl: 3  •  naproxen (NAPROSYN) 500 MG tablet, Take 1 tablet by mouth 2 (Two) Times a Day With Meals. (Patient taking differently: Take 500 mg by mouth 2 (Two) Times a Day As Needed for Mild Pain .), Disp: 60 tablet, Rfl: 0  •  PREMPRO 0.45-1.5 MG per tablet, TAKE ONE TABLET BY MOUTH ONCE DAILY (Patient taking differently: TAKE ONE TABLET BY MOUTH on mon wed fri), Disp: 28 tablet, Rfl: 11  •  traMADol (ULTRAM) 50 MG tablet, Take 1 tablet by mouth Every 8 (Eight) Hours As Needed for Severe Pain ., Disp: 30 tablet, Rfl: 0  •  vitamin D (ERGOCALCIFEROL) 90460 units capsule capsule, TAKE 1 CAPSULE BY MOUTH ONCE A WEEK, Disp: 12 capsule, Rfl: 3   also entered in Sleep Questionnaire         Vital Signs: /88   Pulse 77   Ht 154.9 cm (61\")   Wt 83 kg (183 lb)   BMI 34.58 kg/m²     Body mass index is 34.58 kg/m².       Tongue: large      Dentition: good       Pharynx: Posterior pharyngeal pillars are narrow   Mallampatti: III (soft and hard palate and base of uvula visible)        General: Alert. Cooperative. Well developed. No acute distress.             Head:  Normocephalic. Symmetrical. Atraumatic.              Nose: No septal deviation. No drainage.          Throat: No oral lesions. No thrush. Moist mucous membranes.    Chest Wall:  Normal shape. Symmetric expansion with respiration. No tenderness.             Neck:  Trachea midline.           Lungs:  Clear to auscultation bilaterally. No wheezes. No rhonchi. No rales. Respirations regular, even and unlabored.            Heart:  Regular rhythm and normal rate. Normal S1 and S2. No murmur.     Abdomen:  Soft, non-tender and non-distended. Normal bowel sounds. No masses.  Extremities: "  Moves all extremities well. No edema.    Psychiatric: Normal mood and affect.    Study:  · 9/8/17 HST  AHI 7.2 indicating mild obstructive sleep apnea.  In supine position AHI 11.9.  Snoring for 4.5% of total sleep time.  Saturation remained above 88%.  · 2/14/19  Overnight diagnostic polysomnogram study from 9:21 PM to 4:45 AM.  Sleep efficiency 85.7% with 6.34 hours total sleep time.  Sleep distribution is fairly normal with slow-wave sleep 14% in rem sleep 23%.  AHI index is 5.  In supine position of 26 minutes AHI index 35.  An 89 minutes of REM sleep AHI index is 5.  Oxygen saturation remained above 88%.  Arousal index 10.7 events an hour.  21.6% time snoring was noted.  There was no seizure-like activities on the EEG.  Patient felt that she did not sleep well at all.  This is despite EEG data showing 85% sleep efficiency.    Testing:  · NA    Fuel ([email or web address]) Company: [employer] (new)    Impression:  1. Obstructive sleep apnea    2. Obesity (BMI 30-39.9)    3. Tobacco abuse        Plan:  Shaylee had interval polysomnogram study that showed sleep apnea.  Given recent dx of CAD and stent - advised to use CPAP and agrees. Auto 5--15 centimeters will be set up through DME.    Trying to quit e-cigs.  Encouraged to stop smoking completely.    I reiterated the importance of effective treatment of obstructive sleep apnea with PAP machine.  Cardiovascular health risks of untreated sleep apnea were again reviewed.  Patient was asked to remain cautious if there is persistent hypersomnolence. The benefit of weight loss in reducing severity of obstructive sleep apnea was discussed.  Patient would benefit from adhering to a strict diet to achieve ideal BMI.     Change of PAP supplies regularly is important for effective use.  Change of cushion on the mask or plugs on nasal pillows along with disposable filters once every month and change of mask frame, tubing, headgear and Velcro straps every 6 months at the minimum was reiterated.    This  patient is compliant with PAP machine and benefits from its use.  Apnea hypopneas index is corrected/improved.  Daytime hypersomnolence has resolved.     Patient will follow up in this clinic in 2 months    Thank you for allowing me to participate in your patient's care.    Kurt Covington MD    Part of this note may be an electronic transcription/translation of spoken language to printed text using the Dragon Dictation System.

## 2019-04-02 ENCOUNTER — TELEPHONE (OUTPATIENT)
Dept: CARDIOLOGY | Facility: CLINIC | Age: 59
End: 2019-04-02

## 2019-04-02 NOTE — TELEPHONE ENCOUNTER
No specific precautions.  Just proceed as patient tolerates with her chronic right sided tremors.    Yes

## 2019-04-02 NOTE — TELEPHONE ENCOUNTER
[facility] called requesting if there are any precautions with pt that they need to know starting cardiac rehab tomorrow. Please advise if any. Thanks, John

## 2019-04-03 ENCOUNTER — TREATMENT (OUTPATIENT)
Dept: CARDIAC REHAB | Facility: HOSPITAL | Age: 59
End: 2019-04-03

## 2019-04-03 DIAGNOSIS — Z95.5 S/P CORONARY ARTERY STENT PLACEMENT: Primary | ICD-10-CM

## 2019-04-03 PROCEDURE — 93798 PHYS/QHP OP CAR RHAB W/ECG: CPT

## 2019-04-03 PROCEDURE — 93797 PHYS/QHP OP CAR RHAB WO ECG: CPT

## 2019-04-05 ENCOUNTER — TREATMENT (OUTPATIENT)
Dept: CARDIAC REHAB | Facility: HOSPITAL | Age: 59
End: 2019-04-05

## 2019-04-05 ENCOUNTER — OFFICE VISIT (OUTPATIENT)
Dept: CARDIOLOGY | Facility: CLINIC | Age: 59
End: 2019-04-05

## 2019-04-05 VITALS
BODY MASS INDEX: 34.29 KG/M2 | DIASTOLIC BLOOD PRESSURE: 80 MMHG | HEIGHT: 61 IN | WEIGHT: 181.6 LBS | HEART RATE: 69 BPM | SYSTOLIC BLOOD PRESSURE: 140 MMHG

## 2019-04-05 DIAGNOSIS — Z95.5 S/P CORONARY ARTERY STENT PLACEMENT: Primary | ICD-10-CM

## 2019-04-05 DIAGNOSIS — M05.79 RHEUMATOID ARTHRITIS OF MULTIPLE SITES WITHOUT ORGAN OR SYSTEM INVOLVEMENT WITH POSITIVE RHEUMATOID FACTOR (HCC): ICD-10-CM

## 2019-04-05 DIAGNOSIS — F44.4 FUNCTIONAL NEUROLOGICAL SYMPTOM DISORDER WITH ABNORMAL MOVEMENT: ICD-10-CM

## 2019-04-05 DIAGNOSIS — I10 BENIGN ESSENTIAL HTN: ICD-10-CM

## 2019-04-05 DIAGNOSIS — Z95.5 S/P DRUG ELUTING CORONARY STENT PLACEMENT: ICD-10-CM

## 2019-04-05 DIAGNOSIS — I25.118 CORONARY ARTERY DISEASE OF NATIVE ARTERY OF NATIVE HEART WITH STABLE ANGINA PECTORIS (HCC): Primary | ICD-10-CM

## 2019-04-05 DIAGNOSIS — E78.5 HYPERLIPIDEMIA LDL GOAL <70: ICD-10-CM

## 2019-04-05 PROBLEM — R94.39 ABNORMAL CARDIOVASCULAR STRESS TEST: Status: RESOLVED | Noted: 2019-02-25 | Resolved: 2019-04-05

## 2019-04-05 PROCEDURE — 93000 ELECTROCARDIOGRAM COMPLETE: CPT | Performed by: INTERNAL MEDICINE

## 2019-04-05 PROCEDURE — 99213 OFFICE O/P EST LOW 20 MIN: CPT | Performed by: INTERNAL MEDICINE

## 2019-04-05 PROCEDURE — 93798 PHYS/QHP OP CAR RHAB W/ECG: CPT

## 2019-04-05 NOTE — PROGRESS NOTES
Subjective:     Encounter Date:04/05/2019      Patient ID: Shaylee Wadsworth is a 58 y.o. female.    Chief Complaint:  History of Present Illness    This is a 58-year-old with a history of hypertension, hyperlipidemia, rheumatoid arthritis, dystonia and BPPV resulting in a right-sided tremor, coronary artery disease status post drug eluting stent placement of the distal left circumflex artery, who presents for follow up.      I saw the patient initially in 2/2019 for an abnormal stress test and chest pain.  At that time she and her  reported she had been under a lot of stress after developing a right sided tremor and balance issues in 4/2018.  As a results she had been to several different doctors including to the [facility].  There she was seen both in the functional movement disorder clinic and by ENT who felt that her symptoms may be due to dystonia and BPPV.  They recommended that she start physical therapy for this.  Following her return from her [facility] visit the patient developed recurrent chest burning symptoms on 2/12/2019 which prompted her to go to the emergency room.  She reported that the symptoms woke her up from sleep and lasted about 2 minutes each and resolved on their own.  She had about 3 episodes before she went to the emergency room.  Workup in the emergency room was apparently unremarkable.  Prior to that she had 2 or 3 episodes about a week or so prior.  She was given Carafate in the emergency room which she has been taking about once daily in addition to daily omeprazole which she has been on chronically.  She was then seen by KAITLYNN Choi on 2/14/2019 and was set up with a treadmill stress test.  This was performed on 2/21/2019.  She only exercised about 5 minutes and had no significant changes with exercise nor did she have any symptoms.  However in recovery the patient developed ST segment elevation in her inferior leads with reciprocal ST depression in 1 and  aVL.  These findings resolved at the end of the recovery period.  She had no symptoms associated with the EKG changes.     Based on the high risk findings on her stress test and recommended proceeding with a cardiac catheterization.  This was performed on 3/4/2019 and showed 80% stenosis of her distal left circumflex artery.  Her main coronary artery showed mild nonobstructive disease.  Left ventricular function was normal with an EF of greater than 60%.  She subsequently underwent drug-eluting stent placement and was discharged later that same day.  Following her stent placement she has followed up with both KAITLYNN Tena and KAITLYNN Dixon at which time she has reported that she has been feeling well.    Today she presents for routine follow-up.  She reports that she has been feeling better overall.  She has had no further episodes of chest discomfort and feels like she has less dyspnea on exertion.  She denies any palpitations, PND orthopnea, near-syncope or syncope, or lower extremity edema.  She has had no significant issues with clopidogrel.  She started cardiac rehab this week and has attended 2 sessions so far.       Review of Systems   Constitution: Negative for weakness and malaise/fatigue.   HENT: Negative for hearing loss, hoarse voice, nosebleeds and sore throat.    Eyes: Negative for pain.   Cardiovascular: Negative for chest pain, claudication, cyanosis, dyspnea on exertion, irregular heartbeat, leg swelling, near-syncope, orthopnea, palpitations, paroxysmal nocturnal dyspnea and syncope.   Respiratory: Negative for shortness of breath and snoring.    Endocrine: Negative for cold intolerance, heat intolerance, polydipsia, polyphagia and polyuria.   Skin: Negative for itching and rash.   Musculoskeletal: Negative for arthritis, falls, joint pain, joint swelling, muscle cramps, muscle weakness and myalgias.   Gastrointestinal: Negative for constipation, diarrhea, dysphagia, heartburn,  hematemesis, hematochezia, melena, nausea and vomiting.   Genitourinary: Negative for frequency, hematuria and hesitancy.   Neurological: Positive for light-headedness and tremors. Negative for excessive daytime sleepiness, dizziness, headaches and numbness.   Psychiatric/Behavioral: Negative for depression. The patient is not nervous/anxious.           Current Outpatient Medications:   •  amlodipine-olmesartan (DAYRON) 10-40 MG per tablet, TAKE 1 TABLET BY MOUTH ONCE DAILY, Disp: 90 tablet, Rfl: 1  •  aspirin 81 MG tablet, Take 81 mg by mouth Daily., Disp: , Rfl:   •  atorvastatin (LIPITOR) 40 MG tablet, Take 1 tablet by mouth Daily., Disp: 90 tablet, Rfl: 3  •  clopidogrel (PLAVIX) 75 MG tablet, Take 1 tablet by mouth Daily., Disp: 30 tablet, Rfl: 11  •  ENBREL SURECLICK 50 MG/ML solution auto-injector, , Disp: , Rfl:   •  esomeprazole (NEXIUM) 40 MG capsule, Take 1 capsule by mouth Daily., Disp: , Rfl:   •  folic acid (FOLVITE) 1 MG tablet, Take 1 mg by mouth Daily., Disp: , Rfl:   •  KLOR-CON 20 MEQ CR tablet, TAKE ONE TABLET BY MOUTH TWICE DAILY, Disp: 180 tablet, Rfl: 1  •  methotrexate 2.5 MG tablet, TAKE 8 TABLETS BY MOUTH ONCE WEEKLY ON THE SAME DAY, Disp: 32 tablet, Rfl: 11  •  metoprolol succinate XL (TOPROL-XL) 50 MG 24 hr tablet, Take 1 tablet by mouth Daily., Disp: 30 tablet, Rfl: 3  •  naproxen (NAPROSYN) 500 MG tablet, Take 1 tablet by mouth 2 (Two) Times a Day With Meals. (Patient taking differently: Take 500 mg by mouth 2 (Two) Times a Day As Needed for Mild Pain .), Disp: 60 tablet, Rfl: 0  •  PREMPRO 0.45-1.5 MG per tablet, TAKE ONE TABLET BY MOUTH ONCE DAILY (Patient taking differently: TAKE ONE TABLET BY MOUTH on mon wed fri), Disp: 28 tablet, Rfl: 11  •  traMADol (ULTRAM) 50 MG tablet, Take 1 tablet by mouth Every 8 (Eight) Hours As Needed for Severe Pain ., Disp: 30 tablet, Rfl: 0  •  vitamin D (ERGOCALCIFEROL) 56850 units capsule capsule, TAKE 1 CAPSULE BY MOUTH ONCE A WEEK, Disp: 12 capsule,  Rfl: 3    Past Medical History:   Diagnosis Date   • Anxiety    • Arthritis 5/1/2014    RA   • Arzate esophagus    • Benign paroxysmal positional vertigo due to bilateral vestibular disorder    • Chest pain    • Cholelithiasis 2007    Removed   • Dystonia    • Esophageal reflux    • Fibromyositis    • Functional movement disorder    • H/O colonoscopy    • H/O mammogram 08/31/2011    NORMAL    • Hx of bone density study 08/31/2011    OSTEOPENIA    • Hyperlipidemia    • Hyperparathyroidism (CMS/HCC)    • Hypertension    • Low back pain    • Menopause    • Osteopenia    • Pancreatitis    • Tremor 4/4/2018     Past Surgical History:   Procedure Laterality Date   • CARDIAC CATHETERIZATION N/A 3/4/2019    Procedure: Coronary angiography;  Surgeon: Jackelyn Dumas MD;  Location:  [facility];  Service: Cardiovascular   • CARDIAC CATHETERIZATION N/A 3/4/2019    Procedure: Left heart cath;  Surgeon: Jackelyn Dumas MD;  Location:  [facility];  Service: Cardiovascular   • CARDIAC CATHETERIZATION N/A 3/4/2019    Procedure: Left ventriculography;  Surgeon: Jackelyn Dumas MD;  Location:  [facility];  Service: Cardiovascular   • CARDIAC CATHETERIZATION N/A 3/4/2019    Procedure: Stent KARLY coronary;  Surgeon: Jackelyn Dumas MD;  Location:  [facility];  Service: Cardiovascular   • CHOLECYSTECTOMY     • COLONOSCOPY  2014   • DILATATION AND CURETTAGE     • HYSTERECTOMY     • MOUTH SURGERY      TOOTH EXTRACTION   • OTHER SURGICAL HISTORY  03/27/2015    DIAGNOSTIC ESOPHAGOSCOPY TRANSNASAL FLEXIBLE WITH BIOPSY; ARZATE ESO. REPEAT EGD 2 YR    • OVARY SURGERY Left     NORMAL    • PAP SMEAR  08/23/2010    NORMAL    • PARATHYROIDECTOMY Right 08/17/2016    Dr. Muchael Flynn at [place]   • PARTIAL HYSTERECTOMY      Left ovary removed9     Family History   Problem Relation Age of Onset   • Diabetes Mother    • Hyperlipidemia Mother    • Hypertension Mother    • Heart  "disease Mother    • Kidney disease Mother    • Arthritis Father    • Anxiety disorder Father    • COPD Father    • Glaucoma Father    • Hyperlipidemia Father    • Hypertension Father    • Vision loss Father    • Heart disease Father    • Heart disease Sister      Social History     Tobacco Use   • Smoking status: Light Tobacco Smoker     Packs/day: 0.50     Years: 30.00     Pack years: 15.00     Types: Electronic Cigarette     Last attempt to quit: 2018     Years since quittin.6   • Smokeless tobacco: Never Used   • Tobacco comment: 18 switch to e-cig   Substance Use Topics   • Alcohol use: No   • Drug use: No           ECG 12 Lead  Date/Time: 2019 3:14 PM  Performed by: Jackelyn Dumas MD  Authorized by: Jackelyn Dumas MD   Comparison: compared with previous ECG   Similar to previous ECG  Rhythm: sinus rhythm  Comments: Non-specific anterior T wave changes               Objective:         Visit Vitals  /80 (BP Location: Right arm)   Pulse 69   Ht 154.9 cm (61\")   Wt 82.4 kg (181 lb 9.6 oz)   BMI 34.31 kg/m²          Physical Exam   Constitutional: She is oriented to person, place, and time. She appears well-developed and well-nourished.   HENT:   Head: Normocephalic and atraumatic.   Eyes: Conjunctivae, EOM and lids are normal. Pupils are equal, round, and reactive to light.   Neck: Normal range of motion and full passive range of motion without pain. Neck supple. No JVD present. Carotid bruit is not present.   Cardiovascular: Normal rate, regular rhythm, S1 normal and S2 normal. Exam reveals no gallop.   No murmur heard.  Pulses:       Radial pulses are 2+ on the right side, and 2+ on the left side.   No bilateral lower extremity edema   Pulmonary/Chest: Effort normal and breath sounds normal.   Abdominal: Soft. Normal appearance.   Lymphadenopathy:     She has no cervical adenopathy.   Neurological: She is alert and oriented to person, place, and time.   Right sided tremors   Skin: Skin " is warm, dry and intact.   Psychiatric: She has a normal mood and affect.       Lab Review:       Assessment:          Diagnosis Plan   1. Coronary artery disease of native artery of native heart with stable angina pectoris (CMS/HCC)     2. S/P drug eluting coronary stent placement     3. Hyperlipidemia LDL goal <70     4. Benign essential HTN     5. Functional neurological symptom disorder with abnormal movement     6. Rheumatoid arthritis of multiple sites without organ or system involvement with positive rheumatoid factor (CMS/[facility])            Plan:       1.  Coronary artery disease.  Status post drug-eluting stent placement to her distal left circumflex artery.  Continue current management including dual antiplatelet therapy.  Continue with cardiac rehab.  2.  Hypertension.  Fairly well controlled on current medications.  3.  Hyperlipidemia.  On atorvastatin which is managed by KAITLYNN Osei.  4.  Right-sided tremors  5.  Rheumatoid arthritis    We will plan on seeing the patient back again in 6 months.    Coronary Artery Disease  Assessment  • The patient has no angina    Plan  • Lifestyle modifications discussed include adhering to a heart healthy diet, maintenance of a healthy weight, medication compliance, regular exercise and regular monitoring of cholesterol and blood pressure    Subjective - Objective  • There has been a previous stent procedure using KARLY on or around 3/4/2019  • Current antiplatelet therapy includes aspirin 81 mg and clopidogrel 75 mg  • The patient qualifies for cardiac rehabilitation, and is already participating in a cardiac rehab program

## 2019-04-08 ENCOUNTER — TREATMENT (OUTPATIENT)
Dept: CARDIAC REHAB | Facility: HOSPITAL | Age: 59
End: 2019-04-08

## 2019-04-08 DIAGNOSIS — Z95.5 S/P CORONARY ARTERY STENT PLACEMENT: Primary | ICD-10-CM

## 2019-04-08 PROCEDURE — 93798 PHYS/QHP OP CAR RHAB W/ECG: CPT

## 2019-04-09 DIAGNOSIS — M54.6 ACUTE RIGHT-SIDED THORACIC BACK PAIN: ICD-10-CM

## 2019-04-09 RX ORDER — GABAPENTIN 100 MG/1
CAPSULE ORAL
Qty: 60 CAPSULE | Refills: 2 | Status: SHIPPED | OUTPATIENT
Start: 2019-04-09 | End: 2019-09-26

## 2019-04-10 ENCOUNTER — APPOINTMENT (OUTPATIENT)
Dept: LAB | Facility: HOSPITAL | Age: 59
End: 2019-04-10

## 2019-04-10 ENCOUNTER — TRANSCRIBE ORDERS (OUTPATIENT)
Dept: ADMINISTRATIVE | Facility: HOSPITAL | Age: 59
End: 2019-04-10

## 2019-04-10 ENCOUNTER — TREATMENT (OUTPATIENT)
Dept: CARDIAC REHAB | Facility: HOSPITAL | Age: 59
End: 2019-04-10

## 2019-04-10 ENCOUNTER — LAB (OUTPATIENT)
Dept: LAB | Facility: HOSPITAL | Age: 59
End: 2019-04-10

## 2019-04-10 DIAGNOSIS — I25.118 ATHSCL HEART DISEASE OF NATIVE COR ART W OTH ANG PCTRS (HCC): ICD-10-CM

## 2019-04-10 DIAGNOSIS — Z79.899 ENCOUNTER FOR LONG-TERM (CURRENT) USE OF MEDICATIONS: ICD-10-CM

## 2019-04-10 DIAGNOSIS — Z92.25 STATUS POST RECENT IMMUNOSUPPRESSIVE THERAPY: ICD-10-CM

## 2019-04-10 DIAGNOSIS — E78.5 HYPERLIPIDEMIA, UNSPECIFIED HYPERLIPIDEMIA TYPE: Primary | ICD-10-CM

## 2019-04-10 DIAGNOSIS — Z95.5 S/P CORONARY ARTERY STENT PLACEMENT: Primary | ICD-10-CM

## 2019-04-10 DIAGNOSIS — M05.79 SEROPOSITIVE RHEUMATOID ARTHRITIS OF MULTIPLE SITES (HCC): ICD-10-CM

## 2019-04-10 DIAGNOSIS — Z95.5 STENTED CORONARY ARTERY: ICD-10-CM

## 2019-04-10 DIAGNOSIS — Z79.899 ENCOUNTER FOR LONG-TERM (CURRENT) USE OF MEDICATIONS: Primary | ICD-10-CM

## 2019-04-10 LAB
ALBUMIN SERPL-MCNC: 4.1 G/DL (ref 3.5–5.2)
ALBUMIN/GLOB SERPL: 1.2 G/DL
ALP SERPL-CCNC: 67 U/L (ref 39–117)
ALT SERPL W P-5'-P-CCNC: 16 U/L (ref 1–33)
ANION GAP SERPL CALCULATED.3IONS-SCNC: 13.2 MMOL/L
AST SERPL-CCNC: 14 U/L (ref 1–32)
BASOPHILS # BLD AUTO: 0.05 10*3/MM3 (ref 0–0.2)
BASOPHILS NFR BLD AUTO: 1.1 % (ref 0–1.5)
BILIRUB CONJ SERPL-MCNC: <0.2 MG/DL (ref 0–0.3)
BILIRUB SERPL-MCNC: 0.5 MG/DL (ref 0.2–1.2)
BUN BLD-MCNC: 15 MG/DL (ref 6–20)
BUN/CREAT SERPL: 20.5 (ref 7–25)
CALCIUM SPEC-SCNC: 10.1 MG/DL (ref 8.6–10.5)
CHLORIDE SERPL-SCNC: 102 MMOL/L (ref 98–107)
CHOLEST SERPL-MCNC: 147 MG/DL (ref 0–200)
CO2 SERPL-SCNC: 25.8 MMOL/L (ref 22–29)
CREAT BLD-MCNC: 0.73 MG/DL (ref 0.57–1)
DEPRECATED RDW RBC AUTO: 45.7 FL (ref 37–54)
EOSINOPHIL # BLD AUTO: 0.06 10*3/MM3 (ref 0–0.4)
EOSINOPHIL NFR BLD AUTO: 1.3 % (ref 0.3–6.2)
ERYTHROCYTE [DISTWIDTH] IN BLOOD BY AUTOMATED COUNT: 14.3 % (ref 12.3–15.4)
GFR SERPL CREATININE-BSD FRML MDRD: 82 ML/MIN/1.73
GLOBULIN UR ELPH-MCNC: 3.3 GM/DL
GLUCOSE BLD-MCNC: 110 MG/DL (ref 65–99)
HCT VFR BLD AUTO: 36.8 % (ref 34–46.6)
HDLC SERPL QL: 2.49
HDLC SERPL-MCNC: 59 MG/DL (ref 40–60)
HGB BLD-MCNC: 12.2 G/DL (ref 12–15.9)
IMM GRANULOCYTES # BLD AUTO: 0.02 10*3/MM3 (ref 0–0.05)
IMM GRANULOCYTES NFR BLD AUTO: 0.4 % (ref 0–0.5)
LDLC SERPL CALC-MCNC: 68 MG/DL (ref 0–100)
LYMPHOCYTES # BLD AUTO: 1.93 10*3/MM3 (ref 0.7–3.1)
LYMPHOCYTES NFR BLD AUTO: 42.7 % (ref 19.6–45.3)
MCH RBC QN AUTO: 29.3 PG (ref 26.6–33)
MCHC RBC AUTO-ENTMCNC: 33.2 G/DL (ref 31.5–35.7)
MCV RBC AUTO: 88.2 FL (ref 79–97)
MONOCYTES # BLD AUTO: 0.28 10*3/MM3 (ref 0.1–0.9)
MONOCYTES NFR BLD AUTO: 6.2 % (ref 5–12)
NEUTROPHILS # BLD AUTO: 2.18 10*3/MM3 (ref 1.4–7)
NEUTROPHILS NFR BLD AUTO: 48.3 % (ref 42.7–76)
NRBC BLD AUTO-RTO: 0 /100 WBC (ref 0–0)
PLATELET # BLD AUTO: 249 10*3/MM3 (ref 140–450)
PMV BLD AUTO: 9.9 FL (ref 6–12)
POTASSIUM BLD-SCNC: 4.2 MMOL/L (ref 3.5–5.2)
PROT SERPL-MCNC: 7.4 G/DL (ref 6–8.5)
RBC # BLD AUTO: 4.17 10*6/MM3 (ref 3.77–5.28)
SODIUM BLD-SCNC: 141 MMOL/L (ref 136–145)
TRIGL SERPL-MCNC: 100 MG/DL (ref 0–150)
VIT B12 BLD-MCNC: 329 PG/ML (ref 211–946)
VLDLC SERPL-MCNC: 20 MG/DL (ref 5–40)
WBC NRBC COR # BLD: 4.52 10*3/MM3 (ref 3.4–10.8)

## 2019-04-10 PROCEDURE — 80061 LIPID PANEL: CPT | Performed by: NURSE PRACTITIONER

## 2019-04-10 PROCEDURE — 36415 COLL VENOUS BLD VENIPUNCTURE: CPT

## 2019-04-10 PROCEDURE — 82248 BILIRUBIN DIRECT: CPT

## 2019-04-10 PROCEDURE — 85025 COMPLETE CBC W/AUTO DIFF WBC: CPT | Performed by: NURSE PRACTITIONER

## 2019-04-10 PROCEDURE — 80053 COMPREHEN METABOLIC PANEL: CPT | Performed by: NURSE PRACTITIONER

## 2019-04-10 PROCEDURE — 93798 PHYS/QHP OP CAR RHAB W/ECG: CPT

## 2019-04-10 PROCEDURE — 82607 VITAMIN B-12: CPT | Performed by: NURSE PRACTITIONER

## 2019-04-10 PROCEDURE — 86480 TB TEST CELL IMMUN MEASURE: CPT

## 2019-04-12 ENCOUNTER — TREATMENT (OUTPATIENT)
Dept: CARDIAC REHAB | Facility: HOSPITAL | Age: 59
End: 2019-04-12

## 2019-04-12 DIAGNOSIS — Z95.5 S/P CORONARY ARTERY STENT PLACEMENT: Primary | ICD-10-CM

## 2019-04-12 LAB
QUANTIFERON CRITERIA: NORMAL
QUANTIFERON INCUBATION: NORMAL
QUANTIFERON MITOGEN VALUE: >10 IU/ML
QUANTIFERON NIL VALUE: 0.02 IU/ML
QUANTIFERON TB1 AG VALUE: 0.02 IU/ML
QUANTIFERON TB2 AG VALUE: 0.03 IU/ML
QUANTIFERON-TB GOLD PLUS: NEGATIVE

## 2019-04-12 PROCEDURE — 93798 PHYS/QHP OP CAR RHAB W/ECG: CPT

## 2019-04-15 ENCOUNTER — TREATMENT (OUTPATIENT)
Dept: CARDIAC REHAB | Facility: HOSPITAL | Age: 59
End: 2019-04-15

## 2019-04-15 DIAGNOSIS — Z95.5 S/P CORONARY ARTERY STENT PLACEMENT: Primary | ICD-10-CM

## 2019-04-15 PROCEDURE — 93798 PHYS/QHP OP CAR RHAB W/ECG: CPT

## 2019-04-17 ENCOUNTER — OFFICE VISIT (OUTPATIENT)
Dept: CARDIAC REHAB | Facility: HOSPITAL | Age: 59
End: 2019-04-17

## 2019-04-17 ENCOUNTER — TREATMENT (OUTPATIENT)
Dept: CARDIAC REHAB | Facility: HOSPITAL | Age: 59
End: 2019-04-17

## 2019-04-17 DIAGNOSIS — Z95.5 S/P CORONARY ARTERY STENT PLACEMENT: Primary | ICD-10-CM

## 2019-04-17 PROCEDURE — 93798 PHYS/QHP OP CAR RHAB W/ECG: CPT

## 2019-04-17 PROCEDURE — 93797 PHYS/QHP OP CAR RHAB WO ECG: CPT

## 2019-04-18 ENCOUNTER — TELEPHONE (OUTPATIENT)
Dept: INTERNAL MEDICINE | Facility: CLINIC | Age: 59
End: 2019-04-18

## 2019-04-18 ENCOUNTER — TELEPHONE (OUTPATIENT)
Dept: CARDIOLOGY | Facility: CLINIC | Age: 59
End: 2019-04-18

## 2019-04-18 RX ORDER — AMLODIPINE AND OLMESARTAN MEDOXOMIL 5; 40 MG/1; MG/1
1 TABLET ORAL DAILY
Qty: 30 TABLET | Refills: 0 | Status: SHIPPED | OUTPATIENT
Start: 2019-04-18 | End: 2019-05-18 | Stop reason: SDUPTHER

## 2019-04-18 NOTE — TELEPHONE ENCOUNTER
Pt  Aware   Sent to pharmacy          ----- Message from KAITLYNN Choi sent at 2019 12:43 PM EDT -----  Reduce Justin to 5/40mg (from 10/40mg due to edema risk)  Si po daily  Disp #30    Reduce Sodium to 1500mg daily. Increase water intake. Bring a BP in Monday.     ----- Message -----  From: Amparo Stallworth MA  Sent: 2019  11:52 AM  To: KAITLYNN Choi    HAS  APPT ON Monday W/YOU,  HER  FEET AND ANKLES SWELLING SHE LEFT MESSAGE W/CARDIO HAS NOT HEARD BACK YET.   [phone]

## 2019-04-18 NOTE — TELEPHONE ENCOUNTER
Pt called and said her feet and ankles are swelling. I called and l/m to call back so I can get more info. I noticed that she has gotten a hold of her PCP and they have already helped her........Magaly

## 2019-04-19 ENCOUNTER — TREATMENT (OUTPATIENT)
Dept: CARDIAC REHAB | Facility: HOSPITAL | Age: 59
End: 2019-04-19

## 2019-04-19 DIAGNOSIS — Z95.5 S/P CORONARY ARTERY STENT PLACEMENT: Primary | ICD-10-CM

## 2019-04-19 PROCEDURE — 93798 PHYS/QHP OP CAR RHAB W/ECG: CPT

## 2019-04-22 ENCOUNTER — OFFICE VISIT (OUTPATIENT)
Dept: INTERNAL MEDICINE | Facility: CLINIC | Age: 59
End: 2019-04-22

## 2019-04-22 ENCOUNTER — TELEPHONE (OUTPATIENT)
Dept: CARDIOLOGY | Facility: CLINIC | Age: 59
End: 2019-04-22

## 2019-04-22 ENCOUNTER — TREATMENT (OUTPATIENT)
Dept: CARDIAC REHAB | Facility: HOSPITAL | Age: 59
End: 2019-04-22

## 2019-04-22 VITALS
HEART RATE: 73 BPM | TEMPERATURE: 97.9 F | HEIGHT: 61 IN | BODY MASS INDEX: 34.55 KG/M2 | SYSTOLIC BLOOD PRESSURE: 122 MMHG | OXYGEN SATURATION: 98 % | RESPIRATION RATE: 16 BRPM | DIASTOLIC BLOOD PRESSURE: 84 MMHG | WEIGHT: 183 LBS

## 2019-04-22 DIAGNOSIS — M51.9 DISC DISORDER OF THORACIC REGION: ICD-10-CM

## 2019-04-22 DIAGNOSIS — M05.79 RHEUMATOID ARTHRITIS OF MULTIPLE SITES WITHOUT ORGAN OR SYSTEM INVOLVEMENT WITH POSITIVE RHEUMATOID FACTOR (HCC): ICD-10-CM

## 2019-04-22 DIAGNOSIS — F44.4 FUNCTIONAL NEUROLOGICAL SYMPTOM DISORDER WITH ABNORMAL MOVEMENT: Primary | ICD-10-CM

## 2019-04-22 DIAGNOSIS — Z95.5 S/P CORONARY ARTERY STENT PLACEMENT: Primary | ICD-10-CM

## 2019-04-22 DIAGNOSIS — I25.118 CORONARY ARTERY DISEASE OF NATIVE ARTERY OF NATIVE HEART WITH STABLE ANGINA PECTORIS (HCC): ICD-10-CM

## 2019-04-22 DIAGNOSIS — I10 BENIGN ESSENTIAL HTN: ICD-10-CM

## 2019-04-22 PROCEDURE — 99214 OFFICE O/P EST MOD 30 MIN: CPT | Performed by: NURSE PRACTITIONER

## 2019-04-22 PROCEDURE — 93798 PHYS/QHP OP CAR RHAB W/ECG: CPT

## 2019-04-22 NOTE — PROGRESS NOTES
Chief Complaint   Patient presents with   • Follow-up   • Hypertension   • Hyperlipidemia   • Coronary Artery Disease   • Rheumatoid Arthritis       Subjective     Shaylee Wadsworth is a 58 y.o. female being seen for a follow up appointment today regarding HTN. She had called  office 4-, reporting swelling in both feet. She went to her RA doctor, to see if it was her RA. The XRs and Vector tests were negative, so she called us. We reduced her Dayron from 10/40 to 5/40mg. This has resolved her edema. She brought in a BP log for 1 wee at rehab. BP ranging from 110//90. She is also on toprol XL 50mg daily.     She is currently in Cardiac rehab for recent stent placement [id number]. She is followed by dr. Dumas. Plavix 75mg is her Antiplatelet aggregate.     She is weaning herself off the gabapentin for thoracic DDD. She has started PT at [facility] for FND. She was evaluated at neurology, psychology, and  [facility].       History of Present Illness     Allergies   Allergen Reactions   • Meloxicam Swelling     EYES AND FACE SWELLING  EYES AND FACE SWELLING         Current Outpatient Medications:   •  amlodipine-olmesartan (DAYRON) 5-40 MG per tablet, Take 1 tablet by mouth Daily., Disp: 30 tablet, Rfl: 0  •  aspirin 81 MG tablet, Take 81 mg by mouth Daily., Disp: , Rfl:   •  atorvastatin (LIPITOR) 40 MG tablet, Take 1 tablet by mouth Daily., Disp: 90 tablet, Rfl: 3  •  clopidogrel (PLAVIX) 75 MG tablet, Take 1 tablet by mouth Daily., Disp: 30 tablet, Rfl: 11  •  ENBREL SURECLICK 50 MG/ML solution auto-injector, , Disp: , Rfl:   •  esomeprazole (NEXIUM) 40 MG capsule, Take 1 capsule by mouth Daily., Disp: , Rfl:   •  folic acid (FOLVITE) 1 MG tablet, Take 1 mg by mouth Daily., Disp: , Rfl:   •  gabapentin (NEURONTIN) 100 MG capsule, TAKE 1 CAPSULE BY MOUTH TWICE DAILY, Disp: 60 capsule, Rfl: 2  •  KLOR-CON 20 MEQ CR tablet, TAKE ONE TABLET BY MOUTH TWICE DAILY, Disp: 180 tablet, Rfl: 1  •  methotrexate  2.5 MG tablet, TAKE 8 TABLETS BY MOUTH ONCE WEEKLY ON THE SAME DAY, Disp: 32 tablet, Rfl: 11  •  metoprolol succinate XL (TOPROL-XL) 50 MG 24 hr tablet, Take 1 tablet by mouth Daily., Disp: 30 tablet, Rfl: 3  •  PREMPRO 0.45-1.5 MG per tablet, TAKE ONE TABLET BY MOUTH ONCE DAILY (Patient taking differently: TAKE ONE TABLET BY MOUTH on mon wed fri), Disp: 28 tablet, Rfl: 11  •  traMADol (ULTRAM) 50 MG tablet, Take 1 tablet by mouth Every 8 (Eight) Hours As Needed for Severe Pain ., Disp: 30 tablet, Rfl: 0  •  vitamin D (ERGOCALCIFEROL) 03999 units capsule capsule, TAKE 1 CAPSULE BY MOUTH ONCE A WEEK, Disp: 12 capsule, Rfl: 3    The following portions of the patient's history were reviewed and updated as appropriate: allergies, current medications, past family history, past medical history, past social history, past surgical history and problem list.    Review of Systems   Constitutional: Negative.  Negative for fever and unexpected weight change.   HENT: Negative.  Negative for congestion.    Eyes: Negative.    Respiratory: Negative.  Negative for shortness of breath, wheezing and stridor.    Cardiovascular: Negative for chest pain, palpitations and leg swelling.   Gastrointestinal: Negative.    Endocrine: Negative.    Genitourinary: Negative.  Negative for difficulty urinating.   Musculoskeletal: Positive for arthralgias and back pain.   Skin: Negative.    Allergic/Immunologic: Negative.    Neurological: Positive for tremors. Negative for weakness, light-headedness and numbness.   Hematological: Negative.  Negative for adenopathy. Does not bruise/bleed easily.   Psychiatric/Behavioral: Positive for agitation and sleep disturbance.       Assessment     Physical Exam   Constitutional: She is oriented to person, place, and time. She appears well-developed and well-nourished. No distress.   HENT:   Head: Normocephalic.   Right Ear: External ear normal.   Neck: Neck supple. No thyromegaly present.   Cardiovascular:  Normal rate, regular rhythm and normal heart sounds.   Pulmonary/Chest: Effort normal and breath sounds normal. No stridor. No respiratory distress.   Musculoskeletal: She exhibits no edema.   Neurological: She is alert and oriented to person, place, and time. She displays normal reflexes. No sensory deficit. She exhibits normal muscle tone. Coordination abnormal.   Speech clear. Antalgic gait with wide base and  limping on right. Gross motor tremors of head, BUE.    Skin: Skin is warm and dry. Capillary refill takes less than 2 seconds.   Psychiatric: She has a normal mood and affect. Her behavior is normal.   Vitals reviewed.      Plan     Her fasting labs were reviewed with the patient from last week.     Shaylee was seen today for follow-up, hypertension, hyperlipidemia, coronary artery disease and rheumatoid arthritis.    Diagnoses and all orders for this visit:    Functional neurological symptom disorder with abnormal movement    Rheumatoid arthritis of multiple sites without organ or system involvement with positive rheumatoid factor (CMS/HCC)    Disc disorder of thoracic region    Benign essential HTN    Coronary artery disease of native artery of native heart with stable angina pectoris (CMS/HCC)      She will continue with PT/OT for tremors. She will follow up with specialty of Neurology, Rheumatology and cardiology.     We have discussed her disability case that is pending.     Follow up in 3 months with fasting labs

## 2019-04-22 NOTE — TELEPHONE ENCOUNTER
04/22/19  1:25 PM  Shaylee Wadsworth  1960  Home Phone [phone]   Mobile [phone]       Shaylee Wadsworth is a patient of Dr. Dumas, calling in w/c/o feet and ankles swelling. Pt is not having any shortness of breath or pitting w/this edema. She reports that it is better since the med noted below was adjusted down, but it they are still swollen today.    Current BP/HR are 119/85, 80.    Current cardiac meds are:    Amlodipine-olmesartan 5-40mg (adjusted down by PCP from 10-40mg)  Metoprolol succinate XL 50mg daily  Clopidogrel 75mg daily  Klor-con 20mEq BID  Atorvastatin 40 mg daily  Aspirin 81mg daily    Does she need a med adjustment?    Does she need to be seen? (Last seen 4/5/19 by Dr. Dumas. Next appt 10/8/19.)    Gloria Estrada RN

## 2019-04-22 NOTE — TELEPHONE ENCOUNTER
Called patient.  She reports swelling is improving with decrease in amlodipine dosage.  She denies any other symptoms.  I explained that amlodipine can cause swelling especially at 10 mg dose.  I also reassured her that her LV function on her recent cath was normal.  Asked her to monitor for further improvement and let me know if it continues to be an issue.  Will see her as originally planned in October.

## 2019-04-24 ENCOUNTER — TREATMENT (OUTPATIENT)
Dept: CARDIAC REHAB | Facility: HOSPITAL | Age: 59
End: 2019-04-24

## 2019-04-24 DIAGNOSIS — Z95.5 S/P CORONARY ARTERY STENT PLACEMENT: Primary | ICD-10-CM

## 2019-04-24 PROCEDURE — 93798 PHYS/QHP OP CAR RHAB W/ECG: CPT

## 2019-04-26 ENCOUNTER — TREATMENT (OUTPATIENT)
Dept: CARDIAC REHAB | Facility: HOSPITAL | Age: 59
End: 2019-04-26

## 2019-04-26 DIAGNOSIS — Z95.5 S/P CORONARY ARTERY STENT PLACEMENT: Primary | ICD-10-CM

## 2019-04-26 PROCEDURE — 93798 PHYS/QHP OP CAR RHAB W/ECG: CPT

## 2019-04-29 ENCOUNTER — TREATMENT (OUTPATIENT)
Dept: CARDIAC REHAB | Facility: HOSPITAL | Age: 59
End: 2019-04-29

## 2019-04-29 DIAGNOSIS — F44.4 FUNCTIONAL NEUROLOGICAL SYMPTOM DISORDER WITH ABNORMAL MOVEMENT: ICD-10-CM

## 2019-04-29 DIAGNOSIS — M05.79 RHEUMATOID ARTHRITIS OF MULTIPLE SITES WITHOUT ORGAN OR SYSTEM INVOLVEMENT WITH POSITIVE RHEUMATOID FACTOR (HCC): Primary | ICD-10-CM

## 2019-04-29 DIAGNOSIS — Z95.5 S/P CORONARY ARTERY STENT PLACEMENT: Primary | ICD-10-CM

## 2019-04-29 DIAGNOSIS — M06.9 RHEUMATOID ARTHRITIS INVOLVING MULTIPLE SITES, UNSPECIFIED RHEUMATOID FACTOR PRESENCE: ICD-10-CM

## 2019-04-29 DIAGNOSIS — M51.9 DISC DISORDER OF THORACIC REGION: ICD-10-CM

## 2019-04-29 PROCEDURE — 93798 PHYS/QHP OP CAR RHAB W/ECG: CPT

## 2019-04-29 RX ORDER — TRAMADOL HYDROCHLORIDE 50 MG/1
50 TABLET ORAL EVERY 8 HOURS PRN
Qty: 30 TABLET | Refills: 2 | Status: SHIPPED | OUTPATIENT
Start: 2019-04-29 | End: 2019-11-08 | Stop reason: SDUPTHER

## 2019-05-01 ENCOUNTER — TREATMENT (OUTPATIENT)
Dept: CARDIAC REHAB | Facility: HOSPITAL | Age: 59
End: 2019-05-01

## 2019-05-01 DIAGNOSIS — Z95.5 S/P CORONARY ARTERY STENT PLACEMENT: Primary | ICD-10-CM

## 2019-05-01 PROCEDURE — 93798 PHYS/QHP OP CAR RHAB W/ECG: CPT

## 2019-05-03 ENCOUNTER — TREATMENT (OUTPATIENT)
Dept: CARDIAC REHAB | Facility: HOSPITAL | Age: 59
End: 2019-05-03

## 2019-05-03 DIAGNOSIS — Z95.5 S/P CORONARY ARTERY STENT PLACEMENT: Primary | ICD-10-CM

## 2019-05-03 PROCEDURE — 93798 PHYS/QHP OP CAR RHAB W/ECG: CPT

## 2019-05-06 ENCOUNTER — TREATMENT (OUTPATIENT)
Dept: CARDIAC REHAB | Facility: HOSPITAL | Age: 59
End: 2019-05-06

## 2019-05-06 DIAGNOSIS — Z95.5 S/P CORONARY ARTERY STENT PLACEMENT: Primary | ICD-10-CM

## 2019-05-06 PROCEDURE — 93798 PHYS/QHP OP CAR RHAB W/ECG: CPT

## 2019-05-08 ENCOUNTER — OFFICE VISIT (OUTPATIENT)
Dept: CARDIAC REHAB | Facility: HOSPITAL | Age: 59
End: 2019-05-08

## 2019-05-08 ENCOUNTER — TREATMENT (OUTPATIENT)
Dept: CARDIAC REHAB | Facility: HOSPITAL | Age: 59
End: 2019-05-08

## 2019-05-08 DIAGNOSIS — Z95.5 S/P CORONARY ARTERY STENT PLACEMENT: Primary | ICD-10-CM

## 2019-05-08 PROCEDURE — 93797 PHYS/QHP OP CAR RHAB WO ECG: CPT

## 2019-05-08 PROCEDURE — 93798 PHYS/QHP OP CAR RHAB W/ECG: CPT

## 2019-05-10 ENCOUNTER — TREATMENT (OUTPATIENT)
Dept: CARDIAC REHAB | Facility: HOSPITAL | Age: 59
End: 2019-05-10

## 2019-05-10 DIAGNOSIS — Z95.5 S/P CORONARY ARTERY STENT PLACEMENT: Primary | ICD-10-CM

## 2019-05-10 PROCEDURE — 93798 PHYS/QHP OP CAR RHAB W/ECG: CPT

## 2019-05-13 ENCOUNTER — TREATMENT (OUTPATIENT)
Dept: CARDIAC REHAB | Facility: HOSPITAL | Age: 59
End: 2019-05-13

## 2019-05-13 DIAGNOSIS — Z95.5 S/P CORONARY ARTERY STENT PLACEMENT: Primary | ICD-10-CM

## 2019-05-13 PROCEDURE — 93798 PHYS/QHP OP CAR RHAB W/ECG: CPT

## 2019-05-15 ENCOUNTER — TREATMENT (OUTPATIENT)
Dept: CARDIAC REHAB | Facility: HOSPITAL | Age: 59
End: 2019-05-15

## 2019-05-15 DIAGNOSIS — Z95.5 S/P CORONARY ARTERY STENT PLACEMENT: Primary | ICD-10-CM

## 2019-05-15 PROCEDURE — 93798 PHYS/QHP OP CAR RHAB W/ECG: CPT

## 2019-05-17 ENCOUNTER — TREATMENT (OUTPATIENT)
Dept: CARDIAC REHAB | Facility: HOSPITAL | Age: 59
End: 2019-05-17

## 2019-05-17 DIAGNOSIS — Z95.5 S/P CORONARY ARTERY STENT PLACEMENT: Primary | ICD-10-CM

## 2019-05-17 PROCEDURE — 93798 PHYS/QHP OP CAR RHAB W/ECG: CPT

## 2019-05-20 ENCOUNTER — TREATMENT (OUTPATIENT)
Dept: CARDIAC REHAB | Facility: HOSPITAL | Age: 59
End: 2019-05-20

## 2019-05-20 DIAGNOSIS — Z95.5 S/P CORONARY ARTERY STENT PLACEMENT: Primary | ICD-10-CM

## 2019-05-20 PROCEDURE — 93798 PHYS/QHP OP CAR RHAB W/ECG: CPT

## 2019-05-20 RX ORDER — AMLODIPINE AND OLMESARTAN MEDOXOMIL 5; 40 MG/1; MG/1
TABLET ORAL
Qty: 90 TABLET | Refills: 1 | Status: SHIPPED | OUTPATIENT
Start: 2019-05-20 | End: 2019-05-21 | Stop reason: SDUPTHER

## 2019-05-21 RX ORDER — AMLODIPINE AND OLMESARTAN MEDOXOMIL 5; 40 MG/1; MG/1
1 TABLET ORAL DAILY
Qty: 90 TABLET | Refills: 1 | Status: SHIPPED | OUTPATIENT
Start: 2019-05-21 | End: 2020-02-03 | Stop reason: DRUGHIGH

## 2019-05-22 ENCOUNTER — TREATMENT (OUTPATIENT)
Dept: CARDIAC REHAB | Facility: HOSPITAL | Age: 59
End: 2019-05-22

## 2019-05-22 DIAGNOSIS — Z95.5 S/P CORONARY ARTERY STENT PLACEMENT: Primary | ICD-10-CM

## 2019-05-22 PROCEDURE — 93798 PHYS/QHP OP CAR RHAB W/ECG: CPT

## 2019-05-24 ENCOUNTER — TREATMENT (OUTPATIENT)
Dept: CARDIAC REHAB | Facility: HOSPITAL | Age: 59
End: 2019-05-24

## 2019-05-24 DIAGNOSIS — Z95.5 S/P CORONARY ARTERY STENT PLACEMENT: Primary | ICD-10-CM

## 2019-05-24 PROCEDURE — 93798 PHYS/QHP OP CAR RHAB W/ECG: CPT

## 2019-05-29 ENCOUNTER — TREATMENT (OUTPATIENT)
Dept: CARDIAC REHAB | Facility: HOSPITAL | Age: 59
End: 2019-05-29

## 2019-05-29 DIAGNOSIS — Z95.5 S/P CORONARY ARTERY STENT PLACEMENT: Primary | ICD-10-CM

## 2019-05-29 PROCEDURE — 93798 PHYS/QHP OP CAR RHAB W/ECG: CPT

## 2019-05-31 ENCOUNTER — TREATMENT (OUTPATIENT)
Dept: CARDIAC REHAB | Facility: HOSPITAL | Age: 59
End: 2019-05-31

## 2019-05-31 DIAGNOSIS — Z95.5 S/P CORONARY ARTERY STENT PLACEMENT: Primary | ICD-10-CM

## 2019-05-31 PROCEDURE — 93798 PHYS/QHP OP CAR RHAB W/ECG: CPT

## 2019-06-01 DIAGNOSIS — I10 ESSENTIAL HYPERTENSION: ICD-10-CM

## 2019-06-03 ENCOUNTER — TREATMENT (OUTPATIENT)
Dept: CARDIAC REHAB | Facility: HOSPITAL | Age: 59
End: 2019-06-03

## 2019-06-03 DIAGNOSIS — Z95.5 S/P CORONARY ARTERY STENT PLACEMENT: Primary | ICD-10-CM

## 2019-06-03 DIAGNOSIS — N95.1 MENOPAUSAL STATE: ICD-10-CM

## 2019-06-03 PROCEDURE — 93798 PHYS/QHP OP CAR RHAB W/ECG: CPT

## 2019-06-03 RX ORDER — METOPROLOL SUCCINATE 50 MG/1
TABLET, EXTENDED RELEASE ORAL
Qty: 90 TABLET | Refills: 3 | Status: SHIPPED | OUTPATIENT
Start: 2019-06-03 | End: 2020-02-13 | Stop reason: SDUPTHER

## 2019-06-04 ENCOUNTER — OFFICE VISIT (OUTPATIENT)
Dept: SLEEP MEDICINE | Facility: HOSPITAL | Age: 59
End: 2019-06-04

## 2019-06-04 ENCOUNTER — TELEPHONE (OUTPATIENT)
Dept: INTERNAL MEDICINE | Facility: CLINIC | Age: 59
End: 2019-06-04

## 2019-06-04 ENCOUNTER — LAB (OUTPATIENT)
Dept: LAB | Facility: HOSPITAL | Age: 59
End: 2019-06-04

## 2019-06-04 ENCOUNTER — TRANSCRIBE ORDERS (OUTPATIENT)
Dept: ADMINISTRATIVE | Facility: HOSPITAL | Age: 59
End: 2019-06-04

## 2019-06-04 ENCOUNTER — OFFICE VISIT (OUTPATIENT)
Dept: INTERNAL MEDICINE | Facility: CLINIC | Age: 59
End: 2019-06-04

## 2019-06-04 VITALS
BODY MASS INDEX: 34.55 KG/M2 | DIASTOLIC BLOOD PRESSURE: 88 MMHG | WEIGHT: 183 LBS | HEART RATE: 84 BPM | SYSTOLIC BLOOD PRESSURE: 122 MMHG | HEIGHT: 61 IN

## 2019-06-04 VITALS
DIASTOLIC BLOOD PRESSURE: 80 MMHG | WEIGHT: 186 LBS | HEART RATE: 70 BPM | SYSTOLIC BLOOD PRESSURE: 120 MMHG | OXYGEN SATURATION: 98 % | HEIGHT: 61 IN | BODY MASS INDEX: 35.12 KG/M2 | RESPIRATION RATE: 16 BRPM | TEMPERATURE: 98 F

## 2019-06-04 DIAGNOSIS — Z79.899 HIGH RISK MEDICATION USE: ICD-10-CM

## 2019-06-04 DIAGNOSIS — Z92.25 STATUS POST RECENT IMMUNOSUPPRESSIVE THERAPY: ICD-10-CM

## 2019-06-04 DIAGNOSIS — F06.31 DEPRESSION DUE TO PHYSICAL ILLNESS: Primary | ICD-10-CM

## 2019-06-04 DIAGNOSIS — M05.79 SEROPOSITIVE RHEUMATOID ARTHRITIS OF MULTIPLE SITES (HCC): ICD-10-CM

## 2019-06-04 DIAGNOSIS — Z79.899 HIGH RISK MEDICATION USE: Primary | ICD-10-CM

## 2019-06-04 DIAGNOSIS — E66.9 OBESITY (BMI 30-39.9): ICD-10-CM

## 2019-06-04 DIAGNOSIS — G47.33 OBSTRUCTIVE SLEEP APNEA: Primary | ICD-10-CM

## 2019-06-04 LAB
ALBUMIN SERPL-MCNC: 4.1 G/DL (ref 3.5–5.2)
ALP SERPL-CCNC: 79 U/L (ref 39–117)
ALT SERPL W P-5'-P-CCNC: 14 U/L (ref 1–33)
AST SERPL-CCNC: 15 U/L (ref 1–32)
BASOPHILS # BLD AUTO: 0.05 10*3/MM3 (ref 0–0.2)
BASOPHILS NFR BLD AUTO: 0.8 % (ref 0–1.5)
BILIRUB CONJ SERPL-MCNC: <0.2 MG/DL (ref 0.2–0.3)
BILIRUB INDIRECT SERPL-MCNC: ABNORMAL MG/DL
BILIRUB SERPL-MCNC: 0.5 MG/DL (ref 0.2–1.2)
DEPRECATED RDW RBC AUTO: 46.3 FL (ref 37–54)
EOSINOPHIL # BLD AUTO: 0.05 10*3/MM3 (ref 0–0.4)
EOSINOPHIL NFR BLD AUTO: 0.8 % (ref 0.3–6.2)
ERYTHROCYTE [DISTWIDTH] IN BLOOD BY AUTOMATED COUNT: 14.5 % (ref 12.3–15.4)
HCT VFR BLD AUTO: 37.9 % (ref 34–46.6)
HGB BLD-MCNC: 12.1 G/DL (ref 12–15.9)
IMM GRANULOCYTES # BLD AUTO: 0 10*3/MM3 (ref 0–0.05)
IMM GRANULOCYTES NFR BLD AUTO: 0 % (ref 0–0.5)
LYMPHOCYTES # BLD AUTO: 2.32 10*3/MM3 (ref 0.7–3.1)
LYMPHOCYTES NFR BLD AUTO: 36.3 % (ref 19.6–45.3)
MCH RBC QN AUTO: 28.1 PG (ref 26.6–33)
MCHC RBC AUTO-ENTMCNC: 31.9 G/DL (ref 31.5–35.7)
MCV RBC AUTO: 88.1 FL (ref 79–97)
MONOCYTES # BLD AUTO: 0.45 10*3/MM3 (ref 0.1–0.9)
MONOCYTES NFR BLD AUTO: 7 % (ref 5–12)
NEUTROPHILS # BLD AUTO: 3.53 10*3/MM3 (ref 1.7–7)
NEUTROPHILS NFR BLD AUTO: 55.1 % (ref 42.7–76)
NRBC BLD AUTO-RTO: 0 /100 WBC (ref 0–0.2)
PLATELET # BLD AUTO: 261 10*3/MM3 (ref 140–450)
PMV BLD AUTO: 10.3 FL (ref 6–12)
PROT SERPL-MCNC: 7.1 G/DL (ref 6–8.5)
RBC # BLD AUTO: 4.3 10*6/MM3 (ref 3.77–5.28)
WBC NRBC COR # BLD: 6.4 10*3/MM3 (ref 3.4–10.8)

## 2019-06-04 PROCEDURE — G0463 HOSPITAL OUTPT CLINIC VISIT: HCPCS

## 2019-06-04 PROCEDURE — 36415 COLL VENOUS BLD VENIPUNCTURE: CPT

## 2019-06-04 PROCEDURE — 85025 COMPLETE CBC W/AUTO DIFF WBC: CPT

## 2019-06-04 PROCEDURE — 80076 HEPATIC FUNCTION PANEL: CPT

## 2019-06-04 PROCEDURE — 99213 OFFICE O/P EST LOW 20 MIN: CPT | Performed by: NURSE PRACTITIONER

## 2019-06-04 RX ORDER — ESCITALOPRAM OXALATE 10 MG/1
10 TABLET ORAL DAILY
Qty: 30 TABLET | Refills: 1 | Status: SHIPPED | OUTPATIENT
Start: 2019-06-04 | End: 2019-07-22 | Stop reason: SDUPTHER

## 2019-06-04 NOTE — TELEPHONE ENCOUNTER
Left a voice mail for pt to come for 3:00 visit with lalo        ----- Message from KAITLYNN Choi sent at 6/4/2019 11:04 AM EDT -----  Regarding: RE: APPOINTMENT?  Contact: [phone]  I can see her today at 3pm  ----- Message -----  From: Amparo Stallworth MA  Sent: 6/4/2019  10:00 AM  To: KAITLYNN Choi  Subject: FW: APPOINTMENT?                                     ----- Message -----  From: Jimmy Crocker  Sent: 6/4/2019   9:35 AM  [facility]  Subject: APPOINTMENT?                                     LALO PT    Patient calling to request an appointment for her depression. She said that one of the [facility] reps called her to discuss how she was doing. Their evaluation / comments, were that she should get in to see her pcp due to her depression.  Please assist with scheduling. She said that if we have cancellation, she is only 10 minutes away  Thanks!  jimmy

## 2019-06-04 NOTE — TELEPHONE ENCOUNTER
Patient notified and confirmed    ----- Message from KAITLYNN Choi sent at 6/4/2019 11:04 AM EDT -----  Regarding: RE: APPOINTMENT?  Contact: [phone]  I can see her today at 3pm  ----- Message -----  From: Amparo Stallworth MA  Sent: 6/4/2019  10:00 AM  To: KAITLYNN Choi  Subject: FW: APPOINTMENT?                                     ----- Message -----  From: Jimmy Crocker  Sent: 6/4/2019   9:35 AM  To: Grazyna Zarate [facility]  Subject: APPOINTMENT?                                     RYANN PT    Patient calling to request an appointment for her depression. She said that one of the [facility] reps called her to discuss how she was doing. Their evaluation / comments, were that she should get in to see her pcp due to her depression.  Please assist with scheduling. She said that if we have cancellation, she is only 10 minutes away  Thanks!  jimmy

## 2019-06-04 NOTE — PROGRESS NOTES
[facility] SLEEP MEDICINE  [address]  3rd Floor  [facility] [address]  [phone]    PCP: Laura Ayala APRN    Reason for visit:  Sleep disorders: DERRICK    Shaylee is a 58 y.o.female who was seen in the [facility] today. She was setup with CPAP. She is having trouble using the device. She feels like she is smothering and pulls it off at night. She does not want to use it any more and is not interested in trying different pressures etc. She feels she is overall sleeping better since her stent was put in. She sleeps from 9p to 6a.  [place] Sleepiness Scale is 3. Caffeine 2 per day. Alcohol 0 per week.    Shaylee  reports that she has been smoking electronic cigarette.  She has a 15.00 pack-year smoking history. She has never used smokeless tobacco.    Pertinent Positive Review of Systems of pnd  Rest of Review of Systems was negative as recorded in Sleep Questionnaire.    Patient  has a past medical history of Anxiety, Arthritis (5/1/2014), Lainez esophagus, Benign paroxysmal positional vertigo due to bilateral vestibular disorder, Chest pain, Cholelithiasis (2007), Dystonia, Esophageal reflux, Fibromyositis, Functional movement disorder, H/O colonoscopy, H/O mammogram (08/31/2011), bone density study (08/31/2011), Hyperlipidemia, Hyperparathyroidism (CMS/[facility]), Hypertension, Low back pain, Menopause, Osteopenia, Pancreatitis, and Tremor (4/4/2018).     Current Medications:    Current Outpatient Medications:   •  amlodipine-olmesartan (DAYRON) 5-40 MG per tablet, Take 1 tablet by mouth Daily., Disp: 90 tablet, Rfl: 1  •  aspirin 81 MG tablet, Take 81 mg by mouth Daily., Disp: , Rfl:   •  atorvastatin (LIPITOR) 40 MG tablet, Take 1 tablet by mouth Daily., Disp: 90 tablet, Rfl: 3  •  clopidogrel (PLAVIX) 75 MG tablet, Take 1 tablet by mouth Daily., Disp: 30 tablet, Rfl: 11  •  ENBREL SURECLICK 50 MG/ML solution auto-injector, , Disp: , Rfl:   •  esomeprazole (NEXIUM) 40 MG capsule, Take 1  "capsule by mouth Daily., Disp: , Rfl:   •  estrogen, conjugated,-medroxyprogesterone (PREMPRO) 0.45-1.5 MG per tablet, Take 1 tablet by mouth Daily., Disp: 28 tablet, Rfl: 11  •  folic acid (FOLVITE) 1 MG tablet, Take 1 mg by mouth Daily., Disp: , Rfl:   •  gabapentin (NEURONTIN) 100 MG capsule, TAKE 1 CAPSULE BY MOUTH TWICE DAILY, Disp: 60 capsule, Rfl: 2  •  KLOR-CON 20 MEQ CR tablet, TAKE ONE TABLET BY MOUTH TWICE DAILY, Disp: 180 tablet, Rfl: 1  •  methotrexate 2.5 MG tablet, TAKE 8 TABLETS BY MOUTH ONCE WEEKLY ON THE SAME DAY, Disp: 32 tablet, Rfl: 11  •  metoprolol succinate XL (TOPROL-XL) 50 MG 24 hr tablet, TAKE 1 TABLET BY MOUTH ONCE DAILY, Disp: 90 tablet, Rfl: 3  •  traMADol (ULTRAM) 50 MG tablet, Take 1 tablet by mouth Every 8 (Eight) Hours As Needed for Severe Pain ., Disp: 30 tablet, Rfl: 2  •  vitamin D (ERGOCALCIFEROL) 47315 units capsule capsule, TAKE 1 CAPSULE BY MOUTH ONCE A WEEK, Disp: 12 capsule, Rfl: 3   also entered in Sleep Questionnaire         Vital Signs: /88   Pulse 84   Ht 154.9 cm (61\")   Wt 83 kg (183 lb)   BMI 34.58 kg/m²     Body mass index is 34.58 kg/m².       Tongue: large      Dentition: good       Pharynx: Posterior pharyngeal pillars are narrow   Mallampatti: II (hard and soft palate, upper portion of tonsils anduvula visible)        General: Alert. Cooperative. Well developed. No acute distress.             Head:  Normocephalic. Symmetrical. Atraumatic.              Nose: No septal deviation. No drainage.          Throat: No oral lesions. No thrush. Moist mucous membranes.    Chest Wall:  Normal shape. Symmetric expansion with respiration. No tenderness.             Neck:  Trachea midline.           Lungs:  Clear to auscultation bilaterally. No wheezes. No rhonchi. No rales. Respirations regular, even and unlabored.            Heart:  Regular rhythm and normal rate. Normal S1 and S2. No murmur.     Abdomen:  Soft, non-tender and non-distended. Normal bowel sounds. No " masses.  Extremities:  Moves all extremities well. No edema.    Psychiatric: Normal mood and affect.    Study:  · 9/8/17 HST  AHI 7.2 indicating mild obstructive sleep apnea.  In supine position AHI 11.9.  Snoring for 4.5% of total sleep time.  Saturation remained above 88%.  · 2/14/19  Overnight diagnostic polysomnogram study from 9:21 PM to 4:45 AM.  Sleep efficiency 85.7% with 6.34 hours total sleep time.  Sleep distribution is fairly normal with slow-wave sleep 14% in rem sleep 23%.  AHI index is 5.  In supine position of 26 minutes AHI index 35.  An 89 minutes of REM sleep AHI index is 5.  Oxygen saturation remained above 88%.  Arousal index 10.7 events an hour.  21.6% time snoring was noted.  There was no seizure-like activities on the EEG.  Patient felt that she did not sleep well at all.  This is despite EEG data showing 85% sleep efficiency.    Testing:  · Compliance is extremely poor.  Patient has basically not been able to use the device.    [employer]: [employer]    Impression:  1. Obstructive sleep apnea    2. Obesity (BMI 30-39.9)        Plan:  Shaylee  wants to return machine and is not interested in trying a lower pressure.  I offered adjustments in ramp pressure mask and device settings.  She feels that she will remain intolerant.  Though her sleep apnea is mild she does have coronary artery disease and had recent stents put in.  She is aware of risks of untreated sleep apnea.  She feels much better since her stents were put in.  At her request we will send an order to discontinue her CPAP machine.  She was asked to contact us if she decides to resume use in the future.    Patient will follow up in this clinic in on an as needed basis.    Thank you for allowing me to participate in your patient's care.    Kurt Covington MD    Part of this note may be an electronic transcription/translation of spoken language to printed text using the Dragon Dictation System.

## 2019-06-04 NOTE — PROGRESS NOTES
Chief Complaint   Patient presents with   • Depression       Subjective     Shaylee Wadsworth is a 58 y.o. female being seen for a follow up appointment today regarding depression. She had a depression screening rper the RN. She has had increase in depression symptoms over the past 1.5 years related to diagnosis of Functional movement disorder. She is very emotional, crying a lot. She is not having any suicidal thoughts. She had taken Wellbutrin in the past. She would like to avoid gaining weight.       History of Present Illness     Allergies   Allergen Reactions   • Meloxicam Swelling     EYES AND FACE SWELLING  EYES AND FACE SWELLING         Current Outpatient Medications:   •  amlodipine-olmesartan (DAYRON) 5-40 MG per tablet, Take 1 tablet by mouth Daily., Disp: 90 tablet, Rfl: 1  •  aspirin 81 MG tablet, Take 81 mg by mouth Daily., Disp: , Rfl:   •  atorvastatin (LIPITOR) 40 MG tablet, Take 1 tablet by mouth Daily., Disp: 90 tablet, Rfl: 3  •  clopidogrel (PLAVIX) 75 MG tablet, Take 1 tablet by mouth Daily., Disp: 30 tablet, Rfl: 11  •  ENBREL SURECLICK 50 MG/ML solution auto-injector, , Disp: , Rfl:   •  esomeprazole (NEXIUM) 40 MG capsule, Take 1 capsule by mouth Daily., Disp: , Rfl:   •  estrogen, conjugated,-medroxyprogesterone (PREMPRO) 0.45-1.5 MG per tablet, Take 1 tablet by mouth Daily., Disp: 28 tablet, Rfl: 11  •  folic acid (FOLVITE) 1 MG tablet, Take 1 mg by mouth Daily., Disp: , Rfl:   •  gabapentin (NEURONTIN) 100 MG capsule, TAKE 1 CAPSULE BY MOUTH TWICE DAILY, Disp: 60 capsule, Rfl: 2  •  KLOR-CON 20 MEQ CR tablet, TAKE ONE TABLET BY MOUTH TWICE DAILY, Disp: 180 tablet, Rfl: 1  •  methotrexate 2.5 MG tablet, TAKE 8 TABLETS BY MOUTH ONCE WEEKLY ON THE SAME DAY, Disp: 32 tablet, Rfl: 11  •  metoprolol succinate XL (TOPROL-XL) 50 MG 24 hr tablet, TAKE 1 TABLET BY MOUTH ONCE DAILY, Disp: 90 tablet, Rfl: 3  •  traMADol (ULTRAM) 50 MG tablet, Take 1 tablet by mouth Every 8 (Eight) Hours As Needed for  Severe Pain ., Disp: 30 tablet, Rfl: 2  •  vitamin D (ERGOCALCIFEROL) 05194 units capsule capsule, TAKE 1 CAPSULE BY MOUTH ONCE A WEEK, Disp: 12 capsule, Rfl: 3    The following portions of the patient's history were reviewed and updated as appropriate: allergies, current medications, past family history, past medical history, past social history, past surgical history and problem list.    Review of Systems   Constitutional: Negative.    HENT: Negative.    Eyes: Negative.    Respiratory: Negative.    Cardiovascular: Negative.    Psychiatric/Behavioral: Positive for behavioral problems, decreased concentration and dysphoric mood. Negative for self-injury, sleep disturbance and suicidal ideas. The patient is not nervous/anxious.        Assessment     Physical Exam   Constitutional: She is oriented to person, place, and time. She appears well-developed and well-nourished. No distress.   HENT:   Head: Normocephalic.   Right Ear: External ear normal.   Left Ear: External ear normal.   Neck: Neck supple. No thyromegaly present.   Cardiovascular: Normal rate, regular rhythm and normal heart sounds.   No murmur heard.  Pulmonary/Chest: Effort normal and breath sounds normal. No stridor. No respiratory distress.   Musculoskeletal: She exhibits no edema.   Neurological: She is alert and oriented to person, place, and time.   Skin: Skin is warm and dry.   Psychiatric: She has a normal mood and affect. Her behavior is normal.   Vitals reviewed.      Plan      Diagnosis Plan   1. Depression due to physical illness  escitalopram (LEXAPRO) 10 MG tablet       Will start Lexapro due to depression.     Follow up as scheduled.

## 2019-06-05 ENCOUNTER — TREATMENT (OUTPATIENT)
Dept: CARDIAC REHAB | Facility: HOSPITAL | Age: 59
End: 2019-06-05

## 2019-06-05 DIAGNOSIS — Z95.5 S/P CORONARY ARTERY STENT PLACEMENT: Primary | ICD-10-CM

## 2019-06-05 PROCEDURE — 93798 PHYS/QHP OP CAR RHAB W/ECG: CPT

## 2019-06-07 ENCOUNTER — APPOINTMENT (OUTPATIENT)
Dept: CARDIAC REHAB | Facility: HOSPITAL | Age: 59
End: 2019-06-07

## 2019-06-10 ENCOUNTER — APPOINTMENT (OUTPATIENT)
Dept: CARDIAC REHAB | Facility: HOSPITAL | Age: 59
End: 2019-06-10

## 2019-06-12 ENCOUNTER — APPOINTMENT (OUTPATIENT)
Dept: CARDIAC REHAB | Facility: HOSPITAL | Age: 59
End: 2019-06-12

## 2019-06-14 ENCOUNTER — APPOINTMENT (OUTPATIENT)
Dept: CARDIAC REHAB | Facility: HOSPITAL | Age: 59
End: 2019-06-14

## 2019-06-17 ENCOUNTER — APPOINTMENT (OUTPATIENT)
Dept: CARDIAC REHAB | Facility: HOSPITAL | Age: 59
End: 2019-06-17

## 2019-06-19 ENCOUNTER — APPOINTMENT (OUTPATIENT)
Dept: CARDIAC REHAB | Facility: HOSPITAL | Age: 59
End: 2019-06-19

## 2019-06-26 ENCOUNTER — TELEPHONE (OUTPATIENT)
Dept: INTERNAL MEDICINE | Facility: CLINIC | Age: 59
End: 2019-06-26

## 2019-06-26 RX ORDER — DIAZEPAM 2 MG/1
2 TABLET ORAL 2 TIMES DAILY PRN
Qty: 10 TABLET | Refills: 0 | Status: SHIPPED | OUTPATIENT
Start: 2019-06-26 | End: 2020-07-28

## 2019-06-26 NOTE — TELEPHONE ENCOUNTER
Sent to pharmacy  Pt  [facility]        ----- Message from KAITLYNN Choi sent at 2019 12:39 PM EDT -----  Sometimes Valium will help temporarily, but it should not be used long term due to habit forming nature.     Valium 2mg  Si po BID prn   Disp #10  ----- Message -----  From: Amparo Stallworth MA  Sent: 2019  11:10 AM  To: KAITLYNN Choi        ----- Message -----  From: Jodi Gonzales  Sent: 2019  11:04 AM  To: [facility] [address] Clinical Pool    PT IS WANTING TO KNOW IF THERE IS ANY OTHER MEDICINE OTHER THAN MECLOZINE FOR HER VERTIGO. SHE HAS BEEN HAVING IT OFF AND ON FOR OVER TWO WEEKS NOW. CALL BACK [phone]. USES [facility] IN [place].

## 2019-07-12 ENCOUNTER — OFFICE VISIT (OUTPATIENT)
Dept: ENDOCRINOLOGY | Age: 59
End: 2019-07-12

## 2019-07-12 VITALS
HEART RATE: 60 BPM | WEIGHT: 181 LBS | BODY MASS INDEX: 34.17 KG/M2 | SYSTOLIC BLOOD PRESSURE: 128 MMHG | OXYGEN SATURATION: 98 % | HEIGHT: 61 IN | DIASTOLIC BLOOD PRESSURE: 68 MMHG

## 2019-07-12 DIAGNOSIS — H81.13 BENIGN PAROXYSMAL POSITIONAL VERTIGO DUE TO BILATERAL VESTIBULAR DISORDER: ICD-10-CM

## 2019-07-12 DIAGNOSIS — E21.0 PRIMARY HYPERPARATHYROIDISM (HCC): Primary | ICD-10-CM

## 2019-07-12 PROCEDURE — 99204 OFFICE O/P NEW MOD 45 MIN: CPT | Performed by: INTERNAL MEDICINE

## 2019-07-12 NOTE — PROGRESS NOTES
NEW PATIENT APPOINMENT / HYPERPARATHYROIDISM    Shaylee Wadsworth,58 y.o. white female is here as a new patient for elevated Ca levels. Consulted by Ms. Valentine.  Patient has been suffering with chronic fatigue for the last 3 years mainly since 2018 and the symptom has been gradually getting worse.  Her tremors are not limited to her hands alone but she has them throughout her body.  They also result in a mild trembling in her voice.  Patient has been extensively worked up by 2 neurologist at [facility] and has been given 2 different diagnoses one was labeled as functional neurological disorder and the other one was benign paroxysmal positional vertigo.  Today patient is here in the office to see if any of her hormonal abnormalities has been contributing to this.    In 2014 patient was diagnosed with rheumatoid arthritis which according to the patient is now in remission she is currently on immunotherapy.  Not on any steroids in the last 6 months.  In 2015 patient was noted to have elevated parathyroid levels, calcium levels, was also noted to have significant bone pains she underwent right inferior parathyroidectomy which resulted in the resolution of most of her symptoms and normalization of her calcium and the parathyroid levels.  In 2018 she developed significant weakness, tremors have started and they have slowly started getting worse initially she had significant work-up for stroke which did not reveal any answers.    Today in clinic patient is mainly concerned about her generalized tremors, low energy levels secondary to this, weight has been stable, no issues with blood pressure, no hair loss.    Reviewed primary care physician's/consulting physician documentation and lab results :     I have reviewed the patient's allergies, medicines, past medical hx, family hx and social hx in detail.    Past Medical History:   Diagnosis Date   • Anxiety    • Arthritis 5/1/2014    RA   • Lainez esophagus    • Benign  paroxysmal positional vertigo due to bilateral vestibular disorder    • Chest pain    • Cholelithiasis     Removed   • Dystonia    • Esophageal reflux    • Fibromyositis    • Functional movement disorder    • H/O colonoscopy    • H/O mammogram 2011    NORMAL    • Hx of bone density study 2011    OSTEOPENIA    • Hyperlipidemia    • Hyperparathyroidism (CMS/HCC)    • Hypertension    • Low back pain    • Menopause    • Osteopenia    • Pancreatitis    • Tremor 2018       Family History   Problem Relation Age of Onset   • Diabetes Mother    • Hyperlipidemia Mother    • Hypertension Mother    • Heart disease Mother    • Kidney disease Mother    • Arthritis Father    • Anxiety disorder Father    • COPD Father    • Glaucoma Father    • Hyperlipidemia Father    • Hypertension Father    • Vision loss Father    • Heart disease Father    • Heart disease Sister        Social History     Socioeconomic History   • Marital status:      Spouse name: Not on file   • Number of children: Not on file   • Years of education: Not on file   • Highest education level: Not on file   Tobacco Use   • Smoking status: Light Tobacco Smoker     Packs/day: 0.50     Years: 30.00     Pack years: 15.00     Types: Electronic Cigarette     Last attempt to quit: 2018     Years since quittin.9   • Smokeless tobacco: Never Used   • Tobacco comment: 18 switch to e-cig   Substance and Sexual Activity   • Alcohol use: No   • Drug use: No   • Sexual activity: No       Allergies   Allergen Reactions   • Meloxicam Swelling     EYES AND FACE SWELLING  EYES AND FACE SWELLING         Current Outpatient Medications:   •  amlodipine-olmesartan (DAYRON) 5-40 MG per tablet, Take 1 tablet by mouth Daily., Disp: 90 tablet, Rfl: 1  •  aspirin 81 MG tablet, Take 81 mg by mouth Daily., Disp: , Rfl:   •  atorvastatin (LIPITOR) 40 MG tablet, Take 1 tablet by mouth Daily., Disp: 90 tablet, Rfl: 3  •  clopidogrel (PLAVIX) 75 MG tablet,  "Take 1 tablet by mouth Daily., Disp: 30 tablet, Rfl: 11  •  diazePAM (VALIUM) 2 MG tablet, Take 1 tablet by mouth 2 (Two) Times a Day As Needed for Anxiety., Disp: 10 tablet, Rfl: 0  •  ENBREL SURECLICK 50 MG/ML solution auto-injector, , Disp: , Rfl:   •  escitalopram (LEXAPRO) 10 MG tablet, Take 1 tablet by mouth Daily., Disp: 30 tablet, Rfl: 1  •  esomeprazole (NEXIUM) 40 MG capsule, Take 1 capsule by mouth Daily., Disp: , Rfl:   •  estrogen, conjugated,-medroxyprogesterone (PREMPRO) 0.45-1.5 MG per tablet, Take 1 tablet by mouth Daily., Disp: 28 tablet, Rfl: 11  •  folic acid (FOLVITE) 1 MG tablet, Take 1 mg by mouth Daily., Disp: , Rfl:   •  KLOR-CON 20 MEQ CR tablet, TAKE ONE TABLET BY MOUTH TWICE DAILY, Disp: 180 tablet, Rfl: 1  •  methotrexate 2.5 MG tablet, TAKE 8 TABLETS BY MOUTH ONCE WEEKLY ON THE SAME DAY, Disp: 32 tablet, Rfl: 11  •  metoprolol succinate XL (TOPROL-XL) 50 MG 24 hr tablet, TAKE 1 TABLET BY MOUTH ONCE DAILY, Disp: 90 tablet, Rfl: 3  •  traMADol (ULTRAM) 50 MG tablet, Take 1 tablet by mouth Every 8 (Eight) Hours As Needed for Severe Pain ., Disp: 30 tablet, Rfl: 2  •  vitamin D (ERGOCALCIFEROL) 89059 units capsule capsule, TAKE 1 CAPSULE BY MOUTH ONCE A WEEK, Disp: 12 capsule, Rfl: 3  •  gabapentin (NEURONTIN) 100 MG capsule, TAKE 1 CAPSULE BY MOUTH TWICE DAILY, Disp: 60 capsule, Rfl: 2    Review of Systems   Constitutional: Positive for diaphoresis and fatigue. Negative for fever.   Respiratory: Negative for shortness of breath.    Cardiovascular: Negative for chest pain and palpitations.   Gastrointestinal: Negative for abdominal pain, nausea and vomiting.   Musculoskeletal: Positive for back pain and neck pain.   Neurological: Positive for dizziness, weakness and light-headedness. Negative for syncope and headaches.   Psychiatric/Behavioral: Negative for confusion.       Objective:    /68   Pulse 60   Ht 154.9 cm (61\")   Wt 82.1 kg (181 lb)   SpO2 98%   BMI 34.20 kg/m² "     Physical Exam   Constitutional: She is oriented to person, place, and time. She appears well-nourished.   HENT:   Head: Normocephalic and atraumatic.   Eyes: Conjunctivae and EOM are normal. No scleral icterus.   Neck: Normal range of motion. Neck supple. No thyromegaly present.   Scar noted   Cardiovascular: Normal rate and normal heart sounds. Exam reveals no friction rub.   No murmur heard.  Pulmonary/Chest: Effort normal and breath sounds normal. No stridor. She has no wheezes. She has no rales.   Abdominal: Soft. Bowel sounds are normal. She exhibits no distension. There is no tenderness.   Musculoskeletal: She exhibits no edema or tenderness.   Lymphadenopathy:     She has no cervical adenopathy.   Neurological: She is alert and oriented to person, place, and time.   Chronic tremors   Skin: Skin is warm and dry. She is not diaphoretic.   Psychiatric: She has a normal mood and affect.   Vitals reviewed.      Results Review:    I reviewed the patient's new clinical results.    Lab on 06/04/2019   Component Date Value Ref Range Status   • Total Protein 06/04/2019 7.1  6.0 - 8.5 g/dL Final   • Albumin 06/04/2019 4.10  3.50 - 5.20 g/dL Final   • ALT (SGPT) 06/04/2019 14  1 - 33 U/L Final   • AST (SGOT) 06/04/2019 15  1 - 32 U/L Final   • Alkaline Phosphatase 06/04/2019 79  39 - 117 U/L Final   • Total Bilirubin 06/04/2019 0.5  0.2 - 1.2 mg/dL Final   • Bilirubin, Direct 06/04/2019 <0.2* 0.2 - 0.3 mg/dL Final   • Bilirubin, Indirect 06/04/2019   mg/dL Final    Unable to calculate   • WBC 06/04/2019 6.40  3.40 - 10.80 10*3/mm3 Final   • RBC 06/04/2019 4.30  3.77 - 5.28 10*6/mm3 Final   • Hemoglobin 06/04/2019 12.1  12.0 - 15.9 g/dL Final   • Hematocrit 06/04/2019 37.9  34.0 - 46.6 % Final   • MCV 06/04/2019 88.1  79.0 - 97.0 fL Final   • MCH 06/04/2019 28.1  26.6 - 33.0 pg Final   • MCHC 06/04/2019 31.9  31.5 - 35.7 g/dL Final   • RDW 06/04/2019 14.5  12.3 - 15.4 % Final   • RDW-SD 06/04/2019 46.3  37.0 - 54.0 fl  Final   • MPV 06/04/2019 10.3  6.0 - 12.0 fL Final   • Platelets 06/04/2019 261  140 - 450 10*3/mm3 Final   • Neutrophil % 06/04/2019 55.1  42.7 - 76.0 % Final   • Lymphocyte % 06/04/2019 36.3  19.6 - 45.3 % Final   • Monocyte % 06/04/2019 7.0  5.0 - 12.0 % Final   • Eosinophil % 06/04/2019 0.8  0.3 - 6.2 % Final   • Basophil % 06/04/2019 0.8  0.0 - 1.5 % Final   • Immature Grans % 06/04/2019 0.0  0.0 - 0.5 % Final   • Neutrophils, Absolute 06/04/2019 3.53  1.70 - 7.00 10*3/mm3 Final   • Lymphocytes, Absolute 06/04/2019 2.32  0.70 - 3.10 10*3/mm3 Final   • Monocytes, Absolute 06/04/2019 0.45  0.10 - 0.90 10*3/mm3 Final   • Eosinophils, Absolute 06/04/2019 0.05  0.00 - 0.40 10*3/mm3 Final   • Basophils, Absolute 06/04/2019 0.05  0.00 - 0.20 10*3/mm3 Final   • Immature Grans, Absolute 06/04/2019 0.00  0.00 - 0.05 10*3/mm3 Final   • nRBC 06/04/2019 0.0  0.0 - 0.2 /100 WBC Final       Shaylee was seen today for abnormal calcium.    Diagnoses and all orders for this visit:    Primary hyperparathyroidism (CMS/[facility])  -     Comprehensive Metabolic Panel  -     PTH, Intact & Calcium  -     Hemoglobin A1c  -     Vitamin B12 & Folate  -     Vitamin D 25 Hydroxy  -     TSH  -     T4, Free  -     Cortisol - AM  -     ACTH    Benign paroxysmal positional vertigo due to bilateral vestibular disorder  -     Comprehensive Metabolic Panel  -     PTH, Intact & Calcium  -     Hemoglobin A1c  -     Vitamin B12 & Folate  -     Vitamin D 25 Hydroxy  -     TSH  -     T4, Free  -     Cortisol - AM  -     ACTH      Generalized tremors  Counseled the patient that thyroid abnormality would result in fine tremors not the course tremors that she is experiencing  Will check TSH, free T4    Patient wanted to be checked for cortisol abnormality as she is very desperate to find answers for her neurological problem.  We will check a.m. cortisol level, ACTH levels if it is abnormal we will proceed with 24-hour urine collection for cortisol  "levels.    Based on patient's age and obesity will check HbA1c.    History of hyperparathyroidism and hypercalcemia  Check parathyroid, calcium and vitamin D levels.    Thank you for asking me to see your patient, Shaylee Wadsworth in consultation.      Marcel Caraballo MD  07/12/19    EMR Dragon / transcription disclaimer:     \"Dictated utilizing Dragon dictation\".   Answers for HPI/ROS submitted by the patient on 7/10/2019   Neurologic complaint  altered mental status: No  clumsiness: Yes  focal sensory loss: No  focal weakness: Yes  loss of balance: Yes  memory loss: No  near-syncope: No  slurred speech: No  visual change: No  Chronicity: chronic  Onset: more than 1 year ago  Onset quality: suddenly  Progression since onset: waxing and waning  Focality: right-sided  auditory change: No  aura: No  bladder incontinence: Yes  bowel incontinence: No  vertigo: No  Treatments tried: acetaminophen, bed rest, medication, neck support, walking  Improvement on treatment: mild    "

## 2019-07-15 LAB
25(OH)D3+25(OH)D2 SERPL-MCNC: 33.2 NG/ML (ref 30–100)
ACTH PLAS-MCNC: 10.9 PG/ML (ref 7.2–63.3)
ALBUMIN SERPL-MCNC: 4.7 G/DL (ref 3.5–5.2)
ALBUMIN/GLOB SERPL: 2.5 G/DL
ALP SERPL-CCNC: 78 U/L (ref 39–117)
ALT SERPL-CCNC: 10 U/L (ref 1–33)
AST SERPL-CCNC: 15 U/L (ref 1–32)
BILIRUB SERPL-MCNC: 0.5 MG/DL (ref 0.2–1.2)
BUN SERPL-MCNC: 9 MG/DL (ref 6–20)
BUN/CREAT SERPL: 13 (ref 7–25)
CALCIUM SERPL-MCNC: 8.8 MG/DL (ref 8.6–10.5)
CHLORIDE SERPL-SCNC: 104 MMOL/L (ref 98–107)
CO2 SERPL-SCNC: 26.2 MMOL/L (ref 22–29)
CORTIS AM PEAK SERPL-MCNC: 5.8 UG/DL (ref 6.2–19.4)
CREAT SERPL-MCNC: 0.69 MG/DL (ref 0.57–1)
FOLATE SERPL-MCNC: >20 NG/ML (ref 4.78–24.2)
GLOBULIN SER CALC-MCNC: 1.9 GM/DL
GLUCOSE SERPL-MCNC: 111 MG/DL (ref 65–99)
HBA1C MFR BLD: 5.5 % (ref 4.8–5.6)
INTACT PTH: ABNORMAL
POTASSIUM SERPL-SCNC: 3.6 MMOL/L (ref 3.5–5.2)
PROT SERPL-MCNC: 6.6 G/DL (ref 6–8.5)
PTH-INTACT SERPL-MCNC: 161 PG/ML (ref 15–65)
SODIUM SERPL-SCNC: 144 MMOL/L (ref 136–145)
T4 FREE SERPL-MCNC: 1.34 NG/DL (ref 0.93–1.7)
TSH SERPL DL<=0.005 MIU/L-ACNC: 2.22 MIU/ML (ref 0.27–4.2)
VIT B12 SERPL-MCNC: 328 PG/ML (ref 211–946)

## 2019-07-16 DIAGNOSIS — H81.13 BENIGN PAROXYSMAL POSITIONAL VERTIGO DUE TO BILATERAL VESTIBULAR DISORDER: Primary | ICD-10-CM

## 2019-07-17 ENCOUNTER — RESULTS ENCOUNTER (OUTPATIENT)
Dept: ENDOCRINOLOGY | Age: 59
End: 2019-07-17

## 2019-07-17 DIAGNOSIS — E21.0 PRIMARY HYPERPARATHYROIDISM (HCC): ICD-10-CM

## 2019-07-17 DIAGNOSIS — E21.0 PRIMARY HYPERPARATHYROIDISM (HCC): Primary | ICD-10-CM

## 2019-07-19 ENCOUNTER — APPOINTMENT (OUTPATIENT)
Dept: LAB | Facility: HOSPITAL | Age: 59
End: 2019-07-19

## 2019-07-19 PROCEDURE — 82530 CORTISOL FREE: CPT | Performed by: INTERNAL MEDICINE

## 2019-07-19 PROCEDURE — 81050 URINALYSIS VOLUME MEASURE: CPT | Performed by: INTERNAL MEDICINE

## 2019-07-22 ENCOUNTER — OFFICE VISIT (OUTPATIENT)
Dept: INTERNAL MEDICINE | Facility: CLINIC | Age: 59
End: 2019-07-22

## 2019-07-22 VITALS
OXYGEN SATURATION: 96 % | DIASTOLIC BLOOD PRESSURE: 74 MMHG | WEIGHT: 179 LBS | HEIGHT: 61 IN | SYSTOLIC BLOOD PRESSURE: 122 MMHG | BODY MASS INDEX: 33.79 KG/M2 | HEART RATE: 64 BPM | RESPIRATION RATE: 16 BRPM | TEMPERATURE: 98.2 F

## 2019-07-22 DIAGNOSIS — M54.6 CHRONIC RIGHT-SIDED THORACIC BACK PAIN: ICD-10-CM

## 2019-07-22 DIAGNOSIS — Z86.39 H/O HYPERPARATHYROIDISM: ICD-10-CM

## 2019-07-22 DIAGNOSIS — I25.118 CORONARY ARTERY DISEASE OF NATIVE ARTERY OF NATIVE HEART WITH STABLE ANGINA PECTORIS (HCC): ICD-10-CM

## 2019-07-22 DIAGNOSIS — F06.31 DEPRESSION DUE TO PHYSICAL ILLNESS: ICD-10-CM

## 2019-07-22 DIAGNOSIS — Z18.39: ICD-10-CM

## 2019-07-22 DIAGNOSIS — M05.79 RHEUMATOID ARTHRITIS OF MULTIPLE SITES WITHOUT ORGAN OR SYSTEM INVOLVEMENT WITH POSITIVE RHEUMATOID FACTOR (HCC): ICD-10-CM

## 2019-07-22 DIAGNOSIS — E21.0 PRIMARY HYPERPARATHYROIDISM (HCC): Primary | ICD-10-CM

## 2019-07-22 DIAGNOSIS — G89.29 CHRONIC RIGHT-SIDED THORACIC BACK PAIN: ICD-10-CM

## 2019-07-22 DIAGNOSIS — I10 BENIGN ESSENTIAL HTN: Primary | ICD-10-CM

## 2019-07-22 DIAGNOSIS — E78.5 HYPERLIPIDEMIA LDL GOAL <70: ICD-10-CM

## 2019-07-22 DIAGNOSIS — F44.4 FUNCTIONAL NEUROLOGICAL SYMPTOM DISORDER WITH ABNORMAL MOVEMENT: ICD-10-CM

## 2019-07-22 DIAGNOSIS — S80.851A: ICD-10-CM

## 2019-07-22 PROCEDURE — 99214 OFFICE O/P EST MOD 30 MIN: CPT | Performed by: NURSE PRACTITIONER

## 2019-07-22 RX ORDER — ESCITALOPRAM OXALATE 10 MG/1
10 TABLET ORAL DAILY
Qty: 90 TABLET | Refills: 3 | Status: SHIPPED | OUTPATIENT
Start: 2019-07-22 | End: 2019-08-29 | Stop reason: DRUGHIGH

## 2019-07-22 NOTE — PROGRESS NOTES
"Chief Complaint   Patient presents with   • Follow-up   • Depression   • Hypertension   • Hyperlipidemia       Subjective     Shaylee Wadsworth is a 58 y.o. female being seen for a follow up appointment today regarding CAD, HTN, Hyperlipidemia, functional movement disorder, and Depression. She has a history of hyperparathyroidism, and was recently seen by endocrinology for a work up. She is awaiting a parathyroid scan due to elevated cortisol levels. She is also followed by cardiology for history MI and rheumatology for RA. She is on Dayron 5/40mg daily, Toprol XL 25 mg daily for HTN. She is going to [employer] 3 days a week. She has noted some dizziness at night, so she is scheduled with a vestibular doctor.     She has felt some muscle aches and \"flu like symptoms.\" Over the past month, She has had 2 tick bites to right leg.     She is seeing a psychotherapist for Functional movement disorder. She was evaluated by 2 neurologists.       History of Present Illness     Allergies   Allergen Reactions   • Meloxicam Swelling     EYES AND FACE SWELLING  EYES AND FACE SWELLING         Current Outpatient Medications:   •  amlodipine-olmesartan (DAYRON) 5-40 MG per tablet, Take 1 tablet by mouth Daily., Disp: 90 tablet, Rfl: 1  •  aspirin 81 MG tablet, Take 81 mg by mouth Daily., Disp: , Rfl:   •  atorvastatin (LIPITOR) 40 MG tablet, Take 1 tablet by mouth Daily., Disp: 90 tablet, Rfl: 3  •  clopidogrel (PLAVIX) 75 MG tablet, Take 1 tablet by mouth Daily., Disp: 30 tablet, Rfl: 11  •  diazePAM (VALIUM) 2 MG tablet, Take 1 tablet by mouth 2 (Two) Times a Day As Needed for Anxiety., Disp: 10 tablet, Rfl: 0  •  ENBREL SURECLICK 50 MG/ML solution auto-injector, , Disp: , Rfl:   •  escitalopram (LEXAPRO) 10 MG tablet, Take 1 tablet by mouth Daily., Disp: 30 tablet, Rfl: 1  •  esomeprazole (NEXIUM) 40 MG capsule, Take 1 capsule by mouth Daily., Disp: , Rfl:   •  estrogen, conjugated,-medroxyprogesterone (PREMPRO) 0.45-1.5 MG per " tablet, Take 1 tablet by mouth Daily., Disp: 28 tablet, Rfl: 11  •  folic acid (FOLVITE) 1 MG tablet, Take 1 mg by mouth Daily., Disp: , Rfl:   •  gabapentin (NEURONTIN) 100 MG capsule, TAKE 1 CAPSULE BY MOUTH TWICE DAILY, Disp: 60 capsule, Rfl: 2  •  KLOR-CON 20 MEQ CR tablet, TAKE ONE TABLET BY MOUTH TWICE DAILY, Disp: 180 tablet, Rfl: 1  •  methotrexate 2.5 MG tablet, TAKE 8 TABLETS BY MOUTH ONCE WEEKLY ON THE SAME DAY, Disp: 32 tablet, Rfl: 11  •  metoprolol succinate XL (TOPROL-XL) 50 MG 24 hr tablet, TAKE 1 TABLET BY MOUTH ONCE DAILY, Disp: 90 tablet, Rfl: 3  •  traMADol (ULTRAM) 50 MG tablet, Take 1 tablet by mouth Every 8 (Eight) Hours As Needed for Severe Pain ., Disp: 30 tablet, Rfl: 2  •  vitamin D (ERGOCALCIFEROL) 21649 units capsule capsule, TAKE 1 CAPSULE BY MOUTH ONCE A WEEK, Disp: 12 capsule, Rfl: 3    The following portions of the patient's history were reviewed and updated as appropriate: allergies, current medications, past family history, past medical history, past social history, past surgical history and problem list.    Review of Systems   Constitutional: Negative for fatigue, fever and unexpected weight change.   Respiratory: Negative.  Negative for cough.    Cardiovascular: Negative.  Negative for chest pain, palpitations and leg swelling.   Gastrointestinal: Negative.  Negative for abdominal distention, abdominal pain, blood in stool and constipation.   Endocrine: Negative.    Genitourinary: Positive for menstrual problem.   Musculoskeletal: Positive for arthralgias and gait problem. Negative for back pain.   Allergic/Immunologic: Positive for environmental allergies.   Neurological: Positive for dizziness, tremors, speech difficulty and weakness.   Hematological: Negative for adenopathy. Does not bruise/bleed easily.   Psychiatric/Behavioral: Positive for agitation. The patient is nervous/anxious.        Assessment     Physical Exam   Constitutional: She is oriented to person, place, and  time. She appears well-developed and well-nourished.   HENT:   Head: Normocephalic.   Right Ear: External ear normal.   Left Ear: External ear normal.   Nose: Nose normal.   Mouth/Throat: Oropharynx is clear and moist. No oropharyngeal exudate.   Cardiovascular: Normal rate, regular rhythm and normal heart sounds.   No murmur heard.  Pulmonary/Chest: Effort normal and breath sounds normal. No stridor. No respiratory distress. She has no wheezes.   Abdominal: Soft. Bowel sounds are normal.   Musculoskeletal: She exhibits no edema.   Neurological: She is alert and oriented to person, place, and time. She displays tremor. No sensory deficit.   Gait antalgic with slapping of feet. Gross motor tremors noted of head and RUE.    Skin: Skin is warm and dry.   Psychiatric: She has a normal mood and affect. Her behavior is normal.   Vitals reviewed.      Plan     Her fasting labs were reviewed with the patient from last week.     Shaylee was seen today for follow-up, depression, hypertension and hyperlipidemia.    Diagnoses and all orders for this visit:    Benign essential HTN  -     Comprehensive metabolic panel; Future  -     Conv Lipid Panel w/ Chol/HDL Ratio; Future    Coronary artery disease of native artery of native heart with stable angina pectoris (CMS/HCC)  -     Comprehensive metabolic panel; Future  -     Conv Lipid Panel w/ Chol/HDL Ratio; Future    Hyperlipidemia LDL goal <70  -     Comprehensive metabolic panel; Future  -     Conv Lipid Panel w/ Chol/HDL Ratio; Future    Rheumatoid arthritis of multiple sites without organ or system involvement with positive rheumatoid factor (CMS/HCC)  -     Comprehensive metabolic panel; Future  -     CBC & Differential; Future    H/O hyperparathyroidism    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    Embedded tick of right lower leg, initial encounter  -     Lyme Disease, Western Blot  -     CBC & Differential        Follow up in 4 months with labs

## 2019-07-23 LAB
CORTIS F 24H UR-MRATE: 8 UG/24 HR (ref 6–42)
CORTIS F UR-MCNC: 4 UG/L

## 2019-07-25 NOTE — PROGRESS NOTES
Mail results to pt, no treatment changes at this time.  Normal 24-hour urine cortisol levels.  Patient does not have an adrenal/steroid abnormality

## 2019-07-27 ENCOUNTER — RESULTS ENCOUNTER (OUTPATIENT)
Dept: INTERNAL MEDICINE | Facility: CLINIC | Age: 59
End: 2019-07-27

## 2019-07-27 DIAGNOSIS — I25.118 CORONARY ARTERY DISEASE OF NATIVE ARTERY OF NATIVE HEART WITH STABLE ANGINA PECTORIS (HCC): ICD-10-CM

## 2019-07-27 DIAGNOSIS — I10 BENIGN ESSENTIAL HTN: ICD-10-CM

## 2019-07-27 DIAGNOSIS — M05.79 RHEUMATOID ARTHRITIS OF MULTIPLE SITES WITHOUT ORGAN OR SYSTEM INVOLVEMENT WITH POSITIVE RHEUMATOID FACTOR (HCC): ICD-10-CM

## 2019-07-27 DIAGNOSIS — E78.5 HYPERLIPIDEMIA LDL GOAL <70: ICD-10-CM

## 2019-07-28 DIAGNOSIS — E78.5 HYPERLIPIDEMIA LDL GOAL <100: ICD-10-CM

## 2019-07-29 RX ORDER — ATORVASTATIN CALCIUM 40 MG/1
TABLET, FILM COATED ORAL
Qty: 90 TABLET | Refills: 3 | Status: SHIPPED | OUTPATIENT
Start: 2019-07-29 | End: 2020-08-10

## 2019-08-23 ENCOUNTER — DOCUMENTATION (OUTPATIENT)
Dept: ENDOCRINOLOGY | Age: 59
End: 2019-08-23

## 2019-08-23 ENCOUNTER — TELEPHONE (OUTPATIENT)
Dept: ENDOCRINOLOGY | Age: 59
End: 2019-08-23

## 2019-08-23 DIAGNOSIS — E21.0 PRIMARY HYPERPARATHYROIDISM (HCC): Primary | ICD-10-CM

## 2019-08-23 NOTE — TELEPHONE ENCOUNTER
Spoke with patient about her parathyroid scan patient voice understanding               ----- Message from Marcel Caraballo MD sent at 8/22/2019 10:07 AM EDT -----  Contact: PATIENT  Monica can you take care of this  ----- Message -----  From: Monica Bach MA  Sent: 8/22/2019   7:34 AM  To: Marcel Caraballo MD        ----- Message -----  From: Natali Erazo  Sent: 8/21/2019   9:13 AM  To: Monica Bach MA    LOOKING FOR THE RESULTS OF THE PARATYROID SCAN DONE ON THIS PAST Friday THE 16TH

## 2019-08-23 NOTE — PROGRESS NOTES
Based on the surgical report at [facility] and her parathyroid scan patient's right superior parathyroid adenoma was removed.  On the recent parathyroid scan concern for left superior parathyroid adenoma was raised.  However that would need further work-up with a 3D scan which could be pursued once the patient meets with the surgeon.    Please asked patient if she wants a referral from others or if she wants to see the surgeon that she got her prior parathyroid surgery from at [facility].

## 2019-08-23 NOTE — PROGRESS NOTES
Patient's parathyroid scan is definitely challenging.  We would be needing patients surgical report for her parathyroid surgery prior to analyzing and commenting on this report.  She does have an questionable left superior parathyroid lesion which could be a culprit but that must be compared with her surgical report before pursuing further investigations.

## 2019-08-29 ENCOUNTER — TELEPHONE (OUTPATIENT)
Dept: INTERNAL MEDICINE | Facility: CLINIC | Age: 59
End: 2019-08-29

## 2019-08-29 RX ORDER — ESCITALOPRAM OXALATE 20 MG/1
20 TABLET ORAL DAILY
Qty: 30 TABLET | Refills: 1 | Status: SHIPPED | OUTPATIENT
Start: 2019-08-29 | End: 2019-11-29 | Stop reason: SDUPTHER

## 2019-08-29 NOTE — TELEPHONE ENCOUNTER
----- Message from KAITLYNN Choi sent at 8/29/2019 12:28 PM EDT -----  Contact: patient  Increase to Lexparo 20mg once daily Schedule a follow up to review   ----- Message -----  From: Bradley Olivera MA  Sent: 8/29/2019  10:58 AM  To: KAITLYNN Choi        ----- Message -----  From: Melissa Crocker  Sent: 8/29/2019  10:50 AM  To: Grazyna Zarate [facility]    Laura pt    Patient called to say that the anthem nurse suggested that she increase her     escitalopram (LEXAPRO) 10 MG tablet       Sig: Take 1 tablet by mouth Daily.       Sent to pharmacy as: Escitalopram Oxalate 10 MG Oral Tablet       Please call her to discuss

## 2019-09-11 ENCOUNTER — DOCUMENTATION (OUTPATIENT)
Dept: ENDOCRINOLOGY | Age: 59
End: 2019-09-11

## 2019-09-11 NOTE — PROGRESS NOTES
Spoke with the patient after discussing with Dr. Martinez pertaining to calcium, parathyroid and generalized tremors.  Explained to the patient that since her calcium levels are normal we might not be recommending another parathyroid surgery.  A repeat parathyroid surgery is only going to increase her risk of the surgery and might not improve the tremors that the patient thinks are due to her parathyroid issue.  For now will monitor her  calcium levels, parathyroid levels every 6 months to see if there is a change in her calcium value.    Patient also had a significant work-up for her tremors from [facility] or [place] and was told it is a functional disorder.  Patient is not satisfied with this diagnosis.

## 2019-09-23 ENCOUNTER — LAB (OUTPATIENT)
Dept: LAB | Facility: HOSPITAL | Age: 59
End: 2019-09-23

## 2019-09-23 DIAGNOSIS — M05.79 RHEUMATOID ARTHRITIS OF MULTIPLE SITES WITHOUT ORGAN OR SYSTEM INVOLVEMENT WITH POSITIVE RHEUMATOID FACTOR (HCC): ICD-10-CM

## 2019-09-23 LAB
ALBUMIN SERPL-MCNC: 4.2 G/DL (ref 3.5–5.2)
ALBUMIN/GLOB SERPL: 1.8 G/DL
ALP SERPL-CCNC: 80 U/L (ref 39–117)
ALT SERPL W P-5'-P-CCNC: 9 U/L (ref 1–33)
ANION GAP SERPL CALCULATED.3IONS-SCNC: 11.8 MMOL/L (ref 5–15)
AST SERPL-CCNC: 14 U/L (ref 1–32)
BASOPHILS # BLD AUTO: 0.04 10*3/MM3 (ref 0–0.2)
BASOPHILS NFR BLD AUTO: 0.7 % (ref 0–1.5)
BILIRUB SERPL-MCNC: 0.6 MG/DL (ref 0.2–1.2)
BUN BLD-MCNC: 7 MG/DL (ref 6–20)
BUN/CREAT SERPL: 9.5 (ref 7–25)
CALCIUM SPEC-SCNC: 9.3 MG/DL (ref 8.6–10.5)
CHLORIDE SERPL-SCNC: 99 MMOL/L (ref 98–107)
CHOLEST SERPL-MCNC: 157 MG/DL (ref 0–200)
CO2 SERPL-SCNC: 27.2 MMOL/L (ref 22–29)
CREAT BLD-MCNC: 0.74 MG/DL (ref 0.57–1)
DEPRECATED RDW RBC AUTO: 49.1 FL (ref 37–54)
EOSINOPHIL # BLD AUTO: 0.08 10*3/MM3 (ref 0–0.4)
EOSINOPHIL NFR BLD AUTO: 1.5 % (ref 0.3–6.2)
ERYTHROCYTE [DISTWIDTH] IN BLOOD BY AUTOMATED COUNT: 15.3 % (ref 12.3–15.4)
GFR SERPL CREATININE-BSD FRML MDRD: 81 ML/MIN/1.73
GLOBULIN UR ELPH-MCNC: 2.4 GM/DL
GLUCOSE BLD-MCNC: 97 MG/DL (ref 65–99)
HCT VFR BLD AUTO: 36.1 % (ref 34–46.6)
HDLC SERPL QL: 2.85
HDLC SERPL-MCNC: 55 MG/DL (ref 40–60)
HGB BLD-MCNC: 11.5 G/DL (ref 12–15.9)
IMM GRANULOCYTES # BLD AUTO: 0.01 10*3/MM3 (ref 0–0.05)
IMM GRANULOCYTES NFR BLD AUTO: 0.2 % (ref 0–0.5)
LDLC SERPL CALC-MCNC: 75 MG/DL (ref 0–100)
LYMPHOCYTES # BLD AUTO: 2.25 10*3/MM3 (ref 0.7–3.1)
LYMPHOCYTES NFR BLD AUTO: 41.2 % (ref 19.6–45.3)
MCH RBC QN AUTO: 27.8 PG (ref 26.6–33)
MCHC RBC AUTO-ENTMCNC: 31.9 G/DL (ref 31.5–35.7)
MCV RBC AUTO: 87.2 FL (ref 79–97)
MONOCYTES # BLD AUTO: 0.33 10*3/MM3 (ref 0.1–0.9)
MONOCYTES NFR BLD AUTO: 6 % (ref 5–12)
NEUTROPHILS # BLD AUTO: 2.75 10*3/MM3 (ref 1.7–7)
NEUTROPHILS NFR BLD AUTO: 50.4 % (ref 42.7–76)
NRBC BLD AUTO-RTO: 0 /100 WBC (ref 0–0.2)
PLATELET # BLD AUTO: 249 10*3/MM3 (ref 140–450)
PMV BLD AUTO: 9.8 FL (ref 6–12)
POTASSIUM BLD-SCNC: 3.7 MMOL/L (ref 3.5–5.2)
PROT SERPL-MCNC: 6.6 G/DL (ref 6–8.5)
RBC # BLD AUTO: 4.14 10*6/MM3 (ref 3.77–5.28)
SODIUM BLD-SCNC: 138 MMOL/L (ref 136–145)
TRIGL SERPL-MCNC: 135 MG/DL (ref 0–150)
VLDLC SERPL-MCNC: 27 MG/DL (ref 5–40)
WBC NRBC COR # BLD: 5.46 10*3/MM3 (ref 3.4–10.8)

## 2019-09-23 PROCEDURE — 80061 LIPID PANEL: CPT

## 2019-09-23 PROCEDURE — 86617 LYME DISEASE ANTIBODY: CPT

## 2019-09-23 PROCEDURE — 85025 COMPLETE CBC W/AUTO DIFF WBC: CPT

## 2019-09-23 PROCEDURE — 80053 COMPREHEN METABOLIC PANEL: CPT

## 2019-09-23 PROCEDURE — 36415 COLL VENOUS BLD VENIPUNCTURE: CPT

## 2019-09-25 LAB
B BURGDOR IGG PATRN SER IB-IMP: NEGATIVE
B BURGDOR IGM PATRN SER IB-IMP: POSITIVE
B BURGDOR18KD IGG SER QL IB: PRESENT
B BURGDOR23KD IGG SER QL IB: ABNORMAL
B BURGDOR23KD IGM SER QL IB: PRESENT
B BURGDOR28KD IGG SER QL IB: ABNORMAL
B BURGDOR30KD IGG SER QL IB: ABNORMAL
B BURGDOR39KD IGG SER QL IB: ABNORMAL
B BURGDOR39KD IGM SER QL IB: ABNORMAL
B BURGDOR41KD IGG SER QL IB: ABNORMAL
B BURGDOR41KD IGM SER QL IB: PRESENT
B BURGDOR45KD IGG SER QL IB: ABNORMAL
B BURGDOR58KD IGG SER QL IB: ABNORMAL
B BURGDOR66KD IGG SER QL IB: ABNORMAL
B BURGDOR93KD IGG SER QL IB: ABNORMAL

## 2019-09-26 ENCOUNTER — OFFICE VISIT (OUTPATIENT)
Dept: INTERNAL MEDICINE | Facility: CLINIC | Age: 59
End: 2019-09-26

## 2019-09-26 VITALS
SYSTOLIC BLOOD PRESSURE: 132 MMHG | WEIGHT: 183 LBS | BODY MASS INDEX: 34.55 KG/M2 | RESPIRATION RATE: 16 BRPM | HEART RATE: 63 BPM | OXYGEN SATURATION: 99 % | HEIGHT: 61 IN | DIASTOLIC BLOOD PRESSURE: 84 MMHG | TEMPERATURE: 98.3 F

## 2019-09-26 DIAGNOSIS — G25.9 FUNCTIONAL MOVEMENT DISORDER: ICD-10-CM

## 2019-09-26 DIAGNOSIS — F06.31 DEPRESSION DUE TO PHYSICAL ILLNESS: ICD-10-CM

## 2019-09-26 DIAGNOSIS — I10 BENIGN ESSENTIAL HTN: Primary | ICD-10-CM

## 2019-09-26 DIAGNOSIS — K58.0 IRRITABLE BOWEL SYNDROME WITH DIARRHEA: ICD-10-CM

## 2019-09-26 DIAGNOSIS — I25.118 CORONARY ARTERY DISEASE OF NATIVE ARTERY OF NATIVE HEART WITH STABLE ANGINA PECTORIS (HCC): ICD-10-CM

## 2019-09-26 DIAGNOSIS — E78.5 HYPERLIPIDEMIA LDL GOAL <70: ICD-10-CM

## 2019-09-26 PROCEDURE — 99214 OFFICE O/P EST MOD 30 MIN: CPT | Performed by: NURSE PRACTITIONER

## 2019-09-26 NOTE — PROGRESS NOTES
"Chief Complaint   Patient presents with   • Follow-up   • Hypertension       Subjective     Shaylee Wadsworth is a 58 y.o. female being seen for a follow up appointment today regarding HTN, Hyperlipidemia. She is on Dayron 5/40mg daily and Toprol XL 50mg daily for HTN and hyperlipidemia. She is tolerating it well.     She is having 23 episodes of diarrhea since August 23rd. She took imodium AD. Associated cramps. Denies blood in stool. She has history of IBS and depression and recently increased her Lexapro to 20mg.     She has a functional movement disorder. She is being treated by a chiropractor, and she feels like her gait an mobility have improved since seeing him. She is currently applying for disability.     Her depression is doing better after increasing her Lexapro from 10- to 20 mg daily. She feels like this has helped her mood \"tremendously.\"     History of Present Illness     Allergies   Allergen Reactions   • Meloxicam Swelling     EYES AND FACE SWELLING  EYES AND FACE SWELLING         Current Outpatient Medications:   •  amlodipine-olmesartan (DAYRON) 5-40 MG per tablet, Take 1 tablet by mouth Daily., Disp: 90 tablet, Rfl: 1  •  aspirin 81 MG tablet, Take 81 mg by mouth Daily., Disp: , Rfl:   •  atorvastatin (LIPITOR) 40 MG tablet, TAKE 1 TABLET BY MOUTH ONCE DAILY, Disp: 90 tablet, Rfl: 3  •  clopidogrel (PLAVIX) 75 MG tablet, Take 1 tablet by mouth Daily., Disp: 30 tablet, Rfl: 11  •  diazePAM (VALIUM) 2 MG tablet, Take 1 tablet by mouth 2 (Two) Times a Day As Needed for Anxiety., Disp: 10 tablet, Rfl: 0  •  ENBREL SURECLICK 50 MG/ML solution auto-injector, , Disp: , Rfl:   •  escitalopram (LEXAPRO) 20 MG tablet, Take 1 tablet by mouth Daily., Disp: 30 tablet, Rfl: 1  •  esomeprazole (NEXIUM) 40 MG capsule, Take 1 capsule by mouth Daily., Disp: , Rfl:   •  estrogen, conjugated,-medroxyprogesterone (PREMPRO) 0.45-1.5 MG per tablet, Take 1 tablet by mouth Daily., Disp: 28 tablet, Rfl: 11  •  folic acid " (FOLVITE) 1 MG tablet, Take 1 mg by mouth Daily., Disp: , Rfl:   •  KLOR-CON 20 MEQ CR tablet, TAKE ONE TABLET BY MOUTH TWICE DAILY, Disp: 180 tablet, Rfl: 1  •  methotrexate 2.5 MG tablet, TAKE 8 TABLETS BY MOUTH ONCE WEEKLY ON THE SAME DAY, Disp: 32 tablet, Rfl: 11  •  metoprolol succinate XL (TOPROL-XL) 50 MG 24 hr tablet, TAKE 1 TABLET BY MOUTH ONCE DAILY, Disp: 90 tablet, Rfl: 3  •  traMADol (ULTRAM) 50 MG tablet, Take 1 tablet by mouth Every 8 (Eight) Hours As Needed for Severe Pain ., Disp: 30 tablet, Rfl: 2  •  vitamin D (ERGOCALCIFEROL) 96805 units capsule capsule, TAKE 1 CAPSULE BY MOUTH ONCE A WEEK, Disp: 12 capsule, Rfl: 3    The following portions of the patient's history were reviewed and updated as appropriate: allergies, current medications, past family history, past medical history, past social history, past surgical history and problem list.    Review of Systems   Constitutional: Positive for fatigue. Negative for unexpected weight change.   HENT: Negative.  Negative for congestion.    Respiratory: Negative.  Negative for shortness of breath, wheezing and stridor.    Cardiovascular: Negative for chest pain, palpitations and leg swelling.   Gastrointestinal: Positive for diarrhea. Negative for abdominal distention, abdominal pain, anal bleeding, constipation, nausea, rectal pain and vomiting.   Genitourinary: Negative.    Musculoskeletal: Positive for arthralgias, gait problem and joint swelling.   Allergic/Immunologic: Negative.    Hematological: Negative.  Negative for adenopathy. Does not bruise/bleed easily.   Psychiatric/Behavioral: Negative.        Assessment     Physical Exam   Constitutional: She is oriented to person, place, and time. She appears well-developed and well-nourished. No distress.   HENT:   Head: Normocephalic.   Right Ear: External ear normal.   Left Ear: External ear normal.   Mouth/Throat: Oropharynx is clear and moist.   Neck: Neck supple. No thyromegaly present.    Cardiovascular: Normal rate, regular rhythm and normal heart sounds.   No murmur heard.  Pulmonary/Chest: Effort normal and breath sounds normal. No stridor. No respiratory distress.   Neurological: She is alert and oriented to person, place, and time. She has normal reflexes.   Slap foot gait. Gross tremors of head and neck.   Skin: Skin is dry. Capillary refill takes less than 2 seconds. No erythema.   Psychiatric: She has a normal mood and affect. Her behavior is normal. Judgment and thought content normal.   Vitals reviewed.      Plan     Her fasting labs were reviewed with the patient from last week.     Shaylee was seen today for follow-up and hypertension.    Diagnoses and all orders for this visit:    Benign essential HTN  -     Comprehensive metabolic panel; Future  -     Conv Lipid Panel w/ Chol/HDL Ratio; Future  -     CBC & Differential; Future    Hyperlipidemia LDL goal <70  -     Comprehensive metabolic panel; Future  -     Conv Lipid Panel w/ Chol/HDL Ratio; Future    Coronary artery disease of native artery of native heart with stable angina pectoris (CMS/HCC)    Functional movement disorder    Depression due to physical illness    Irritable bowel syndrome with diarrhea    Other orders  -     rifaximin (XIFAXAN) 550 MG tablet; Take 1 tablet by mouth 3 (Three) Times a Day.    Discussed recent labs. She is going to see a Lyme specialist in [place]. I have given her a Lyme support number.    Follow up in 3 months with labs

## 2019-09-27 ENCOUNTER — TELEPHONE (OUTPATIENT)
Dept: INFECTIOUS DISEASES | Facility: CLINIC | Age: 59
End: 2019-09-27

## 2019-09-27 NOTE — TELEPHONE ENCOUNTER
Phone with patient. Informed her, per Dr. Meza, she should see us and have a repeat blood test for the Lyme's disease in 6-8 weeks due to high false positive readings. She states she is doing that with her PCP (who did the original testing) and will follow up with us if the repeat tests confirms a positive result. Gloria Davidson RN

## 2019-09-27 NOTE — TELEPHONE ENCOUNTER
Patient called to say she has tested positive for Lyme's disease by Dr. Ayala's office. Wonders if she needs treatment or appt with Dr. Meza. Results in Epic. Please advise. Gloria Davidson RN

## 2019-09-27 NOTE — TELEPHONE ENCOUNTER
Please have her scheduled to see me in 6-8 weeks so I can evaluate her and repeat the Lyme disease test at that time. Unfortunately there is a high false positive rate with Lyme disease tests so I suspect that is most likely the case. Repeating the test in 6-8 weeks will give us the answer.    Thank you.

## 2019-10-01 ENCOUNTER — RESULTS ENCOUNTER (OUTPATIENT)
Dept: INTERNAL MEDICINE | Facility: CLINIC | Age: 59
End: 2019-10-01

## 2019-10-01 DIAGNOSIS — E78.5 HYPERLIPIDEMIA LDL GOAL <70: ICD-10-CM

## 2019-10-01 DIAGNOSIS — I10 BENIGN ESSENTIAL HTN: ICD-10-CM

## 2019-10-08 ENCOUNTER — OFFICE VISIT (OUTPATIENT)
Dept: CARDIOLOGY | Facility: CLINIC | Age: 59
End: 2019-10-08

## 2019-10-08 VITALS
HEIGHT: 61 IN | HEART RATE: 58 BPM | SYSTOLIC BLOOD PRESSURE: 130 MMHG | DIASTOLIC BLOOD PRESSURE: 72 MMHG | WEIGHT: 185 LBS | BODY MASS INDEX: 34.93 KG/M2

## 2019-10-08 DIAGNOSIS — I10 BENIGN ESSENTIAL HTN: ICD-10-CM

## 2019-10-08 DIAGNOSIS — E78.5 HYPERLIPIDEMIA LDL GOAL <70: ICD-10-CM

## 2019-10-08 DIAGNOSIS — M05.79 RHEUMATOID ARTHRITIS OF MULTIPLE SITES WITHOUT ORGAN OR SYSTEM INVOLVEMENT WITH POSITIVE RHEUMATOID FACTOR (HCC): ICD-10-CM

## 2019-10-08 DIAGNOSIS — Z95.5 S/P DRUG ELUTING CORONARY STENT PLACEMENT: ICD-10-CM

## 2019-10-08 DIAGNOSIS — R25.1 TREMOR: ICD-10-CM

## 2019-10-08 DIAGNOSIS — I25.10 CORONARY ARTERY DISEASE INVOLVING NATIVE CORONARY ARTERY OF NATIVE HEART WITHOUT ANGINA PECTORIS: Primary | ICD-10-CM

## 2019-10-08 PROCEDURE — 93000 ELECTROCARDIOGRAM COMPLETE: CPT | Performed by: INTERNAL MEDICINE

## 2019-10-08 PROCEDURE — 99214 OFFICE O/P EST MOD 30 MIN: CPT | Performed by: INTERNAL MEDICINE

## 2019-10-08 NOTE — PROGRESS NOTES
Subjective:     Encounter Date:10/08/2019      Patient ID: Shaylee Wadsworth is a 58 y.o. female.    Chief Complaint:  History of Present Illness    This is a 58-year-old with a history of hypertension, hyperlipidemia, rheumatoid arthritis, dystonia and BPPV resulting in a right-sided tremor, coronary artery disease status post drug eluting stent placement of the distal left circumflex artery, who presents for follow up.      I saw the patient initially in 2/2019 for an abnormal stress test and chest pain.  At that time she and her  reported she had been under a lot of stress after developing a right sided tremor and balance issues in 4/2018.  As a results she had been to several different doctors including to the [facility].  There she was seen both in the functional movement disorder clinic and by ENT who felt that her symptoms may be due to dystonia and BPPV.  They recommended that she start physical therapy for this.  Following her return from her [facility] visit the patient developed recurrent chest burning symptoms on 2/12/2019 which prompted her to go to the emergency room.  She reported that the symptoms woke her up from sleep and lasted about 2 minutes each and resolved on their own.  She had about 3 episodes before she went to the emergency room.  Workup in the emergency room was apparently unremarkable.  Prior to that she had 2 or 3 episodes about a week or so prior.  She was given Carafate in the emergency room which she has been taking about once daily in addition to daily omeprazole which she has been on chronically.  She was then seen by KAITLYNN Choi on 2/14/2019 and was set up with a treadmill stress test.  This was performed on 2/21/2019.  She only exercised about 5 minutes and had no significant changes with exercise nor did she have any symptoms.  However in recovery the patient developed ST segment elevation in her inferior leads with reciprocal ST depression in 1 and  aVL.  These findings resolved at the end of the recovery period.  She had no symptoms associated with the EKG changes.      Based on the high risk findings on her stress test and recommended proceeding with a cardiac catheterization.  This was performed on 3/4/2019 and showed 80% stenosis of her distal left circumflex artery.  Her main coronary artery showed mild nonobstructive disease.  Left ventricular function was normal with an EF of greater than 60%.  She subsequently underwent drug-eluting stent placement and was discharged later that same day.  Following her stent placement she has followed up with both KAITLYNN Tena and KAITLYNN Choi at which time she has reported that she has been feeling well.    She presents today for routine six-month follow-up.  She reports from a cardiac standpoint she is been doing well.  Denies any chest pain, shortness of breath, palpitations, lower extremity edema, near-syncope or syncope.  She has chronic fatigue which is unchanged.  She was recently diagnosed with an elevated parathyroid hormone level by her endocrinologist.  She is also tested positive for Lyme disease again.    Review of Systems   Constitution: Positive for malaise/fatigue. Negative for weakness.   HENT: Negative for hearing loss, hoarse voice, nosebleeds and sore throat.    Eyes: Negative for pain.   Cardiovascular: Negative for chest pain, claudication, cyanosis, dyspnea on exertion, irregular heartbeat, leg swelling, near-syncope, orthopnea, palpitations, paroxysmal nocturnal dyspnea and syncope.   Respiratory: Negative for shortness of breath and snoring.    Endocrine: Negative for cold intolerance, heat intolerance, polydipsia, polyphagia and polyuria.   Skin: Negative for itching and rash.   Musculoskeletal: Negative for arthritis, falls, joint pain, joint swelling, muscle cramps, muscle weakness and myalgias.   Gastrointestinal: Negative for constipation, diarrhea, dysphagia, heartburn,  hematemesis, hematochezia, melena, nausea and vomiting.   Genitourinary: Negative for frequency, hematuria and hesitancy.   Neurological: Positive for tremors. Negative for excessive daytime sleepiness, dizziness, headaches, light-headedness and numbness.   Psychiatric/Behavioral: Negative for depression. The patient is not nervous/anxious.           Current Outpatient Medications:   •  amlodipine-olmesartan (DAYRON) 5-40 MG per tablet, Take 1 tablet by mouth Daily., Disp: 90 tablet, Rfl: 1  •  aspirin 81 MG tablet, Take 81 mg by mouth Daily., Disp: , Rfl:   •  atorvastatin (LIPITOR) 40 MG tablet, TAKE 1 TABLET BY MOUTH ONCE DAILY, Disp: 90 tablet, Rfl: 3  •  clopidogrel (PLAVIX) 75 MG tablet, Take 1 tablet by mouth Daily., Disp: 30 tablet, Rfl: 11  •  diazePAM (VALIUM) 2 MG tablet, Take 1 tablet by mouth 2 (Two) Times a Day As Needed for Anxiety., Disp: 10 tablet, Rfl: 0  •  ENBREL SURECLICK 50 MG/ML solution auto-injector, , Disp: , Rfl:   •  escitalopram (LEXAPRO) 20 MG tablet, Take 1 tablet by mouth Daily., Disp: 30 tablet, Rfl: 1  •  esomeprazole (NEXIUM) 40 MG capsule, Take 1 capsule by mouth Daily., Disp: , Rfl:   •  estrogen, conjugated,-medroxyprogesterone (PREMPRO) 0.45-1.5 MG per tablet, Take 1 tablet by mouth Daily., Disp: 28 tablet, Rfl: 11  •  folic acid (FOLVITE) 1 MG tablet, Take 1 mg by mouth Daily., Disp: , Rfl:   •  KLOR-CON 20 MEQ CR tablet, TAKE ONE TABLET BY MOUTH TWICE DAILY, Disp: 180 tablet, Rfl: 1  •  methotrexate 2.5 MG tablet, TAKE 8 TABLETS BY MOUTH ONCE WEEKLY ON THE SAME DAY, Disp: 32 tablet, Rfl: 11  •  metoprolol succinate XL (TOPROL-XL) 50 MG 24 hr tablet, TAKE 1 TABLET BY MOUTH ONCE DAILY, Disp: 90 tablet, Rfl: 3  •  rifaximin (XIFAXAN) 550 MG tablet, Take 1 tablet by mouth 3 (Three) Times a Day., Disp: 42 tablet, Rfl: 0  •  traMADol (ULTRAM) 50 MG tablet, Take 1 tablet by mouth Every 8 (Eight) Hours As Needed for Severe Pain ., Disp: 30 tablet, Rfl: 2  •  vitamin D (ERGOCALCIFEROL)  58367 units capsule capsule, TAKE 1 CAPSULE BY MOUTH ONCE A WEEK, Disp: 12 capsule, Rfl: 3    Past Medical History:   Diagnosis Date   • Anxiety    • Arthritis 5/1/2014    RA   • Arzate esophagus    • Benign paroxysmal positional vertigo due to bilateral vestibular disorder    • Chest pain    • Cholelithiasis 2007    Removed   • Coronary artery disease 2/26/19   • Depression 6/12/19   • Dystonia    • Esophageal reflux    • Fibromyositis    • Functional movement disorder    • H/O colonoscopy    • H/O mammogram 08/31/2011    NORMAL    • Hx of bone density study 08/31/2011    OSTEOPENIA    • Hyperlipidemia    • Hyperparathyroidism (CMS/HCC)    • Hypertension    • Low back pain    • Menopause    • Osteopenia    • Pancreatitis    • Scoliosis 2/16/13   • Tremor 4/4/2018     Past Surgical History:   Procedure Laterality Date   • CARDIAC CATHETERIZATION N/A 3/4/2019    Procedure: Coronary angiography;  Surgeon: Jackelyn Dumas MD;  Location:  [facility];  Service: Cardiovascular   • CARDIAC CATHETERIZATION N/A 3/4/2019    Procedure: Left heart cath;  Surgeon: Jackelyn Dumas MD;  Location:  [facility];  Service: Cardiovascular   • CARDIAC CATHETERIZATION N/A 3/4/2019    Procedure: Left ventriculography;  Surgeon: Jackelyn Dumas MD;  Location:  [facility];  Service: Cardiovascular   • CARDIAC CATHETERIZATION N/A 3/4/2019    Procedure: Stent KARLY coronary;  Surgeon: Jackelyn Dumas MD;  Location:  [facility];  Service: Cardiovascular   • CHOLECYSTECTOMY     • COLONOSCOPY  2014   • DILATATION AND CURETTAGE     • HYSTERECTOMY     • MOUTH SURGERY      TOOTH EXTRACTION   • OTHER SURGICAL HISTORY  03/27/2015    DIAGNOSTIC ESOPHAGOSCOPY TRANSNASAL FLEXIBLE WITH BIOPSY; ARZATE ESO. REPEAT EGD 2 YR    • OVARY SURGERY Left     NORMAL    • PAP SMEAR  08/23/2010    NORMAL    • PARATHYROIDECTOMY Right 08/17/2016    Dr. Muchael Flynn at [place]   • PARTIAL  "HYSTERECTOMY      Left ovary removed9     Family History   Problem Relation Age of Onset   • Diabetes Mother    • Hyperlipidemia Mother    • Hypertension Mother    • Heart disease Mother    • Kidney disease Mother    • Arthritis Father    • Anxiety disorder Father    • COPD Father    • Glaucoma Father    • Hyperlipidemia Father    • Hypertension Father    • Vision loss Father    • Heart disease Father    • Heart disease Sister      Social History     Tobacco Use   • Smoking status: Passive Smoke Exposure - Never Smoker   • Smokeless tobacco: Never Used   • Tobacco comment: 8/12/18 switch to e-cig   Substance Use Topics   • Alcohol use: No   • Drug use: No           ECG 12 Lead  Date/Time: 10/8/2019 11:07 AM  Performed by: Jackelyn Dumas MD  Authorized by: Jackelyn Dumas MD   Comparison: compared with previous ECG   Similar to previous ECG  Rhythm: sinus rhythm  T flattening: V1, V2, V3, V4, V5 and V6                 Objective:         Visit Vitals  /72   Pulse 58   Ht 154.9 cm (61\")   Wt 83.9 kg (185 lb)   BMI 34.96 kg/m²          Physical Exam   Constitutional: She is oriented to person, place, and time. She appears well-developed and well-nourished.   HENT:   Head: Normocephalic and atraumatic.   Eyes: Conjunctivae, EOM and lids are normal. Pupils are equal, round, and reactive to light.   Neck: Normal range of motion and full passive range of motion without pain. Neck supple. No JVD present. Carotid bruit is not present.   Cardiovascular: Normal rate, regular rhythm, S1 normal and S2 normal. Exam reveals no gallop.   No murmur heard.  Pulses:       Radial pulses are 2+ on the right side, and 2+ on the left side.   No bilateral lower extremity edema   Pulmonary/Chest: Effort normal and breath sounds normal.   Abdominal: Soft. Normal appearance.   Lymphadenopathy:     She has no cervical adenopathy.   Neurological: She is alert and oriented to person, place, and time.   Skin: Skin is warm, dry and intact. "   Psychiatric: She has a normal mood and affect.       Lab Review:       Assessment:          Diagnosis Plan   1. Coronary artery disease involving native coronary artery of native heart without angina pectoris     2. S/P drug eluting coronary stent placement     3. Benign essential HTN     4. Hyperlipidemia LDL goal <70     5. Rheumatoid arthritis of multiple sites without organ or system involvement with positive rheumatoid factor (CMS/[facility])     6. Tremor            Plan:       1.  Coronary artery disease.  Status post drug-eluting stent placement of her mid to distal circumflex artery in 2/2019.  She is doing well from the standpoint.  Continue current management including dual antiplatelet therapy.  2.  Hypertension.  Well controlled on her current medications.  3.  Hyperlipidemia.  On atorvastatin which is managed by KAITLYNN Peñaloza.  Last lipid panel showed that her lipids were at goal.  4.  Obstructive sleep apnea.  Untreated.  The patient reports that she is unable to tolerate CPAP.    We will plan on seeing the patient back again in 6 months.    Coronary Artery Disease  Assessment  • The patient has no angina    Plan  • Lifestyle modifications discussed include adhering to a heart healthy diet, maintenance of a healthy weight, medication compliance, regular exercise and regular monitoring of cholesterol and blood pressure    Subjective - Objective  • There has been a previous stent procedure using KARLY on or around 3/4/2019  • Current antiplatelet therapy includes aspirin 81 mg and clopidogrel 75 mg  • The patient qualifies for cardiac rehabilitation, and has completed a cardiac rehab program

## 2019-10-22 ENCOUNTER — TELEPHONE (OUTPATIENT)
Dept: ENDOCRINOLOGY | Age: 59
End: 2019-10-22

## 2019-10-22 ENCOUNTER — OFFICE VISIT (OUTPATIENT)
Dept: ENDOCRINOLOGY | Age: 59
End: 2019-10-22

## 2019-10-22 VITALS
BODY MASS INDEX: 35.12 KG/M2 | HEART RATE: 62 BPM | DIASTOLIC BLOOD PRESSURE: 70 MMHG | WEIGHT: 186 LBS | OXYGEN SATURATION: 96 % | SYSTOLIC BLOOD PRESSURE: 126 MMHG | HEIGHT: 61 IN

## 2019-10-22 DIAGNOSIS — E21.0 PRIMARY HYPERPARATHYROIDISM (HCC): Primary | ICD-10-CM

## 2019-10-22 PROCEDURE — 99214 OFFICE O/P EST MOD 30 MIN: CPT | Performed by: INTERNAL MEDICINE

## 2019-10-22 NOTE — PROGRESS NOTES
58 y.o.    Patient Care Team:  Laura Ayala APRN as PCP - General  Laura Ayala APRN as PCP - Family Medicine    Chief COMPLAINT  FOLLOW UP / HYPERPARATHYROIDISM  Subjective     HPI    58-year-old white male is here as a follow-up for the evaluation of hyperparathyroidism.    Patient has been suffering with chronic fatigue for the last 3 years mainly since 2018 and the symptom has been gradually getting worse.  Her tremors are not limited to her hands alone but she has them throughout her body.  They also result in a mild trembling in her voice.  Patient has been extensively worked up by 2 neurologist at [facility] and has been given 2 different diagnoses one was labeled as functional neurological disorder and the other one was benign paroxysmal positional vertigo.    Patient was seen by me during her last visit to see if she has hormonal abnormalities that is contributing to her tremors.  In 2016 patient was noted to have elevated parathyroid levels and calcium levels she underwent right inferior parathyroidectomy with normalization of the parathyroid levels.  However during her last visit her parathyroid levels were noted to be high, calcium levels were noted to be normal.  This led to a parathyroid scan which did not show any concerning parathyroid adenomas.    Based on patient's request patient was referred to the ENT as she felt her tremors might improve the normalization of her PTH levels.    I discussed extensively with Dr. Martinez and we both believe that patient might not benefit with the parathyroid surgery at this point especially not for her generalized tremors.  Her parathyroid levels are elevated but her calcium levels are still normal and given the risk of repeat surgery, with no identifiable parathyroid adenomas at this point it has been recommended to just monitor her parathyroid levels.    Clinically she is concerned if she has MGUS, she gets most of this information by her review of online  information  Reviewed primary care physician's/consulting physician documentation and lab results :     Interval History      The following portions of the patient's history were reviewed and updated as appropriate: allergies, current medications, past family history, past medical history, past social history, past surgical history and problem list.    Past Medical History:   Diagnosis Date   • Anxiety    • Arthritis 5/1/2014    RA   • Lainez esophagus    • Benign paroxysmal positional vertigo due to bilateral vestibular disorder    • Chest pain    • Cholelithiasis 2007    Removed   • Coronary artery disease 2/26/19   • Depression 6/12/19   • Dystonia    • Esophageal reflux    • Fibromyositis    • Functional movement disorder    • H/O colonoscopy    • H/O mammogram 08/31/2011    NORMAL    • Hx of bone density study 08/31/2011    OSTEOPENIA    • Hyperlipidemia    • Hyperparathyroidism (CMS/HCC)    • Hypertension    • Low back pain    • Menopause    • Osteopenia    • Pancreatitis    • Scoliosis 2/16/13   • Tremor 4/4/2018     Family History   Problem Relation Age of Onset   • Diabetes Mother    • Hyperlipidemia Mother    • Hypertension Mother    • Heart disease Mother    • Kidney disease Mother    • Arthritis Father    • Anxiety disorder Father    • COPD Father    • Glaucoma Father    • Hyperlipidemia Father    • Hypertension Father    • Vision loss Father    • Heart disease Father    • Heart disease Sister      Social History     Socioeconomic History   • Marital status:      Spouse name: Not on file   • Number of children: Not on file   • Years of education: Not on file   • Highest education level: Not on file   Tobacco Use   • Smoking status: Passive Smoke Exposure - Never Smoker   • Smokeless tobacco: Never Used   • Tobacco comment: 8/12/18 switch to e-cig   Substance and Sexual Activity   • Alcohol use: No   • Drug use: No   • Sexual activity: No     Allergies   Allergen Reactions   • Meloxicam Swelling      EYES AND FACE SWELLING  EYES AND FACE SWELLING       Current Outpatient Medications:   •  amlodipine-olmesartan (DAYRON) 5-40 MG per tablet, Take 1 tablet by mouth Daily., Disp: 90 tablet, Rfl: 1  •  aspirin 81 MG tablet, Take 81 mg by mouth Daily., Disp: , Rfl:   •  atorvastatin (LIPITOR) 40 MG tablet, TAKE 1 TABLET BY MOUTH ONCE DAILY, Disp: 90 tablet, Rfl: 3  •  clopidogrel (PLAVIX) 75 MG tablet, Take 1 tablet by mouth Daily., Disp: 30 tablet, Rfl: 11  •  diazePAM (VALIUM) 2 MG tablet, Take 1 tablet by mouth 2 (Two) Times a Day As Needed for Anxiety., Disp: 10 tablet, Rfl: 0  •  ENBREL SURECLICK 50 MG/ML solution auto-injector, , Disp: , Rfl:   •  escitalopram (LEXAPRO) 20 MG tablet, Take 1 tablet by mouth Daily., Disp: 30 tablet, Rfl: 1  •  esomeprazole (NEXIUM) 40 MG capsule, Take 1 capsule by mouth Daily., Disp: , Rfl:   •  estrogen, conjugated,-medroxyprogesterone (PREMPRO) 0.45-1.5 MG per tablet, Take 1 tablet by mouth Daily., Disp: 28 tablet, Rfl: 11  •  folic acid (FOLVITE) 1 MG tablet, Take 1 mg by mouth Daily., Disp: , Rfl:   •  KLOR-CON 20 MEQ CR tablet, TAKE ONE TABLET BY MOUTH TWICE DAILY, Disp: 180 tablet, Rfl: 1  •  methotrexate 2.5 MG tablet, TAKE 8 TABLETS BY MOUTH ONCE WEEKLY ON THE SAME DAY, Disp: 32 tablet, Rfl: 11  •  metoprolol succinate XL (TOPROL-XL) 50 MG 24 hr tablet, TAKE 1 TABLET BY MOUTH ONCE DAILY, Disp: 90 tablet, Rfl: 3  •  traMADol (ULTRAM) 50 MG tablet, Take 1 tablet by mouth Every 8 (Eight) Hours As Needed for Severe Pain ., Disp: 30 tablet, Rfl: 2  •  vitamin D (ERGOCALCIFEROL) 33749 units capsule capsule, TAKE 1 CAPSULE BY MOUTH ONCE A WEEK, Disp: 12 capsule, Rfl: 3        Review of Systems   Constitutional: Negative for appetite change, fatigue and fever.   Eyes: Negative for visual disturbance.   Respiratory: Negative for shortness of breath.    Cardiovascular: Negative for palpitations and leg swelling.   Gastrointestinal: Negative for abdominal pain and vomiting.  "  Endocrine: Negative for polydipsia and polyuria.   Musculoskeletal: Negative for joint swelling and neck pain.   Skin: Negative for rash.   Neurological: Negative for weakness and numbness.   Psychiatric/Behavioral: Negative for behavioral problems.       Objective       Vitals:    10/22/19 1424   BP: 126/70   Pulse: 62   SpO2: 96%   Weight: 84.4 kg (186 lb)   Height: 154.9 cm (61\")     Body mass index is 35.14 kg/m².      Physical Exam   Constitutional: She is oriented to person, place, and time. She appears well-nourished.   HENT:   Head: Normocephalic and atraumatic.   Eyes: Conjunctivae and EOM are normal. No scleral icterus.   Neck: Normal range of motion. Neck supple. No thyromegaly present.   Surgical scar noted   Cardiovascular: Normal rate and normal heart sounds. Exam reveals no friction rub.   No murmur heard.  Pulmonary/Chest: Effort normal and breath sounds normal. No stridor. She has no wheezes. She has no rales.   Abdominal: Soft. Bowel sounds are normal. She exhibits no distension. There is no tenderness.   Musculoskeletal: She exhibits no edema or tenderness.   Lymphadenopathy:     She has no cervical adenopathy.   Neurological: She is alert and oriented to person, place, and time.   Tremors   Skin: Skin is warm and dry. She is not diaphoretic.   Psychiatric: She has a normal mood and affect.   Vitals reviewed.    Results Review:     I reviewed the patient's new clinical results and mentioned them above in HPI and in plan as well.    Medical records reviewed  Summary:Done      Lab on 09/23/2019   Component Date Value Ref Range Status   • Glucose 09/23/2019 97  65 - 99 mg/dL Final   • BUN 09/23/2019 7  6 - 20 mg/dL Final   • Creatinine 09/23/2019 0.74  0.57 - 1.00 mg/dL Final   • Sodium 09/23/2019 138  136 - 145 mmol/L Final   • Potassium 09/23/2019 3.7  3.5 - 5.2 mmol/L Final   • Chloride 09/23/2019 99  98 - 107 mmol/L Final   • CO2 09/23/2019 27.2  22.0 - 29.0 mmol/L Final   • Calcium 09/23/2019 " 9.3  8.6 - 10.5 mg/dL Final   • Total Protein 09/23/2019 6.6  6.0 - 8.5 g/dL Final   • Albumin 09/23/2019 4.20  3.50 - 5.20 g/dL Final   • ALT (SGPT) 09/23/2019 9  1 - 33 U/L Final   • AST (SGOT) 09/23/2019 14  1 - 32 U/L Final   • Alkaline Phosphatase 09/23/2019 80  39 - 117 U/L Final   • Total Bilirubin 09/23/2019 0.6  0.2 - 1.2 mg/dL Final   • eGFR Non  Amer 09/23/2019 81  >60 mL/min/1.73 Final   • Globulin 09/23/2019 2.4  gm/dL Final   • A/G Ratio 09/23/2019 1.8  g/dL Final   • BUN/Creatinine Ratio 09/23/2019 9.5  7.0 - 25.0 Final   • Anion Gap 09/23/2019 11.8  5.0 - 15.0 mmol/L Final   • Total Cholesterol 09/23/2019 157  0 - 200 mg/dL Final   • Triglycerides 09/23/2019 135  0 - 150 mg/dL Final   • HDL Cholesterol 09/23/2019 55  40 - 60 mg/dL Final   • LDL Cholesterol  09/23/2019 75  0 - 100 mg/dL Final   • VLDL Cholesterol 09/23/2019 27  5 - 40 mg/dL Final   • Chol/HDL Ratio 09/23/2019 2.85   Final   • IgG P93 Ab. 09/23/2019 Absent   Final   • IgG P66 Ab. 09/23/2019 Absent   Final   • IgG P58 Ab. 09/23/2019 Absent   Final   • IgG P45 Ab. 09/23/2019 Absent   Final   • IgG P41 Ab. 09/23/2019 Absent   Final   • IgG P39 Ab. 09/23/2019 Absent   Final   • IgG P30 Ab. 09/23/2019 Absent   Final   • IgG P28 Ab. 09/23/2019 Absent   Final   • IgG P23 Ab. 09/23/2019 Absent   Final   • IgG P18 Ab. 09/23/2019 Present*  Final   • Lyme IgG Western Blot Interpretati* 09/23/2019 Negative   Final                         Positive: 5 of the following                                 Borrelia-specific bands:                                 18,23,28,30,39,41,45,58,                                 66, and 93.                       Negative: No bands or banding                                 patterns which do not                                 meet positive criteria.   • IgM P41 Ab. 09/23/2019 Present*  Final   • IgM P39 Ab. 09/23/2019 Absent   Final   • IgM P23 Ab. 09/23/2019 Present*  Final   • Lyme IgM Western Blot  Interpretati* 09/23/2019 Positive*  Final    Note: An equivocal or positive EIA result followed by a negative  Line Blot result is considered NEGATIVE. An equivocal or positive  EIA result followed by a positive Line Blot is considered POSITIVE  by the CDC.  Positive: 2 of the following bands: 23,39 or 41  Negative: No bands or banding patterns which do not meet positive  criteria.  Criteria for positivity are those recommended by CDC/ASTPHLD.  p23=Osp C, d03=qhyawalqh  Note:  Sera from individuals with the following may cross react in the  Lyme Line Blot assays: other spirochetal diseases (periodontal  disease, leptospirosis, relapsing fever, yaws, and pinta);  connective autoimmune (Rheumatoid Arthritis and Systemic Lupus  Erythematosus and also individuals with Antinuclear Antibody);  other infections ([place] Spotted Fever; Patricia-Barr Virus,  and Cytomegalovirus).   • WBC 09/23/2019 5.46  3.40 - 10.80 10*3/mm3 Final   • RBC 09/23/2019 4.14  3.77 - 5.28 10*6/mm3 Final   • Hemoglobin 09/23/2019 11.5* 12.0 - 15.9 g/dL Final   • Hematocrit 09/23/2019 36.1  34.0 - 46.6 % Final   • MCV 09/23/2019 87.2  79.0 - 97.0 fL Final   • MCH 09/23/2019 27.8  26.6 - 33.0 pg Final   • MCHC 09/23/2019 31.9  31.5 - 35.7 g/dL Final   • RDW 09/23/2019 15.3  12.3 - 15.4 % Final   • RDW-SD 09/23/2019 49.1  37.0 - 54.0 fl Final   • MPV 09/23/2019 9.8  6.0 - 12.0 fL Final   • Platelets 09/23/2019 249  140 - 450 10*3/mm3 Final   • Neutrophil % 09/23/2019 50.4  42.7 - 76.0 % Final   • Lymphocyte % 09/23/2019 41.2  19.6 - 45.3 % Final   • Monocyte % 09/23/2019 6.0  5.0 - 12.0 % Final   • Eosinophil % 09/23/2019 1.5  0.3 - 6.2 % Final   • Basophil % 09/23/2019 0.7  0.0 - 1.5 % Final   • Immature Grans % 09/23/2019 0.2  0.0 - 0.5 % Final   • Neutrophils, Absolute 09/23/2019 2.75  1.70 - 7.00 10*3/mm3 Final   • Lymphocytes, Absolute 09/23/2019 2.25  0.70 - 3.10 10*3/mm3 Final   • Monocytes, Absolute 09/23/2019 0.33  0.10 - 0.90  "10*3/mm3 Final   • Eosinophils, Absolute 09/23/2019 0.08  0.00 - 0.40 10*3/mm3 Final   • Basophils, Absolute 09/23/2019 0.04  0.00 - 0.20 10*3/mm3 Final   • Immature Grans, Absolute 09/23/2019 0.01  0.00 - 0.05 10*3/mm3 Final   • nRBC 09/23/2019 0.0  0.0 - 0.2 /100 WBC Final     Lab Results   Component Value Date    HGBA1C 5.50 07/12/2019     Lab Results   Component Value Date    CREATININE 0.74 09/23/2019     Imaging Results (most recent)     None                Assessment and Plan:    Shaylee was seen today for abnormal calcium.    Diagnoses and all orders for this visit:    Primary hyperparathyroidism (CMS/[facility])  -     Vitamin D 25 Hydroxy  -     PTH, Intact & Calcium  -     Protein Elec + Interp, Serum  -     Phosphorus  -     Calcium, Ionized  -     Magnesium  -     TSH  -     T4, Free      Primary hyperparathyroidism  Repeat PTH, calcium, vitamin D levels  If patient has kidney stones, or if the calcium levels are elevated would re-entertain the possibility of surgery if not we will continue to monitor her parathyroid levels.    Generalized tremors  Explained to the patient that hormonally I do not believe that her tremors are related to the parathyroid issue.  She has pending work-up with ID for the concern of Lyme disease if that is contributing to the tremors.    Hyperlipidemia  Continue Lipitor    Reviewed Lab results with the patient.             Marcel Caraballo MD  10/22/19    EMR Dragon / transcription disclaimer:     \"Dictated utilizing Dragon dictation\".  "

## 2019-10-23 LAB
25(OH)D3+25(OH)D2 SERPL-MCNC: 38.9 NG/ML (ref 30–100)
ALBUMIN SERPL ELPH-MCNC: 3.5 G/DL (ref 2.9–4.4)
ALBUMIN/GLOB SERPL: 1.1 {RATIO} (ref 0.7–1.7)
ALPHA1 GLOB SERPL ELPH-MCNC: 0.3 G/DL (ref 0–0.4)
ALPHA2 GLOB SERPL ELPH-MCNC: 0.8 G/DL (ref 0.4–1)
B-GLOBULIN SERPL ELPH-MCNC: 1 G/DL (ref 0.7–1.3)
CA-I SERPL ISE-MCNC: 5.3 MG/DL (ref 4.5–5.6)
CALCIUM SERPL-MCNC: 9.5 MG/DL (ref 8.7–10.2)
GAMMA GLOB SERPL ELPH-MCNC: 0.9 G/DL (ref 0.4–1.8)
GLOBULIN SER CALC-MCNC: 3.1 G/DL (ref 2.2–3.9)
INTACT PTH: ABNORMAL
LABORATORY COMMENT REPORT: NORMAL
M PROTEIN SERPL ELPH-MCNC: NORMAL G/DL
MAGNESIUM SERPL-MCNC: 1.9 MG/DL (ref 1.6–2.3)
PHOSPHATE SERPL-MCNC: 3.7 MG/DL (ref 2.5–4.5)
PROT PATTERN SERPL ELPH-IMP: NORMAL
PROT SERPL-MCNC: 6.6 G/DL (ref 6–8.5)
PTH-INTACT SERPL-MCNC: 162 PG/ML (ref 15–65)
T4 FREE SERPL-MCNC: 1.24 NG/DL (ref 0.82–1.77)
TSH SERPL DL<=0.005 MIU/L-ACNC: 2.87 UIU/ML (ref 0.45–4.5)

## 2019-10-28 ENCOUNTER — TELEPHONE (OUTPATIENT)
Dept: ENDOCRINOLOGY | Age: 59
End: 2019-10-28

## 2019-11-08 DIAGNOSIS — M51.9 DISC DISORDER OF THORACIC REGION: ICD-10-CM

## 2019-11-08 RX ORDER — TRAMADOL HYDROCHLORIDE 50 MG/1
TABLET ORAL
Qty: 30 TABLET | Refills: 2 | Status: SHIPPED | OUTPATIENT
Start: 2019-11-08 | End: 2020-04-09

## 2019-11-11 RX ORDER — ESOMEPRAZOLE MAGNESIUM 40 MG/1
CAPSULE, DELAYED RELEASE ORAL
Qty: 60 CAPSULE | Refills: 5 | Status: SHIPPED | OUTPATIENT
Start: 2019-11-11 | End: 2020-01-13 | Stop reason: SDUPTHER

## 2019-11-18 ENCOUNTER — TELEPHONE (OUTPATIENT)
Dept: INTERNAL MEDICINE | Facility: CLINIC | Age: 59
End: 2019-11-18

## 2019-11-18 DIAGNOSIS — A69.20 LYME DISEASE: Primary | ICD-10-CM

## 2019-11-18 NOTE — TELEPHONE ENCOUNTER
NEEDS AN ORDER FOR A LYME TEST. PATIENT IS GOING TOMORROW MORNING FOR THE TEST DOWNSTAIRS AT THE HOSPITAL.

## 2019-11-19 ENCOUNTER — APPOINTMENT (OUTPATIENT)
Dept: LAB | Facility: HOSPITAL | Age: 59
End: 2019-11-19

## 2019-11-19 LAB
ALBUMIN SERPL-MCNC: 3.9 G/DL (ref 3.5–5.2)
ALBUMIN/GLOB SERPL: 1.2 G/DL
ALP SERPL-CCNC: 80 U/L (ref 39–117)
ALT SERPL W P-5'-P-CCNC: 7 U/L (ref 1–33)
ANION GAP SERPL CALCULATED.3IONS-SCNC: 12.7 MMOL/L (ref 5–15)
AST SERPL-CCNC: 13 U/L (ref 1–32)
BASOPHILS # BLD AUTO: 0.06 10*3/MM3 (ref 0–0.2)
BASOPHILS NFR BLD AUTO: 1.1 % (ref 0–1.5)
BILIRUB SERPL-MCNC: 0.6 MG/DL (ref 0.2–1.2)
BUN BLD-MCNC: 14 MG/DL (ref 6–20)
BUN/CREAT SERPL: 19.7 (ref 7–25)
CALCIUM SPEC-SCNC: 9.2 MG/DL (ref 8.6–10.5)
CHLORIDE SERPL-SCNC: 100 MMOL/L (ref 98–107)
CO2 SERPL-SCNC: 28.3 MMOL/L (ref 22–29)
CREAT BLD-MCNC: 0.71 MG/DL (ref 0.57–1)
DEPRECATED RDW RBC AUTO: 47.9 FL (ref 37–54)
EOSINOPHIL # BLD AUTO: 0.1 10*3/MM3 (ref 0–0.4)
EOSINOPHIL NFR BLD AUTO: 1.8 % (ref 0.3–6.2)
ERYTHROCYTE [DISTWIDTH] IN BLOOD BY AUTOMATED COUNT: 15.5 % (ref 12.3–15.4)
GFR SERPL CREATININE-BSD FRML MDRD: 85 ML/MIN/1.73
GLOBULIN UR ELPH-MCNC: 3.2 GM/DL
GLUCOSE BLD-MCNC: 105 MG/DL (ref 65–99)
HCT VFR BLD AUTO: 34.8 % (ref 34–46.6)
HGB BLD-MCNC: 11.3 G/DL (ref 12–15.9)
IMM GRANULOCYTES # BLD AUTO: 0.01 10*3/MM3 (ref 0–0.05)
IMM GRANULOCYTES NFR BLD AUTO: 0.2 % (ref 0–0.5)
LYMPHOCYTES # BLD AUTO: 2.33 10*3/MM3 (ref 0.7–3.1)
LYMPHOCYTES NFR BLD AUTO: 41.2 % (ref 19.6–45.3)
MCH RBC QN AUTO: 27.6 PG (ref 26.6–33)
MCHC RBC AUTO-ENTMCNC: 32.5 G/DL (ref 31.5–35.7)
MCV RBC AUTO: 85.1 FL (ref 79–97)
MONOCYTES # BLD AUTO: 0.44 10*3/MM3 (ref 0.1–0.9)
MONOCYTES NFR BLD AUTO: 7.8 % (ref 5–12)
NEUTROPHILS # BLD AUTO: 2.72 10*3/MM3 (ref 1.7–7)
NEUTROPHILS NFR BLD AUTO: 47.9 % (ref 42.7–76)
NRBC BLD AUTO-RTO: 0 /100 WBC (ref 0–0.2)
PLATELET # BLD AUTO: 242 10*3/MM3 (ref 140–450)
PMV BLD AUTO: 10.2 FL (ref 6–12)
POTASSIUM BLD-SCNC: 3.8 MMOL/L (ref 3.5–5.2)
PROT SERPL-MCNC: 7.1 G/DL (ref 6–8.5)
RBC # BLD AUTO: 4.09 10*6/MM3 (ref 3.77–5.28)
SODIUM BLD-SCNC: 141 MMOL/L (ref 136–145)
WBC NRBC COR # BLD: 5.66 10*3/MM3 (ref 3.4–10.8)

## 2019-11-19 PROCEDURE — 86618 LYME DISEASE ANTIBODY: CPT | Performed by: NURSE PRACTITIONER

## 2019-11-19 PROCEDURE — 86617 LYME DISEASE ANTIBODY: CPT | Performed by: NURSE PRACTITIONER

## 2019-11-19 PROCEDURE — 80053 COMPREHEN METABOLIC PANEL: CPT | Performed by: NURSE PRACTITIONER

## 2019-11-19 PROCEDURE — 85025 COMPLETE CBC W/AUTO DIFF WBC: CPT | Performed by: NURSE PRACTITIONER

## 2019-11-19 PROCEDURE — 36415 COLL VENOUS BLD VENIPUNCTURE: CPT | Performed by: NURSE PRACTITIONER

## 2019-11-20 LAB
B BURGDOR IGG SER QL: NEGATIVE
B BURGDOR IGM SER QL: POSITIVE

## 2019-11-25 ENCOUNTER — TELEPHONE (OUTPATIENT)
Dept: INTERNAL MEDICINE | Facility: CLINIC | Age: 59
End: 2019-11-25

## 2019-11-25 DIAGNOSIS — Z12.39 BREAST CANCER SCREENING: ICD-10-CM

## 2019-11-25 DIAGNOSIS — N95.1 MENOPAUSAL STATE: Primary | ICD-10-CM

## 2019-11-25 LAB
B BURGDOR IGG PATRN SER IB-IMP: NEGATIVE
B BURGDOR IGM PATRN SER IB-IMP: POSITIVE
B BURGDOR18KD IGG SER QL IB: ABNORMAL
B BURGDOR23KD IGG SER QL IB: ABNORMAL
B BURGDOR23KD IGM SER QL IB: PRESENT
B BURGDOR28KD IGG SER QL IB: ABNORMAL
B BURGDOR30KD IGG SER QL IB: ABNORMAL
B BURGDOR39KD IGG SER QL IB: ABNORMAL
B BURGDOR39KD IGM SER QL IB: ABNORMAL
B BURGDOR41KD IGG SER QL IB: ABNORMAL
B BURGDOR41KD IGM SER QL IB: PRESENT
B BURGDOR45KD IGG SER QL IB: ABNORMAL
B BURGDOR58KD IGG SER QL IB: ABNORMAL
B BURGDOR66KD IGG SER QL IB: ABNORMAL
B BURGDOR93KD IGG SER QL IB: ABNORMAL

## 2019-11-25 RX ORDER — COLESEVELAM 180 1/1
625 TABLET ORAL 2 TIMES DAILY WITH MEALS
Qty: 60 TABLET | Refills: 0 | Status: SHIPPED | OUTPATIENT
Start: 2019-11-25 | End: 2020-01-13

## 2019-11-25 NOTE — TELEPHONE ENCOUNTER
May order DXA and Maamogram. Trial of   Welchol 625 mg   Si po bid  Disp #60    TO help with diarrhea

## 2019-11-25 NOTE — TELEPHONE ENCOUNTER
PT STATES SHE WAS IN A LITTLE WHILE AGO FOR DIARRHEA. IT IS BACK AGAIN. WANTS TO KNOW WHAT RYANN THINKS ABOUT THAT. ALSO WANTS TO GET SCHEDULED FOR A MAMMOGRAM AND A BONE DENSITY SCAN IF RYANN CAN ORDER THEM. CALL BACK [phone]

## 2019-11-26 ENCOUNTER — DOCUMENTATION (OUTPATIENT)
Dept: INFECTIOUS DISEASES | Facility: CLINIC | Age: 59
End: 2019-11-26

## 2019-11-26 NOTE — PROGRESS NOTES
Note written in Epic. Patient has false positive IgM Lyme test. IgG remains negative 4 months later.

## 2019-11-26 NOTE — PROGRESS NOTES
Patient with repeat Lyme IgM positive for 2 bands but IgG negative. This is strongly suggestive that her Lyme IgM is a false positive as she had the same pattern 4 months prior. If she truly had Lyme disease, her IgG would have turned positive by now given the duration of time between tests. Additionally and thankfully, Lyme disease remains extremely uncommon in [place].    I called the patient to inform her of her test results and my interpretation of them. I will also call her PCP.    I think she should continue to follow-up with neurology but not for Lyme disease.

## 2019-11-29 DIAGNOSIS — E87.6 HYPOKALEMIA: ICD-10-CM

## 2019-11-29 DIAGNOSIS — I10 ESSENTIAL HYPERTENSION: ICD-10-CM

## 2019-12-02 RX ORDER — ESCITALOPRAM OXALATE 20 MG/1
TABLET ORAL
Qty: 30 TABLET | Refills: 1 | Status: SHIPPED | OUTPATIENT
Start: 2019-12-02 | End: 2020-01-13

## 2019-12-02 RX ORDER — POTASSIUM CHLORIDE 20 MEQ/1
TABLET, EXTENDED RELEASE ORAL
Qty: 60 TABLET | Refills: 5 | Status: SHIPPED | OUTPATIENT
Start: 2019-12-02 | End: 2020-09-30 | Stop reason: SDUPTHER

## 2019-12-26 ENCOUNTER — RESULTS ENCOUNTER (OUTPATIENT)
Dept: INTERNAL MEDICINE | Facility: CLINIC | Age: 59
End: 2019-12-26

## 2019-12-26 ENCOUNTER — APPOINTMENT (OUTPATIENT)
Dept: BONE DENSITY | Facility: HOSPITAL | Age: 59
End: 2019-12-26

## 2019-12-26 ENCOUNTER — HOSPITAL ENCOUNTER (OUTPATIENT)
Dept: MAMMOGRAPHY | Facility: HOSPITAL | Age: 59
Discharge: HOME | End: 2019-12-26
Admitting: NURSE PRACTITIONER

## 2019-12-26 DIAGNOSIS — Z12.39 BREAST CANCER SCREENING: ICD-10-CM

## 2019-12-26 DIAGNOSIS — I10 BENIGN ESSENTIAL HTN: ICD-10-CM

## 2019-12-26 DIAGNOSIS — M85.80 OSTEOPENIA: ICD-10-CM

## 2019-12-26 DIAGNOSIS — N95.1 MENOPAUSAL STATE: ICD-10-CM

## 2019-12-26 PROCEDURE — 77063 BREAST TOMOSYNTHESIS BI: CPT

## 2019-12-26 PROCEDURE — 77067 SCR MAMMO BI INCL CAD: CPT

## 2019-12-26 PROCEDURE — 77080 DXA BONE DENSITY AXIAL: CPT

## 2019-12-26 RX ORDER — ERGOCALCIFEROL 1.25 MG/1
CAPSULE ORAL
Qty: 4 CAPSULE | Refills: 0 | Status: SHIPPED | OUTPATIENT
Start: 2019-12-26 | End: 2020-02-11

## 2019-12-30 ENCOUNTER — TELEPHONE (OUTPATIENT)
Dept: INTERNAL MEDICINE | Facility: CLINIC | Age: 59
End: 2019-12-30

## 2020-01-08 DIAGNOSIS — N95.1 MENOPAUSAL SYMPTOM: ICD-10-CM

## 2020-01-08 DIAGNOSIS — E21.0 PRIMARY HYPERPARATHYROIDISM (HCC): Primary | ICD-10-CM

## 2020-01-09 ENCOUNTER — TRANSCRIBE ORDERS (OUTPATIENT)
Dept: ADMINISTRATIVE | Facility: HOSPITAL | Age: 60
End: 2020-01-09

## 2020-01-09 ENCOUNTER — LAB (OUTPATIENT)
Dept: LAB | Facility: HOSPITAL | Age: 60
End: 2020-01-09

## 2020-01-09 DIAGNOSIS — M05.79 SEROPOSITIVE RHEUMATOID ARTHRITIS OF MULTIPLE SITES (HCC): ICD-10-CM

## 2020-01-09 DIAGNOSIS — N95.1 MENOPAUSAL SYMPTOM: ICD-10-CM

## 2020-01-09 DIAGNOSIS — Z79.01 LONG TERM (CURRENT) USE OF ANTICOAGULANTS: ICD-10-CM

## 2020-01-09 DIAGNOSIS — M05.79 SEROPOSITIVE RHEUMATOID ARTHRITIS OF MULTIPLE SITES (HCC): Primary | ICD-10-CM

## 2020-01-09 DIAGNOSIS — E21.0 PRIMARY HYPERPARATHYROIDISM (HCC): ICD-10-CM

## 2020-01-09 LAB
ALBUMIN SERPL-MCNC: 4.2 G/DL (ref 3.5–5.2)
ALP SERPL-CCNC: 74 U/L (ref 39–117)
ALT SERPL W P-5'-P-CCNC: 12 U/L (ref 1–33)
AST SERPL-CCNC: 12 U/L (ref 1–32)
BASOPHILS # BLD AUTO: 0.01 10*3/MM3 (ref 0–0.2)
BASOPHILS NFR BLD AUTO: 0.1 % (ref 0–1.5)
BILIRUB CONJ SERPL-MCNC: <0.2 MG/DL (ref 0.2–0.3)
BILIRUB INDIRECT SERPL-MCNC: ABNORMAL MG/DL
BILIRUB SERPL-MCNC: 0.3 MG/DL (ref 0.2–1.2)
DEPRECATED RDW RBC AUTO: 49.7 FL (ref 37–54)
EOSINOPHIL # BLD AUTO: 0 10*3/MM3 (ref 0–0.4)
EOSINOPHIL NFR BLD AUTO: 0 % (ref 0.3–6.2)
ERYTHROCYTE [DISTWIDTH] IN BLOOD BY AUTOMATED COUNT: 15.9 % (ref 12.3–15.4)
HCT VFR BLD AUTO: 37.3 % (ref 34–46.6)
HGB BLD-MCNC: 12.1 G/DL (ref 12–15.9)
IMM GRANULOCYTES # BLD AUTO: 0.08 10*3/MM3 (ref 0–0.05)
IMM GRANULOCYTES NFR BLD AUTO: 0.8 % (ref 0–0.5)
LH SERPL-ACNC: 37 MIU/ML
LYMPHOCYTES # BLD AUTO: 1.38 10*3/MM3 (ref 0.7–3.1)
LYMPHOCYTES NFR BLD AUTO: 13 % (ref 19.6–45.3)
MCH RBC QN AUTO: 28.3 PG (ref 26.6–33)
MCHC RBC AUTO-ENTMCNC: 32.4 G/DL (ref 31.5–35.7)
MCV RBC AUTO: 87.4 FL (ref 79–97)
MONOCYTES # BLD AUTO: 0.13 10*3/MM3 (ref 0.1–0.9)
MONOCYTES NFR BLD AUTO: 1.2 % (ref 5–12)
NEUTROPHILS # BLD AUTO: 9 10*3/MM3 (ref 1.7–7)
NEUTROPHILS NFR BLD AUTO: 84.9 % (ref 42.7–76)
NRBC BLD AUTO-RTO: 0 /100 WBC (ref 0–0.2)
PLATELET # BLD AUTO: 345 10*3/MM3 (ref 140–450)
PMV BLD AUTO: 9.1 FL (ref 6–12)
PROT SERPL-MCNC: 7.2 G/DL (ref 6–8.5)
RBC # BLD AUTO: 4.27 10*6/MM3 (ref 3.77–5.28)
WBC NRBC COR # BLD: 10.6 10*3/MM3 (ref 3.4–10.8)

## 2020-01-09 PROCEDURE — 85025 COMPLETE CBC W/AUTO DIFF WBC: CPT

## 2020-01-09 PROCEDURE — 80076 HEPATIC FUNCTION PANEL: CPT

## 2020-01-09 PROCEDURE — 36415 COLL VENOUS BLD VENIPUNCTURE: CPT

## 2020-01-09 PROCEDURE — 83002 ASSAY OF GONADOTROPIN (LH): CPT

## 2020-01-13 ENCOUNTER — OFFICE VISIT (OUTPATIENT)
Dept: INTERNAL MEDICINE | Facility: CLINIC | Age: 60
End: 2020-01-13

## 2020-01-13 VITALS
TEMPERATURE: 98 F | HEART RATE: 60 BPM | BODY MASS INDEX: 35.72 KG/M2 | SYSTOLIC BLOOD PRESSURE: 110 MMHG | RESPIRATION RATE: 16 BRPM | DIASTOLIC BLOOD PRESSURE: 70 MMHG | HEIGHT: 61 IN | WEIGHT: 189.2 LBS | OXYGEN SATURATION: 98 %

## 2020-01-13 DIAGNOSIS — F44.4 FUNCTIONAL NEUROLOGICAL SYMPTOM DISORDER WITH ABNORMAL MOVEMENT: ICD-10-CM

## 2020-01-13 DIAGNOSIS — M05.79 RHEUMATOID ARTHRITIS OF MULTIPLE SITES WITHOUT ORGAN OR SYSTEM INVOLVEMENT WITH POSITIVE RHEUMATOID FACTOR (HCC): Primary | ICD-10-CM

## 2020-01-13 DIAGNOSIS — Z86.39 H/O HYPERPARATHYROIDISM: ICD-10-CM

## 2020-01-13 DIAGNOSIS — Z95.5 S/P DRUG ELUTING CORONARY STENT PLACEMENT: ICD-10-CM

## 2020-01-13 DIAGNOSIS — F06.31 DEPRESSION DUE TO PHYSICAL ILLNESS: ICD-10-CM

## 2020-01-13 PROBLEM — M25.571 PAIN IN RIGHT ANKLE AND JOINTS OF RIGHT FOOT: Status: ACTIVE | Noted: 2020-01-13

## 2020-01-13 PROBLEM — M25.511 PAIN OF BOTH SHOULDER JOINTS: Status: ACTIVE | Noted: 2020-01-13

## 2020-01-13 PROBLEM — M25.512 PAIN IN LEFT SHOULDER: Status: ACTIVE | Noted: 2020-01-13

## 2020-01-13 PROBLEM — M25.572 PAIN IN LEFT ANKLE AND JOINTS OF LEFT FOOT: Status: ACTIVE | Noted: 2020-01-13

## 2020-01-13 PROBLEM — M25.512 PAIN OF BOTH SHOULDER JOINTS: Status: ACTIVE | Noted: 2020-01-13

## 2020-01-13 PROCEDURE — 99214 OFFICE O/P EST MOD 30 MIN: CPT | Performed by: NURSE PRACTITIONER

## 2020-01-13 RX ORDER — DESVENLAFAXINE SUCCINATE 50 MG/1
50 TABLET, EXTENDED RELEASE ORAL DAILY
Qty: 30 TABLET | Refills: 1 | Status: SHIPPED | OUTPATIENT
Start: 2020-01-13 | End: 2020-04-20 | Stop reason: ALTCHOICE

## 2020-01-13 NOTE — PROGRESS NOTES
Chief Complaint   Patient presents with   • Insomnia     has been going on for about 4 months    • Night Sweats     has been going on for about a month    • Diarrhea   • Hypertension       Subjective     Shaylee Wadsworth is a 59 y.o. female being seen for a follow up appointment today regarding HTN, FND, CAD, and Depression. She is followed by neurology for FND; Rheumatology for RA, and Endocrinology for Hyperparathyroidism, and Cardio for CAD. She has vague complaints of night sweats and insomnia for the past 2 weeks. She is currently on a prednisone taper per rheumatology for RA.  She is on the Prempro MWD, and could not increase due to bleeding. She is being seen by the chirpractor for FND and [facility] rehab for FND.     Answers for HPI/ROS submitted by the patient on 1/11/2020   Hypertension  Chronicity: recurrent  Onset: more than 1 year ago  Progression since onset: waxing and waning  Condition status: resistant  anxiety: Yes  blurred vision: No  chest pain: No  headaches: Yes  malaise/fatigue: Yes  neck pain: Yes  orthopnea: No  palpitations: No  peripheral edema: No  PND: No  shortness of breath: No  sweats: Yes  Agents associated with hypertension: estrogens, steroids  CAD risks: dyslipidemia, family history, obesity, post-menopausal state, smoking/tobacco exposure, stress  Compliance problems: exercise    History of Present Illness     Allergies   Allergen Reactions   • Meloxicam Swelling     EYES AND FACE SWELLING  EYES AND FACE SWELLING         Current Outpatient Medications:   •  amlodipine-olmesartan (DAYRON) 5-40 MG per tablet, Take 1 tablet by mouth Daily., Disp: 90 tablet, Rfl: 1  •  aspirin 81 MG tablet, Take 81 mg by mouth Daily., Disp: , Rfl:   •  atorvastatin (LIPITOR) 40 MG tablet, TAKE 1 TABLET BY MOUTH ONCE DAILY, Disp: 90 tablet, Rfl: 3  •  clopidogrel (PLAVIX) 75 MG tablet, Take 1 tablet by mouth Daily., Disp: 30 tablet, Rfl: 11  •  diazePAM (VALIUM) 2 MG tablet, Take 1 tablet by mouth 2 (Two)  Times a Day As Needed for Anxiety., Disp: 10 tablet, Rfl: 0  •  ENBREL SURECLICK 50 MG/ML solution auto-injector, , Disp: , Rfl:   •  escitalopram (LEXAPRO) 20 MG tablet, TAKE 1 TABLET BY MOUTH ONCE DAILY, Disp: 30 tablet, Rfl: 1  •  esomeprazole (NEXIUM) 40 MG capsule, Take 1 capsule by mouth Daily., Disp: , Rfl:   •  estrogen, conjugated,-medroxyprogesterone (PREMPRO) 0.45-1.5 MG per tablet, Take 1 tablet by mouth Daily., Disp: 28 tablet, Rfl: 11  •  folic acid (FOLVITE) 1 MG tablet, Take 1 mg by mouth Daily., Disp: , Rfl:   •  methotrexate 2.5 MG tablet, TAKE 8 TABLETS BY MOUTH ONCE WEEKLY ON THE SAME DAY, Disp: 32 tablet, Rfl: 11  •  metoprolol succinate XL (TOPROL-XL) 50 MG 24 hr tablet, TAKE 1 TABLET BY MOUTH ONCE DAILY, Disp: 90 tablet, Rfl: 3  •  potassium chloride (K-DUR,KLOR-CON) 20 MEQ CR tablet, TAKE 1 TABLET BY MOUTH TWICE DAILY, Disp: 60 tablet, Rfl: 5  •  traMADol (ULTRAM) 50 MG tablet, TAKE 1 TABLET BY MOUTH EVERY 8 HOURS AS NEEDED FOR SEVERE PAIN, Disp: 30 tablet, Rfl: 2  •  vitamin D (ERGOCALCIFEROL) 1.25 MG (30442 UT) capsule capsule, TAKE 1 CAPSULE BY MOUTH ONCE A WEEK, Disp: 4 capsule, Rfl: 0  •  colesevelam (WELCHOL) 625 MG tablet, Take 1 tablet by mouth 2 (Two) Times a Day With Meals., Disp: 60 tablet, Rfl: 0  •  esomeprazole (nexIUM) 40 MG capsule, TAKE 1 CAPSULE BY MOUTH TWICE DAILY, Disp: 60 capsule, Rfl: 5    The following portions of the patient's history were reviewed and updated as appropriate: allergies, current medications, past family history, past medical history, past social history, past surgical history and problem list.    Review of Systems   Constitutional: Negative.  Negative for fever and unexpected weight change.   Eyes: Negative.  Negative for photophobia and visual disturbance.   Respiratory: Negative for shortness of breath, wheezing and stridor.    Cardiovascular: Negative for chest pain and palpitations.   Gastrointestinal: Positive for diarrhea (intermittently).  Negative for abdominal distention and abdominal pain.   Endocrine: Negative.    Genitourinary: Negative.    Musculoskeletal: Positive for arthralgias and neck pain.   Allergic/Immunologic: Negative.    Neurological: Positive for tremors, speech difficulty and headaches. Negative for seizures and numbness.   Hematological: Negative for adenopathy.   Psychiatric/Behavioral: Positive for agitation, decreased concentration and sleep disturbance. Negative for self-injury and suicidal ideas. The patient is nervous/anxious.        Assessment     Physical Exam   Constitutional: She is oriented to person, place, and time. She appears well-developed and well-nourished. No distress.   HENT:   Head: Normocephalic.   Right Ear: External ear normal.   Left Ear: External ear normal.   Nose: Nose normal.   Mouth/Throat: Oropharynx is clear and moist. No oropharyngeal exudate.   Eyes: Pupils are equal, round, and reactive to light.   Neck: Neck supple. No thyromegaly present.   Cardiovascular: Normal rate, regular rhythm and normal heart sounds.   No murmur heard.  Pulmonary/Chest: Effort normal and breath sounds normal. No stridor. No respiratory distress. She has no wheezes.   Musculoskeletal: She exhibits no edema.   Neurological: She is alert and oriented to person, place, and time.   Speech clear. Shuffling gait with tremors of BUE.    Skin: Skin is warm and dry. She is not diaphoretic.   Psychiatric: She has a normal mood and affect. Her behavior is normal.   Vitals reviewed.      Plan     Her fasting labs were reviewed with the patient from last week.     Shaylee was seen today for insomnia, night sweats, diarrhea and hypertension.    Diagnoses and all orders for this visit:    Rheumatoid arthritis of multiple sites without organ or system involvement with positive rheumatoid factor (CMS/[facility])    Depression due to physical illness  -     desvenlafaxine (PRISTIQ) 50 MG 24 hr tablet; Take 1 tablet by mouth Daily.    Functional  neurological symptom disorder with abnormal movement    H/O hyperparathyroidism    S/P drug eluting coronary stent placement        Taper off Lexapro by Reducing to Half tablet. Pristiq 50mg once daily. I feel like she is having increasing depression related to illness and increasing anxiety. Pristiq may help her pain complaints due to SNRI affect. .     Follow up next week.

## 2020-01-16 ENCOUNTER — LAB (OUTPATIENT)
Dept: LAB | Facility: HOSPITAL | Age: 60
End: 2020-01-16

## 2020-01-16 DIAGNOSIS — E21.0 PRIMARY HYPERPARATHYROIDISM (HCC): ICD-10-CM

## 2020-01-16 DIAGNOSIS — N95.1 MENOPAUSAL SYMPTOM: ICD-10-CM

## 2020-01-16 LAB
BASOPHILS # BLD AUTO: 0.06 10*3/MM3 (ref 0–0.2)
BASOPHILS NFR BLD AUTO: 0.7 % (ref 0–1.5)
CEA SERPL-MCNC: 1.31 NG/ML
DEPRECATED RDW RBC AUTO: 48.9 FL (ref 37–54)
EOSINOPHIL # BLD AUTO: 0.11 10*3/MM3 (ref 0–0.4)
EOSINOPHIL NFR BLD AUTO: 1.3 % (ref 0.3–6.2)
ERYTHROCYTE [DISTWIDTH] IN BLOOD BY AUTOMATED COUNT: 16 % (ref 12.3–15.4)
FSH SERPL-ACNC: 55.4 MIU/ML
HCT VFR BLD AUTO: 36.8 % (ref 34–46.6)
HGB BLD-MCNC: 12.2 G/DL (ref 12–15.9)
IMM GRANULOCYTES # BLD AUTO: 0.02 10*3/MM3 (ref 0–0.05)
IMM GRANULOCYTES NFR BLD AUTO: 0.2 % (ref 0–0.5)
LYMPHOCYTES # BLD AUTO: 2.86 10*3/MM3 (ref 0.7–3.1)
LYMPHOCYTES NFR BLD AUTO: 33.6 % (ref 19.6–45.3)
MCH RBC QN AUTO: 28.3 PG (ref 26.6–33)
MCHC RBC AUTO-ENTMCNC: 33.2 G/DL (ref 31.5–35.7)
MCV RBC AUTO: 85.4 FL (ref 79–97)
MONOCYTES # BLD AUTO: 0.69 10*3/MM3 (ref 0.1–0.9)
MONOCYTES NFR BLD AUTO: 8.1 % (ref 5–12)
NEUTROPHILS # BLD AUTO: 4.76 10*3/MM3 (ref 1.7–7)
NEUTROPHILS NFR BLD AUTO: 56.1 % (ref 42.7–76)
NRBC BLD AUTO-RTO: 0 /100 WBC (ref 0–0.2)
PLATELET # BLD AUTO: 354 10*3/MM3 (ref 140–450)
PMV BLD AUTO: 9.9 FL (ref 6–12)
RBC # BLD AUTO: 4.31 10*6/MM3 (ref 3.77–5.28)
T4 FREE SERPL-MCNC: 1.53 NG/DL (ref 0.93–1.7)
TSH SERPL DL<=0.05 MIU/L-ACNC: 3.35 UIU/ML (ref 0.27–4.2)
WBC NRBC COR # BLD: 8.5 10*3/MM3 (ref 3.4–10.8)

## 2020-01-16 PROCEDURE — 82672 ASSAY OF ESTROGEN: CPT

## 2020-01-16 PROCEDURE — 84439 ASSAY OF FREE THYROXINE: CPT

## 2020-01-16 PROCEDURE — 83001 ASSAY OF GONADOTROPIN (FSH): CPT

## 2020-01-16 PROCEDURE — 82378 CARCINOEMBRYONIC ANTIGEN: CPT

## 2020-01-16 PROCEDURE — 36415 COLL VENOUS BLD VENIPUNCTURE: CPT

## 2020-01-16 PROCEDURE — 84443 ASSAY THYROID STIM HORMONE: CPT

## 2020-01-16 PROCEDURE — 85025 COMPLETE CBC W/AUTO DIFF WBC: CPT

## 2020-01-18 ENCOUNTER — APPOINTMENT (OUTPATIENT)
Dept: LAB | Facility: HOSPITAL | Age: 60
End: 2020-01-18

## 2020-01-18 LAB — ESTROGEN SERPL-MCNC: 58 PG/ML

## 2020-01-18 PROCEDURE — 83497 ASSAY OF 5-HIAA: CPT

## 2020-01-21 ENCOUNTER — OFFICE VISIT (OUTPATIENT)
Dept: INTERNAL MEDICINE | Facility: CLINIC | Age: 60
End: 2020-01-21

## 2020-01-21 VITALS
TEMPERATURE: 98.1 F | RESPIRATION RATE: 18 BRPM | WEIGHT: 190 LBS | HEIGHT: 61 IN | SYSTOLIC BLOOD PRESSURE: 120 MMHG | DIASTOLIC BLOOD PRESSURE: 78 MMHG | OXYGEN SATURATION: 98 % | HEART RATE: 80 BPM | BODY MASS INDEX: 35.87 KG/M2

## 2020-01-21 DIAGNOSIS — F44.4 FUNCTIONAL NEUROLOGICAL SYMPTOM DISORDER WITH ABNORMAL MOVEMENT: ICD-10-CM

## 2020-01-21 DIAGNOSIS — R10.11 RUQ ABDOMINAL PAIN: Primary | ICD-10-CM

## 2020-01-21 DIAGNOSIS — F06.31 DEPRESSION DUE TO PHYSICAL ILLNESS: ICD-10-CM

## 2020-01-21 DIAGNOSIS — I10 BENIGN ESSENTIAL HTN: ICD-10-CM

## 2020-01-21 DIAGNOSIS — R19.7 DIARRHEA, UNSPECIFIED TYPE: ICD-10-CM

## 2020-01-21 PROBLEM — S80.851A: Status: RESOLVED | Noted: 2019-07-22 | Resolved: 2020-01-21

## 2020-01-21 PROBLEM — Z18.39: Status: RESOLVED | Noted: 2019-07-22 | Resolved: 2020-01-21

## 2020-01-21 LAB
5OH-INDOLEACETATE 24H UR-MCNC: NORMAL MG/ML
5OH-INDOLEACETATE 24H UR-MRATE: NORMAL MG/(24.H)
BILIRUB BLD-MCNC: NEGATIVE MG/DL
CLARITY, POC: CLEAR
COLOR UR: YELLOW
GLUCOSE UR STRIP-MCNC: NEGATIVE MG/DL
KETONES UR QL: NEGATIVE
LEUKOCYTE EST, POC: NEGATIVE
NITRITE UR-MCNC: NEGATIVE MG/ML
PH UR: 6 [PH] (ref 5–8)
PROT UR STRIP-MCNC: NEGATIVE MG/DL
RBC # UR STRIP: ABNORMAL /UL
SP GR UR: 1.01 (ref 1–1.03)
UROBILINOGEN UR QL: NORMAL

## 2020-01-21 PROCEDURE — 81003 URINALYSIS AUTO W/O SCOPE: CPT | Performed by: NURSE PRACTITIONER

## 2020-01-21 PROCEDURE — 99214 OFFICE O/P EST MOD 30 MIN: CPT | Performed by: NURSE PRACTITIONER

## 2020-01-21 NOTE — PROGRESS NOTES
"Chief Complaint   Patient presents with   • Hyperlipidemia     lab follow up    • Benign essential HTN       Subjective     Shaylee Wadsworth is a 59 y.o. 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 She is followed by endo, cardio, and rheumatology. She was in the office last month complaining of vague symptoms of night sweats, insomnia. Sge is in [facility] and recently had labs with endo. She was switched from Lexapro to Pristiq 50mg to help both depression and Night sweats. Today, she reports that her night sweats are \"much better, not drenching me anymore.\" she is having abd pain and diarrhea since Friday. She describes it as \"a pressure feeling like when I had my gallbladder attacks. Like a balloon under my ribs.\" Grabiel nausea, vomiting, indigestion. Her last C-scope was 10-3-2014. She had a CT abd/pelvis 2- for similar complaints. Associated dysuria.       History of Present Illness     Allergies   Allergen Reactions   • Meloxicam Swelling     EYES AND FACE SWELLING  EYES AND FACE SWELLING   • Effexor Xr [Venlafaxine Hcl Er] Other (See Comments)     Caused weight gain         Current Outpatient Medications:   •  amlodipine-olmesartan (DAYRON) 5-40 MG per tablet, Take 1 tablet by mouth Daily., Disp: 90 tablet, Rfl: 1  •  aspirin 81 MG tablet, Take 81 mg by mouth Daily., Disp: , Rfl:   •  atorvastatin (LIPITOR) 40 MG tablet, TAKE 1 TABLET BY MOUTH ONCE DAILY, Disp: 90 tablet, Rfl: 3  •  clopidogrel (PLAVIX) 75 MG tablet, Take 1 tablet by mouth Daily., Disp: 30 tablet, Rfl: 11  •  desvenlafaxine (PRISTIQ) 50 MG 24 hr tablet, Take 1 tablet by mouth Daily., Disp: 30 tablet, Rfl: 1  •  diazePAM (VALIUM) 2 MG tablet, Take 1 tablet by mouth 2 (Two) Times a Day As Needed for Anxiety., Disp: 10 tablet, Rfl: 0  •  ENBREL SURECLICK 50 MG/ML solution auto-injector, , Disp: , Rfl:   •  esomeprazole " (NEXIUM) 40 MG capsule, Take 1 capsule by mouth Daily., Disp: , Rfl:   •  estrogen, conjugated,-medroxyprogesterone (PREMPRO) 0.45-1.5 MG per tablet, Take 1 tablet by mouth Daily., Disp: 28 tablet, Rfl: 11  •  folic acid (FOLVITE) 1 MG tablet, Take 1 mg by mouth Daily., Disp: , Rfl:   •  methotrexate 2.5 MG tablet, TAKE 8 TABLETS BY MOUTH ONCE WEEKLY ON THE SAME DAY, Disp: 32 tablet, Rfl: 11  •  metoprolol succinate XL (TOPROL-XL) 50 MG 24 hr tablet, TAKE 1 TABLET BY MOUTH ONCE DAILY, Disp: 90 tablet, Rfl: 3  •  potassium chloride (K-DUR,KLOR-CON) 20 MEQ CR tablet, TAKE 1 TABLET BY MOUTH TWICE DAILY, Disp: 60 tablet, Rfl: 5  •  traMADol (ULTRAM) 50 MG tablet, TAKE 1 TABLET BY MOUTH EVERY 8 HOURS AS NEEDED FOR SEVERE PAIN, Disp: 30 tablet, Rfl: 2  •  vitamin D (ERGOCALCIFEROL) 1.25 MG (53223 UT) capsule capsule, TAKE 1 CAPSULE BY MOUTH ONCE A WEEK, Disp: 4 capsule, Rfl: 0    The following portions of the patient's history were reviewed and updated as appropriate: allergies, current medications, past family history, past medical history, past social history, past surgical history and problem list.    Review of Systems   Constitutional: Positive for fatigue. Negative for fever.   HENT: Positive for congestion.    Eyes: Negative.    Respiratory: Negative for cough and shortness of breath.    Cardiovascular: Negative for chest pain, palpitations and leg swelling.   Endocrine: Negative.    Genitourinary: Negative.    Musculoskeletal: Positive for arthralgias and back pain.   Allergic/Immunologic: Positive for environmental allergies.   Neurological: Positive for tremors and headaches. Negative for speech difficulty.   Hematological: Negative for adenopathy. Does not bruise/bleed easily.   Psychiatric/Behavioral: Positive for agitation and decreased concentration. The patient is nervous/anxious.        Assessment     Physical Exam   Constitutional: She is oriented to person, place, and time. She appears well-developed and  well-nourished.   HENT:   Head: Normocephalic.   Right Ear: External ear normal.   Neck: Neck supple. No thyromegaly present.   Cardiovascular: Normal rate, regular rhythm and normal heart sounds.   Pulmonary/Chest: Effort normal and breath sounds normal. No stridor. No respiratory distress. She has no wheezes.   Abdominal: Normal appearance. There is no hepatosplenomegaly, splenomegaly or hepatomegaly. There is tenderness in the right upper quadrant and epigastric area. There is no rigidity, no guarding, no tenderness at McBurney's point and negative Davies's sign.   Musculoskeletal: She exhibits no edema.   Neurological: She is alert and oriented to person, place, and time. She has normal reflexes. No cranial nerve deficit.   Shuffling gait. Tremors of head, BUE. Speech is clear.    Skin: Skin is warm and dry.   Psychiatric: She has a normal mood and affect. Her behavior is normal.   Vitals reviewed.      Plan     Her fasting labs were reviewed with the patient from endo order.     Shaylee was seen today for hyperlipidemia and benign essential htn.    Diagnoses and all orders for this visit:    RUQ abdominal pain  -     POC Urinalysis Dipstick, Automated  -     Ambulatory Referral to Gastroenterology  -     Stool Culture ([facility]) - Stool, Per Rectum  -     C Difficile Toxigenic Culture ([facility]) - Stool, Per Rectum  -     Comprehensive metabolic panel  -     CBC and Differential  -     Amylase  -     Lipase  -     H. Pylori IgM, Blood  -     Sedimentation Rate  -     CT Abdomen Pelvis With Contrast; Future    Diarrhea, unspecified type  -     POC Urinalysis Dipstick, Automated  -     Ambulatory Referral to Gastroenterology  -     Stool Culture ([facility]) - Stool, Per Rectum  -     C Difficile Toxigenic Culture ([facility]) - Stool, Per Rectum  -     Comprehensive metabolic panel  -     CBC and Differential  -     Amylase  -     Lipase  -     H. Pylori IgM, Blood  -     Sedimentation Rate  -     CT Abdomen  Pelvis With Contrast; Future    Depression due to physical illness    Functional neurological symptom disorder with abnormal movement    Benign essential HTN      Her depression is slightly better.     Will evaluate her for underlying colitis.     Follow up in 1 month

## 2020-01-22 LAB
ALBUMIN SERPL-MCNC: 4.3 G/DL (ref 3.5–5.2)
ALBUMIN/GLOB SERPL: 2 G/DL
ALP SERPL-CCNC: 80 U/L (ref 39–117)
ALT SERPL-CCNC: 13 U/L (ref 1–33)
AMYLASE SERPL-CCNC: 86 U/L (ref 28–100)
AST SERPL-CCNC: 12 U/L (ref 1–32)
BASOPHILS # BLD AUTO: 0.07 10*3/MM3 (ref 0–0.2)
BASOPHILS NFR BLD AUTO: 0.9 % (ref 0–1.5)
BILIRUB SERPL-MCNC: 0.6 MG/DL (ref 0.2–1.2)
BUN SERPL-MCNC: 8 MG/DL (ref 6–20)
BUN/CREAT SERPL: 9.3 (ref 7–25)
CALCIUM SERPL-MCNC: 9.3 MG/DL (ref 8.6–10.5)
CHLORIDE SERPL-SCNC: 97 MMOL/L (ref 98–107)
CO2 SERPL-SCNC: 25 MMOL/L (ref 22–29)
CREAT SERPL-MCNC: 0.86 MG/DL (ref 0.57–1)
EOSINOPHIL # BLD AUTO: 0.1 10*3/MM3 (ref 0–0.4)
EOSINOPHIL NFR BLD AUTO: 1.3 % (ref 0.3–6.2)
ERYTHROCYTE [DISTWIDTH] IN BLOOD BY AUTOMATED COUNT: 16.4 % (ref 12.3–15.4)
ERYTHROCYTE [SEDIMENTATION RATE] IN BLOOD BY WESTERGREN METHOD: 36 MM/HR (ref 0–30)
GLOBULIN SER CALC-MCNC: 2.1 GM/DL
GLUCOSE SERPL-MCNC: 100 MG/DL (ref 65–99)
H PYLORI IGM SER-ACNC: <9 UNITS (ref 0–8.9)
HCT VFR BLD AUTO: 36 % (ref 34–46.6)
HGB BLD-MCNC: 12 G/DL (ref 12–15.9)
IMM GRANULOCYTES # BLD AUTO: 0.02 10*3/MM3 (ref 0–0.05)
IMM GRANULOCYTES NFR BLD AUTO: 0.3 % (ref 0–0.5)
LIPASE SERPL-CCNC: 41 U/L (ref 13–60)
LYMPHOCYTES # BLD AUTO: 2.26 10*3/MM3 (ref 0.7–3.1)
LYMPHOCYTES NFR BLD AUTO: 30.4 % (ref 19.6–45.3)
MCH RBC QN AUTO: 28.8 PG (ref 26.6–33)
MCHC RBC AUTO-ENTMCNC: 33.3 G/DL (ref 31.5–35.7)
MCV RBC AUTO: 86.3 FL (ref 79–97)
MONOCYTES # BLD AUTO: 0.49 10*3/MM3 (ref 0.1–0.9)
MONOCYTES NFR BLD AUTO: 6.6 % (ref 5–12)
NEUTROPHILS # BLD AUTO: 4.49 10*3/MM3 (ref 1.7–7)
NEUTROPHILS NFR BLD AUTO: 60.5 % (ref 42.7–76)
NRBC BLD AUTO-RTO: 0 /100 WBC (ref 0–0.2)
PLATELET # BLD AUTO: 304 10*3/MM3 (ref 140–450)
POTASSIUM SERPL-SCNC: 4 MMOL/L (ref 3.5–5.2)
PROT SERPL-MCNC: 6.4 G/DL (ref 6–8.5)
RBC # BLD AUTO: 4.17 10*6/MM3 (ref 3.77–5.28)
SODIUM SERPL-SCNC: 138 MMOL/L (ref 136–145)
WBC # BLD AUTO: 7.43 10*3/MM3 (ref 3.4–10.8)

## 2020-01-25 ENCOUNTER — TRANSCRIBE ORDERS (OUTPATIENT)
Dept: ADMINISTRATIVE | Facility: HOSPITAL | Age: 60
End: 2020-01-25

## 2020-01-25 ENCOUNTER — LAB (OUTPATIENT)
Dept: LAB | Facility: HOSPITAL | Age: 60
End: 2020-01-25

## 2020-01-25 DIAGNOSIS — E21.0 PRIMARY HYPERPARATHYROIDISM (HCC): ICD-10-CM

## 2020-01-25 DIAGNOSIS — E21.0 PRIMARY HYPERPARATHYROIDISM (HCC): Primary | ICD-10-CM

## 2020-01-25 DIAGNOSIS — N95.1 SYMPTOMATIC MENOPAUSAL OR FEMALE CLIMACTERIC STATES: ICD-10-CM

## 2020-01-25 PROCEDURE — 81050 URINALYSIS VOLUME MEASURE: CPT

## 2020-01-25 PROCEDURE — 83497 ASSAY OF 5-HIAA: CPT

## 2020-01-27 ENCOUNTER — APPOINTMENT (OUTPATIENT)
Dept: CT IMAGING | Facility: HOSPITAL | Age: 60
End: 2020-01-27

## 2020-01-27 ENCOUNTER — HOSPITAL ENCOUNTER (OUTPATIENT)
Dept: CT IMAGING | Facility: HOSPITAL | Age: 60
Discharge: HOME | End: 2020-01-27
Admitting: NURSE PRACTITIONER

## 2020-01-27 DIAGNOSIS — R10.11 RUQ ABDOMINAL PAIN: ICD-10-CM

## 2020-01-27 DIAGNOSIS — R19.7 DIARRHEA, UNSPECIFIED TYPE: ICD-10-CM

## 2020-01-27 PROCEDURE — 0 DIATRIZOATE MEGLUMINE & SODIUM PER 1 ML: Performed by: NURSE PRACTITIONER

## 2020-01-27 PROCEDURE — 0 IOPAMIDOL PER 1 ML: Performed by: NURSE PRACTITIONER

## 2020-01-27 PROCEDURE — 74177 CT ABD & PELVIS W/CONTRAST: CPT

## 2020-01-27 RX ADMIN — DIATRIZOATE MEGLUMINE AND DIATRIZOATE SODIUM 30 ML: 600; 100 SOLUTION ORAL; RECTAL at 13:07

## 2020-01-27 RX ADMIN — IOPAMIDOL 100 ML: 755 INJECTION, SOLUTION INTRAVENOUS at 14:23

## 2020-01-28 LAB
BACTERIA SPEC CULT: NORMAL
BACTERIA SPEC CULT: NORMAL
C DIFF TOX GENS STL QL NAA+PROBE: NEGATIVE
CAMPYLOBACTER STL CULT: NORMAL
E COLI SXT STL QL IA: NEGATIVE
SALM + SHIG STL CULT: NORMAL

## 2020-01-30 ENCOUNTER — TELEPHONE (OUTPATIENT)
Dept: INTERNAL MEDICINE | Facility: CLINIC | Age: 60
End: 2020-01-30

## 2020-01-30 LAB
5OH-INDOLEACETATE 24H UR-MCNC: 1.1 MG/L
5OH-INDOLEACETATE 24H UR-MRATE: 3 MG/24 HR (ref 0–14.9)

## 2020-01-30 NOTE — TELEPHONE ENCOUNTER
----- Message from Shaylee Wadsworth sent at 1/30/2020 11:39 AM EST -----  Regarding: Non-Urgent Medical Question  Contact: [phone]  Tristin Sanchez    I have had a headache since last Friday and it keeps coming back after tylenol.  I'm going to try coricitin it feels like sinuses but it wont stop. Is there anything else I can do?

## 2020-02-03 ENCOUNTER — TELEPHONE (OUTPATIENT)
Dept: INTERNAL MEDICINE | Facility: CLINIC | Age: 60
End: 2020-02-03

## 2020-02-03 RX ORDER — AMLODIPINE AND OLMESARTAN MEDOXOMIL 10; 40 MG/1; MG/1
1 TABLET ORAL DAILY
Qty: 30 TABLET | Refills: 0 | Status: SHIPPED | OUTPATIENT
Start: 2020-02-03 | End: 2020-02-13 | Stop reason: SDUPTHER

## 2020-02-03 NOTE — TELEPHONE ENCOUNTER
----- Message from Shaylee Wadsworth sent at 2/3/2020  9:59 AM EST -----  Regarding: RE: Non-Urgent Medical Question  Contact: [phone]    Tristin Wolf    My last BP was 144/99 HR 99 in January 25th.  Just now my BP was 146/97-84.  It just spikes for no reason.    Shaylee  ----- Message -----  From: PO HOLLINS  Sent: 2/3/20 9:54 AM  To: Shaylee Wadsworth  Subject: RE: Non-Urgent Medical Question      Laura June is asking how your blood pressure is running. Do you have any blood pressure logs at home, or do you take your blood pressure?    Let us know.     Have a great day!    -Maryann HOLLINS CMA         ----- Message -----     From: Shaylee Wadsworth     Sent: 1/30/2020 11:39 AM EST       To: KAITLYNN Choi  Subject: Non-Urgent Medical Question    Tristin Sanchez    I have had a headache since last Friday and it keeps coming back after tylenol.  I'm going to try coricitin it feels like sinuses but it wont stop. Is there anything else I can do?

## 2020-02-09 DIAGNOSIS — M85.80 OSTEOPENIA: ICD-10-CM

## 2020-02-10 ENCOUNTER — TELEPHONE (OUTPATIENT)
Dept: CARDIOLOGY | Facility: CLINIC | Age: 60
End: 2020-02-10

## 2020-02-10 DIAGNOSIS — R00.2 PALPITATIONS: Primary | ICD-10-CM

## 2020-02-10 NOTE — TELEPHONE ENCOUNTER
Called and discussed with patient.  Will get her set up with 24 hour holter.  Patient prefers for this to be placed at [place].

## 2020-02-10 NOTE — TELEPHONE ENCOUNTER
"----- Message from Shaylee Wadsworth sent at 2/9/2020  3:16 PM EST -----  Regarding: Non-Urgent Medical Question  Contact: [phone]  Hi Dr Dumas    I have been having trouble with my blood pressure and my gp has changed my medicine but my heart rate has been spiking for no reason.  While sitting or laying down it goes from low 60\"s to hi 90\"s and stays there.  Just a slow walk and it is about 110.  Is something I should be worried about?  "

## 2020-02-11 ENCOUNTER — OFFICE VISIT (OUTPATIENT)
Dept: GASTROENTEROLOGY | Facility: CLINIC | Age: 60
End: 2020-02-11

## 2020-02-11 VITALS — WEIGHT: 193.6 LBS | HEIGHT: 61 IN | BODY MASS INDEX: 36.55 KG/M2

## 2020-02-11 DIAGNOSIS — R19.7 DIARRHEA, UNSPECIFIED TYPE: Primary | ICD-10-CM

## 2020-02-11 DIAGNOSIS — R10.9 ABDOMINAL PAIN, UNSPECIFIED ABDOMINAL LOCATION: ICD-10-CM

## 2020-02-11 PROCEDURE — 99203 OFFICE O/P NEW LOW 30 MIN: CPT | Performed by: INTERNAL MEDICINE

## 2020-02-11 RX ORDER — ERGOCALCIFEROL 1.25 MG/1
CAPSULE ORAL
Qty: 4 CAPSULE | Refills: 0 | Status: SHIPPED | OUTPATIENT
Start: 2020-02-11 | End: 2020-03-03

## 2020-02-11 NOTE — PROGRESS NOTES
PATIENT INFORMATION  Shaylee Wadsworth       - 1960    CHIEF COMPLAINT  Chief Complaint   Patient presents with   • Abdominal Pain   • Diarrhea       HISTORY OF PRESENT ILLNESS  HPI    60 yo with diarrhea since 2019.  BM are 5-6 times daily. No nocturnal diarrhea. No blood in stool, does have intermittent cramping..  pcp did stool studies done which was negative for cx and c.diff. CT done :  HISTORY:  Right-sided abdominal pain for 2 weeks. Pain is cramping, mild, intermittent, no radiation. She does not think bm affect the pain.  She was tried on welchol in november with ?mild improvement   She has been feeling better in the past one month.  bm now are 3 times daily. Normal was once every other day.    TECHNIQUE:  Spiral CT was performed through the abdomen and pelvis following oral  and intravenous contrast administration. Radiation dose reduction  techniques included automated exposure control or exposure modulation  based on body size. Radiation audit for CT and nuclear cardiology exams  in the last 12 months: 0.     ABDOMEN FINDINGS:  The liver, spleen, pancreas, adrenal glands and kidneys are normal  except for small right renal cyst. The gallbladder is surgically absent.     PELVIS FINDINGS:  The gut, mesenteric and arabella structures are normal. The appendix is  normal. There is no free fluid in the abdomen or pelvis. Images of the  lung bases demonstrate small hiatal hernia.     IMPRESSION:  1. Right renal cyst.  2. Normal appendix.  3. Small hiatal hernia.      In looking back at her medications, lexapro was started in  and taken off last month.    Last cls was in  and hyperplastic polyps removed.  No family history of colon cancer or polyps.  REVIEW OF SYSTEMS  Review of Systems   Constitutional: Positive for diaphoresis, fatigue and unexpected weight change.   HENT: Positive for sinus pressure.    Respiratory: Positive for apnea.    Gastrointestinal: Positive for abdominal  distention, abdominal pain and diarrhea.   Endocrine: Positive for polydipsia.   Musculoskeletal: Positive for arthralgias, back pain, gait problem and joint swelling.   Neurological: Positive for tremors and headaches.   Hematological: Bruises/bleeds easily.   All other systems reviewed and are negative.        ACTIVE PROBLEMS  Patient Active Problem List    Diagnosis   • RUQ abdominal pain [R10.11]   • Diarrhea [R19.7]   • Pain in left ankle and joints of left foot [M25.572]   • Pain in right ankle and joints of right foot [M25.571]   • Pain of both shoulder joints [M25.511, M25.512]   • Pain in left shoulder [M25.512]   • Rheumatoid arthritis with rheumatoid factor of multiple sites without organ or systems involvement (CMS/HCC) [M05.79]   • Body mass index (BMI) 35.0-35.9, adult [Z68.35]   • Irritable bowel syndrome with diarrhea [K58.0]   • Depression due to physical illness [F06.31]   • S/P drug eluting coronary stent placement [Z95.5]   • Other spondylosis with myelopathy, thoracic region [M47.14]   • Personal history of immunosupression therapy [Z92.25]   • Rheumatoid arthritis of multiple sites without organ or system involvement with positive rheumatoid factor (CMS/HCC) [M05.79]   • Coronary artery disease involving native heart without angina pectoris [I25.10]   • Functional neurological symptom disorder with abnormal movement [F44.4]   • BPPV (benign paroxysmal positional vertigo) [H81.10]   • Dystonia [G24.9]   • Chronic right-sided thoracic back pain [M54.6, G89.29]   • Benign essential HTN [I10]   • Difficulty walking [R26.2]   • Astasia-abasia [F44.4]   • Functional movement disorder [G25.9]   • Tremor [R25.1]   • Anxiety related tremor [R25.1, F41.9]   • Hypokalemia [E87.6]   • Healthcare maintenance [Z00.00]   • Tendinitis of left shoulder [M75.82]   • Multiple pulmonary nodules determined by computed tomography of lung [R91.8]   • H/O hyperparathyroidism [Z86.39]   • Menopausal state [N95.1]   •  Epigastric pain [R10.13]   • Abnormal radiographic examination [R93.89]   • Disc disorder of thoracic region [M51.9]   • Gastroesophageal reflux disease [K21.9]   • Chronic gastritis [K29.50]   • Hyperlipidemia LDL goal <70 [E78.5]   • Menopausal symptom [N95.1]   • Migraine [G43.909]   • Osteoarthritis of shoulder [M19.019]   • Osteopenia [M85.80]   • Rheumatoid arthritis (CMS/HCC) [M06.9]         PAST MEDICAL HISTORY  Past Medical History:   Diagnosis Date   • Anxiety    • Arthritis 5/1/2014    RA   • Lainez esophagus    • Benign paroxysmal positional vertigo due to bilateral vestibular disorder    • Chest pain    • Cholelithiasis 2007    Removed   • Coronary artery disease 2/26/19   • Depression 6/12/19   • Dystonia    • Esophageal reflux    • Fibromyositis    • Functional movement disorder    • H/O colonoscopy    • H/O mammogram 08/31/2011    NORMAL    • Hx of bone density study 08/31/2011    OSTEOPENIA    • Hyperlipidemia    • Hyperparathyroidism (CMS/HCC)    • Hypertension    • Low back pain    • Menopause    • Osteopenia    • Pancreatitis    • Scoliosis 2/16/13   • Tremor 4/4/2018         SURGICAL HISTORY  Past Surgical History:   Procedure Laterality Date   • CARDIAC CATHETERIZATION N/A 3/4/2019    Procedure: Coronary angiography;  Surgeon: Jackelyn Dumas MD;  Location: [facility] CATH INVASIVE LOCATION;  Service: Cardiovascular   • CARDIAC CATHETERIZATION N/A 3/4/2019    Procedure: Left heart cath;  Surgeon: Jackelyn Dumas MD;  Location: [facility] CATH INVASIVE LOCATION;  Service: Cardiovascular   • CARDIAC CATHETERIZATION N/A 3/4/2019    Procedure: Left ventriculography;  Surgeon: Jackelyn Dumas MD;  Location: [facility] CATH INVASIVE LOCATION;  Service: Cardiovascular   • CARDIAC CATHETERIZATION N/A 3/4/2019    Procedure: Stent KARLY coronary;  Surgeon: Jackelyn Dumas MD;  Location: [facility] CATH INVASIVE LOCATION;  Service: Cardiovascular   • CHOLECYSTECTOMY     • COLONOSCOPY  2014   • DILATATION AND CURETTAGE      • HYSTERECTOMY     • MOUTH SURGERY      TOOTH EXTRACTION   • OTHER SURGICAL HISTORY  03/27/2015    DIAGNOSTIC ESOPHAGOSCOPY TRANSNASAL FLEXIBLE WITH BIOPSY; ARZATE ESO. REPEAT EGD 2 YR    • OVARY SURGERY Left     NORMAL    • PAP SMEAR  08/23/2010    NORMAL    • PARATHYROIDECTOMY Right 08/17/2016    Dr. Muchael Flynn at [place]   • SUBTOTAL HYSTERECTOMY      Left ovary removed9         FAMILY HISTORY  Family History   Problem Relation Age of Onset   • Diabetes Mother    • Hyperlipidemia Mother    • Hypertension Mother    • Heart disease Mother    • Kidney disease Mother    • Arthritis Father    • Anxiety disorder Father    • COPD Father    • Glaucoma Father    • Hyperlipidemia Father    • Hypertension Father    • Vision loss Father    • Heart disease Father    • Heart disease Sister    • Breast cancer Neg Hx    • Colon cancer Neg Hx    • Colon polyps Neg Hx          SOCIAL HISTORY  Social History     Occupational History   • Not on file   Tobacco Use   • Smoking status: Passive Smoke Exposure - Never Smoker   • Smokeless tobacco: Never Used   • Tobacco comment: 8/12/18 switch to e-cig   Substance and Sexual Activity   • Alcohol use: No   • Drug use: No   • Sexual activity: Never       Debilities/Disabilities Identified: None    Emotional Behavior: Appropriate    CURRENT MEDICATIONS    Current Outpatient Medications:   •  amlodipine-olmesartan (DAYRON) 10-40 MG per tablet, Take 1 tablet by mouth Daily., Disp: 30 tablet, Rfl: 0  •  aspirin 81 MG tablet, Take 81 mg by mouth Daily., Disp: , Rfl:   •  atorvastatin (LIPITOR) 40 MG tablet, TAKE 1 TABLET BY MOUTH ONCE DAILY, Disp: 90 tablet, Rfl: 3  •  clopidogrel (PLAVIX) 75 MG tablet, Take 1 tablet by mouth Daily., Disp: 30 tablet, Rfl: 11  •  desvenlafaxine (PRISTIQ) 50 MG 24 hr tablet, Take 1 tablet by mouth Daily., Disp: 30 tablet, Rfl: 1  •  diazePAM (VALIUM) 2 MG tablet, Take 1 tablet by mouth 2 (Two) Times a Day As Needed for Anxiety., Disp: 10 tablet, Rfl: 0  •   "ENBREL SURECLICK 50 MG/ML solution auto-injector, , Disp: , Rfl:   •  esomeprazole (NEXIUM) 40 MG capsule, Take 1 capsule by mouth Daily., Disp: , Rfl:   •  estrogen, conjugated,-medroxyprogesterone (PREMPRO) 0.45-1.5 MG per tablet, Take 1 tablet by mouth Daily., Disp: 28 tablet, Rfl: 11  •  folic acid (FOLVITE) 1 MG tablet, Take 1 mg by mouth Daily., Disp: , Rfl:   •  methotrexate 2.5 MG tablet, TAKE 8 TABLETS BY MOUTH ONCE WEEKLY ON THE SAME DAY, Disp: 32 tablet, Rfl: 11  •  metoprolol succinate XL (TOPROL-XL) 50 MG 24 hr tablet, TAKE 1 TABLET BY MOUTH ONCE DAILY, Disp: 90 tablet, Rfl: 3  •  potassium chloride (K-DUR,KLOR-CON) 20 MEQ CR tablet, TAKE 1 TABLET BY MOUTH TWICE DAILY, Disp: 60 tablet, Rfl: 5  •  traMADol (ULTRAM) 50 MG tablet, TAKE 1 TABLET BY MOUTH EVERY 8 HOURS AS NEEDED FOR SEVERE PAIN, Disp: 30 tablet, Rfl: 2  •  vitamin D (ERGOCALCIFEROL) 1.25 MG (19423 UT) capsule capsule, TAKE 1 CAPSULE BY MOUTH ONCE A WEEK, Disp: 4 capsule, Rfl: 0    ALLERGIES  Meloxicam and Effexor xr [venlafaxine hcl er]    VITALS  Vitals:    02/11/20 1358   Weight: 87.8 kg (193 lb 9.6 oz)   Height: 154.9 cm (60.98\")       LAST RESULTS   Lab on 01/25/2020   Component Date Value Ref Range Status   • 5-HIAA, Urine 01/25/2020 1.1  Undefined mg/L Final    This test was developed and its performance characteristics  determined by [facility]. It has not been cleared or approved  by the Food and Drug Administration.   • 5-HIAA, 24H Ur 01/25/2020 3.0  0.0 - 14.9 mg/24 hr Final     Ct Abdomen Pelvis With Contrast    Result Date: 1/27/2020  Narrative: CT SCAN OF THE ABDOMEN AND PELVIS WITH CONTRAST 01/27/2020  HISTORY: Right-sided abdominal pain for 2 weeks.  TECHNIQUE: Spiral CT was performed through the abdomen and pelvis following oral and intravenous contrast administration. Radiation dose reduction techniques included automated exposure control or exposure modulation based on body size. Radiation audit for CT and nuclear cardiology " exams in the last 12 months: 0.  ABDOMEN FINDINGS: The liver, spleen, pancreas, adrenal glands and kidneys are normal except for small right renal cyst. The gallbladder is surgically absent.  PELVIS FINDINGS: The gut, mesenteric and arabella structures are normal. The appendix is normal. There is no free fluid in the abdomen or pelvis. Images of the lung bases demonstrate small hiatal hernia.      Impression: 1. Right renal cyst. 2. Normal appendix. 3. Small hiatal hernia.  This report was finalized on 1/27/2020 3:04 PM by Dr. Dre St MD.        PHYSICAL EXAM  Physical Exam   Constitutional: She is oriented to person, place, and time. She appears well-developed and well-nourished. No distress.   HENT:   Head: Normocephalic and atraumatic.   Mouth/Throat: Oropharynx is clear and moist.   Eyes: Pupils are equal, round, and reactive to light. EOM are normal.   Neck: Normal range of motion. No tracheal deviation present.   Cardiovascular: Normal rate, regular rhythm, normal heart sounds and intact distal pulses. Exam reveals no gallop and no friction rub.   No murmur heard.  Pulmonary/Chest: Effort normal and breath sounds normal. No stridor. No respiratory distress. She has no wheezes. She has no rales. She exhibits no tenderness.   Abdominal: Soft. Bowel sounds are normal. She exhibits no distension. There is no tenderness. There is no rebound and no guarding.   No pain   Musculoskeletal: She exhibits no edema.   Lymphadenopathy:     She has no cervical adenopathy.   Neurological: She is alert and oriented to person, place, and time.   Tremors noted   Skin: Skin is warm. She is not diaphoretic.   Psychiatric: She has a normal mood and affect. Her behavior is normal. Judgment and thought content normal.   Nursing note and vitals reviewed.      ASSESSMENT  Diagnoses and all orders for this visit:    Diarrhea, unspecified type  -     Case Request; Standing  -     Case Request    Abdominal pain, unspecified abdominal  location  -     Case Request; Standing  -     Case Request    Other orders  -     Follow Anesthesia Guidelines / Standing Orders; Future  -     Implement Anesthesia orders day of procedure.; Standing  -     Obtain informed consent; Standing          PLAN  No follow-ups on file.      Plan cls   Biopsies to rule out microscopic colitis.

## 2020-02-13 ENCOUNTER — TELEPHONE (OUTPATIENT)
Dept: CARDIOLOGY | Facility: CLINIC | Age: 60
End: 2020-02-13

## 2020-02-13 ENCOUNTER — OFFICE VISIT (OUTPATIENT)
Dept: INTERNAL MEDICINE | Facility: CLINIC | Age: 60
End: 2020-02-13

## 2020-02-13 VITALS
HEART RATE: 76 BPM | DIASTOLIC BLOOD PRESSURE: 84 MMHG | HEIGHT: 61 IN | SYSTOLIC BLOOD PRESSURE: 122 MMHG | RESPIRATION RATE: 16 BRPM | BODY MASS INDEX: 36.51 KG/M2 | OXYGEN SATURATION: 98 % | TEMPERATURE: 98.1 F | WEIGHT: 193.4 LBS

## 2020-02-13 DIAGNOSIS — I10 ESSENTIAL HYPERTENSION: ICD-10-CM

## 2020-02-13 PROCEDURE — 99213 OFFICE O/P EST LOW 20 MIN: CPT | Performed by: NURSE PRACTITIONER

## 2020-02-13 RX ORDER — METOPROLOL SUCCINATE 50 MG/1
50 TABLET, EXTENDED RELEASE ORAL DAILY
Qty: 90 TABLET | Refills: 3
Start: 2020-02-13 | End: 2020-05-31

## 2020-02-13 RX ORDER — AMLODIPINE AND OLMESARTAN MEDOXOMIL 10; 40 MG/1; MG/1
1 TABLET ORAL DAILY
Qty: 90 TABLET | Refills: 3 | Status: SHIPPED | OUTPATIENT
Start: 2020-02-13 | End: 2020-07-23

## 2020-02-13 NOTE — TELEPHONE ENCOUNTER
----- Message from Shaylee Wadsworth sent at 2/13/2020 11:01 AM EST -----  Regarding: Non-Urgent Medical Question  Contact: [phone]  Hi Dr Dumas    It was suggested that I have a colonoscopy for other problems I've been having and the prep instructions say to stop any blood thinners 5-7 days before the procedure.  Is this ok?

## 2020-02-13 NOTE — PROGRESS NOTES
"Chief Complaint   Patient presents with   • Hypertension   • Follow-up       Subjective     Shaylee Wadsworth is a 59 y.o. female being seen for a follow up appointment today regarding HTN. She called the office 1- complaining of headaches and her BP running 140s/80-99. He rAzor was increased from 5/40 to 10/40mg daily. Her BP has improved since the medication change. She does report that her HR is fluctuating from 65 at rest to 99 with walking just a few steps. She called Dr. Dumas (cardio) and is scheduled for a Holter monitor tomorrow. She has an \"awareness of heart beats\" She denies SOA, edema, lightheadedness or edema.    Answers for HPI/ROS submitted by the patient on 2/11/2020   Hypertension  What is the primary reason for your visit?: High Blood Pressure    History of Present Illness     Allergies   Allergen Reactions   • Meloxicam Swelling     EYES AND FACE SWELLING  EYES AND FACE SWELLING   • Effexor Xr [Venlafaxine Hcl Er] Other (See Comments)     Caused weight gain         Current Outpatient Medications:   •  amlodipine-olmesartan (DAYRON) 10-40 MG per tablet, Take 1 tablet by mouth Daily., Disp: 30 tablet, Rfl: 0  •  aspirin 81 MG tablet, Take 81 mg by mouth Daily., Disp: , Rfl:   •  atorvastatin (LIPITOR) 40 MG tablet, TAKE 1 TABLET BY MOUTH ONCE DAILY, Disp: 90 tablet, Rfl: 3  •  clopidogrel (PLAVIX) 75 MG tablet, Take 1 tablet by mouth Daily., Disp: 30 tablet, Rfl: 11  •  desvenlafaxine (PRISTIQ) 50 MG 24 hr tablet, Take 1 tablet by mouth Daily., Disp: 30 tablet, Rfl: 1  •  diazePAM (VALIUM) 2 MG tablet, Take 1 tablet by mouth 2 (Two) Times a Day As Needed for Anxiety., Disp: 10 tablet, Rfl: 0  •  ENBREL SURECLICK 50 MG/ML solution auto-injector, , Disp: , Rfl:   •  esomeprazole (NEXIUM) 40 MG capsule, Take 1 capsule by mouth Daily., Disp: , Rfl:   •  estrogen, conjugated,-medroxyprogesterone (PREMPRO) 0.45-1.5 MG per tablet, Take 1 tablet by mouth Daily., Disp: 28 tablet, Rfl: 11  •  folic acid " (FOLVITE) 1 MG tablet, Take 1 mg by mouth Daily., Disp: , Rfl:   •  methotrexate 2.5 MG tablet, TAKE 8 TABLETS BY MOUTH ONCE WEEKLY ON THE SAME DAY, Disp: 32 tablet, Rfl: 11  •  metoprolol succinate XL (TOPROL-XL) 50 MG 24 hr tablet, TAKE 1 TABLET BY MOUTH ONCE DAILY, Disp: 90 tablet, Rfl: 3  •  potassium chloride (K-DUR,KLOR-CON) 20 MEQ CR tablet, TAKE 1 TABLET BY MOUTH TWICE DAILY, Disp: 60 tablet, Rfl: 5  •  traMADol (ULTRAM) 50 MG tablet, TAKE 1 TABLET BY MOUTH EVERY 8 HOURS AS NEEDED FOR SEVERE PAIN, Disp: 30 tablet, Rfl: 2  •  vitamin D (ERGOCALCIFEROL) 1.25 MG (26159 UT) capsule capsule, TAKE 1 CAPSULE BY MOUTH ONCE A WEEK, Disp: 4 capsule, Rfl: 0    The following portions of the patient's history were reviewed and updated as appropriate: allergies, current medications, past family history, past medical history, past social history, past surgical history and problem list.    Review of Systems   Constitutional: Negative for fatigue and fever.   Eyes: Negative.  Negative for photophobia and visual disturbance.   Respiratory: Negative for cough, choking and chest tightness.    Cardiovascular: Positive for palpitations. Negative for chest pain and leg swelling.   Gastrointestinal: Negative.    Endocrine: Negative.    Genitourinary: Negative.    Musculoskeletal: Positive for arthralgias. Negative for neck pain.   Allergic/Immunologic: Negative.    Neurological: Positive for tremors, weakness and headaches.       Assessment     Physical Exam   Constitutional: She is oriented to person, place, and time. She appears well-developed and well-nourished.   HENT:   Head: Normocephalic.   Right Ear: External ear normal.   Neck: Neck supple. No thyromegaly present.   Cardiovascular: Normal rate, regular rhythm and normal heart sounds.   No murmur heard.  Pulmonary/Chest: Effort normal and breath sounds normal. No stridor. No respiratory distress.   Musculoskeletal: She exhibits no edema.   Neurological: She is alert and  oriented to person, place, and time. She displays normal reflexes. No cranial nerve deficit.   Tremors of head andRUE   Psychiatric: She has a normal mood and affect. Her behavior is normal.   Vitals reviewed.      Plan         Shaylee was seen today for hypertension and follow-up.    Diagnoses and all orders for this visit:    Essential hypertension  -     amlodipine-olmesartan (DAYRON) 10-40 MG per tablet; Take 1 tablet by mouth Daily.  -     metoprolol succinate XL (TOPROL-XL) 50 MG 24 hr tablet; Take 1 tablet by mouth Daily.        I have asked her to bring in her BP cuff to check against out monitor.     Follow up with Holter tomorrow.

## 2020-02-13 NOTE — TELEPHONE ENCOUNTER
Called and told her since it has been close to a year since her stent placement that she should be fine to hold aspirin and clopidogrel before colonoscopy.  She said colonoscopy is not scheduled yet.

## 2020-02-14 ENCOUNTER — LAB (OUTPATIENT)
Dept: LAB | Facility: HOSPITAL | Age: 60
End: 2020-02-14

## 2020-02-14 ENCOUNTER — HOSPITAL ENCOUNTER (OUTPATIENT)
Dept: CARDIOLOGY | Facility: HOSPITAL | Age: 60
Discharge: HOME | End: 2020-02-14
Admitting: INTERNAL MEDICINE

## 2020-02-14 ENCOUNTER — TRANSCRIBE ORDERS (OUTPATIENT)
Dept: ADMINISTRATIVE | Facility: HOSPITAL | Age: 60
End: 2020-02-14

## 2020-02-14 DIAGNOSIS — Z79.899 ENCOUNTER FOR LONG-TERM (CURRENT) USE OF OTHER MEDICATIONS: ICD-10-CM

## 2020-02-14 DIAGNOSIS — M05.79 SEROPOSITIVE RHEUMATOID ARTHRITIS OF MULTIPLE SITES (HCC): Primary | ICD-10-CM

## 2020-02-14 DIAGNOSIS — M05.79 SEROPOSITIVE RHEUMATOID ARTHRITIS OF MULTIPLE SITES (HCC): ICD-10-CM

## 2020-02-14 DIAGNOSIS — R00.2 PALPITATIONS: ICD-10-CM

## 2020-02-14 LAB
ALBUMIN SERPL-MCNC: 4.3 G/DL (ref 3.5–5.2)
ALP SERPL-CCNC: 81 U/L (ref 39–117)
ALT SERPL W P-5'-P-CCNC: 13 U/L (ref 1–33)
AST SERPL-CCNC: 15 U/L (ref 1–32)
BASOPHILS # BLD AUTO: 0.06 10*3/MM3 (ref 0–0.2)
BASOPHILS NFR BLD AUTO: 1.1 % (ref 0–1.5)
BILIRUB CONJ SERPL-MCNC: <0.2 MG/DL (ref 0.2–0.3)
BILIRUB INDIRECT SERPL-MCNC: ABNORMAL MG/DL
BILIRUB SERPL-MCNC: 0.5 MG/DL (ref 0.2–1.2)
DEPRECATED RDW RBC AUTO: 48.6 FL (ref 37–54)
EOSINOPHIL # BLD AUTO: 0.06 10*3/MM3 (ref 0–0.4)
EOSINOPHIL NFR BLD AUTO: 1.1 % (ref 0.3–6.2)
ERYTHROCYTE [DISTWIDTH] IN BLOOD BY AUTOMATED COUNT: 15.9 % (ref 12.3–15.4)
HCT VFR BLD AUTO: 37.8 % (ref 34–46.6)
HGB BLD-MCNC: 12.6 G/DL (ref 12–15.9)
IMM GRANULOCYTES # BLD AUTO: 0.01 10*3/MM3 (ref 0–0.05)
IMM GRANULOCYTES NFR BLD AUTO: 0.2 % (ref 0–0.5)
LYMPHOCYTES # BLD AUTO: 2.4 10*3/MM3 (ref 0.7–3.1)
LYMPHOCYTES NFR BLD AUTO: 43.6 % (ref 19.6–45.3)
MCH RBC QN AUTO: 28.1 PG (ref 26.6–33)
MCHC RBC AUTO-ENTMCNC: 33.3 G/DL (ref 31.5–35.7)
MCV RBC AUTO: 84.2 FL (ref 79–97)
MONOCYTES # BLD AUTO: 0.44 10*3/MM3 (ref 0.1–0.9)
MONOCYTES NFR BLD AUTO: 8 % (ref 5–12)
NEUTROPHILS # BLD AUTO: 2.54 10*3/MM3 (ref 1.7–7)
NEUTROPHILS NFR BLD AUTO: 46 % (ref 42.7–76)
NRBC BLD AUTO-RTO: 0 /100 WBC (ref 0–0.2)
PLATELET # BLD AUTO: 345 10*3/MM3 (ref 140–450)
PMV BLD AUTO: 9.5 FL (ref 6–12)
PROT SERPL-MCNC: 7.3 G/DL (ref 6–8.5)
RBC # BLD AUTO: 4.49 10*6/MM3 (ref 3.77–5.28)
WBC NRBC COR # BLD: 5.51 10*3/MM3 (ref 3.4–10.8)

## 2020-02-14 PROCEDURE — 85025 COMPLETE CBC W/AUTO DIFF WBC: CPT

## 2020-02-14 PROCEDURE — 36415 COLL VENOUS BLD VENIPUNCTURE: CPT

## 2020-02-14 PROCEDURE — 80076 HEPATIC FUNCTION PANEL: CPT

## 2020-02-14 PROCEDURE — 93226 XTRNL ECG REC<48 HR SCAN A/R: CPT

## 2020-02-14 PROCEDURE — 93225 XTRNL ECG REC<48 HRS REC: CPT

## 2020-02-17 PROCEDURE — 93227 XTRNL ECG REC<48 HR R&I: CPT | Performed by: INTERNAL MEDICINE

## 2020-02-24 ENCOUNTER — TELEPHONE (OUTPATIENT)
Dept: GASTROENTEROLOGY | Facility: CLINIC | Age: 60
End: 2020-02-24

## 2020-02-25 PROBLEM — R10.9 ABDOMINAL PAIN: Status: ACTIVE | Noted: 2020-02-25

## 2020-02-25 NOTE — TELEPHONE ENCOUNTER
Emailed  instructions     Date: _____3/6/20_________ Arrival Time: ___10:00_____    Hospital:  [facility] [place]([address] [place] [address] [place] [address]) (back of hospital at the emergency room)     Please buy the following:  • One 64oz or two 32oz bottle(s) of Gatorade or any other non-carbonated drink (NO RED OR PURPLE). You may buy sugar-free if you are diabetic. You may refrigerate if preferred.   • Dulcolax tablets (not suppository or stool softener - will need 6 tablets).  • Rashmi lax 238 grams (8.3oz) powder or generic form polyethylene glycol 3350.  • One bottle of Infants’ Mylicon liquid ask pharmacist for substitute if not available.  SEVEN DAYS BEFORE STOP ASPIRIN, ADVIL, ALEVE, MOTRIN, IBU or anything listed on the back of this form.  The day prior to colonoscopy, you will need to follow a clear liquid diet.   Clear liquid diet requirements:  You may consume water, fruit juices, jello, clear broth or bouillon, popsicles, Sprite, sports drinks, etc. (no orange, grapefruit or V-8). Please consume plenty of clear liquids. AVOID: All solid foods, dairy products and anything red or purple. Limit coffee and tea.  The day prior to colonoscopy, begin prep as detailed below:  1) In AM, mix all Rashmi lax, all Gatorade and 3 milliliters of the Mylicon drops (.3 x 10=3ml) Stir/shake contents until completely dissolved. Chill if preferred. DO NOT DRINK YET.  2) At 9AM, take 3 tablets of Dulcolax pills with a large glass of water.  3) At 11AM, start drinking one 8oz glass of the Gatorade/Rashmi lax/Mylicon solution every 15 minutes until half of solution is gone.   4) At 5PM, drink other half of solution by drinking an 8oz glass every 15 minutes.  5) At 6PM, take last 3 tablets with a large glass of water.  6) You may have liquids up to 4 hours prior.  You may take your morning heart, blood pressure, seizure, psych and breathing medications with a small sip of water. No gum or candy. No smoking or tobacco  products  7) You will need someone to drive you home after your exam. No bus or uber allowed. The average time spent is around 2-3 hours. You are not to drive, operate machinery or make legal decisions the remainder of the day.  Helpful tips:  • Some people may develop nausea/vomiting during this prep. The best option for this is to stop drinking the solution for about 30 minutes, then resume drinking at a slower rate. It is important to drink entire contents of solution.  • Walking in between drinking each glass reduces bloating.  • If diabetic, use sugar-free drinks and monitor blood sugar closely to prevent low blood sugar.      Please call the office the morning of your procedure if your bowel movements are still solid. [phone] Cynthia. If it is after hours or the weekend and you need to reach the provider or the office please call [phone].  Please call the office as soon as possible if you need to reschedule or cancel your procedure you must give two weeks’ notice.  If you do not show up or frequently reschedule your procedure your provider has the option of not rescheduling.   It is your responsibility to check with your insurance company to determine benefits and out of pocket costs.     Avoid these medications 5-7 days prior to surgery  Please check with your prescribing doctor before stopping any medications    NSAIDS- Advil, Aleve, Motrin, Ibuprofen, Midol, Excedrin, Fiorinal, Cami-Noble  (Some cold medications may have these in them)  Tylenol is okay.    All herbal medications- iron, vitamin E , fish oil, decongestants (phenylephrine, pseudoephedrine), ginkgo, garlic, ginseng, Lilliana’s wart    Mobic (meloxicam), Celebrex, Diclofenac (Voltaren), Nambumetone (Relafen), Daypro, Naproxen, Sulindac, Indomethacin, Toradol, Feldine, Salsalate, Etodolac (Lodine),     Viagra, Cialis, Levitra    Aspirin, Plavix (clopidogrel), Effient, Pletal, Coumadin, Pradaxa, Brilinta, Ticlide, Eliquis, (Xaralto- 3  days)      Diet pills -Adipex (phentermine) -2 weeks prior

## 2020-02-28 RX ORDER — CLOPIDOGREL BISULFATE 75 MG/1
TABLET ORAL
Qty: 90 TABLET | Refills: 1 | Status: SHIPPED | OUTPATIENT
Start: 2020-02-28 | End: 2020-04-09

## 2020-02-29 DIAGNOSIS — M85.80 OSTEOPENIA: ICD-10-CM

## 2020-03-03 RX ORDER — ERGOCALCIFEROL 1.25 MG/1
CAPSULE ORAL
Qty: 4 CAPSULE | Refills: 0 | Status: SHIPPED | OUTPATIENT
Start: 2020-03-03 | End: 2020-04-27

## 2020-03-05 ENCOUNTER — ANESTHESIA EVENT (OUTPATIENT)
Dept: PERIOP | Facility: HOSPITAL | Age: 60
End: 2020-03-05

## 2020-03-06 ENCOUNTER — ANESTHESIA (OUTPATIENT)
Dept: PERIOP | Facility: HOSPITAL | Age: 60
End: 2020-03-06

## 2020-03-06 ENCOUNTER — HOSPITAL ENCOUNTER (OUTPATIENT)
Facility: HOSPITAL | Age: 60
Setting detail: HOSPITAL OUTPATIENT SURGERY
Discharge: HOME | End: 2020-03-06
Attending: INTERNAL MEDICINE | Admitting: INTERNAL MEDICINE

## 2020-03-06 VITALS
TEMPERATURE: 98.7 F | OXYGEN SATURATION: 96 % | WEIGHT: 187.2 LBS | BODY MASS INDEX: 35.34 KG/M2 | DIASTOLIC BLOOD PRESSURE: 65 MMHG | HEIGHT: 61 IN | SYSTOLIC BLOOD PRESSURE: 127 MMHG | HEART RATE: 75 BPM | RESPIRATION RATE: 20 BRPM

## 2020-03-06 DIAGNOSIS — R10.9 ABDOMINAL PAIN, UNSPECIFIED ABDOMINAL LOCATION: ICD-10-CM

## 2020-03-06 DIAGNOSIS — R19.7 DIARRHEA, UNSPECIFIED TYPE: ICD-10-CM

## 2020-03-06 LAB — POTASSIUM BLD-SCNC: 3.7 MMOL/L (ref 3.5–5.2)

## 2020-03-06 PROCEDURE — 45380 COLONOSCOPY AND BIOPSY: CPT | Performed by: INTERNAL MEDICINE

## 2020-03-06 PROCEDURE — 88305 TISSUE EXAM BY PATHOLOGIST: CPT | Performed by: INTERNAL MEDICINE

## 2020-03-06 PROCEDURE — 25010000002 PROPOFOL 10 MG/ML EMULSION: Performed by: NURSE ANESTHETIST, CERTIFIED REGISTERED

## 2020-03-06 PROCEDURE — 84132 ASSAY OF SERUM POTASSIUM: CPT | Performed by: NURSE ANESTHETIST, CERTIFIED REGISTERED

## 2020-03-06 RX ORDER — ONDANSETRON 2 MG/ML
4 INJECTION INTRAMUSCULAR; INTRAVENOUS ONCE AS NEEDED
Status: CANCELLED | OUTPATIENT
Start: 2020-03-06

## 2020-03-06 RX ORDER — LIDOCAINE HYDROCHLORIDE 10 MG/ML
0.5 INJECTION, SOLUTION EPIDURAL; INFILTRATION; INTRACAUDAL; PERINEURAL ONCE AS NEEDED
Status: DISCONTINUED | OUTPATIENT
Start: 2020-03-06 | End: 2020-03-06 | Stop reason: HOSPADM

## 2020-03-06 RX ORDER — SODIUM CHLORIDE 0.9 % (FLUSH) 0.9 %
10 SYRINGE (ML) INJECTION AS NEEDED
Status: DISCONTINUED | OUTPATIENT
Start: 2020-03-06 | End: 2020-03-06 | Stop reason: HOSPADM

## 2020-03-06 RX ORDER — LIDOCAINE HYDROCHLORIDE 20 MG/ML
INJECTION, SOLUTION INFILTRATION; PERINEURAL AS NEEDED
Status: DISCONTINUED | OUTPATIENT
Start: 2020-03-06 | End: 2020-03-06 | Stop reason: SURG

## 2020-03-06 RX ORDER — MEPERIDINE HYDROCHLORIDE 25 MG/ML
12.5 INJECTION INTRAMUSCULAR; INTRAVENOUS; SUBCUTANEOUS
Status: CANCELLED | OUTPATIENT
Start: 2020-03-06 | End: 2020-03-07

## 2020-03-06 RX ORDER — SODIUM CHLORIDE 0.9 % (FLUSH) 0.9 %
10 SYRINGE (ML) INJECTION EVERY 12 HOURS SCHEDULED
Status: DISCONTINUED | OUTPATIENT
Start: 2020-03-06 | End: 2020-03-06 | Stop reason: HOSPADM

## 2020-03-06 RX ORDER — SODIUM CHLORIDE, SODIUM LACTATE, POTASSIUM CHLORIDE, CALCIUM CHLORIDE 600; 310; 30; 20 MG/100ML; MG/100ML; MG/100ML; MG/100ML
100 INJECTION, SOLUTION INTRAVENOUS CONTINUOUS
Status: CANCELLED | OUTPATIENT
Start: 2020-03-06

## 2020-03-06 RX ORDER — SODIUM CHLORIDE, SODIUM LACTATE, POTASSIUM CHLORIDE, CALCIUM CHLORIDE 600; 310; 30; 20 MG/100ML; MG/100ML; MG/100ML; MG/100ML
9 INJECTION, SOLUTION INTRAVENOUS CONTINUOUS
Status: DISCONTINUED | OUTPATIENT
Start: 2020-03-06 | End: 2020-03-06 | Stop reason: HOSPADM

## 2020-03-06 RX ORDER — SODIUM CHLORIDE 9 MG/ML
40 INJECTION, SOLUTION INTRAVENOUS AS NEEDED
Status: DISCONTINUED | OUTPATIENT
Start: 2020-03-06 | End: 2020-03-06 | Stop reason: HOSPADM

## 2020-03-06 RX ORDER — PROPOFOL 10 MG/ML
VIAL (ML) INTRAVENOUS AS NEEDED
Status: DISCONTINUED | OUTPATIENT
Start: 2020-03-06 | End: 2020-03-06 | Stop reason: SURG

## 2020-03-06 RX ORDER — EPHEDRINE SULFATE 50 MG/ML
INJECTION, SOLUTION INTRAVENOUS AS NEEDED
Status: DISCONTINUED | OUTPATIENT
Start: 2020-03-06 | End: 2020-03-06 | Stop reason: SURG

## 2020-03-06 RX ADMIN — PROPOFOL 50 MG: 10 INJECTION, EMULSION INTRAVENOUS at 11:04

## 2020-03-06 RX ADMIN — LIDOCAINE HYDROCHLORIDE 100 MG: 20 INJECTION, SOLUTION INFILTRATION; PERINEURAL at 11:00

## 2020-03-06 RX ADMIN — SODIUM CHLORIDE, POTASSIUM CHLORIDE, SODIUM LACTATE AND CALCIUM CHLORIDE 9 ML/HR: 600; 310; 30; 20 INJECTION, SOLUTION INTRAVENOUS at 10:28

## 2020-03-06 RX ADMIN — EPHEDRINE SULFATE 10 MG: 50 INJECTION, SOLUTION INTRAVENOUS at 11:14

## 2020-03-06 RX ADMIN — PROPOFOL 50 MG: 10 INJECTION, EMULSION INTRAVENOUS at 11:22

## 2020-03-06 RX ADMIN — PROPOFOL 50 MG: 10 INJECTION, EMULSION INTRAVENOUS at 11:16

## 2020-03-06 RX ADMIN — EPHEDRINE SULFATE 5 MG: 50 INJECTION, SOLUTION INTRAVENOUS at 11:26

## 2020-03-06 RX ADMIN — PROPOFOL 100 MG: 10 INJECTION, EMULSION INTRAVENOUS at 11:00

## 2020-03-06 RX ADMIN — EPHEDRINE SULFATE 5 MG: 50 INJECTION, SOLUTION INTRAVENOUS at 11:16

## 2020-03-06 NOTE — OP NOTE
Patient Name:  Shaylee Wadsworth  YOB: 1960    Date of Procedure:  3/6/2020    Procedure Performed: Colonoscopy     Indications: Abdominal pain, diarrhea colonoscopy    Pre-op Diagnosis:   Diarrhea, unspecified type [R19.7]  Abdominal pain, unspecified abdominal location [R10.9]    Post-Op Diagnosis Codes:     * Diarrhea, unspecified type [R19.7]     * Abdominal pain, unspecified abdominal location [R10.9]         Staff:  Surgeon(s):  Rain Kirk MD         Consent: Procedure colonoscopy indications, risks and procedure were discussed with the patient, including but not limited to, bleeding, infection, possibility of perforation and possible polypectomy. All of the patient's questions were answered, and signed informed consent was obtained and placed on the chart.      Sedation: Sedation was provided by anesthesia.      Estimated Blood Loss: minimal    Specimens:   ID Type Source Tests Collected by Time   A : Random biopsy Tissue Large Intestine TISSUE PATHOLOGY EXAM Rain Kirk MD 3/6/2020 1111   B : Rectal polyp x 3 Polyp Large Intestine, Rectum TISSUE PATHOLOGY EXAM Rain Kirk MD 3/6/2020 1121         Description of Procedure:   After excellent sedation a digital rectal examination is performed and a flexible colonoscope was inserted into the rectum passed to the cecum.  The cecum was identified by both the ileocecal valve and the appendiceal orifice.  The overall preparation was fair.  Upon withdrawal scope careful examination mucosa was made.  Terminal ileum was entered this is normal.  Pertinent findings include mild diverticulosis of the sigmoid colon.  The entire examined colonic mucosa was free of any inflammatory changes.  Random biopsies are obtained to rule out microscopic colitis.  The scope was carefully withdrawn to the rectal area were 3 polyps were seen and removed using forceps ranging in size between 2 to 4 mm sessile.  The scope was carefully withdrawn to the rectum  retroflexed were internal hemorrhoids are noted.  The scope was straightened and withdrawn out the patient with no immediate complications and she tolerated the procedure well.     Withdrawal time: 15 minutes    Quality of bowel preparation: Fair    Findings:   Polyps removed with forceps  Diverticulosis  We will await biopsy results.  Repeat colonoscopy will likely be 5 years based on pathology results    Complications: None        Rain Kirk MD     Date: 3/6/2020  Time: 11:27 AM

## 2020-03-06 NOTE — H&P
PATIENT INFORMATION  Shaylee Wadsworth         - 1960     CHIEF COMPLAINT      Chief Complaint   Patient presents with   • Abdominal Pain   • Diarrhea         HISTORY OF PRESENT ILLNESS  HPI     58 yo with diarrhea since 2019.  BM are 5-6 times daily. No nocturnal diarrhea. No blood in stool, does have intermittent cramping..  pcp did stool studies done which was negative for cx and c.diff. CT done :  HISTORY:  Right-sided abdominal pain for 2 weeks. Pain is cramping, mild, intermittent, no radiation. She does not think bm affect the pain.  She was tried on welchol in november with ?mild improvement   She has been feeling better in the past one month.  bm now are 3 times daily. Normal was once every other day.     TECHNIQUE:  Spiral CT was performed through the abdomen and pelvis following oral  and intravenous contrast administration. Radiation dose reduction  techniques included automated exposure control or exposure modulation  based on body size. Radiation audit for CT and nuclear cardiology exams  in the last 12 months: 0.     ABDOMEN FINDINGS:  The liver, spleen, pancreas, adrenal glands and kidneys are normal  except for small right renal cyst. The gallbladder is surgically absent.     PELVIS FINDINGS:  The gut, mesenteric and arabella structures are normal. The appendix is  normal. There is no free fluid in the abdomen or pelvis. Images of the  lung bases demonstrate small hiatal hernia.     IMPRESSION:  1. Right renal cyst.  2. Normal appendix.  3. Small hiatal hernia.        In looking back at her medications, lexapro was started in  and taken off last month.     Last cls was in  and hyperplastic polyps removed.  No family history of colon cancer or polyps.  REVIEW OF SYSTEMS  Review of Systems   Constitutional: Positive for diaphoresis, fatigue and unexpected weight change.   HENT: Positive for sinus pressure.    Respiratory: Positive for apnea.    Gastrointestinal: Positive for abdominal  distention, abdominal pain and diarrhea.   Endocrine: Positive for polydipsia.   Musculoskeletal: Positive for arthralgias, back pain, gait problem and joint swelling.   Neurological: Positive for tremors and headaches.   Hematological: Bruises/bleeds easily.   All other systems reviewed and are negative.           ACTIVE PROBLEMS      Patient Active Problem List     Diagnosis   • RUQ abdominal pain [R10.11]   • Diarrhea [R19.7]   • Pain in left ankle and joints of left foot [M25.572]   • Pain in right ankle and joints of right foot [M25.571]   • Pain of both shoulder joints [M25.511, M25.512]   • Pain in left shoulder [M25.512]   • Rheumatoid arthritis with rheumatoid factor of multiple sites without organ or systems involvement (CMS/HCC) [M05.79]   • Body mass index (BMI) 35.0-35.9, adult [Z68.35]   • Irritable bowel syndrome with diarrhea [K58.0]   • Depression due to physical illness [F06.31]   • S/P drug eluting coronary stent placement [Z95.5]   • Other spondylosis with myelopathy, thoracic region [M47.14]   • Personal history of immunosupression therapy [Z92.25]   • Rheumatoid arthritis of multiple sites without organ or system involvement with positive rheumatoid factor (CMS/HCC) [M05.79]   • Coronary artery disease involving native heart without angina pectoris [I25.10]   • Functional neurological symptom disorder with abnormal movement [F44.4]   • BPPV (benign paroxysmal positional vertigo) [H81.10]   • Dystonia [G24.9]   • Chronic right-sided thoracic back pain [M54.6, G89.29]   • Benign essential HTN [I10]   • Difficulty walking [R26.2]   • Astasia-abasia [F44.4]   • Functional movement disorder [G25.9]   • Tremor [R25.1]   • Anxiety related tremor [R25.1, F41.9]   • Hypokalemia [E87.6]   • Healthcare maintenance [Z00.00]   • Tendinitis of left shoulder [M75.82]   • Multiple pulmonary nodules determined by computed tomography of lung [R91.8]   • H/O hyperparathyroidism [Z86.39]   • Menopausal state  [N95.1]   • Epigastric pain [R10.13]   • Abnormal radiographic examination [R93.89]   • Disc disorder of thoracic region [M51.9]   • Gastroesophageal reflux disease [K21.9]   • Chronic gastritis [K29.50]   • Hyperlipidemia LDL goal <70 [E78.5]   • Menopausal symptom [N95.1]   • Migraine [G43.909]   • Osteoarthritis of shoulder [M19.019]   • Osteopenia [M85.80]   • Rheumatoid arthritis (CMS/HCC) [M06.9]            PAST MEDICAL HISTORY  Medical History        Past Medical History:   Diagnosis Date   • Anxiety     • Arthritis 5/1/2014     RA   • Lainez esophagus     • Benign paroxysmal positional vertigo due to bilateral vestibular disorder     • Chest pain     • Cholelithiasis 2007     Removed   • Coronary artery disease 2/26/19   • Depression 6/12/19   • Dystonia     • Esophageal reflux     • Fibromyositis     • Functional movement disorder     • H/O colonoscopy     • H/O mammogram 08/31/2011     NORMAL    • Hx of bone density study 08/31/2011     OSTEOPENIA    • Hyperlipidemia     • Hyperparathyroidism (CMS/HCC)     • Hypertension     • Low back pain     • Menopause     • Osteopenia     • Pancreatitis     • Scoliosis 2/16/13   • Tremor 4/4/2018               SURGICAL HISTORY  Surgical History         Past Surgical History:   Procedure Laterality Date   • CARDIAC CATHETERIZATION N/A 3/4/2019     Procedure: Coronary angiography;  Surgeon: Jackelyn Dumas MD;  Location: [facility] CATH INVASIVE LOCATION;  Service: Cardiovascular   • CARDIAC CATHETERIZATION N/A 3/4/2019     Procedure: Left heart cath;  Surgeon: Jackelyn Dumas MD;  Location: [facility] CATH INVASIVE LOCATION;  Service: Cardiovascular   • CARDIAC CATHETERIZATION N/A 3/4/2019     Procedure: Left ventriculography;  Surgeon: Jackelyn Dumas MD;  Location: [facility] CATH INVASIVE LOCATION;  Service: Cardiovascular   • CARDIAC CATHETERIZATION N/A 3/4/2019     Procedure: Stent KARLY coronary;  Surgeon: Jackelyn Dumas MD;  Location: [facility] CATH INVASIVE LOCATION;   Service: Cardiovascular   • CHOLECYSTECTOMY       • COLONOSCOPY   2014   • DILATATION AND CURETTAGE       • HYSTERECTOMY       • MOUTH SURGERY         TOOTH EXTRACTION   • OTHER SURGICAL HISTORY   03/27/2015     DIAGNOSTIC ESOPHAGOSCOPY TRANSNASAL FLEXIBLE WITH BIOPSY; ARZATE ESO. REPEAT EGD 2 YR    • OVARY SURGERY Left       NORMAL    • PAP SMEAR   08/23/2010     NORMAL    • PARATHYROIDECTOMY Right 08/17/2016     Dr. Muchael Flynn at [place]   • SUBTOTAL HYSTERECTOMY         Left ovary removed9               FAMILY HISTORY        Family History   Problem Relation Age of Onset   • Diabetes Mother     • Hyperlipidemia Mother     • Hypertension Mother     • Heart disease Mother     • Kidney disease Mother     • Arthritis Father     • Anxiety disorder Father     • COPD Father     • Glaucoma Father     • Hyperlipidemia Father     • Hypertension Father     • Vision loss Father     • Heart disease Father     • Heart disease Sister     • Breast cancer Neg Hx     • Colon cancer Neg Hx     • Colon polyps Neg Hx              SOCIAL HISTORY  Social History           Occupational History   • Not on file   Tobacco Use   • Smoking status: Passive Smoke Exposure - Never Smoker   • Smokeless tobacco: Never Used   • Tobacco comment: 8/12/18 switch to e-cig   Substance and Sexual Activity   • Alcohol use: No   • Drug use: No   • Sexual activity: Never         Debilities/Disabilities Identified: None     Emotional Behavior: Appropriate     CURRENT MEDICATIONS     Current Outpatient Medications:   •  amlodipine-olmesartan (DAYRON) 10-40 MG per tablet, Take 1 tablet by mouth Daily., Disp: 30 tablet, Rfl: 0  •  aspirin 81 MG tablet, Take 81 mg by mouth Daily., Disp: , Rfl:   •  atorvastatin (LIPITOR) 40 MG tablet, TAKE 1 TABLET BY MOUTH ONCE DAILY, Disp: 90 tablet, Rfl: 3  •  clopidogrel (PLAVIX) 75 MG tablet, Take 1 tablet by mouth Daily., Disp: 30 tablet, Rfl: 11  •  desvenlafaxine (PRISTIQ) 50 MG 24 hr tablet, Take 1 tablet by mouth  "Daily., Disp: 30 tablet, Rfl: 1  •  diazePAM (VALIUM) 2 MG tablet, Take 1 tablet by mouth 2 (Two) Times a Day As Needed for Anxiety., Disp: 10 tablet, Rfl: 0  •  ENBREL SURECLICK 50 MG/ML solution auto-injector, , Disp: , Rfl:   •  esomeprazole (NEXIUM) 40 MG capsule, Take 1 capsule by mouth Daily., Disp: , Rfl:   •  estrogen, conjugated,-medroxyprogesterone (PREMPRO) 0.45-1.5 MG per tablet, Take 1 tablet by mouth Daily., Disp: 28 tablet, Rfl: 11  •  folic acid (FOLVITE) 1 MG tablet, Take 1 mg by mouth Daily., Disp: , Rfl:   •  methotrexate 2.5 MG tablet, TAKE 8 TABLETS BY MOUTH ONCE WEEKLY ON THE SAME DAY, Disp: 32 tablet, Rfl: 11  •  metoprolol succinate XL (TOPROL-XL) 50 MG 24 hr tablet, TAKE 1 TABLET BY MOUTH ONCE DAILY, Disp: 90 tablet, Rfl: 3  •  potassium chloride (K-DUR,KLOR-CON) 20 MEQ CR tablet, TAKE 1 TABLET BY MOUTH TWICE DAILY, Disp: 60 tablet, Rfl: 5  •  traMADol (ULTRAM) 50 MG tablet, TAKE 1 TABLET BY MOUTH EVERY 8 HOURS AS NEEDED FOR SEVERE PAIN, Disp: 30 tablet, Rfl: 2  •  vitamin D (ERGOCALCIFEROL) 1.25 MG (13131 UT) capsule capsule, TAKE 1 CAPSULE BY MOUTH ONCE A WEEK, Disp: 4 capsule, Rfl: 0     ALLERGIES  Meloxicam and Effexor xr [venlafaxine hcl er]     VITALS  /78 (BP Location: Left arm, Patient Position: Lying)   Pulse 81   Temp 98.5 °F (36.9 °C) (Oral)   Resp 20   Ht 154.9 cm (60.98\")   Wt 84.9 kg (187 lb 3.2 oz)   LMP  (LMP Unknown) Comment: partial hysterectomy   SpO2 98%   BMI 35.39 kg/m²               LAST RESULTS                   Lab on 01/25/2020   Component Date Value Ref Range Status   • 5-HIAA, Urine 01/25/2020 1.1  Undefined mg/L Final     This test was developed and its performance characteristics  determined by [facility]. It has not been cleared or approved  by the Food and Drug Administration.   • 5-HIAA, 24H Ur 01/25/2020 3.0  0.0 - 14.9 mg/24 hr Final      Ct Abdomen Pelvis With Contrast     Result Date: 1/27/2020  Narrative: CT SCAN OF THE ABDOMEN AND PELVIS WITH " CONTRAST 01/27/2020  HISTORY: Right-sided abdominal pain for 2 weeks.  TECHNIQUE: Spiral CT was performed through the abdomen and pelvis following oral and intravenous contrast administration. Radiation dose reduction techniques included automated exposure control or exposure modulation based on body size. Radiation audit for CT and nuclear cardiology exams in the last 12 months: 0.  ABDOMEN FINDINGS: The liver, spleen, pancreas, adrenal glands and kidneys are normal except for small right renal cyst. The gallbladder is surgically absent.  PELVIS FINDINGS: The gut, mesenteric and arabella structures are normal. The appendix is normal. There is no free fluid in the abdomen or pelvis. Images of the lung bases demonstrate small hiatal hernia.       Impression: 1. Right renal cyst. 2. Normal appendix. 3. Small hiatal hernia.  This report was finalized on 1/27/2020 3:04 PM by Dr. Dre St MD.          PHYSICAL EXAM  Physical Exam   Constitutional: She is oriented to person, place, and time. She appears well-developed and well-nourished. No distress.   HENT:   Head: Normocephalic and atraumatic.   Mouth/Throat: Oropharynx is clear and moist.   Eyes: Pupils are equal, round, and reactive to light. EOM are normal.   Neck: Normal range of motion. No tracheal deviation present.   Cardiovascular: Normal rate, regular rhythm, normal heart sounds and intact distal pulses. Exam reveals no gallop and no friction rub.   No murmur heard.  Pulmonary/Chest: Effort normal and breath sounds normal. No stridor. No respiratory distress. She has no wheezes. She has no rales. She exhibits no tenderness.   Abdominal: Soft. Bowel sounds are normal. She exhibits no distension. There is no tenderness. There is no rebound and no guarding.   No pain   Musculoskeletal: She exhibits no edema.   Lymphadenopathy:     She has no cervical adenopathy.   Neurological: She is alert and oriented to person, place, and time.   Tremors noted   Skin: Skin is  warm. She is not diaphoretic.   Psychiatric: She has a normal mood and affect. Her behavior is normal. Judgment and thought content normal.   Nursing note and vitals reviewed.        ASSESSMENT  Diagnoses and all orders for this visit:     Diarrhea, unspecified type  -     Case Request; Standing  -     Case Request     Abdominal pain, unspecified abdominal location  -     Case Request; Standing  -     Case Request     Other orders  -     Follow Anesthesia Guidelines / Standing Orders; Future  -     Implement Anesthesia orders day of procedure.; Standing  -     Obtain informed consent; Standing              PLAN  No follow-ups on file.        Plan cls   Biopsies to rule out microscopic colitis.

## 2020-03-06 NOTE — ANESTHESIA PREPROCEDURE EVALUATION
Anesthesia Evaluation     Patient summary reviewed and Nursing notes reviewed   NPO Solid Status: > 8 hours  NPO Liquid Status: > 8 hours           Airway   Mallampati: II  TM distance: >3 FB  Neck ROM: full  No difficulty expected  Dental - normal exam     Pulmonary     breath sounds clear to auscultation  (+) sleep apnea ( non compliant with cpap),   Cardiovascular   Exercise tolerance: poor (<4 METS)    ECG reviewed  Rhythm: regular  Rate: normal    (+) hypertension, CAD, cardiac stents ( 4/4/2019 failed stress test. stent placed) within the past 12 months hyperlipidemia,       Neuro/Psych  (+) tremors ( functional neurologic disorder), psychiatric history Depression,     GI/Hepatic/Renal/Endo    (+) obesity,  GERD,      Musculoskeletal     (+) chronic pain, gait problem, myalgias, neck pain, neck stiffness,   Abdominal    Substance History      OB/GYN          Other   arthritis ( rheumatoid arthritis),      ROS/Med Hx Other: Last asa 1 week ago  Last plavix 1 week ago    Narrative     Jackelyn Dumas MD     10/8/2019 11:11 AM    ECG 12 Lead  Date/Time: 10/8/2019 11:07 AM  Performed by: Jackelyn Dumas MD  Authorized by: Jackelyn Dumas MD   Comparison: compared with previous ECG   Similar to previous ECG  Rhythm: sinus rhythm  T flattening: V1, V2, V3, V4, V5 and V6                      Anesthesia Plan    ASA 3     MAC     intravenous induction     Anesthetic plan, all risks, benefits, and alternatives have been provided, discussed and informed consent has been obtained with: patient.  Use of blood products discussed with patient  Consented to blood products.   Plan discussed with CRNA.

## 2020-03-06 NOTE — DISCHARGE INSTRUCTIONS
Colonoscopy, Adult, Care After  This sheet gives you information about how to care for yourself after your procedure. Your doctor may also give you more specific instructions. If you have problems or questions, call your doctor.  What can I expect after the procedure?  After the procedure, it is common to have:  · A small amount of blood in your poop for 24 hours.  · Some gas.  · Mild cramping or bloating in your belly.  Follow these instructions at home:  General instructions    ***************DO NOT DRIVE TODAY*******************    · For the first 24 hours after the procedure:  ? Do not sign important documents.  ? Do not drink alcohol.  ? Do your daily activities more slowly than normal.  ? Eat foods that are soft and easy to digest.  · Take over-the-counter or prescription medicines only as told by your doctor.  To help cramping and bloating:       · Try walking around.  · Put heat on your belly (abdomen) as told by your doctor. Use a heat source that your doctor recommends, such as a moist heat pack or a heating pad.  ? Put a towel between your skin and the heat source.  ? Leave the heat on for 20-30 minutes.  ? Remove the heat if your skin turns bright red. This is especially important if you cannot feel pain, heat, or cold. You can get burned.  Eating and drinking  Monitored Anesthesia Care, Care After  These instructions provide you with information about caring for yourself after your procedure. Your health care provider may also give you more specific instructions. Your treatment has been planned according to current medical practices, but problems sometimes occur. Call your health care provider if you have any problems or questions after your procedure.  What can I expect after the procedure?  After your procedure, you may:  · Feel sleepy for several hours.  · Feel clumsy and have poor balance for several hours.  · Feel forgetful about what happened after the procedure.  · Have poor judgment for several  hours.  · Feel nauseous or vomit.  · Have a sore throat if you had a breathing tube during the procedure.  Follow these instructions at home:    *****************NO DRIVING TODAY****************************    For at least 24 hours after the procedure:             · Have a responsible adult stay with you. It is important to have someone help care for you until you are awake and alert.  · Rest as needed.  · Do not:  ? Participate in activities in which you could fall or become injured.  ? Use heavy machinery.  ? Drink alcohol.  ? Take sleeping pills or medicines that cause drowsiness.  ? Make important decisions or sign legal documents.  ? Take care of children on your own.  Eating and drinking  · Follow the diet that is recommended by your health care provider.  · If you vomit, drink water, juice, or soup when you can drink without vomiting.  · Make sure you have little or no nausea before eating solid foods.  General instructions  · Take over-the-counter and prescription medicines only as told by your health care provider.  · If you have sleep apnea, surgery and certain medicines can increase your risk for breathing problems. Follow instructions from your health care provider about wearing your sleep device:  ? Anytime you are sleeping, including during daytime naps.  ? While taking prescription pain medicines, sleeping medicines, or medicines that make you drowsy.  · If you smoke, do not smoke without supervision.  · Keep all follow-up visits as told by your health care provider. This is important.  Contact a health care provider if:  · You keep feeling nauseous or you keep vomiting.  · You feel light-headed.  · You develop a rash.  · You have a fever.  Get help right away if:  · You have trouble breathing.  Summary  · For several hours after your procedure, you may feel sleepy and have poor judgment.  · Have a responsible adult stay with you for at least 24 hours or until you are awake and alert.  This  information is not intended to replace advice given to you by your health care provider. Make sure you discuss any questions you have with your health care provider.  Document Released: 04/09/2017 Document Revised: 08/03/2018 Document Reviewed: 04/09/2017  [employer] Interactive Patient Education © 2019 [employer]

## 2020-03-11 LAB
CYTO UR: NORMAL
LAB AP CASE REPORT: NORMAL
PATH REPORT.FINAL DX SPEC: NORMAL
PATH REPORT.GROSS SPEC: NORMAL

## 2020-03-12 NOTE — PROGRESS NOTES
Random colon biopsies are normal  Colon polyp is hyperplastic  Screening can be kept at every 10 years

## 2020-04-01 ENCOUNTER — TELEPHONE (OUTPATIENT)
Dept: CARDIOLOGY | Facility: CLINIC | Age: 60
End: 2020-04-01

## 2020-04-01 ENCOUNTER — PATIENT MESSAGE (OUTPATIENT)
Dept: CARDIOLOGY | Facility: CLINIC | Age: 60
End: 2020-04-01

## 2020-04-01 NOTE — TELEPHONE ENCOUNTER
She would be fine to be a telephone or video visit if she is ok with that.  We can discuss plavix at that time.  thanks

## 2020-04-01 NOTE — TELEPHONE ENCOUNTER
----- Message from Shaylee Wadsworth sent at 4/1/2020  9:53 AM EDT -----  Regarding: Prescription Question  Contact: [phone]  Hi Dr Dumas    With the corona virus right now I thought that I should reschedule my appointment.  I refilled my plavix prescription about 3 weeks ago for a 90 day supply and was wondering when I can go off?  Not sure when to reschedule?

## 2020-04-01 NOTE — TELEPHONE ENCOUNTER
See below. Her appt is set for 4/9/20. Is she a candidate for a telephone/video visit?  Please advise.    ThanksMeryl

## 2020-04-01 NOTE — TELEPHONE ENCOUNTER
See below. I informed the pt. Can you call and get her setup when you get a chance.    Thanks,  Meryl

## 2020-04-09 ENCOUNTER — OFFICE VISIT (OUTPATIENT)
Dept: CARDIOLOGY | Facility: CLINIC | Age: 60
End: 2020-04-09

## 2020-04-09 VITALS
HEART RATE: 71 BPM | WEIGHT: 190 LBS | SYSTOLIC BLOOD PRESSURE: 131 MMHG | HEIGHT: 61 IN | DIASTOLIC BLOOD PRESSURE: 81 MMHG | BODY MASS INDEX: 35.87 KG/M2

## 2020-04-09 DIAGNOSIS — E78.5 HYPERLIPIDEMIA LDL GOAL <70: ICD-10-CM

## 2020-04-09 DIAGNOSIS — R25.1 TREMOR: ICD-10-CM

## 2020-04-09 DIAGNOSIS — M05.9 RHEUMATOID ARTHRITIS WITH POSITIVE RHEUMATOID FACTOR, INVOLVING UNSPECIFIED SITE (HCC): ICD-10-CM

## 2020-04-09 DIAGNOSIS — I10 BENIGN ESSENTIAL HTN: ICD-10-CM

## 2020-04-09 DIAGNOSIS — Z95.5 S/P DRUG ELUTING CORONARY STENT PLACEMENT: ICD-10-CM

## 2020-04-09 DIAGNOSIS — I25.10 CORONARY ARTERY DISEASE INVOLVING NATIVE CORONARY ARTERY OF NATIVE HEART WITHOUT ANGINA PECTORIS: Primary | ICD-10-CM

## 2020-04-09 DIAGNOSIS — R00.2 PALPITATIONS: ICD-10-CM

## 2020-04-09 DIAGNOSIS — F44.4 FUNCTIONAL NEUROLOGICAL SYMPTOM DISORDER WITH ABNORMAL MOVEMENT: ICD-10-CM

## 2020-04-09 PROCEDURE — 99443 PR PHYS/QHP TELEPHONE EVALUATION 21-30 MIN: CPT | Performed by: INTERNAL MEDICINE

## 2020-04-16 ENCOUNTER — TELEPHONE (OUTPATIENT)
Dept: INTERNAL MEDICINE | Facility: CLINIC | Age: 60
End: 2020-04-16

## 2020-04-20 RX ORDER — ESCITALOPRAM OXALATE 10 MG/1
10 TABLET ORAL DAILY
Qty: 30 TABLET | Refills: 0 | Status: SHIPPED | OUTPATIENT
Start: 2020-04-20 | End: 2020-06-30

## 2020-04-25 DIAGNOSIS — M85.80 OSTEOPENIA: ICD-10-CM

## 2020-04-27 RX ORDER — ERGOCALCIFEROL 1.25 MG/1
CAPSULE ORAL
Qty: 4 CAPSULE | Refills: 0 | Status: SHIPPED | OUTPATIENT
Start: 2020-04-27 | End: 2020-05-31

## 2020-05-30 DIAGNOSIS — M85.80 OSTEOPENIA: ICD-10-CM

## 2020-05-30 DIAGNOSIS — I10 ESSENTIAL HYPERTENSION: ICD-10-CM

## 2020-05-31 RX ORDER — ERGOCALCIFEROL 1.25 MG/1
CAPSULE ORAL
Qty: 4 CAPSULE | Refills: 0 | Status: SHIPPED | OUTPATIENT
Start: 2020-05-31 | End: 2020-06-30 | Stop reason: SDUPTHER

## 2020-05-31 RX ORDER — ERGOCALCIFEROL 1.25 MG/1
CAPSULE ORAL
Qty: 4 CAPSULE | Refills: 0 | Status: SHIPPED | OUTPATIENT
Start: 2020-05-31 | End: 2020-07-27

## 2020-05-31 RX ORDER — METOPROLOL SUCCINATE 50 MG/1
TABLET, EXTENDED RELEASE ORAL
Qty: 90 TABLET | Refills: 0 | Status: SHIPPED | OUTPATIENT
Start: 2020-05-31 | End: 2020-08-27

## 2020-06-15 ENCOUNTER — TELEPHONE (OUTPATIENT)
Dept: INTERNAL MEDICINE | Facility: CLINIC | Age: 60
End: 2020-06-15

## 2020-06-15 NOTE — TELEPHONE ENCOUNTER
Pt comes by office today to drop of patient assistance application with [employer].  She is scheduled for an appt with PCP this month and needs labs prior.,  Can lab orders please be put in and patient notified.=?

## 2020-06-16 DIAGNOSIS — E03.9 HYPOTHYROIDISM, UNSPECIFIED TYPE: ICD-10-CM

## 2020-06-16 DIAGNOSIS — Z00.00 ROUTINE ADULT HEALTH MAINTENANCE: Primary | ICD-10-CM

## 2020-06-16 DIAGNOSIS — E55.9 VITAMIN D DEFICIENCY: ICD-10-CM

## 2020-06-16 DIAGNOSIS — E21.0 PRIMARY HYPERPARATHYROIDISM (HCC): Primary | ICD-10-CM

## 2020-06-16 DIAGNOSIS — N95.1 MENOPAUSAL SYMPTOM: ICD-10-CM

## 2020-06-18 ENCOUNTER — LAB (OUTPATIENT)
Dept: LAB | Facility: HOSPITAL | Age: 60
End: 2020-06-18

## 2020-06-18 DIAGNOSIS — E21.0 PRIMARY HYPERPARATHYROIDISM (HCC): ICD-10-CM

## 2020-06-18 DIAGNOSIS — Z79.899 ENCOUNTER FOR LONG-TERM (CURRENT) USE OF OTHER MEDICATIONS: ICD-10-CM

## 2020-06-18 DIAGNOSIS — Z00.00 ROUTINE ADULT HEALTH MAINTENANCE: ICD-10-CM

## 2020-06-18 DIAGNOSIS — E55.9 VITAMIN D DEFICIENCY: ICD-10-CM

## 2020-06-18 DIAGNOSIS — E03.9 HYPOTHYROIDISM, UNSPECIFIED TYPE: ICD-10-CM

## 2020-06-18 DIAGNOSIS — N95.1 MENOPAUSAL SYMPTOM: ICD-10-CM

## 2020-06-18 DIAGNOSIS — M05.79 SEROPOSITIVE RHEUMATOID ARTHRITIS OF MULTIPLE SITES (HCC): ICD-10-CM

## 2020-06-18 LAB
ALBUMIN SERPL-MCNC: 4.5 G/DL (ref 3.5–5.2)
ALBUMIN/GLOB SERPL: 1.4 G/DL
ALP SERPL-CCNC: 83 U/L (ref 39–117)
ALT SERPL W P-5'-P-CCNC: 13 U/L (ref 1–33)
ANION GAP SERPL CALCULATED.3IONS-SCNC: 11.7 MMOL/L (ref 5–15)
AST SERPL-CCNC: 14 U/L (ref 1–32)
BASOPHILS # BLD AUTO: 0.07 10*3/MM3 (ref 0–0.2)
BASOPHILS NFR BLD AUTO: 1.1 % (ref 0–1.5)
BILIRUB SERPL-MCNC: 0.5 MG/DL (ref 0.2–1.2)
BUN BLD-MCNC: 13 MG/DL (ref 6–20)
BUN/CREAT SERPL: 16 (ref 7–25)
CA-I BLD-MCNC: 4.96 MG/DL (ref 4.6–5.4)
CALCIUM SPEC-SCNC: 10.1 MG/DL (ref 8.6–10.5)
CHLORIDE SERPL-SCNC: 100 MMOL/L (ref 98–107)
CHOLEST SERPL-MCNC: 163 MG/DL (ref 0–200)
CO2 SERPL-SCNC: 25.3 MMOL/L (ref 22–29)
CREAT BLD-MCNC: 0.81 MG/DL (ref 0.57–1)
DEPRECATED RDW RBC AUTO: 47.9 FL (ref 37–54)
EOSINOPHIL # BLD AUTO: 0.09 10*3/MM3 (ref 0–0.4)
EOSINOPHIL NFR BLD AUTO: 1.4 % (ref 0.3–6.2)
ERYTHROCYTE [DISTWIDTH] IN BLOOD BY AUTOMATED COUNT: 15.4 % (ref 12.3–15.4)
GFR SERPL CREATININE-BSD FRML MDRD: 73 ML/MIN/1.73
GLOBULIN UR ELPH-MCNC: 3.2 GM/DL
GLUCOSE BLD-MCNC: 115 MG/DL (ref 65–99)
HCT VFR BLD AUTO: 34.7 % (ref 34–46.6)
HCV AB SER DONR QL: NORMAL
HDLC SERPL QL: 2.55
HDLC SERPL-MCNC: 64 MG/DL (ref 40–60)
HGB BLD-MCNC: 11.5 G/DL (ref 12–15.9)
IMM GRANULOCYTES # BLD AUTO: 0.01 10*3/MM3 (ref 0–0.05)
IMM GRANULOCYTES NFR BLD AUTO: 0.2 % (ref 0–0.5)
LDLC SERPL CALC-MCNC: 71 MG/DL (ref 0–100)
LYMPHOCYTES # BLD AUTO: 2.37 10*3/MM3 (ref 0.7–3.1)
LYMPHOCYTES NFR BLD AUTO: 35.6 % (ref 19.6–45.3)
MCH RBC QN AUTO: 28.2 PG (ref 26.6–33)
MCHC RBC AUTO-ENTMCNC: 33.1 G/DL (ref 31.5–35.7)
MCV RBC AUTO: 85 FL (ref 79–97)
MONOCYTES # BLD AUTO: 0.53 10*3/MM3 (ref 0.1–0.9)
MONOCYTES NFR BLD AUTO: 8 % (ref 5–12)
NEUTROPHILS # BLD AUTO: 3.58 10*3/MM3 (ref 1.7–7)
NEUTROPHILS NFR BLD AUTO: 53.7 % (ref 42.7–76)
NRBC BLD AUTO-RTO: 0 /100 WBC (ref 0–0.2)
PLATELET # BLD AUTO: 322 10*3/MM3 (ref 140–450)
PMV BLD AUTO: 9.9 FL (ref 6–12)
POTASSIUM BLD-SCNC: 3.9 MMOL/L (ref 3.5–5.2)
PROT SERPL-MCNC: 7.7 G/DL (ref 6–8.5)
RBC # BLD AUTO: 4.08 10*6/MM3 (ref 3.77–5.28)
SODIUM BLD-SCNC: 137 MMOL/L (ref 136–145)
TRIGL SERPL-MCNC: 140 MG/DL (ref 0–150)
TSH SERPL DL<=0.05 MIU/L-ACNC: 4.76 UIU/ML (ref 0.27–4.2)
VLDLC SERPL-MCNC: 28 MG/DL (ref 5–40)
WBC NRBC COR # BLD: 6.65 10*3/MM3 (ref 3.4–10.8)

## 2020-06-18 PROCEDURE — 84443 ASSAY THYROID STIM HORMONE: CPT

## 2020-06-18 PROCEDURE — 80061 LIPID PANEL: CPT

## 2020-06-18 PROCEDURE — 84439 ASSAY OF FREE THYROXINE: CPT

## 2020-06-18 PROCEDURE — 84100 ASSAY OF PHOSPHORUS: CPT

## 2020-06-18 PROCEDURE — 82533 TOTAL CORTISOL: CPT

## 2020-06-18 PROCEDURE — 84165 PROTEIN E-PHORESIS SERUM: CPT

## 2020-06-18 PROCEDURE — 85025 COMPLETE CBC W/AUTO DIFF WBC: CPT

## 2020-06-18 PROCEDURE — 82330 ASSAY OF CALCIUM: CPT | Performed by: INTERNAL MEDICINE

## 2020-06-18 PROCEDURE — 80053 COMPREHEN METABOLIC PANEL: CPT

## 2020-06-18 PROCEDURE — 83735 ASSAY OF MAGNESIUM: CPT

## 2020-06-18 PROCEDURE — 82024 ASSAY OF ACTH: CPT

## 2020-06-18 PROCEDURE — 86803 HEPATITIS C AB TEST: CPT

## 2020-06-18 PROCEDURE — 82306 VITAMIN D 25 HYDROXY: CPT

## 2020-06-18 PROCEDURE — 83970 ASSAY OF PARATHORMONE: CPT

## 2020-06-18 PROCEDURE — 36415 COLL VENOUS BLD VENIPUNCTURE: CPT

## 2020-06-19 LAB
25(OH)D3 SERPL-MCNC: 41.1 NG/ML (ref 30–100)
ACTH PLAS-MCNC: 24.1 PG/ML (ref 7.2–63.3)
ALBUMIN SERPL-MCNC: 3.6 G/DL (ref 2.9–4.4)
ALBUMIN/GLOB SERPL: 1.1 {RATIO} (ref 0.7–1.7)
ALPHA1 GLOB FLD ELPH-MCNC: 0.3 G/DL (ref 0–0.4)
ALPHA2 GLOB SERPL ELPH-MCNC: 0.9 G/DL (ref 0.4–1)
B-GLOBULIN SERPL ELPH-MCNC: 1.2 G/DL (ref 0.7–1.3)
CORTIS AM PEAK SERPL-MCNC: 12 UG/DL (ref 6.2–19.4)
GAMMA GLOB SERPL ELPH-MCNC: 1 G/DL (ref 0.4–1.8)
GLOBULIN SER CALC-MCNC: 3.3 G/DL (ref 2.2–3.9)
Lab: NORMAL
M-SPIKE: NORMAL G/DL
MAGNESIUM SERPL-MCNC: 1.8 MG/DL (ref 1.6–2.3)
PHOSPHORUS,INORGANIC: 3 MG/DL (ref 3–4.3)
PROT PATTERN SERPL ELPH-IMP: NORMAL
PROT SERPL-MCNC: 6.9 G/DL (ref 6–8.5)
PTH-INTACT SERPL-MCNC: 142 PG/ML (ref 15–65)
T4 FREE SERPL-MCNC: 1.41 NG/DL (ref 0.82–1.77)

## 2020-06-22 ENCOUNTER — OFFICE VISIT (OUTPATIENT)
Dept: ENDOCRINOLOGY | Age: 60
End: 2020-06-22

## 2020-06-22 DIAGNOSIS — E78.2 HYPERLIPEMIA, MIXED: ICD-10-CM

## 2020-06-22 DIAGNOSIS — E03.9 ACQUIRED HYPOTHYROIDISM: Primary | ICD-10-CM

## 2020-06-22 DIAGNOSIS — E21.0 PRIMARY HYPERPARATHYROIDISM (HCC): ICD-10-CM

## 2020-06-22 PROCEDURE — 99443 PR PHYS/QHP TELEPHONE EVALUATION 21-30 MIN: CPT | Performed by: INTERNAL MEDICINE

## 2020-06-22 RX ORDER — LEVOTHYROXINE SODIUM 0.03 MG/1
25 TABLET ORAL DAILY
Qty: 30 TABLET | Refills: 11 | Status: SHIPPED | OUTPATIENT
Start: 2020-06-22 | End: 2021-03-05 | Stop reason: SDUPTHER

## 2020-06-22 NOTE — PROGRESS NOTES
59 y.o.    Patient Care Team:  Laura Ayala APRN as PCP - General  Laura Ayala APRN as PCP - Family Medicine    Chief Complaint:    FOLLOW UP/HYPERPARATHROIDISM  Subjective     HPI     59-year-old white male is here as a follow-up for the evaluation of hyperparathyroidism.     Patient has been suffering with chronic fatigue for the last 3 years mainly since 2018 and the symptom has been gradually getting worse.  Her tremors are not limited to her hands alone but she has them throughout her body.  They also result in a mild trembling in her voice.  Patient has been extensively worked up by 2 neurologist at [facility] and has been given 2 different diagnoses one was labeled as functional neurological disorder and the other one was benign paroxysmal positional vertigo.     Patient was seen by me during her last visit to see if she has hormonal abnormalities that is contributing to her tremors.  In 2016 patient was noted to have elevated parathyroid levels and calcium levels she underwent right inferior parathyroidectomy with normalization of the parathyroid levels.    However for the last 1 year patient has been noted to have elevated parathyroid levels, latest parathyroid level being 142 pg/mL.  With normal calcium levels.  Patient also had multiple parathyroid scans which did not show any concerning parathyroid adenomas.    Patient was evaluated by ENT who felt that normalization of the parathyroid levels would not contribute to the resolution of her tremors.  And also since the parathyroid adenomas were not noted in the scans she would be at a high risk for surgery with no improvement in her symptoms.    Today in visit patient feels that her joint pains could be related to the calcium issue, patient has been time and again emphasized that her calcium levels have been within normal limits and that would not be contributing to the joint pains that she is experiencing.  Patient also has a diagnosis of  rheumatoid arthritis, for which she is evaluated by the rheumatologist and she is on medications for this.  Since the last visit patient has been on steroids for almost a week 2 times because of the flareup of her rheumatoid arthritis.    Clinically she is concerned if she has MGUS, she gets most of this information by her review of online information    You have chosen to receive care through a telephone visit. Do you consent to use a telephone visit for your medical care today? Yes      Reviewed primary care physician's/consulting physician documentation and lab results :     Interval History      The following portions of the patient's history were reviewed and updated as appropriate: allergies, current medications, past family history, past medical history, past social history, past surgical history and problem list.    Past Medical History:   Diagnosis Date   • Anxiety    • Arthritis 5/1/2014    RA   • Lainez esophagus    • Benign paroxysmal positional vertigo due to bilateral vestibular disorder    • Chest pain    • Cholelithiasis 2007    Removed   • Colon polyp    • Coronary artery disease 2/26/19   • Depression 6/12/19   • Dystonia    • Esophageal reflux    • Fibromyositis    • Functional movement disorder    • H/O colonoscopy    • H/O mammogram 08/31/2011    NORMAL    • Hx of bone density study 08/31/2011    OSTEOPENIA    • Hyperlipidemia    • Hyperparathyroidism (CMS/HCC)    • Hypertension    • Low back pain    • Menopause    • Osteopenia    • Pancreatitis    • Scoliosis 2/16/13   • Tremor 4/4/2018     Family History   Problem Relation Age of Onset   • Diabetes Mother    • Hyperlipidemia Mother    • Hypertension Mother    • Heart disease Mother    • Kidney disease Mother    • Arthritis Father    • Anxiety disorder Father    • COPD Father    • Glaucoma Father    • Hyperlipidemia Father    • Hypertension Father    • Vision loss Father    • Heart disease Father    • Heart disease Sister    • Breast cancer  Neg Hx    • Colon cancer Neg Hx    • Colon polyps Neg Hx      Social History     Socioeconomic History   • Marital status:      Spouse name: Not on file   • Number of children: Not on file   • Years of education: Not on file   • Highest education level: Not on file   Tobacco Use   • Smoking status: Passive Smoke Exposure - Never Smoker   • Smokeless tobacco: Current User   • Tobacco comment: 8/12/18 switch to e-cig   Substance and Sexual Activity   • Alcohol use: No   • Drug use: No   • Sexual activity: Never     Allergies   Allergen Reactions   • Meloxicam Swelling     EYES AND FACE SWELLING  EYES AND FACE SWELLING   • Effexor Xr [Venlafaxine Hcl Er] Other (See Comments)     Caused weight gain       Current Outpatient Medications:   •  amlodipine-olmesartan (DAYRON) 10-40 MG per tablet, Take 1 tablet by mouth Daily., Disp: 90 tablet, Rfl: 3  •  aspirin 81 MG tablet, Take 81 mg by mouth Daily., Disp: , Rfl:   •  atorvastatin (LIPITOR) 40 MG tablet, TAKE 1 TABLET BY MOUTH ONCE DAILY, Disp: 90 tablet, Rfl: 3  •  diazePAM (VALIUM) 2 MG tablet, Take 1 tablet by mouth 2 (Two) Times a Day As Needed for Anxiety., Disp: 10 tablet, Rfl: 0  •  ENBREL SURECLICK 50 MG/ML solution auto-injector, , Disp: , Rfl:   •  escitalopram (Lexapro) 10 MG tablet, Take 1 tablet by mouth Daily., Disp: 30 tablet, Rfl: 0  •  esomeprazole (NEXIUM) 40 MG capsule, Take 1 capsule by mouth Daily., Disp: , Rfl:   •  estrogen, conjugated,-medroxyprogesterone (PREMPRO) 0.45-1.5 MG per tablet, Take 1 tablet by mouth Daily. (Patient taking differently: Take 1 tablet by mouth. Monday, Wednesday & Friday), Disp: 28 tablet, Rfl: 11  •  folic acid (FOLVITE) 1 MG tablet, Take 2 mg by mouth Daily., Disp: , Rfl:   •  methotrexate 2.5 MG tablet, TAKE 8 TABLETS BY MOUTH ONCE A WEEK ON THE SAME DAY, Disp: 32 tablet, Rfl: 0  •  metoprolol succinate XL (TOPROL-XL) 50 MG 24 hr tablet, Take 1 tablet by mouth once daily, Disp: 90 tablet, Rfl: 0  •  potassium  chloride (K-DUR,KLOR-CON) 20 MEQ CR tablet, TAKE 1 TABLET BY MOUTH TWICE DAILY (Patient taking differently: Daily.), Disp: 60 tablet, Rfl: 5  •  vitamin D (ERGOCALCIFEROL) 1.25 MG (19048 UT) capsule capsule, Take 1 capsule by mouth once a week, Disp: 4 capsule, Rfl: 0  •  vitamin D (ERGOCALCIFEROL) 1.25 MG (24850 UT) capsule capsule, Take 1 capsule by mouth once a week, Disp: 4 capsule, Rfl: 0  •  levothyroxine (SYNTHROID, LEVOTHROID) 25 MCG tablet, Take 1 tablet by mouth Daily., Disp: 30 tablet, Rfl: 11        Review of Systems   Constitutional: Positive for fatigue. Negative for appetite change and fever.   Eyes: Negative for visual disturbance.   Respiratory: Negative for shortness of breath.    Cardiovascular: Negative for palpitations and leg swelling.   Gastrointestinal: Negative for abdominal pain and vomiting.   Endocrine: Negative for polydipsia and polyuria.   Musculoskeletal: Negative for joint swelling and neck pain.   Skin: Negative for rash.   Neurological: Negative for weakness and numbness.   Psychiatric/Behavioral: Negative for behavioral problems.     I have reviewed the ROS as documented by the MA; Marcel Caraballo MD.      Objective       There were no vitals filed for this visit.  There is no height or weight on file to calculate BMI.      Physical Exam  General appearance-pleasant, no distress  Respiratory-normal breathing appreciated.  No respiratory distress noted  Ear nose and throat-no hard of hearing.  Neurological assessment-alert and oriented x3.    Results Review:     I reviewed the patient's new clinical results and mentioned them above in HPI and in plan as well.    Medical records reviewed  Summary:Done      Lab on 06/18/2020   Component Date Value Ref Range Status   • Free T4 06/18/2020 1.41  0.82 - 1.77 ng/dL Final   • Magnesium 06/18/2020 1.8  1.6 - 2.3 mg/dL Final   • ACTH 06/18/2020 24.1  7.2 - 63.3 pg/mL Final    ACTH reference interval for samples collected between 7 and 10 AM.    • Cortisol - AM 06/18/2020 12.0  6.2 - 19.4 ug/dL Final   • 25 Hydroxy, Vitamin D 06/18/2020 41.1  30.0 - 100.0 ng/mL Final    Vitamin D deficiency has been defined by the [place] of  Medicine and an Endocrine Society practice guideline as a  level of serum 25-OH vitamin D less than 20 ng/mL (1,2).  The Endocrine Society went on to further define vitamin D  insufficiency as a level between 21 and 29 ng/mL (2).  1. IOM ([place] of Medicine). 2010. Dietary reference     intakes for calcium and D. [place]: The     National [employer]B [employer] Press.  2. Joby MF, Gianni NC, Simon CHAMPAGNE, et al.     Evaluation, treatment, and prevention of vitamin D     deficiency: an Endocrine Society clinical practice     guideline. JCEM. 2011 Jul; 96(7):1911-30.   • PTH, Intact 06/18/2020 142* 15 - 65 pg/mL Final   • Total Protein 06/18/2020 6.9  6.0 - 8.5 g/dL Final   • Albumin 06/18/2020 3.6  2.9 - 4.4 g/dL Final   • Alpha-1-Globulin 06/18/2020 0.3  0.0 - 0.4 g/dL Final   • Alpha-2-Globulin 06/18/2020 0.9  0.4 - 1.0 g/dL Final   • Beta Globulin 06/18/2020 1.2  0.7 - 1.3 g/dL Final   • Gamma Globulin 06/18/2020 1.0  0.4 - 1.8 g/dL Final   • M-Julio 06/18/2020 Not Observed  Not Observed g/dL Final   • Globulin 06/18/2020 3.3  2.2 - 3.9 g/dL Final   • A/G Ratio 06/18/2020 1.1  0.7 - 1.7 Final   • Please note 06/18/2020 Comment   Final    Protein electrophoresis scan will follow via computer, mail, or   delivery.   • SPE Interpretation 06/18/2020 Comment   Final    The SPE pattern appears unremarkable. Evidence of monoclonal  protein is not apparent.   • Phosphorus 06/18/2020 3.0  3.0 - 4.3 mg/dL Final   • Glucose 06/18/2020 115* 65 - 99 mg/dL Final   • BUN 06/18/2020 13  6 - 20 mg/dL Final   • Creatinine 06/18/2020 0.81  0.57 - 1.00 mg/dL Final   • Sodium 06/18/2020 137  136 - 145 mmol/L Final   • Potassium 06/18/2020 3.9  3.5 - 5.2 mmol/L Final   • Chloride 06/18/2020 100  98 - 107 mmol/L Final   • CO2 06/18/2020  25.3  22.0 - 29.0 mmol/L Final   • Calcium 06/18/2020 10.1  8.6 - 10.5 mg/dL Final   • Total Protein 06/18/2020 7.7  6.0 - 8.5 g/dL Final   • Albumin 06/18/2020 4.50  3.50 - 5.20 g/dL Final   • ALT (SGPT) 06/18/2020 13  1 - 33 U/L Final   • AST (SGOT) 06/18/2020 14  1 - 32 U/L Final   • Alkaline Phosphatase 06/18/2020 83  39 - 117 U/L Final   • Total Bilirubin 06/18/2020 0.5  0.2 - 1.2 mg/dL Final   • eGFR Non African Amer 06/18/2020 73  >60 mL/min/1.73 Final   • Globulin 06/18/2020 3.2  gm/dL Final   • A/G Ratio 06/18/2020 1.4  g/dL Final   • BUN/Creatinine Ratio 06/18/2020 16.0  7.0 - 25.0 Final   • Anion Gap 06/18/2020 11.7  5.0 - 15.0 mmol/L Final   • Total Cholesterol 06/18/2020 163  0 - 200 mg/dL Final   • Triglycerides 06/18/2020 140  0 - 150 mg/dL Final   • HDL Cholesterol 06/18/2020 64* 40 - 60 mg/dL Final   • LDL Cholesterol  06/18/2020 71  0 - 100 mg/dL Final   • VLDL Cholesterol 06/18/2020 28  5 - 40 mg/dL Final   • Chol/HDL Ratio 06/18/2020 2.55   Final   • TSH 06/18/2020 4.760* 0.270 - 4.200 uIU/mL Final   • Hepatitis C Ab 06/18/2020 Non-Reactive  Non-Reactive Final   • WBC 06/18/2020 6.65  3.40 - 10.80 10*3/mm3 Final   • RBC 06/18/2020 4.08  3.77 - 5.28 10*6/mm3 Final   • Hemoglobin 06/18/2020 11.5* 12.0 - 15.9 g/dL Final   • Hematocrit 06/18/2020 34.7  34.0 - 46.6 % Final   • MCV 06/18/2020 85.0  79.0 - 97.0 fL Final   • MCH 06/18/2020 28.2  26.6 - 33.0 pg Final   • MCHC 06/18/2020 33.1  31.5 - 35.7 g/dL Final   • RDW 06/18/2020 15.4  12.3 - 15.4 % Final   • RDW-SD 06/18/2020 47.9  37.0 - 54.0 fl Final   • MPV 06/18/2020 9.9  6.0 - 12.0 fL Final   • Platelets 06/18/2020 322  140 - 450 10*3/mm3 Final   • Neutrophil % 06/18/2020 53.7  42.7 - 76.0 % Final   • Lymphocyte % 06/18/2020 35.6  19.6 - 45.3 % Final   • Monocyte % 06/18/2020 8.0  5.0 - 12.0 % Final   • Eosinophil % 06/18/2020 1.4  0.3 - 6.2 % Final   • Basophil % 06/18/2020 1.1  0.0 - 1.5 % Final   • Immature Grans % 06/18/2020 0.2  0.0 - 0.5 %  Final   • Neutrophils, Absolute 06/18/2020 3.58  1.70 - 7.00 10*3/mm3 Final   • Lymphocytes, Absolute 06/18/2020 2.37  0.70 - 3.10 10*3/mm3 Final   • Monocytes, Absolute 06/18/2020 0.53  0.10 - 0.90 10*3/mm3 Final   • Eosinophils, Absolute 06/18/2020 0.09  0.00 - 0.40 10*3/mm3 Final   • Basophils, Absolute 06/18/2020 0.07  0.00 - 0.20 10*3/mm3 Final   • Immature Grans, Absolute 06/18/2020 0.01  0.00 - 0.05 10*3/mm3 Final   • nRBC 06/18/2020 0.0  0.0 - 0.2 /100 WBC Final   • Ionized Calcium 06/18/2020 4.96  4.60 - 5.40 mg/dL Final    Meter: [id number]     :  [id number]     Lab Results   Component Value Date    HGBA1C 5.50 07/12/2019     Lab Results   Component Value Date    CREATININE 0.81 06/18/2020     Imaging Results (Most Recent)     None                Assessment and Plan:    Shaylee was seen today for hyperparathroidism.    Diagnoses and all orders for this visit:    Acquired hypothyroidism  -     TSH; Future  -     T4, Free; Future    Hyperlipemia, mixed  -     TSH; Future  -     T4, Free; Future    Primary hyperparathyroidism (CMS/HCC)  -     TSH; Future  -     T4, Free; Future    Other orders  -     levothyroxine (SYNTHROID, LEVOTHROID) 25 MCG tablet; Take 1 tablet by mouth Daily.      Hyperparathyroidism  Since patient's calcium levels are within normal limits we will continue to monitor the parathyroid levels  Time and again patient has been explained that the tremors would not improve with her repeat parathyroid surgery.  The fact that patient's calcium levels are normal is not contributing to her joint pains that she has been experiencing.    Hypothyroidism-levels are abnormal  Start levothyroxine 25 mcg oral daily  Repeat blood work-up in 6 weeks from now.    Hyperlipidemia  Continue statin    Reviewed Lab results with the patient.             13   ( greater than 50% of the time) out of  25 minutes  spent counseling the patient on medication changes, blood work-up that is necessary and  "treatment plan.    Marcel Caraballo MD  06/22/20    [facility] Dragon / transcription disclaimer:     \"Dictated utilizing Dragon dictation\".      "

## 2020-06-30 ENCOUNTER — OFFICE VISIT (OUTPATIENT)
Dept: INTERNAL MEDICINE | Facility: CLINIC | Age: 60
End: 2020-06-30

## 2020-06-30 VITALS
RESPIRATION RATE: 16 BRPM | HEIGHT: 61 IN | SYSTOLIC BLOOD PRESSURE: 124 MMHG | WEIGHT: 195 LBS | OXYGEN SATURATION: 97 % | HEART RATE: 77 BPM | BODY MASS INDEX: 36.82 KG/M2 | TEMPERATURE: 98.1 F | DIASTOLIC BLOOD PRESSURE: 86 MMHG

## 2020-06-30 DIAGNOSIS — R82.998 LEUKOCYTES IN URINE: ICD-10-CM

## 2020-06-30 DIAGNOSIS — Z00.00 ROUTINE ADULT HEALTH MAINTENANCE: Primary | ICD-10-CM

## 2020-06-30 DIAGNOSIS — R73.01 IFG (IMPAIRED FASTING GLUCOSE): ICD-10-CM

## 2020-06-30 DIAGNOSIS — I10 BENIGN ESSENTIAL HTN: ICD-10-CM

## 2020-06-30 DIAGNOSIS — E78.5 HYPERLIPIDEMIA LDL GOAL <70: ICD-10-CM

## 2020-06-30 LAB
BILIRUB BLD-MCNC: ABNORMAL MG/DL
CLARITY, POC: CLEAR
COLOR UR: YELLOW
GLUCOSE UR STRIP-MCNC: NEGATIVE MG/DL
KETONES UR QL: ABNORMAL
LEUKOCYTE EST, POC: ABNORMAL
NITRITE UR-MCNC: NEGATIVE MG/ML
PH UR: 5 [PH] (ref 5–8)
POC CREATININE URINE: NORMAL
POC MICROALBUMIN URINE: NORMAL
PROT UR STRIP-MCNC: NEGATIVE MG/DL
RBC # UR STRIP: ABNORMAL /UL
SP GR UR: 1.02 (ref 1–1.03)
UROBILINOGEN UR QL: NORMAL

## 2020-06-30 PROCEDURE — 82044 UR ALBUMIN SEMIQUANTITATIVE: CPT | Performed by: NURSE PRACTITIONER

## 2020-06-30 PROCEDURE — 99396 PREV VISIT EST AGE 40-64: CPT | Performed by: NURSE PRACTITIONER

## 2020-06-30 PROCEDURE — 81003 URINALYSIS AUTO W/O SCOPE: CPT | Performed by: NURSE PRACTITIONER

## 2020-06-30 NOTE — PROGRESS NOTES
CPE     Shaylee Wadsworth is being seen for a Complete physical exam. Her last physical was 5-.     Social: She is  to [employer].    Lifestyle: She does use tobacco, currently smoking e-cigs since 10-. Previous cigarette smoker 15 pack yr, 1/2 PPD for 30 years. She drinks 0 alcoholic drinks per week. She exercises 0 times a week, she is just walking around the house, getting an average of 4,000 per day.    Screening: Colonoscopy was completed 3-, on a 10 year recall. Last labs reviewed from 6-.      Reproductive Health: Her Last Pap smear was 2013, has an appt with Dr. Ojeda. She has had a hysterectomy, subtotal. DEXA scan complete 12-, showing osteopenia. Last Mammogram was 12-    Dental exam is up to date. Eye exam was completed within the year.     Miss Wadsworth has a very extensive Health history of CAD with stent placement. She is followed by Dr. Dumas. She has FMD, followed by neurology and sent to [facility] movement disorder clinic. She has had a parathyroidectomy, followed by endo. She has RA followed by Rheumatology. She has DERRICK that is not treated, evaluated with Dr. Covington.     History of Present Illness     The following portions of the patient's history were reviewed and updated as appropriate: allergies, current medications, past family history, past medical history, past social history, past surgical history and problem list.    Review of Systems   Constitutional: Negative.  Negative for fever.   Eyes: Negative.    Respiratory: Negative.    Cardiovascular: Negative.  Negative for chest pain, palpitations and leg swelling.   Gastrointestinal: Negative.    Endocrine: Negative.  Negative for polyphagia and polyuria.   Genitourinary: Negative.    Musculoskeletal: Positive for arthralgias and back pain. Negative for neck pain and neck stiffness.   Skin: Negative.    Allergic/Immunologic: Negative.    Neurological: Positive for tremors. Negative for weakness.    Hematological: Negative for adenopathy. Does not bruise/bleed easily.   Psychiatric/Behavioral: Positive for sleep disturbance. The patient is nervous/anxious.        Objective       General Appearance:    Alert, cooperative, no distress, appears stated age   Head:    Normocephalic, without obvious abnormality, atraumatic   Eyes:    PERRL, conjunctiva/corneas clear, EOM's intact, fundi     benign, both eyes   Ears:    Normal TM's and external ear canals, both ears   Nose:   Nares normal, septum midline, mucosa normal, no drainage     or sinus tenderness   Throat:   Lips, mucosa, and tongue normal; teeth and gums normal   Neck:   Supple, symmetrical, trachea midline, no adenopathy;     thyroid:  no enlargement/tenderness/nodules; no carotid    bruit or JVD   Back:     Symmetric, no curvature, ROM normal, no CVA tenderness   Lungs:     Clear to auscultation bilaterally, respirations unlabored   Chest Wall:    No tenderness or deformity    Heart:    Regular rate and rhythm, S1 and S2 normal, no murmur, rub    or gallop   Breast Exam:    Deferred to GYN   Abdomen:     Soft, non-tender, bowel sounds active all four quadrants,     no masses, no organomegaly   Genitalia:    defered to GYN   Rectal:    Deferred to GYN   Extremities:   Extremities normal, atraumatic, no cyanosis or edema   Pulses:   2+ and symmetric all extremities   Skin:   Skin color, texture, turgor normal, no rashes or lesions   Lymph nodes:   Cervical, supraclavicular, and axillary nodes normal   Neurologic:   Speech clear. Tremors of head, BUE and RLE. Slap foot walking on right. Sensation intact. DTRs 2 plus               Assessment/Plan   Shaylee was seen today for annual exam.    Diagnoses and all orders for this visit:    Routine adult health maintenance  -     POC Urinalysis Dipstick, Automated  -     POC Microalbumin    Leukocytes in urine  -     Urine Culture - Urine, Urine, Clean Catch    Hyperlipidemia LDL goal <70  -     Comprehensive  metabolic panel; Future  -     Lipid panel; Future    Benign essential HTN    IFG (impaired fasting glucose)  -     Comprehensive metabolic panel; Future  -     Hemoglobin A1c; Future          Preventive counseling: Mammogram up to date, scheduled for a Pelvic with Dr. Ojeda. Immunizations reviewed, will get Shingrix at pharmacy. Discussed increasing exercise regimen to 5 days a week. She will try Melatonin for 2 weeks to improve sleep quality.     She will follow up at next scheduled appointment in 3 months

## 2020-07-02 ENCOUNTER — TELEPHONE (OUTPATIENT)
Dept: ENDOCRINOLOGY | Age: 60
End: 2020-07-02

## 2020-07-02 LAB
BACTERIA UR CULT: NORMAL
BACTERIA UR CULT: NORMAL

## 2020-07-02 NOTE — TELEPHONE ENCOUNTER
Patient left voice mail stating that you put her levothyronixe 25 mcg at her last office visit she stats it been about 2 weeks she has developed  Swelling in her feet and ankles wants to know if that will cause that

## 2020-07-02 NOTE — TELEPHONE ENCOUNTER
Spoke with patient about her leg swelling and medication synthroid that might be causeing it pre Dr Caraballo It is not a common side effect of levothyroxine.  But again we cannot rule it out.  If she wants to discontinue the medication and see if her symptoms would improve she could do that. Patient will call PCP to see what they suggest

## 2020-07-02 NOTE — TELEPHONE ENCOUNTER
It is not a common side effect of levothyroxine.  But again we cannot rule it out.  If she wants to discontinue the medication and see if her symptoms would improve she could do that.

## 2020-07-06 ENCOUNTER — OFFICE VISIT (OUTPATIENT)
Dept: OBSTETRICS AND GYNECOLOGY | Facility: CLINIC | Age: 60
End: 2020-07-06

## 2020-07-06 VITALS
SYSTOLIC BLOOD PRESSURE: 120 MMHG | HEIGHT: 61 IN | WEIGHT: 195 LBS | BODY MASS INDEX: 36.82 KG/M2 | DIASTOLIC BLOOD PRESSURE: 70 MMHG

## 2020-07-06 DIAGNOSIS — Z01.419 PAP SMEAR, LOW-RISK: ICD-10-CM

## 2020-07-06 DIAGNOSIS — Z01.419 ROUTINE GYNECOLOGICAL EXAMINATION: Primary | ICD-10-CM

## 2020-07-06 DIAGNOSIS — Z13.9 SCREENING FOR CONDITION: ICD-10-CM

## 2020-07-06 LAB
BILIRUB BLD-MCNC: NEGATIVE MG/DL
CLARITY, POC: CLEAR
COLOR UR: YELLOW
GLUCOSE UR STRIP-MCNC: NEGATIVE MG/DL
KETONES UR QL: ABNORMAL
LEUKOCYTE EST, POC: NEGATIVE
NITRITE UR-MCNC: NEGATIVE MG/ML
PH UR: 5 [PH] (ref 5–8)
PROT UR STRIP-MCNC: ABNORMAL MG/DL
RBC # UR STRIP: ABNORMAL /UL
SP GR UR: 1.03 (ref 1–1.03)
UROBILINOGEN UR QL: NORMAL

## 2020-07-06 PROCEDURE — 99386 PREV VISIT NEW AGE 40-64: CPT | Performed by: OBSTETRICS & GYNECOLOGY

## 2020-07-06 PROCEDURE — 81002 URINALYSIS NONAUTO W/O SCOPE: CPT | Performed by: OBSTETRICS & GYNECOLOGY

## 2020-07-06 NOTE — PROGRESS NOTES
GYN Annual Exam     CC- Here for annual exam.     Shaylee Wadsworth is a 59 y.o. female who presents for annual well woman exam. Pt is a new pt to me. She has not had a pap smear in years. Menopause approx 10 years ago. No PMPB. MMG UTD 2019. Pt is on HRT for last 8 years. Pt is on Prempro and she takes it every other day. Pt has hx of RA and is on Enbrel and MTX. Pt had colonoscopy 3/2020. Hx of LSO due to ovarian cyst.    OB History             Para        Term   0            AB        Living           SAB        TAB        Ectopic        Molar        Multiple        Live Births                    Current contraception: post menopausal status  History of abnormal Pap smear: yes - remote  History of abnormal mammogram: yes - remote with no biopsy  Family history of uterine, colon or ovarian cancer: no  Family history of breast cancer: no    Health Maintenance   Topic Date Due   • Annual Gynecologic Pelvic and Breast Exam  1960   • PAP SMEAR  2016   • ZOSTER VACCINE (1 of 2) 2021 (Originally 2012)   • INFLUENZA VACCINE  2020   • LIPID PANEL  2021   • ANNUAL PHYSICAL  2021   • MAMMOGRAM  2021   • DXA SCAN  2021   • TDAP/TD VACCINES (2 - Td) 2025   • COLONOSCOPY  2030   • HEPATITIS C SCREENING  Completed       Past Medical History:   Diagnosis Date   • Anxiety    • Arthritis 2014    RA   • Lainez esophagus    • Benign paroxysmal positional vertigo due to bilateral vestibular disorder    • Chest pain    • Cholelithiasis     Removed   • Colon polyp    • Coronary artery disease 19   • Depression 19   • Dystonia    • Esophageal reflux    • Fibromyositis    • Functional movement disorder    • H/O colonoscopy    • H/O mammogram 2011    NORMAL    • Hx of bone density study 2011    OSTEOPENIA    • Hyperlipidemia    • Hyperparathyroidism (CMS/HCC)    • Hypertension    • Low back pain    • Menopause    • Osteopenia     • Pancreatitis    • Scoliosis 2/16/13   • Tremor 4/4/2018       Past Surgical History:   Procedure Laterality Date   • CARDIAC CATHETERIZATION N/A 3/4/2019    Procedure: Coronary angiography;  Surgeon: Jackelyn Dumas MD;  Location:  [facility];  Service: Cardiovascular   • CARDIAC CATHETERIZATION N/A 3/4/2019    Procedure: Left heart cath;  Surgeon: Jackelyn Dumas MD;  Location:  [facility];  Service: Cardiovascular   • CARDIAC CATHETERIZATION N/A 3/4/2019    Procedure: Left ventriculography;  Surgeon: Jackelyn Dumas MD;  Location:  [facility];  Service: Cardiovascular   • CARDIAC CATHETERIZATION N/A 3/4/2019    Procedure: Stent KARLY coronary;  Surgeon: Jackelyn Dumas MD;  Location:  [facility];  Service: Cardiovascular   • CHOLECYSTECTOMY     • COLONOSCOPY  2014   • COLONOSCOPY N/A 3/6/2020    Procedure: COLONOSCOPY, biopsy, polypectomy;  Surgeon: Rain Kirk MD;  Location: [facility];  Service: Gastroenterology;  Laterality: N/A;  Random biopsies; Rectal polyp x 3; Hemorrhoids; Diverticulosis   • DILATATION AND CURETTAGE     • HYSTERECTOMY     • MOUTH SURGERY      TOOTH EXTRACTION   • OTHER SURGICAL HISTORY  03/27/2015    DIAGNOSTIC ESOPHAGOSCOPY TRANSNASAL FLEXIBLE WITH BIOPSY; ARZATE ESO. REPEAT EGD 2 YR    • OVARY SURGERY Left     NORMAL    • PAP SMEAR  08/23/2010    NORMAL    • PARATHYROIDECTOMY Right 08/17/2016    Dr. Muchael Flynn at [place]   • SUBTOTAL HYSTERECTOMY      Left ovary removed9         Current Outpatient Medications:   •  amlodipine-olmesartan (DAYRON) 10-40 MG per tablet, Take 1 tablet by mouth Daily., Disp: 90 tablet, Rfl: 3  •  aspirin 81 MG tablet, Take 81 mg by mouth Daily., Disp: , Rfl:   •  atorvastatin (LIPITOR) 40 MG tablet, TAKE 1 TABLET BY MOUTH ONCE DAILY, Disp: 90 tablet, Rfl: 3  •  diazePAM (VALIUM) 2 MG tablet, Take 1 tablet by mouth 2 (Two) Times a Day As Needed for Anxiety., Disp: 10 tablet, Rfl:  0  •  ENBREL SURECLICK 50 MG/ML solution auto-injector, , Disp: , Rfl:   •  esomeprazole (NEXIUM) 40 MG capsule, Take 1 capsule by mouth Daily., Disp: , Rfl:   •  estrogen, conjugated,-medroxyprogesterone (PREMPRO) 0.45-1.5 MG per tablet, Take 1 tablet by mouth Daily. (Patient taking differently: Take 1 tablet by mouth. Monday, Wednesday & Friday), Disp: 28 tablet, Rfl: 11  •  folic acid (FOLVITE) 1 MG tablet, Take 2 mg by mouth Daily., Disp: , Rfl:   •  levothyroxine (SYNTHROID, LEVOTHROID) 25 MCG tablet, Take 1 tablet by mouth Daily., Disp: 30 tablet, Rfl: 11  •  methotrexate 2.5 MG tablet, TAKE 8 TABLETS BY MOUTH ONCE A WEEK ON  THE  SAME  DAY, Disp: 32 tablet, Rfl: 0  •  metoprolol succinate XL (TOPROL-XL) 50 MG 24 hr tablet, Take 1 tablet by mouth once daily, Disp: 90 tablet, Rfl: 0  •  potassium chloride (K-DUR,KLOR-CON) 20 MEQ CR tablet, TAKE 1 TABLET BY MOUTH TWICE DAILY (Patient taking differently: Daily.), Disp: 60 tablet, Rfl: 5  •  vitamin D (ERGOCALCIFEROL) 1.25 MG (58693 UT) capsule capsule, Take 1 capsule by mouth once a week, Disp: 4 capsule, Rfl: 0    Allergies   Allergen Reactions   • Meloxicam Swelling     EYES AND FACE SWELLING  EYES AND FACE SWELLING   • Effexor Xr [Venlafaxine Hcl Er] Other (See Comments)     Caused weight gain       Social History     Tobacco Use   • Smoking status: Passive Smoke Exposure - Never Smoker   • Smokeless tobacco: Current User   • Tobacco comment: 8/12/18 switch to e-cig   Substance Use Topics   • Alcohol use: No   • Drug use: No       Family History   Problem Relation Age of Onset   • Diabetes Mother    • Hyperlipidemia Mother    • Hypertension Mother    • Heart disease Mother    • Kidney disease Mother    • Arthritis Father    • Anxiety disorder Father    • COPD Father    • Glaucoma Father    • Hyperlipidemia Father    • Hypertension Father    • Vision loss Father    • Heart disease Father    • Heart disease Sister    • Breast cancer Neg Hx    • Colon cancer Neg  "Hx    • Colon polyps Neg Hx        Review of Systems   Constitutional: Negative for appetite change, chills, fatigue, fever and unexpected weight change.   Gastrointestinal: Negative for abdominal distention, abdominal pain, anal bleeding, blood in stool, constipation, diarrhea, nausea and vomiting.   Genitourinary: Negative for dyspareunia, dysuria, menstrual problem, pelvic pain, vaginal bleeding, vaginal discharge and vaginal pain.       /70   Ht 154.9 cm (60.98\")   Wt 88.5 kg (195 lb)   LMP  (LMP Unknown) Comment: partial hysterectomy   Breastfeeding No   BMI 36.86 kg/m²     Physical Exam   Constitutional: She is oriented to person, place, and time. She appears well-developed and well-nourished.   HENT:   Mouth/Throat: Normal dentition. No dental caries.   Cardiovascular: Normal rate, regular rhythm and normal heart sounds.   Pulmonary/Chest: Effort normal and breath sounds normal. No stridor. No respiratory distress. She has no wheezes. Right breast exhibits no inverted nipple, no mass, no nipple discharge, no skin change and no tenderness. Left breast exhibits no inverted nipple, no mass, no nipple discharge, no skin change and no tenderness.   Abdominal: Soft. She exhibits no distension and no mass. There is no tenderness.   Genitourinary: There is no rash, tenderness or lesion on the right labia. There is no rash, tenderness or lesion on the left labia. Uterus is not deviated, not enlarged, not fixed and not tender. Cervix exhibits no motion tenderness, no discharge and no friability. Right adnexum displays no mass, no tenderness and no fullness. Left adnexum displays no mass, no tenderness and no fullness. No tenderness or bleeding in the vagina. No vaginal discharge found.   Genitourinary Comments: atrophy   Musculoskeletal: Normal range of motion. She exhibits no edema or tenderness.   Neurological: She is alert and oriented to person, place, and time. No cranial nerve deficit. Coordination " normal.   Skin: Skin is warm. No rash noted. No erythema.   Psychiatric: She has a normal mood and affect. Her behavior is normal. Judgment and thought content normal.   Vitals reviewed.         Assessment/Plan    1) GYN HM: Check pap smear. SBE demonstrated and encouraged. MMG UTD.   2) STD screening: not sexually active  3) Bone health - Weight bearing exercise, dietary calcium recommendations and vitamin D reviewed.   4) Diet and Exercise discussed  5) Smoking Status: e cig- has been a smoker for 30+ years. Pt switched to e cig in 2018 to try and cut back on her smoking.   6) RA: On Enbrel and MTX. Will need a pap every year due to immunosuppression meds.  7) : Pt on HRT for last 8 years. She take qod. She has hx of cardiac catheterization in 2019. Discussed with pt that she should attempt to wean off HRT due to hx of cath with stent placement. She is currently on Prempro .45 qod. Will decrease to Prempro .3mg qod.   8) Follow up prn and 1 year       Diagnoses and all orders for this visit:    Routine gynecological examination  -     Pap IG, HPV-hr    Screening for condition  -     POC Urinalysis Dipstick    Pap smear, low-risk  -     Pap IG, HPV-hr        Meryl Ojeda, DO  7/6/2020  08:57

## 2020-07-10 LAB
CYTOLOGIST CVX/VAG CYTO: NORMAL
CYTOLOGY CVX/VAG DOC CYTO: NORMAL
CYTOLOGY CVX/VAG DOC THIN PREP: NORMAL
DX ICD CODE: NORMAL
HIV 1 & 2 AB SER-IMP: NORMAL
HPV I/H RISK 1 DNA CVX QL PROBE+SIG AMP: NEGATIVE
Lab: NORMAL
OTHER STN SPEC: NORMAL
STAT OF ADQ CVX/VAG CYTO-IMP: NORMAL

## 2020-07-15 DIAGNOSIS — N95.1 MENOPAUSAL STATE: ICD-10-CM

## 2020-07-20 RX ORDER — SPIRONOLACTONE 25 MG/1
25 TABLET ORAL DAILY
Qty: 30 TABLET | Refills: 0 | Status: SHIPPED | OUTPATIENT
Start: 2020-07-20 | End: 2020-07-28 | Stop reason: SDUPTHER

## 2020-07-20 NOTE — TELEPHONE ENCOUNTER
Patient has decreased medication as directed by PCP but still having symptoms.  Awaiting call back from office for further instruction.

## 2020-07-23 RX ORDER — AMLODIPINE AND OLMESARTAN MEDOXOMIL 5; 40 MG/1; MG/1
1 TABLET ORAL DAILY
Qty: 90 TABLET | Refills: 1 | Status: SHIPPED | OUTPATIENT
Start: 2020-07-23 | End: 2021-01-27

## 2020-07-27 DIAGNOSIS — M85.80 OSTEOPENIA: ICD-10-CM

## 2020-07-27 RX ORDER — ERGOCALCIFEROL 1.25 MG/1
CAPSULE ORAL
Qty: 4 CAPSULE | Refills: 0 | Status: SHIPPED | OUTPATIENT
Start: 2020-07-27 | End: 2020-08-27

## 2020-07-28 ENCOUNTER — TELEMEDICINE (OUTPATIENT)
Dept: INTERNAL MEDICINE | Facility: CLINIC | Age: 60
End: 2020-07-28

## 2020-07-28 ENCOUNTER — LAB (OUTPATIENT)
Dept: LAB | Facility: HOSPITAL | Age: 60
End: 2020-07-28

## 2020-07-28 VITALS — BODY MASS INDEX: 37.24 KG/M2 | DIASTOLIC BLOOD PRESSURE: 76 MMHG | WEIGHT: 197 LBS | SYSTOLIC BLOOD PRESSURE: 126 MMHG

## 2020-07-28 DIAGNOSIS — E03.9 HYPOTHYROIDISM, ADULT: ICD-10-CM

## 2020-07-28 DIAGNOSIS — Z86.39 H/O HYPERPARATHYROIDISM: ICD-10-CM

## 2020-07-28 DIAGNOSIS — I10 BENIGN ESSENTIAL HTN: Primary | ICD-10-CM

## 2020-07-28 LAB
ALBUMIN SERPL-MCNC: 4.2 G/DL (ref 3.5–5.2)
ALBUMIN/GLOB SERPL: 1.6 G/DL
ALP SERPL-CCNC: 75 U/L (ref 39–117)
ALT SERPL W P-5'-P-CCNC: 12 U/L (ref 1–33)
ANION GAP SERPL CALCULATED.3IONS-SCNC: 8.8 MMOL/L (ref 5–15)
AST SERPL-CCNC: 13 U/L (ref 1–32)
BASOPHILS # BLD AUTO: 0.07 10*3/MM3 (ref 0–0.2)
BASOPHILS NFR BLD AUTO: 1.3 % (ref 0–1.5)
BILIRUB SERPL-MCNC: 0.4 MG/DL (ref 0–1.2)
BUN SERPL-MCNC: 11 MG/DL (ref 6–20)
BUN/CREAT SERPL: 15.7 (ref 7–25)
CALCIUM SPEC-SCNC: 9.6 MG/DL (ref 8.6–10.5)
CHLORIDE SERPL-SCNC: 101 MMOL/L (ref 98–107)
CO2 SERPL-SCNC: 27.2 MMOL/L (ref 22–29)
CREAT SERPL-MCNC: 0.7 MG/DL (ref 0.57–1)
DEPRECATED RDW RBC AUTO: 50.6 FL (ref 37–54)
EOSINOPHIL # BLD AUTO: 0.11 10*3/MM3 (ref 0–0.4)
EOSINOPHIL NFR BLD AUTO: 2 % (ref 0.3–6.2)
ERYTHROCYTE [DISTWIDTH] IN BLOOD BY AUTOMATED COUNT: 15.9 % (ref 12.3–15.4)
GFR SERPL CREATININE-BSD FRML MDRD: 86 ML/MIN/1.73
GLOBULIN UR ELPH-MCNC: 2.7 GM/DL
GLUCOSE SERPL-MCNC: 102 MG/DL (ref 65–99)
HCT VFR BLD AUTO: 35 % (ref 34–46.6)
HGB BLD-MCNC: 11.1 G/DL (ref 12–15.9)
IMM GRANULOCYTES # BLD AUTO: 0.01 10*3/MM3 (ref 0–0.05)
IMM GRANULOCYTES NFR BLD AUTO: 0.2 % (ref 0–0.5)
LYMPHOCYTES # BLD AUTO: 2.05 10*3/MM3 (ref 0.7–3.1)
LYMPHOCYTES NFR BLD AUTO: 37.7 % (ref 19.6–45.3)
MCH RBC QN AUTO: 27.7 PG (ref 26.6–33)
MCHC RBC AUTO-ENTMCNC: 31.7 G/DL (ref 31.5–35.7)
MCV RBC AUTO: 87.3 FL (ref 79–97)
MONOCYTES # BLD AUTO: 0.44 10*3/MM3 (ref 0.1–0.9)
MONOCYTES NFR BLD AUTO: 8.1 % (ref 5–12)
NEUTROPHILS NFR BLD AUTO: 2.76 10*3/MM3 (ref 1.7–7)
NEUTROPHILS NFR BLD AUTO: 50.7 % (ref 42.7–76)
NRBC BLD AUTO-RTO: 0 /100 WBC (ref 0–0.2)
PLATELET # BLD AUTO: 233 10*3/MM3 (ref 140–450)
PMV BLD AUTO: 10.4 FL (ref 6–12)
POTASSIUM SERPL-SCNC: 4.9 MMOL/L (ref 3.5–5.2)
PROT SERPL-MCNC: 6.9 G/DL (ref 6–8.5)
RBC # BLD AUTO: 4.01 10*6/MM3 (ref 3.77–5.28)
SODIUM SERPL-SCNC: 137 MMOL/L (ref 136–145)
T4 FREE SERPL-MCNC: 1.46 NG/DL (ref 0.93–1.7)
TSH SERPL DL<=0.05 MIU/L-ACNC: 2.56 UIU/ML (ref 0.27–4.2)
WBC # BLD AUTO: 5.44 10*3/MM3 (ref 3.4–10.8)

## 2020-07-28 PROCEDURE — 84439 ASSAY OF FREE THYROXINE: CPT | Performed by: NURSE PRACTITIONER

## 2020-07-28 PROCEDURE — 99213 OFFICE O/P EST LOW 20 MIN: CPT | Performed by: NURSE PRACTITIONER

## 2020-07-28 PROCEDURE — 85025 COMPLETE CBC W/AUTO DIFF WBC: CPT | Performed by: NURSE PRACTITIONER

## 2020-07-28 PROCEDURE — 84443 ASSAY THYROID STIM HORMONE: CPT | Performed by: NURSE PRACTITIONER

## 2020-07-28 PROCEDURE — 36415 COLL VENOUS BLD VENIPUNCTURE: CPT | Performed by: NURSE PRACTITIONER

## 2020-07-28 PROCEDURE — 80053 COMPREHEN METABOLIC PANEL: CPT | Performed by: NURSE PRACTITIONER

## 2020-07-28 RX ORDER — SPIRONOLACTONE 25 MG/1
25 TABLET ORAL DAILY
Qty: 90 TABLET | Refills: 0 | Status: SHIPPED | OUTPATIENT
Start: 2020-07-28 | End: 2020-11-30

## 2020-07-28 NOTE — PROGRESS NOTES
"Follow up on BP    Subjective     Shaylee Wadsworth is a 59 y.o. female being seen for a follow up appointment today regarding HTN. She sent an email 7-7-2020 reporting an increase in swelling in feet and ankles with a 4 pounds weight gain after picking up her Dayron 10/40mg on 6-. We asked her to reduce sodium in diet and increase water, made her aware that Amlodipine causing swelling. She tried this, but 1 week later, still had the issue, her Dayron was reduced to 40/5mg dose and Spironolactone 25mg daily was added. Today, she is reporting he BP has returned to normal unless \"I am on my feet a really long time.\"     This patient has consented to a video visit due to Covid 19 pandemic in accordance with current CDC guidelines.    History of Present Illness     Allergies   Allergen Reactions   • Meloxicam Swelling     EYES AND FACE SWELLING  EYES AND FACE SWELLING   • Effexor Xr [Venlafaxine Hcl Er] Other (See Comments)     Caused weight gain         Current Outpatient Medications:   •  amlodipine-olmesartan (DAYRON) 5-40 MG per tablet, Take 1 tablet by mouth Daily., Disp: 90 tablet, Rfl: 1  •  aspirin 81 MG tablet, Take 81 mg by mouth Daily., Disp: , Rfl:   •  atorvastatin (LIPITOR) 40 MG tablet, TAKE 1 TABLET BY MOUTH ONCE DAILY, Disp: 90 tablet, Rfl: 3  •  diazePAM (VALIUM) 2 MG tablet, Take 1 tablet by mouth 2 (Two) Times a Day As Needed for Anxiety., Disp: 10 tablet, Rfl: 0  •  ENBREL SURECLICK 50 MG/ML solution auto-injector, , Disp: , Rfl:   •  esomeprazole (NEXIUM) 40 MG capsule, Take 1 capsule by mouth Daily., Disp: , Rfl:   •  estrogen, conjugated,-medroxyprogesterone (Prempro) 0.45-1.5 MG per tablet, Take 1 tablet by mouth Daily., Disp: 28 tablet, Rfl: 11  •  folic acid (FOLVITE) 1 MG tablet, Take 2 mg by mouth Daily., Disp: , Rfl:   •  levothyroxine (SYNTHROID, LEVOTHROID) 25 MCG tablet, Take 1 tablet by mouth Daily., Disp: 30 tablet, Rfl: 11  •  methotrexate 2.5 MG tablet, TAKE 8 TABLETS BY MOUTH ONCE A " WEEK ON  THE  SAME  DAY, Disp: 32 tablet, Rfl: 0  •  metoprolol succinate XL (TOPROL-XL) 50 MG 24 hr tablet, Take 1 tablet by mouth once daily, Disp: 90 tablet, Rfl: 0  •  potassium chloride (K-DUR,KLOR-CON) 20 MEQ CR tablet, TAKE 1 TABLET BY MOUTH TWICE DAILY (Patient taking differently: Daily.), Disp: 60 tablet, Rfl: 5  •  spironolactone (Aldactone) 25 MG tablet, Take 1 tablet by mouth Daily., Disp: 30 tablet, Rfl: 0  •  vitamin D (ERGOCALCIFEROL) 1.25 MG (54604 UT) capsule capsule, Take 1 capsule by mouth once a week, Disp: 4 capsule, Rfl: 0    The following portions of the patient's history were reviewed and updated as appropriate: allergies, current medications, past family history, past medical history, past social history, past surgical history and problem list.    Review of Systems   Constitutional: Negative.    HENT: Negative.    Eyes: Negative.    Respiratory: Negative.  Negative for chest tightness.    Cardiovascular: Positive for leg swelling. Negative for chest pain and palpitations.   Gastrointestinal: Negative.    Genitourinary: Negative.    Musculoskeletal: Positive for arthralgias, back pain, gait problem and myalgias. Negative for joint swelling.   Skin: Negative.    Allergic/Immunologic: Negative.  Negative for environmental allergies and immunocompromised state.   Neurological: Positive for tremors.       Assessment     Physical Exam   Constitutional: She is oriented to person, place, and time. She appears well-developed.   Pulmonary/Chest: No respiratory distress.   Musculoskeletal: She exhibits no edema.   Neurological: She is alert and oriented to person, place, and time. Coordination abnormal.   Tremors noted in both Upper extremiteis   Psychiatric: She has a normal mood and affect. Her behavior is normal.   Vitals reviewed.      Plan     Her fasting labs were reviewed with the patient from last week.     Shaylee was seen today for hypertension.    Diagnoses and all orders for this  visit:    Benign essential HTN  -     spironolactone (Aldactone) 25 MG tablet; Take 1 tablet by mouth Daily.    H/O hyperparathyroidism  -     Comprehensive metabolic panel    Hypothyroidism, adult  -     CBC and Differential  -     TSH  -     T4, Free        BP is doing well. Labs needed to check TSH and potassium due to risk of hyperkalemia on BP meds    Follow up in September as scheduled

## 2020-07-30 ENCOUNTER — RESULTS ENCOUNTER (OUTPATIENT)
Dept: ENDOCRINOLOGY | Age: 60
End: 2020-07-30

## 2020-07-30 DIAGNOSIS — N95.1 MENOPAUSAL SYMPTOM: ICD-10-CM

## 2020-07-30 DIAGNOSIS — E03.9 HYPOTHYROIDISM, UNSPECIFIED TYPE: ICD-10-CM

## 2020-07-30 DIAGNOSIS — E21.0 PRIMARY HYPERPARATHYROIDISM (HCC): ICD-10-CM

## 2020-07-30 DIAGNOSIS — E78.5 HYPERLIPIDEMIA LDL GOAL <70: ICD-10-CM

## 2020-07-30 DIAGNOSIS — R73.01 IFG (IMPAIRED FASTING GLUCOSE): ICD-10-CM

## 2020-08-03 ENCOUNTER — RESULTS ENCOUNTER (OUTPATIENT)
Dept: ENDOCRINOLOGY | Age: 60
End: 2020-08-03

## 2020-08-03 DIAGNOSIS — E78.2 HYPERLIPEMIA, MIXED: ICD-10-CM

## 2020-08-03 DIAGNOSIS — E03.9 ACQUIRED HYPOTHYROIDISM: ICD-10-CM

## 2020-08-03 DIAGNOSIS — E21.0 PRIMARY HYPERPARATHYROIDISM (HCC): ICD-10-CM

## 2020-08-08 DIAGNOSIS — E78.5 HYPERLIPIDEMIA LDL GOAL <100: ICD-10-CM

## 2020-08-10 DIAGNOSIS — E78.5 HYPERLIPIDEMIA LDL GOAL <100: ICD-10-CM

## 2020-08-10 RX ORDER — ATORVASTATIN CALCIUM 40 MG/1
40 TABLET, FILM COATED ORAL DAILY
Qty: 90 TABLET | Refills: 0 | Status: SHIPPED | OUTPATIENT
Start: 2020-08-10 | End: 2020-11-03

## 2020-08-10 RX ORDER — ATORVASTATIN CALCIUM 40 MG/1
TABLET, FILM COATED ORAL
Qty: 90 TABLET | Refills: 0 | Status: SHIPPED | OUTPATIENT
Start: 2020-08-10 | End: 2020-08-10 | Stop reason: SDUPTHER

## 2020-08-11 DIAGNOSIS — M51.9 DISC DISORDER OF THORACIC REGION: ICD-10-CM

## 2020-08-11 RX ORDER — TRAMADOL HYDROCHLORIDE 50 MG/1
TABLET ORAL
Qty: 30 TABLET | Refills: 0 | Status: SHIPPED | OUTPATIENT
Start: 2020-08-11 | End: 2020-12-29 | Stop reason: SDUPTHER

## 2020-08-19 ENCOUNTER — RESULTS ENCOUNTER (OUTPATIENT)
Dept: ENDOCRINOLOGY | Age: 60
End: 2020-08-19

## 2020-08-19 DIAGNOSIS — E03.9 HYPOTHYROIDISM, UNSPECIFIED TYPE: ICD-10-CM

## 2020-08-19 DIAGNOSIS — E21.0 PRIMARY HYPERPARATHYROIDISM (HCC): ICD-10-CM

## 2020-08-19 DIAGNOSIS — E21.0 PRIMARY HYPERPARATHYROIDISM (HCC): Primary | ICD-10-CM

## 2020-08-27 DIAGNOSIS — I10 ESSENTIAL HYPERTENSION: ICD-10-CM

## 2020-08-27 DIAGNOSIS — M85.80 OSTEOPENIA: ICD-10-CM

## 2020-08-27 RX ORDER — METOPROLOL SUCCINATE 50 MG/1
TABLET, EXTENDED RELEASE ORAL
Qty: 90 TABLET | Refills: 0 | Status: SHIPPED | OUTPATIENT
Start: 2020-08-27 | End: 2020-11-30

## 2020-08-27 RX ORDER — ERGOCALCIFEROL 1.25 MG/1
CAPSULE ORAL
Qty: 4 CAPSULE | Refills: 0 | Status: SHIPPED | OUTPATIENT
Start: 2020-08-27 | End: 2020-09-30 | Stop reason: SDUPTHER

## 2020-09-23 ENCOUNTER — LAB (OUTPATIENT)
Dept: LAB | Facility: HOSPITAL | Age: 60
End: 2020-09-23

## 2020-09-23 DIAGNOSIS — E78.5 HYPERLIPIDEMIA LDL GOAL <70: ICD-10-CM

## 2020-09-23 DIAGNOSIS — Z79.899 ENCOUNTER FOR LONG-TERM (CURRENT) USE OF OTHER MEDICATIONS: ICD-10-CM

## 2020-09-23 DIAGNOSIS — R73.01 IFG (IMPAIRED FASTING GLUCOSE): ICD-10-CM

## 2020-09-23 DIAGNOSIS — M05.79 SEROPOSITIVE RHEUMATOID ARTHRITIS OF MULTIPLE SITES (HCC): ICD-10-CM

## 2020-09-23 LAB
ALBUMIN SERPL-MCNC: 4.5 G/DL (ref 3.5–5.2)
ALBUMIN/GLOB SERPL: 1.7 G/DL
ALP SERPL-CCNC: 88 U/L (ref 39–117)
ALT SERPL W P-5'-P-CCNC: 17 U/L (ref 1–33)
ANION GAP SERPL CALCULATED.3IONS-SCNC: 14.5 MMOL/L (ref 5–15)
AST SERPL-CCNC: 16 U/L (ref 1–32)
BASOPHILS # BLD AUTO: 0.05 10*3/MM3 (ref 0–0.2)
BASOPHILS NFR BLD AUTO: 0.8 % (ref 0–1.5)
BILIRUB SERPL-MCNC: 0.5 MG/DL (ref 0–1.2)
BUN SERPL-MCNC: 11 MG/DL (ref 6–20)
BUN/CREAT SERPL: 16.7 (ref 7–25)
CA-I BLD-MCNC: 4.67 MG/DL (ref 4.6–5.4)
CALCIUM SPEC-SCNC: 9.6 MG/DL (ref 8.6–10.5)
CHLORIDE SERPL-SCNC: 104 MMOL/L (ref 98–107)
CHOLEST SERPL-MCNC: 167 MG/DL (ref 0–200)
CO2 SERPL-SCNC: 22.5 MMOL/L (ref 22–29)
CREAT SERPL-MCNC: 0.66 MG/DL (ref 0.57–1)
DEPRECATED RDW RBC AUTO: 46.9 FL (ref 37–54)
EOSINOPHIL # BLD AUTO: 0.1 10*3/MM3 (ref 0–0.4)
EOSINOPHIL NFR BLD AUTO: 1.5 % (ref 0.3–6.2)
ERYTHROCYTE [DISTWIDTH] IN BLOOD BY AUTOMATED COUNT: 15.8 % (ref 12.3–15.4)
GFR SERPL CREATININE-BSD FRML MDRD: 92 ML/MIN/1.73
GLOBULIN UR ELPH-MCNC: 2.7 GM/DL
GLUCOSE SERPL-MCNC: 119 MG/DL (ref 65–99)
HBA1C MFR BLD: 5.9 % (ref 4.8–5.6)
HCT VFR BLD AUTO: 33 % (ref 34–46.6)
HDLC SERPL-MCNC: 53 MG/DL (ref 40–60)
HGB BLD-MCNC: 11.1 G/DL (ref 12–15.9)
IMM GRANULOCYTES # BLD AUTO: 0.02 10*3/MM3 (ref 0–0.05)
IMM GRANULOCYTES NFR BLD AUTO: 0.3 % (ref 0–0.5)
LDLC SERPL CALC-MCNC: 86 MG/DL (ref 0–100)
LDLC/HDLC SERPL: 1.62 {RATIO}
LYMPHOCYTES # BLD AUTO: 2.08 10*3/MM3 (ref 0.7–3.1)
LYMPHOCYTES NFR BLD AUTO: 32 % (ref 19.6–45.3)
MCH RBC QN AUTO: 27.5 PG (ref 26.6–33)
MCHC RBC AUTO-ENTMCNC: 33.6 G/DL (ref 31.5–35.7)
MCV RBC AUTO: 81.9 FL (ref 79–97)
MONOCYTES # BLD AUTO: 0.41 10*3/MM3 (ref 0.1–0.9)
MONOCYTES NFR BLD AUTO: 6.3 % (ref 5–12)
NEUTROPHILS NFR BLD AUTO: 3.85 10*3/MM3 (ref 1.7–7)
NEUTROPHILS NFR BLD AUTO: 59.1 % (ref 42.7–76)
NRBC BLD AUTO-RTO: 0 /100 WBC (ref 0–0.2)
PLATELET # BLD AUTO: 246 10*3/MM3 (ref 140–450)
PMV BLD AUTO: 10 FL (ref 6–12)
POTASSIUM SERPL-SCNC: 3.7 MMOL/L (ref 3.5–5.2)
PROT SERPL-MCNC: 7.2 G/DL (ref 6–8.5)
PTH-INTACT SERPL-MCNC: 122 PG/ML (ref 15–65)
RBC # BLD AUTO: 4.03 10*6/MM3 (ref 3.77–5.28)
SODIUM SERPL-SCNC: 141 MMOL/L (ref 136–145)
T4 FREE SERPL-MCNC: 1.46 NG/DL (ref 0.93–1.7)
TRIGL SERPL-MCNC: 142 MG/DL (ref 0–150)
TSH SERPL DL<=0.05 MIU/L-ACNC: 2.42 UIU/ML (ref 0.27–4.2)
VLDLC SERPL-MCNC: 28.4 MG/DL (ref 5–40)
WBC # BLD AUTO: 6.51 10*3/MM3 (ref 3.4–10.8)

## 2020-09-23 PROCEDURE — 84439 ASSAY OF FREE THYROXINE: CPT | Performed by: INTERNAL MEDICINE

## 2020-09-23 PROCEDURE — 36415 COLL VENOUS BLD VENIPUNCTURE: CPT

## 2020-09-23 PROCEDURE — 84443 ASSAY THYROID STIM HORMONE: CPT | Performed by: INTERNAL MEDICINE

## 2020-09-23 PROCEDURE — 82330 ASSAY OF CALCIUM: CPT | Performed by: INTERNAL MEDICINE

## 2020-09-23 PROCEDURE — 85025 COMPLETE CBC W/AUTO DIFF WBC: CPT

## 2020-09-23 PROCEDURE — 83970 ASSAY OF PARATHORMONE: CPT | Performed by: INTERNAL MEDICINE

## 2020-09-23 PROCEDURE — 83036 HEMOGLOBIN GLYCOSYLATED A1C: CPT

## 2020-09-23 PROCEDURE — 80053 COMPREHEN METABOLIC PANEL: CPT | Performed by: NURSE PRACTITIONER

## 2020-09-23 PROCEDURE — 80061 LIPID PANEL: CPT

## 2020-09-29 ENCOUNTER — OFFICE VISIT (OUTPATIENT)
Dept: ENDOCRINOLOGY | Age: 60
End: 2020-09-29

## 2020-09-29 DIAGNOSIS — E78.2 HYPERLIPEMIA, MIXED: ICD-10-CM

## 2020-09-29 DIAGNOSIS — E03.9 ACQUIRED HYPOTHYROIDISM: ICD-10-CM

## 2020-09-29 DIAGNOSIS — E21.0 PRIMARY HYPERPARATHYROIDISM (HCC): Primary | ICD-10-CM

## 2020-09-29 PROCEDURE — 99443 PR PHYS/QHP TELEPHONE EVALUATION 21-30 MIN: CPT | Performed by: INTERNAL MEDICINE

## 2020-09-29 NOTE — PROGRESS NOTES
59 y.o.    Patient Care Team:  Laura Ayala APRN as PCP - General  Laura Ayala APRN as PCP - Family Medicine    Chief Complaint:    Follow up / hypothyroidism  Subjective     HPI    59-year-old white male is here as a follow-up for the evaluation of hyperparathyroidism.     Patient has been suffering with chronic fatigue for the last 3 years mainly since 2018 and the symptom has been gradually getting worse.  Her tremors are not limited to her hands alone but she has them throughout her body.  They also result in a mild trembling in her voice.  Patient has been extensively worked up by 2 neurologist at [facility] and has been given 2 different diagnoses one was labeled as functional neurological disorder and the other one was benign paroxysmal positional vertigo.     In 2016 patient was noted to have elevated parathyroid levels and calcium levels she underwent right inferior parathyroidectomy with normalization of the parathyroid levels.  In 1 of her prior visits complete hormonal panel was checked to see if any of her hormonal abnormalities was contributing to her extreme fatigue, tremors and pain.  Most of her hormonal panel apart from the parathyroid levels are within normal limits.    Today in visit patient wanted her parathyroid levels to be checked as she was in extreme pain in the months of June/July 2020.  Her rheumatologist was not certain as to what was contributing to the pain in her symptoms.  Patient thought that it could be secondary to her parathyroid issue and so requested her parathyroid levels to be checked.    No issues of kidney stones, no recent fractures.  Calcium levels have always been within normal limits.    History of rheumatoid arthritis  Sees the rheumatologist    Hypothyroidism  On levothyroxine 25 mcg oral daily.    You have chosen to receive care through a telephone visit. Do you consent to use a telephone visit for your medical care today? Yes      Reviewed primary care  physician's/consulting physician documentation and lab results :     Interval History  Patient was also evaluated by the ENT who felt that another parathyroid surgery might help with her parathyroid levels but would not improve her tremors and as a result the surgery was canceled and her parathyroid levels were continued to be monitored.    The following portions of the patient's history were reviewed and updated as appropriate: allergies, current medications, past family history, past medical history, past social history, past surgical history and problem list.    Past Medical History:   Diagnosis Date   • Anxiety    • Arthritis 5/1/2014    RA   • Lainez esophagus    • Benign paroxysmal positional vertigo due to bilateral vestibular disorder    • Chest pain    • Cholelithiasis 2007    Removed   • Colon polyp    • Coronary artery disease 2/26/19   • Depression 6/12/19   • Dystonia    • Esophageal reflux    • Fibromyositis    • Functional movement disorder    • H/O colonoscopy    • H/O mammogram 08/31/2011    NORMAL    • Hx of bone density study 08/31/2011    OSTEOPENIA    • Hyperlipidemia    • Hyperparathyroidism (CMS/HCC)    • Hypertension    • Low back pain    • Menopause    • Osteopenia    • Pancreatitis    • Scoliosis 2/16/13   • Tremor 4/4/2018     Family History   Problem Relation Age of Onset   • Diabetes Mother    • Hyperlipidemia Mother    • Hypertension Mother    • Heart disease Mother    • Kidney disease Mother    • Arthritis Father    • Anxiety disorder Father    • COPD Father    • Glaucoma Father    • Hyperlipidemia Father    • Hypertension Father    • Vision loss Father    • Heart disease Father    • Heart disease Sister    • Breast cancer Neg Hx    • Colon cancer Neg Hx    • Colon polyps Neg Hx      Social History     Socioeconomic History   • Marital status:      Spouse name: Not on file   • Number of children: Not on file   • Years of education: Not on file   • Highest education level: Not  on file   Tobacco Use   • Smoking status: Passive Smoke Exposure - Never Smoker   • Smokeless tobacco: Current User   • Tobacco comment: 8/12/18 switch to e-cig   Substance and Sexual Activity   • Alcohol use: No   • Drug use: No   • Sexual activity: Never   Social History Narrative    She is  to [employer]. She is on social security disability of RA, FMD.      Allergies   Allergen Reactions   • Meloxicam Swelling     EYES AND FACE SWELLING  EYES AND FACE SWELLING   • Effexor Xr [Venlafaxine Hcl Er] Other (See Comments)     Caused weight gain       Current Outpatient Medications:   •  amlodipine-olmesartan (DAYRON) 5-40 MG per tablet, Take 1 tablet by mouth Daily., Disp: 90 tablet, Rfl: 1  •  aspirin 81 MG tablet, Take 81 mg by mouth Daily., Disp: , Rfl:   •  atorvastatin (LIPITOR) 40 MG tablet, Take 1 tablet by mouth Daily., Disp: 90 tablet, Rfl: 0  •  ENBREL SURECLICK 50 MG/ML solution auto-injector, , Disp: , Rfl:   •  esomeprazole (NEXIUM) 40 MG capsule, Take 1 capsule by mouth Daily., Disp: , Rfl:   •  estrogen, conjugated,-medroxyprogesterone (Prempro) 0.45-1.5 MG per tablet, Take 1 tablet by mouth Daily., Disp: 28 tablet, Rfl: 11  •  folic acid (FOLVITE) 1 MG tablet, Take 2 mg by mouth Daily., Disp: , Rfl:   •  levothyroxine (SYNTHROID, LEVOTHROID) 25 MCG tablet, Take 1 tablet by mouth Daily., Disp: 30 tablet, Rfl: 11  •  methotrexate 2.5 MG tablet, TAKE 8 TABLETS BY MOUTH ONCE A WEEK ON THE SAME DAY, Disp: 32 tablet, Rfl: 0  •  metoprolol succinate XL (TOPROL-XL) 50 MG 24 hr tablet, Take 1 tablet by mouth once daily, Disp: 90 tablet, Rfl: 0  •  potassium chloride (K-DUR,KLOR-CON) 20 MEQ CR tablet, TAKE 1 TABLET BY MOUTH TWICE DAILY (Patient taking differently: Daily.), Disp: 60 tablet, Rfl: 5  •  spironolactone (Aldactone) 25 MG tablet, Take 1 tablet by mouth Daily., Disp: 90 tablet, Rfl: 0  •  traMADol (ULTRAM) 50 MG tablet, TAKE 1 TABLET BY MOUTH EVERY 8 HOURS AS NEEDED FOR SEVERE PAIN, Disp: 30  tablet, Rfl: 0  •  vitamin D (ERGOCALCIFEROL) 1.25 MG (41752 UT) capsule capsule, Take 1 capsule by mouth once a week, Disp: 4 capsule, Rfl: 0        Review of Systems   Constitutional: Positive for fatigue. Negative for appetite change and fever.   Eyes: Negative for visual disturbance.   Respiratory: Negative for shortness of breath.    Cardiovascular: Negative for palpitations and leg swelling.   Gastrointestinal: Negative for abdominal pain and vomiting.   Endocrine: Negative for polydipsia and polyuria.   Musculoskeletal: Negative for joint swelling and neck pain.   Skin: Negative for rash.   Neurological: Negative for weakness and numbness.   Psychiatric/Behavioral: Negative for behavioral problems.     I have reviewed the ROS as documented by the MA; Marcel Caraballo MD.      Objective       There were no vitals filed for this visit.  There is no height or weight on file to calculate BMI.      Physical Exam  General appearance-pleasant, no distress  Respiratory-normal breathing appreciated.  No respiratory distress noted  Ear nose and throat-no hard of hearing.  Neurological assessment-alert and oriented x3.    Results Review:     I reviewed the patient's new clinical results and mentioned them above in HPI and in plan as well.    Medical records reviewed  Summary:Done      Lab on 09/23/2020   Component Date Value Ref Range Status   • Total Cholesterol 09/23/2020 167  0 - 200 mg/dL Final   • Triglycerides 09/23/2020 142  0 - 150 mg/dL Final   • HDL Cholesterol 09/23/2020 53  40 - 60 mg/dL Final   • LDL Cholesterol  09/23/2020 86  0 - 100 mg/dL Final   • VLDL Cholesterol 09/23/2020 28.4  5 - 40 mg/dL Final   • LDL/HDL Ratio 09/23/2020 1.62   Final     Lab Results   Component Value Date    HGBA1C 5.90 (H) 09/23/2020    HGBA1C 5.50 07/12/2019     Lab Results   Component Value Date    CREATININE 0.66 09/23/2020     Imaging Results (Most Recent)     None                Assessment and Plan:    Diagnoses and all  "orders for this visit:    Primary hyperparathyroidism (CMS/[facility])  -     TSH; Future  -     T4, Free; Future  -     Vitamin D 25 Hydroxy; Future  -     PTH, Intact; Future  -     Basic Metabolic Panel; Future    Acquired hypothyroidism  -     TSH; Future  -     T4, Free; Future  -     Vitamin D 25 Hydroxy; Future  -     PTH, Intact; Future  -     Basic Metabolic Panel; Future    Hyperlipemia, mixed  -     TSH; Future  -     T4, Free; Future  -     Vitamin D 25 Hydroxy; Future  -     PTH, Intact; Future  -     Basic Metabolic Panel; Future      Hyperparathyroidism-stabilization of calcium levels and elevated parathyroid levels  Would continue to monitor her parathyroid levels  Counseled the patient that some of the symptoms that she is experiencing might be related to her rheumatological concerns but might not be entirely related to a parathyroid abnormality.    Hypothyroidism  Continue levothyroxine    Hyperlipidemia  Continue the statin.    Vitamin D deficiency  Continue vitamin D replacement 50,000 units q. weekly.    Reviewed Lab results with the patient.             13   ( greater than 50% of the time) out of  25 minutes  spent counseling the patient on medication changes, treatment plan and work-up that is necessary.    Marcel Caraballo MD  09/29/20    EMR Dragon / transcription disclaimer:     \"Dictated utilizing Dragon dictation\".      "

## 2020-09-30 ENCOUNTER — OFFICE VISIT (OUTPATIENT)
Dept: INTERNAL MEDICINE | Facility: CLINIC | Age: 60
End: 2020-09-30

## 2020-09-30 VITALS
BODY MASS INDEX: 37 KG/M2 | HEART RATE: 70 BPM | SYSTOLIC BLOOD PRESSURE: 124 MMHG | TEMPERATURE: 97.8 F | HEIGHT: 61 IN | WEIGHT: 196 LBS | OXYGEN SATURATION: 98 % | DIASTOLIC BLOOD PRESSURE: 74 MMHG | RESPIRATION RATE: 16 BRPM

## 2020-09-30 DIAGNOSIS — I10 BENIGN ESSENTIAL HTN: Primary | ICD-10-CM

## 2020-09-30 DIAGNOSIS — E03.9 ADULT HYPOTHYROIDISM: ICD-10-CM

## 2020-09-30 DIAGNOSIS — E87.6 HYPOKALEMIA: ICD-10-CM

## 2020-09-30 DIAGNOSIS — F44.4 FUNCTIONAL NEUROLOGICAL SYMPTOM DISORDER WITH ABNORMAL MOVEMENT: ICD-10-CM

## 2020-09-30 DIAGNOSIS — Z86.39 H/O HYPERPARATHYROIDISM: ICD-10-CM

## 2020-09-30 DIAGNOSIS — E78.5 HYPERLIPIDEMIA LDL GOAL <70: ICD-10-CM

## 2020-09-30 DIAGNOSIS — M85.80 OSTEOPENIA: ICD-10-CM

## 2020-09-30 DIAGNOSIS — I10 ESSENTIAL HYPERTENSION: ICD-10-CM

## 2020-09-30 DIAGNOSIS — R73.01 IFG (IMPAIRED FASTING GLUCOSE): ICD-10-CM

## 2020-09-30 DIAGNOSIS — I25.10 CORONARY ARTERY DISEASE INVOLVING NATIVE CORONARY ARTERY OF NATIVE HEART WITHOUT ANGINA PECTORIS: ICD-10-CM

## 2020-09-30 DIAGNOSIS — E55.9 VITAMIN D DEFICIENCY: ICD-10-CM

## 2020-09-30 DIAGNOSIS — M05.79 RHEUMATOID ARTHRITIS OF MULTIPLE SITES WITHOUT ORGAN OR SYSTEM INVOLVEMENT WITH POSITIVE RHEUMATOID FACTOR (HCC): ICD-10-CM

## 2020-09-30 PROBLEM — R10.11 RUQ ABDOMINAL PAIN: Status: RESOLVED | Noted: 2020-01-21 | Resolved: 2020-09-30

## 2020-09-30 PROCEDURE — 99214 OFFICE O/P EST MOD 30 MIN: CPT | Performed by: NURSE PRACTITIONER

## 2020-09-30 RX ORDER — POTASSIUM CHLORIDE 20 MEQ/1
20 TABLET, EXTENDED RELEASE ORAL DAILY
Qty: 90 TABLET | Refills: 3
Start: 2020-09-30 | End: 2020-12-21

## 2020-09-30 RX ORDER — ERGOCALCIFEROL 1.25 MG/1
50000 CAPSULE ORAL WEEKLY
Qty: 12 CAPSULE | Refills: 3 | Status: SHIPPED | OUTPATIENT
Start: 2020-09-30 | End: 2021-09-07

## 2020-09-30 NOTE — PROGRESS NOTES
Chief Complaint   Patient presents with   • 3 month follow up       Subjective     Shaylee W[place]sworth is a 59 y.o. female being seen for a follow up appointment today regarding HTN, CAD, hyperlipdiemia, Hyperparathyroidism, hypothyroidism, GERD,  and FMD. She is on torprol XL 50 mg daily, Dayron 5/40mg daily for HTN and aldactone 25 mg daily. BP at home 120s/70s. She denies any edema in legs, SOA, CP.     She takes Lipitor 40mg at night for cholesterol. She has known CAD .     She is followed by Multiple specialist including Dr. Dumas (Cardio), Ilya Peña (emdo), Dr. Martinez (rheumatology).     She is on Enbrel injection weekly for RA. She has Functional movement disorder diagnosied at [facility] and was referred to PT/OT. She has tremors of head and neck as well as trouble with walking.       History of Present Illness     Allergies   Allergen Reactions   • Meloxicam Swelling     EYES AND FACE SWELLING  EYES AND FACE SWELLING   • Effexor Xr [Venlafaxine Hcl Er] Other (See Comments)     Caused weight gain         Current Outpatient Medications:   •  amlodipine-olmesartan (DAYRON) 5-40 MG per tablet, Take 1 tablet by mouth Daily., Disp: 90 tablet, Rfl: 1  •  aspirin 81 MG tablet, Take 81 mg by mouth Daily., Disp: , Rfl:   •  atorvastatin (LIPITOR) 40 MG tablet, Take 1 tablet by mouth Daily., Disp: 90 tablet, Rfl: 0  •  ENBREL SURECLICK 50 MG/ML solution auto-injector, , Disp: , Rfl:   •  esomeprazole (NEXIUM) 40 MG capsule, Take 1 capsule by mouth Daily., Disp: , Rfl:   •  estrogen, conjugated,-medroxyprogesterone (Prempro) 0.45-1.5 MG per tablet, Take 1 tablet by mouth Daily., Disp: 28 tablet, Rfl: 11  •  folic acid (FOLVITE) 1 MG tablet, Take 2 mg by mouth Daily., Disp: , Rfl:   •  levothyroxine (SYNTHROID, LEVOTHROID) 25 MCG tablet, Take 1 tablet by mouth Daily., Disp: 30 tablet, Rfl: 11  •  methotrexate 2.5 MG tablet, TAKE 8 TABLETS BY MOUTH ONCE A WEEK ON THE SAME DAY, Disp: 32 tablet, Rfl: 0  •  metoprolol  succinate XL (TOPROL-XL) 50 MG 24 hr tablet, Take 1 tablet by mouth once daily, Disp: 90 tablet, Rfl: 0  •  potassium chloride (K-DUR,KLOR-CON) 20 MEQ CR tablet, TAKE 1 TABLET BY MOUTH TWICE DAILY (Patient taking differently: Daily.), Disp: 60 tablet, Rfl: 5  •  spironolactone (Aldactone) 25 MG tablet, Take 1 tablet by mouth Daily., Disp: 90 tablet, Rfl: 0  •  traMADol (ULTRAM) 50 MG tablet, TAKE 1 TABLET BY MOUTH EVERY 8 HOURS AS NEEDED FOR SEVERE PAIN, Disp: 30 tablet, Rfl: 0  •  vitamin D (ERGOCALCIFEROL) 1.25 MG (25849 UT) capsule capsule, Take 1 capsule by mouth once a week, Disp: 4 capsule, Rfl: 0    The following portions of the patient's history were reviewed and updated as appropriate: allergies, current medications, past family history, past medical history, past social history, past surgical history and problem list.    Review of Systems   Constitutional: Positive for fatigue. Negative for unexpected weight change.   Eyes: Negative.    Respiratory: Negative for cough, shortness of breath, wheezing and stridor.    Cardiovascular: Negative for chest pain, palpitations and leg swelling.   Gastrointestinal: Negative.    Endocrine: Negative.    Genitourinary: Negative.    Musculoskeletal: Positive for arthralgias and gait problem.   Allergic/Immunologic: Negative.    Neurological: Positive for tremors and speech difficulty. Negative for weakness.   Psychiatric/Behavioral: The patient is nervous/anxious.        Assessment     Physical Exam  Vitals signs reviewed.   Constitutional:       Appearance: She is obese. She is not ill-appearing.   HENT:      Head: Normocephalic.      Nose: No congestion or rhinorrhea.   Neck:      Musculoskeletal: No neck rigidity or muscular tenderness.   Cardiovascular:      Rate and Rhythm: Normal rate and regular rhythm.      Heart sounds: Normal heart sounds. No murmur.   Pulmonary:      Effort: Pulmonary effort is normal. No respiratory distress.      Breath sounds: Normal breath  sounds. No stridor. No rhonchi.   Musculoskeletal:         General: No swelling or tenderness.   Skin:     General: Skin is warm and dry.      Coloration: Skin is not jaundiced or pale.      Findings: No erythema.   Neurological:      General: No focal deficit present.      Mental Status: She is alert and oriented to person, place, and time.      Cranial Nerves: No facial asymmetry.      Sensory: No sensory deficit.      Motor: Tremor present. No atrophy or seizure activity.      Coordination: Romberg sign positive. Coordination abnormal. Heel to Velasco Test abnormal.      Gait: Gait abnormal.      Comments: Speech with voice shakiness. Gross motro tremors of head and RUE. Slap foot gait on the right.    Psychiatric:         Mood and Affect: Mood normal.         Behavior: Behavior normal.         Thought Content: Thought content normal.         Plan     Her fasting labs were reviewed with the patient from last week.     Shaylee was seen today for 3 month follow up.    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    Hypokalemia  -     potassium chloride (K-DUR,KLOR-CON) 20 MEQ CR tablet; Take 1 tablet by mouth Daily.    Osteopenia  -     vitamin D (ERGOCALCIFEROL) 1.25 MG (52700 UT) capsule capsule; Take 1 capsule by mouth 1 (One) Time Per Week.  -     Vitamin D  1,25 Dihydroxy; Future    IFG (impaired fasting glucose)  -     Comprehensive metabolic panel; Future  -     Hemoglobin A1c; Future    Vitamin D deficiency  -     Vitamin D 1,25 Dihydroxy; Future    H/O hyperparathyroidism  -     PTH, Intact; Future  -     CBC w AUTO Differential; Future    Adult hypothyroidism  -     TSH; Future  -     T4, free; Future        Follow up in 3 months

## 2020-10-08 ENCOUNTER — TELEPHONE (OUTPATIENT)
Dept: CARDIOLOGY | Facility: CLINIC | Age: 60
End: 2020-10-08

## 2020-10-08 NOTE — TELEPHONE ENCOUNTER
----- Message from Shaylee W[place]sworth sent at 10/7/2020  7:28 PM EDT -----  Regarding: Non-Urgent Medical Question  Contact: [phone]  Hi Dr Dumas    Can we do a phone visit since I'm feeling good?    Thanks    Shaylee Wadsworth

## 2020-10-13 ENCOUNTER — OFFICE VISIT (OUTPATIENT)
Dept: CARDIOLOGY | Facility: CLINIC | Age: 60
End: 2020-10-13

## 2020-10-13 VITALS — BODY MASS INDEX: 36.82 KG/M2 | HEART RATE: 63 BPM | WEIGHT: 195 LBS | HEIGHT: 61 IN

## 2020-10-13 DIAGNOSIS — M05.79 RHEUMATOID ARTHRITIS OF MULTIPLE SITES WITHOUT ORGAN OR SYSTEM INVOLVEMENT WITH POSITIVE RHEUMATOID FACTOR (HCC): ICD-10-CM

## 2020-10-13 DIAGNOSIS — E78.5 HYPERLIPIDEMIA LDL GOAL <70: ICD-10-CM

## 2020-10-13 DIAGNOSIS — I25.10 CORONARY ARTERY DISEASE INVOLVING NATIVE CORONARY ARTERY OF NATIVE HEART WITHOUT ANGINA PECTORIS: Primary | ICD-10-CM

## 2020-10-13 DIAGNOSIS — I10 BENIGN ESSENTIAL HTN: ICD-10-CM

## 2020-10-13 DIAGNOSIS — R25.1 TREMOR: ICD-10-CM

## 2020-10-13 DIAGNOSIS — Z95.5 S/P DRUG ELUTING CORONARY STENT PLACEMENT: ICD-10-CM

## 2020-10-13 PROCEDURE — 99442 PR PHYS/QHP TELEPHONE EVALUATION 11-20 MIN: CPT | Performed by: INTERNAL MEDICINE

## 2020-10-13 NOTE — PROGRESS NOTES
TELEPHONE FOLLOW UP VISIT    Subjective:     Encounter Date:10/13/2020      Patient ID: Shaylee Wadsworth is a 59 y.o. female.    Chief Complaint:  History of Present Illness    This is a 59-year-old with a history of hypertension, hyperlipidemia, rheumatoid arthritis, functional movement disorder resulting amanda tremor, coronary artery disease status post drug eluting stent placement of the distal left circumflex artery, obstructive sleep apnea, who presents for telephone follow up.      She presents today for routine follow-up.  She has continued to feel well.  She denies any chest pain, shortness of breath, palpitation, orthopnea, near-syncope or syncope.  She denies any further palpitations.  She reported some issues with lower extremity edema that was attributed to amlodipine.  That dosage was decreased and she was started on spironolactone with improvement in her swelling.  Since her last visit she has been diagnosed with hypothyroidism and has been started on levothyroxine.     Prior History:  I saw the patient initially in 2/2019 for an abnormal stress test and chest pain.  At that time she and her  reported she had been under a lot of stress after developing a right sided tremor and balance issues in 4/2018.  As a results she had been to several different doctors including to the [facility].  There she was seen both in the functional movement disorder clinic and by ENT who felt that her symptoms may be due to dystonia and BPPV.  They recommended that she start physical therapy for this.  Following her return from her [place] clinic visit the patient developed recurrent chest burning symptoms on 2/12/2019 which prompted her to go to the emergency room.  She reported that the symptoms woke her up from sleep and lasted about 2 minutes each and resolved on their own.  She had about 3 episodes before she went to the emergency room.  Workup in the emergency room was apparently unremarkable.  Prior to  that she had 2 or 3 episodes about a week or so prior.  She was given Carafate in the emergency room which she has been taking about once daily in addition to daily omeprazole which she has been on chronically.  She was then seen by KAITLYNN Choi on 2/14/2019 and was set up with a treadmill stress test.  This was performed on 2/21/2019.  She only exercised about 5 minutes and had no significant changes with exercise nor did she have any symptoms.  However in recovery the patient developed ST segment elevation in her inferior leads with reciprocal ST depression in 1 and aVL.  These findings resolved at the end of the recovery period.  She had no symptoms associated with the EKG changes.      Based on the high risk findings on her stress test and recommended proceeding with a cardiac catheterization.  This was performed on 3/4/2019 and showed 80% stenosis of her distal left circumflex artery.  Her main coronary artery showed mild nonobstructive disease.  Left ventricular function was normal with an EF of greater than 60%.  She subsequently underwent drug-eluting stent placement and was discharged later that same day.      In 2/2020 she complained of palpitations.   She was set up with a monitor which showed one 16 beat run of SVT but was overall unremarkable.    Since then her symptoms of palpitations improved.  Clopidogrel was discontinued at her last telephone follow-up in 4/2020.    Review of Systems   Constitution: Negative for malaise/fatigue.   HENT: Negative for hearing loss, hoarse voice, nosebleeds and sore throat.    Eyes: Negative for pain.   Cardiovascular: Positive for leg swelling. Negative for chest pain, claudication, cyanosis, dyspnea on exertion, irregular heartbeat, near-syncope, orthopnea, palpitations, paroxysmal nocturnal dyspnea and syncope.   Respiratory: Negative for shortness of breath and snoring.    Endocrine: Negative for cold intolerance, heat intolerance, polydipsia, polyphagia and  polyuria.   Skin: Negative for itching and rash.   Musculoskeletal: Positive for joint pain. Negative for arthritis, falls, joint swelling, muscle cramps, muscle weakness and myalgias.   Gastrointestinal: Negative for constipation, diarrhea, dysphagia, heartburn, hematemesis, hematochezia, melena, nausea and vomiting.   Genitourinary: Negative for frequency, hematuria and hesitancy.   Neurological: Negative for excessive daytime sleepiness, dizziness, headaches, light-headedness, numbness and weakness.   Psychiatric/Behavioral: Negative for depression. The patient is not nervous/anxious.          Current Outpatient Medications:   •  amlodipine-olmesartan (DAYRON) 5-40 MG per tablet, Take 1 tablet by mouth Daily., Disp: 90 tablet, Rfl: 1  •  aspirin 81 MG tablet, Take 81 mg by mouth Daily., Disp: , Rfl:   •  atorvastatin (LIPITOR) 40 MG tablet, Take 1 tablet by mouth Daily., Disp: 90 tablet, Rfl: 0  •  ENBREL SURECLICK 50 MG/ML solution auto-injector, Every 7 (Seven) Days., Disp: , Rfl:   •  esomeprazole (NEXIUM) 40 MG capsule, Take 1 capsule by mouth Daily., Disp: , Rfl:   •  estrogen, conjugated,-medroxyprogesterone (Prempro) 0.45-1.5 MG per tablet, Take 1 tablet by mouth Daily. (Patient taking differently: Take 1 tablet by mouth. Every other day), Disp: 28 tablet, Rfl: 11  •  folic acid (FOLVITE) 1 MG tablet, Take 2 mg by mouth Daily., Disp: , Rfl:   •  levothyroxine (SYNTHROID, LEVOTHROID) 25 MCG tablet, Take 1 tablet by mouth Daily., Disp: 30 tablet, Rfl: 11  •  methotrexate 2.5 MG tablet, TAKE 8 TABLETS BY MOUTH ONCE A WEEK ON THE SAME DAY (Patient taking differently: 4 tabs once a week), Disp: 32 tablet, Rfl: 0  •  metoprolol succinate XL (TOPROL-XL) 50 MG 24 hr tablet, Take 1 tablet by mouth once daily, Disp: 90 tablet, Rfl: 0  •  potassium chloride (K-DUR,KLOR-CON) 20 MEQ CR tablet, Take 1 tablet by mouth Daily., Disp: 90 tablet, Rfl: 3  •  spironolactone (Aldactone) 25 MG tablet, Take 1 tablet by mouth  Daily., Disp: 90 tablet, Rfl: 0  •  traMADol (ULTRAM) 50 MG tablet, TAKE 1 TABLET BY MOUTH EVERY 8 HOURS AS NEEDED FOR SEVERE PAIN, Disp: 30 tablet, Rfl: 0  •  vitamin D (ERGOCALCIFEROL) 1.25 MG (65913 UT) capsule capsule, Take 1 capsule by mouth 1 (One) Time Per Week., Disp: 12 capsule, Rfl: 3    Past Medical History:   Diagnosis Date   • Anxiety    • Arthritis 5/1/2014    RA   • Lainez esophagus    • Benign paroxysmal positional vertigo due to bilateral vestibular disorder    • Chest pain    • Cholelithiasis 2007    Removed   • Colon polyp    • Coronary artery disease 2/26/19   • Depression 6/12/19   • Dystonia    • Esophageal reflux    • Fibromyositis    • Functional movement disorder    • H/O colonoscopy    • H/O mammogram 08/31/2011    NORMAL    • Hx of bone density study 08/31/2011    OSTEOPENIA    • Hyperlipidemia    • Hyperparathyroidism (CMS/HCC)    • Hypertension    • Low back pain    • Menopause    • Osteopenia    • Pancreatitis    • Scoliosis 2/16/13   • Tremor 4/4/2018       Past Surgical History:   Procedure Laterality Date   • CARDIAC CATHETERIZATION N/A 3/4/2019    Procedure: Coronary angiography;  Surgeon: Jackelyn Dumas MD;  Location: [facility] CATH INVASIVE LOCATION;  Service: Cardiovascular   • CARDIAC CATHETERIZATION N/A 3/4/2019    Procedure: Left heart cath;  Surgeon: Jackelyn Dumas MD;  Location: [facility] CATH INVASIVE LOCATION;  Service: Cardiovascular   • CARDIAC CATHETERIZATION N/A 3/4/2019    Procedure: Left ventriculography;  Surgeon: Jackelyn Dumas MD;  Location: [facility] CATH INVASIVE LOCATION;  Service: Cardiovascular   • CARDIAC CATHETERIZATION N/A 3/4/2019    Procedure: Stent KARLY coronary;  Surgeon: Jackelyn Dumas MD;  Location:  [facility] CATH INVASIVE LOCATION;  Service: Cardiovascular   • CHOLECYSTECTOMY     • COLONOSCOPY  2014   • COLONOSCOPY N/A 3/6/2020    Procedure: COLONOSCOPY, biopsy, polypectomy;  Surgeon: aRin Kirk MD;  Location: [facility] OR;  Service: Gastroenterology;  " Laterality: N/A;  Random biopsies; Rectal polyp x 3; Hemorrhoids; Diverticulosis   • DILATATION AND CURETTAGE     • HYSTERECTOMY     • MOUTH SURGERY      TOOTH EXTRACTION   • OTHER SURGICAL HISTORY  03/27/2015    DIAGNOSTIC ESOPHAGOSCOPY TRANSNASAL FLEXIBLE WITH BIOPSY; ARZATE ESO. REPEAT EGD 2 YR    • OVARY SURGERY Left     NORMAL    • PAP SMEAR  08/23/2010    NORMAL    • PARATHYROIDECTOMY Right 08/17/2016    Dr. Muchael Flynn at [place]   • SUBTOTAL HYSTERECTOMY      Left ovary removed9       Family History   Problem Relation Age of Onset   • Diabetes Mother    • Hyperlipidemia Mother    • Hypertension Mother    • Heart disease Mother    • Kidney disease Mother    • Arthritis Father    • Anxiety disorder Father    • COPD Father    • Glaucoma Father    • Hyperlipidemia Father    • Hypertension Father    • Vision loss Father    • Heart disease Father    • Heart disease Sister    • Breast cancer Neg Hx    • Colon cancer Neg Hx    • Colon polyps Neg Hx        Social History     Tobacco Use   • Smoking status: Passive Smoke Exposure - Never Smoker   • Smokeless tobacco: Current User   • Tobacco comment: 8/12/18 switch to e-cig   Substance Use Topics   • Alcohol use: No   • Drug use: No          Objective:     Visit Vitals  Pulse 63   Ht 154.9 cm (61\")   Wt 88.5 kg (195 lb)   LMP  (LMP Unknown) Comment: partial hysterectomy    BMI 36.84 kg/m²         Physical Exam  Not performed due to telephone visit.        Assessment:          Diagnosis Plan   1. Coronary artery disease involving native coronary artery of native heart without angina pectoris     2. Benign essential HTN     3. Hyperlipidemia LDL goal <70     4. S/P drug eluting coronary stent placement     5. Rheumatoid arthritis of multiple sites without organ or system involvement with positive rheumatoid factor (CMS/HCC)     6. Tremor            Plan:       1.  Coronary artery disease.  History of distal left circumflex artery stent placement.  Clopidogrel was " discontinued after 1 year of therapy.  She is to continue aspirin and statin.  2.  Hypertension.  No values available today but she reports that it is normally well controlled.  3.  Hyperlipidemia.  Lipids appear to be near goal occluding an LDL of around 70.  She remains on atorvastatin which is managed by KAITLYNN Estrada and endocrinology.  4.  Functional movement disorder  5.  Rheumatoid arthritis  6.  Nicotine use.  She continues to smoke E cigarettes.  Encouraged her to wean herself off completely.    We will plan on seeing her back in the office in 1 year.    This patient has consented to a telehealth visit via telephone. The visit was scheduled as a telephone visit to comply with patient safety concerns in accordance with CDC recommendations.  All vitals recorded within this visit are reported by the patient.  I spent  6 minutes in total including but not limited to the 11 minutes spent in direct conversation with this patient.

## 2020-11-03 DIAGNOSIS — E78.5 HYPERLIPIDEMIA LDL GOAL <100: ICD-10-CM

## 2020-11-03 RX ORDER — ATORVASTATIN CALCIUM 40 MG/1
TABLET, FILM COATED ORAL
Qty: 90 TABLET | Refills: 0 | Status: SHIPPED | OUTPATIENT
Start: 2020-11-03 | End: 2021-02-01

## 2020-11-29 DIAGNOSIS — I10 ESSENTIAL HYPERTENSION: ICD-10-CM

## 2020-11-29 DIAGNOSIS — I10 BENIGN ESSENTIAL HTN: ICD-10-CM

## 2020-11-30 RX ORDER — METHOTREXATE 2.5 MG/1
TABLET ORAL
Qty: 32 TABLET | Refills: 0 | Status: SHIPPED | OUTPATIENT
Start: 2020-11-30 | End: 2021-01-31

## 2020-11-30 RX ORDER — SPIRONOLACTONE 25 MG/1
TABLET ORAL
Qty: 90 TABLET | Refills: 0 | Status: SHIPPED | OUTPATIENT
Start: 2020-11-30 | End: 2021-04-23

## 2020-11-30 RX ORDER — ESOMEPRAZOLE MAGNESIUM 40 MG/1
CAPSULE, DELAYED RELEASE ORAL
Qty: 60 CAPSULE | Refills: 0 | Status: SHIPPED | OUTPATIENT
Start: 2020-11-30 | End: 2021-01-31

## 2020-11-30 RX ORDER — METOPROLOL SUCCINATE 50 MG/1
TABLET, EXTENDED RELEASE ORAL
Qty: 90 TABLET | Refills: 0 | Status: SHIPPED | OUTPATIENT
Start: 2020-11-30 | End: 2021-03-01

## 2020-12-10 ENCOUNTER — LAB (OUTPATIENT)
Dept: LAB | Facility: HOSPITAL | Age: 60
End: 2020-12-10

## 2020-12-10 DIAGNOSIS — M05.79 SEROPOSITIVE RHEUMATOID ARTHRITIS OF MULTIPLE SITES (HCC): ICD-10-CM

## 2020-12-10 DIAGNOSIS — R73.01 IFG (IMPAIRED FASTING GLUCOSE): ICD-10-CM

## 2020-12-10 DIAGNOSIS — Z86.39 H/O HYPERPARATHYROIDISM: ICD-10-CM

## 2020-12-10 DIAGNOSIS — E78.2 HYPERLIPEMIA, MIXED: ICD-10-CM

## 2020-12-10 DIAGNOSIS — E03.9 ACQUIRED HYPOTHYROIDISM: ICD-10-CM

## 2020-12-10 DIAGNOSIS — I10 BENIGN ESSENTIAL HTN: ICD-10-CM

## 2020-12-10 DIAGNOSIS — Z79.899 ENCOUNTER FOR LONG-TERM (CURRENT) USE OF OTHER MEDICATIONS: ICD-10-CM

## 2020-12-10 DIAGNOSIS — E78.5 HYPERLIPIDEMIA LDL GOAL <70: ICD-10-CM

## 2020-12-10 DIAGNOSIS — I25.10 CORONARY ARTERY DISEASE INVOLVING NATIVE CORONARY ARTERY OF NATIVE HEART WITHOUT ANGINA PECTORIS: ICD-10-CM

## 2020-12-10 DIAGNOSIS — E21.0 PRIMARY HYPERPARATHYROIDISM (HCC): ICD-10-CM

## 2020-12-10 DIAGNOSIS — E03.9 ADULT HYPOTHYROIDISM: ICD-10-CM

## 2020-12-10 LAB
25(OH)D3 SERPL-MCNC: 48.3 NG/ML (ref 30–100)
ALBUMIN SERPL-MCNC: 4.1 G/DL (ref 3.5–5.2)
ALBUMIN/GLOB SERPL: 1.4 G/DL
ALP SERPL-CCNC: 100 U/L (ref 39–117)
ALT SERPL W P-5'-P-CCNC: 9 U/L (ref 1–33)
ANION GAP SERPL CALCULATED.3IONS-SCNC: 11.3 MMOL/L (ref 5–15)
AST SERPL-CCNC: 12 U/L (ref 1–32)
BASOPHILS # BLD AUTO: 0.08 10*3/MM3 (ref 0–0.2)
BASOPHILS NFR BLD AUTO: 1 % (ref 0–1.5)
BILIRUB SERPL-MCNC: 0.6 MG/DL (ref 0–1.2)
BUN SERPL-MCNC: 8 MG/DL (ref 6–20)
BUN/CREAT SERPL: 9.4 (ref 7–25)
CALCIUM SPEC-SCNC: 8.9 MG/DL (ref 8.6–10.5)
CHLORIDE SERPL-SCNC: 100 MMOL/L (ref 98–107)
CHOLEST SERPL-MCNC: 158 MG/DL (ref 0–200)
CO2 SERPL-SCNC: 25.7 MMOL/L (ref 22–29)
CREAT SERPL-MCNC: 0.85 MG/DL (ref 0.57–1)
DEPRECATED RDW RBC AUTO: 48.2 FL (ref 37–54)
EOSINOPHIL # BLD AUTO: 0.12 10*3/MM3 (ref 0–0.4)
EOSINOPHIL NFR BLD AUTO: 1.6 % (ref 0.3–6.2)
ERYTHROCYTE [DISTWIDTH] IN BLOOD BY AUTOMATED COUNT: 16.3 % (ref 12.3–15.4)
GFR SERPL CREATININE-BSD FRML MDRD: 69 ML/MIN/1.73
GLOBULIN UR ELPH-MCNC: 3 GM/DL
GLUCOSE SERPL-MCNC: 121 MG/DL (ref 65–99)
HBA1C MFR BLD: 5.9 % (ref 4.8–5.6)
HCT VFR BLD AUTO: 34.6 % (ref 34–46.6)
HDLC SERPL-MCNC: 56 MG/DL (ref 40–60)
HGB BLD-MCNC: 11.4 G/DL (ref 12–15.9)
IMM GRANULOCYTES # BLD AUTO: 0.03 10*3/MM3 (ref 0–0.05)
IMM GRANULOCYTES NFR BLD AUTO: 0.4 % (ref 0–0.5)
LDLC SERPL CALC-MCNC: 78 MG/DL (ref 0–100)
LDLC/HDLC SERPL: 1.34 {RATIO}
LYMPHOCYTES # BLD AUTO: 2.73 10*3/MM3 (ref 0.7–3.1)
LYMPHOCYTES NFR BLD AUTO: 35.4 % (ref 19.6–45.3)
MCH RBC QN AUTO: 27.3 PG (ref 26.6–33)
MCHC RBC AUTO-ENTMCNC: 32.9 G/DL (ref 31.5–35.7)
MCV RBC AUTO: 83 FL (ref 79–97)
MONOCYTES # BLD AUTO: 0.47 10*3/MM3 (ref 0.1–0.9)
MONOCYTES NFR BLD AUTO: 6.1 % (ref 5–12)
NEUTROPHILS NFR BLD AUTO: 4.28 10*3/MM3 (ref 1.7–7)
NEUTROPHILS NFR BLD AUTO: 55.5 % (ref 42.7–76)
NRBC BLD AUTO-RTO: 0 /100 WBC (ref 0–0.2)
PLATELET # BLD AUTO: 326 10*3/MM3 (ref 140–450)
PMV BLD AUTO: 10 FL (ref 6–12)
POTASSIUM SERPL-SCNC: 3.5 MMOL/L (ref 3.5–5.2)
PROT SERPL-MCNC: 7.1 G/DL (ref 6–8.5)
PTH-INTACT SERPL-MCNC: 172 PG/ML (ref 15–65)
RBC # BLD AUTO: 4.17 10*6/MM3 (ref 3.77–5.28)
SODIUM SERPL-SCNC: 137 MMOL/L (ref 136–145)
T4 FREE SERPL-MCNC: 1.59 NG/DL (ref 0.93–1.7)
TRIGL SERPL-MCNC: 135 MG/DL (ref 0–150)
TSH SERPL DL<=0.05 MIU/L-ACNC: 2.81 UIU/ML (ref 0.27–4.2)
VLDLC SERPL-MCNC: 24 MG/DL (ref 5–40)
WBC # BLD AUTO: 7.71 10*3/MM3 (ref 3.4–10.8)

## 2020-12-10 PROCEDURE — 36415 COLL VENOUS BLD VENIPUNCTURE: CPT

## 2020-12-10 PROCEDURE — 83970 ASSAY OF PARATHORMONE: CPT

## 2020-12-10 PROCEDURE — 83036 HEMOGLOBIN GLYCOSYLATED A1C: CPT

## 2020-12-10 PROCEDURE — 84439 ASSAY OF FREE THYROXINE: CPT

## 2020-12-10 PROCEDURE — 80061 LIPID PANEL: CPT

## 2020-12-10 PROCEDURE — 82306 VITAMIN D 25 HYDROXY: CPT

## 2020-12-10 PROCEDURE — 85025 COMPLETE CBC W/AUTO DIFF WBC: CPT

## 2020-12-10 PROCEDURE — 80053 COMPREHEN METABOLIC PANEL: CPT

## 2020-12-10 PROCEDURE — 84443 ASSAY THYROID STIM HORMONE: CPT

## 2020-12-21 DIAGNOSIS — E87.6 HYPOKALEMIA: ICD-10-CM

## 2020-12-21 DIAGNOSIS — I10 ESSENTIAL HYPERTENSION: ICD-10-CM

## 2020-12-21 RX ORDER — POTASSIUM CHLORIDE 20 MEQ/1
TABLET, EXTENDED RELEASE ORAL
Qty: 180 TABLET | Refills: 0 | Status: SHIPPED | OUTPATIENT
Start: 2020-12-21 | End: 2021-07-19

## 2020-12-29 ENCOUNTER — OFFICE VISIT (OUTPATIENT)
Dept: INTERNAL MEDICINE | Facility: CLINIC | Age: 60
End: 2020-12-29

## 2020-12-29 VITALS
HEIGHT: 61 IN | SYSTOLIC BLOOD PRESSURE: 110 MMHG | WEIGHT: 198 LBS | DIASTOLIC BLOOD PRESSURE: 78 MMHG | RESPIRATION RATE: 17 BRPM | OXYGEN SATURATION: 94 % | BODY MASS INDEX: 37.38 KG/M2 | HEART RATE: 72 BPM

## 2020-12-29 DIAGNOSIS — M51.9 DISC DISORDER OF THORACIC REGION: ICD-10-CM

## 2020-12-29 DIAGNOSIS — I10 BENIGN ESSENTIAL HTN: Primary | ICD-10-CM

## 2020-12-29 DIAGNOSIS — I25.10 CORONARY ARTERY DISEASE INVOLVING NATIVE CORONARY ARTERY OF NATIVE HEART WITHOUT ANGINA PECTORIS: ICD-10-CM

## 2020-12-29 DIAGNOSIS — F44.4 FUNCTIONAL NEUROLOGICAL SYMPTOM DISORDER WITH ABNORMAL MOVEMENT: ICD-10-CM

## 2020-12-29 DIAGNOSIS — Z86.39 H/O HYPERPARATHYROIDISM: ICD-10-CM

## 2020-12-29 DIAGNOSIS — G25.9 FUNCTIONAL MOVEMENT DISORDER: ICD-10-CM

## 2020-12-29 DIAGNOSIS — E78.5 HYPERLIPIDEMIA LDL GOAL <70: ICD-10-CM

## 2020-12-29 DIAGNOSIS — K21.00 GASTROESOPHAGEAL REFLUX DISEASE WITH ESOPHAGITIS WITHOUT HEMORRHAGE: ICD-10-CM

## 2020-12-29 PROBLEM — R10.9 ABDOMINAL PAIN: Status: RESOLVED | Noted: 2020-02-25 | Resolved: 2020-12-29

## 2020-12-29 PROCEDURE — 99214 OFFICE O/P EST MOD 30 MIN: CPT | Performed by: NURSE PRACTITIONER

## 2020-12-29 RX ORDER — TRAMADOL HYDROCHLORIDE 50 MG/1
50 TABLET ORAL EVERY 8 HOURS PRN
Qty: 30 TABLET | Refills: 0 | Status: SHIPPED | OUTPATIENT
Start: 2020-12-29 | End: 2022-01-05 | Stop reason: SDUPTHER

## 2020-12-29 NOTE — PROGRESS NOTES
Chief Complaint   Patient presents with   • Hypertension       Subjective     Shaylee Wadsworth is a 60 y.o. female being seen for a follow up appointment today regarding HTN, Hyperlipdiemia, CAD, GERD, depression, hypothyroidism, and FMD. She is currently on Toprol XL 50mg daily for HTN, and spironolactone 25mg, and Dayron 5/40mg daily for HTN. She is followed by Dr. Dumas for CAD s/p Drug eluding stent. Grabiel CP, SOA, edema.     She is on lipitor nightly for cholesterol. LDL is at goal. It is tolerated well and does no believe this is causing myalgias.     She is on Nexium 40mg daily for Reflux. She denies abd pain or indigestion.     She is followed by Dr. Martinez for RA. She takes Enbrel weekly and methotrexate. She has noticed an increase in joint pains in both her hands and feet. She is describing it as aching pain near thumbs and pain in all toes. Rated 4-5 of 10. She denies any redness or swelling.     She is followed by Dr. Alfred for endo and hyperparathyroidism and hypothyroididsm. She is complaint with her Synthroid 25 mcgs daily.    History of Present Illness     Allergies   Allergen Reactions   • Meloxicam Swelling     EYES AND FACE SWELLING  EYES AND FACE SWELLING   • Effexor Xr [Venlafaxine Hcl Er] Other (See Comments)     Caused weight gain         Current Outpatient Medications:   •  amlodipine-olmesartan (DAYRON) 5-40 MG per tablet, Take 1 tablet by mouth Daily., Disp: 90 tablet, Rfl: 1  •  aspirin 81 MG tablet, Take 81 mg by mouth Daily., Disp: , Rfl:   •  atorvastatin (LIPITOR) 40 MG tablet, Take 1 tablet by mouth once daily, Disp: 90 tablet, Rfl: 0  •  ENBREL SURECLICK 50 MG/ML solution auto-injector, Every 7 (Seven) Days., Disp: , Rfl:   •  esomeprazole (nexIUM) 40 MG capsule, Take 1 capsule by mouth twice daily, Disp: 60 capsule, Rfl: 0  •  estrogen, conjugated,-medroxyprogesterone (Prempro) 0.45-1.5 MG per tablet, Take 1 tablet by mouth Daily. (Patient taking differently: Take 1 tablet by mouth.  Every other day), Disp: 28 tablet, Rfl: 11  •  folic acid (FOLVITE) 1 MG tablet, Take 2 mg by mouth Daily., Disp: , Rfl:   •  levothyroxine (SYNTHROID, LEVOTHROID) 25 MCG tablet, Take 1 tablet by mouth Daily., Disp: 30 tablet, Rfl: 11  •  methotrexate 2.5 MG tablet, TAKE 4 TABLETS BY MOUTH ONCE A WEEK, Disp: 32 tablet, Rfl: 0  •  metoprolol succinate XL (TOPROL-XL) 50 MG 24 hr tablet, Take 1 tablet by mouth once daily, Disp: 90 tablet, Rfl: 0  •  potassium chloride (K-DUR,KLOR-CON) 20 MEQ CR tablet, Take 1 tablet by mouth twice daily, Disp: 180 tablet, Rfl: 0  •  spironolactone (ALDACTONE) 25 MG tablet, Take 1 tablet by mouth once daily, Disp: 90 tablet, Rfl: 0  •  traMADol (ULTRAM) 50 MG tablet, TAKE 1 TABLET BY MOUTH EVERY 8 HOURS AS NEEDED FOR SEVERE PAIN, Disp: 30 tablet, Rfl: 0  •  vitamin D (ERGOCALCIFEROL) 1.25 MG (99577 UT) capsule capsule, Take 1 capsule by mouth 1 (One) Time Per Week., Disp: 12 capsule, Rfl: 3    The following portions of the patient's history were reviewed and updated as appropriate: allergies, current medications, past family history, past medical history, past social history, past surgical history and problem list.    Review of Systems   Constitutional: Positive for fatigue.   HENT: Negative.    Eyes: Negative.    Respiratory: Negative.  Negative for shortness of breath and stridor.    Cardiovascular: Negative.  Negative for chest pain.   Gastrointestinal: Negative.    Endocrine: Negative.    Genitourinary: Negative for vaginal discharge and vaginal pain.   Musculoskeletal: Positive for arthralgias.   Allergic/Immunologic: Positive for environmental allergies.   Neurological: Positive for dizziness, tremors and weakness.   Hematological: Negative.  Negative for adenopathy. Does not bruise/bleed easily.   Psychiatric/Behavioral: Negative.  Negative for agitation.       Assessment     Physical Exam  Vitals signs reviewed.   Constitutional:       Appearance: She is obese. She is  ill-appearing (chronically).   HENT:      Right Ear: Tympanic membrane normal.      Left Ear: Tympanic membrane normal.      Mouth/Throat:      Pharynx: No posterior oropharyngeal erythema.   Cardiovascular:      Rate and Rhythm: Normal rate and regular rhythm.      Pulses: Normal pulses.      Heart sounds: No murmur.   Pulmonary:      Effort: Pulmonary effort is normal. No respiratory distress.      Breath sounds: No stridor. No rhonchi.   Musculoskeletal:         General: No swelling.   Skin:     General: Skin is warm and dry.      Capillary Refill: Capillary refill takes less than 2 seconds.   Neurological:      Mental Status: She is alert and oriented to person, place, and time.      Gait: Gait abnormal.      Comments: Speech clear, voice shakiness noted. Gait with slap foot on the right. Gross motor Tremors to BUE.    Psychiatric:         Mood and Affect: Mood normal.         Behavior: Behavior normal.         Thought Content: Thought content normal.         Plan     Her fasting labs were reviewed with the patient from last week.     Diagnoses and all orders for this visit:    1. Benign essential HTN (Primary)  -     Comprehensive metabolic panel; Future  -     Conv Lipid Panel w/ Chol/HDL Ratio; Future  -     CBC w AUTO Differential; Future    2. Hyperlipidemia LDL goal <70  -     Comprehensive metabolic panel; Future  -     Conv Lipid Panel w/ Chol/HDL Ratio; Future  -     CBC w AUTO Differential; Future    3. Coronary artery disease involving native coronary artery of native heart without angina pectoris  -     Comprehensive metabolic panel; Future  -     Conv Lipid Panel w/ Chol/HDL Ratio; Future  -     CBC w AUTO Differential; Future    4. Gastroesophageal reflux disease with esophagitis without hemorrhage  -     Iron and TIBC; Future  -     Vitamin B12 and Folate; Future    5. Functional neurological symptom disorder with abnormal movement  -     Vitamin B12 and Folate; Future  -     Ambulatory Referral  to Physical Therapy Evaluate and treat    6. Functional movement disorder    7. H/O hyperparathyroidism  -     TSH; Future  -     T4, free; Future    8. Disc disorder of thoracic region  -     traMADol (ULTRAM) 50 MG tablet; Take 1 tablet by mouth Every 8 (Eight) Hours As Needed for Severe Pain .  Dispense: 30 tablet; Refill: 0        Her HTN is at goal. Continue current BP meds.    LDL cholesterol at goal., continue Lipitor.     Thyroid euthyroid.    Her main complaint is pain. Refer to PT and refill tramadol as needed, last refill 8-2020.     The patient has read and signed the [facility] Controlled Substance Contract.  I will continue to see patient for regular follow up appointments.  They are well controlled on their medication.  [place] is updated every 3 months. The patient is aware of the potential for addiction and dependence.    Follow up in 3 months with labs

## 2021-01-27 RX ORDER — AMLODIPINE AND OLMESARTAN MEDOXOMIL 5; 40 MG/1; MG/1
TABLET ORAL
Qty: 90 TABLET | Refills: 3 | Status: SHIPPED | OUTPATIENT
Start: 2021-01-27 | End: 2021-06-08 | Stop reason: CLARIF

## 2021-01-31 RX ORDER — ESOMEPRAZOLE MAGNESIUM 40 MG/1
CAPSULE, DELAYED RELEASE ORAL
Qty: 60 CAPSULE | Refills: 0 | Status: SHIPPED | OUTPATIENT
Start: 2021-01-31 | End: 2021-04-06

## 2021-01-31 RX ORDER — METHOTREXATE 2.5 MG/1
TABLET ORAL
Qty: 32 TABLET | Refills: 0 | Status: SHIPPED | OUTPATIENT
Start: 2021-01-31 | End: 2021-03-29

## 2021-02-01 DIAGNOSIS — E78.5 HYPERLIPIDEMIA LDL GOAL <100: ICD-10-CM

## 2021-02-01 RX ORDER — ATORVASTATIN CALCIUM 20 MG/1
40 TABLET, FILM COATED ORAL DAILY
Qty: 90 TABLET | Refills: 3 | Status: SHIPPED | OUTPATIENT
Start: 2021-02-01 | End: 2021-02-15

## 2021-02-12 ENCOUNTER — TELEPHONE (OUTPATIENT)
Dept: INTERNAL MEDICINE | Facility: CLINIC | Age: 61
End: 2021-02-12

## 2021-02-15 DIAGNOSIS — E78.5 HYPERLIPIDEMIA LDL GOAL <100: ICD-10-CM

## 2021-02-15 RX ORDER — ATORVASTATIN CALCIUM 40 MG/1
40 TABLET, FILM COATED ORAL NIGHTLY
Qty: 90 TABLET | Refills: 3 | Status: SHIPPED | OUTPATIENT
Start: 2021-02-15 | End: 2022-02-28

## 2021-02-28 DIAGNOSIS — I10 ESSENTIAL HYPERTENSION: ICD-10-CM

## 2021-03-01 RX ORDER — METOPROLOL SUCCINATE 50 MG/1
TABLET, EXTENDED RELEASE ORAL
Qty: 90 TABLET | Refills: 3 | Status: SHIPPED | OUTPATIENT
Start: 2021-03-01 | End: 2022-02-28

## 2021-03-04 ENCOUNTER — LAB (OUTPATIENT)
Dept: LAB | Facility: HOSPITAL | Age: 61
End: 2021-03-04

## 2021-03-04 DIAGNOSIS — I25.10 CORONARY ARTERY DISEASE INVOLVING NATIVE CORONARY ARTERY OF NATIVE HEART WITHOUT ANGINA PECTORIS: ICD-10-CM

## 2021-03-04 DIAGNOSIS — E21.0 PRIMARY HYPERPARATHYROIDISM (HCC): ICD-10-CM

## 2021-03-04 DIAGNOSIS — F44.4 FUNCTIONAL NEUROLOGICAL SYMPTOM DISORDER WITH ABNORMAL MOVEMENT: ICD-10-CM

## 2021-03-04 DIAGNOSIS — Z86.39 H/O HYPERPARATHYROIDISM: ICD-10-CM

## 2021-03-04 DIAGNOSIS — K21.00 GASTROESOPHAGEAL REFLUX DISEASE WITH ESOPHAGITIS WITHOUT HEMORRHAGE: ICD-10-CM

## 2021-03-04 DIAGNOSIS — E55.9 VITAMIN D DEFICIENCY: ICD-10-CM

## 2021-03-04 DIAGNOSIS — E78.2 HYPERLIPEMIA, MIXED: ICD-10-CM

## 2021-03-04 DIAGNOSIS — E03.9 ACQUIRED HYPOTHYROIDISM: ICD-10-CM

## 2021-03-04 DIAGNOSIS — I10 BENIGN ESSENTIAL HTN: ICD-10-CM

## 2021-03-04 DIAGNOSIS — E78.5 HYPERLIPIDEMIA LDL GOAL <70: ICD-10-CM

## 2021-03-04 DIAGNOSIS — M85.80 OSTEOPENIA: ICD-10-CM

## 2021-03-04 LAB
ALBUMIN SERPL-MCNC: 4 G/DL (ref 3.5–5.2)
ALBUMIN/GLOB SERPL: 1.3 G/DL
ALP SERPL-CCNC: 88 U/L (ref 39–117)
ALT SERPL W P-5'-P-CCNC: 5 U/L (ref 1–33)
ANION GAP SERPL CALCULATED.3IONS-SCNC: 11.2 MMOL/L (ref 5–15)
ANION GAP SERPL CALCULATED.3IONS-SCNC: 12.1 MMOL/L (ref 5–15)
AST SERPL-CCNC: 11 U/L (ref 1–32)
BASOPHILS # BLD AUTO: 0.09 10*3/MM3 (ref 0–0.2)
BASOPHILS NFR BLD AUTO: 1.2 % (ref 0–1.5)
BILIRUB SERPL-MCNC: 0.3 MG/DL (ref 0–1.2)
BUN SERPL-MCNC: 14 MG/DL (ref 6–20)
BUN SERPL-MCNC: 14 MG/DL (ref 6–20)
BUN/CREAT SERPL: 16.9 (ref 7–25)
BUN/CREAT SERPL: 17.5 (ref 7–25)
CALCIUM SPEC-SCNC: 9.4 MG/DL (ref 8.6–10.5)
CALCIUM SPEC-SCNC: 9.7 MG/DL (ref 8.6–10.5)
CHLORIDE SERPL-SCNC: 105 MMOL/L (ref 98–107)
CHLORIDE SERPL-SCNC: 106 MMOL/L (ref 98–107)
CHOLEST SERPL-MCNC: 148 MG/DL (ref 0–200)
CO2 SERPL-SCNC: 22.8 MMOL/L (ref 22–29)
CO2 SERPL-SCNC: 22.9 MMOL/L (ref 22–29)
CREAT SERPL-MCNC: 0.8 MG/DL (ref 0.57–1)
CREAT SERPL-MCNC: 0.83 MG/DL (ref 0.57–1)
DEPRECATED RDW RBC AUTO: 50.4 FL (ref 37–54)
EOSINOPHIL # BLD AUTO: 0.07 10*3/MM3 (ref 0–0.4)
EOSINOPHIL NFR BLD AUTO: 0.9 % (ref 0.3–6.2)
ERYTHROCYTE [DISTWIDTH] IN BLOOD BY AUTOMATED COUNT: 16.1 % (ref 12.3–15.4)
FOLATE SERPL-MCNC: >20 NG/ML (ref 4.78–24.2)
GFR SERPL CREATININE-BSD FRML MDRD: 71 ML/MIN/1.73
GFR SERPL CREATININE-BSD FRML MDRD: 74 ML/MIN/1.73
GLOBULIN UR ELPH-MCNC: 3.1 GM/DL
GLUCOSE SERPL-MCNC: 107 MG/DL (ref 65–99)
GLUCOSE SERPL-MCNC: 111 MG/DL (ref 65–99)
HCT VFR BLD AUTO: 33.8 % (ref 34–46.6)
HDLC SERPL QL: 3.02
HDLC SERPL-MCNC: 49 MG/DL (ref 40–60)
HGB BLD-MCNC: 10.9 G/DL (ref 12–15.9)
IMM GRANULOCYTES # BLD AUTO: 0.01 10*3/MM3 (ref 0–0.05)
IMM GRANULOCYTES NFR BLD AUTO: 0.1 % (ref 0–0.5)
IRON 24H UR-MRATE: 48 MCG/DL (ref 37–145)
IRON SATN MFR SERPL: 10 % (ref 20–50)
LDLC SERPL CALC-MCNC: 76 MG/DL (ref 0–100)
LYMPHOCYTES # BLD AUTO: 3.1 10*3/MM3 (ref 0.7–3.1)
LYMPHOCYTES NFR BLD AUTO: 40.7 % (ref 19.6–45.3)
MCH RBC QN AUTO: 28 PG (ref 26.6–33)
MCHC RBC AUTO-ENTMCNC: 32.2 G/DL (ref 31.5–35.7)
MCV RBC AUTO: 86.9 FL (ref 79–97)
MONOCYTES # BLD AUTO: 0.47 10*3/MM3 (ref 0.1–0.9)
MONOCYTES NFR BLD AUTO: 6.2 % (ref 5–12)
NEUTROPHILS NFR BLD AUTO: 3.88 10*3/MM3 (ref 1.7–7)
NEUTROPHILS NFR BLD AUTO: 50.9 % (ref 42.7–76)
NRBC BLD AUTO-RTO: 0 /100 WBC (ref 0–0.2)
PLATELET # BLD AUTO: 307 10*3/MM3 (ref 140–450)
PMV BLD AUTO: 9.7 FL (ref 6–12)
POTASSIUM SERPL-SCNC: 4.1 MMOL/L (ref 3.5–5.2)
POTASSIUM SERPL-SCNC: 4.2 MMOL/L (ref 3.5–5.2)
PROT SERPL-MCNC: 7.1 G/DL (ref 6–8.5)
PTH-INTACT SERPL-MCNC: 90 PG/ML (ref 15–65)
RBC # BLD AUTO: 3.89 10*6/MM3 (ref 3.77–5.28)
SODIUM SERPL-SCNC: 140 MMOL/L (ref 136–145)
SODIUM SERPL-SCNC: 140 MMOL/L (ref 136–145)
T4 FREE SERPL-MCNC: 1.27 NG/DL (ref 0.93–1.7)
T4 FREE SERPL-MCNC: 1.32 NG/DL (ref 0.93–1.7)
TIBC SERPL-MCNC: 486 MCG/DL (ref 298–536)
TRANSFERRIN SERPL-MCNC: 326 MG/DL (ref 200–360)
TRIGL SERPL-MCNC: 132 MG/DL (ref 0–150)
TSH SERPL DL<=0.05 MIU/L-ACNC: 5.27 UIU/ML (ref 0.27–4.2)
VIT B12 BLD-MCNC: 341 PG/ML (ref 211–946)
VLDLC SERPL-MCNC: 23 MG/DL (ref 5–40)
WBC # BLD AUTO: 7.62 10*3/MM3 (ref 3.4–10.8)

## 2021-03-04 PROCEDURE — 80053 COMPREHEN METABOLIC PANEL: CPT

## 2021-03-04 PROCEDURE — 83970 ASSAY OF PARATHORMONE: CPT

## 2021-03-04 PROCEDURE — 80061 LIPID PANEL: CPT

## 2021-03-04 PROCEDURE — 84466 ASSAY OF TRANSFERRIN: CPT

## 2021-03-04 PROCEDURE — 84439 ASSAY OF FREE THYROXINE: CPT

## 2021-03-04 PROCEDURE — 80048 BASIC METABOLIC PNL TOTAL CA: CPT

## 2021-03-04 PROCEDURE — 36415 COLL VENOUS BLD VENIPUNCTURE: CPT

## 2021-03-04 PROCEDURE — 84443 ASSAY THYROID STIM HORMONE: CPT

## 2021-03-04 PROCEDURE — 83540 ASSAY OF IRON: CPT

## 2021-03-04 PROCEDURE — 82607 VITAMIN B-12: CPT

## 2021-03-04 PROCEDURE — 82652 VIT D 1 25-DIHYDROXY: CPT

## 2021-03-04 PROCEDURE — 85025 COMPLETE CBC W/AUTO DIFF WBC: CPT

## 2021-03-04 PROCEDURE — 82746 ASSAY OF FOLIC ACID SERUM: CPT

## 2021-03-05 DIAGNOSIS — D64.9 ANEMIA, UNSPECIFIED TYPE: Primary | ICD-10-CM

## 2021-03-05 RX ORDER — LEVOTHYROXINE SODIUM 0.05 MG/1
50 TABLET ORAL DAILY
Qty: 30 TABLET | Refills: 11 | Status: SHIPPED | OUTPATIENT
Start: 2021-03-05 | End: 2021-05-27 | Stop reason: SDUPTHER

## 2021-03-06 LAB
1,25(OH)2D SERPL-MCNC: 69.9 PG/ML (ref 19.9–79.3)
HEMOCCULT STL QL IA: NEGATIVE

## 2021-03-29 RX ORDER — METHOTREXATE 2.5 MG/1
TABLET ORAL
Qty: 32 TABLET | Refills: 6 | Status: SHIPPED | OUTPATIENT
Start: 2021-03-29

## 2021-03-31 ENCOUNTER — OFFICE VISIT (OUTPATIENT)
Dept: INTERNAL MEDICINE | Facility: CLINIC | Age: 61
End: 2021-03-31

## 2021-03-31 VITALS
RESPIRATION RATE: 20 BRPM | TEMPERATURE: 97.5 F | HEIGHT: 61 IN | SYSTOLIC BLOOD PRESSURE: 124 MMHG | DIASTOLIC BLOOD PRESSURE: 86 MMHG | HEART RATE: 69 BPM | WEIGHT: 198.2 LBS | OXYGEN SATURATION: 98 % | BODY MASS INDEX: 37.42 KG/M2

## 2021-03-31 DIAGNOSIS — E03.9 HYPOTHYROIDISM, UNSPECIFIED TYPE: ICD-10-CM

## 2021-03-31 DIAGNOSIS — Z95.5 S/P DRUG ELUTING CORONARY STENT PLACEMENT: ICD-10-CM

## 2021-03-31 DIAGNOSIS — I10 BENIGN ESSENTIAL HTN: ICD-10-CM

## 2021-03-31 DIAGNOSIS — K21.00 GASTROESOPHAGEAL REFLUX DISEASE WITH ESOPHAGITIS WITHOUT HEMORRHAGE: ICD-10-CM

## 2021-03-31 DIAGNOSIS — G25.9 FUNCTIONAL MOVEMENT DISORDER: ICD-10-CM

## 2021-03-31 DIAGNOSIS — I25.10 CORONARY ARTERY DISEASE INVOLVING NATIVE CORONARY ARTERY OF NATIVE HEART WITHOUT ANGINA PECTORIS: Primary | ICD-10-CM

## 2021-03-31 DIAGNOSIS — D50.9 IRON DEFICIENCY ANEMIA, UNSPECIFIED IRON DEFICIENCY ANEMIA TYPE: ICD-10-CM

## 2021-03-31 DIAGNOSIS — E78.5 HYPERLIPIDEMIA LDL GOAL <70: ICD-10-CM

## 2021-03-31 PROBLEM — Z00.00 HEALTHCARE MAINTENANCE: Status: RESOLVED | Noted: 2017-05-18 | Resolved: 2021-03-31

## 2021-03-31 PROBLEM — R25.1 TREMOR: Status: RESOLVED | Noted: 2018-06-15 | Resolved: 2021-03-31

## 2021-03-31 PROCEDURE — 99214 OFFICE O/P EST MOD 30 MIN: CPT | Performed by: NURSE PRACTITIONER

## 2021-03-31 NOTE — PROGRESS NOTES
Chief Complaint   Patient presents with   • Hypertension       Subjective     Shaylee Wadsworth is a 60 y.o. female being seen for a follow up appointment today regarding HTN, Hyperlipidemia, GERD, CAD, Hypothyroidism, FMD, Vit D def, and . She is currently on Dayron, Toprol XL, and sprinolactone for HTN and CAD. She is followed by Dr. Dumas. S/P drug eluting stent Distad left circumflex. Plavix was stopped April 2020.     She has thoracic back pain, daily pain rated a 3 of 10. She uses ultram sparingly, filling it only twice this year.     She has FMD since April 2018. She has been evaluated by 2 neurologists as well as [facility] and an ENT. Last Neurologist was Dr. LaFavre at [facility]. She has been thru PT and OT with Movement disordered clinic. She feels like traction helps more than any other modality. She is currently receiving Traction therapy with [facility] in [place].    She is followed by Dr. Caraballo ([facility]) for hyperparathyroidism and hypothyroidism. She just increased from 25 to 50 mcgs daily on levothyroxine. She is followed by Rheumatology, last televist 1 months ago. She is on methotrexate.     She developed some mild anemia 6-, which has gradually worsened. She denies any rectal bleeding or any known source of bleeding. She is on Chronic aspirin therapy for CAD. Last colonoscopy was completed 3-6-2020 with a polyp removed. Fecal OB negative 3-5-2021. Iron saturation running 10%. She reports that her mother had to have iron infusion as she aged.     History of Present Illness     Allergies   Allergen Reactions   • Meloxicam Swelling     EYES AND FACE SWELLING  EYES AND FACE SWELLING   • Effexor Xr [Venlafaxine Hcl Er] Other (See Comments)     Caused weight gain         Current Outpatient Medications:   •  amlodipine-olmesartan (DAYRON) 5-40 MG per tablet, Take 1 tablet by mouth once daily, Disp: 90 tablet, Rfl: 3  •  aspirin 81 MG tablet, Take 81 mg by mouth Daily., Disp: , Rfl:   •   atorvastatin (LIPITOR) 40 MG tablet, Take 1 tablet by mouth Every Night., Disp: 90 tablet, Rfl: 3  •  ENBREL SURECLICK 50 MG/ML solution auto-injector, Every 7 (Seven) Days., Disp: , Rfl:   •  esomeprazole (nexIUM) 40 MG capsule, Take 1 capsule by mouth twice daily, Disp: 60 capsule, Rfl: 0  •  estrogen, conjugated,-medroxyprogesterone (Prempro) 0.45-1.5 MG per tablet, Take 1 tablet by mouth Daily. (Patient taking differently: Take 1 tablet by mouth. Every other day), Disp: 28 tablet, Rfl: 11  •  folic acid (FOLVITE) 1 MG tablet, Take 2 mg by mouth Daily., Disp: , Rfl:   •  levothyroxine (SYNTHROID, LEVOTHROID) 50 MCG tablet, Take 1 tablet by mouth Daily., Disp: 30 tablet, Rfl: 11  •  methotrexate 2.5 MG tablet, TAKE 4 TABLETS BY MOUTH ONCE A WEEK, Disp: 32 tablet, Rfl: 6  •  metoprolol succinate XL (TOPROL-XL) 50 MG 24 hr tablet, Take 1 tablet by mouth once daily, Disp: 90 tablet, Rfl: 3  •  potassium chloride (K-DUR,KLOR-CON) 20 MEQ CR tablet, Take 1 tablet by mouth twice daily, Disp: 180 tablet, Rfl: 0  •  spironolactone (ALDACTONE) 25 MG tablet, Take 1 tablet by mouth once daily, Disp: 90 tablet, Rfl: 0  •  traMADol (ULTRAM) 50 MG tablet, Take 1 tablet by mouth Every 8 (Eight) Hours As Needed for Severe Pain ., Disp: 30 tablet, Rfl: 0  •  vitamin D (ERGOCALCIFEROL) 1.25 MG (16333 UT) capsule capsule, Take 1 capsule by mouth 1 (One) Time Per Week., Disp: 12 capsule, Rfl: 3    The following portions of the patient's history were reviewed and updated as appropriate: allergies, current medications, past family history, past medical history, past social history, past surgical history and problem list.    Review of Systems   Constitutional: Positive for fatigue.   HENT: Negative.    Eyes: Negative.    Respiratory: Negative.    Cardiovascular: Negative for chest pain, palpitations and leg swelling.   Gastrointestinal: Positive for abdominal distention. Negative for blood in stool, constipation and diarrhea.    Musculoskeletal: Positive for arthralgias and back pain.   Neurological: Positive for tremors. Negative for weakness.   Psychiatric/Behavioral: Positive for sleep disturbance.       Assessment     Physical Exam  Vitals reviewed.   Constitutional:       Appearance: Normal appearance. She is not ill-appearing.   HENT:      Head: Normocephalic.      Right Ear: Tympanic membrane normal.      Left Ear: Tympanic membrane normal.   Cardiovascular:      Rate and Rhythm: Normal rate and regular rhythm.      Pulses: Normal pulses.      Heart sounds: Normal heart sounds. No murmur heard.     Pulmonary:      Effort: Pulmonary effort is normal. No respiratory distress.      Breath sounds: Normal breath sounds. No stridor. No wheezing or rhonchi.   Musculoskeletal:      Cervical back: Neck supple. No tenderness.   Skin:     Capillary Refill: Capillary refill takes less than 2 seconds.   Neurological:      Mental Status: She is alert and oriented to person, place, and time.      Cranial Nerves: No cranial nerve deficit.      Motor: No weakness.      Gait: Gait abnormal.      Deep Tendon Reflexes: Reflexes normal.      Comments: Speech clear today without quivering. Tremors noted of head. No tremor of hands. Gait with mild slap foot.    Psychiatric:         Mood and Affect: Mood normal.         Thought Content: Thought content normal.         Plan     Her fasting labs were reviewed with the patient from last week.     Diagnoses and all orders for this visit:    1. Coronary artery disease involving native coronary artery of native heart without angina pectoris (Primary)  Comments:  BP well controlled on current Justin, Toprol and sprironlactone.   Orders:  -     CBC & Differential; Future  -     Iron Profile; Future  -     Lipid Panel With / Chol / HDL Ratio; Future  -     Comprehensive Metabolic Panel; Future  -     Thyroid Cascade Profile; Future    2. Benign essential HTN  -     CBC & Differential; Future  -     Lipid Panel With  / Chol / HDL Ratio; Future  -     Comprehensive Metabolic Panel; Future    3. Hyperlipidemia LDL goal <70  -     Lipid Panel With / Chol / HDL Ratio; Future  -     Comprehensive Metabolic Panel; Future    4. Gastroesophageal reflux disease with esophagitis without hemorrhage  Comments:  Controlled on PPI therapy  Orders:  -     CBC & Differential; Future  -     Comprehensive Metabolic Panel; Future    5. S/P drug eluting coronary stent placement  Comments:  Plavix stopped April 2020. Aspirin 81 mg daily.    6. Functional movement disorder  Comments:  Referred to [facility] for traction therapy    7. Iron deficiency anemia, unspecified iron deficiency anemia type  Comments:  Colonoscopy 3-6-2020, Fecal OB neg 3-5-2021. Reported FH of iron deficiny in mother requiring infusions. On aspirin therapy for CAD.   Orders:  -     Ambulatory Referral to Hematology  -     CBC & Differential; Future  -     Iron Profile; Future    8. Hypothyroidism, unspecified type  Comments:  Levothyroxine increased to 50mcgs daily, repeat TSH in 4-6 weeks.  Orders:  -     Thyroid Cascade Profile; Future      The patient has read and signed the [facility] Controlled Substance Contract.  I will continue to see patient for regular follow up appointments.  They are well controlled on their medication.  [place] is updated every 3 months. The patient is aware of the potential for addiction and dependence.    Follow up in 6 months with labs

## 2021-04-06 RX ORDER — ESOMEPRAZOLE MAGNESIUM 40 MG/1
CAPSULE, DELAYED RELEASE ORAL
Qty: 60 CAPSULE | Refills: 0 | Status: SHIPPED | OUTPATIENT
Start: 2021-04-06 | End: 2021-06-07

## 2021-04-15 ENCOUNTER — CONSULT (OUTPATIENT)
Dept: ONCOLOGY | Facility: CLINIC | Age: 61
End: 2021-04-15

## 2021-04-15 ENCOUNTER — LAB (OUTPATIENT)
Dept: LAB | Facility: HOSPITAL | Age: 61
End: 2021-04-15

## 2021-04-15 VITALS
OXYGEN SATURATION: 97 % | RESPIRATION RATE: 14 BRPM | HEART RATE: 68 BPM | TEMPERATURE: 98 F | HEIGHT: 61 IN | WEIGHT: 196.9 LBS | DIASTOLIC BLOOD PRESSURE: 82 MMHG | SYSTOLIC BLOOD PRESSURE: 138 MMHG | BODY MASS INDEX: 37.17 KG/M2

## 2021-04-15 DIAGNOSIS — D50.9 IRON DEFICIENCY ANEMIA, UNSPECIFIED IRON DEFICIENCY ANEMIA TYPE: Primary | ICD-10-CM

## 2021-04-15 DIAGNOSIS — D64.9 ANEMIA, UNSPECIFIED TYPE: Primary | ICD-10-CM

## 2021-04-15 LAB
BASOPHILS # BLD AUTO: 0.07 10*3/MM3 (ref 0–0.2)
BASOPHILS NFR BLD AUTO: 0.9 % (ref 0–1.5)
DEPRECATED RDW RBC AUTO: 46 FL (ref 37–54)
EOSINOPHIL # BLD AUTO: 0.12 10*3/MM3 (ref 0–0.4)
EOSINOPHIL NFR BLD AUTO: 1.6 % (ref 0.3–6.2)
ERYTHROCYTE [DISTWIDTH] IN BLOOD BY AUTOMATED COUNT: 15.5 % (ref 12.3–15.4)
HCT VFR BLD AUTO: 32.9 % (ref 34–46.6)
HGB BLD-MCNC: 11.2 G/DL (ref 12–15.9)
IMM GRANULOCYTES # BLD AUTO: 0.04 10*3/MM3 (ref 0–0.05)
IMM GRANULOCYTES NFR BLD AUTO: 0.5 % (ref 0–0.5)
LYMPHOCYTES # BLD AUTO: 2.43 10*3/MM3 (ref 0.7–3.1)
LYMPHOCYTES NFR BLD AUTO: 31.4 % (ref 19.6–45.3)
MCH RBC QN AUTO: 28.2 PG (ref 26.6–33)
MCHC RBC AUTO-ENTMCNC: 34 G/DL (ref 31.5–35.7)
MCV RBC AUTO: 82.9 FL (ref 79–97)
MONOCYTES # BLD AUTO: 0.54 10*3/MM3 (ref 0.1–0.9)
MONOCYTES NFR BLD AUTO: 7 % (ref 5–12)
NEUTROPHILS NFR BLD AUTO: 4.53 10*3/MM3 (ref 1.7–7)
NEUTROPHILS NFR BLD AUTO: 58.6 % (ref 42.7–76)
NRBC BLD AUTO-RTO: 0 /100 WBC (ref 0–0.2)
PLATELET # BLD AUTO: 287 10*3/MM3 (ref 140–450)
PMV BLD AUTO: 9.5 FL (ref 6–12)
RBC # BLD AUTO: 3.97 10*6/MM3 (ref 3.77–5.28)
WBC # BLD AUTO: 7.73 10*3/MM3 (ref 3.4–10.8)

## 2021-04-15 PROCEDURE — 36415 COLL VENOUS BLD VENIPUNCTURE: CPT

## 2021-04-15 PROCEDURE — 85025 COMPLETE CBC W/AUTO DIFF WBC: CPT

## 2021-04-15 PROCEDURE — 99244 OFF/OP CNSLTJ NEW/EST MOD 40: CPT | Performed by: INTERNAL MEDICINE

## 2021-04-15 RX ORDER — FERROUS SULFATE 325(65) MG
325 TABLET ORAL
Qty: 30 TABLET | Refills: 3 | Status: SHIPPED | OUTPATIENT
Start: 2021-04-15 | End: 2022-09-30

## 2021-04-15 NOTE — PROGRESS NOTES
Subjective     REASON FOR CONSULTATION:  Iron def anemia  Provide an opinion on any further workup or treatment                             REQUESTING PRACTITIONER:  Laura Ayala    RECORDS OBTAINED:  Records of the patients history including those obtained from the referring provider were reviewed and summarized in detail.    HISTORY OF PRESENT ILLNESS:  The patient is a 60 y.o. year old female who is here for an opinion about the above issue.    History of Present Illness   This is a pleasant 60-year-old lady seen today for evaluation and treatment of anemia.  Reviewing the patient's recent laboratory data she has developed an anemia over the past 1 year with her hemoglobin running in the 10-11 range.  Her red blood cells are normocytic and normochromic.  She has normal white blood cell and platelet counts.  Additional work-up shows likely iron deficiency with an iron saturation of 10% on 3/4/2021.  Her B12 and folic acid levels were normal.  An occult blood on the stool 3/5/2021 was negative.  She had a colonoscopy performed March 2020.  She has not had a recent EGD.  Notes indicate she has history of Lainez's esophagus.    The patient has fatigue and chronic musculoskeletal pain related to rheumatoid arthritis.  She is currently on Enbrel.  She denies donating blood, bright red blood per rectum, melanotic stool, vaginal bleeding.  She reports normal diet and is not vegan or vegetarian.    Past Medical History:   Diagnosis Date   • Anxiety    • Arthritis 5/1/2014    RA   • Lainez esophagus    • Benign paroxysmal positional vertigo due to bilateral vestibular disorder    • Chest pain    • Cholelithiasis 2007    Removed   • Colon polyp    • Coronary artery disease 2/26/19   • Depression 6/12/19   • Dystonia    • Esophageal reflux    • Fibromyositis    • Functional movement disorder    • H/O colonoscopy    • H/O mammogram 08/31/2011    NORMAL    • History of colonoscopy 03/2020   • Hx of bone density study  08/31/2011    OSTEOPENIA    • Hyperlipidemia    • Hyperparathyroidism (CMS/HCC)    • Hypertension    • Low back pain    • Menopause    • Osteopenia    • Pancreatitis    • Pancreatitis    • Scoliosis 2/16/13   • Tremor 4/4/2018        Past Surgical History:   Procedure Laterality Date   • CARDIAC CATHETERIZATION N/A 3/4/2019    Procedure: Coronary angiography;  Surgeon: Jackelyn Dumas MD;  Location:  [facility];  Service: Cardiovascular   • CARDIAC CATHETERIZATION N/A 3/4/2019    Procedure: Left heart cath;  Surgeon: Jackelyn Dumas MD;  Location:  [facility];  Service: Cardiovascular   • CARDIAC CATHETERIZATION N/A 3/4/2019    Procedure: Left ventriculography;  Surgeon: Jackelyn Dumas MD;  Location:  [facility];  Service: Cardiovascular   • CARDIAC CATHETERIZATION N/A 3/4/2019    Procedure: Stent KARLY coronary;  Surgeon: Jackelyn Dumas MD;  Location:  [facility];  Service: Cardiovascular   • CHOLECYSTECTOMY     • COLONOSCOPY  2014   • COLONOSCOPY N/A 3/6/2020    Procedure: COLONOSCOPY, biopsy, polypectomy;  Surgeon: Rain Kirk MD;  Location[facility];  Service: Gastroenterology;  Laterality: N/A;  Random biopsies; Rectal polyp x 3; Hemorrhoids; Diverticulosis   • DILATATION AND CURETTAGE     • HYSTERECTOMY     • MOUTH SURGERY      TOOTH EXTRACTION   • OTHER SURGICAL HISTORY  03/27/2015    DIAGNOSTIC ESOPHAGOSCOPY TRANSNASAL FLEXIBLE WITH BIOPSY; ARZATE ESO. REPEAT EGD 2 YR    • OVARY SURGERY Left     NORMAL    • PAP SMEAR  08/23/2010    NORMAL    • PARATHYROIDECTOMY Right 08/17/2016    Dr. Muchael Flynn at [facility]   • SUBTOTAL HYSTERECTOMY      Left ovary removed9        Current Outpatient Medications on File Prior to Visit   Medication Sig Dispense Refill   • amlodipine-olmesartan (DAYRON) 5-40 MG per tablet Take 1 tablet by mouth once daily 90 tablet 3   • aspirin 81 MG tablet Take 81 mg by mouth Daily.     • atorvastatin (LIPITOR)  40 MG tablet Take 1 tablet by mouth Every Night. 90 tablet 3   • ENBREL SURECLICK 50 MG/ML solution auto-injector Every 7 (Seven) Days.     • esomeprazole (nexIUM) 40 MG capsule Take 1 capsule by mouth twice daily 60 capsule 0   • estrogen, conjugated,-medroxyprogesterone (Prempro) 0.45-1.5 MG per tablet Take 1 tablet by mouth Daily. (Patient taking differently: Take 1 tablet by mouth. Every other day) 28 tablet 11   • folic acid (FOLVITE) 1 MG tablet Take 2 mg by mouth Daily.     • levothyroxine (SYNTHROID, LEVOTHROID) 50 MCG tablet Take 1 tablet by mouth Daily. 30 tablet 11   • methotrexate 2.5 MG tablet TAKE 4 TABLETS BY MOUTH ONCE A WEEK 32 tablet 6   • metoprolol succinate XL (TOPROL-XL) 50 MG 24 hr tablet Take 1 tablet by mouth once daily 90 tablet 3   • potassium chloride (K-DUR,KLOR-CON) 20 MEQ CR tablet Take 1 tablet by mouth twice daily 180 tablet 0   • spironolactone (ALDACTONE) 25 MG tablet Take 1 tablet by mouth once daily 90 tablet 0   • traMADol (ULTRAM) 50 MG tablet Take 1 tablet by mouth Every 8 (Eight) Hours As Needed for Severe Pain . 30 tablet 0   • vitamin D (ERGOCALCIFEROL) 1.25 MG (06530 UT) capsule capsule Take 1 capsule by mouth 1 (One) Time Per Week. 12 capsule 3     No current facility-administered medications on file prior to visit.        ALLERGIES:    Allergies   Allergen Reactions   • Meloxicam Swelling     EYES AND FACE SWELLING  EYES AND FACE SWELLING   • Effexor Xr [Venlafaxine Hcl Er] Other (See Comments)     Caused weight gain        Social History     Socioeconomic History   • Marital status:      Spouse name: Not on file   • Number of children: Not on file   • Years of education: Not on file   • Highest education level: Not on file   Tobacco Use   • Smoking status: Passive Smoke Exposure - Never Smoker   • Smokeless tobacco: Current User   • Tobacco comment: 8/12/18 switch to e-cig   Substance and Sexual Activity   • Alcohol use: No   • Drug use: No   • Sexual activity:  "Never        Family History   Problem Relation Age of Onset   • Diabetes Mother    • Hyperlipidemia Mother    • Hypertension Mother    • Heart disease Mother    • Kidney disease Mother    • Cancer Mother    • Heart attack Mother    • Multiple myeloma Mother    • Arthritis Father    • Anxiety disorder Father    • COPD Father    • Glaucoma Father    • Hyperlipidemia Father    • Hypertension Father    • Vision loss Father    • Heart disease Father    • Heart attack Father    • Heart disease Sister    • Breast cancer Neg Hx    • Colon cancer Neg Hx    • Colon polyps Neg Hx         Review of Systems   Constitutional: Positive for fatigue. Negative for diaphoresis and unexpected weight change.   HENT: Negative.    Respiratory: Negative.    Cardiovascular: Negative.    Gastrointestinal: Positive for constipation. Negative for nausea.   Genitourinary: Negative.    Musculoskeletal: Positive for arthralgias, back pain and gait problem.   Skin: Negative.    Hematological: Negative.    Psychiatric/Behavioral: Negative.           Objective     Vitals:    04/15/21 1037   BP: 138/82   Pulse: 68   Resp: 14   Temp: 98 °F (36.7 °C)   TempSrc: Infrared   SpO2: 97%   Weight: 89.3 kg (196 lb 14.4 oz)   Height: 155.4 cm (61.18\")  Comment: new ht   PainSc:   2   PainLoc: Comment: all over     Current Status 4/15/2021   ECOG score 0       Physical Exam    CONSTITUTIONAL: pleasant well-developed adult woman  HEENT: no icterus, no thrush, moist membranes  NECK: no jvd  LYMPH: no cervical or supraclavicular lad  CV: RRR, S1S2, no murmur  RESP: cta bilat, no wheezing, no rales  GI: soft, non-tender, no splenomegaly, +bs  NEURO: alert and oriented x3, normal strength  PSYCH: normal mood and affect    RECENT LABS:  Hematology WBC   Date Value Ref Range Status   04/15/2021 7.73 3.40 - 10.80 10*3/mm3 Final   01/21/2020 7.43 3.40 - 10.80 10*3/mm3 Final     RBC   Date Value Ref Range Status   04/15/2021 3.97 3.77 - 5.28 10*6/mm3 Final   01/21/2020 " 4.17 3.77 - 5.28 10*6/mm3 Final     Hemoglobin   Date Value Ref Range Status   04/15/2021 11.2 (L) 12.0 - 15.9 g/dL Final     Hematocrit   Date Value Ref Range Status   04/15/2021 32.9 (L) 34.0 - 46.6 % Final     Platelets   Date Value Ref Range Status   04/15/2021 287 140 - 450 10*3/mm3 Final      Iron saturation 10%    Assessment/Plan     *Normocytic normochromic anemia:  · The patient has developed anemia since January 2020, current hemoglobin 11.2  · Recent iron studies suggestive of iron deficiency with an iron saturation 10%  · She has not received any iron replacement  · She had a colonoscopy March 2020 but has not had recent EGD; notes indicate history of Lainez's esophagus    Hematology recommendations:  1.  Begin ferrous sulfate 325 mg once daily with food; if she has difficulty tolerating daily dosing I recommended she reduce the dose to every other day.  If she cannot tolerate every other day dosing I asked the patient to contact clinic to consider intravenous replacement.  2.  We will repeat her CBC, ferritin and iron profile in about 2 months  3.  She likely needs GI follow-up for monitoring of her Lainez's esophagus-we will address next visit    Thank you for allowing me to participate in the care of the patient.

## 2021-04-22 DIAGNOSIS — I10 BENIGN ESSENTIAL HTN: ICD-10-CM

## 2021-04-23 RX ORDER — SPIRONOLACTONE 25 MG/1
TABLET ORAL
Qty: 90 TABLET | Refills: 0 | Status: SHIPPED | OUTPATIENT
Start: 2021-04-23 | End: 2021-07-19

## 2021-05-07 ENCOUNTER — OFFICE VISIT (OUTPATIENT)
Dept: ENDOCRINOLOGY | Age: 61
End: 2021-05-07

## 2021-05-07 DIAGNOSIS — E21.0 PRIMARY HYPERPARATHYROIDISM (HCC): Primary | ICD-10-CM

## 2021-05-07 DIAGNOSIS — E78.2 HYPERLIPEMIA, MIXED: ICD-10-CM

## 2021-05-07 DIAGNOSIS — E03.9 ACQUIRED HYPOTHYROIDISM: ICD-10-CM

## 2021-05-07 PROCEDURE — 99443 PR PHYS/QHP TELEPHONE EVALUATION 21-30 MIN: CPT | Performed by: INTERNAL MEDICINE

## 2021-05-07 NOTE — PROGRESS NOTES
Chief Complaint    Subjective            History of Present Illness  Shaylee Wadsworth,60 y.o. is here as a follow-up for the evaluation of hyperparathyroidism and hypothyroidism.      Today in visit patient still continues to complain of fatigue but it is no worse or better than last time.  No kidney stones.  Does have osteopenia, bone density scan was performed by primary care in 2019.  No history of fragility fractures.      Hypothyroidism  On levothyroxine 50 mcg oral daily    History of rheumatoid arthritis  Sees the rheumatologist.    Interval hx -   Patient has been suffering with chronic fatigue for the last 3 years mainly since 2018 and the symptom has been gradually getting worse.  Her tremors are not limited to her hands alone but she has them throughout her body.  They also result in a mild trembling in her voice.  Patient has been extensively worked up by 2 neurologist at [facility] and has been given 2 different diagnoses one was labeled as functional neurological disorder and the other one was benign paroxysmal positional vertigo.     In 2016 patient was noted to have elevated parathyroid levels and calcium levels she underwent right inferior parathyroidectomy with normalization of the parathyroid levels.  In 1 of her prior visits complete hormonal panel was checked to see if any of her hormonal abnormalities was contributing to her extreme fatigue, tremors and pain.  Most of her hormonal panel apart from the parathyroid levels are within normal limits.    I even discussed with Dr. Mott that another parathyroid surgery might help with her parathyroid levels but would not improve her tremors and as a result the surgery was canceled and her parathyroid levels were continued to be monitored.      Reviewed primary care physician's/consulting physician documentation and lab results     You have chosen to receive care through a telephone visit. Do you consent to use a telephone visit for your  medical care today? Yes    I have reviewed the patient's allergies, medicines, past medical hx, family hx and social hx in detail.    Objective   Vital Signs:   There were no vitals taken for this visit.  - telehealth  Physical Exam   General appearance-pleasant, no distress  Respiratory-normal breathing appreciated.  No respiratory distress noted  Ear nose and throat-no hard of hearing.  Neurological assessment-alert and oriented x3.    Result Review :   The following data was reviewed by: Marcel Caraballo MD on 05/07/2021:  Lab on 04/15/2021   Component Date Value Ref Range Status   • WBC 04/15/2021 7.73  3.40 - 10.80 10*3/mm3 Final   • RBC 04/15/2021 3.97  3.77 - 5.28 10*6/mm3 Final   • Hemoglobin 04/15/2021 11.2* 12.0 - 15.9 g/dL Final   • Hematocrit 04/15/2021 32.9* 34.0 - 46.6 % Final   • MCV 04/15/2021 82.9  79.0 - 97.0 fL Final   • MCH 04/15/2021 28.2  26.6 - 33.0 pg Final   • MCHC 04/15/2021 34.0  31.5 - 35.7 g/dL Final   • RDW 04/15/2021 15.5* 12.3 - 15.4 % Final   • RDW-SD 04/15/2021 46.0  37.0 - 54.0 fl Final   • MPV 04/15/2021 9.5  6.0 - 12.0 fL Final   • Platelets 04/15/2021 287  140 - 450 10*3/mm3 Final   • Neutrophil % 04/15/2021 58.6  42.7 - 76.0 % Final   • Lymphocyte % 04/15/2021 31.4  19.6 - 45.3 % Final   • Monocyte % 04/15/2021 7.0  5.0 - 12.0 % Final   • Eosinophil % 04/15/2021 1.6  0.3 - 6.2 % Final   • Basophil % 04/15/2021 0.9  0.0 - 1.5 % Final   • Immature Grans % 04/15/2021 0.5  0.0 - 0.5 % Final   • Neutrophils, Absolute 04/15/2021 4.53  1.70 - 7.00 10*3/mm3 Final   • Lymphocytes, Absolute 04/15/2021 2.43  0.70 - 3.10 10*3/mm3 Final   • Monocytes, Absolute 04/15/2021 0.54  0.10 - 0.90 10*3/mm3 Final   • Eosinophils, Absolute 04/15/2021 0.12  0.00 - 0.40 10*3/mm3 Final   • Basophils, Absolute 04/15/2021 0.07  0.00 - 0.20 10*3/mm3 Final   • Immature Grans, Absolute 04/15/2021 0.04  0.00 - 0.05 10*3/mm3 Final   • nRBC 04/15/2021 0.0  0.0 - 0.2 /100 WBC Final     Data reviewed: Primary care  documentation        I reviewed the patient's new clinical results and mentioned them above in HPI and in plan as well.          Assessment and Plan    Problem List Items Addressed This Visit        Other    Hypothyroidism    Relevant Orders    Hemoglobin A1c    Basic Metabolic Panel    TSH    T4, Free    Vitamin D 25 Hydroxy    Vitamin B12 & Folate      Other Visit Diagnoses     Primary hyperparathyroidism (CMS/[facility])    -  Primary    Relevant Orders    Hemoglobin A1c    Basic Metabolic Panel    TSH    T4, Free    Vitamin D 25 Hydroxy    Vitamin B12 & Folate    PTH, Intact & Calcium    Hyperlipemia, mixed        Relevant Orders    Hemoglobin A1c    Basic Metabolic Panel    TSH    T4, Free    Vitamin D 25 Hydroxy    Vitamin B12 & Folate        Hypothyroidism  Continue levothyroxine 50 mcg oral daily  Continue to monitor the levels.    Primary hyperparathyroidism  Continue to monitor the parathyroid and calcium levels.  Would hold off on surgery as it is unclear if surgery would even benefit the patient at this time and the risk of the surgery outweighs the benefit at this time.    Osteopenia  Will defer this to the primary care.    Vitamin D deficiency  Continue vitamin D replacement 50,000 units q. weekly.  Follow Up   No follow-ups on file.    Refills/Meds sent to pharmacy    Interpreted the blood work-up/imaging results performed by the primary care/consulting physician -    Patient was given instructions and counseling regarding her condition or for health maintenance advice. Please see specific information pulled into the AVS if appropriate.     I spent 30 minutes caring for Shaylee on this date of service. This time includes time spent by me in the following activities:reviewing tests, obtaining and/or reviewing a separately obtained history, counseling and educating the patient/family/caregiver, ordering medications, tests, or procedures, documenting information in the medical record, independently interpreting  results and communicating that information with the patient/family/caregiver and care coordination

## 2021-05-08 ENCOUNTER — TELEPHONE (OUTPATIENT)
Dept: CARDIOLOGY | Facility: CLINIC | Age: 61
End: 2021-05-08

## 2021-05-08 NOTE — TELEPHONE ENCOUNTER
Hi Dr. Dumas,    I called this patient to reschedule her 10/14/21 appointment. She would to change this to telehealth if possible. Would that be okay?    Patient is currently rescheduled for in office follow up with Lakesha Prasad.    Thanks,   Isi

## 2021-05-08 NOTE — PROGRESS NOTES
See session summary  
[FreeTextEntry1] : 55 yr-old woman slipped on a wet floor at work 3 years ago on March 2, 2018. She fell forward striking the floor with her left wrist and left knee. Since the fall she has had persisting low back pain. She had MRI of LS spine which I reviewed which showed a L4-5 anterolisthesis with significant central canal stenosis at that level.\par \par Her low back pain radiates to the left lower limb.  Pain is aggravated by walking more than 2 blocks.  She has had 4 epidural steroid injections over the last 3 years.  Last injection 1 month ago providing temporary relief.  She describes her pain as a "4/10".  She has not returned to work as a  of English as a second language.\par \par She has seen 3 spine surgeons who all agreed that she would benefit from a decompressive surgery.

## 2021-05-10 NOTE — TELEPHONE ENCOUNTER
Let her know I would prefer an in person visit since her last visit was also telephone visit.    However we can always re-evaluate the situation closer to her scheduled appointment in October.  If the situation with the virus is not looking good or she is still uncomfortable with coming in we can consider switching her at that time.

## 2021-05-13 NOTE — TELEPHONE ENCOUNTER
I called the patient and advised her to keep the in office visit with Lakesha Prasad. She will call  to her appointment if she still is uncomfortable with coming in.     Thank you,   Isi

## 2021-05-27 ENCOUNTER — LAB (OUTPATIENT)
Dept: LAB | Facility: HOSPITAL | Age: 61
End: 2021-05-27

## 2021-05-27 ENCOUNTER — TELEPHONE (OUTPATIENT)
Dept: INTERNAL MEDICINE | Facility: CLINIC | Age: 61
End: 2021-05-27

## 2021-05-27 ENCOUNTER — TRANSCRIBE ORDERS (OUTPATIENT)
Dept: ADMINISTRATIVE | Facility: HOSPITAL | Age: 61
End: 2021-05-27

## 2021-05-27 DIAGNOSIS — M05.79 SEROPOSITIVE RHEUMATOID ARTHRITIS OF MULTIPLE SITES (HCC): Primary | ICD-10-CM

## 2021-05-27 DIAGNOSIS — M05.79 SEROPOSITIVE RHEUMATOID ARTHRITIS OF MULTIPLE SITES (HCC): ICD-10-CM

## 2021-05-27 DIAGNOSIS — D50.9 IRON DEFICIENCY ANEMIA, UNSPECIFIED IRON DEFICIENCY ANEMIA TYPE: ICD-10-CM

## 2021-05-27 DIAGNOSIS — Z79.899 ENCOUNTER FOR LONG-TERM (CURRENT) USE OF OTHER MEDICATIONS: ICD-10-CM

## 2021-05-27 LAB
BASOPHILS # BLD AUTO: 0.07 10*3/MM3 (ref 0–0.2)
BASOPHILS NFR BLD AUTO: 0.8 % (ref 0–1.5)
BILIRUB CONJ SERPL-MCNC: <0.2 MG/DL (ref 0–0.3)
DEPRECATED RDW RBC AUTO: 52.4 FL (ref 37–54)
EOSINOPHIL # BLD AUTO: 0.11 10*3/MM3 (ref 0–0.4)
EOSINOPHIL NFR BLD AUTO: 1.3 % (ref 0.3–6.2)
ERYTHROCYTE [DISTWIDTH] IN BLOOD BY AUTOMATED COUNT: 16.3 % (ref 12.3–15.4)
FERRITIN SERPL-MCNC: 15.1 NG/ML (ref 13–150)
HCT VFR BLD AUTO: 38.2 % (ref 34–46.6)
HGB BLD-MCNC: 12.6 G/DL (ref 12–15.9)
IMM GRANULOCYTES # BLD AUTO: 0.02 10*3/MM3 (ref 0–0.05)
IMM GRANULOCYTES NFR BLD AUTO: 0.2 % (ref 0–0.5)
IRON 24H UR-MRATE: 153 MCG/DL (ref 37–145)
IRON SATN MFR SERPL: 33 % (ref 20–50)
LYMPHOCYTES # BLD AUTO: 2.94 10*3/MM3 (ref 0.7–3.1)
LYMPHOCYTES NFR BLD AUTO: 35.3 % (ref 19.6–45.3)
MCH RBC QN AUTO: 29 PG (ref 26.6–33)
MCHC RBC AUTO-ENTMCNC: 33 G/DL (ref 31.5–35.7)
MCV RBC AUTO: 88 FL (ref 79–97)
MONOCYTES # BLD AUTO: 0.56 10*3/MM3 (ref 0.1–0.9)
MONOCYTES NFR BLD AUTO: 6.7 % (ref 5–12)
NEUTROPHILS NFR BLD AUTO: 4.64 10*3/MM3 (ref 1.7–7)
NEUTROPHILS NFR BLD AUTO: 55.7 % (ref 42.7–76)
NRBC BLD AUTO-RTO: 0 /100 WBC (ref 0–0.2)
PLATELET # BLD AUTO: 351 10*3/MM3 (ref 140–450)
PMV BLD AUTO: 10 FL (ref 6–12)
RBC # BLD AUTO: 4.34 10*6/MM3 (ref 3.77–5.28)
TIBC SERPL-MCNC: 468 MCG/DL (ref 298–536)
TRANSFERRIN SERPL-MCNC: 314 MG/DL (ref 200–360)
WBC # BLD AUTO: 8.34 10*3/MM3 (ref 3.4–10.8)

## 2021-05-27 PROCEDURE — 80053 COMPREHEN METABOLIC PANEL: CPT | Performed by: NURSE PRACTITIONER

## 2021-05-27 PROCEDURE — 83970 ASSAY OF PARATHORMONE: CPT | Performed by: INTERNAL MEDICINE

## 2021-05-27 PROCEDURE — 80048 BASIC METABOLIC PNL TOTAL CA: CPT | Performed by: INTERNAL MEDICINE

## 2021-05-27 PROCEDURE — 84439 ASSAY OF FREE THYROXINE: CPT | Performed by: NURSE PRACTITIONER

## 2021-05-27 PROCEDURE — 82248 BILIRUBIN DIRECT: CPT

## 2021-05-27 PROCEDURE — 83036 HEMOGLOBIN GLYCOSYLATED A1C: CPT | Performed by: INTERNAL MEDICINE

## 2021-05-27 PROCEDURE — 80061 LIPID PANEL: CPT | Performed by: NURSE PRACTITIONER

## 2021-05-27 PROCEDURE — 82306 VITAMIN D 25 HYDROXY: CPT | Performed by: NURSE PRACTITIONER

## 2021-05-27 PROCEDURE — 83540 ASSAY OF IRON: CPT

## 2021-05-27 PROCEDURE — 85025 COMPLETE CBC W/AUTO DIFF WBC: CPT

## 2021-05-27 PROCEDURE — 84466 ASSAY OF TRANSFERRIN: CPT

## 2021-05-27 PROCEDURE — 82607 VITAMIN B-12: CPT | Performed by: INTERNAL MEDICINE

## 2021-05-27 PROCEDURE — 84443 ASSAY THYROID STIM HORMONE: CPT | Performed by: NURSE PRACTITIONER

## 2021-05-27 PROCEDURE — 82746 ASSAY OF FOLIC ACID SERUM: CPT | Performed by: INTERNAL MEDICINE

## 2021-05-27 PROCEDURE — 82728 ASSAY OF FERRITIN: CPT

## 2021-05-27 NOTE — TELEPHONE ENCOUNTER
VIANEY WITH [facility] CALLED AND STATED THE PATIENT JUST LEFT THE LAB. PATIENT WAS SENT HOME WITH 3 OCCULT BLOOD SLIDES BUT THERE WAS NO ORDER IN THE SYSTEM. VIANEY WANTS TO MAKE SURE THE PATIENT NEEDS TO PERFORM THESE TEST AND IF SO CAN YOU PUT ORDERS IN FOR THEM.    VIANEY [phone]

## 2021-05-28 RX ORDER — LEVOTHYROXINE SODIUM 0.07 MG/1
75 TABLET ORAL DAILY
Qty: 30 TABLET | Refills: 11 | Status: SHIPPED | OUTPATIENT
Start: 2021-05-28 | End: 2022-06-06

## 2021-06-07 RX ORDER — ESOMEPRAZOLE MAGNESIUM 40 MG/1
CAPSULE, DELAYED RELEASE ORAL
Qty: 60 CAPSULE | Refills: 11 | Status: SHIPPED | OUTPATIENT
Start: 2021-06-07 | End: 2021-06-08 | Stop reason: SDUPTHER

## 2021-06-08 RX ORDER — OLMESARTAN MEDOXOMIL 40 MG/1
40 TABLET ORAL DAILY
Qty: 90 TABLET | Refills: 1 | Status: SHIPPED | OUTPATIENT
Start: 2021-06-08 | End: 2021-11-29

## 2021-06-08 RX ORDER — AMLODIPINE BESYLATE 5 MG/1
5 TABLET ORAL DAILY
Qty: 90 TABLET | Refills: 1 | Status: SHIPPED | OUTPATIENT
Start: 2021-06-08 | End: 2021-11-29

## 2021-06-08 RX ORDER — ESOMEPRAZOLE MAGNESIUM 40 MG/1
40 CAPSULE, DELAYED RELEASE ORAL 2 TIMES DAILY
Qty: 90 CAPSULE | Refills: 3 | Status: SHIPPED | OUTPATIENT
Start: 2021-06-08 | End: 2022-08-08

## 2021-06-17 ENCOUNTER — OFFICE VISIT (OUTPATIENT)
Dept: ONCOLOGY | Facility: CLINIC | Age: 61
End: 2021-06-17

## 2021-06-17 ENCOUNTER — LAB (OUTPATIENT)
Dept: LAB | Facility: HOSPITAL | Age: 61
End: 2021-06-17

## 2021-06-17 VITALS
SYSTOLIC BLOOD PRESSURE: 114 MMHG | DIASTOLIC BLOOD PRESSURE: 75 MMHG | HEART RATE: 69 BPM | OXYGEN SATURATION: 98 % | RESPIRATION RATE: 14 BRPM | BODY MASS INDEX: 36.8 KG/M2 | WEIGHT: 194.9 LBS | TEMPERATURE: 97.7 F | HEIGHT: 61 IN

## 2021-06-17 DIAGNOSIS — D50.9 IRON DEFICIENCY ANEMIA, UNSPECIFIED IRON DEFICIENCY ANEMIA TYPE: Primary | ICD-10-CM

## 2021-06-17 DIAGNOSIS — K22.70 BARRETT'S ESOPHAGUS WITHOUT DYSPLASIA: ICD-10-CM

## 2021-06-17 PROBLEM — M05.9 SEROPOSITIVE RHEUMATOID ARTHRITIS: Status: ACTIVE | Noted: 2018-07-13

## 2021-06-17 PROCEDURE — 99213 OFFICE O/P EST LOW 20 MIN: CPT | Performed by: INTERNAL MEDICINE

## 2021-06-17 RX ORDER — FOLIC ACID 1 MG/1
1 TABLET ORAL DAILY
COMMUNITY
Start: 2021-05-20

## 2021-06-17 RX ORDER — CYCLOBENZAPRINE HCL 10 MG
10 TABLET ORAL 2 TIMES DAILY PRN
COMMUNITY

## 2021-06-30 ENCOUNTER — TELEPHONE (OUTPATIENT)
Dept: GASTROENTEROLOGY | Facility: CLINIC | Age: 61
End: 2021-06-30

## 2021-06-30 NOTE — TELEPHONE ENCOUNTER
Patient advised that we are no longer doing televisits, but that we are following COVID precautions still with regards to masking.  Patient is reassured by this and elects to keep scheduled in-office visit.

## 2021-06-30 NOTE — TELEPHONE ENCOUNTER
Provider: JAN RAJAN  Caller: MELQUIADES BARRON  Relationship to Patient: SELF    Phone Number: [phone]  Reason for Call: SCHEDULING AND PT ASKED IF OFFICE HAD TELEMEDICINE APPTS AVAILABLE. PLEASE CALL PT BACK [phone] AT ANYTIME.

## 2021-07-08 ENCOUNTER — OFFICE VISIT (OUTPATIENT)
Dept: OBSTETRICS AND GYNECOLOGY | Facility: CLINIC | Age: 61
End: 2021-07-08

## 2021-07-08 VITALS
BODY MASS INDEX: 36.61 KG/M2 | HEIGHT: 61 IN | DIASTOLIC BLOOD PRESSURE: 88 MMHG | SYSTOLIC BLOOD PRESSURE: 118 MMHG | WEIGHT: 193.9 LBS

## 2021-07-08 DIAGNOSIS — Z01.419 PAP SMEAR, LOW-RISK: Primary | ICD-10-CM

## 2021-07-08 DIAGNOSIS — Z11.51 SPECIAL SCREENING EXAMINATION FOR HUMAN PAPILLOMAVIRUS (HPV): ICD-10-CM

## 2021-07-08 DIAGNOSIS — Z01.419 ROUTINE GYNECOLOGICAL EXAMINATION: ICD-10-CM

## 2021-07-08 LAB
BILIRUB BLD-MCNC: NEGATIVE MG/DL
CLARITY, POC: CLEAR
COLOR UR: YELLOW
GLUCOSE UR STRIP-MCNC: NEGATIVE MG/DL
KETONES UR QL: NEGATIVE
LEUKOCYTE EST, POC: NEGATIVE
NITRITE UR-MCNC: NEGATIVE MG/ML
PH UR: 5 [PH] (ref 5–8)
PROT UR STRIP-MCNC: NEGATIVE MG/DL
RBC # UR STRIP: NEGATIVE /UL
SP GR UR: 1 (ref 1–1.03)
UROBILINOGEN UR QL: NORMAL

## 2021-07-08 PROCEDURE — 81002 URINALYSIS NONAUTO W/O SCOPE: CPT | Performed by: OBSTETRICS & GYNECOLOGY

## 2021-07-08 PROCEDURE — 99396 PREV VISIT EST AGE 40-64: CPT | Performed by: OBSTETRICS & GYNECOLOGY

## 2021-07-08 NOTE — PROGRESS NOTES
GYN Annual Exam     CC- Here for annual exam.     Shaylee Wadsworth is a 60 y.o. female who presents for annual well woman exam. Menopause over 10 years ago. No PMPB. MMG 2019. Pt is on HRT for last 9 years. Pt is on Prempro and she takes it every other day. She was supposed to start on a lower dosage last year and start weaning herself but that never happened.  Patient is interested in weaning and wants to talk about that today.  Pt has hx of RA and is on Enbrel and MTX. Pt had colonoscopy 3/2020. Hx of LSO due to ovarian cyst.    OB History             Para        Term   0            AB        Living           SAB        TAB        Ectopic        Molar        Multiple        Live Births                      Current contraception: post menopausal status  History of abnormal Pap smear: yes - remote  History of abnormal mammogram: yes - remote with no biopsy  Family history of uterine, colon or ovarian cancer: no  Family history of breast cancer: no    Health Maintenance   Topic Date Due   • COVID-19 Vaccine (2 - Moderna 2-dose series) 2021   • ANNUAL PHYSICAL  2021   • Annual Gynecologic Pelvic and Breast Exam  2021   • ZOSTER VACCINE (1 of 2) 2021 (Originally 2012)   • INFLUENZA VACCINE  2021   • MAMMOGRAM  2021   • DXA SCAN  2021   • LIPID PANEL  2022   • PAP SMEAR  2023   • TDAP/TD VACCINES (2 - Td or Tdap) 2025   • COLORECTAL CANCER SCREENING  2030   • HEPATITIS C SCREENING  Completed   • Pneumococcal Vaccine 0-64  Completed       Past Medical History:   Diagnosis Date   • Anemia May 2021   • Anxiety    • Arthritis 2014    RA   • Lainez esophagus    • Benign paroxysmal positional vertigo due to bilateral vestibular disorder    • Chest pain    • Cholelithiasis     Removed   • Colon polyp    • Coronary artery disease 19   • Depression 19   • Disease of thyroid gland    • Dystonia    • Esophageal reflux    •  Fibromyositis    • Functional movement disorder    • H/O colonoscopy    • H/O mammogram 08/31/2011    NORMAL    • History of colonoscopy 03/2020   • Hx of bone density study 08/31/2011    OSTEOPENIA    • Hyperlipidemia    • Hyperparathyroidism (CMS/HCC)    • Hypertension    • Low back pain    • Menopause    • Osteopenia    • Ovarian cyst 2000    L ovary removed   • Pancreatitis    • Pancreatitis    • Scoliosis 2/16/13   • Tremor 4/4/2018       Past Surgical History:   Procedure Laterality Date   • CARDIAC CATHETERIZATION N/A 3/4/2019    Procedure: Coronary angiography;  Surgeon: Jackelyn Dumas MD;  Location:  [facility];  Service: Cardiovascular   • CARDIAC CATHETERIZATION N/A 3/4/2019    Procedure: Left heart cath;  Surgeon: Jackelyn Dumas MD;  Location:  [facility];  Service: Cardiovascular   • CARDIAC CATHETERIZATION N/A 3/4/2019    Procedure: Left ventriculography;  Surgeon: Jackelyn Dumas MD;  Location:  [facility];  Service: Cardiovascular   • CARDIAC CATHETERIZATION N/A 3/4/2019    Procedure: Stent KARLY coronary;  Surgeon: Jackelyn Dumas MD;  Location:  [facility];  Service: Cardiovascular   • CHOLECYSTECTOMY     • COLONOSCOPY  2014   • COLONOSCOPY N/A 3/6/2020    Procedure: COLONOSCOPY, biopsy, polypectomy;  Surgeon: Rain Kirk MD;  Location: [facility];  Service: Gastroenterology;  Laterality: N/A;  Random biopsies; Rectal polyp x 3; Hemorrhoids; Diverticulosis   • DILATATION AND CURETTAGE     • HYSTERECTOMY     • LAPAROSCOPIC CHOLECYSTECTOMY  2005   • MOUTH SURGERY      TOOTH EXTRACTION   • OTHER SURGICAL HISTORY  03/27/2015    DIAGNOSTIC ESOPHAGOSCOPY TRANSNASAL FLEXIBLE WITH BIOPSY; ARZATE ESO. REPEAT EGD 2 YR    • OVARIAN CYST SURGERY  2000    L ovary removed   • OVARY SURGERY Left     NORMAL    • PAP SMEAR  08/23/2010    NORMAL    • PARATHYROIDECTOMY Right 08/17/2016    Dr. Muchael Flynn at [place]   • SUBTOTAL  HYSTERECTOMY      Left ovary removed9   • WISDOM TOOTH EXTRACTION  1984         Current Outpatient Medications:   •  amLODIPine (NORVASC) 5 MG tablet, Take 1 tablet by mouth Daily., Disp: 90 tablet, Rfl: 1  •  aspirin 81 MG tablet, Take 81 mg by mouth Daily., Disp: , Rfl:   •  atorvastatin (LIPITOR) 40 MG tablet, Take 1 tablet by mouth Every Night., Disp: 90 tablet, Rfl: 3  •  cyclobenzaprine (FLEXERIL) 10 MG tablet, Take 10 mg by mouth 2 (two) times a day., Disp: , Rfl:   •  ENBREL SURECLICK 50 MG/ML solution auto-injector, Every 7 (Seven) Days., Disp: , Rfl:   •  esomeprazole (nexIUM) 40 MG capsule, Take 1 capsule by mouth 2 (Two) Times a Day., Disp: 90 capsule, Rfl: 3  •  estrogen, conjugated,-medroxyprogesterone (Prempro) 0.45-1.5 MG per tablet, Take 1 tablet by mouth Daily. (Patient taking differently: Take 1 tablet by mouth. Every other day), Disp: 28 tablet, Rfl: 11  •  ferrous sulfate 325 (65 FE) MG tablet, Take 1 tablet by mouth Daily With Breakfast., Disp: 30 tablet, Rfl: 3  •  folic acid (FOLVITE) 1 MG tablet, Take 1 mg by mouth Daily. 1 to 4 tablets daily, Disp: , Rfl:   •  levothyroxine (SYNTHROID, LEVOTHROID) 75 MCG tablet, Take 1 tablet by mouth Daily., Disp: 30 tablet, Rfl: 11  •  methotrexate 2.5 MG tablet, TAKE 4 TABLETS BY MOUTH ONCE A WEEK, Disp: 32 tablet, Rfl: 6  •  metoprolol succinate XL (TOPROL-XL) 50 MG 24 hr tablet, Take 1 tablet by mouth once daily, Disp: 90 tablet, Rfl: 3  •  olmesartan (BENICAR) 40 MG tablet, Take 1 tablet by mouth Daily., Disp: 90 tablet, Rfl: 1  •  potassium chloride (K-DUR,KLOR-CON) 20 MEQ CR tablet, Take 1 tablet by mouth twice daily, Disp: 180 tablet, Rfl: 0  •  spironolactone (ALDACTONE) 25 MG tablet, Take 1 tablet by mouth once daily, Disp: 90 tablet, Rfl: 0  •  traMADol (ULTRAM) 50 MG tablet, Take 1 tablet by mouth Every 8 (Eight) Hours As Needed for Severe Pain ., Disp: 30 tablet, Rfl: 0  •  vitamin D (ERGOCALCIFEROL) 1.25 MG (44512 UT) capsule capsule, Take 1  "capsule by mouth 1 (One) Time Per Week., Disp: 12 capsule, Rfl: 3    Allergies   Allergen Reactions   • Meloxicam Swelling     EYES AND FACE SWELLING  EYES AND FACE SWELLING   • Effexor Xr [Venlafaxine Hcl Er] Other (See Comments)     Caused weight gain       Social History     Tobacco Use   • Smoking status: Passive Smoke Exposure - Never Smoker   • Smokeless tobacco: Current User   • Tobacco comment: 8/12/18 switch to e-cig   Substance Use Topics   • Alcohol use: No   • Drug use: No       Family History   Problem Relation Age of Onset   • Diabetes Mother    • Hyperlipidemia Mother    • Hypertension Mother    • Heart disease Mother    • Kidney disease Mother    • Cancer Mother    • Heart attack Mother    • Multiple myeloma Mother    • Melanoma Mother         [place] water contamination   • Coronary artery disease Mother    • Arthritis Father    • Anxiety disorder Father    • COPD Father    • Glaucoma Father    • Hyperlipidemia Father    • Hypertension Father    • Vision loss Father    • Heart disease Father    • Heart attack Father    • Heart disease Sister    • Breast cancer Neg Hx    • Colon cancer Neg Hx    • Colon polyps Neg Hx        Review of Systems   Constitutional: Negative for appetite change, chills, fatigue, fever and unexpected weight change.   Gastrointestinal: Negative for abdominal distention, abdominal pain, anal bleeding, blood in stool, constipation, diarrhea, nausea and vomiting.   Genitourinary: Negative for dyspareunia, dysuria, menstrual problem, pelvic pain, vaginal bleeding, vaginal discharge and vaginal pain.       /88   Ht 155.4 cm (61.18\")   Wt 88 kg (193 lb 14.4 oz)   LMP  (LMP Unknown) Comment: partial hysterectomy   Breastfeeding No   BMI 36.42 kg/m²     Physical Exam  Vitals reviewed.   Constitutional:       Appearance: She is well-developed.   HENT:      Mouth/Throat:      Dentition: Normal dentition. No dental caries.   Cardiovascular:      Rate and Rhythm: Normal rate " and regular rhythm.      Heart sounds: Normal heart sounds.   Pulmonary:      Effort: Pulmonary effort is normal. No respiratory distress.      Breath sounds: Normal breath sounds. No stridor. No wheezing.   Chest:      Breasts:         Right: No inverted nipple, mass, nipple discharge, skin change or tenderness.         Left: No inverted nipple, mass, nipple discharge, skin change or tenderness.   Abdominal:      General: There is no distension.      Palpations: Abdomen is soft. There is no mass.      Tenderness: There is no abdominal tenderness.   Genitourinary:     Labia:         Right: No rash, tenderness or lesion.         Left: No rash, tenderness or lesion.       Vagina: No vaginal discharge, tenderness or bleeding.      Cervix: No cervical motion tenderness, discharge or friability.      Uterus: Not deviated, not enlarged, not fixed and not tender.       Adnexa:         Right: No mass, tenderness or fullness.          Left: No mass, tenderness or fullness.     Musculoskeletal:         General: No tenderness. Normal range of motion.   Skin:     General: Skin is warm.      Findings: No erythema or rash.   Neurological:      Mental Status: She is alert and oriented to person, place, and time.      Cranial Nerves: No cranial nerve deficit.      Coordination: Coordination normal.   Psychiatric:         Behavior: Behavior normal.         Thought Content: Thought content normal.         Judgment: Judgment normal.            Assessment/Plan    1) GYN HM: Check pap smear. SBE demonstrated and encouraged. Needs MMG.  2) STD screening: not sexually active  3) Bone health - Weight bearing exercise, dietary calcium recommendations and vitamin D reviewed.   4) Diet and Exercise discussed  5) Smoking Status: e cig- has been a smoker for 30+ years. Pt switched to e cig in 2018 to try and cut back on her smoking. Shaylee Wadsworth  reports that she is a non-smoker but has been exposed to tobacco smoke. She has been exposed to  0.50 packs per day for the past 30.00 years. She uses smokeless tobacco.. I have educated her on the risk of diseases from using tobacco products such as cancer, COPD and heart disease. I advised her to quit and she is trying to cut backI spent less than 3  minutes counseling the patient.  6) RA: On Enbrel and MTX. Will need a pap every year due to immunosuppression meds.  7) : Pt on HRT for last 9 years. She take qod. She has hx of cardiac catheterization in 2019. Discussed with pt that she needs to wean off HRT due to hx of cath with stent placement. She is currently on Prempro .45 qod.  Patient is taking this medication every other day.  She is going to start taking it every third day for the next 2 months and then drop down to just taking it 1 time per week before she is off.  She is nervous about having vasomotor symptoms.  Discussed with patient that if she is struggling to get off the medication that we could decrease her to the Prempro 0.3 mg but patient wants to start with her current dosage and she has lots of those medications at home.  8) Follow up prn and 1 year       Diagnoses and all orders for this visit:    Pap smear, low-risk  -     IgP, Aptima HPV    Routine gynecological examination  -     POC Urinalysis Dipstick  -     IgP, Aptima HPV    Special screening examination for human papillomavirus (HPV)  -     IgP, Aptima HPV        Meryl Ojeda DO  7/8/2021  08:35 EDT

## 2021-07-10 LAB
CYTOLOGIST CVX/VAG CYTO: NORMAL
CYTOLOGY CVX/VAG DOC CYTO: NORMAL
CYTOLOGY CVX/VAG DOC THIN PREP: NORMAL
DX ICD CODE: NORMAL
HIV 1 & 2 AB SER-IMP: NORMAL
HPV I/H RISK 4 DNA CVX QL PROBE+SIG AMP: NEGATIVE
OTHER STN SPEC: NORMAL
STAT OF ADQ CVX/VAG CYTO-IMP: NORMAL

## 2021-07-12 ENCOUNTER — TRANSCRIBE ORDERS (OUTPATIENT)
Dept: ADMINISTRATIVE | Facility: HOSPITAL | Age: 61
End: 2021-07-12

## 2021-07-12 DIAGNOSIS — Z12.31 VISIT FOR SCREENING MAMMOGRAM: Primary | ICD-10-CM

## 2021-07-13 ENCOUNTER — HOSPITAL ENCOUNTER (OUTPATIENT)
Dept: MAMMOGRAPHY | Facility: HOSPITAL | Age: 61
Discharge: HOME | End: 2021-07-13
Admitting: OBSTETRICS & GYNECOLOGY

## 2021-07-13 DIAGNOSIS — Z12.31 VISIT FOR SCREENING MAMMOGRAM: ICD-10-CM

## 2021-07-13 PROCEDURE — 77063 BREAST TOMOSYNTHESIS BI: CPT

## 2021-07-13 PROCEDURE — 77067 SCR MAMMO BI INCL CAD: CPT

## 2021-07-18 DIAGNOSIS — I10 BENIGN ESSENTIAL HTN: ICD-10-CM

## 2021-07-18 DIAGNOSIS — E87.6 HYPOKALEMIA: ICD-10-CM

## 2021-07-18 DIAGNOSIS — I10 ESSENTIAL HYPERTENSION: ICD-10-CM

## 2021-07-19 RX ORDER — SPIRONOLACTONE 25 MG/1
TABLET ORAL
Qty: 90 TABLET | Refills: 0 | Status: SHIPPED | OUTPATIENT
Start: 2021-07-19 | End: 2021-09-30

## 2021-07-19 RX ORDER — POTASSIUM CHLORIDE 20 MEQ/1
TABLET, EXTENDED RELEASE ORAL
Qty: 180 TABLET | Refills: 0 | Status: SHIPPED | OUTPATIENT
Start: 2021-07-19 | End: 2022-02-21

## 2021-07-22 ENCOUNTER — HOSPITAL ENCOUNTER (OUTPATIENT)
Facility: HOSPITAL | Age: 61
Setting detail: HOSPITAL OUTPATIENT SURGERY
End: 2021-07-22
Attending: INTERNAL MEDICINE | Admitting: INTERNAL MEDICINE

## 2021-07-22 ENCOUNTER — OFFICE VISIT (OUTPATIENT)
Dept: GASTROENTEROLOGY | Facility: CLINIC | Age: 61
End: 2021-07-22

## 2021-07-22 VITALS
SYSTOLIC BLOOD PRESSURE: 118 MMHG | BODY MASS INDEX: 36.07 KG/M2 | WEIGHT: 196 LBS | HEIGHT: 62 IN | DIASTOLIC BLOOD PRESSURE: 82 MMHG

## 2021-07-22 DIAGNOSIS — D50.8 OTHER IRON DEFICIENCY ANEMIA: ICD-10-CM

## 2021-07-22 DIAGNOSIS — K21.00 GASTROESOPHAGEAL REFLUX DISEASE WITH ESOPHAGITIS WITHOUT HEMORRHAGE: ICD-10-CM

## 2021-07-22 DIAGNOSIS — K22.70 BARRETT'S ESOPHAGUS WITHOUT DYSPLASIA: Primary | ICD-10-CM

## 2021-07-22 PROBLEM — D64.9 ABSOLUTE ANEMIA: Status: ACTIVE | Noted: 2021-07-22

## 2021-07-22 PROCEDURE — 99214 OFFICE O/P EST MOD 30 MIN: CPT | Performed by: NURSE PRACTITIONER

## 2021-07-22 NOTE — PROGRESS NOTES
PATIENT INFORMATION  Shaylee Wadsworth     - 1960    CHIEF COMPLAINT  Chief Complaint   Patient presents with   • Anemia     Fe deficient   • Lainez's       HISTORY OF PRESENT ILLNESS  Here today for TAMANNA recently with hx of barretts and last EGD was  with Barretts,  She has continued on her esomeprazole 40mg but one a day.    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          REVIEWED PERTINENT RESULTS/ LABS  Lab Results   Component Value Date    CASEREPORT  2020     Surgical Pathology Report                         Case: [id number]                                  Authorizing Provider:  Rain Kirk MD         Collected:           2020 11:11 AM          Ordering Location:     [facility]   Received:            2020 11:34 AM                                 OR                                                                           Pathologist:           Susan Muñoz MD                                                          Specimens:   1) - Large Intestine, Random biopsy                                                                 2) - Large Intestine, Rectum, Rectal polyp x 3                                             FINALDX  2020     1. Colon, Random, Biopsy:     A. Colonic mucosa with no significant histopathologic changes.   B. No histologic evidence of microscopic colitis.     2. Rectum, Polyps, Polypectomies:   A. Hyperplastic polyps.    Jab/kds       Lab Results   Component  Value Date    HGB 12.6 05/27/2021    MCV 88.0 05/27/2021     05/27/2021    ALT 12 05/27/2021    AST 15 05/27/2021    HGBA1C 5.72 (H) 05/27/2021    INR 1.00 04/04/2018    TRIG 163 (H) 05/27/2021    FERRITIN 15.10 05/27/2021    IRON 153 (H) 05/27/2021    TIBC 468 05/27/2021      Mammo Screening Digital Tomosynthesis Bilateral With CAD    Result Date: 7/13/2021  Narrative: EXAM, 07/13/2021: 1. Bilateral digital screening mammogram with CAD. 2. Bilateral digital screening breast tomosynthesis.  INDICATION: 60-year-old female for routine screening. Negative family history.  TECHNIQUE: Bilateral digital screening mammogram images were obtained including digital breast tomosynthesis and CAD review.  COMPARISON: *  Screening mammogram, 12/26/2019 and 3/22/2017  FINDINGS: There are scattered areas of fibroglandular density. No significant change when compared with prior images. No mammographic evidence of malignancy. Recommend repeat screening mammogram in one year.      Impression: Negative screening mammogram.  BI-RADS CATEGORY 1: Negative   Women over the age of 40 undergoing screening mammography are entered into a reminder system with target due date for the next mammogram.  This report was finalized on 7/13/2021 8:32 AM by Dr. Harjit Brown MD.        CURRENT MEDICATIONS    Current Outpatient Medications:   •  amLODIPine (NORVASC) 5 MG tablet, Take 1 tablet by mouth Daily., Disp: 90 tablet, Rfl: 1  •  aspirin 81 MG tablet, Take 81 mg by mouth Daily., Disp: , Rfl:   •  atorvastatin (LIPITOR) 40 MG tablet, Take 1 tablet by mouth Every Night., Disp: 90 tablet, Rfl: 3  •  cyclobenzaprine (FLEXERIL) 10 MG tablet, Take 10 mg by mouth 2 (two) times a day., Disp: , Rfl:   •  ENBREL SURECLICK 50 MG/ML solution auto-injector, Every 7 (Seven) Days., Disp: , Rfl:   •  esomeprazole (nexIUM) 40 MG capsule, Take 1 capsule by mouth 2 (Two) Times a Day. (Patient taking differently: Take 40 mg by mouth Every Morning Before  Breakfast.), Disp: 90 capsule, Rfl: 3  •  estrogen, conjugated,-medroxyprogesterone (Prempro) 0.45-1.5 MG per tablet, Take 1 tablet by mouth Daily. (Patient taking differently: Take 1 tablet by mouth. Every other day), Disp: 28 tablet, Rfl: 11  •  ferrous sulfate 325 (65 FE) MG tablet, Take 1 tablet by mouth Daily With Breakfast., Disp: 30 tablet, Rfl: 3  •  folic acid (FOLVITE) 1 MG tablet, Take 1 mg by mouth Daily. 1 to 4 tablets daily, Disp: , Rfl:   •  levothyroxine (SYNTHROID, LEVOTHROID) 75 MCG tablet, Take 1 tablet by mouth Daily., Disp: 30 tablet, Rfl: 11  •  methotrexate 2.5 MG tablet, TAKE 4 TABLETS BY MOUTH ONCE A WEEK, Disp: 32 tablet, Rfl: 6  •  metoprolol succinate XL (TOPROL-XL) 50 MG 24 hr tablet, Take 1 tablet by mouth once daily, Disp: 90 tablet, Rfl: 3  •  olmesartan (BENICAR) 40 MG tablet, Take 1 tablet by mouth Daily., Disp: 90 tablet, Rfl: 1  •  potassium chloride (K-DUR,KLOR-CON) 20 MEQ CR tablet, Take 1 tablet by mouth twice daily, Disp: 180 tablet, Rfl: 0  •  spironolactone (ALDACTONE) 25 MG tablet, Take 1 tablet by mouth once daily, Disp: 90 tablet, Rfl: 0  •  traMADol (ULTRAM) 50 MG tablet, Take 1 tablet by mouth Every 8 (Eight) Hours As Needed for Severe Pain ., Disp: 30 tablet, Rfl: 0  •  vitamin D (ERGOCALCIFEROL) 1.25 MG (84366 UT) capsule capsule, Take 1 capsule by mouth 1 (One) Time Per Week., Disp: 12 capsule, Rfl: 3    ALLERGIES  Meloxicam and Effexor xr [venlafaxine hcl er]    REVIEW OF SYSTEMS  ROS: 14 point review of systems was performed and all other systems were reviewed and are negative except for documented findings in HPI and today's encounter.   Review of Systems   Constitutional: Positive for fatigue. Negative for activity change, chills, fever and unexpected weight change.   HENT: Negative for congestion.    Eyes: Negative for visual disturbance.   Respiratory: Negative for shortness of breath.    Cardiovascular: Negative for chest pain and palpitations.    Gastrointestinal: Positive for abdominal distention, constipation and nausea. Negative for abdominal pain and blood in stool.   Endocrine: Negative for cold intolerance and heat intolerance.   Genitourinary: Negative for hematuria.   Musculoskeletal: Negative for gait problem.   Skin: Negative for color change.   Allergic/Immunologic: Negative for immunocompromised state.   Neurological: Negative for weakness and light-headedness.   Hematological: Negative for adenopathy.   Psychiatric/Behavioral: Negative for sleep disturbance. The patient is not nervous/anxious.           Medicines reviewed by Laureen Merchant APRN on 7/22/2021 at  3:13 PM      ACTIVE PROBLEMS  Patient Active Problem List    Diagnosis    • Absolute anemia [D64.9]    • Lainez's esophagus [K22.70]    • Iron deficiency anemia [D50.9]    • Hypothyroidism [E03.9]    • Palpitations [R00.2]    • Diarrhea [R19.7]    • Pain in left ankle and joints of left foot [M25.572]    • Pain in right ankle and joints of right foot [M25.571]    • Pain of both shoulder joints [M25.511, M25.512]    • Pain in left shoulder [M25.512]    • Rheumatoid arthritis with rheumatoid factor of multiple sites without organ or systems involvement (CMS/HCC) [M05.79]    • Body mass index (BMI) 35.0-35.9, adult [Z68.35]    • Irritable bowel syndrome with diarrhea [K58.0]    • Depression due to physical illness [F06.31]    • S/P drug eluting coronary stent placement [Z95.5]    • Other spondylosis with myelopathy, thoracic region [M47.14]    • Personal history of immunosupression therapy [Z92.25]    • Rheumatoid arthritis of multiple sites without organ or system involvement with positive rheumatoid factor (CMS/HCC) [M05.79]    • Coronary artery disease involving native heart without angina pectoris [I25.10]    • Functional neurological symptom disorder with abnormal movement [F44.4]    • BPPV (benign paroxysmal positional vertigo) [H81.10]    • Dystonia [G24.9]    • Chronic  right-sided thoracic back pain [M54.6, G89.29]    • Benign essential HTN [I10]    • Difficulty walking [R26.2]    • Seropositive rheumatoid arthritis (CMS/HCC) [M05.9]    • Astasia-abasia [F44.4]    • Functional movement disorder [G25.9]    • Anxiety related tremor [R25.1, F41.9]    • Hypokalemia [E87.6]    • Tendinitis of left shoulder [M77.8]    • Multiple pulmonary nodules determined by computed tomography of lung [R91.8]    • H/O hyperparathyroidism [Z86.39]    • Abnormal radiographic examination [R93.89]    • Disc disorder of thoracic region [M51.9]    • Gastroesophageal reflux disease [K21.9]    • Chronic gastritis [K29.50]    • Hyperlipidemia LDL goal <70 [E78.5]    • Menopausal symptom [N95.1]    • Migraine [G43.909]    • Osteoarthritis of shoulder [M19.019]    • Osteopenia [M85.80]    • Rheumatoid arthritis (CMS/HCC) [M06.9]          PAST MEDICAL HISTORY  Past Medical History:   Diagnosis Date   • Abnormal electrocardiogram    • Anemia May 2021   • Anxiety    • Arthritis 5/1/2014    RA   • Lainez esophagus    • Benign paroxysmal positional vertigo due to bilateral vestibular disorder    • Chest pain    • Cholelithiasis 2007    Removed   • Colon polyp    • Coronary artery disease 2/26/19   • Depression 6/12/19   • Disease of thyroid gland    • Dystonia    • Embedded tick of right lower leg    • Esophageal reflux    • Fibromyositis    • Functional movement disorder    • H/O colonoscopy    • H/O mammogram 08/31/2011    NORMAL    • History of colonoscopy 03/2020   • Hx of bone density study 08/31/2011    OSTEOPENIA    • Hyperlipidemia    • Hyperparathyroidism (CMS/HCC)    • Hypertension    • Low back pain    • Menopause    • Osteopenia    • Osteopenia    • Ovarian cyst 2000    L ovary removed   • Pancreatitis    • Pancreatitis    • Scoliosis 2/16/13   • Superficial incisional infection of surgical site    • Tremor 4/4/2018         SURGICAL HISTORY  Past Surgical History:   Procedure Laterality Date   • CARDIAC  CATHETERIZATION N/A 3/4/2019    Procedure: Coronary angiography;  Surgeon: Jackelyn Dumas MD;  Location:  [facility];  Service: Cardiovascular   • CARDIAC CATHETERIZATION N/A 3/4/2019    Procedure: Left heart cath;  Surgeon: Jackelyn Dumas MD;  Location:  [facility];  Service: Cardiovascular   • CARDIAC CATHETERIZATION N/A 3/4/2019    Procedure: Left ventriculography;  Surgeon: Jackelyn Dmuas MD;  Location:  [facility];  Service: Cardiovascular   • CARDIAC CATHETERIZATION N/A 3/4/2019    Procedure: Stent KARLY coronary;  Surgeon: Jackelyn Dumas MD;  Location:  [facility];  Service: Cardiovascular   • CHOLECYSTECTOMY     • COLONOSCOPY  2014   • COLONOSCOPY N/A 3/6/2020    Procedure: COLONOSCOPY, biopsy, polypectomy;  Surgeon: Rain Kirk MD;  Location:  [facility];  Service: Gastroenterology;  Laterality: N/A;  Random biopsies; Rectal polyp x 3; Hemorrhoids; Diverticulosis   • DILATATION AND CURETTAGE     • HYSTERECTOMY     • LAPAROSCOPIC CHOLECYSTECTOMY  2005   • MOUTH SURGERY      TOOTH EXTRACTION   • OTHER SURGICAL HISTORY  03/27/2015    DIAGNOSTIC ESOPHAGOSCOPY TRANSNASAL FLEXIBLE WITH BIOPSY; ARZATE ESO. REPEAT EGD 2 YR    • OVARIAN CYST SURGERY  2000    L ovary removed   • OVARY SURGERY Left     NORMAL    • PAP SMEAR  08/23/2010    NORMAL    • PARATHYROIDECTOMY Right 08/17/2016    Dr. Muchael Flynn at [place]   • SUBTOTAL HYSTERECTOMY      Left ovary removed9   • WISDOM TOOTH EXTRACTION  1984         FAMILY HISTORY  Family History   Problem Relation Age of Onset   • Diabetes Mother    • Hyperlipidemia Mother    • Hypertension Mother    • Heart disease Mother    • Kidney disease Mother    • Cancer Mother    • Heart attack Mother    • Multiple myeloma Mother    • Melanoma Mother         [place] water contamination   • Coronary artery disease Mother    • Arthritis Father    • Anxiety disorder Father    • COPD Father    • Glaucoma Father     • Hyperlipidemia Father    • Hypertension Father    • Vision loss Father    • Heart disease Father    • Heart attack Father    • Heart disease Sister    • Breast cancer Neg Hx    • Colon cancer Neg Hx    • Colon polyps Neg Hx          SOCIAL HISTORY  Social History     Occupational History   • Not on file   Tobacco Use   • Smoking status: Passive Smoke Exposure - Never Smoker   • Smokeless tobacco: Current User   • Tobacco comment: 8/12/18 switch to e-cig   Substance and Sexual Activity   • Alcohol use: No   • Drug use: No   • Sexual activity: Never         LAST RESULTS  See also labs reviewed above.   Office Visit on 07/08/2021   Component Date Value Ref Range Status   • Color 07/08/2021 Yellow  Yellow, Straw, Dark Yellow, Maureen Final   • Clarity, UA 07/08/2021 Clear  Clear Final   • Glucose, UA 07/08/2021 Negative  Negative, 1000 mg/dL (3+) mg/dL Final   • Bilirubin 07/08/2021 Negative  Negative Final   • Ketones, UA 07/08/2021 Negative  Negative Final   • Specific Gravity  07/08/2021 1.005  1.005 - 1.030 Final   • Blood, UA 07/08/2021 Negative  Negative Final   • pH, Urine 07/08/2021 5.0  5.0 - 8.0 Final   • Protein, POC 07/08/2021 Negative  Negative mg/dL Final   • Urobilinogen, UA 07/08/2021 Normal  Normal Final   • Leukocytes 07/08/2021 Negative  Negative Final   • Nitrite, UA 07/08/2021 Negative  Negative Final   • Diagnosis 07/08/2021 Comment   Final    NEGATIVE FOR INTRAEPITHELIAL LESION OR MALIGNANCY.   • Specimen adequacy: 07/08/2021 Comment   Final    Comment: Satisfactory for evaluation.  Endocervical and/or squamous metaplastic  cells (endocervical component) are present.     • Clinician Provided ICD-10: 07/08/2021 Comment   Final    Comment: Z01.419  Z11.51     • Performed by: 07/08/2021 Comment   Final    Lorrie Jasmine, Cytotechnologist (ASCP)   • . 07/08/2021 .   Final   • Note: 07/08/2021 Comment   Final    Comment: The Pap smear is a screening test designed to aid in the detection  "of  premalignant and malignant conditions of the uterine cervix.  It is not a  diagnostic procedure and should not be used as the sole means of detecting  cervical cancer.  Both false-positive and false-negative reports do occur.     • Method: 07/08/2021 Comment   Final    Comment: This liquid based ThinPrep(R) pap test was screened with the  use of an image guided system.     • HPV Aptima 07/08/2021 Negative  Negative Final    Comment: This nucleic acid amplification test detects fourteen high-risk  HPV types (16,18,31,33,35,39,45,51,52,56,58,59,66,68) without  differentiation.       Mammo Screening Digital Tomosynthesis Bilateral With CAD    Result Date: 7/13/2021  Narrative: EXAM, 07/13/2021: 1. Bilateral digital screening mammogram with CAD. 2. Bilateral digital screening breast tomosynthesis.  INDICATION: 60-year-old female for routine screening. Negative family history.  TECHNIQUE: Bilateral digital screening mammogram images were obtained including digital breast tomosynthesis and CAD review.  COMPARISON: *  Screening mammogram, 12/26/2019 and 3/22/2017  FINDINGS: There are scattered areas of fibroglandular density. No significant change when compared with prior images. No mammographic evidence of malignancy. Recommend repeat screening mammogram in one year.      Impression: Negative screening mammogram.  BI-RADS CATEGORY 1: Negative   Women over the age of 40 undergoing screening mammography are entered into a reminder system with target due date for the next mammogram.  This report was finalized on 7/13/2021 8:32 AM by Dr. Harjit Brown MD.        PHYSICAL EXAM  Debilities/Disabilities Identified: None  Emotional Behavior: Appropriate  60 y.o. female  Wt Readings from Last 3 Encounters:   07/22/21 88.9 kg (196 lb)   07/08/21 88 kg (193 lb 14.4 oz)   06/17/21 88.4 kg (194 lb 14.4 oz)     Ht Readings from Last 1 Encounters:   07/22/21 157 cm (61.8\")     Body mass index is 36.08 kg/m².  Vitals:    07/22/21 " "1318   BP: 118/82   BP Location: Left arm   Patient Position: Sitting   Cuff Size: Large Adult   Weight: 88.9 kg (196 lb)   Height: 157 cm (61.8\")     Vital signs reviewed.          Physical Exam  Vitals and nursing note reviewed.   Constitutional:       Appearance: She is well-developed.   HENT:      Head: Normocephalic and atraumatic.   Eyes:      Conjunctiva/sclera: Conjunctivae normal.      Pupils: Pupils are equal, round, and reactive to light.   Cardiovascular:      Rate and Rhythm: Normal rate and regular rhythm.   Pulmonary:      Effort: Pulmonary effort is normal.      Breath sounds: Normal breath sounds.   Abdominal:      General: Bowel sounds are normal. There is no distension.      Palpations: Abdomen is soft.      Tenderness: There is no abdominal tenderness.      Comments: nontender on e   Musculoskeletal:         General: Normal range of motion.      Cervical back: Normal range of motion and neck supple.   Lymphadenopathy:      Cervical: No cervical adenopathy.   Skin:     General: Skin is warm and dry.   Neurological:      Mental Status: She is alert and oriented to person, place, and time.   Psychiatric:         Behavior: Behavior normal.           CLINICAL DATA REVIEWED   reviewed previous lab results and integrated with today's visit, reviewed notes from other physicians and/or last GI encounter, reviewed previous endoscopy results and available photos, reviewed surgical pathology results from previous biopsies      ASSESSMENT  Diagnoses and all orders for this visit:    Lainez's esophagus without dysplasia  -     Case Request; Standing  -     Follow Anesthesia Guidelines / Protocol; Future  -     Obtain Informed Consent; Standing  -     Case Request    Gastroesophageal reflux disease with esophagitis without hemorrhage  -     Case Request; Standing  -     Follow Anesthesia Guidelines / Protocol; Future  -     Obtain Informed Consent; Standing  -     Case Request    Other iron deficiency " anemia  -     Case Request; Standing  -     Follow Anesthesia Guidelines / Protocol; Future  -     Obtain Informed Consent; Standing  -     Case Request          PLAN  Return if symptoms worsen or fail to improve.     EGD for TAMANNA with apc in room.  Hx of Barretts.    Taking esomeprazole 40mg once a day.  Her blood count and iron levels have improved and is managed by Dr. Dunn.      I have discussed the above plan with the patient.  They verbalize understanding and are in agreement with the plan.  They have been advised to contact the office for any questions, concerns, or changes related to their health.    30 min spent with patient today >50% spent counseling about natural history and expected course of assessed complaint and reviewed treatment options that have been tried and not tried and those currently available. Questions answered.

## 2021-08-02 DIAGNOSIS — E21.0 PRIMARY HYPERPARATHYROIDISM (HCC): Primary | ICD-10-CM

## 2021-08-02 DIAGNOSIS — E55.9 VITAMIN D DEFICIENCY: ICD-10-CM

## 2021-08-02 DIAGNOSIS — E78.2 HYPERLIPEMIA, MIXED: ICD-10-CM

## 2021-08-02 DIAGNOSIS — E03.9 ACQUIRED HYPOTHYROIDISM: ICD-10-CM

## 2021-08-03 ENCOUNTER — LAB (OUTPATIENT)
Dept: LAB | Facility: HOSPITAL | Age: 61
End: 2021-08-03

## 2021-08-03 DIAGNOSIS — E78.2 HYPERLIPEMIA, MIXED: ICD-10-CM

## 2021-08-03 DIAGNOSIS — E03.9 ACQUIRED HYPOTHYROIDISM: ICD-10-CM

## 2021-08-03 DIAGNOSIS — E21.0 PRIMARY HYPERPARATHYROIDISM (HCC): ICD-10-CM

## 2021-08-03 DIAGNOSIS — E55.9 VITAMIN D DEFICIENCY: ICD-10-CM

## 2021-08-03 DIAGNOSIS — D50.9 IRON DEFICIENCY ANEMIA, UNSPECIFIED IRON DEFICIENCY ANEMIA TYPE: ICD-10-CM

## 2021-08-03 DIAGNOSIS — K22.70 BARRETT'S ESOPHAGUS WITHOUT DYSPLASIA: ICD-10-CM

## 2021-08-03 LAB
25(OH)D3 SERPL-MCNC: 55.4 NG/ML (ref 30–100)
ALBUMIN SERPL-MCNC: 3.9 G/DL (ref 3.5–5.2)
ALBUMIN/GLOB SERPL: 1.3 G/DL
ALP SERPL-CCNC: 82 U/L (ref 39–117)
ALT SERPL W P-5'-P-CCNC: 9 U/L (ref 1–33)
ANION GAP SERPL CALCULATED.3IONS-SCNC: 9.1 MMOL/L (ref 5–15)
AST SERPL-CCNC: 8 U/L (ref 1–32)
BILIRUB SERPL-MCNC: 0.6 MG/DL (ref 0–1.2)
BUN SERPL-MCNC: 8 MG/DL (ref 6–20)
BUN/CREAT SERPL: 8.2 (ref 7–25)
CALCIUM SPEC-SCNC: 9 MG/DL (ref 8.6–10.5)
CHLORIDE SERPL-SCNC: 102 MMOL/L (ref 98–107)
CO2 SERPL-SCNC: 26.9 MMOL/L (ref 22–29)
CREAT SERPL-MCNC: 0.97 MG/DL (ref 0.57–1)
GFR SERPL CREATININE-BSD FRML MDRD: 59 ML/MIN/1.73
GLOBULIN UR ELPH-MCNC: 2.9 GM/DL
GLUCOSE SERPL-MCNC: 115 MG/DL (ref 65–99)
HBA1C MFR BLD: 5.8 % (ref 4.8–5.6)
IRON 24H UR-MRATE: 112 MCG/DL (ref 37–145)
IRON SATN MFR SERPL: 27 % (ref 20–50)
POTASSIUM SERPL-SCNC: 3.6 MMOL/L (ref 3.5–5.2)
PROT SERPL-MCNC: 6.8 G/DL (ref 6–8.5)
PTH-INTACT SERPL-MCNC: 145 PG/ML (ref 15–65)
SODIUM SERPL-SCNC: 138 MMOL/L (ref 136–145)
T3FREE SERPL-MCNC: 2.93 PG/ML (ref 2–4.4)
T4 FREE SERPL-MCNC: 1.67 NG/DL (ref 0.93–1.7)
TIBC SERPL-MCNC: 419 MCG/DL (ref 298–536)
TRANSFERRIN SERPL-MCNC: 281 MG/DL (ref 200–360)
TSH SERPL DL<=0.05 MIU/L-ACNC: 2.31 UIU/ML (ref 0.27–4.2)

## 2021-08-03 PROCEDURE — 83540 ASSAY OF IRON: CPT

## 2021-08-03 PROCEDURE — 84443 ASSAY THYROID STIM HORMONE: CPT

## 2021-08-03 PROCEDURE — 84466 ASSAY OF TRANSFERRIN: CPT

## 2021-08-03 PROCEDURE — 84481 FREE ASSAY (FT-3): CPT

## 2021-08-03 PROCEDURE — 36415 COLL VENOUS BLD VENIPUNCTURE: CPT

## 2021-08-03 PROCEDURE — 82306 VITAMIN D 25 HYDROXY: CPT

## 2021-08-03 PROCEDURE — 83970 ASSAY OF PARATHORMONE: CPT

## 2021-08-03 PROCEDURE — 83036 HEMOGLOBIN GLYCOSYLATED A1C: CPT

## 2021-08-03 PROCEDURE — 84439 ASSAY OF FREE THYROXINE: CPT

## 2021-08-03 PROCEDURE — 80053 COMPREHEN METABOLIC PANEL: CPT

## 2021-08-10 ENCOUNTER — TELEPHONE (OUTPATIENT)
Dept: ONCOLOGY | Facility: CLINIC | Age: 61
End: 2021-08-10

## 2021-08-10 NOTE — TELEPHONE ENCOUNTER
Caller: Melquiades Wadsworth    Relationship: Self    Best call back number: [phone]    What was the call regarding: MELQUIADES CALLED WITH QUESTIONS REGARDING HER FERRITIN LABS. SHE SAYS SHE THINKS THEY NEED TO BE DONE SOONER AND ON A REGULAR BASIS, BUT SHE WAS TOLD THAT THE ORDER SAYS THAT IT ISN'T DUE UNTIL October. SHE WOULD LIKE A CALLBACK TO DISCUSS THAT.    Do you require a callback: YES

## 2021-08-10 NOTE — TELEPHONE ENCOUNTER
INFORMED PT. PER DR. LION'S PROGRESS NOTE DATED 6/17/21 SHE WOULD GET ANOTHER CBC, FERRITIN, AND IRON PANEL DRAWN AT THAT TIME.  PT. HAS A RETURN APPT TO SEE DR. LION AND LAB WORK ON 10/6/21.  PT. INSTRUCTED TO CALL IF SHE DEVELOPS INCREASE FATIGUE, DIZZINESS, LIGHTHEADEDNESS OR OTHER SYMPTOMS WHICH WOULD WARRANT HER TO COME IN SOONER FOR LABS.  V/U.  PT. STATES SHE DOESN'T HAVE ANY SYMPTOMS AT THIS TIME.  STATES SHE FEELS FINE.

## 2021-09-06 DIAGNOSIS — M85.80 OSTEOPENIA: ICD-10-CM

## 2021-09-07 ENCOUNTER — TRANSCRIBE ORDERS (OUTPATIENT)
Dept: ADMINISTRATIVE | Facility: HOSPITAL | Age: 61
End: 2021-09-07

## 2021-09-07 DIAGNOSIS — U07.1 COVID-19: Primary | ICD-10-CM

## 2021-09-07 RX ORDER — ERGOCALCIFEROL 1.25 MG/1
CAPSULE ORAL
Qty: 12 CAPSULE | Refills: 0 | Status: SHIPPED | OUTPATIENT
Start: 2021-09-07 | End: 2021-12-13 | Stop reason: SDUPTHER

## 2021-09-07 NOTE — TELEPHONE ENCOUNTER
Rx Refill Note  Requested Prescriptions     Pending Prescriptions Disp Refills    vitamin D (ERGOCALCIFEROL) 1.25 MG (02544 UT) capsule capsule [Pharmacy Med Name: VITAMIN D2 (ERGO) 1.25MG  CAP] 12 capsule 0     Sig: Take 1 capsule by mouth once a week      Last office visit with prescribing clinician: 3/31/2021      Next office visit with prescribing clinician: 9/30/2021     Sulema Gallo  09/07/21, 09:44 EDT

## 2021-09-27 DIAGNOSIS — Z86.39 H/O HYPERPARATHYROIDISM: ICD-10-CM

## 2021-09-27 DIAGNOSIS — R91.8 MULTIPLE PULMONARY NODULES DETERMINED BY COMPUTED TOMOGRAPHY OF LUNG: Primary | ICD-10-CM

## 2021-09-30 ENCOUNTER — OFFICE VISIT (OUTPATIENT)
Dept: INTERNAL MEDICINE | Facility: CLINIC | Age: 61
End: 2021-09-30

## 2021-09-30 VITALS
RESPIRATION RATE: 16 BRPM | OXYGEN SATURATION: 98 % | HEIGHT: 62 IN | HEART RATE: 70 BPM | TEMPERATURE: 97.6 F | BODY MASS INDEX: 35.63 KG/M2 | DIASTOLIC BLOOD PRESSURE: 60 MMHG | WEIGHT: 193.6 LBS | SYSTOLIC BLOOD PRESSURE: 115 MMHG

## 2021-09-30 DIAGNOSIS — F44.4 FUNCTIONAL NEUROLOGICAL SYMPTOM DISORDER WITH ABNORMAL MOVEMENT: ICD-10-CM

## 2021-09-30 DIAGNOSIS — I25.10 CORONARY ARTERY DISEASE INVOLVING NATIVE CORONARY ARTERY OF NATIVE HEART WITHOUT ANGINA PECTORIS: ICD-10-CM

## 2021-09-30 DIAGNOSIS — E03.9 ACQUIRED HYPOTHYROIDISM: ICD-10-CM

## 2021-09-30 DIAGNOSIS — I10 BENIGN ESSENTIAL HTN: Primary | ICD-10-CM

## 2021-09-30 DIAGNOSIS — D50.9 IRON DEFICIENCY ANEMIA, UNSPECIFIED IRON DEFICIENCY ANEMIA TYPE: ICD-10-CM

## 2021-09-30 DIAGNOSIS — E78.5 HYPERLIPIDEMIA LDL GOAL <70: ICD-10-CM

## 2021-09-30 DIAGNOSIS — M05.79 RHEUMATOID ARTHRITIS WITH RHEUMATOID FACTOR OF MULTIPLE SITES WITHOUT ORGAN OR SYSTEMS INVOLVEMENT (HCC): ICD-10-CM

## 2021-09-30 PROCEDURE — 99214 OFFICE O/P EST MOD 30 MIN: CPT | Performed by: NURSE PRACTITIONER

## 2021-09-30 RX ORDER — FOLIC ACID 1 MG/1
TABLET ORAL
COMMUNITY
Start: 2021-07-15 | End: 2021-09-30

## 2021-09-30 NOTE — PROGRESS NOTES
Chief Complaint   Patient presents with   • Hypothyroidism       Subjective     Shaylee Wadsworth is a 60 y.o. female being seen for a follow up appointment today regarding HTN, Hyperlipidemia, GERD, CAD, Hypothyroidism, FMD, Vit D def . She is currently on Justin (Norvasc and Benicar), Toprol XL for HTN and CAD. She is followed by Dr. Dumas. S/P drug eluting stent Distad left circumflex. Plavix was stopped April 2020.      She has thoracic back pain, daily pain rated a 3 of 10. She uses ultram sparingly for pain as needed. She is doing home traction for therapy. She has noticed darker urine, and right lower back pain for 3-4 weeks.     She is on Nexium 40mg daily for GERD. She reports some burping, but no ingestion or stomach pain. She has an EGD 10-6-2021 to eval her TAMANNA scheduled next month. She is being followed by Dr. Dunn for TAMANNA.      She has FMD since April 2018. She has been evaluated by 2 neurologists as well as [facility] and an ENT. Last Neurologist was Dr. LaFavre at [facility]. She has been thru PT and OT with [facility] disordered clinic. She feels like traction helps more than any other modality. She is currently receiving Traction therapy with [facility] in [place].     She is followed by Dr. Caraballo ([facility]) for hyperparathyroidism and hypothyroidism. She just increased from 25 to 50 mcgs daily on levothyroxine. She is followed by Rheumatology, last televist 1 months ago. She is on methotrexate.        History of Present Illness     Allergies   Allergen Reactions   • Meloxicam Swelling     EYES AND FACE SWELLING  EYES AND FACE SWELLING   • Effexor Xr [Venlafaxine Hcl Er] Other (See Comments)     Caused weight gain         Current Outpatient Medications:   •  amLODIPine (NORVASC) 5 MG tablet, Take 1 tablet by mouth Daily., Disp: 90 tablet, Rfl: 1  •  aspirin 81 MG tablet, Take 81 mg by mouth Daily., Disp: , Rfl:   •  atorvastatin (LIPITOR) 40 MG tablet, Take 1 tablet by mouth Every Night., Disp:  90 tablet, Rfl: 3  •  cyclobenzaprine (FLEXERIL) 10 MG tablet, Take 10 mg by mouth 2 (two) times a day., Disp: , Rfl:   •  ENBREL SURECLICK 50 MG/ML solution auto-injector, Every 7 (Seven) Days., Disp: , Rfl:   •  esomeprazole (nexIUM) 40 MG capsule, Take 1 capsule by mouth 2 (Two) Times a Day. (Patient taking differently: Take 40 mg by mouth Every Morning Before Breakfast.), Disp: 90 capsule, Rfl: 3  •  estrogen, conjugated,-medroxyprogesterone (Prempro) 0.45-1.5 MG per tablet, Take 1 tablet by mouth Daily. (Patient taking differently: Take 1 tablet by mouth. Every other day), Disp: 28 tablet, Rfl: 11  •  ferrous sulfate 325 (65 FE) MG tablet, Take 1 tablet by mouth Daily With Breakfast., Disp: 30 tablet, Rfl: 3  •  folic acid (FOLVITE) 1 MG tablet, Take 1 mg by mouth Daily. 1 to 4 tablets daily, Disp: , Rfl:   •  levothyroxine (SYNTHROID, LEVOTHROID) 75 MCG tablet, Take 1 tablet by mouth Daily., Disp: 30 tablet, Rfl: 11  •  methotrexate 2.5 MG tablet, TAKE 4 TABLETS BY MOUTH ONCE A WEEK, Disp: 32 tablet, Rfl: 6  •  metoprolol succinate XL (TOPROL-XL) 50 MG 24 hr tablet, Take 1 tablet by mouth once daily, Disp: 90 tablet, Rfl: 3  •  olmesartan (BENICAR) 40 MG tablet, Take 1 tablet by mouth Daily., Disp: 90 tablet, Rfl: 1  •  potassium chloride (K-DUR,KLOR-CON) 20 MEQ CR tablet, Take 1 tablet by mouth twice daily, Disp: 180 tablet, Rfl: 0  •  traMADol (ULTRAM) 50 MG tablet, Take 1 tablet by mouth Every 8 (Eight) Hours As Needed for Severe Pain ., Disp: 30 tablet, Rfl: 0  •  vitamin D (ERGOCALCIFEROL) 1.25 MG (75967 UT) capsule capsule, Take 1 capsule by mouth once a week, Disp: 12 capsule, Rfl: 0  •  spironolactone (ALDACTONE) 25 MG tablet, Take 1 tablet by mouth once daily, Disp: 90 tablet, Rfl: 0    The following portions of the patient's history were reviewed and updated as appropriate: allergies, current medications, past family history, past medical history, past social history, past surgical history and  problem list.    Review of Systems   Constitutional: Negative for fatigue and fever.   HENT: Negative.    Eyes: Negative.    Respiratory: Negative.    Cardiovascular: Negative.    Gastrointestinal: Negative.    Endocrine: Negative.    Genitourinary: Negative.  Negative for difficulty urinating.   Musculoskeletal: Positive for arthralgias, back pain and gait problem.   Skin: Negative.    Allergic/Immunologic: Negative.  Negative for environmental allergies, food allergies and immunocompromised state.   Neurological: Positive for tremors and weakness. Negative for speech difficulty.   Hematological: Negative.    Psychiatric/Behavioral: Negative.    All other systems reviewed and are negative.      Assessment     Physical Exam  Vitals reviewed.   Constitutional:       Appearance: Normal appearance. She is obese. She is not ill-appearing.   HENT:      Head: Normocephalic.      Right Ear: Tympanic membrane normal.      Left Ear: Tympanic membrane normal.   Cardiovascular:      Rate and Rhythm: Normal rate and regular rhythm.      Pulses: Normal pulses.      Heart sounds: Normal heart sounds. No murmur heard.     Pulmonary:      Effort: Pulmonary effort is normal.      Breath sounds: Normal breath sounds.   Neurological:      Mental Status: She is alert.      Cranial Nerves: No cranial nerve deficit.      Sensory: Sensation is intact.      Motor: Tremor (RUE) present.      Gait: Gait abnormal (Wide stancce gait).         Plan     Her  labs were reviewed with the patient from 8-3-2021    Diagnoses and all orders for this visit:    1. Benign essential HTN (Primary)  -     Lipid Panel With / Chol / HDL Ratio; Future  -     CBC & Differential; Future  -     Comprehensive Metabolic Panel; Future    2. Hyperlipidemia LDL goal <70  -     Lipid Panel With / Chol / HDL Ratio; Future  -     Comprehensive Metabolic Panel; Future    3. Coronary artery disease involving native coronary artery of native heart without angina pectoris  -      Lipid Panel With / Chol / HDL Ratio; Future  -     CBC & Differential; Future  -     Comprehensive Metabolic Panel; Future    4. Acquired hypothyroidism  -     Comprehensive Metabolic Panel; Future  -     TSH; Future  -     T4, Free; Future    5. Functional neurological symptom disorder with abnormal movement    6. Rheumatoid arthritis with rheumatoid factor of multiple sites without organ or systems involvement (CMS/HCC)    7. Iron deficiency anemia, unspecified iron deficiency anemia type  -     CBC & Differential; Future  -     Iron Profile; Future  -     Ferritin; Future    The patient has read and signed the [facility] Controlled Substance Contract.  I will continue to see patient for regular follow up appointments.  They are well controlled on their medication.  RENO is updated every 3 months. The patient is aware of the potential for addiction and dependence.    BP at goal. Continue Toprol XL, Norvasc and Benicar.      GERD symptoms controlled, proceed with EGD    Euthyroid on current dose of Synthroid 75mcgs    Follow up in 6 months

## 2021-10-04 ENCOUNTER — LAB (OUTPATIENT)
Dept: LAB | Facility: HOSPITAL | Age: 61
End: 2021-10-04

## 2021-10-04 ENCOUNTER — TELEPHONE (OUTPATIENT)
Dept: GASTROENTEROLOGY | Facility: CLINIC | Age: 61
End: 2021-10-04

## 2021-10-04 ENCOUNTER — TRANSCRIBE ORDERS (OUTPATIENT)
Dept: ADMINISTRATIVE | Facility: HOSPITAL | Age: 61
End: 2021-10-04

## 2021-10-04 DIAGNOSIS — Z79.899 ENCOUNTER FOR LONG-TERM (CURRENT) USE OF OTHER MEDICATIONS: ICD-10-CM

## 2021-10-04 DIAGNOSIS — I25.10 CORONARY ARTERY DISEASE INVOLVING NATIVE CORONARY ARTERY OF NATIVE HEART WITHOUT ANGINA PECTORIS: ICD-10-CM

## 2021-10-04 DIAGNOSIS — M05.79 SEROPOSITIVE RHEUMATOID ARTHRITIS OF MULTIPLE SITES (HCC): Primary | ICD-10-CM

## 2021-10-04 DIAGNOSIS — M05.79 SEROPOSITIVE RHEUMATOID ARTHRITIS OF MULTIPLE SITES (HCC): ICD-10-CM

## 2021-10-04 DIAGNOSIS — E03.9 ACQUIRED HYPOTHYROIDISM: ICD-10-CM

## 2021-10-04 DIAGNOSIS — U07.1 COVID-19: ICD-10-CM

## 2021-10-04 DIAGNOSIS — I10 BENIGN ESSENTIAL HTN: ICD-10-CM

## 2021-10-04 DIAGNOSIS — D50.9 IRON DEFICIENCY ANEMIA, UNSPECIFIED IRON DEFICIENCY ANEMIA TYPE: ICD-10-CM

## 2021-10-04 DIAGNOSIS — E78.5 HYPERLIPIDEMIA LDL GOAL <70: ICD-10-CM

## 2021-10-04 LAB
ALBUMIN SERPL-MCNC: 4.5 G/DL (ref 3.5–5.2)
ALBUMIN/GLOB SERPL: 1.6 G/DL
ALP SERPL-CCNC: 99 U/L (ref 39–117)
ALT SERPL W P-5'-P-CCNC: 27 U/L (ref 1–33)
ANION GAP SERPL CALCULATED.3IONS-SCNC: 11.7 MMOL/L (ref 5–15)
AST SERPL-CCNC: 31 U/L (ref 1–32)
BACTERIA UR QL AUTO: ABNORMAL /HPF
BASOPHILS # BLD AUTO: 0.04 10*3/MM3 (ref 0–0.2)
BASOPHILS NFR BLD AUTO: 0.7 % (ref 0–1.5)
BILIRUB CONJ SERPL-MCNC: <0.2 MG/DL (ref 0–0.3)
BILIRUB SERPL-MCNC: 0.7 MG/DL (ref 0–1.2)
BILIRUB UR QL STRIP: NEGATIVE
BUN SERPL-MCNC: 10 MG/DL (ref 6–20)
BUN/CREAT SERPL: 13.2 (ref 7–25)
CALCIUM SPEC-SCNC: 9.7 MG/DL (ref 8.6–10.5)
CHLORIDE SERPL-SCNC: 99 MMOL/L (ref 98–107)
CHOLEST SERPL-MCNC: 194 MG/DL (ref 0–200)
CLARITY UR: CLEAR
CO2 SERPL-SCNC: 27.3 MMOL/L (ref 22–29)
COLOR UR: YELLOW
CREAT SERPL-MCNC: 0.76 MG/DL (ref 0.57–1)
DEPRECATED RDW RBC AUTO: 41.2 FL (ref 37–54)
EOSINOPHIL # BLD AUTO: 0.06 10*3/MM3 (ref 0–0.4)
EOSINOPHIL NFR BLD AUTO: 1 % (ref 0.3–6.2)
ERYTHROCYTE [DISTWIDTH] IN BLOOD BY AUTOMATED COUNT: 13.3 % (ref 12.3–15.4)
FERRITIN SERPL-MCNC: 27.2 NG/ML (ref 13–150)
GFR SERPL CREATININE-BSD FRML MDRD: 78 ML/MIN/1.73
GLOBULIN UR ELPH-MCNC: 2.9 GM/DL
GLUCOSE SERPL-MCNC: 103 MG/DL (ref 65–99)
GLUCOSE UR STRIP-MCNC: NEGATIVE MG/DL
HCT VFR BLD AUTO: 38 % (ref 34–46.6)
HDLC SERPL QL: 3.4
HDLC SERPL-MCNC: 57 MG/DL (ref 40–60)
HGB BLD-MCNC: 13.5 G/DL (ref 12–15.9)
HGB UR QL STRIP.AUTO: ABNORMAL
HYALINE CASTS UR QL AUTO: ABNORMAL /LPF
IMM GRANULOCYTES # BLD AUTO: 0.01 10*3/MM3 (ref 0–0.05)
IMM GRANULOCYTES NFR BLD AUTO: 0.2 % (ref 0–0.5)
IRON 24H UR-MRATE: 216 MCG/DL (ref 37–145)
IRON SATN MFR SERPL: 46 % (ref 20–50)
KETONES UR QL STRIP: NEGATIVE
LDLC SERPL CALC-MCNC: 107 MG/DL (ref 0–100)
LEUKOCYTE ESTERASE UR QL STRIP.AUTO: ABNORMAL
LYMPHOCYTES # BLD AUTO: 2.19 10*3/MM3 (ref 0.7–3.1)
LYMPHOCYTES NFR BLD AUTO: 36 % (ref 19.6–45.3)
MCH RBC QN AUTO: 30.6 PG (ref 26.6–33)
MCHC RBC AUTO-ENTMCNC: 35.5 G/DL (ref 31.5–35.7)
MCV RBC AUTO: 86.2 FL (ref 79–97)
MONOCYTES # BLD AUTO: 0.31 10*3/MM3 (ref 0.1–0.9)
MONOCYTES NFR BLD AUTO: 5.1 % (ref 5–12)
NEUTROPHILS NFR BLD AUTO: 3.48 10*3/MM3 (ref 1.7–7)
NEUTROPHILS NFR BLD AUTO: 57 % (ref 42.7–76)
NITRITE UR QL STRIP: NEGATIVE
NRBC BLD AUTO-RTO: 0 /100 WBC (ref 0–0.2)
PH UR STRIP.AUTO: 6 [PH] (ref 5–8)
PLATELET # BLD AUTO: 314 10*3/MM3 (ref 140–450)
PMV BLD AUTO: 9.4 FL (ref 6–12)
POTASSIUM SERPL-SCNC: 3.9 MMOL/L (ref 3.5–5.2)
PROT SERPL-MCNC: 7.4 G/DL (ref 6–8.5)
PROT UR QL STRIP: NEGATIVE
RBC # BLD AUTO: 4.41 10*6/MM3 (ref 3.77–5.28)
RBC # UR: ABNORMAL /HPF
REF LAB TEST METHOD: ABNORMAL
SARS-COV-2 RNA PNL SPEC NAA+PROBE: NOT DETECTED
SODIUM SERPL-SCNC: 138 MMOL/L (ref 136–145)
SP GR UR STRIP: 1.01 (ref 1–1.03)
SQUAMOUS #/AREA URNS HPF: ABNORMAL /HPF
T4 FREE SERPL-MCNC: 1.69 NG/DL (ref 0.93–1.7)
TIBC SERPL-MCNC: 474 MCG/DL (ref 298–536)
TRANSFERRIN SERPL-MCNC: 318 MG/DL (ref 200–360)
TRIGL SERPL-MCNC: 173 MG/DL (ref 0–150)
TSH SERPL DL<=0.05 MIU/L-ACNC: 2.14 UIU/ML (ref 0.27–4.2)
UROBILINOGEN UR QL STRIP: ABNORMAL
VLDLC SERPL-MCNC: 30 MG/DL (ref 5–40)
WBC # BLD AUTO: 6.09 10*3/MM3 (ref 3.4–10.8)
WBC UR QL AUTO: ABNORMAL /HPF

## 2021-10-04 PROCEDURE — 82728 ASSAY OF FERRITIN: CPT

## 2021-10-04 PROCEDURE — 83540 ASSAY OF IRON: CPT

## 2021-10-04 PROCEDURE — 80061 LIPID PANEL: CPT

## 2021-10-04 PROCEDURE — 87086 URINE CULTURE/COLONY COUNT: CPT

## 2021-10-04 PROCEDURE — 84443 ASSAY THYROID STIM HORMONE: CPT

## 2021-10-04 PROCEDURE — 82248 BILIRUBIN DIRECT: CPT

## 2021-10-04 PROCEDURE — 87635 SARS-COV-2 COVID-19 AMP PRB: CPT | Performed by: OBSTETRICS & GYNECOLOGY

## 2021-10-04 PROCEDURE — 84439 ASSAY OF FREE THYROXINE: CPT

## 2021-10-04 PROCEDURE — C9803 HOPD COVID-19 SPEC COLLECT: HCPCS

## 2021-10-04 PROCEDURE — 81001 URINALYSIS AUTO W/SCOPE: CPT

## 2021-10-04 PROCEDURE — 80053 COMPREHEN METABOLIC PANEL: CPT

## 2021-10-04 PROCEDURE — 85025 COMPLETE CBC W/AUTO DIFF WBC: CPT

## 2021-10-04 PROCEDURE — 84466 ASSAY OF TRANSFERRIN: CPT

## 2021-10-04 PROCEDURE — 36415 COLL VENOUS BLD VENIPUNCTURE: CPT

## 2021-10-04 NOTE — TELEPHONE ENCOUNTER
Patient forgot to stop her aspirin and iron prior to her EGD. Will need to reschedule her procedure.

## 2021-10-05 LAB — BACTERIA SPEC AEROBE CULT: NORMAL

## 2021-10-12 NOTE — TELEPHONE ENCOUNTER
SPOKE WITH PATIENT.  RESCHEDULED TO [place] ON 01/10/2022 AT 8:45AM - ARRIVE 7:30AM.  E-MAIL INSTRUCTIONS [email or web address] TODAY.

## 2021-10-14 ENCOUNTER — OFFICE VISIT (OUTPATIENT)
Dept: CARDIOLOGY | Facility: CLINIC | Age: 61
End: 2021-10-14

## 2021-10-14 VITALS
HEART RATE: 68 BPM | DIASTOLIC BLOOD PRESSURE: 72 MMHG | WEIGHT: 194 LBS | BODY MASS INDEX: 33.12 KG/M2 | SYSTOLIC BLOOD PRESSURE: 124 MMHG | OXYGEN SATURATION: 99 % | HEIGHT: 64 IN

## 2021-10-14 DIAGNOSIS — I10 BENIGN ESSENTIAL HTN: ICD-10-CM

## 2021-10-14 DIAGNOSIS — E78.5 HYPERLIPIDEMIA LDL GOAL <70: ICD-10-CM

## 2021-10-14 DIAGNOSIS — Z95.5 S/P DRUG ELUTING CORONARY STENT PLACEMENT: ICD-10-CM

## 2021-10-14 DIAGNOSIS — I25.10 CORONARY ARTERY DISEASE INVOLVING NATIVE CORONARY ARTERY OF NATIVE HEART WITHOUT ANGINA PECTORIS: Primary | ICD-10-CM

## 2021-10-14 PROCEDURE — 93000 ELECTROCARDIOGRAM COMPLETE: CPT | Performed by: NURSE PRACTITIONER

## 2021-10-14 PROCEDURE — 99214 OFFICE O/P EST MOD 30 MIN: CPT | Performed by: NURSE PRACTITIONER

## 2021-10-14 NOTE — PROGRESS NOTES
CARDIOLOGY        Patient Name: Shaylee Wadsworth  :1960  Age: 60 y.o.  Primary Cardiologist: Jackelyn Dumas MD  Encounter Provider:  KAITLYNN Salomon    Date of Service: 10/14/21          CHIEF COMPLAINT / REASON FOR OFFICE VISIT     Coronary Artery Disease (1 yr f/u)      HISTORY OF PRESENT ILLNESS       HPI  Shaylee Wadsworth is a 60 y.o. female who presents today for annual evaluation.     Pt has a  history significant for CAD, hypertension, hyperlipidemia, functional movement disorder, rheumatoid arthritis, nicotine use.    Patient states that she has done well over the past year.  She states that her RA limits some of her exercise, but she does stay active.  She has not been monitoring her BP at home.  She denies chest pain, shortness of breath, lightheadedness, worsening fatigue.  She does have occasional heart palpitations that she attributes to changes in her thyroid medication and HRT medications.     The following portions of the patient's history were reviewed and updated as appropriate: allergies, current medications, past family history, past medical history, past social history, past surgical history and problem list.      VITAL SIGNS     Visit Vitals  LMP  (LMP Unknown) Comment: partial hysterectomy          Wt Readings from Last 3 Encounters:   21 87.8 kg (193 lb 9.6 oz)   21 88.9 kg (196 lb)   21 88 kg (193 lb 14.4 oz)     There is no height or weight on file to calculate BMI.      REVIEW OF SYSTEMS   Review of Systems   Constitutional: Negative for chills, fever, weight gain and weight loss.   Cardiovascular: Negative for leg swelling.   Respiratory: Negative for cough, snoring and wheezing.    Hematologic/Lymphatic: Negative for bleeding problem. Does not bruise/bleed easily.   Skin: Negative for color change.   Musculoskeletal: Negative for falls, joint pain and myalgias.   Gastrointestinal: Negative for melena.   Genitourinary: Negative for hematuria.    Neurological: Negative for excessive daytime sleepiness.   Psychiatric/Behavioral: Negative for depression. The patient is not nervous/anxious.            PHYSICAL EXAMINATION     Vitals and nursing note reviewed.   Constitutional:       Appearance: Normal appearance. Well-developed.   Eyes:      Conjunctiva/sclera: Conjunctivae normal.   Neck:      Vascular: No carotid bruit.   Pulmonary:      Breath sounds: Normal breath sounds.   Cardiovascular:      Normal rate. Regular rhythm. Normal S1 with normal intensity. Normal S2 with normal intensity.      Murmurs: There is no murmur.      No gallop. No click. No rub.   Musculoskeletal: Normal range of motion. Skin:     General: Skin is warm and dry.   Neurological:      Mental Status: Alert and oriented to person, place, and time.      GCS: GCS eye subscore is 4. GCS verbal subscore is 5. GCS motor subscore is 6.   Psychiatric:         Speech: Speech normal.         Behavior: Behavior normal.         Thought Content: Thought content normal.         Judgment: Judgment normal.           REVIEWED DATA       ECG 12 Lead    Date/Time: 10/14/2021 10:43 AM  Performed by: Megan Pina APRN  Authorized by: Megan Pina APRN   Comparison: compared with previous ECG   Rhythm: sinus rhythm  Rate: normal  ST Segments: ST segments normal  T Waves: T waves normal  QRS axis: normal  Other: no other findings  Other findings: non-specific ST-T wave changes    Clinical impression: non-specific ECG            Cardiac Procedures:  1. 24-hour Holter 5/11/2018.  Relatively benign monitor study.  2. Treadmill stress test 2/19/2019.  Shortness of breath during stress test.  Arrhythmias were not significant.  Findings consistent with abnormal ECG stress test.  3. Cardiac catheterization 3/4/2019.  Severe single-vessel CAD status post PCI of distal left circumflex.  4. 24-hour Holter 2/17/2020.  Abnormal monitor study.  Single episode of SVT lasting 16 beats in duration.    Lipid  Panel    Lipid Panel 3/4/21 5/27/21 10/4/21   Total Cholesterol 148 160 194   Triglycerides 132 163 (A) 173 (A)   HDL Cholesterol 49 51 57   VLDL Cholesterol 23 28 30   LDL Cholesterol  76 81 107 (A)   (A) Abnormal value                ASSESSMENT & PLAN      Diagnosis Plan   1. Coronary artery disease involving native coronary artery of native heart without angina pectoris     2. S/P drug eluting coronary stent placement     3. Benign essential HTN     4. Hyperlipidemia LDL goal <70           SUMMARY/DISCUSSION  1. CAD with prior KARLY.  Patient has done well over the past year.  States that she has not had any further episodes of chest discomfort or angina.  ECG today is unchanged compared to prior.  Patient does not formally exercise secondary to her rheumatoid arthritis.  She does try to stay active.  She will continue GDMT with aspirin, atorvastatin, olmesartan, metoprolol succinate.  2. Hypertension.  Blood pressure controlled in office today at 124/72.  Continue olmesartan 40 mg/day, metoprolol succinate 50 mg/day, amlodipine 5 mg/day.  3. Hyperlipidemia.  LDL slightly elevated on most recent assessment at 107.  Patient will continue atorvastatin 40 mg/day.  If it remains elevated at subsequent checks would recommend increasing atorvastatin to 80 mg/day given her history of coronary disease and goal LDL less than 70.  4. Follow-up with  in 1 year.         MEDICATIONS         Discharge Medications          Accurate as of October 14, 2021 10:48 AM. If you have any questions, ask your nurse or doctor.            Changes to Medications      Instructions Start Date   esomeprazole 40 MG capsule  Commonly known as: nexIUM  What changed: when to take this   40 mg, Oral, 2 Times Daily         Continue These Medications      Instructions Start Date   amLODIPine 5 MG tablet  Commonly known as: NORVASC   5 mg, Oral, Daily      aspirin 81 MG tablet   81 mg, Oral, Daily      atorvastatin 40 MG tablet  Commonly known  as: LIPITOR   40 mg, Oral, Nightly      cyclobenzaprine 10 MG tablet  Commonly known as: FLEXERIL   10 mg, Oral, 2 times daily      Enbrel SureClick 50 MG/ML solution auto-injector  Generic drug: Etanercept   Every 7 Days      ferrous sulfate 325 (65 FE) MG tablet   325 mg, Oral, Daily With Breakfast      folic acid 1 MG tablet  Commonly known as: FOLVITE   1 mg, Oral, Daily, 1 to 4 tablets daily      levothyroxine 75 MCG tablet  Commonly known as: SYNTHROID, LEVOTHROID   75 mcg, Oral, Daily      methotrexate 2.5 MG tablet   TAKE 4 TABLETS BY MOUTH ONCE A WEEK      metoprolol succinate XL 50 MG 24 hr tablet  Commonly known as: TOPROL-XL   Take 1 tablet by mouth once daily      olmesartan 40 MG tablet  Commonly known as: BENICAR   40 mg, Oral, Daily      potassium chloride 20 MEQ CR tablet  Commonly known as: K-DUR,KLOR-CON   Take 1 tablet by mouth twice daily      traMADol 50 MG tablet  Commonly known as: ULTRAM   50 mg, Oral, Every 8 Hours PRN      vitamin D 1.25 MG (30912 UT) capsule capsule  Commonly known as: ERGOCALCIFEROL   Take 1 capsule by mouth once a week                 **Dragon Disclaimer:   Much of this encounter note is an electronic transcription/translation of spoken language to printed text. The electronic translation of spoken language may permit erroneous, or at times, nonsensical words or phrases to be inadvertently transcribed. Although I have reviewed the note for such errors, some may still exist.

## 2021-10-15 ENCOUNTER — TELEPHONE (OUTPATIENT)
Dept: INTERNAL MEDICINE | Facility: CLINIC | Age: 61
End: 2021-10-15

## 2021-10-15 NOTE — TELEPHONE ENCOUNTER
Pt states that she seen her urine results and was wanting to know if she needs antibiotics for this. She also states that she is still having the back pain.

## 2021-10-17 NOTE — TELEPHONE ENCOUNTER
The culture shows normal mervin, no UTI. She has chronic back pain, please come in for a visit of this has changed

## 2021-10-19 ENCOUNTER — OFFICE VISIT (OUTPATIENT)
Dept: ONCOLOGY | Facility: CLINIC | Age: 61
End: 2021-10-19

## 2021-10-19 ENCOUNTER — LAB (OUTPATIENT)
Dept: LAB | Facility: HOSPITAL | Age: 61
End: 2021-10-19

## 2021-10-19 VITALS
DIASTOLIC BLOOD PRESSURE: 67 MMHG | OXYGEN SATURATION: 97 % | SYSTOLIC BLOOD PRESSURE: 118 MMHG | TEMPERATURE: 96.2 F | HEART RATE: 70 BPM | HEIGHT: 62 IN | WEIGHT: 194.5 LBS | RESPIRATION RATE: 16 BRPM | BODY MASS INDEX: 35.79 KG/M2

## 2021-10-19 DIAGNOSIS — D50.9 IRON DEFICIENCY ANEMIA, UNSPECIFIED IRON DEFICIENCY ANEMIA TYPE: ICD-10-CM

## 2021-10-19 DIAGNOSIS — K22.70 BARRETT'S ESOPHAGUS WITHOUT DYSPLASIA: Primary | ICD-10-CM

## 2021-10-19 PROCEDURE — 99213 OFFICE O/P EST LOW 20 MIN: CPT | Performed by: INTERNAL MEDICINE

## 2021-10-19 NOTE — PROGRESS NOTES
Subjective     REASON FOR CONSULTATION:  Iron def anemia  Provide an opinion on any further workup or treatment                             REQUESTING PRACTITIONER:  Laura Ayala    RECORDS OBTAINED:  Records of the patients history including those obtained from the referring provider were reviewed and summarized in detail.    HISTORY OF PRESENT ILLNESS:  The patient is a 60 y.o. year old female who is here for an opinion about the above issue.    History of Present Illness   This is a pleasant 60-year-old lady seen today for evaluation and treatment of anemia.  Reviewing the patient's recent laboratory data she has developed an anemia over the past 1 year with her hemoglobin running in the 10-11 range.  Her red blood cells are normocytic and normochromic.  She has normal white blood cell and platelet counts.  Additional work-up shows likely iron deficiency with an iron saturation of 10% on 3/4/2021.  Her B12 and folic acid levels were normal.  An occult blood on the stool 3/5/2021 was negative.  She had a colonoscopy performed March 2020.  She has not had a recent EGD.  Notes indicate she has history of Lainez's esophagus.    The patient has fatigue and chronic musculoskeletal pain related to rheumatoid arthritis.  She is currently on Enbrel and methotrexate.  She denies donating blood, bright red blood per rectum, melanotic stool, vaginal bleeding.  She reports normal diet and is not vegan or vegetarian.    The patient returned today for a follow-up visit.  She is currently taking oral iron every other day with good tolerance.  She denies lightheadedness, dizziness or shortness of breath.  EGD got rescheduled because the patient forgot to hold aspirin/iron.    Past Medical History:   Diagnosis Date   • Abnormal electrocardiogram    • Anemia May 2021   • Anxiety    • Arthritis 5/1/2014    RA   • Lainez esophagus    • Benign paroxysmal positional vertigo due to bilateral vestibular disorder    • Chest pain    •  Cholelithiasis 2007    Removed   • Colon polyp    • Coronary artery disease 2/26/19   • Depression 6/12/19   • Disease of thyroid gland    • Dystonia    • Embedded tick of right lower leg    • Esophageal reflux    • Fibromyositis    • Functional movement disorder    • H/O colonoscopy    • H/O mammogram 08/31/2011    NORMAL    • History of colonoscopy 03/2020   • Hx of bone density study 08/31/2011    OSTEOPENIA    • Hyperlipidemia    • Hyperparathyroidism (HCC)    • Hypertension    • Hypothyroidism May 2021   • Low back pain    • Menopause    • Osteopenia    • Osteopenia    • Ovarian cyst 2000    L ovary removed   • Pancreatitis    • Pancreatitis    • Scoliosis 2/16/13   • Superficial incisional infection of surgical site    • Tremor 4/4/2018        Past Surgical History:   Procedure Laterality Date   • CARDIAC CATHETERIZATION N/A 3/4/2019    Procedure: Coronary angiography;  Surgeon: Jackelyn Dumas MD;  Location: [facility] INVASIVE LOCATION;  Service: Cardiovascular   • CARDIAC CATHETERIZATION N/A 3/4/2019    Procedure: Left heart cath;  Surgeon: Jackelyn Dumas MD;  Location[facility] CATH [facility]VASIVE LOC[facility];  Service: Cardiovascular   • CARDIAC CATHETERIZATION N/A 3/4/2019    Procedure: Left ventriculography;  Surgeon: Jackelyn Dumas MD;  Location:  [facility] CATH INVASIVE LOCATION;  Service: Cardiovascular   • CARDIAC CATHETERIZATION N/A 3/4/2019    Procedure: Stent KARLY coronary;  Surgeon: Jackelyn Dumas MD;  [facility];  Service: Cardiovascular   • CHOLECYSTECTOMY     • COLONOSCOPY  2014   • COLONOSCOPY N/A 3/6/2020    Procedure: COLONOSCOPY, biopsy, polypectomy;  Surgeon: Rain Kirk MD;  Location: [facility];  Service: Gastroenterology;  Laterality: N/A;  Random biopsies; Rectal polyp x 3; Hemorrhoids; Diverticulosis   • DILATATION AND CURETTAGE     • HYSTERECTOMY     • LAPAROSCOPIC CHOLECYSTECTOMY  2005   • MOUTH SURGERY      TOOTH EXTRACTION   • OTHER SURGICAL HISTORY   03/27/2015    DIAGNOSTIC ESOPHAGOSCOPY TRANSNASAL FLEXIBLE WITH BIOPSY; ARZATE ESO. REPEAT EGD 2 YR    • OVARIAN CYST SURGERY  2000    L ovary removed   • OVARY SURGERY Left     NORMAL    • PAP SMEAR  08/23/2010    NORMAL    • PARATHYROIDECTOMY Right 08/17/2016    Dr. Muchael Flynn at [place]   • SUBTOTAL HYSTERECTOMY      Left ovary removed9   • WISDOM TOOTH EXTRACTION  1984        Current Outpatient Medications on File Prior to Visit   Medication Sig Dispense Refill   • amLODIPine (NORVASC) 5 MG tablet Take 1 tablet by mouth Daily. 90 tablet 1   • aspirin 81 MG tablet Take 81 mg by mouth Daily.     • atorvastatin (LIPITOR) 40 MG tablet Take 1 tablet by mouth Every Night. 90 tablet 3   • cyclobenzaprine (FLEXERIL) 10 MG tablet Take 10 mg by mouth 2 (two) times a day.     • ENBREL SURECLICK 50 MG/ML solution auto-injector Every 7 (Seven) Days.     • esomeprazole (nexIUM) 40 MG capsule Take 1 capsule by mouth 2 (Two) Times a Day. (Patient taking differently: Take 40 mg by mouth Every Morning Before Breakfast.) 90 capsule 3   • ferrous sulfate 325 (65 FE) MG tablet Take 1 tablet by mouth Daily With Breakfast. 30 tablet 3   • folic acid (FOLVITE) 1 MG tablet Take 1 mg by mouth Daily. 1 to 4 tablets daily     • levothyroxine (SYNTHROID, LEVOTHROID) 75 MCG tablet Take 1 tablet by mouth Daily. 30 tablet 11   • methotrexate 2.5 MG tablet TAKE 4 TABLETS BY MOUTH ONCE A WEEK 32 tablet 6   • metoprolol succinate XL (TOPROL-XL) 50 MG 24 hr tablet Take 1 tablet by mouth once daily 90 tablet 3   • olmesartan (BENICAR) 40 MG tablet Take 1 tablet by mouth Daily. 90 tablet 1   • potassium chloride (K-DUR,KLOR-CON) 20 MEQ CR tablet Take 1 tablet by mouth twice daily 180 tablet 0   • traMADol (ULTRAM) 50 MG tablet Take 1 tablet by mouth Every 8 (Eight) Hours As Needed for Severe Pain . 30 tablet 0   • vitamin D (ERGOCALCIFEROL) 1.25 MG (25611 UT) capsule capsule Take 1 capsule by mouth once a week 12 capsule 0     No current  facility-administered medications on file prior to visit.        ALLERGIES:    Allergies   Allergen Reactions   • Meloxicam Swelling     EYES AND FACE SWELLING  EYES AND FACE SWELLING   • Effexor Xr [Venlafaxine Hcl Er] Unknown - Low Severity     Caused weight gain        Social History     Socioeconomic History   • Marital status:    Tobacco Use   • Smoking status: Current Every Day Smoker     Packs/day: 0.50     Years: 30.00     Pack years: 15.00     Types: Electronic Cigarette, Cigarettes     Last attempt to quit: 8/12/2018     Years since quitting: 3.1   • Smokeless tobacco: Current User   • Tobacco comment: 8/12/18 switch to e-cig   Substance and Sexual Activity   • Alcohol use: No   • Drug use: No   • Sexual activity: Never        Family History   Problem Relation Age of Onset   • Diabetes Mother    • Hyperlipidemia Mother    • Hypertension Mother    • Heart disease Mother    • Kidney disease Mother    • Cancer Mother    • Heart attack Mother    • Multiple myeloma Mother    • Melanoma Mother         Mar[place] water contamination   • Coronary artery disease Mother    • Arthritis Father    • Anxiety disorder Father    • COPD Father    • Glaucoma Father    • Hyperlipidemia Father    • Hypertension Father    • Vision loss Father    • Heart disease Father    • Heart attack Father    • Heart disease Sister    • Breast cancer Neg Hx    • Colon cancer Neg Hx    • Colon polyps Neg Hx         Review of Systems   Constitutional: Negative for diaphoresis, fatigue and unexpected weight change.   HENT: Negative.    Respiratory: Negative.    Cardiovascular: Negative.    Gastrointestinal: Positive for constipation. Negative for nausea.   Genitourinary: Negative.    Musculoskeletal: Positive for arthralgias, back pain and gait problem.   Skin: Negative.    Hematological: Negative.  Bruises/bleeds easily:    Psychiatric/Behavioral: Negative.           Objective     Vitals:    10/19/21 1126   BP: 118/67   Pulse: 70   Resp:  "16   Temp: 96.2 °F (35.7 °C)   TempSrc: Temporal   SpO2: 97%   Weight: 88.2 kg (194 lb 8 oz)   Height: 157 cm (61.81\")   PainSc:   2   PainLoc: Back  Comment: lower     Current Status 10/19/2021   ECOG score 0       Physical Exam    CONSTITUTIONAL: pleasant well-developed adult woman  HEENT: no icterus, no thrush, moist membranes  NECK: no jvd  LYMPH: no cervical or supraclavicular lad  CV: RRR, S1S2, no murmur  RESP: cta bilat, no wheezing, no rales  GI: soft, non-tender, no splenomegaly, +bs  NEURO: alert and oriented x3, normal strength  PSYCH: normal mood and affect  Exam unchanged-10/19/21    RECENT LABS:  Hematology WBC   Date Value Ref Range Status   10/04/2021 6.09 3.40 - 10.80 10*3/mm3 Final   01/21/2020 7.43 3.40 - 10.80 10*3/mm3 Final     RBC   Date Value Ref Range Status   10/04/2021 4.41 3.77 - 5.28 10*6/mm3 Final   01/21/2020 4.17 3.77 - 5.28 10*6/mm3 Final     Hemoglobin   Date Value Ref Range Status   10/04/2021 13.5 12.0 - 15.9 g/dL Final     Hematocrit   Date Value Ref Range Status   10/04/2021 38.0 34.0 - 46.6 % Final     Platelets   Date Value Ref Range Status   10/04/2021 314 140 - 450 10*3/mm3 Final      Ferritin 27/iron saturation 46%    Assessment/Plan     *Normocytic normochromic anemia:  · The patient has developed anemia since January 2020, current hemoglobin 11.2  · amado studies suggestive of iron deficiency with an iron saturation 10%  · She has not received any iron replacement  · She had a colonoscopy March 2020 but has not had recent EGD; notes indicate history of Lainez's esophagus  · Hemoglobin normalized with iron replacement; iron stores low normal    *History of Lainez's esophagus-scheduled for follow-up EGD with GI    Hematology recommendations:  I recommended the patient to continue oral iron every other day another 3 to 4 months.  She is scheduled for EGD in January.  I would recommend her PCP recheck her CBC ferritin iron profile 3 to 4 months after discontinuation of oral " iron to make sure her stores remain adequate.  I will see her back as needed.

## 2021-11-29 RX ORDER — AMLODIPINE BESYLATE 5 MG/1
TABLET ORAL
Qty: 90 TABLET | Refills: 0 | Status: SHIPPED | OUTPATIENT
Start: 2021-11-29 | End: 2022-03-30 | Stop reason: SDUPTHER

## 2021-11-29 RX ORDER — OLMESARTAN MEDOXOMIL 40 MG/1
TABLET ORAL
Qty: 90 TABLET | Refills: 0 | Status: SHIPPED | OUTPATIENT
Start: 2021-11-29 | End: 2022-03-30 | Stop reason: SDUPTHER

## 2021-12-13 DIAGNOSIS — M85.80 OSTEOPENIA: ICD-10-CM

## 2021-12-13 RX ORDER — ERGOCALCIFEROL 1.25 MG/1
50000 CAPSULE ORAL WEEKLY
Qty: 12 CAPSULE | Refills: 3 | Status: SHIPPED | OUTPATIENT
Start: 2021-12-13 | End: 2022-11-21

## 2021-12-22 ENCOUNTER — ANESTHESIA EVENT (OUTPATIENT)
Dept: PERIOP | Facility: HOSPITAL | Age: 61
End: 2021-12-22

## 2021-12-22 ENCOUNTER — HOSPITAL ENCOUNTER (OUTPATIENT)
Facility: HOSPITAL | Age: 61
Discharge: HOME | End: 2021-12-25
Attending: EMERGENCY MEDICINE | Admitting: SURGERY

## 2021-12-22 ENCOUNTER — TELEPHONE (OUTPATIENT)
Dept: INTERNAL MEDICINE | Facility: CLINIC | Age: 61
End: 2021-12-22

## 2021-12-22 ENCOUNTER — LAB (OUTPATIENT)
Dept: LAB | Facility: HOSPITAL | Age: 61
End: 2021-12-22

## 2021-12-22 ENCOUNTER — OFFICE VISIT (OUTPATIENT)
Dept: INTERNAL MEDICINE | Facility: CLINIC | Age: 61
End: 2021-12-22

## 2021-12-22 ENCOUNTER — ANESTHESIA (OUTPATIENT)
Dept: PERIOP | Facility: HOSPITAL | Age: 61
End: 2021-12-22

## 2021-12-22 ENCOUNTER — HOSPITAL ENCOUNTER (OUTPATIENT)
Dept: CT IMAGING | Facility: HOSPITAL | Age: 61
Discharge: HOME | End: 2021-12-22

## 2021-12-22 VITALS
HEIGHT: 62 IN | SYSTOLIC BLOOD PRESSURE: 124 MMHG | RESPIRATION RATE: 19 BRPM | TEMPERATURE: 97.4 F | WEIGHT: 193.4 LBS | OXYGEN SATURATION: 98 % | BODY MASS INDEX: 35.59 KG/M2 | DIASTOLIC BLOOD PRESSURE: 70 MMHG | HEART RATE: 78 BPM

## 2021-12-22 DIAGNOSIS — K35.80 ACUTE APPENDICITIS, UNSPECIFIED ACUTE APPENDICITIS TYPE: ICD-10-CM

## 2021-12-22 DIAGNOSIS — R35.0 URINE FREQUENCY: Primary | ICD-10-CM

## 2021-12-22 DIAGNOSIS — K35.30 ACUTE APPENDICITIS WITH LOCALIZED PERITONITIS, UNSPECIFIED WHETHER ABSCESS PRESENT, UNSPECIFIED WHETHER GANGRENE PRESENT, UNSPECIFIED WHETHER PERFORATION PRESENT: Primary | ICD-10-CM

## 2021-12-22 DIAGNOSIS — K37 APPENDICITIS: ICD-10-CM

## 2021-12-22 DIAGNOSIS — R10.31 RLQ ABDOMINAL PAIN: Primary | ICD-10-CM

## 2021-12-22 DIAGNOSIS — R10.31 RLQ ABDOMINAL PAIN: ICD-10-CM

## 2021-12-22 LAB
ALBUMIN SERPL-MCNC: 4 G/DL (ref 3.5–5.2)
ALBUMIN SERPL-MCNC: 4.2 G/DL (ref 3.5–5.2)
ALBUMIN/GLOB SERPL: 1.5 G/DL
ALBUMIN/GLOB SERPL: 1.5 G/DL
ALP SERPL-CCNC: 102 U/L (ref 39–117)
ALP SERPL-CCNC: 96 U/L (ref 39–117)
ALT SERPL W P-5'-P-CCNC: 7 U/L (ref 1–33)
ALT SERPL W P-5'-P-CCNC: 9 U/L (ref 1–33)
ANION GAP SERPL CALCULATED.3IONS-SCNC: 11.7 MMOL/L (ref 5–15)
ANION GAP SERPL CALCULATED.3IONS-SCNC: 13.9 MMOL/L (ref 5–15)
AST SERPL-CCNC: 12 U/L (ref 1–32)
AST SERPL-CCNC: 9 U/L (ref 1–32)
BASOPHILS # BLD AUTO: 0.03 10*3/MM3 (ref 0–0.2)
BASOPHILS # BLD AUTO: 0.06 10*3/MM3 (ref 0–0.2)
BASOPHILS NFR BLD AUTO: 0.3 % (ref 0–1.5)
BASOPHILS NFR BLD AUTO: 0.5 % (ref 0–1.5)
BILIRUB BLD-MCNC: ABNORMAL MG/DL
BILIRUB SERPL-MCNC: 0.8 MG/DL (ref 0–1.2)
BILIRUB SERPL-MCNC: 1 MG/DL (ref 0–1.2)
BUN SERPL-MCNC: 6 MG/DL (ref 6–20)
BUN SERPL-MCNC: 6 MG/DL (ref 6–20)
BUN/CREAT SERPL: 6.8 (ref 7–25)
BUN/CREAT SERPL: 7.4 (ref 7–25)
CALCIUM SPEC-SCNC: 8.7 MG/DL (ref 8.6–10.5)
CALCIUM SPEC-SCNC: 9.4 MG/DL (ref 8.6–10.5)
CHLORIDE SERPL-SCNC: 95 MMOL/L (ref 98–107)
CHLORIDE SERPL-SCNC: 97 MMOL/L (ref 98–107)
CLARITY, POC: ABNORMAL
CO2 SERPL-SCNC: 25.1 MMOL/L (ref 22–29)
CO2 SERPL-SCNC: 26.3 MMOL/L (ref 22–29)
COLOR UR: YELLOW
CREAT SERPL-MCNC: 0.81 MG/DL (ref 0.57–1)
CREAT SERPL-MCNC: 0.88 MG/DL (ref 0.57–1)
DEPRECATED RDW RBC AUTO: 41.1 FL (ref 37–54)
DEPRECATED RDW RBC AUTO: 41.3 FL (ref 37–54)
EOSINOPHIL # BLD AUTO: 0.02 10*3/MM3 (ref 0–0.4)
EOSINOPHIL # BLD AUTO: 0.02 10*3/MM3 (ref 0–0.4)
EOSINOPHIL NFR BLD AUTO: 0.2 % (ref 0.3–6.2)
EOSINOPHIL NFR BLD AUTO: 0.2 % (ref 0.3–6.2)
ERYTHROCYTE [DISTWIDTH] IN BLOOD BY AUTOMATED COUNT: 13.3 % (ref 12.3–15.4)
ERYTHROCYTE [DISTWIDTH] IN BLOOD BY AUTOMATED COUNT: 13.6 % (ref 12.3–15.4)
EXPIRATION DATE: ABNORMAL
GFR SERPL CREATININE-BSD FRML MDRD: 66 ML/MIN/1.73
GFR SERPL CREATININE-BSD FRML MDRD: 72 ML/MIN/1.73
GLOBULIN UR ELPH-MCNC: 2.7 GM/DL
GLOBULIN UR ELPH-MCNC: 2.8 GM/DL
GLUCOSE SERPL-MCNC: 137 MG/DL (ref 65–99)
GLUCOSE SERPL-MCNC: 141 MG/DL (ref 65–99)
GLUCOSE UR STRIP-MCNC: NEGATIVE MG/DL
HCT VFR BLD AUTO: 33.8 % (ref 34–46.6)
HCT VFR BLD AUTO: 35.1 % (ref 34–46.6)
HGB BLD-MCNC: 11.5 G/DL (ref 12–15.9)
HGB BLD-MCNC: 12.2 G/DL (ref 12–15.9)
IMM GRANULOCYTES # BLD AUTO: 0.02 10*3/MM3 (ref 0–0.05)
IMM GRANULOCYTES # BLD AUTO: 0.03 10*3/MM3 (ref 0–0.05)
IMM GRANULOCYTES NFR BLD AUTO: 0.2 % (ref 0–0.5)
IMM GRANULOCYTES NFR BLD AUTO: 0.3 % (ref 0–0.5)
KETONES UR QL: ABNORMAL
LEUKOCYTE EST, POC: ABNORMAL
LYMPHOCYTES # BLD AUTO: 1.76 10*3/MM3 (ref 0.7–3.1)
LYMPHOCYTES # BLD AUTO: 2.1 10*3/MM3 (ref 0.7–3.1)
LYMPHOCYTES NFR BLD AUTO: 14.9 % (ref 19.6–45.3)
LYMPHOCYTES NFR BLD AUTO: 19.7 % (ref 19.6–45.3)
Lab: ABNORMAL
MCH RBC QN AUTO: 29 PG (ref 26.6–33)
MCH RBC QN AUTO: 29.3 PG (ref 26.6–33)
MCHC RBC AUTO-ENTMCNC: 34 G/DL (ref 31.5–35.7)
MCHC RBC AUTO-ENTMCNC: 34.8 G/DL (ref 31.5–35.7)
MCV RBC AUTO: 84.2 FL (ref 79–97)
MCV RBC AUTO: 85.4 FL (ref 79–97)
MONOCYTES # BLD AUTO: 0.63 10*3/MM3 (ref 0.1–0.9)
MONOCYTES # BLD AUTO: 0.67 10*3/MM3 (ref 0.1–0.9)
MONOCYTES NFR BLD AUTO: 5.3 % (ref 5–12)
MONOCYTES NFR BLD AUTO: 6.3 % (ref 5–12)
NEUTROPHILS NFR BLD AUTO: 7.82 10*3/MM3 (ref 1.7–7)
NEUTROPHILS NFR BLD AUTO: 73.3 % (ref 42.7–76)
NEUTROPHILS NFR BLD AUTO: 78.8 % (ref 42.7–76)
NEUTROPHILS NFR BLD AUTO: 9.35 10*3/MM3 (ref 1.7–7)
NITRITE UR-MCNC: NEGATIVE MG/ML
NRBC BLD AUTO-RTO: 0 /100 WBC (ref 0–0.2)
PH UR: 5 [PH] (ref 5–8)
PLATELET # BLD AUTO: 272 10*3/MM3 (ref 140–450)
PLATELET # BLD AUTO: 301 10*3/MM3 (ref 140–450)
PMV BLD AUTO: 9.2 FL (ref 6–12)
PMV BLD AUTO: 9.7 FL (ref 6–12)
POTASSIUM SERPL-SCNC: 3.1 MMOL/L (ref 3.5–5.2)
POTASSIUM SERPL-SCNC: 3.2 MMOL/L (ref 3.5–5.2)
PROT SERPL-MCNC: 6.7 G/DL (ref 6–8.5)
PROT SERPL-MCNC: 7 G/DL (ref 6–8.5)
PROT UR STRIP-MCNC: ABNORMAL MG/DL
QT INTERVAL: 390 MS
RBC # BLD AUTO: 3.96 10*6/MM3 (ref 3.77–5.28)
RBC # BLD AUTO: 4.17 10*6/MM3 (ref 3.77–5.28)
RBC # UR STRIP: ABNORMAL /UL
SARS-COV-2 RNA PNL SPEC NAA+PROBE: NOT DETECTED
SODIUM SERPL-SCNC: 134 MMOL/L (ref 136–145)
SODIUM SERPL-SCNC: 135 MMOL/L (ref 136–145)
SP GR UR: 1.03 (ref 1–1.03)
UROBILINOGEN UR QL: NORMAL
WBC NRBC COR # BLD: 10.66 10*3/MM3 (ref 3.4–10.8)
WBC NRBC COR # BLD: 11.85 10*3/MM3 (ref 3.4–10.8)

## 2021-12-22 PROCEDURE — C9803 HOPD COVID-19 SPEC COLLECT: HCPCS

## 2021-12-22 PROCEDURE — 25010000002 NEOSTIGMINE 10 MG/10ML SOLUTION: Performed by: NURSE ANESTHETIST, CERTIFIED REGISTERED

## 2021-12-22 PROCEDURE — 25010000002 HYDROMORPHONE 1 MG/ML SOLUTION: Performed by: NURSE ANESTHETIST, CERTIFIED REGISTERED

## 2021-12-22 PROCEDURE — 25010000002 PHENYLEPHRINE 10 MG/ML SOLUTION: Performed by: NURSE ANESTHETIST, CERTIFIED REGISTERED

## 2021-12-22 PROCEDURE — 80053 COMPREHEN METABOLIC PANEL: CPT | Performed by: NURSE PRACTITIONER

## 2021-12-22 PROCEDURE — 25010000002 HYDROMORPHONE PER 4 MG: Performed by: EMERGENCY MEDICINE

## 2021-12-22 PROCEDURE — 25010000002 MIDAZOLAM PER 1MG: Performed by: NURSE ANESTHETIST, CERTIFIED REGISTERED

## 2021-12-22 PROCEDURE — 0 DIATRIZOATE MEGLUMINE & SODIUM PER 1 ML: Performed by: NURSE PRACTITIONER

## 2021-12-22 PROCEDURE — 96374 THER/PROPH/DIAG INJ IV PUSH: CPT

## 2021-12-22 PROCEDURE — 25010000002 DEXAMETHASONE PER 1 MG: Performed by: NURSE ANESTHETIST, CERTIFIED REGISTERED

## 2021-12-22 PROCEDURE — 99285 EMERGENCY DEPT VISIT HI MDM: CPT

## 2021-12-22 PROCEDURE — 88304 TISSUE EXAM BY PATHOLOGIST: CPT | Performed by: SURGERY

## 2021-12-22 PROCEDURE — 96375 TX/PRO/DX INJ NEW DRUG ADDON: CPT

## 2021-12-22 PROCEDURE — 85025 COMPLETE CBC W/AUTO DIFF WBC: CPT

## 2021-12-22 PROCEDURE — 25010000002 PIPERACILLIN SOD-TAZOBACTAM PER 1 G: Performed by: SURGERY

## 2021-12-22 PROCEDURE — C1889 IMPLANT/INSERT DEVICE, NOC: HCPCS | Performed by: SURGERY

## 2021-12-22 PROCEDURE — 44970 LAPAROSCOPY APPENDECTOMY: CPT | Performed by: SPECIALIST/TECHNOLOGIST, OTHER

## 2021-12-22 PROCEDURE — G0378 HOSPITAL OBSERVATION PER HR: HCPCS

## 2021-12-22 PROCEDURE — 74177 CT ABD & PELVIS W/CONTRAST: CPT

## 2021-12-22 PROCEDURE — 99284 EMERGENCY DEPT VISIT MOD MDM: CPT

## 2021-12-22 PROCEDURE — 93010 ELECTROCARDIOGRAM REPORT: CPT | Performed by: INTERNAL MEDICINE

## 2021-12-22 PROCEDURE — 44970 LAPAROSCOPY APPENDECTOMY: CPT | Performed by: SURGERY

## 2021-12-22 PROCEDURE — 25010000002 ONDANSETRON PER 1 MG: Performed by: NURSE ANESTHETIST, CERTIFIED REGISTERED

## 2021-12-22 PROCEDURE — 85025 COMPLETE CBC W/AUTO DIFF WBC: CPT | Performed by: NURSE PRACTITIONER

## 2021-12-22 PROCEDURE — 25010000002 MORPHINE PER 10 MG: Performed by: SURGERY

## 2021-12-22 PROCEDURE — 36415 COLL VENOUS BLD VENIPUNCTURE: CPT | Performed by: NURSE PRACTITIONER

## 2021-12-22 PROCEDURE — 99220 PR INITIAL OBSERVATION CARE/DAY 70 MINUTES: CPT | Performed by: SURGERY

## 2021-12-22 PROCEDURE — 93005 ELECTROCARDIOGRAM TRACING: CPT

## 2021-12-22 PROCEDURE — 0 IOPAMIDOL PER 1 ML: Performed by: NURSE PRACTITIONER

## 2021-12-22 PROCEDURE — 80053 COMPREHEN METABOLIC PANEL: CPT

## 2021-12-22 PROCEDURE — 87635 SARS-COV-2 COVID-19 AMP PRB: CPT

## 2021-12-22 PROCEDURE — 25010000002 SUCCINYLCHOLINE PER 20 MG: Performed by: NURSE ANESTHETIST, CERTIFIED REGISTERED

## 2021-12-22 PROCEDURE — 25010000002 PROPOFOL 10 MG/ML EMULSION: Performed by: NURSE ANESTHETIST, CERTIFIED REGISTERED

## 2021-12-22 PROCEDURE — 81003 URINALYSIS AUTO W/O SCOPE: CPT | Performed by: NURSE PRACTITIONER

## 2021-12-22 PROCEDURE — 99213 OFFICE O/P EST LOW 20 MIN: CPT | Performed by: NURSE PRACTITIONER

## 2021-12-22 DEVICE — ENDOPATH ECHELON VASCULAR  RELOADS, WHITE, 35MM
Type: IMPLANTABLE DEVICE | Site: ABDOMEN | Status: FUNCTIONAL
Brand: ECHELON ENDOPATH

## 2021-12-22 DEVICE — LIGAMAX 5 MM ENDOSCOPIC MULTIPLE CLIP APPLIER
Type: IMPLANTABLE DEVICE | Site: ABDOMEN | Status: FUNCTIONAL
Brand: LIGAMAX

## 2021-12-22 DEVICE — FLOSEAL HEMOSTATIC MATRIX, 5ML
Type: IMPLANTABLE DEVICE | Site: ABDOMEN | Status: FUNCTIONAL
Brand: FLOSEAL HEMOSTATIC MATRIX

## 2021-12-22 RX ORDER — SODIUM CHLORIDE, SODIUM LACTATE, POTASSIUM CHLORIDE, CALCIUM CHLORIDE 600; 310; 30; 20 MG/100ML; MG/100ML; MG/100ML; MG/100ML
100 INJECTION, SOLUTION INTRAVENOUS CONTINUOUS
Status: DISCONTINUED | OUTPATIENT
Start: 2021-12-22 | End: 2021-12-23

## 2021-12-22 RX ORDER — OXYCODONE HYDROCHLORIDE AND ACETAMINOPHEN 5; 325 MG/1; MG/1
1 TABLET ORAL EVERY 4 HOURS PRN
Status: DISCONTINUED | OUTPATIENT
Start: 2021-12-22 | End: 2021-12-25 | Stop reason: HOSPADM

## 2021-12-22 RX ORDER — LIDOCAINE HYDROCHLORIDE AND EPINEPHRINE 10; 10 MG/ML; UG/ML
INJECTION, SOLUTION INFILTRATION; PERINEURAL AS NEEDED
Status: DISCONTINUED | OUTPATIENT
Start: 2021-12-22 | End: 2021-12-22 | Stop reason: HOSPADM

## 2021-12-22 RX ORDER — FAMOTIDINE 10 MG/ML
20 INJECTION, SOLUTION INTRAVENOUS
Status: COMPLETED | OUTPATIENT
Start: 2021-12-22 | End: 2021-12-22

## 2021-12-22 RX ORDER — ATORVASTATIN CALCIUM 40 MG/1
40 TABLET, FILM COATED ORAL NIGHTLY
Status: DISCONTINUED | OUTPATIENT
Start: 2021-12-22 | End: 2021-12-25 | Stop reason: HOSPADM

## 2021-12-22 RX ORDER — PANTOPRAZOLE SODIUM 40 MG/1
40 TABLET, DELAYED RELEASE ORAL EVERY MORNING
Refills: 3 | Status: DISCONTINUED | OUTPATIENT
Start: 2021-12-23 | End: 2021-12-25 | Stop reason: HOSPADM

## 2021-12-22 RX ORDER — SODIUM CHLORIDE 0.9 % (FLUSH) 0.9 %
10 SYRINGE (ML) INJECTION AS NEEDED
Status: DISCONTINUED | OUTPATIENT
Start: 2021-12-22 | End: 2021-12-25 | Stop reason: HOSPADM

## 2021-12-22 RX ORDER — OXYCODONE AND ACETAMINOPHEN 7.5; 325 MG/1; MG/1
1 TABLET ORAL ONCE AS NEEDED
Status: DISCONTINUED | OUTPATIENT
Start: 2021-12-22 | End: 2021-12-22 | Stop reason: HOSPADM

## 2021-12-22 RX ORDER — OXYCODONE HYDROCHLORIDE AND ACETAMINOPHEN 5; 325 MG/1; MG/1
1 TABLET ORAL ONCE AS NEEDED
Status: COMPLETED | OUTPATIENT
Start: 2021-12-22 | End: 2021-12-22

## 2021-12-22 RX ORDER — MAGNESIUM HYDROXIDE 1200 MG/15ML
LIQUID ORAL AS NEEDED
Status: DISCONTINUED | OUTPATIENT
Start: 2021-12-22 | End: 2021-12-22 | Stop reason: HOSPADM

## 2021-12-22 RX ORDER — SODIUM CHLORIDE 9 MG/ML
40 INJECTION, SOLUTION INTRAVENOUS AS NEEDED
Status: DISCONTINUED | OUTPATIENT
Start: 2021-12-22 | End: 2021-12-22 | Stop reason: HOSPADM

## 2021-12-22 RX ORDER — ASPIRIN 81 MG/1
81 TABLET ORAL DAILY
Status: DISCONTINUED | OUTPATIENT
Start: 2021-12-22 | End: 2021-12-25 | Stop reason: HOSPADM

## 2021-12-22 RX ORDER — ONDANSETRON 2 MG/ML
4 INJECTION INTRAMUSCULAR; INTRAVENOUS ONCE AS NEEDED
Status: DISCONTINUED | OUTPATIENT
Start: 2021-12-22 | End: 2021-12-22 | Stop reason: HOSPADM

## 2021-12-22 RX ORDER — MORPHINE SULFATE 2 MG/ML
2 INJECTION, SOLUTION INTRAMUSCULAR; INTRAVENOUS
Status: DISCONTINUED | OUTPATIENT
Start: 2021-12-22 | End: 2021-12-23

## 2021-12-22 RX ORDER — SUCCINYLCHOLINE CHLORIDE 20 MG/ML
INJECTION INTRAMUSCULAR; INTRAVENOUS AS NEEDED
Status: DISCONTINUED | OUTPATIENT
Start: 2021-12-22 | End: 2021-12-22 | Stop reason: SURG

## 2021-12-22 RX ORDER — ONDANSETRON 2 MG/ML
4 INJECTION INTRAMUSCULAR; INTRAVENOUS ONCE AS NEEDED
Status: COMPLETED | OUTPATIENT
Start: 2021-12-22 | End: 2021-12-22

## 2021-12-22 RX ORDER — SODIUM CHLORIDE 0.9 % (FLUSH) 0.9 %
10 SYRINGE (ML) INJECTION AS NEEDED
Status: DISCONTINUED | OUTPATIENT
Start: 2021-12-22 | End: 2021-12-22 | Stop reason: HOSPADM

## 2021-12-22 RX ORDER — EPHEDRINE SULFATE 50 MG/ML
INJECTION, SOLUTION INTRAVENOUS AS NEEDED
Status: DISCONTINUED | OUTPATIENT
Start: 2021-12-22 | End: 2021-12-22 | Stop reason: SURG

## 2021-12-22 RX ORDER — ONDANSETRON 2 MG/ML
4 INJECTION INTRAMUSCULAR; INTRAVENOUS EVERY 4 HOURS PRN
Status: DISCONTINUED | OUTPATIENT
Start: 2021-12-22 | End: 2021-12-25 | Stop reason: HOSPADM

## 2021-12-22 RX ORDER — AMLODIPINE BESYLATE 5 MG/1
5 TABLET ORAL DAILY
Status: DISCONTINUED | OUTPATIENT
Start: 2021-12-22 | End: 2021-12-23

## 2021-12-22 RX ORDER — MORPHINE SULFATE 10 MG/ML
4 INJECTION INTRAMUSCULAR; INTRAVENOUS; SUBCUTANEOUS
Status: DISCONTINUED | OUTPATIENT
Start: 2021-12-22 | End: 2021-12-23

## 2021-12-22 RX ORDER — SODIUM CHLORIDE, SODIUM LACTATE, POTASSIUM CHLORIDE, CALCIUM CHLORIDE 600; 310; 30; 20 MG/100ML; MG/100ML; MG/100ML; MG/100ML
9 INJECTION, SOLUTION INTRAVENOUS CONTINUOUS
Status: DISCONTINUED | OUTPATIENT
Start: 2021-12-22 | End: 2021-12-22

## 2021-12-22 RX ORDER — METOPROLOL SUCCINATE 50 MG/1
50 TABLET, EXTENDED RELEASE ORAL DAILY
Status: DISCONTINUED | OUTPATIENT
Start: 2021-12-22 | End: 2021-12-25 | Stop reason: HOSPADM

## 2021-12-22 RX ORDER — LIDOCAINE HYDROCHLORIDE 10 MG/ML
0.5 INJECTION, SOLUTION EPIDURAL; INFILTRATION; INTRACAUDAL; PERINEURAL ONCE AS NEEDED
Status: DISCONTINUED | OUTPATIENT
Start: 2021-12-22 | End: 2021-12-22 | Stop reason: HOSPADM

## 2021-12-22 RX ORDER — PROPOFOL 10 MG/ML
VIAL (ML) INTRAVENOUS AS NEEDED
Status: DISCONTINUED | OUTPATIENT
Start: 2021-12-22 | End: 2021-12-22 | Stop reason: SURG

## 2021-12-22 RX ORDER — DEXAMETHASONE SODIUM PHOSPHATE 4 MG/ML
8 INJECTION, SOLUTION INTRA-ARTICULAR; INTRALESIONAL; INTRAMUSCULAR; INTRAVENOUS; SOFT TISSUE ONCE
Status: COMPLETED | OUTPATIENT
Start: 2021-12-22 | End: 2021-12-22

## 2021-12-22 RX ORDER — ROCURONIUM BROMIDE 10 MG/ML
INJECTION, SOLUTION INTRAVENOUS AS NEEDED
Status: DISCONTINUED | OUTPATIENT
Start: 2021-12-22 | End: 2021-12-22 | Stop reason: SURG

## 2021-12-22 RX ORDER — LIDOCAINE HYDROCHLORIDE 20 MG/ML
INJECTION, SOLUTION INFILTRATION; PERINEURAL AS NEEDED
Status: DISCONTINUED | OUTPATIENT
Start: 2021-12-22 | End: 2021-12-22 | Stop reason: SURG

## 2021-12-22 RX ORDER — DEXMEDETOMIDINE HYDROCHLORIDE 100 UG/ML
INJECTION, SOLUTION INTRAVENOUS AS NEEDED
Status: DISCONTINUED | OUTPATIENT
Start: 2021-12-22 | End: 2021-12-22 | Stop reason: SURG

## 2021-12-22 RX ORDER — SODIUM CHLORIDE 0.9 % (FLUSH) 0.9 %
10 SYRINGE (ML) INJECTION EVERY 12 HOURS SCHEDULED
Status: DISCONTINUED | OUTPATIENT
Start: 2021-12-22 | End: 2021-12-22 | Stop reason: HOSPADM

## 2021-12-22 RX ORDER — HYDROMORPHONE HCL 110MG/55ML
0.5 PATIENT CONTROLLED ANALGESIA SYRINGE INTRAVENOUS ONCE
Status: COMPLETED | OUTPATIENT
Start: 2021-12-22 | End: 2021-12-22

## 2021-12-22 RX ORDER — KETAMINE HYDROCHLORIDE 10 MG/ML
INJECTION INTRAMUSCULAR; INTRAVENOUS AS NEEDED
Status: DISCONTINUED | OUTPATIENT
Start: 2021-12-22 | End: 2021-12-22 | Stop reason: SURG

## 2021-12-22 RX ORDER — GLYCOPYRROLATE 0.2 MG/ML
INJECTION INTRAMUSCULAR; INTRAVENOUS AS NEEDED
Status: DISCONTINUED | OUTPATIENT
Start: 2021-12-22 | End: 2021-12-22 | Stop reason: SURG

## 2021-12-22 RX ORDER — MIDAZOLAM HYDROCHLORIDE 2 MG/2ML
1 INJECTION, SOLUTION INTRAMUSCULAR; INTRAVENOUS
Status: DISCONTINUED | OUTPATIENT
Start: 2021-12-22 | End: 2021-12-22 | Stop reason: HOSPADM

## 2021-12-22 RX ORDER — LEVOTHYROXINE SODIUM 0.07 MG/1
75 TABLET ORAL
Status: DISCONTINUED | OUTPATIENT
Start: 2021-12-23 | End: 2021-12-25 | Stop reason: HOSPADM

## 2021-12-22 RX ORDER — NEOSTIGMINE METHYLSULFATE 1 MG/ML
INJECTION, SOLUTION INTRAVENOUS AS NEEDED
Status: DISCONTINUED | OUTPATIENT
Start: 2021-12-22 | End: 2021-12-22 | Stop reason: SURG

## 2021-12-22 RX ORDER — PHENYLEPHRINE HYDROCHLORIDE 10 MG/ML
INJECTION INTRAVENOUS AS NEEDED
Status: DISCONTINUED | OUTPATIENT
Start: 2021-12-22 | End: 2021-12-22 | Stop reason: SURG

## 2021-12-22 RX ORDER — POTASSIUM CHLORIDE 20 MEQ/1
20 TABLET, EXTENDED RELEASE ORAL 2 TIMES DAILY
Status: DISCONTINUED | OUTPATIENT
Start: 2021-12-22 | End: 2021-12-23

## 2021-12-22 RX ADMIN — KETAMINE HYDROCHLORIDE 20 MG: 10 INJECTION, SOLUTION INTRAMUSCULAR; INTRAVENOUS at 17:47

## 2021-12-22 RX ADMIN — DEXMEDETOMIDINE 4 MCG: 100 INJECTION, SOLUTION, CONCENTRATE INTRAVENOUS at 18:21

## 2021-12-22 RX ADMIN — NEOSTIGMINE METHYLSULFATE 4 MG: 1 INJECTION INTRAVENOUS at 19:09

## 2021-12-22 RX ADMIN — MIDAZOLAM HYDROCHLORIDE 1 MG: 1 INJECTION, SOLUTION INTRAMUSCULAR; INTRAVENOUS at 17:32

## 2021-12-22 RX ADMIN — HYDROMORPHONE HYDROCHLORIDE 0.5 MG: 2 INJECTION, SOLUTION INTRAMUSCULAR; INTRAVENOUS; SUBCUTANEOUS at 16:22

## 2021-12-22 RX ADMIN — SODIUM CHLORIDE, POTASSIUM CHLORIDE, SODIUM LACTATE AND CALCIUM CHLORIDE: 600; 310; 30; 20 INJECTION, SOLUTION INTRAVENOUS at 17:18

## 2021-12-22 RX ADMIN — IOPAMIDOL 100 ML: 755 INJECTION, SOLUTION INTRAVENOUS at 15:16

## 2021-12-22 RX ADMIN — LIDOCAINE HYDROCHLORIDE 100 MG: 20 INJECTION, SOLUTION INFILTRATION; PERINEURAL at 17:47

## 2021-12-22 RX ADMIN — DEXMEDETOMIDINE 4 MCG: 100 INJECTION, SOLUTION, CONCENTRATE INTRAVENOUS at 17:46

## 2021-12-22 RX ADMIN — PHENYLEPHRINE HYDROCHLORIDE 100 MCG: 10 INJECTION INTRAVENOUS at 17:57

## 2021-12-22 RX ADMIN — DIATRIZOATE MEGLUMINE AND DIATRIZOATE SODIUM 30 ML: 600; 100 SOLUTION ORAL; RECTAL at 13:35

## 2021-12-22 RX ADMIN — PIPERACILLIN AND TAZOBACTAM 4.5 G: 4; .5 INJECTION, POWDER, LYOPHILIZED, FOR SOLUTION INTRAVENOUS; PARENTERAL at 17:00

## 2021-12-22 RX ADMIN — DEXAMETHASONE SODIUM PHOSPHATE 8 MG: 4 INJECTION, SOLUTION INTRAMUSCULAR; INTRAVENOUS at 17:32

## 2021-12-22 RX ADMIN — KETAMINE HYDROCHLORIDE 10 MG: 10 INJECTION, SOLUTION INTRAMUSCULAR; INTRAVENOUS at 18:21

## 2021-12-22 RX ADMIN — SUCCINYLCHOLINE CHLORIDE 80 MG: 20 INJECTION, SOLUTION INTRAMUSCULAR; INTRAVENOUS at 17:47

## 2021-12-22 RX ADMIN — HYDROMORPHONE HYDROCHLORIDE 0.5 MG: 1 INJECTION, SOLUTION INTRAMUSCULAR; INTRAVENOUS; SUBCUTANEOUS at 19:48

## 2021-12-22 RX ADMIN — ROCURONIUM BROMIDE 30 MG: 10 INJECTION INTRAVENOUS at 17:49

## 2021-12-22 RX ADMIN — GLYCOPYRROLATE 0.4 MG: 0.2 INJECTION INTRAMUSCULAR; INTRAVENOUS at 19:09

## 2021-12-22 RX ADMIN — ONDANSETRON 4 MG: 2 INJECTION INTRAMUSCULAR; INTRAVENOUS at 17:32

## 2021-12-22 RX ADMIN — MORPHINE SULFATE 2 MG: 2 INJECTION, SOLUTION INTRAMUSCULAR; INTRAVENOUS at 21:53

## 2021-12-22 RX ADMIN — EPHEDRINE SULFATE 10 MG: 50 INJECTION, SOLUTION INTRAVENOUS at 17:57

## 2021-12-22 RX ADMIN — OXYCODONE HYDROCHLORIDE AND ACETAMINOPHEN 1 TABLET: 5; 325 TABLET ORAL at 20:12

## 2021-12-22 RX ADMIN — SODIUM CHLORIDE, POTASSIUM CHLORIDE, SODIUM LACTATE AND CALCIUM CHLORIDE 100 ML/HR: 600; 310; 30; 20 INJECTION, SOLUTION INTRAVENOUS at 21:33

## 2021-12-22 RX ADMIN — HYDROMORPHONE HYDROCHLORIDE 0.5 MG: 1 INJECTION, SOLUTION INTRAMUSCULAR; INTRAVENOUS; SUBCUTANEOUS at 20:00

## 2021-12-22 RX ADMIN — FAMOTIDINE 20 MG: 10 INJECTION, SOLUTION INTRAVENOUS at 17:32

## 2021-12-22 RX ADMIN — PROPOFOL 120 MG: 10 INJECTION, EMULSION INTRAVENOUS at 17:47

## 2021-12-22 NOTE — ANESTHESIA PREPROCEDURE EVALUATION
Anesthesia Evaluation     Patient summary reviewed and Nursing notes reviewed   NPO Solid Status: > 8 hours  NPO Liquid Status: > 2 hours           Airway   Mallampati: II  TM distance: >3 FB  Neck ROM: full  No difficulty expected  Dental - normal exam     Pulmonary     breath sounds clear to auscultation  (+) a smoker ( tim since 2018) Current Smoked day of surgery, sleep apnea ( non compliant with cpap),   Cardiovascular   Exercise tolerance: poor (<4 METS)    ECG reviewed  Rhythm: regular  Rate: normal    (+) hypertension, CAD, cardiac stents ( 4/4/2019 failed stress test. stent placed) within the past 12 months hyperlipidemia,       Neuro/Psych  (+) tremors ( functional neurologic disorder), psychiatric history Depression,     GI/Hepatic/Renal/Endo    (+) obesity,  GERD,  thyroid problem hypothyroidism    Musculoskeletal     (+) chronic pain, gait problem, myalgias, neck pain, neck stiffness,   Abdominal    Substance History      OB/GYN          Other   arthritis ( rheumatoid arthritis ),      ROS/Med Hx Other: Sips coke at 1530                    Anesthesia Plan    ASA 3 - emergent     general     intravenous induction     Anesthetic plan, all risks, benefits, and alternatives have been provided, discussed and informed consent has been obtained with: patient.  Use of blood products discussed with patient  Consented to blood products.   Plan discussed with CRNA.

## 2021-12-22 NOTE — ED PROVIDER NOTES
EMERGENCY DEPARTMENT ENCOUNTER      Room Number: [facility] OR/MAIN OR    History is provided by the patient, no translation services needed    HPI:    Chief complaint: Abdominal pain    Location: Right lower quadrant    Quality/Severity: Patient describes the pain as a dull pain.  She rates the pain 5 out of 10.  Patient advises that when her abdomen is pressed on that the pain becomes a sharp pain that she rates a 10 out of 10.    Timing/Duration: 4 days    Modifying Factors: None    Associated Symptoms: None    Narrative: Pt is a 61 y.o. female who presents complaining of right lower quadrant abdominal pain x4 days.  Patient describes the pain as a dull pain.  She rates the pain a 5 out of 10.  Patient advises that when her abdomen is pressed on it the pain becomes a sharp pain that she rates a 10 out of 10.  Patient denies any nausea or vomiting.  She states that she thought she was constipated when the pain began so she took several laxatives and had an episode of diarrhea.  Patient denies any chest pain, shortness of breath, headaches, dizziness.  Patient advises that she had a CT scan performed earlier this date that showed acute appendicitis.      PMD: Laura Ayala APRN    REVIEW OF SYSTEMS  Review of Systems   Constitutional: Negative for appetite change, chills and fever.   HENT: Negative for congestion and rhinorrhea.    Eyes: Negative for pain and visual disturbance.   Respiratory: Negative for cough, chest tightness and shortness of breath.    Cardiovascular: Negative for chest pain and leg swelling.   Gastrointestinal: Positive for abdominal pain and diarrhea. Negative for constipation, nausea and vomiting.   Genitourinary: Negative for difficulty urinating, dysuria and flank pain.   Musculoskeletal: Negative for arthralgias and myalgias.   Skin: Negative for color change, pallor, rash and wound.   Neurological: Negative for dizziness, syncope, weakness and headaches.   Psychiatric/Behavioral: Negative for  suicidal ideas. The patient is not nervous/anxious.          PAST MEDICAL HISTORY  Active Ambulatory Problems     Diagnosis Date Noted   • Abnormal radiographic examination 01/28/2016   • Disc disorder of thoracic region 01/28/2016   • Gastroesophageal reflux disease 01/28/2016   • Chronic gastritis 01/28/2016   • Hyperlipidemia LDL goal <70 01/28/2016   • Menopausal symptom 01/28/2016   • Migraine 01/28/2016   • Osteoarthritis of shoulder 01/28/2016   • Osteopenia 01/28/2016   • Rheumatoid arthritis (HCC) 01/28/2016   • Multiple pulmonary nodules determined by computed tomography of lung 09/15/2016   • H/O hyperparathyroidism 09/15/2016   • Tendinitis of left shoulder 11/22/2016   • Hypokalemia 06/19/2017   • Anxiety related tremor 04/10/2018   • Difficulty walking 11/11/2018   • Benign essential HTN 11/29/2018   • Chronic right-sided thoracic back pain 01/08/2019   • BPPV (benign paroxysmal positional vertigo) 02/14/2019   • Dystonia 02/14/2019   • Functional neurological symptom disorder with abnormal movement 02/25/2019   • Coronary artery disease involving native heart without angina pectoris 03/06/2019   • Astasia-abasia 06/15/2018   • Other spondylosis with myelopathy, thoracic region 03/08/2019   • Personal history of immunosupression therapy 03/08/2019   • Functional movement disorder 06/15/2018   • Rheumatoid arthritis of multiple sites without organ or system involvement with positive rheumatoid factor (HCC) 03/08/2019   • S/P drug eluting coronary stent placement 03/13/2019   • Depression due to physical illness 06/04/2019   • Irritable bowel syndrome with diarrhea 09/26/2019   • Body mass index (BMI) 35.0-35.9, adult 12/09/2019   • Pain in left ankle and joints of left foot 01/13/2020   • Pain in right ankle and joints of right foot 01/13/2020   • Pain of both shoulder joints 01/13/2020   • Rheumatoid arthritis with rheumatoid factor of multiple sites without organ or systems involvement (HCC)  01/07/2020   • Pain in left shoulder 01/13/2020   • Diarrhea 01/21/2020   • Palpitations 04/09/2020   • Iron deficiency anemia 03/31/2021   • Hypothyroidism 03/31/2021   • Seropositive rheumatoid arthritis (HCC) 07/13/2018   • Lainez's esophagus 06/17/2021   • Absolute anemia 07/22/2021   • RLQ abdominal pain 12/22/2021     Resolved Ambulatory Problems     Diagnosis Date Noted   • Epigastric pain 01/28/2016   • Abnormal electrocardiogram 01/28/2016   • Hypertension 01/28/2016   • Shoulder pain 01/28/2016   • Idiopathic acute pancreatitis 06/09/2016   • Serum calcium elevated 06/09/2016   • Menopausal state 07/29/2016   • Superficial incisional infection of surgical site 09/15/2016   • Healthcare maintenance 05/18/2017   • Functional movement disorder 08/06/2018   • Chest pain of unknown etiology 02/14/2019   • Abnormal cardiovascular stress test 02/25/2019   • Abnormal stress test 02/26/2019   • Other long term (current) drug therapy 03/08/2019   • Tremor 06/15/2018   • Embedded tick of right lower leg 07/22/2019   • Body mass index (bmi) 34.0-34.9, adult 04/10/2019   • RUQ abdominal pain 01/21/2020   • Abdominal pain 02/25/2020     Past Medical History:   Diagnosis Date   • Anemia May 2021   • Anxiety    • Arthritis 5/1/2014   • Lainez esophagus    • Benign paroxysmal positional vertigo due to bilateral vestibular disorder    • Chest pain    • Cholelithiasis 2007   • Colon polyp    • Coronary artery disease 2/26/19   • Depression 6/12/19   • Disease of thyroid gland    • Esophageal reflux    • Fibromyositis    • H/O colonoscopy    • H/O mammogram 08/31/2011   • History of colonoscopy 03/2020   • Hx of bone density study 08/31/2011   • Hyperlipidemia    • Hyperparathyroidism (HCC)    • Low back pain    • Menopause    • Ovarian cyst 2000   • Pancreatitis    • Pancreatitis    • Scoliosis 2/16/13       PAST SURGICAL HISTORY  Past Surgical History:   Procedure Laterality Date   • CARDIAC CATHETERIZATION N/A 3/4/2019     Procedure: Coronary angiography;  Surgeon: Jackelyn Dumas MD;  Location:  [facility];  Service: Cardiovascular   • CARDIAC CATHETERIZATION N/A 3/4/2019    Procedure: Left heart cath;  Surgeon: Jackelyn Dumas MD;  Location:  [facility];  Service: Cardiovascular   • CARDIAC CATHETERIZATION N/A 3/4/2019    Procedure: Left ventriculography;  Surgeon: Jackelyn Dumas MD;  Location:  [facility];  Service: Cardiovascular   • CARDIAC CATHETERIZATION N/A 3/4/2019    Procedure: Stent KARLY coronary;  Surgeon: Jackelyn Dumas MD;  Location:  [facility];  Service: Cardiovascular   • CHOLECYSTECTOMY     • COLONOSCOPY  2014   • COLONOSCOPY N/A 3/6/2020    Procedure: COLONOSCOPY, biopsy, polypectomy;  Surgeon: Rain Kirk MD;  Location:  [facility];  Service: Gastroenterology;  Laterality: N/A;  Random biopsies; Rectal polyp x 3; Hemorrhoids; Diverticulosis   • DILATATION AND CURETTAGE     • HYSTERECTOMY     • LAPAROSCOPIC CHOLECYSTECTOMY  2005   • MOUTH SURGERY      TOOTH EXTRACTION   • OTHER SURGICAL HISTORY  03/27/2015    DIAGNOSTIC ESOPHAGOSCOPY TRANSNASAL FLEXIBLE WITH BIOPSY; ARZATE ESO. REPEAT EGD 2 YR    • OVARIAN CYST SURGERY  2000    L ovary removed   • OVARY SURGERY Left     NORMAL    • PAP SMEAR  08/23/2010    NORMAL    • PARATHYROIDECTOMY Right 08/17/2016    Dr. Muchael Flynn at [place]   • SUBTOTAL HYSTERECTOMY      Left ovary removed9   • WISDOM TOOTH EXTRACTION  1984       FAMILY HISTORY  Family History   Problem Relation Age of Onset   • Diabetes Mother    • Hyperlipidemia Mother    • Hypertension Mother    • Heart disease Mother    • Kidney disease Mother    • Cancer Mother    • Heart attack Mother    • Multiple myeloma Mother    • Melanoma Mother         [place] water contamination   • Coronary artery disease Mother    • Arthritis Father    • Anxiety disorder Father    • COPD Father    • Glaucoma Father    • Hyperlipidemia Father     • Hypertension Father    • Vision loss Father    • Heart disease Father    • Heart attack Father    • Heart disease Sister    • Breast cancer Neg Hx    • Colon cancer Neg Hx    • Colon polyps Neg Hx        SOCIAL HISTORY  Social History     Socioeconomic History   • Marital status:    Tobacco Use   • Smoking status: Current Every Day Smoker     Packs/day: 0.50     Years: 30.00     Pack years: 15.00     Types: Electronic Cigarette, Cigarettes     Last attempt to quit: 8/12/2018     Years since quitting: 3.3   • Smokeless tobacco: Current User   • Tobacco comment: 8/12/18 switch to e-cig   Substance and Sexual Activity   • Alcohol use: No   • Drug use: No   • Sexual activity: Never       ALLERGIES  Meloxicam and Effexor xr [venlafaxine hcl er]      Current Facility-Administered Medications:   •  [COMPLETED] Insert peripheral IV, , , Once **AND** [MAR Hold] sodium chloride 0.9 % flush 10 mL, 10 mL, Intravenous, PRN, Ida Bridges PA-C    PHYSICAL EXAM  ED Triage Vitals [12/22/21 1558]   Temp Heart Rate Resp BP SpO2   98.4 °F (36.9 °C) 77 16 136/83 99 %      Temp src Heart Rate Source Patient Position BP Location FiO2 (%)   Oral Monitor -- -- --       Physical Exam  Vitals and nursing note reviewed.   Constitutional:       General: She is not in acute distress.     Appearance: She is well-developed. She is not ill-appearing, toxic-appearing or diaphoretic.   HENT:      Head: Normocephalic and atraumatic.   Eyes:      Conjunctiva/sclera: Conjunctivae normal.   Cardiovascular:      Rate and Rhythm: Normal rate and regular rhythm.      Heart sounds: Normal heart sounds.   Pulmonary:      Effort: Pulmonary effort is normal. No respiratory distress.      Breath sounds: Normal breath sounds. No wheezing, rhonchi or rales.   Abdominal:      General: Bowel sounds are normal. There is no distension.      Palpations: Abdomen is soft.      Tenderness: There is abdominal tenderness in the right lower quadrant. There is  guarding and rebound. Negative signs include Rovsing's sign.   Musculoskeletal:         General: Normal range of motion.      Cervical back: Normal range of motion and neck supple.   Skin:     General: Skin is warm and dry.      Coloration: Skin is not cyanotic, jaundiced, mottled or pale.      Findings: No erythema or rash.   Neurological:      Mental Status: She is alert and oriented to person, place, and time.   Psychiatric:         Mood and Affect: Mood and affect normal.           LAB RESULTS  Lab Results (last 24 hours)     Procedure Component Value Units Date/Time    POCT urinalysis dipstick, automated [[id number]]  (Abnormal) Collected: 12/22/21 1256    Specimen: Urine Updated: 12/22/21 1256     Color Yellow     Clarity, UA Turbid     Specific Gravity  1.030     pH, Urine 5.0     Leukocytes Small (1+)     Nitrite, UA Negative     Protein, POC Trace mg/dL      Glucose, UA Negative mg/dL      Ketones, UA Trace     Urobilinogen, UA Normal     Bilirubin Small (1+)     Blood, UA Large     Lot Number [id number]     Expiration Date 10/31/2022    CBC w AUTO Differential [[id number]]  (Abnormal) Collected: 12/22/21 1339    Specimen: Blood Updated: 12/22/21 1617    Narrative:      The following orders were created for panel order CBC w AUTO Differential.  Procedure                               Abnormality         Status                     ---------                               -----------         ------                     CBC Auto Differential[[id number]]        Abnormal            Final result                 Please view results for these tests on the individual orders.    Comprehensive metabolic panel [[id number]]  (Abnormal) Collected: 12/22/21 1339    Specimen: Blood Updated: 12/22/21 1650     Glucose 141 mg/dL      BUN 6 mg/dL      Creatinine 0.88 mg/dL      Sodium 135 mmol/L      Potassium 3.2 mmol/L      Chloride 97 mmol/L      CO2 26.3 mmol/L      Calcium 9.4 mg/dL      Total Protein 7.0 g/dL      Albumin 4.20  g/dL      ALT (SGPT) 9 U/L      AST (SGOT) 9 U/L      Alkaline Phosphatase 102 U/L      Total Bilirubin 0.8 mg/dL      eGFR Non African Amer 66 mL/min/1.73      Globulin 2.8 gm/dL      A/G Ratio 1.5 g/dL      BUN/Creatinine Ratio 6.8     Anion Gap 11.7 mmol/L     Narrative:      GFR Normal >60  Chronic Kidney Disease <60  Kidney Failure <15      CBC Auto Differential [[id number]]  (Abnormal) Collected: 12/22/21 1339    Specimen: Blood Updated: 12/22/21 1617     WBC 11.85 10*3/mm3      RBC 4.17 10*6/mm3      Hemoglobin 12.2 g/dL      Hematocrit 35.1 %      MCV 84.2 fL      MCH 29.3 pg      MCHC 34.8 g/dL      RDW 13.6 %      RDW-SD 41.3 fl      MPV 9.7 fL      Platelets 301 10*3/mm3      Neutrophil % 78.8 %      Lymphocyte % 14.9 %      Monocyte % 5.3 %      Eosinophil % 0.2 %      Basophil % 0.5 %      Immature Grans % 0.3 %      Neutrophils, Absolute 9.35 10*3/mm3      Lymphocytes, Absolute 1.76 10*3/mm3      Monocytes, Absolute 0.63 10*3/mm3      Eosinophils, Absolute 0.02 10*3/mm3      Basophils, Absolute 0.06 10*3/mm3      Immature Grans, Absolute 0.03 10*3/mm3      nRBC 0.0 /100 WBC     CBC & Differential [[id number]]  (Abnormal) Collected: 12/22/21 1614    Specimen: Blood Updated: 12/22/21 1630    Narrative:      The following orders were created for panel order CBC & Differential.  Procedure                               Abnormality         Status                     ---------                               -----------         ------                     CBC Auto Differential[[id number]]        Abnormal            Final result                 Please view results for these tests on the individual orders.    Comprehensive Metabolic Panel [[id number]]  (Abnormal) Collected: 12/22/21 1614    Specimen: Blood Updated: 12/22/21 1649     Glucose 137 mg/dL      BUN 6 mg/dL      Creatinine 0.81 mg/dL      Sodium 134 mmol/L      Potassium 3.1 mmol/L      Chloride 95 mmol/L      CO2 25.1 mmol/L      Calcium 8.7 mg/dL       Total Protein 6.7 g/dL      Albumin 4.00 g/dL      ALT (SGPT) 7 U/L      AST (SGOT) 12 U/L      Alkaline Phosphatase 96 U/L      Total Bilirubin 1.0 mg/dL      eGFR Non African Amer 72 mL/min/1.73      Globulin 2.7 gm/dL      A/G Ratio 1.5 g/dL      BUN/Creatinine Ratio 7.4     Anion Gap 13.9 mmol/L     Narrative:      GFR Normal >60  Chronic Kidney Disease <60  Kidney Failure <15      COVID-19,[facility] IN-HOUSE,Nasal Swab No Transport Media 3-4 HR TAT - Swab, Nasal Cavity [[id number]]  (Normal) Collected: 12/22/21 1614    Specimen: Swab from Nasal Cavity Updated: 12/22/21 1700     COVID19 Not Detected    Narrative:      Fact sheet for providers: https://www.fda.gov/media/545180/download     Fact sheet for patients: https://www.[employer] [place].gov/media/574903/download    Test performed by PCR.    Consider negative results in combination with clinical observations, patient history, and epidemiological information.    CBC Auto Differential [[id number]]  (Abnormal) Collected: 12/22/21 1614    Specimen: Blood Updated: 12/22/21 1630     WBC 10.66 10*3/mm3      RBC 3.96 10*6/mm3      Hemoglobin 11.5 g/dL      Hematocrit 33.8 %      MCV 85.4 fL      MCH 29.0 pg      MCHC 34.0 g/dL      RDW 13.3 %      RDW-SD 41.1 fl      MPV 9.2 fL      Platelets 272 10*3/mm3      Neutrophil % 73.3 %      Lymphocyte % 19.7 %      Monocyte % 6.3 %      Eosinophil % 0.2 %      Basophil % 0.3 %      Immature Grans % 0.2 %      Neutrophils, Absolute 7.82 10*3/mm3      Lymphocytes, Absolute 2.10 10*3/mm3      Monocytes, Absolute 0.67 10*3/mm3      Eosinophils, Absolute 0.02 10*3/mm3      Basophils, Absolute 0.03 10*3/mm3      Immature Grans, Absolute 0.02 10*3/mm3             I ordered the above labs and reviewed the results    RADIOLOGY  CT Abdomen Pelvis With Contrast    Result Date: 12/22/2021  CT Abdomen Pelvis W INDICATION: Right-sided abdominal pain for 5 days. TECHNIQUE: CT of the abdomen and pelvis with Isovue-370-100 cc IV contrast. Coronal and  sagittal reconstructions were obtained.  Radiation dose reduction techniques included automated exposure control or exposure modulation based on body size. Count of known CT and cardiac nuc med studies performed in previous 12 months: 0. COMPARISON: CT abdomen and pelvis 1/27/2020 FINDINGS: Abdomen: Included portions of the lung bases are clear. No effusions. The liver, spleen, pancreas and bile ducts are normal. There are clips consistent with prior cholecystectomy. Adrenal glands and kidneys are normal. There is mild atherosclerotic change of the abdominal aorta without aneurysm. Stomach and small bowel are normal The appendix is clearly enlarged and abnormal measuring up to 9 mm. There is fluid distending the distal tip with moderate surrounding groundglass change. Findings are most consistent with acute appendicitis without definite perforation. Surgical consultation is recommended. Colon is unremarkable. Pelvis: The bladder, uterus and ovaries are normal. No dependent free fluid in the pelvis.     Enlarged appendix with surrounding moderate groundglass changes consistent with acute appendicitis without definite perforation. Surgical consultation recommended. NOTIFICATION: Critical Value/emergent results were called by telephone at the time of interpretation on 12/22/2021 3:27 PM to RYANN FRENCH MD who verbally acknowledged these results. The patient was held in radiology. I spoke by telephone with the patient regarding the findings at 1532 hours and requested that she check in the emergency Department for further management of acute appendicitis. Signer Name: Whitley Padilla MD  Signed: 12/22/2021 3:33 PM  Workstation Name: [id number]  [facility]      I ordered the above radiologic testing and reviewed the results    PROCEDURES  Procedures      PROGRESS AND CONSULTS  ED Course as of 12/22/21 1715   Wed Dec 22, 2021   1621 EKG         EKG time / Interpretation time:  1618/1619  Rhythm/Rate: Sinus, 68   NH: 194  QRS, axis: -35  QTc 415  ST and T waves: No acute ST segment changes or T wave abnormalities.  EKG Tracing Interpreted Contemporaneously by me, independently viewed by me and MD.  Unchanged compared to prior 10/14/2021.   [AH]   1626 CT is able to be accessed. Called Dr. Godoy. He requested an EKG prior to surgery based on pt age.  []      ED Course User Index  [] Ida Bridges PA-C           MEDICAL DECISION MAKING    MDM     My differential diagnosis for abdominal pain includes but is not limited to:  Gastritis, gastroenteritis, peptic ulcer disease, GERD, esophageal perforation, acute appendicitis, mesenteric adenitis, Meckel’s diverticulum, epiploic appendagitis, diverticulitis, colon cancer, ulcerative colitis, Crohn’s disease, intussusception, small bowel obstruction, adhesions, ischemic bowel, perforated viscus, ileus, obstipation, biliary colic, cholecystitis, cholelithiasis, Jeremy-Rohan Sourav, hepatitis, pancreatitis, common bile duct obstruction, cholangitis, bile leak, splenic trauma, splenic rupture, splenic infarction, splenic abscess, abdominal abscess, ascites, spontaneous bacterial peritonitis, hernia, UTI, cystitis,ureterolithiasis, urinary obstruction, ovarian cyst, torsion, pregnancy, ectopic pregnancy, PID, pelvic abscess, mittelschmerz, endometriosis, AAA, myocardial infarction, pneumonia, cancer, porphyria, DKA, medications, sickle cell, viral syndrome, zoster  DIAGNOSIS  Final diagnoses:   Acute appendicitis, unspecified acute appendicitis type       Latest Documented Vital Signs:  As of 17:15 EST  BP- 136/83 HR- 77 Temp- 98.4 °F (36.9 °C) (Oral) O2 sat- 96%    DISPOSITION  Pt admitted to surgery.    Smoking cessation discussed with patient.    Discussed pertinent findings with the patient/family.  Patient/Family voiced understanding of need to follow-up for recheck and further testing as needed.  Return to the Emergency Department warnings were  given.         Medication List      No changes were made to your prescriptions during this visit.                   Dictated utilizing [employer] dictation     Ida Bridges PA-C  12/22/21 1376

## 2021-12-22 NOTE — ED NOTES
Right sided abd pain and tenderness x 3 days. She had an outpatient ct and it showed appendicitis. Pt here to be admitted for surgery.     Laurie Nesbitt RN  12/22/21 0343

## 2021-12-22 NOTE — PROGRESS NOTES
"Chief Complaint   Patient presents with   • Abdominal Pain     right sided pain around waist       Subjective     Shaylee Wadsworth is a 61 y.o. female being seen for an acute visit for Right lower abd pain since Saturday. She describes right sided flank pain as \"tender to touch\" and \"sore\". It started around the center of her abd and then settled in her RLQ. Constant in nature, and gradually getting worse. She took a laxative in case it was constipation, and has had a BM, which did not relieve her pain.  She has not eaten today due to poor appetite. She is not sure if she had a fever, but \"felt warm\" last night.  Denies diarrhea, nausea, or chest pain. She still has her appendix. PMH includes Lap dequan, hysterectomy, D and C.      History of Present Illness     Allergies   Allergen Reactions   • Meloxicam Swelling     EYES AND FACE SWELLING  EYES AND FACE SWELLING   • Effexor Xr [Venlafaxine Hcl Er] Unknown - Low Severity     Caused weight gain         Current Outpatient Medications:   •  amLODIPine (NORVASC) 5 MG tablet, Take 1 tablet by mouth once daily, Disp: 90 tablet, Rfl: 0  •  aspirin 81 MG tablet, Take 81 mg by mouth Daily., Disp: , Rfl:   •  atorvastatin (LIPITOR) 40 MG tablet, Take 1 tablet by mouth Every Night., Disp: 90 tablet, Rfl: 3  •  cyclobenzaprine (FLEXERIL) 10 MG tablet, Take 10 mg by mouth 2 (two) times a day., Disp: , Rfl:   •  ENBREL SURECLICK 50 MG/ML solution auto-injector, Every 7 (Seven) Days., Disp: , Rfl:   •  esomeprazole (nexIUM) 40 MG capsule, Take 1 capsule by mouth 2 (Two) Times a Day. (Patient taking differently: Take 40 mg by mouth Every Morning Before Breakfast.), Disp: 90 capsule, Rfl: 3  •  ferrous sulfate 325 (65 FE) MG tablet, Take 1 tablet by mouth Daily With Breakfast., Disp: 30 tablet, Rfl: 3  •  folic acid (FOLVITE) 1 MG tablet, Take 1 mg by mouth Daily. 1 to 4 tablets daily, Disp: , Rfl:   •  levothyroxine (SYNTHROID, LEVOTHROID) 75 MCG tablet, Take 1 tablet by mouth " Daily., Disp: 30 tablet, Rfl: 11  •  methotrexate 2.5 MG tablet, TAKE 4 TABLETS BY MOUTH ONCE A WEEK, Disp: 32 tablet, Rfl: 6  •  metoprolol succinate XL (TOPROL-XL) 50 MG 24 hr tablet, Take 1 tablet by mouth once daily, Disp: 90 tablet, Rfl: 3  •  olmesartan (BENICAR) 40 MG tablet, Take 1 tablet by mouth once daily, Disp: 90 tablet, Rfl: 0  •  potassium chloride (K-DUR,KLOR-CON) 20 MEQ CR tablet, Take 1 tablet by mouth twice daily, Disp: 180 tablet, Rfl: 0  •  traMADol (ULTRAM) 50 MG tablet, Take 1 tablet by mouth Every 8 (Eight) Hours As Needed for Severe Pain ., Disp: 30 tablet, Rfl: 0  •  vitamin D (ERGOCALCIFEROL) 1.25 MG (41982 UT) capsule capsule, Take 1 capsule by mouth 1 (One) Time Per Week., Disp: 12 capsule, Rfl: 3    The following portions of the patient's history were reviewed and updated as appropriate: allergies, current medications, past family history, past medical history, past social history, past surgical history and problem list.    Review of Systems   Constitutional: Negative for fatigue and fever.   HENT: Negative.    Respiratory: Negative for cough.    Cardiovascular: Negative for chest pain, palpitations and leg swelling.   Gastrointestinal: Positive for abdominal distention and abdominal pain. Negative for anal bleeding, constipation, diarrhea, nausea, rectal pain and vomiting.   Musculoskeletal: Positive for arthralgias and back pain.   Skin: Negative.    Neurological: Positive for tremors.   Psychiatric/Behavioral: Negative.    All other systems reviewed and are negative.      Assessment     Physical Exam  Vitals reviewed.   Constitutional:       Appearance: Normal appearance. She is obese.   Cardiovascular:      Rate and Rhythm: Normal rate and regular rhythm.      Pulses: Normal pulses.      Heart sounds: Normal heart sounds. No murmur heard.      Pulmonary:      Effort: Pulmonary effort is normal.   Abdominal:      General: Bowel sounds are increased. There is no distension.       Palpations: There is no mass.      Tenderness: There is abdominal tenderness in the right lower quadrant. There is guarding and rebound. There is no right CVA tenderness or left CVA tenderness. Negative signs include Davies's sign and McBurney's sign.      Hernia: No hernia is present. There is no hernia in the umbilical area.   Neurological:      Mental Status: She is alert.         Plan         Diagnoses and all orders for this visit:    1. RLQ abdominal pain (Primary)  -     POCT urinalysis dipstick, automated  -     CBC w AUTO Differential  -     Comprehensive metabolic panel  -     CT Abdomen Pelvis With Contrast; Future      Report sent to provider on call for possible Appendicitis. Will call with results.

## 2021-12-22 NOTE — ANESTHESIA PROCEDURE NOTES
Airway  Urgency: emergent    Date/Time: 12/22/2021 5:48 PM  Airway not difficult    General Information and Staff    Patient location during procedure: OR  CRNA: Bonita Nichole CRNA    Consent for Airway (if performed for an anesthetic, see related documentation for consents)  Patient identity confirmed: verbally with patient, arm band, provided demographic data and hospital-assigned identification number  Consent: No emergent situation. Verbal consent obtained. Written consent obtained.  Risks and benefits: risks, benefits and alternatives were discussed  Consent given by: patient      Indications and Patient Condition  Indications for airway management: airway protection    Preoxygenated: yes  MILS maintained throughout  Mask difficulty assessment: 0 - not attempted    Final Airway Details  Final airway type: endotracheal airway      Successful airway: ETT  Cuffed: yes   Successful intubation technique: direct laryngoscopy  Facilitating devices/methods: intubating stylet  Endotracheal tube insertion site: oral  Blade: Remedios  Blade size: 3  ETT size (mm): 7.0  Cormack-Lehane Classification: grade I - full view of glottis  Placement verified by: chest auscultation and capnometry   Cuff volume (mL): 8  Measured from: lips  ETT/EBT  to lips (cm): 21  Number of attempts at approach: 1  Assessment: lips, teeth, and gum same as pre-op and atraumatic intubation

## 2021-12-22 NOTE — H&P
"I was asked to see this patient by the ER practitioner  Chief complaint right lower quadrant abdominal pain  History of present illness 61-year-old white female complains of abdominal pain starting on Saturday.  She initially thought she was constipated the pain started in her mid abdomen moved to her right lower quadrant.  The pain has not improved she saw her primary care today they ordered a CT scan that was abnormal and she was directed to the emergency room where I was subsequently asked to see her.  She has had some anorexia she has not really taken her temperature she denies any nausea.  Past medical history is significant for an oophorectomy cholecystectomy parathyroidectomy, D&C, heart stent.  She denies myocardial infarction  Hypertension hypothyroidism rheumatoid arthritis had a colonoscopy  Family history significant for diabetes hyperlipidemia hypertension heart disease kidney disease multiple myeloma melanoma coronary artery disease anxiety  Social history she is a smoker she denies alcohol use  Home medications Norvasc, aspirin, Lipitor, Flexeril, and will, Nexium, iron, folic acid, Synthroid, methotrexate, metoprolol, Benicar, potassium, tramadol, vitamin D  Review of systems she denies any chest pain urinary tract symptoms all other organ systems reviewed and are negative other than mentioned above  Physical exam this alert overweight white female in no active distress.  /83   Pulse 77   Temp 98.4 °F (36.9 °C) (Oral)   Resp 16   Ht 152.4 cm (60\")   Wt 87.1 kg (192 lb)   LMP  (LMP Unknown) Comment: partial hysterectomy   SpO2 96%   BMI 37.50 kg/m²   Heart shows regular rate and rhythm lungs are clear and equal abdomen soft with moderate amount of right lower quadrant tenderness.  CT scan of the abdomen was reviewed by myself and shows a dilated appendix with periappendiceal inflammation  Her hemoglobin is 11.5 white blood count is 10.66  Platelet count is normal Covid test is pending " urinalysis shows specific gravity of 1.030 large amount of blood small leukocytes small amount of bilirubin  BMP was not drawn  EKG shows no acute changes  Assessment acute appendicitis  Plan the risk benefits and options were discussed with the patient in detail.  We will give her IV antibiotics SCDs check a BMP and proceed with a laparoscopic appendectomy she understands and wishes to proceed

## 2021-12-23 LAB
ANION GAP SERPL CALCULATED.3IONS-SCNC: 13 MMOL/L (ref 5–15)
BASOPHILS # BLD AUTO: 0.01 10*3/MM3 (ref 0–0.2)
BASOPHILS NFR BLD AUTO: 0.1 % (ref 0–1.5)
BUN SERPL-MCNC: 8 MG/DL (ref 6–20)
BUN/CREAT SERPL: 10.5 (ref 7–25)
CALCIUM SPEC-SCNC: 8.5 MG/DL (ref 8.6–10.5)
CHLORIDE SERPL-SCNC: 97 MMOL/L (ref 98–107)
CO2 SERPL-SCNC: 24 MMOL/L (ref 22–29)
CREAT SERPL-MCNC: 0.76 MG/DL (ref 0.57–1)
DEPRECATED RDW RBC AUTO: 43 FL (ref 37–54)
EOSINOPHIL # BLD AUTO: 0 10*3/MM3 (ref 0–0.4)
EOSINOPHIL NFR BLD AUTO: 0 % (ref 0.3–6.2)
ERYTHROCYTE [DISTWIDTH] IN BLOOD BY AUTOMATED COUNT: 13.5 % (ref 12.3–15.4)
GFR SERPL CREATININE-BSD FRML MDRD: 78 ML/MIN/1.73
GLUCOSE SERPL-MCNC: 196 MG/DL (ref 65–99)
HCT VFR BLD AUTO: 31.9 % (ref 34–46.6)
HGB BLD-MCNC: 10.6 G/DL (ref 12–15.9)
IMM GRANULOCYTES # BLD AUTO: 0.04 10*3/MM3 (ref 0–0.05)
IMM GRANULOCYTES NFR BLD AUTO: 0.5 % (ref 0–0.5)
LYMPHOCYTES # BLD AUTO: 0.68 10*3/MM3 (ref 0.7–3.1)
LYMPHOCYTES NFR BLD AUTO: 8.4 % (ref 19.6–45.3)
MAGNESIUM SERPL-MCNC: 1.5 MG/DL (ref 1.6–2.6)
MAGNESIUM SERPL-MCNC: 2.1 MG/DL (ref 1.6–2.6)
MCH RBC QN AUTO: 29.2 PG (ref 26.6–33)
MCHC RBC AUTO-ENTMCNC: 33.2 G/DL (ref 31.5–35.7)
MCV RBC AUTO: 87.9 FL (ref 79–97)
MONOCYTES # BLD AUTO: 0.12 10*3/MM3 (ref 0.1–0.9)
MONOCYTES NFR BLD AUTO: 1.5 % (ref 5–12)
NEUTROPHILS NFR BLD AUTO: 7.23 10*3/MM3 (ref 1.7–7)
NEUTROPHILS NFR BLD AUTO: 89.5 % (ref 42.7–76)
NRBC BLD AUTO-RTO: 0 /100 WBC (ref 0–0.2)
PLATELET # BLD AUTO: 248 10*3/MM3 (ref 140–450)
PMV BLD AUTO: 9.7 FL (ref 6–12)
POTASSIUM SERPL-SCNC: 3.1 MMOL/L (ref 3.5–5.2)
POTASSIUM SERPL-SCNC: 3.8 MMOL/L (ref 3.5–5.2)
RBC # BLD AUTO: 3.63 10*6/MM3 (ref 3.77–5.28)
SODIUM SERPL-SCNC: 134 MMOL/L (ref 136–145)
WBC NRBC COR # BLD: 8.08 10*3/MM3 (ref 3.4–10.8)

## 2021-12-23 PROCEDURE — 83735 ASSAY OF MAGNESIUM: CPT | Performed by: INTERNAL MEDICINE

## 2021-12-23 PROCEDURE — 84132 ASSAY OF SERUM POTASSIUM: CPT | Performed by: INTERNAL MEDICINE

## 2021-12-23 PROCEDURE — G0378 HOSPITAL OBSERVATION PER HR: HCPCS

## 2021-12-23 PROCEDURE — 99024 POSTOP FOLLOW-UP VISIT: CPT | Performed by: SURGERY

## 2021-12-23 PROCEDURE — 25010000002 PIPERACILLIN SOD-TAZOBACTAM PER 1 G: Performed by: SURGERY

## 2021-12-23 PROCEDURE — 94761 N-INVAS EAR/PLS OXIMETRY MLT: CPT

## 2021-12-23 PROCEDURE — 25010000002 MORPHINE PER 10 MG: Performed by: SURGERY

## 2021-12-23 PROCEDURE — 94799 UNLISTED PULMONARY SVC/PX: CPT

## 2021-12-23 PROCEDURE — 85025 COMPLETE CBC W/AUTO DIFF WBC: CPT | Performed by: SURGERY

## 2021-12-23 PROCEDURE — 25010000002 MAGNESIUM SULFATE 2 GM/50ML SOLUTION: Performed by: INTERNAL MEDICINE

## 2021-12-23 PROCEDURE — 99214 OFFICE O/P EST MOD 30 MIN: CPT | Performed by: INTERNAL MEDICINE

## 2021-12-23 PROCEDURE — 80048 BASIC METABOLIC PNL TOTAL CA: CPT | Performed by: SURGERY

## 2021-12-23 RX ORDER — POTASSIUM CHLORIDE 20 MEQ/1
40 TABLET, EXTENDED RELEASE ORAL ONCE
Status: COMPLETED | OUTPATIENT
Start: 2021-12-23 | End: 2021-12-23

## 2021-12-23 RX ORDER — MAGNESIUM SULFATE HEPTAHYDRATE 40 MG/ML
2 INJECTION, SOLUTION INTRAVENOUS ONCE
Status: COMPLETED | OUTPATIENT
Start: 2021-12-23 | End: 2021-12-23

## 2021-12-23 RX ORDER — SODIUM CHLORIDE 9 MG/ML
INJECTION, SOLUTION INTRAVENOUS
Status: DISPENSED
Start: 2021-12-23 | End: 2021-12-23

## 2021-12-23 RX ORDER — PIPERACILLIN SODIUM, TAZOBACTAM SODIUM 4; .5 G/20ML; G/20ML
INJECTION, POWDER, LYOPHILIZED, FOR SOLUTION INTRAVENOUS
Status: DISPENSED
Start: 2021-12-23 | End: 2021-12-23

## 2021-12-23 RX ADMIN — POTASSIUM CHLORIDE 40 MEQ: 1500 TABLET, EXTENDED RELEASE ORAL at 06:12

## 2021-12-23 RX ADMIN — PANTOPRAZOLE SODIUM 40 MG: 40 TABLET, DELAYED RELEASE ORAL at 05:00

## 2021-12-23 RX ADMIN — OXYCODONE HYDROCHLORIDE AND ACETAMINOPHEN 1 TABLET: 5; 325 TABLET ORAL at 12:41

## 2021-12-23 RX ADMIN — ATORVASTATIN CALCIUM 40 MG: 40 TABLET, FILM COATED ORAL at 21:10

## 2021-12-23 RX ADMIN — MORPHINE SULFATE 2 MG: 2 INJECTION, SOLUTION INTRAMUSCULAR; INTRAVENOUS at 04:54

## 2021-12-23 RX ADMIN — PIPERACILLIN AND TAZOBACTAM 4.5 G: 4; .5 INJECTION, POWDER, LYOPHILIZED, FOR SOLUTION INTRAVENOUS; PARENTERAL at 09:44

## 2021-12-23 RX ADMIN — METOPROLOL SUCCINATE 50 MG: 50 TABLET, EXTENDED RELEASE ORAL at 08:01

## 2021-12-23 RX ADMIN — OXYCODONE HYDROCHLORIDE AND ACETAMINOPHEN 1 TABLET: 5; 325 TABLET ORAL at 03:08

## 2021-12-23 RX ADMIN — Medication 10 ML: at 01:23

## 2021-12-23 RX ADMIN — PIPERACILLIN AND TAZOBACTAM 4.5 G: 4; .5 INJECTION, POWDER, LYOPHILIZED, FOR SOLUTION INTRAVENOUS; PARENTERAL at 17:28

## 2021-12-23 RX ADMIN — OXYCODONE HYDROCHLORIDE AND ACETAMINOPHEN 1 TABLET: 5; 325 TABLET ORAL at 08:01

## 2021-12-23 RX ADMIN — OXYCODONE HYDROCHLORIDE AND ACETAMINOPHEN 1 TABLET: 5; 325 TABLET ORAL at 17:28

## 2021-12-23 RX ADMIN — LEVOTHYROXINE SODIUM 75 MCG: 75 TABLET ORAL at 05:00

## 2021-12-23 RX ADMIN — ASPIRIN 81 MG: 81 TABLET, COATED ORAL at 08:01

## 2021-12-23 RX ADMIN — OXYCODONE HYDROCHLORIDE AND ACETAMINOPHEN 1 TABLET: 5; 325 TABLET ORAL at 23:03

## 2021-12-23 RX ADMIN — SODIUM CHLORIDE, POTASSIUM CHLORIDE, SODIUM LACTATE AND CALCIUM CHLORIDE 100 ML/HR: 600; 310; 30; 20 INJECTION, SOLUTION INTRAVENOUS at 07:50

## 2021-12-23 RX ADMIN — POTASSIUM CHLORIDE 40 MEQ: 1500 TABLET, EXTENDED RELEASE ORAL at 03:08

## 2021-12-23 RX ADMIN — MAGNESIUM SULFATE HEPTAHYDRATE 2 G: 40 INJECTION, SOLUTION INTRAVENOUS at 06:12

## 2021-12-23 NOTE — PROGRESS NOTES
I have reviewed patient's vital signs, labs and nursing notes.  Hypokalemia and hypomagnesemia resolved with repletion continue to monitor.  Hypertension currently stable on home Toprol-XL, will continue to hold amlodipine and Benicar.  Coronary artery disease is stable with home statin aspirin and beta-blocker.  Hypothyroidism-stable on home levothyroxine.  RA-stable with holding of Enbrel and methotrexate, also holding Flexeril and tramadol while on narcotics per surgery.  GERD stable on home PPI.     No other acute changes to plan.

## 2021-12-23 NOTE — PROGRESS NOTES
Chief Complaint: POD # 1    Subjective   Pt denies N&V, tolerating clear liquids, pain controlled, urinating without difficulties, +flatus, no BM     Objective     Vital signs in last 24 hours:  Temp:  [97.4 °F (36.3 °C)-98.7 °F (37.1 °C)] 98.4 °F (36.9 °C)  Heart Rate:  [64-78] 65  Resp:  [12-19] 17  BP: ()/() 117/75    Intake/Output last 3 shifts:  I/O last 3 completed shifts:  In: 1020 [P.O.:720; I.V.:200; IV Piggyback:100]  Out: 370 [Urine:350; Drains:20]    Intake/Output this shift:  I/O this shift:  In: 1025 [P.O.:480; I.V.:545]  Out: 800 [Urine:800]    Physical Exam:  Respiratory: CTA, good inspiratory effort  CV: RRR  Abd: + BS, soft, ND, usual post-op tenderness, no rebound, no guarding, incisions look good, ORESTES serosanguinous 20cc  Results from last 7 days   Lab Units 12/23/21  0421   WBC 10*3/mm3 8.08   HEMOGLOBIN g/dL 10.6*   HEMATOCRIT % 31.9*   PLATELETS 10*3/mm3 248     Results from last 7 days   Lab Units 12/23/21  0930 12/23/21  0421 12/23/21  0421 12/22/21  1614 12/22/21  1614   SODIUM mmol/L  --   --  134*   < > 134*   POTASSIUM mmol/L 3.8   < > 3.1*   < > 3.1*   CHLORIDE mmol/L  --   --  97*   < > 95*   CO2 mmol/L  --   --  24.0   < > 25.1   BUN mg/dL  --   --  8   < > 6   CREATININE mg/dL  --   --  0.76   < > 0.81   CALCIUM mg/dL  --   --  8.5*   < > 8.7   BILIRUBIN mg/dL  --   --   --   --  1.0   ALK PHOS U/L  --   --   --   --  96   ALT (SGPT) U/L  --   --   --   --  7   AST (SGOT) U/L  --   --   --   --  12   GLUCOSE mg/dL  --   --  196*   < > 137*    < > = values in this interval not displayed.     Assessment/Plan   S/P Laparoscopic Appendectomy   Advance diet, d/c IVFs   Continue IV antibiotics until tolerating regular diet, normal WBC    Amparo Zurita MD  General, Minimally Invasive and Endoscopic Surgery  [facility], [facility]    [address] [facility] [address] [place] [address]   [address]    [address]  [place] [address]               [place] [address]    P:  [phone]  F: [phone]    Cc:  Laura Ayala, APRN

## 2021-12-23 NOTE — ANESTHESIA POSTPROCEDURE EVALUATION
Patient: Shaylee Wadsworth    Procedure Summary     Date: 12/22/21 Room / Location:  [facility] OR 1 /  [facility] OR    Anesthesia Start: 1738 Anesthesia Stop: 1926    Procedure: APPENDECTOMY LAPAROSCOPIC (N/A Abdomen) Diagnosis: (Appendicitis)    Surgeons: Maciel Godoy MD Provider: Bonita Nichole CRNA    Anesthesia Type: general ASA Status: 3 - Emergent          Anesthesia Type: general    Vitals  Vitals Value Taken Time   /66 12/22/21 2045   Temp 97.8 °F (36.6 °C) 12/22/21 1932   Pulse 67 12/22/21 2045   Resp 12 12/22/21 2045   SpO2 94 % 12/22/21 2050   Vitals shown include unvalidated device data.        Post Anesthesia Care and Evaluation    Patient location during evaluation: PACU  Patient participation: complete - patient participated  Level of consciousness: awake  Pain score: 2  Pain management: adequate  Airway patency: patent  Anesthetic complications: No anesthetic complications  PONV Status: none  Cardiovascular status: acceptable  Respiratory status: acceptable  Hydration status: acceptable

## 2021-12-23 NOTE — OP NOTE
Preoperative diagnosis acute appendicitis  Postoperative diagnosis acute phlegmonous appendicitis  Procedure laparoscopic appendectomy  Complications none  Drains 10 [place] round Issa drain  Estimated blood loss 25 cc  Anesthesia General via endotracheal tube  Surgeon Dr. Godoy  Assistant Tess Coon, assistant necessary for retraction  Specimen appendix to pathology  Findings phlegmonous partially retrocecal appendicitis  Procedure after satisfactory induction of general anesthesia via endotracheal tube the patient's abdomen was prepped and draped in sterile fashion.  She was straight cathed.  Transverse infraumbilical skin incision was made carried down through the skin and subcutaneous tissue.  Fascia was controlled medially and laterally tween 0 Ethibond stay sutures.  Fascia and peritoneum were divided.  Dillon trocar was placed within the abdomen under direct vision and the abdomen was insufflated to 14 mmHg with use of CO2.  General survey of the abdomen showed a phlegmon in the right lower quadrant.  5 mm ports x2 were placed 1 in the lower midline and 1 in the left lower quadrant under direct vision after each site of been locally infiltrated 1% lidocaine with epinephrine.  I was unable to delineate where the tip of the appendix was it appeared to be curling retrocecal and on top of the cecum laterally.  We identified the base the appendix a window was created in the base of the appendix mesentery.  The base appendix was fired across with Endo YUDY V.  The appendix was then dissected out distally primarily bluntly between hemoclips where appropriate and the appendix was taken off the cecum without any evidence of injury to the cecum.  Specimen was dropped in Endo Catch bag and removed from the abdomen.  Right lower quadrant is liberally irrigated hemostasis assured staple line appeared to be intact and the site appeared to be hemostatic.  Because of the extensive dissection was elected to place 10 cc of  FloSeal overlying the area of the mesenteric dissection.  10 [place] round Issa drain was brought out the inferior 5 mm port site and was placed in the right gutter and adjacent to the appendiceal stump.  It was sutured to the skin with use of 2-0 silk.  5 mm port was pulled out and was hemostatic.  Abdomen was desufflated Dillon trocar was removed.  Infraumbilical fascia was closed in interrupted simple fashion with use of 0 Ethibond placing all sutures and tying at completion.  Skin was closed with sterile skin clips drain was hooked to self bulb suction.  She will be admitted for intravenous antibiotics and further care.  She tolerated the procedure well and was transferred to the recovery room in stable condition

## 2021-12-23 NOTE — CONSULTS
"    Referring Provider: Dr. Godoy  Reason for Consultation: Medical management of HTN, RA, CAD    Patient Care Team:  Laura Ayala APRN as PCP - General  aLura Ayala APRN as PCP - Family Medicine  Blaze Martinez MD as Consulting Physician (Rheumatology)  Laura Ayala APRN as Referring Physician (Family Medicine)    Chief complaint: abdominal pain    Subjective     History of present illness:    Patient is a 60 y/o female with a past medical history of HTN, CAD, and RA who presented to her PCP complaining of a 5 day history of right sided lower abdominal pain, which she described as tender and \"sore\". She also reports some associated bloating, but initially thought it was constipation so she took a laxative. Her pain continued to worsen and Tuesday night she developed a subjective fever. She was sent for a CT abd/pel which showed acute appendicitis and went to the ED and was admitted and underwent a laparoscopic appendectomy.  At time of my exam, patient reports her pain is controlled.         Review of Systems:  Pertinent items are noted in HPI, all other systems reviewed and negative    History:  Past Medical History:   Diagnosis Date   • Abnormal electrocardiogram    • Anemia May 2021   • Anxiety    • Arthritis 5/1/2014    RA   • Lainez esophagus    • Benign paroxysmal positional vertigo due to bilateral vestibular disorder    • Chest pain    • Cholelithiasis 2007    Removed   • Colon polyp    • Coronary artery disease 2/26/19   • Depression 6/12/19   • Disease of thyroid gland    • Dystonia    • Embedded tick of right lower leg    • Esophageal reflux    • Fibromyositis    • Functional movement disorder    • H/O colonoscopy    • H/O mammogram 08/31/2011    NORMAL    • History of colonoscopy 03/2020   • Hx of bone density study 08/31/2011    OSTEOPENIA    • Hyperlipidemia    • Hyperparathyroidism (HCC)    • Hypertension    • Hypothyroidism May 2021   • Low back pain    • Menopause    • Osteopenia    • " Osteopenia    • Ovarian cyst 2000    L ovary removed   • Pancreatitis    • Pancreatitis    • Scoliosis 2/16/13   • Superficial incisional infection of surgical site    • Tremor 4/4/2018      Past Surgical History:   Procedure Laterality Date   • CARDIAC CATHETERIZATION N/A 3/4/2019    Procedure: Coronary angiography;  Surgeon: Jackelyn Dumas MD;  Location:  [facility];  Service: Cardiovascular   • CARDIAC CATHETERIZATION N/A 3/4/2019    Procedure: Left heart cath;  Surgeon: Jackelyn Dumas MD;  Location:  [facility];  Service: Cardiovascular   • CARDIAC CATHETERIZATION N/A 3/4/2019    Procedure: Left ventriculography;  Surgeon: Jackelyn Dumas MD;  Location:  [facility];  Service: Cardiovascular   • CARDIAC CATHETERIZATION N/A 3/4/2019    Procedure: Stent KARLY coronary;  Surgeon: Jackelyn Dumas MD;  Location:  [facility];  Service: Cardiovascular   • CHOLECYSTECTOMY     • COLONOSCOPY  2014   • COLONOSCOPY N/A 3/6/2020    Procedure: COLONOSCOPY, biopsy, polypectomy;  Surgeon: Rain Kirk MD;  Location: [facility];  Service: Gastroenterology;  Laterality: N/A;  Random biopsies; Rectal polyp x 3; Hemorrhoids; Diverticulosis   • CORONARY STENT PLACEMENT     • DILATATION AND CURETTAGE     • HYSTERECTOMY     • LAPAROSCOPIC CHOLECYSTECTOMY  2005   • MOUTH SURGERY      TOOTH EXTRACTION   • OTHER SURGICAL HISTORY  03/27/2015    DIAGNOSTIC ESOPHAGOSCOPY TRANSNASAL FLEXIBLE WITH BIOPSY; ARZATE ESO. REPEAT EGD 2 YR    • OVARIAN CYST SURGERY  2000    L ovary removed   • OVARY SURGERY Left     NORMAL    • PAP SMEAR  08/23/2010    NORMAL    • PARATHYROIDECTOMY Right 08/17/2016    Dr. Muchael Flynn at [facility]   • SUBTOTAL HYSTERECTOMY      Left ovary removed9   • WISDOM TOOTH EXTRACTION  1984     Family History   Problem Relation Age of Onset   • Diabetes Mother    • Hyperlipidemia Mother    • Hypertension Mother    • Heart disease Mother    • Kidney  disease Mother    • Cancer Mother    • Heart attack Mother    • Multiple myeloma Mother    • Melanoma Mother         [place] water contamination   • Coronary artery disease Mother    • Arthritis Father    • Anxiety disorder Father    • COPD Father    • Glaucoma Father    • Hyperlipidemia Father    • Hypertension Father    • Vision loss Father    • Heart disease Father    • Heart attack Father    • Heart disease Sister    • Breast cancer Neg Hx    • Colon cancer Neg Hx    • Colon polyps Neg Hx      Social History     Tobacco Use   • Smoking status: Current Every Day Smoker     Packs/day: 0.50     Years: 30.00     Pack years: 15.00     Types: Electronic Cigarette, Cigarettes     Last attempt to quit: 8/12/2018     Years since quitting: 3.3   • Smokeless tobacco: Current User   • Tobacco comment: 8/12/18 switch to e-cig   Vaping Use   • Vaping Use: Every day   Substance Use Topics   • Alcohol use: No   • Drug use: No     Medications Prior to Admission   Medication Sig Dispense Refill Last Dose   • amLODIPine (NORVASC) 5 MG tablet Take 1 tablet by mouth once daily 90 tablet 0 12/22/2021 at Unknown time   • aspirin 81 MG tablet Take 81 mg by mouth Daily.   Past Week at Unknown time   • atorvastatin (LIPITOR) 40 MG tablet Take 1 tablet by mouth Every Night. 90 tablet 3 12/22/2021 at Unknown time   • cyclobenzaprine (FLEXERIL) 10 MG tablet Take 10 mg by mouth 2 (Two) Times a Day As Needed.   12/22/2021 at Unknown time   • ENBREL SURECLICK 50 MG/ML solution auto-injector Every 7 (Seven) Days.   Past Week at Unknown time   • esomeprazole (nexIUM) 40 MG capsule Take 1 capsule by mouth 2 (Two) Times a Day. (Patient taking differently: Take 40 mg by mouth Every Morning Before Breakfast.) 90 capsule 3 12/22/2021 at Unknown time   • ferrous sulfate 325 (65 FE) MG tablet Take 1 tablet by mouth Daily With Breakfast. 30 tablet 3 Past Month at Unknown time   • folic acid (FOLVITE) 1 MG tablet Take 1 mg by mouth Daily. 1 to 4 tablets  daily   12/22/2021 at Unknown time   • levothyroxine (SYNTHROID, LEVOTHROID) 75 MCG tablet Take 1 tablet by mouth Daily. 30 tablet 11 12/22/2021 at Unknown time   • methotrexate 2.5 MG tablet TAKE 4 TABLETS BY MOUTH ONCE A WEEK 32 tablet 6 Past Week at Unknown time   • metoprolol succinate XL (TOPROL-XL) 50 MG 24 hr tablet Take 1 tablet by mouth once daily 90 tablet 3 12/22/2021 at Unknown time   • olmesartan (BENICAR) 40 MG tablet Take 1 tablet by mouth once daily 90 tablet 0 12/22/2021 at Unknown time   • potassium chloride (K-DUR,KLOR-CON) 20 MEQ CR tablet Take 1 tablet by mouth twice daily (Patient taking differently: Take 20 mEq by mouth Daily.) 180 tablet 0 12/22/2021 at Unknown time   • traMADol (ULTRAM) 50 MG tablet Take 1 tablet by mouth Every 8 (Eight) Hours As Needed for Severe Pain . 30 tablet 0 Past Month at Unknown time   • vitamin D (ERGOCALCIFEROL) 1.25 MG (59510 UT) capsule capsule Take 1 capsule by mouth 1 (One) Time Per Week. 12 capsule 3 Past Week at Unknown time     Allergies:  Meloxicam and Effexor xr [venlafaxine hcl er]      Objective      Vital Signs:  Temp:  [97.4 °F (36.3 °C)-98.7 °F (37.1 °C)] 98.7 °F (37.1 °C)  Heart Rate:  [64-78] 69  Resp:  [12-19] 17  BP: ()/() 91/50      Med Review:  Scheduled Meds:piperacillin-tazobactam, , ,   sodium chloride, , ,   sodium chloride, , ,   amLODIPine, 5 mg, Oral, Daily  aspirin, 81 mg, Oral, Daily  atorvastatin, 40 mg, Oral, Nightly  levothyroxine, 75 mcg, Oral, Q AM  metoprolol succinate XL, 50 mg, Oral, Daily  pantoprazole, 40 mg, Oral, QAM  piperacillin-tazobactam, 4.5 g, Intravenous, Q8H  potassium chloride, 20 mEq, Oral, BID      Continuous Infusions:lactated ringers, 100 mL/hr, Last Rate: 100 mL/hr (12/23/21 0019)  lactated ringers, 100 mL/hr      PRN Meds:.Morphine  •  Morphine  •  ondansetron  •  oxyCODONE-acetaminophen  •  [COMPLETED] Insert peripheral IV **AND** sodium chloride      Physical Exam:    Physical Exam  Vitals  reviewed.   Constitutional:       General: She is not in acute distress.     Appearance: She is obese.   HENT:      Head: Normocephalic and atraumatic.      Mouth/Throat:      Mouth: Mucous membranes are moist.   Eyes:      General: No scleral icterus.     Pupils: Pupils are equal, round, and reactive to light.   Cardiovascular:      Rate and Rhythm: Normal rate and regular rhythm.      Heart sounds: No murmur heard.      Pulmonary:      Effort: Pulmonary effort is normal. No respiratory distress.      Breath sounds: No wheezing or rales.   Abdominal:      Comments: Obese, nt, +bs   Musculoskeletal:      Right lower leg: No edema.      Left lower leg: No edema.   Skin:     General: Skin is warm and dry.      Comments: Dressing in place to abdomen without erythema or exudate   Neurological:      General: No focal deficit present.      Mental Status: She is alert. Mental status is at baseline.      Deep Tendon Reflexes: Abnormal reflex:    Psychiatric:         Mood and Affect: Mood normal.         Behavior: Behavior normal.               Labs:  Lab Results (last 24 hours)     Procedure Component Value Units Date/Time    Tissue Pathology Exam [[id number]] Collected: 12/22/21 1856    Specimen: Tissue from Large Intestine, Appendix Updated: 12/22/21 2002    COVID-19,[facility] IN-HOUSE,Nasal Swab No Transport Media 3-4 HR TAT - Swab, Nasal Cavity [[id number]]  (Normal) Collected: 12/22/21 1614    Specimen: Swab from Nasal Cavity Updated: 12/22/21 1700     COVID19 Not Detected    Narrative:      Fact sheet for providers: https://www.fda.gov/media/034411/download     Fact sheet for patients: https://www.fda.gov/media/846581/download    Test performed by PCR.    Consider negative results in combination with clinical observations, patient history, and epidemiological information.    Comprehensive Metabolic Panel [[id number]]  (Abnormal) Collected: 12/22/21 1614    Specimen: Blood Updated: 12/22/21 1649     Glucose 137 mg/dL       BUN 6 mg/dL      Creatinine 0.81 mg/dL      Sodium 134 mmol/L      Potassium 3.1 mmol/L      Chloride 95 mmol/L      CO2 25.1 mmol/L      Calcium 8.7 mg/dL      Total Protein 6.7 g/dL      Albumin 4.00 g/dL      ALT (SGPT) 7 U/L      AST (SGOT) 12 U/L      Alkaline Phosphatase 96 U/L      Total Bilirubin 1.0 mg/dL      eGFR Non African Amer 72 mL/min/1.73      Globulin 2.7 gm/dL      A/G Ratio 1.5 g/dL      BUN/Creatinine Ratio 7.4     Anion Gap 13.9 mmol/L     Narrative:      GFR Normal >60  Chronic Kidney Disease <60  Kidney Failure <15      CBC & Differential [[id number]]  (Abnormal) Collected: 12/22/21 1614    Specimen: Blood Updated: 12/22/21 1630    Narrative:      The following orders were created for panel order CBC & Differential.  Procedure                               Abnormality         Status                     ---------                               -----------         ------                     CBC Auto Differential[[id number]]        Abnormal            Final result                 Please view results for these tests on the individual orders.    CBC Auto Differential [[id number]]  (Abnormal) Collected: 12/22/21 1614    Specimen: Blood Updated: 12/22/21 1630     WBC 10.66 10*3/mm3      RBC 3.96 10*6/mm3      Hemoglobin 11.5 g/dL      Hematocrit 33.8 %      MCV 85.4 fL      MCH 29.0 pg      MCHC 34.0 g/dL      RDW 13.3 %      RDW-SD 41.1 fl      MPV 9.2 fL      Platelets 272 10*3/mm3      Neutrophil % 73.3 %      Lymphocyte % 19.7 %      Monocyte % 6.3 %      Eosinophil % 0.2 %      Basophil % 0.3 %      Immature Grans % 0.2 %      Neutrophils, Absolute 7.82 10*3/mm3      Lymphocytes, Absolute 2.10 10*3/mm3      Monocytes, Absolute 0.67 10*3/mm3      Eosinophils, Absolute 0.02 10*3/mm3      Basophils, Absolute 0.03 10*3/mm3      Immature Grans, Absolute 0.02 10*3/mm3           Imaging Results (Last 24 Hours)     ** No results found for the last 24 hours. **        CT Abdomen Pelvis With  Contrast    Result Date: 12/22/2021  CT Abdomen Pelvis W INDICATION: Right-sided abdominal pain for 5 days. TECHNIQUE: CT of the abdomen and pelvis with Isovue-370-100 cc IV contrast. Coronal and sagittal reconstructions were obtained.  Radiation dose reduction techniques included automated exposure control or exposure modulation based on body size. Count of known CT and cardiac nuc med studies performed in previous 12 months: 0. COMPARISON: CT abdomen and pelvis 1/27/2020 FINDINGS: Abdomen: Included portions of the lung bases are clear. No effusions. The liver, spleen, pancreas and bile ducts are normal. There are clips consistent with prior cholecystectomy. Adrenal glands and kidneys are normal. There is mild atherosclerotic change of the abdominal aorta without aneurysm. Stomach and small bowel are normal The appendix is clearly enlarged and abnormal measuring up to 9 mm. There is fluid distending the distal tip with moderate surrounding groundglass change. Findings are most consistent with acute appendicitis without definite perforation. Surgical consultation is recommended. Colon is unremarkable. Pelvis: The bladder, uterus and ovaries are normal. No dependent free fluid in the pelvis.     Impression: Enlarged appendix with surrounding moderate groundglass changes consistent with acute appendicitis without definite perforation. Surgical consultation recommended. NOTIFICATION: Critical Value/emergent results were called by telephone at the time of interpretation on 12/22/2021 3:27 PM to RYANN FRENCH MD who verbally acknowledged these results. The patient was held in radiology. I spoke by telephone with the patient regarding the findings at 1532 hours and requested that she check in the emergency Department for further management of acute appendicitis. Signer Name: Whitley Padilla MD  Signed: 12/22/2021 3:33 PM  Workstation Name: [id number]  [facility]      ECG/EMG Results (most  recent)     Procedure Component Value Units Date/Time    ECG 12 Lead [[id number]] Collected: 12/22/21 1618     Updated: 12/22/21 1737     QT Interval 390 ms     Narrative:      HEART RATE= 68  bpm  RR Interval= 884  ms  WI Interval= 194  ms  P Horizontal Axis=   deg  P Front Axis= 35  deg  QRSD Interval= 93  ms  QT Interval= 390  ms  QRS Axis= -35  deg  T Wave Axis= 75  deg  - ABNORMAL ECG -  Sinus rhythm  Inferior infarct, old  Borderline T abnormalities, anterior leads  NO SIGNIFICANT CHANGE FROM PREVIOUS ECG  Electronically Signed By: Jackelyn Dumas ([facility]) 22-Dec-2021 17:28:35  Date and Time of Study: 2021-12-22 16:18:52                Results Review:  I reviewed the patient's new clinical results.  I reviewed the patient's new imaging results and agree with the interpretation.        Assessment and Plan:    Acute Appendicitis s/p laparoscopic appendectomy 12/22/2021  - pain control as per Dr. Godoy  - on IV Zosyn     Hypokalemia  - replace po  - check magnesium  - repeat in am     Hypertension  - continue Toprol XL  - holding amlodipine and benicar as blood pressure is borderline low   - monitor with routine vital signs and make adjustments as needed     CAD  - continue aspirin, statin, bbl    Hypothyroidism  - continue levothyroxine     RA  - holdinh Enbrel, methotrexate  - holding flexeril and tramadol while on narcotics     GERD  - continue ppi     Obesity   -  on lifestyle changes as appropriate     DVT Prophylaxis  - SCDs      I discussed the patients findings and my recommendations with patient.        Shayna Larsen DO  12/23/21  01:47 EST

## 2021-12-23 NOTE — PLAN OF CARE
Goal Outcome Evaluation:  Plan of Care Reviewed With: patient        Progress: improving  Outcome Summary: VSS, pain controlled with iv pain and po pain medications, ambulated to bathroom

## 2021-12-23 NOTE — PROGRESS NOTES
Patient: Shaylee Wadsworth    @A@    Anesthesia Type: general  Patient location: [facility] Floor  Last vitals  BP      Temp      Pulse     Resp      SpO2        Post vital signs: stable  Level of consciousness: awake, alert and oriented    Post-anesthesia pain: adequate analgesia  Airway patency: patent  Respiratory: unassisted  Cardiovascular: stable and blood pressure at baseline  Hydration: euvolemic    Difficult Airway: no  Anesthetic complications: no

## 2021-12-23 NOTE — PLAN OF CARE
Goal Outcome Evaluation:  Plan of Care Reviewed With: patient, spouse        Progress: improving  Outcome Summary: ORESTES drain intact with bloody drainage; incision open to air (drsg removed by Dr Zurita) pain controlled with Percocet; conts on Zosyn IV; BM today; tolerasting full liquid diet; diet advanced to reg cardiac for dinner; ambs freq with assist stand-by; anticipated D/C tomorrow

## 2021-12-23 NOTE — CASE MANAGEMENT/SOCIAL WORK
Discharge Planning Assessment  FRANCHESKA Novoa     Patient Name: Shaylee Wadsworth  MRN: [id number]  Today's Date: 12/23/2021    Admit Date: 12/22/2021     Discharge Needs Assessment     Row Name 12/23/21 1327       Living Environment    Lives With spouse    Name(s) of Who Lives With Patient All    Current Living Arrangements home/apartment/condo    Duration at Residence 22 years    Potentially Unsafe Housing Conditions --  none    Primary Care Provided by self    Provides Primary Care For no one    Family Caregiver if Needed spouse    Family Caregiver Names All,     Quality of Family Relationships unable to assess    Able to Return to Prior Arrangements yes       Resource/Environmental Concerns    Resource/Environmental Concerns none    Transportation Concerns car, none       Transition Planning    Patient/Family Anticipates Transition to home with family    Patient/Family Anticipated Services at Transition none    Transportation Anticipated family or friend will provide       Discharge Needs Assessment    Readmission Within the Last 30 Days no previous admission in last 30 days    Current Outpatient/Agency/Support Group --  none    Equipment Currently Used at Home none    Concerns to be Addressed discharge planning    Equipment Needed After Discharge none    Outpatient/Agency/Support Group Needs --  Pt declined    Discharge Facility/Level of Care Needs --  Pt declined    Provided Post Acute Provider List? Refused    Refused Provider List Comment Pt declined    Provided Post Acute Provider Quality & Resource List? Refused    Refused Quality and Resource List Comment Pt declined    Current Discharge Risk --  none               Discharge Plan     Row Name 12/23/21 0206       Plan    Plan Discharge home with     Patient/Family in Agreement with Plan yes    Plan Comments I spoke with the patient in her room with her permission.  I introduced myself and explained my role as .  I verified the  information on the facesheet the patients PCP as KAITLYNN Choi.  The patient uses [facility] in [place] and she is able to afford her medications and can  them up.  The patient lives in a two story house for the past 22 years. She lives with her , All.  There are no stairs to enter and she is able to enter and maneuver around her home with no issues. She is independent with her ADL’s, has a car and drives. The patient’s  will pick her up at discharge.  She denied having or needing any DME at home.   I offered the patient information regarding home health and other community resources and she declined the information.   The patient will discharge home with her  to assist and she is agreeable to that plan.  She denied having and further questions or concerns at this time.  CM will continue to follow for further needs.              Continued Care and Services - Admitted Since 12/22/2021    Coordination has not been started for this encounter.          Demographic Summary     Row Name 12/23/21 8694       General Information    Admission Type observation    Arrived From home    Required Notices Provided Observation Status Notice    Referral Source admission list    Reason for Consult discharge planning    Preferred Language English     Used During This Interaction no       Contact Information    Permission Granted to Share Info With                Functional Status    No documentation.                Psychosocial    No documentation.                Abuse/Neglect    No documentation.                Legal    No documentation.                Substance Abuse    No documentation.                Patient Forms    No documentation.                   Monica Marroquin RN

## 2021-12-24 LAB
BACTERIA UR CULT: NORMAL
BACTERIA UR CULT: NORMAL

## 2021-12-24 PROCEDURE — 99024 POSTOP FOLLOW-UP VISIT: CPT | Performed by: SURGERY

## 2021-12-24 PROCEDURE — 94761 N-INVAS EAR/PLS OXIMETRY MLT: CPT

## 2021-12-24 PROCEDURE — 94799 UNLISTED PULMONARY SVC/PX: CPT

## 2021-12-24 PROCEDURE — G0378 HOSPITAL OBSERVATION PER HR: HCPCS

## 2021-12-24 PROCEDURE — 25010000002 PIPERACILLIN SOD-TAZOBACTAM PER 1 G: Performed by: SURGERY

## 2021-12-24 RX ORDER — MAGNESIUM SULFATE HEPTAHYDRATE 40 MG/ML
4 INJECTION, SOLUTION INTRAVENOUS AS NEEDED
Status: DISCONTINUED | OUTPATIENT
Start: 2021-12-24 | End: 2021-12-25 | Stop reason: HOSPADM

## 2021-12-24 RX ORDER — AMOXICILLIN AND CLAVULANATE POTASSIUM 875; 125 MG/1; MG/1
1 TABLET, FILM COATED ORAL EVERY 12 HOURS SCHEDULED
Status: DISCONTINUED | OUTPATIENT
Start: 2021-12-24 | End: 2021-12-25 | Stop reason: HOSPADM

## 2021-12-24 RX ORDER — POTASSIUM CHLORIDE 20 MEQ/1
40 TABLET, EXTENDED RELEASE ORAL AS NEEDED
Status: DISCONTINUED | OUTPATIENT
Start: 2021-12-24 | End: 2021-12-25 | Stop reason: HOSPADM

## 2021-12-24 RX ORDER — POTASSIUM CHLORIDE 1.5 G/1.77G
40 POWDER, FOR SOLUTION ORAL AS NEEDED
Status: DISCONTINUED | OUTPATIENT
Start: 2021-12-24 | End: 2021-12-25 | Stop reason: HOSPADM

## 2021-12-24 RX ORDER — MAGNESIUM SULFATE HEPTAHYDRATE 40 MG/ML
2 INJECTION, SOLUTION INTRAVENOUS AS NEEDED
Status: DISCONTINUED | OUTPATIENT
Start: 2021-12-24 | End: 2021-12-25 | Stop reason: HOSPADM

## 2021-12-24 RX ORDER — POTASSIUM CHLORIDE 7.45 MG/ML
10 INJECTION INTRAVENOUS
Status: DISCONTINUED | OUTPATIENT
Start: 2021-12-24 | End: 2021-12-25 | Stop reason: HOSPADM

## 2021-12-24 RX ADMIN — AMOXICILLIN AND CLAVULANATE POTASSIUM 1 TABLET: 875; 125 TABLET, FILM COATED ORAL at 21:36

## 2021-12-24 RX ADMIN — SODIUM CHLORIDE, PRESERVATIVE FREE 10 ML: 5 INJECTION INTRAVENOUS at 21:36

## 2021-12-24 RX ADMIN — PIPERACILLIN AND TAZOBACTAM 4.5 G: 4; .5 INJECTION, POWDER, LYOPHILIZED, FOR SOLUTION INTRAVENOUS; PARENTERAL at 01:00

## 2021-12-24 RX ADMIN — PANTOPRAZOLE SODIUM 40 MG: 40 TABLET, DELAYED RELEASE ORAL at 09:05

## 2021-12-24 RX ADMIN — OXYCODONE HYDROCHLORIDE AND ACETAMINOPHEN 1 TABLET: 5; 325 TABLET ORAL at 18:15

## 2021-12-24 RX ADMIN — LEVOTHYROXINE SODIUM 75 MCG: 75 TABLET ORAL at 06:49

## 2021-12-24 RX ADMIN — ATORVASTATIN CALCIUM 40 MG: 40 TABLET, FILM COATED ORAL at 21:36

## 2021-12-24 RX ADMIN — ASPIRIN 81 MG: 81 TABLET, COATED ORAL at 09:04

## 2021-12-24 RX ADMIN — PIPERACILLIN AND TAZOBACTAM 4.5 G: 4; .5 INJECTION, POWDER, LYOPHILIZED, FOR SOLUTION INTRAVENOUS; PARENTERAL at 09:05

## 2021-12-24 RX ADMIN — METOPROLOL SUCCINATE 50 MG: 50 TABLET, EXTENDED RELEASE ORAL at 09:04

## 2021-12-24 RX ADMIN — OXYCODONE HYDROCHLORIDE AND ACETAMINOPHEN 1 TABLET: 5; 325 TABLET ORAL at 09:10

## 2021-12-24 NOTE — PLAN OF CARE
Goal Outcome Evaluation:              Outcome Summary: Pain alert and oriented, patient complains of pain 5/10 on pain scale. Pt requests pain medication during this shift. Pt ambulates with standby assist. Continues to have ORESTES drain and gave out 40ml this shift. VSS at this time, will continue to monitor.

## 2021-12-24 NOTE — PROGRESS NOTES
Chart reviewed again by me, all signs currently stable saturating well on room air.  Labs reviewed as well potassium is low and I have placed on electrolyte replacement protocol.  Nursing notes reviewed and patient is well controlled with her pain medication, continues on IV Zosyn had a bowel movement.  Possible discharge today per them.     No other acute changes to plan.

## 2021-12-24 NOTE — PROGRESS NOTES
Chief Complaint: POD # 2    Subjective   Pt reports after taking one bite of her breakfast she has severe RUQ abdominal pain and required pain medication. When she tried lunch, the same thing happened. She is also c/o difficulty swallowing. She has an EGD scheduled with Dr. Sheffield and is on oral Protonix. I have advised the patient that she had a retrocecal appendicitis with a phlegmon and the pain may be related to the area of surgery. She is convinced that it is not related to surgery but to eating.     Objective     Vital signs in last 24 hours:  Temp:  [97.5 °F (36.4 °C)-97.9 °F (36.6 °C)] 97.8 °F (36.6 °C)  Heart Rate:  [66-68] 68  Resp:  [16] 16  BP: (108-155)/(55-66) 155/66    Intake/Output last 3 shifts:  I/O last 3 completed shifts:  In: 3248.3 [P.O.:1920; I.V.:1028.3; IV Piggyback:300]  Out: 1720 [Urine:1650; Drains:70]    Intake/Output this shift:  No intake/output data recorded.    Physical Exam:  General: Up in chair eating lunch and appears comfortable  Respiratory: CTA, good inspiratory effort  CV: RRR  Abd: + BS, soft, ND, usual post-op tenderness, no rebound, no guarding, incisions C/D/I with staples, ORESTES serous 50cc  Ext: no edema    Assessment/Plan   S/P lap appy  RUQ pain associated with eating - requesting to see Dr. Sheffield  Pt is on Protonix   No leukocytosis - change to po antibiotics    Amparo Zurita MD  General, Minimally Invasive and Endoscopic Surgery  [facility]    [address]   [address]    [address]  [place] [address]               [place] [address]    P: [phone]  F: [phone]    Cc:  Laura Ayala, APRN  2

## 2021-12-25 ENCOUNTER — READMISSION MANAGEMENT (OUTPATIENT)
Dept: CALL CENTER | Facility: HOSPITAL | Age: 61
End: 2021-12-25

## 2021-12-25 VITALS
RESPIRATION RATE: 18 BRPM | TEMPERATURE: 97.9 F | OXYGEN SATURATION: 97 % | WEIGHT: 192 LBS | SYSTOLIC BLOOD PRESSURE: 138 MMHG | HEIGHT: 60 IN | BODY MASS INDEX: 37.69 KG/M2 | HEART RATE: 57 BPM | DIASTOLIC BLOOD PRESSURE: 80 MMHG

## 2021-12-25 PROCEDURE — 99024 POSTOP FOLLOW-UP VISIT: CPT | Performed by: SURGERY

## 2021-12-25 PROCEDURE — G0378 HOSPITAL OBSERVATION PER HR: HCPCS

## 2021-12-25 RX ORDER — OXYCODONE HYDROCHLORIDE AND ACETAMINOPHEN 5; 325 MG/1; MG/1
1 TABLET ORAL EVERY 4 HOURS PRN
Qty: 20 TABLET | Refills: 0 | Status: SHIPPED | OUTPATIENT
Start: 2021-12-25 | End: 2021-12-30

## 2021-12-25 RX ADMIN — PANTOPRAZOLE SODIUM 40 MG: 40 TABLET, DELAYED RELEASE ORAL at 06:18

## 2021-12-25 RX ADMIN — LEVOTHYROXINE SODIUM 75 MCG: 75 TABLET ORAL at 06:18

## 2021-12-25 RX ADMIN — METOPROLOL SUCCINATE 50 MG: 50 TABLET, EXTENDED RELEASE ORAL at 09:00

## 2021-12-25 RX ADMIN — ASPIRIN 81 MG: 81 TABLET, COATED ORAL at 09:00

## 2021-12-25 RX ADMIN — OXYCODONE HYDROCHLORIDE AND ACETAMINOPHEN 1 TABLET: 5; 325 TABLET ORAL at 06:18

## 2021-12-25 RX ADMIN — AMOXICILLIN AND CLAVULANATE POTASSIUM 1 TABLET: 875; 125 TABLET, FILM COATED ORAL at 09:00

## 2021-12-25 NOTE — PROGRESS NOTES
Chart again reviewed by me today.  Labs, vital signs, nursing notes reviewed.  Patient remained stable.  No acute changes to medicine plan.  We will continue to follow peripherally.

## 2021-12-25 NOTE — OUTREACH NOTE
Prep Survey      Responses   [facility] facility patient discharged from? [facility]   Is LACE score < 7 ? Yes   Emergency Room discharge w/ pulse ox? No   Eligibility [facility]   Date of Admission 12/22/21   Date of Discharge 12/25/21   Discharge Disposition Home or Self Care   Discharge diagnosis Acute appendicitis  Surgery at 12/22/2021    Does the patient have one of the following disease processes/diagnoses(primary or secondary)? General Surgery   Does the patient have Home health ordered? No   Is there a DME ordered? No   Prep survey completed? Yes          Jenn Martell RN

## 2021-12-25 NOTE — DISCHARGE INSTRUCTIONS
Discharge instructions for appendectomy  Dr. Zurita  [phone]    Post-Op Appendectomy    1. Expect to rest most of the day of surgery, but do get up several times to reduce the risk of getting a clot in your legs.     2. Eat and drink lightly at first. For example: jello, ginger ale, chicken noodle soup, crackers and other bland food types on the day of surgery. Do not take pain pills on an empty stomach.    3. Your clear dressing is water resistant and will wear off. You can take a shower with it in place but do not submerge in water for about one week.     4. It is not uncommon to have right shoulder pain after the surgery from the gas used in laparoscopy, which will usually dissipate within 1-3 days. It should get better, however, and if it does not, call me.     5. It is not unusual that your stamina will be low for a week or so after surgery. Do try to take walks and some mild activity between resting.     6. Do not drive while taking pain medications.     7. No strenuous activity or lifting over 10 pounds for 1 week. You can go up and down stairs, but minimize this, and initially, do one at a time (both feet on one step rather than going up with each step).    8. In general, if you have a sedentary job, you can return to work in 7-10 days. If heavy lifting is required, 2 weeks.     9. It is not unusual to be constipated by the narcotics given during and after surgery. If needed, common over the counter laxatives can be used temporarily.     10. You will need to call for a follow-up appointment after surgery in 2 weeks.      Amparo Zurita MD  General, Minimally Invasive and Endoscopic Surgery  [facility]    [address] [facility] [address]    [address]  [place] [id number]               [place] [address]    P: [phone]  F: [phone]    Cc:  Laura Ayala APRN

## 2021-12-25 NOTE — PROGRESS NOTES
Chief Complaint: POD # 3    Subjective   Patient reports she is no longer having any pain and is able to eat, positive bowel movements  Objective     Vital signs in last 24 hours:  Temp:  [97.5 °F (36.4 °C)-97.9 °F (36.6 °C)] 97.9 °F (36.6 °C)  Heart Rate:  [50-60] 57  Resp:  [18-20] 18  BP: (127-138)/(78-80) 138/80    Intake/Output last 3 shifts:  I/O last 3 completed shifts:  In: 1080 [P.O.:1080]  Out: 45 [Drains:45]    Intake/Output this shift:  I/O this shift:  In: 120 [P.O.:120]  Out: -     Physical Exam:  Respiratory: CTA, good inspiratory effort  CV: RRR  Abd: Positive BS, soft, ND, usual post-op tenderness, no rebound, no guarding, incisions look good, ORESTES serous 25 cc  Results from last 7 days   Lab Units 12/23/21  0421   WBC 10*3/mm3 8.08   HEMOGLOBIN g/dL 10.6*   HEMATOCRIT % 31.9*   PLATELETS 10*3/mm3 248         Assessment/Plan   S/P laparoscopic appendectomy  DC drain  DC home follow-up with Dr. Godoy for staple removal in 2 weeks      Amparo Zurita MD  General, Minimally Invasive and Endoscopic Surgery  [facility] [place], operated by [facility]    [address] [facility] [address] [place] [address] [address]    [address]  [place] [address]               [place] [address]    P: [phone]  F: [phone]    Cc:  Laura Ayala APRN

## 2021-12-25 NOTE — PLAN OF CARE
Goal Outcome Evaluation:  Plan of Care Reviewed With: patient        Progress: improving  Outcome Summary: vss. pt medicated for pain x2 for R upper abdomen. ORESTES drain in place with 25cc output this shift. pt ambulated in room and hallway. no other complaints at this time.

## 2021-12-25 NOTE — PLAN OF CARE
Goal Outcome Evaluation:  Plan of Care Reviewed With: patient        Progress: improving  Outcome Summary: VSS. Pt c/o severe gas pains. No request for pain meds. ORESTES drain in place with little serosanguinous output. Ambulated in hallway through out the hospital twice

## 2021-12-26 DIAGNOSIS — M51.9 DISC DISORDER OF THORACIC REGION: ICD-10-CM

## 2021-12-26 NOTE — CASE MANAGEMENT/SOCIAL WORK
Case Management Discharge Note      Final Note: DC home    Provided Post Acute Provider List?: Refused  Refused Provider List Comment: Pt declined  Provided Post Acute Provider Quality & Resource List?: Refused  Refused Quality and Resource List Comment: Pt declined    Selected Continued Care - Discharged on 12/25/2021 Admission date: 12/22/2021 - Discharge disposition: Home or Self Care    Destination    No services have been selected for the patient.              Durable Medical Equipment    No services have been selected for the patient.              Dialysis/Infusion    No services have been selected for the patient.              Home Medical Care    No services have been selected for the patient.              Therapy    No services have been selected for the patient.              Community Resources    No services have been selected for the patient.              Community & DME    No services have been selected for the patient.                       Final Discharge Disposition Code: 01 - home or self-care

## 2021-12-27 ENCOUNTER — TRANSITIONAL CARE MANAGEMENT TELEPHONE ENCOUNTER (OUTPATIENT)
Dept: CALL CENTER | Facility: HOSPITAL | Age: 61
End: 2021-12-27

## 2021-12-27 LAB
LAB AP CASE REPORT: NORMAL
LAB AP DIAGNOSIS COMMENT: NORMAL
PATH REPORT.FINAL DX SPEC: NORMAL
PATH REPORT.GROSS SPEC: NORMAL

## 2021-12-27 NOTE — OUTREACH NOTE
Call Center TCM Note      Responses   [facility] patient discharged from? [place]   Does the patient have one of the following disease processes/diagnoses(primary or secondary)? General Surgery   TCM attempt successful? Yes   Call start time [phone]   Call end time [phone]   Discharge diagnosis Acute appendicitis  Surgery at 12/22/2021    Person spoke with today (if not patient) and relationship All-spouse   Meds reviewed with patient/caregiver? Yes   Is the patient having any side effects they believe may be caused by any medication additions or changes? No   Does the patient have all medications related to this admission filled (includes all antibiotics, pain medications, etc.) Yes   Is the patient taking all medications as directed (includes completed medication regime)? Yes   Does the patient have a follow up appointment scheduled with their surgeon? No   What is preventing the patient from scheduling follow up appointments? Haven't had time   Nursing Interventions Advised patient to make appointment  [Pt will call when she gets up today]   Has the patient kept scheduled appointments due by today? N/A   Comments Hospital d/c PCP f/u appt on 12/30/21 @ 3:15pm   Has home health visited the patient within 72 hours of discharge? N/A   Psychosocial issues? No   Did the patient receive a copy of their discharge instructions? Yes   Nursing interventions Reviewed instructions with patient   What is the patient's perception of their health status since discharge? Same   Nursing interventions Nurse provided patient education   Is the patient /caregiver able to teach back basic post-op care? Continue use of incentive spirometry at least 1 week post discharge,  Drive as instructed by MD in discharge instructions,  Take showers only when approved by MD-sponge bathe until then,  No tub bath, swimming, or hot tub until instructed by MD,  Keep incision areas clean,dry and protected,  Lifting as instructed by MD in discharge  instructions   Is the patient/caregiver able to teach back signs and symptoms of incisional infection? Increased redness, swelling or pain at the incisonal site,  Increased drainage or bleeding,  Fever   Is the patient/caregiver able to teach back steps to recovery at home? Set small, achievable goals for return to baseline health,  Rest and rebuild strength, gradually increase activity,  Eat a well-balance diet   Is the patient/caregiver able to teach back the hierarchy of who to call/visit for symptoms/problems? PCP, Specialist, Home health nurse, Urgent Care, ED, 911 Yes   TCM call completed? Yes          CHITO GONZALEZ RN    12/27/2021, 08:44 EST

## 2021-12-27 NOTE — TELEPHONE ENCOUNTER
Rx Refill Note  Requested Prescriptions     Pending Prescriptions Disp Refills    traMADol (ULTRAM) 50 MG tablet [Pharmacy Med Name: traMADol HCl 50 MG Oral Tablet] 30 tablet 0     Sig: TAKE 1 TABLET BY MOUTH EVERY 8 HOURS AS NEEDED FOR SEVERE PAIN      Last office visit with prescribing clinician: 12/22/2021      Next office visit with prescribing clinician: 3/30/2022            Sujey Valadez MA  12/27/21, 08:08 EST

## 2021-12-28 ENCOUNTER — TELEPHONE (OUTPATIENT)
Dept: SURGERY | Facility: CLINIC | Age: 61
End: 2021-12-28

## 2021-12-28 DIAGNOSIS — K12.1 STOMATITIS: Primary | ICD-10-CM

## 2021-12-28 RX ORDER — TRAMADOL HYDROCHLORIDE 50 MG/1
TABLET ORAL
Qty: 30 TABLET | Refills: 0 | OUTPATIENT
Start: 2021-12-28

## 2021-12-28 NOTE — TELEPHONE ENCOUNTER
Pt had surgery on 12/22/21 and discharged from hospital on 12/25/21. Today she is calling stating that the hide has come off of her tongue. She states that it has been since Sunday. She states- I'm not sure if this is a problem or not but I have lost the coating on my tongue and my mouth and throat is raw.

## 2022-01-03 ENCOUNTER — OFFICE VISIT (OUTPATIENT)
Dept: SURGERY | Facility: CLINIC | Age: 62
End: 2022-01-03

## 2022-01-03 ENCOUNTER — TELEPHONE (OUTPATIENT)
Dept: GASTROENTEROLOGY | Facility: CLINIC | Age: 62
End: 2022-01-03

## 2022-01-03 VITALS
SYSTOLIC BLOOD PRESSURE: 132 MMHG | BODY MASS INDEX: 35.68 KG/M2 | DIASTOLIC BLOOD PRESSURE: 80 MMHG | WEIGHT: 189 LBS | HEIGHT: 61 IN

## 2022-01-03 DIAGNOSIS — Z09 SURGICAL FOLLOW-UP CARE: Primary | ICD-10-CM

## 2022-01-03 PROCEDURE — 99024 POSTOP FOLLOW-UP VISIT: CPT | Performed by: SURGERY

## 2022-01-03 NOTE — TELEPHONE ENCOUNTER
PATIENT SAW DR. AYOUB TODAY.  SCHEDULED FOR EGD 01/10/2022.  NEEDS TO BE PUSHED BACK.    RESCHEDULE TO [facility] ON 05/04/2022 AT 12PM - ARRIVE 11AM.  GAVE HER NEW INSTRUCTIONS.    COVID TEST ON 05/02/2022, WILL CALL WITH MAURICIO BUI.

## 2022-01-03 NOTE — PROGRESS NOTES
Patient presents for 12 day lap appy.  states that since surgery she has had raw feeling in her mouth. The on call doctor sent in Jenn's magic mouth wash but the patient states that it is not helping.  Complains of a yeast infection of her mouth and her vagina.  We will put her on nystatin swish and swallow a prescription has been sent.  Her incision is healing well.  Her sutures were removed.  Activity level was discussed.  Her pathology was discussed.  Apparently she was not discharged on any antibiotics but she says she is not having fevers or chills.

## 2022-01-04 DIAGNOSIS — Z01.818 OTHER SPECIFIED PRE-OPERATIVE EXAMINATION: Primary | ICD-10-CM

## 2022-01-04 RX ORDER — FLUCONAZOLE 150 MG/1
150 TABLET ORAL ONCE
Qty: 1 TABLET | Refills: 0 | Status: SHIPPED | OUTPATIENT
Start: 2022-01-04 | End: 2022-01-04

## 2022-01-05 ENCOUNTER — OFFICE VISIT (OUTPATIENT)
Dept: INTERNAL MEDICINE | Facility: CLINIC | Age: 62
End: 2022-01-05

## 2022-01-05 VITALS
BODY MASS INDEX: 35.74 KG/M2 | RESPIRATION RATE: 20 BRPM | HEART RATE: 67 BPM | OXYGEN SATURATION: 98 % | TEMPERATURE: 98.7 F | DIASTOLIC BLOOD PRESSURE: 80 MMHG | SYSTOLIC BLOOD PRESSURE: 126 MMHG | WEIGHT: 189.3 LBS | HEIGHT: 61 IN

## 2022-01-05 DIAGNOSIS — M79.673 CHRONIC FOOT PAIN, UNSPECIFIED LATERALITY: Primary | ICD-10-CM

## 2022-01-05 DIAGNOSIS — M51.9 DISC DISORDER OF THORACIC REGION: ICD-10-CM

## 2022-01-05 DIAGNOSIS — M05.79 RHEUMATOID ARTHRITIS WITH RHEUMATOID FACTOR OF MULTIPLE SITES WITHOUT ORGAN OR SYSTEMS INVOLVEMENT: ICD-10-CM

## 2022-01-05 DIAGNOSIS — G89.29 CHRONIC FOOT PAIN, UNSPECIFIED LATERALITY: Primary | ICD-10-CM

## 2022-01-05 PROCEDURE — 99213 OFFICE O/P EST LOW 20 MIN: CPT | Performed by: INTERNAL MEDICINE

## 2022-01-05 RX ORDER — TRAMADOL HYDROCHLORIDE 50 MG/1
50 TABLET ORAL EVERY 8 HOURS PRN
Qty: 30 TABLET | Refills: 0 | Status: SHIPPED | OUTPATIENT
Start: 2022-01-05

## 2022-01-05 NOTE — PROGRESS NOTES
Shaylee Wadsworth is a 61 y.o. female, who presents with a chief complaint of   Chief Complaint   Patient presents with   • Foot Pain           HPI   Pt c/o pain in both feet. R>L.  She says the pain is right at the ball of her foot.  Most shoes are difficult for her to wear.  The pain gets worse with activity then takes hours for it to go away.  The pain has been there for a few years and just wont go away.  Pt has a history of Rheumatoid arthritis and follows with rheumatology.  She would like to go see podiatry.      The following portions of the patient's history were reviewed and updated as appropriate: allergies, current medications, past family history, past medical history, past social history, past surgical history and problem list.    Allergies: Meloxicam and Effexor xr [venlafaxine hcl er]    Review of Systems   Constitutional: Negative.    HENT: Negative.    Eyes: Negative.    Respiratory: Negative.    Cardiovascular: Negative.    Gastrointestinal: Negative.    Endocrine: Negative.    Genitourinary: Negative.    Musculoskeletal: Negative.         Bilat foot pain   Skin: Negative.    Allergic/Immunologic: Negative.    Neurological: Negative.    Hematological: Negative.    Psychiatric/Behavioral: Negative.    All other systems reviewed and are negative.            Wt Readings from Last 3 Encounters:   01/05/22 85.9 kg (189 lb 4.8 oz)   01/03/22 85.7 kg (189 lb)   12/22/21 87.1 kg (192 lb)     Temp Readings from Last 3 Encounters:   01/05/22 98.7 °F (37.1 °C) (Temporal)   12/25/21 97.9 °F (36.6 °C) (Oral)   12/22/21 97.4 °F (36.3 °C) (Temporal)     BP Readings from Last 3 Encounters:   01/05/22 126/80   01/03/22 132/80   12/25/21 138/80     Pulse Readings from Last 3 Encounters:   01/05/22 67   12/25/21 57   12/22/21 78     Body mass index is 35.79 kg/m².  SpO2 Readings from Last 3 Encounters:   01/05/22 98%   12/25/21 97%   12/22/21 98%          Physical Exam  Vitals and nursing note reviewed.    Constitutional:       General: She is not in acute distress.     Appearance: She is well-developed.   HENT:      Head: Normocephalic and atraumatic.      Right Ear: External ear normal.      Left Ear: External ear normal.      Nose: Nose normal.   Eyes:      Conjunctiva/sclera: Conjunctivae normal.      Pupils: Pupils are equal, round, and reactive to light.   Cardiovascular:      Rate and Rhythm: Normal rate and regular rhythm.      Heart sounds: Normal heart sounds.   Pulmonary:      Effort: Pulmonary effort is normal. No respiratory distress.      Breath sounds: Normal breath sounds. No wheezing.   Musculoskeletal:         General: Normal range of motion.      Cervical back: Normal range of motion and neck supple.      Comments: ttp of feet with worst spot at distal 4th metatarsal on right foot.   Skin:     General: Skin is warm and dry.   Neurological:      Mental Status: She is alert and oriented to person, place, and time.   Psychiatric:         Behavior: Behavior normal.         Thought Content: Thought content normal.         Judgment: Judgment normal.         Results for orders placed or performed during the hospital encounter of 12/22/21   COVID-19,[employer] [employer] IN-HOUSE,Nasal Swab No Transport Media 3-4 HR TAT - Swab, Nasal Cavity    Specimen: Nasal Cavity; Swab   Result Value Ref Range    COVID19 Not Detected Not Detected - Ref. Range   Comprehensive Metabolic Panel    Specimen: Blood   Result Value Ref Range    Glucose 137 (H) 65 - 99 mg/dL    BUN 6 6 - 20 mg/dL    Creatinine 0.81 0.57 - 1.00 mg/dL    Sodium 134 (L) 136 - 145 mmol/L    Potassium 3.1 (L) 3.5 - 5.2 mmol/L    Chloride 95 (L) 98 - 107 mmol/L    CO2 25.1 22.0 - 29.0 mmol/L    Calcium 8.7 8.6 - 10.5 mg/dL    Total Protein 6.7 6.0 - 8.5 g/dL    Albumin 4.00 3.50 - 5.20 g/dL    ALT (SGPT) 7 1 - 33 U/L    AST (SGOT) 12 1 - 32 U/L    Alkaline Phosphatase 96 39 - 117 U/L    Total Bilirubin 1.0 0.0 - 1.2 mg/dL    eGFR Non African Amer 72 >60  mL/min/1.73    Globulin 2.7 gm/dL    A/G Ratio 1.5 g/dL    BUN/Creatinine Ratio 7.4 7.0 - 25.0    Anion Gap 13.9 5.0 - 15.0 mmol/L   CBC Auto Differential    Specimen: Blood   Result Value Ref Range    WBC 10.66 3.40 - 10.80 10*3/mm3    RBC 3.96 3.77 - 5.28 10*6/mm3    Hemoglobin 11.5 (L) 12.0 - 15.9 g/dL    Hematocrit 33.8 (L) 34.0 - 46.6 %    MCV 85.4 79.0 - 97.0 fL    MCH 29.0 26.6 - 33.0 pg    MCHC 34.0 31.5 - 35.7 g/dL    RDW 13.3 12.3 - 15.4 %    RDW-SD 41.1 37.0 - 54.0 fl    MPV 9.2 6.0 - 12.0 fL    Platelets 272 140 - 450 10*3/mm3    Neutrophil % 73.3 42.7 - 76.0 %    Lymphocyte % 19.7 19.6 - 45.3 %    Monocyte % 6.3 5.0 - 12.0 %    Eosinophil % 0.2 (L) 0.3 - 6.2 %    Basophil % 0.3 0.0 - 1.5 %    Immature Grans % 0.2 0.0 - 0.5 %    Neutrophils, Absolute 7.82 (H) 1.70 - 7.00 10*3/mm3    Lymphocytes, Absolute 2.10 0.70 - 3.10 10*3/mm3    Monocytes, Absolute 0.67 0.10 - 0.90 10*3/mm3    Eosinophils, Absolute 0.02 0.00 - 0.40 10*3/mm3    Basophils, Absolute 0.03 0.00 - 0.20 10*3/mm3    Immature Grans, Absolute 0.02 0.00 - 0.05 10*3/mm3   Basic Metabolic Panel    Specimen: Blood   Result Value Ref Range    Glucose 196 (H) 65 - 99 mg/dL    BUN 8 6 - 20 mg/dL    Creatinine 0.76 0.57 - 1.00 mg/dL    Sodium 134 (L) 136 - 145 mmol/L    Potassium 3.1 (L) 3.5 - 5.2 mmol/L    Chloride 97 (L) 98 - 107 mmol/L    CO2 24.0 22.0 - 29.0 mmol/L    Calcium 8.5 (L) 8.6 - 10.5 mg/dL    eGFR Non African Amer 78 >60 mL/min/1.73    BUN/Creatinine Ratio 10.5 7.0 - 25.0    Anion Gap 13.0 5.0 - 15.0 mmol/L   CBC Auto Differential    Specimen: Blood   Result Value Ref Range    WBC 8.08 3.40 - 10.80 10*3/mm3    RBC 3.63 (L) 3.77 - 5.28 10*6/mm3    Hemoglobin 10.6 (L) 12.0 - 15.9 g/dL    Hematocrit 31.9 (L) 34.0 - 46.6 %    MCV 87.9 79.0 - 97.0 fL    MCH 29.2 26.6 - 33.0 pg    MCHC 33.2 31.5 - 35.7 g/dL    RDW 13.5 12.3 - 15.4 %    RDW-SD 43.0 37.0 - 54.0 fl    MPV 9.7 6.0 - 12.0 fL    Platelets 248 140 - 450 10*3/mm3    Neutrophil % 89.5  "(H) 42.7 - 76.0 %    Lymphocyte % 8.4 (L) 19.6 - 45.3 %    Monocyte % 1.5 (L) 5.0 - 12.0 %    Eosinophil % 0.0 (L) 0.3 - 6.2 %    Basophil % 0.1 0.0 - 1.5 %    Immature Grans % 0.5 0.0 - 0.5 %    Neutrophils, Absolute 7.23 (H) 1.70 - 7.00 10*3/mm3    Lymphocytes, Absolute 0.68 (L) 0.70 - 3.10 10*3/mm3    Monocytes, Absolute 0.12 0.10 - 0.90 10*3/mm3    Eosinophils, Absolute 0.00 0.00 - 0.40 10*3/mm3    Basophils, Absolute 0.01 0.00 - 0.20 10*3/mm3    Immature Grans, Absolute 0.04 0.00 - 0.05 10*3/mm3    nRBC 0.0 0.0 - 0.2 /100 WBC   Magnesium    Specimen: Blood   Result Value Ref Range    Magnesium 1.5 (L) 1.6 - 2.6 mg/dL   Magnesium    Specimen: Blood   Result Value Ref Range    Magnesium 2.1 1.6 - 2.6 mg/dL   Potassium    Specimen: Blood   Result Value Ref Range    Potassium 3.8 3.5 - 5.2 mmol/L   ECG 12 Lead   Result Value Ref Range    QT Interval 390 ms   Tissue Pathology Exam    Specimen: Large Intestine, Appendix; Tissue   Result Value Ref Range    Case Report       Surgical Pathology Report                         Case: [id number]                                  Authorizing Provider:  Maciel Godoy MD        Collected:           12/22/2021 06:56 PM          Ordering Location:     [facility]   Received:            12/22/2021 08:02 PM                                 OR                                                                           Pathologist:           Jarocho Garza MD                                                         Specimen:    Large Intestine, Appendix                                                                  Final Diagnosis       1. Appendix, Appendectomy:  Benign appendix with   A. Acute appendicitis.   B. Serositis.    Mariano/kds      Comment       There is full thickness wall necrosis and in areas suggesting perforation.     Mariano/kds      Gross Description       1.  Received in formalin labeled with the patient's name and designated \"appendix\" and consists of " an intact appendix that is 5.7 cm in length with an average diameter of 1.0 cm.  The specimen is received with 1.5 cm of attached grossly unremarkable mesoappendix.  The serosal surface displays pink tan fibrous adhesions.  The specimen is serially sectioned to reveal the lumen to be partially filled with pink tan semi-solid material.  The proximal margin is inked black.  The specimen displays an average 0.2 cm wall thickness.  Representative sections of the specimen are submitted in cassettes 1A and 1B.    pf/uso/mec/jse        Result Review :                  Assessment and Plan    Diagnoses and all orders for this visit:    1. Chronic foot pain, unspecified laterality (Primary)  -     Ambulatory Referral to Podiatry  -     traMADol (ULTRAM) 50 MG tablet; Take 1 tablet by mouth Every 8 (Eight) Hours As Needed for Severe Pain .  Dispense: 30 tablet; Refill: 0    2. Rheumatoid arthritis with rheumatoid factor of multiple sites without organ or systems involvement (HCC)  -     Ambulatory Referral to Podiatry  -     traMADol (ULTRAM) 50 MG tablet; Take 1 tablet by mouth Every 8 (Eight) Hours As Needed for Severe Pain .  Dispense: 30 tablet; Refill: 0    3. Disc disorder of thoracic region  -     traMADol (ULTRAM) 50 MG tablet; Take 1 tablet by mouth Every 8 (Eight) Hours As Needed for Severe Pain .  Dispense: 30 tablet; Refill: 0       Tylenol/advil for pain PRN.  She takes tramadol PRN.       Outpatient Medications Prior to Visit   Medication Sig Dispense Refill   • amLODIPine (NORVASC) 5 MG tablet Take 1 tablet by mouth once daily 90 tablet 0   • aspirin 81 MG tablet Take 81 mg by mouth Daily.     • atorvastatin (LIPITOR) 40 MG tablet Take 1 tablet by mouth Every Night. 90 tablet 3   • cyclobenzaprine (FLEXERIL) 10 MG tablet Take 10 mg by mouth 2 (Two) Times a Day As Needed.     • ENBREL SURECLICK 50 MG/ML solution auto-injector Every 7 (Seven) Days.     • esomeprazole (nexIUM) 40 MG capsule Take 1 capsule by mouth  2 (Two) Times a Day. (Patient taking differently: Take 40 mg by mouth Every Morning Before Breakfast.) 90 capsule 3   • ferrous sulfate 325 (65 FE) MG tablet Take 1 tablet by mouth Daily With Breakfast. 30 tablet 3   • folic acid (FOLVITE) 1 MG tablet Take 1 mg by mouth Daily. 1 to 4 tablets daily     • levothyroxine (SYNTHROID, LEVOTHROID) 75 MCG tablet Take 1 tablet by mouth Daily. 30 tablet 11   • magic mouthwash oral suspension Swish and spit 5 mL Every 4 (Four) Hours As Needed for Stomatitis. 1 Part viscous lidocaine 2%  1 Part Maalox.  1 Part diphenhydramine 12.5 mg per 5 ml elixir. 1 bottle 0   • methotrexate 2.5 MG tablet TAKE 4 TABLETS BY MOUTH ONCE A WEEK 32 tablet 6   • metoprolol succinate XL (TOPROL-XL) 50 MG 24 hr tablet Take 1 tablet by mouth once daily 90 tablet 3   • nystatin (MYCOSTATIN) 100,000 unit/mL suspension Swish and swallow 5 mL 4 (Four) Times a Day for 5 days. 5 mL 1   • olmesartan (BENICAR) 40 MG tablet Take 1 tablet by mouth once daily 90 tablet 0   • potassium chloride (K-DUR,KLOR-CON) 20 MEQ CR tablet Take 1 tablet by mouth twice daily (Patient taking differently: Take 20 mEq by mouth Daily.) 180 tablet 0   • vitamin D (ERGOCALCIFEROL) 1.25 MG (70386 UT) capsule capsule Take 1 capsule by mouth 1 (One) Time Per Week. 12 capsule 3   • traMADol (ULTRAM) 50 MG tablet Take 1 tablet by mouth Every 8 (Eight) Hours As Needed for Severe Pain . 30 tablet 0     No facility-administered medications prior to visit.     New Medications Ordered This Visit   Medications   • traMADol (ULTRAM) 50 MG tablet     Sig: Take 1 tablet by mouth Every 8 (Eight) Hours As Needed for Severe Pain .     Dispense:  30 tablet     Refill:  0     [unfilled]  Medications Discontinued During This Encounter   Medication Reason   • traMADol (ULTRAM) 50 MG tablet Reorder         No follow-ups on file.    Patient was given instructions and counseling regarding her condition or for health maintenance advice. Please see  specific information pulled into the AVS if appropriate.

## 2022-01-07 ENCOUNTER — APPOINTMENT (OUTPATIENT)
Dept: LAB | Facility: HOSPITAL | Age: 62
End: 2022-01-07

## 2022-01-27 ENCOUNTER — OFFICE VISIT (OUTPATIENT)
Dept: ENDOCRINOLOGY | Age: 62
End: 2022-01-27

## 2022-01-27 DIAGNOSIS — R73.01 IMPAIRED FASTING BLOOD SUGAR: ICD-10-CM

## 2022-01-27 DIAGNOSIS — E55.9 VITAMIN D DEFICIENCY: ICD-10-CM

## 2022-01-27 DIAGNOSIS — E21.0 PRIMARY HYPERPARATHYROIDISM: Primary | ICD-10-CM

## 2022-01-27 DIAGNOSIS — E78.2 HYPERLIPEMIA, MIXED: ICD-10-CM

## 2022-01-27 DIAGNOSIS — E03.9 ACQUIRED HYPOTHYROIDISM: ICD-10-CM

## 2022-01-27 DIAGNOSIS — I25.118 CORONARY ARTERY DISEASE OF NATIVE ARTERY OF NATIVE HEART WITH STABLE ANGINA PECTORIS: ICD-10-CM

## 2022-01-27 PROCEDURE — 99214 OFFICE O/P EST MOD 30 MIN: CPT | Performed by: INTERNAL MEDICINE

## 2022-01-27 NOTE — PROGRESS NOTES
Chief Complaint  Chief Complaint   Patient presents with   • hyperparathyroidism       Subjective          History of Present Illness    Shaylee Wadsworth,61 y.o.  is here for as F/u for the evaluation of hyperparathyroidism, hypothyroidism.     Hypothyroidism - on levothyroxine 75 mcg oral daily.   Energy levels are low, believes she also has anemia and has pending - EGD and colonoscopy.     Hx of RA - sees rheumatologist.     Hyperparathyroidism - patient still continues to complain of fatigue but it is no worse or better than last time.  No kidney stones.  Does have osteopenia, bone density scan was performed by primary care in 2019.  She is due for another bone density scan but would like to proceed with that through the primary care.  No history of fragility fractures.    Reviewed primary care physician's/consulting physician documentation and lab results       You have chosen to receive care through a telephone visit. Do you consent to use a telephone visit for your medical care today? Yes        Interval hx -   Patient has been suffering with chronic fatigue for the last 3 years mainly since 2018 and the symptom has been gradually getting worse.  Her tremors are not limited to her hands alone but she has them throughout her body.  They also result in a mild trembling in her voice.  Patient has been extensively worked up by 2 neurologist at [facility] and has been given 2 different diagnoses one was labeled as functional neurological disorder and the other one was benign paroxysmal positional vertigo.     In 2016 patient was noted to have elevated parathyroid levels and calcium levels she underwent right inferior parathyroidectomy with normalization of the parathyroid levels.  In 1 of her prior visits complete hormonal panel was checked to see if any of her hormonal abnormalities was contributing to her extreme fatigue, tremors and pain.  Most of her hormonal panel apart from the parathyroid levels are  within normal limits.     I even discussed with Dr. Martinez-ENT that another parathyroid surgery might help with her parathyroid levels but would not improve her tremors and as a result the surgery was canceled and her parathyroid levels were continued to be monitored.    I have reviewed the patient's allergies, medicines, past medical hx, family hx and social hx in detail.    Objective   Vital Signs:   Physical Exam   General appearance-pleasant, no distress  Respiratory-normal breathing appreciated.  No respiratory distress noted  Ear nose and throat-no hard of hearing.  Neurological assessment-alert and oriented x3.    Result Review :   The following data was reviewed by: Marcel Caraballo MD on 01/27/2022:  Admission on 12/22/2021, Discharged on 12/25/2021   Component Date Value Ref Range Status   • Glucose 12/22/2021 137* 65 - 99 mg/dL Final   • BUN 12/22/2021 6  6 - 20 mg/dL Final   • Creatinine 12/22/2021 0.81  0.57 - 1.00 mg/dL Final   • Sodium 12/22/2021 134* 136 - 145 mmol/L Final   • Potassium 12/22/2021 3.1* 3.5 - 5.2 mmol/L Final   • Chloride 12/22/2021 95* 98 - 107 mmol/L Final   • CO2 12/22/2021 25.1  22.0 - 29.0 mmol/L Final   • Calcium 12/22/2021 8.7  8.6 - 10.5 mg/dL Final   • Total Protein 12/22/2021 6.7  6.0 - 8.5 g/dL Final   • Albumin 12/22/2021 4.00  3.50 - 5.20 g/dL Final   • ALT (SGPT) 12/22/2021 7  1 - 33 U/L Final   • AST (SGOT) 12/22/2021 12  1 - 32 U/L Final   • Alkaline Phosphatase 12/22/2021 96  39 - 117 U/L Final   • Total Bilirubin 12/22/2021 1.0  0.0 - 1.2 mg/dL Final   • eGFR Non  Amer 12/22/2021 72  >60 mL/min/1.73 Final   • Globulin 12/22/2021 2.7  gm/dL Final   • A/G Ratio 12/22/2021 1.5  g/dL Final   • BUN/Creatinine Ratio 12/22/2021 7.4  7.0 - 25.0 Final   • Anion Gap 12/22/2021 13.9  5.0 - 15.0 mmol/L Final   • QT Interval 12/22/2021 390  ms Final   • COVID19 12/22/2021 Not Detected  Not Detected - Ref. Range Final   • WBC 12/22/2021 10.66  3.40 - 10.80 10*3/mm3 Final   •  RBC 12/22/2021 3.96  3.77 - 5.28 10*6/mm3 Final   • Hemoglobin 12/22/2021 11.5* 12.0 - 15.9 g/dL Final   • Hematocrit 12/22/2021 33.8* 34.0 - 46.6 % Final   • MCV 12/22/2021 85.4  79.0 - 97.0 fL Final   • MCH 12/22/2021 29.0  26.6 - 33.0 pg Final   • MCHC 12/22/2021 34.0  31.5 - 35.7 g/dL Final   • RDW 12/22/2021 13.3  12.3 - 15.4 % Final   • RDW-SD 12/22/2021 41.1  37.0 - 54.0 fl Final   • MPV 12/22/2021 9.2  6.0 - 12.0 fL Final   • Platelets 12/22/2021 272  140 - 450 10*3/mm3 Final   • Neutrophil % 12/22/2021 73.3  42.7 - 76.0 % Final   • Lymphocyte % 12/22/2021 19.7  19.6 - 45.3 % Final   • Monocyte % 12/22/2021 6.3  5.0 - 12.0 % Final   • Eosinophil % 12/22/2021 0.2* 0.3 - 6.2 % Final   • Basophil % 12/22/2021 0.3  0.0 - 1.5 % Final   • Immature Grans % 12/22/2021 0.2  0.0 - 0.5 % Final   • Neutrophils, Absolute 12/22/2021 7.82* 1.70 - 7.00 10*3/mm3 Final   • Lymphocytes, Absolute 12/22/2021 2.10  0.70 - 3.10 10*3/mm3 Final   • Monocytes, Absolute 12/22/2021 0.67  0.10 - 0.90 10*3/mm3 Final   • Eosinophils, Absolute 12/22/2021 0.02  0.00 - 0.40 10*3/mm3 Final   • Basophils, Absolute 12/22/2021 0.03  0.00 - 0.20 10*3/mm3 Final   • Immature Grans, Absolute 12/22/2021 0.02  0.00 - 0.05 10*3/mm3 Final   • Case Report 12/22/2021    Final                    Value:Surgical Pathology Report                         Case: [id number]                                  Authorizing Provider:  Maciel Godoy MD        Collected:           12/22/2021 06:56 PM          Ordering Location:     [facility]   Received:            12/22/2021 08:02 PM                                 OR                                                                           Pathologist:           Jarocho Garza MD                                                         Specimen:    Large Intestine, Appendix                                                                 • Final Diagnosis 12/22/2021    Final                     Value:This result contains rich text formatting which cannot be displayed here.   • Comment 12/22/2021    Final                    Value:This result contains rich text formatting which cannot be displayed here.   • Gross Description 12/22/2021    Final                    Value:This result contains rich text formatting which cannot be displayed here.   • Glucose 12/23/2021 196* 65 - 99 mg/dL Final   • BUN 12/23/2021 8  6 - 20 mg/dL Final   • Creatinine 12/23/2021 0.76  0.57 - 1.00 mg/dL Final   • Sodium 12/23/2021 134* 136 - 145 mmol/L Final   • Potassium 12/23/2021 3.1* 3.5 - 5.2 mmol/L Final   • Chloride 12/23/2021 97* 98 - 107 mmol/L Final   • CO2 12/23/2021 24.0  22.0 - 29.0 mmol/L Final   • Calcium 12/23/2021 8.5* 8.6 - 10.5 mg/dL Final   • eGFR Non  Amer 12/23/2021 78  >60 mL/min/1.73 Final   • BUN/Creatinine Ratio 12/23/2021 10.5  7.0 - 25.0 Final   • Anion Gap 12/23/2021 13.0  5.0 - 15.0 mmol/L Final   • WBC 12/23/2021 8.08  3.40 - 10.80 10*3/mm3 Final   • RBC 12/23/2021 3.63* 3.77 - 5.28 10*6/mm3 Final   • Hemoglobin 12/23/2021 10.6* 12.0 - 15.9 g/dL Final   • Hematocrit 12/23/2021 31.9* 34.0 - 46.6 % Final   • MCV 12/23/2021 87.9  79.0 - 97.0 fL Final   • MCH 12/23/2021 29.2  26.6 - 33.0 pg Final   • MCHC 12/23/2021 33.2  31.5 - 35.7 g/dL Final   • RDW 12/23/2021 13.5  12.3 - 15.4 % Final   • RDW-SD 12/23/2021 43.0  37.0 - 54.0 fl Final   • MPV 12/23/2021 9.7  6.0 - 12.0 fL Final   • Platelets 12/23/2021 248  140 - 450 10*3/mm3 Final   • Neutrophil % 12/23/2021 89.5* 42.7 - 76.0 % Final   • Lymphocyte % 12/23/2021 8.4* 19.6 - 45.3 % Final   • Monocyte % 12/23/2021 1.5* 5.0 - 12.0 % Final   • Eosinophil % 12/23/2021 0.0* 0.3 - 6.2 % Final   • Basophil % 12/23/2021 0.1  0.0 - 1.5 % Final   • Immature Grans % 12/23/2021 0.5  0.0 - 0.5 % Final   • Neutrophils, Absolute 12/23/2021 7.23* 1.70 - 7.00 10*3/mm3 Final   • Lymphocytes, Absolute 12/23/2021 0.68* 0.70 - 3.10 10*3/mm3 Final   • Monocytes, Absolute  12/23/2021 0.12  0.10 - 0.90 10*3/mm3 Final   • Eosinophils, Absolute 12/23/2021 0.00  0.00 - 0.40 10*3/mm3 Final   • Basophils, Absolute 12/23/2021 0.01  0.00 - 0.20 10*3/mm3 Final   • Immature Grans, Absolute 12/23/2021 0.04  0.00 - 0.05 10*3/mm3 Final   • nRBC 12/23/2021 0.0  0.0 - 0.2 /100 WBC Final   • Magnesium 12/23/2021 1.5* 1.6 - 2.6 mg/dL Final   • Magnesium 12/23/2021 2.1  1.6 - 2.6 mg/dL Final   • Potassium 12/23/2021 3.8  3.5 - 5.2 mmol/L Final     Data reviewed: hosptial notes       Results Review:    I reviewed the patient's new clinical results.     Assessment and Plan    Problem List Items Addressed This Visit        Other    Hypothyroidism    Relevant Orders    TSH    T4, Free    T3, Free    Basic Metabolic Panel    Hemoglobin A1c    Vitamin D 25 Hydroxy    Vitamin B12 & Folate    PTH, Intact      Other Visit Diagnoses     Primary hyperparathyroidism (HCC)    -  Primary    Relevant Orders    TSH    T4, Free    T3, Free    Basic Metabolic Panel    Hemoglobin A1c    Vitamin D 25 Hydroxy    Vitamin B12 & Folate    PTH, Intact    Hyperlipemia, mixed        Relevant Orders    TSH    T4, Free    T3, Free    Basic Metabolic Panel    Hemoglobin A1c    Vitamin D 25 Hydroxy    Vitamin B12 & Folate    PTH, Intact    Vitamin D deficiency        Relevant Orders    TSH    T4, Free    T3, Free    Basic Metabolic Panel    Hemoglobin A1c    Vitamin D 25 Hydroxy    Vitamin B12 & Folate    PTH, Intact    Impaired fasting blood sugar         Relevant Orders    Hemoglobin A1c        Hypothyroidism-chronic problem  Check thyroid levels  Adjust the dosage of levothyroxine based on the work-up  Continue levothyroxine 75 mcg oral daily.    Hyperparathyroidism  Continue to monitor the parathyroid, calcium, vitamin D levels.    Check HbA1c based on patient's age.  Defer bone density scan work-up to the primary care    Interpreted the blood work-up/imaging results performed by the primary care/consulting physician  "-    Refills sent to pharmacy    Follow Up     Patient was given instructions and counseling regarding her condition or for health maintenance advice. Please see specific information pulled into the AVS if appropriate.       Thank you for asking me to see your patient, Shaylee Wadsworth in consultation.         Marcel Caraballo MD  01/27/22      EMR Dragon / transcription disclaimer:     \"Dictated utilizing Dragon dictation\".                     "

## 2022-02-01 ENCOUNTER — TELEPHONE (OUTPATIENT)
Dept: INTERNAL MEDICINE | Facility: CLINIC | Age: 62
End: 2022-02-01

## 2022-02-12 ENCOUNTER — LAB (OUTPATIENT)
Dept: LAB | Facility: HOSPITAL | Age: 62
End: 2022-02-12

## 2022-02-12 DIAGNOSIS — Z01.818 OTHER SPECIFIED PRE-OPERATIVE EXAMINATION: ICD-10-CM

## 2022-02-12 DIAGNOSIS — K22.70 BARRETT'S ESOPHAGUS WITHOUT DYSPLASIA: ICD-10-CM

## 2022-02-12 DIAGNOSIS — D50.9 IRON DEFICIENCY ANEMIA, UNSPECIFIED IRON DEFICIENCY ANEMIA TYPE: ICD-10-CM

## 2022-02-12 LAB — FERRITIN SERPL-MCNC: 32.8 NG/ML (ref 13–150)

## 2022-02-12 PROCEDURE — 80048 BASIC METABOLIC PNL TOTAL CA: CPT | Performed by: INTERNAL MEDICINE

## 2022-02-12 PROCEDURE — 84443 ASSAY THYROID STIM HORMONE: CPT | Performed by: INTERNAL MEDICINE

## 2022-02-12 PROCEDURE — 84439 ASSAY OF FREE THYROXINE: CPT | Performed by: INTERNAL MEDICINE

## 2022-02-12 PROCEDURE — 83036 HEMOGLOBIN GLYCOSYLATED A1C: CPT | Performed by: INTERNAL MEDICINE

## 2022-02-12 PROCEDURE — 84481 FREE ASSAY (FT-3): CPT | Performed by: INTERNAL MEDICINE

## 2022-02-12 PROCEDURE — 82306 VITAMIN D 25 HYDROXY: CPT | Performed by: INTERNAL MEDICINE

## 2022-02-12 PROCEDURE — 82728 ASSAY OF FERRITIN: CPT

## 2022-02-12 PROCEDURE — 83970 ASSAY OF PARATHORMONE: CPT | Performed by: INTERNAL MEDICINE

## 2022-02-12 PROCEDURE — 82746 ASSAY OF FOLIC ACID SERUM: CPT | Performed by: INTERNAL MEDICINE

## 2022-02-12 PROCEDURE — 82607 VITAMIN B-12: CPT | Performed by: INTERNAL MEDICINE

## 2022-02-16 DIAGNOSIS — M05.79 RHEUMATOID ARTHRITIS WITH RHEUMATOID FACTOR OF MULTIPLE SITES WITHOUT ORGAN OR SYSTEMS INVOLVEMENT: ICD-10-CM

## 2022-02-16 DIAGNOSIS — I10 BENIGN ESSENTIAL HTN: Primary | ICD-10-CM

## 2022-02-20 DIAGNOSIS — I10 ESSENTIAL HYPERTENSION: ICD-10-CM

## 2022-02-20 DIAGNOSIS — E87.6 HYPOKALEMIA: ICD-10-CM

## 2022-02-21 RX ORDER — POTASSIUM CHLORIDE 20 MEQ/1
20 TABLET, EXTENDED RELEASE ORAL DAILY
Qty: 90 TABLET | Refills: 1 | Status: SHIPPED | OUTPATIENT
Start: 2022-02-21 | End: 2022-09-20

## 2022-02-21 NOTE — TELEPHONE ENCOUNTER
Rx Refill Note  Requested Prescriptions     Pending Prescriptions Disp Refills    potassium chloride (K-DUR,KLOR-CON) 20 MEQ CR tablet [Pharmacy Med Name: Potassium Chloride Eunice ER 20 MEQ Oral Tablet Extended Release] 180 tablet 0     Sig: Take 1 tablet by mouth twice daily      Last office visit with prescribing clinician: 12/22/2021      Next office visit with prescribing clinician: 3/30/2022            Mone Hilario MA  02/21/22, 10:24 EST

## 2022-02-28 DIAGNOSIS — E78.5 HYPERLIPIDEMIA LDL GOAL <100: ICD-10-CM

## 2022-02-28 DIAGNOSIS — I10 ESSENTIAL HYPERTENSION: ICD-10-CM

## 2022-02-28 RX ORDER — METOPROLOL SUCCINATE 50 MG/1
TABLET, EXTENDED RELEASE ORAL
Qty: 90 TABLET | Refills: 0 | Status: SHIPPED | OUTPATIENT
Start: 2022-02-28 | End: 2022-03-30 | Stop reason: SDUPTHER

## 2022-02-28 RX ORDER — ATORVASTATIN CALCIUM 40 MG/1
TABLET, FILM COATED ORAL
Qty: 90 TABLET | Refills: 0 | Status: SHIPPED | OUTPATIENT
Start: 2022-02-28 | End: 2022-03-30 | Stop reason: SDUPTHER

## 2022-03-30 ENCOUNTER — OFFICE VISIT (OUTPATIENT)
Dept: INTERNAL MEDICINE | Facility: CLINIC | Age: 62
End: 2022-03-30

## 2022-03-30 VITALS
OXYGEN SATURATION: 98 % | DIASTOLIC BLOOD PRESSURE: 80 MMHG | BODY MASS INDEX: 34.78 KG/M2 | HEART RATE: 69 BPM | WEIGHT: 184.2 LBS | SYSTOLIC BLOOD PRESSURE: 128 MMHG | HEIGHT: 61 IN

## 2022-03-30 DIAGNOSIS — E03.9 ACQUIRED HYPOTHYROIDISM: ICD-10-CM

## 2022-03-30 DIAGNOSIS — D50.9 IRON DEFICIENCY ANEMIA, UNSPECIFIED IRON DEFICIENCY ANEMIA TYPE: ICD-10-CM

## 2022-03-30 DIAGNOSIS — M85.88 OSTEOPENIA OF LUMBAR SPINE: ICD-10-CM

## 2022-03-30 DIAGNOSIS — Z95.5 S/P DRUG ELUTING CORONARY STENT PLACEMENT: ICD-10-CM

## 2022-03-30 DIAGNOSIS — E78.5 HYPERLIPIDEMIA LDL GOAL <70: ICD-10-CM

## 2022-03-30 DIAGNOSIS — Z00.00 ENCOUNTER FOR SUBSEQUENT ANNUAL WELLNESS VISIT (AWV) IN MEDICARE PATIENT: Primary | ICD-10-CM

## 2022-03-30 DIAGNOSIS — K21.00 GASTROESOPHAGEAL REFLUX DISEASE WITH ESOPHAGITIS WITHOUT HEMORRHAGE: ICD-10-CM

## 2022-03-30 DIAGNOSIS — I10 BENIGN ESSENTIAL HTN: ICD-10-CM

## 2022-03-30 DIAGNOSIS — I25.10 CORONARY ARTERY DISEASE INVOLVING NATIVE CORONARY ARTERY OF NATIVE HEART WITHOUT ANGINA PECTORIS: ICD-10-CM

## 2022-03-30 PROBLEM — K35.80 ACUTE APPENDICITIS: Status: RESOLVED | Noted: 2021-12-22 | Resolved: 2022-03-30

## 2022-03-30 PROBLEM — R10.31 RLQ ABDOMINAL PAIN: Status: RESOLVED | Noted: 2021-12-22 | Resolved: 2022-03-30

## 2022-03-30 PROCEDURE — G0439 PPPS, SUBSEQ VISIT: HCPCS | Performed by: NURSE PRACTITIONER

## 2022-03-30 PROCEDURE — 99214 OFFICE O/P EST MOD 30 MIN: CPT | Performed by: NURSE PRACTITIONER

## 2022-03-30 RX ORDER — OLMESARTAN MEDOXOMIL 40 MG/1
40 TABLET ORAL DAILY
Qty: 90 TABLET | Refills: 1 | Status: SHIPPED | OUTPATIENT
Start: 2022-03-30 | End: 2022-10-19

## 2022-03-30 RX ORDER — AMLODIPINE BESYLATE 5 MG/1
5 TABLET ORAL DAILY
Qty: 90 TABLET | Refills: 1 | Status: SHIPPED | OUTPATIENT
Start: 2022-03-30 | End: 2022-10-19

## 2022-03-30 RX ORDER — METOPROLOL SUCCINATE 50 MG/1
50 TABLET, EXTENDED RELEASE ORAL DAILY
Qty: 90 TABLET | Refills: 1 | Status: SHIPPED | OUTPATIENT
Start: 2022-03-30 | End: 2022-05-25

## 2022-03-30 RX ORDER — ATORVASTATIN CALCIUM 40 MG/1
40 TABLET, FILM COATED ORAL NIGHTLY
Qty: 90 TABLET | Refills: 1 | Status: SHIPPED | OUTPATIENT
Start: 2022-03-30 | End: 2022-08-08

## 2022-03-30 RX ORDER — LEVOCETIRIZINE DIHYDROCHLORIDE 5 MG/1
5 TABLET, FILM COATED ORAL EVERY EVENING
Start: 2022-03-30

## 2022-03-30 NOTE — PROGRESS NOTES
The ABCs of the Annual Wellness Visit  Subsequent Medicare Wellness Visit    Chief Complaint   Patient presents with   • Annual Exam     AWV      Subjective    History of Present Illness:  Shaylee Wadsworth is a 61 y.o. female who presents for a Subsequent Medicare Wellness Visit.SHe needs a separate F/U on HTN, Hyperlipidemia, GERD, CAD, Hypothyroidism, FMD, Vit D def . She is currently on Justin (Norvasc and Benicar), Toprol XL for HTN and CAD. She is followed by Dr. Dumas. .Progress Notes by Megan Pina APRN (10/14/2021 11:00  S/P drug eluting stent Distal left circumflex. Plavix was stopped April 2020.      She has thoracic back pain, daily pain rated a 3 of 10. She uses ultram sparingly for pain as needed. She is doing home traction for therapy. She has noticed darker urine, and right lower back pain for 3-4 weeks.      She is on Nexium 40mg daily for GERD. She reports some burping, but no ingestion or stomach pain. She has an EGD 10-6-2021 to eval her TAMANNA scheduled next month.     She is being followed by Dr. Dunn for TAMANNA. She has been off her iron for 3 months     She has FMD since April 2018. She has been evaluated by 2 neurologists as well as [facility] and an ENT. Last Neurologist was Dr. LaFavre at [facility]. She has been thru PT and OT with [facility] disordered clinic. She feels like traction helps more than any other modality. She is currently receiving Traction therapy with [facility] in [place].     She is followed by Dr. Caraballo ([facility]) for hyperparathyroidism and hypothyroidism. Progress Notes by Marcel Caraballo MD (01/27/2022 13:30)      She is followed by Rheumatology for RA. RHEUMATOLOGY - SCAN by [facility] [place] (02/16/2022 00:00) She is on Methotrexate.      The following portions of the patient's history were reviewed and   updated as appropriate: allergies, current medications, past family history, past medical history, past social history, past surgical history and problem  list.    Compared to one year ago, the patient feels her physical   health is better.    Compared to one year ago, the patient feels her mental   health is better.    Recent Hospitalizations:  She was admitted within the past 365 days at [facility].       Current Medical Providers:  Patient Care Team:  Laura Ayala APRN as PCP - General  Laura Ayala APRN as PCP - Family Medicine  Blaze Martinez MD as Consulting Physician (Rheumatology)  Laura Ayala APRN as Referring Physician (Family Medicine)    Outpatient Medications Prior to Visit   Medication Sig Dispense Refill   • amLODIPine (NORVASC) 5 MG tablet Take 1 tablet by mouth once daily 90 tablet 0   • aspirin 81 MG tablet Take 81 mg by mouth Daily.     • atorvastatin (LIPITOR) 40 MG tablet TAKE 1 TABLET BY MOUTH ONCE DAILY AT NIGHT 90 tablet 0   • cyclobenzaprine (FLEXERIL) 10 MG tablet Take 10 mg by mouth 2 (Two) Times a Day As Needed.     • ENBREL SURECLICK 50 MG/ML solution auto-injector Every 7 (Seven) Days.     • esomeprazole (nexIUM) 40 MG capsule Take 1 capsule by mouth 2 (Two) Times a Day. (Patient taking differently: Take 40 mg by mouth Every Morning Before Breakfast.) 90 capsule 3   • ferrous sulfate 325 (65 FE) MG tablet Take 1 tablet by mouth Daily With Breakfast. 30 tablet 3   • folic acid (FOLVITE) 1 MG tablet Take 1 mg by mouth Daily. 1 to 4 tablets daily     • levothyroxine (SYNTHROID, LEVOTHROID) 75 MCG tablet Take 1 tablet by mouth Daily. 30 tablet 11   • methotrexate 2.5 MG tablet TAKE 4 TABLETS BY MOUTH ONCE A WEEK 32 tablet 6   • metoprolol succinate XL (TOPROL-XL) 50 MG 24 hr tablet Take 1 tablet by mouth once daily 90 tablet 0   • olmesartan (BENICAR) 40 MG tablet Take 1 tablet by mouth once daily 90 tablet 0   • potassium chloride (K-DUR,KLOR-CON) 20 MEQ CR tablet Take 1 tablet by mouth Daily. 90 tablet 1   • traMADol (ULTRAM) 50 MG tablet Take 1 tablet by mouth Every 8 (Eight) Hours As Needed for Severe Pain . 30 tablet  0   • vitamin D (ERGOCALCIFEROL) 1.25 MG (06117 UT) capsule capsule Take 1 capsule by mouth 1 (One) Time Per Week. 12 capsule 3     No facility-administered medications prior to visit.       Opioid medication/s are on active medication list.  and I have evaluated her active treatment plan and pain score trends (see table).  There were no vitals filed for this visit.  I have reviewed the chart for potential of high risk medication and harmful drug interactions in the elderly.            Aspirin is on active medication list. Aspirin use is indicated based on review of current medical condition/s. Pros and cons of this therapy have been discussed today. Benefits of this medication outweigh potential harm.  Patient has been encouraged to continue taking this medication.  .      Patient Active Problem List   Diagnosis   • Abnormal radiographic examination   • Disc disorder of thoracic region   • Gastroesophageal reflux disease   • Chronic gastritis   • Hyperlipidemia LDL goal <70   • Menopausal symptom   • Migraine   • Osteoarthritis of shoulder   • Osteopenia   • Rheumatoid arthritis (HCC)   • Multiple pulmonary nodules determined by computed tomography of lung   • H/O hyperparathyroidism   • Tendinitis of left shoulder   • Hypokalemia   • Anxiety related tremor   • Difficulty walking   • Benign essential HTN   • Chronic right-sided thoracic back pain   • BPPV (benign paroxysmal positional vertigo)   • Dystonia   • Functional neurological symptom disorder with abnormal movement   • Coronary artery disease involving native heart without angina pectoris   • Astasia-abasia   • Other spondylosis with myelopathy, thoracic region   • Personal history of immunosupression therapy   • Functional movement disorder   • Rheumatoid arthritis of multiple sites without organ or system involvement with positive rheumatoid factor (HCC)   • S/P drug eluting coronary stent placement   • Depression due to physical illness   • Irritable  "bowel syndrome with diarrhea   • Body mass index (BMI) 35.0-35.9, adult   • Pain in left ankle and joints of left foot   • Pain in right ankle and joints of right foot   • Pain of both shoulder joints   • Rheumatoid arthritis with rheumatoid factor of multiple sites without organ or systems involvement (HCC)   • Pain in left shoulder   • Diarrhea   • Palpitations   • Iron deficiency anemia   • Hypothyroidism   • Seropositive rheumatoid arthritis (HCC)   • Lainze's esophagus   • Absolute anemia   • RLQ abdominal pain   • Acute appendicitis     Advance Care Planning  Advance Directive is not on file.  Has copy at home, will bring    Review of Systems   Constitutional: Negative.    HENT: Positive for congestion.    Eyes: Negative.    Respiratory: Negative.  Negative for apnea and chest tightness.    Cardiovascular: Negative for chest pain and leg swelling.   Endocrine: Negative.    Genitourinary: Negative.    Musculoskeletal: Positive for arthralgias.   Allergic/Immunologic: Negative.    Neurological: Positive for dizziness and tremors. Negative for speech difficulty.   Hematological: Negative.    Psychiatric/Behavioral: Negative.    All other systems reviewed and are negative.       Objective    Vitals:    03/30/22 1127   BP: 128/80   Pulse: 69   SpO2: 98%   Weight: 83.6 kg (184 lb 3.2 oz)   Height: 154.9 cm (60.98\")     BMI Readings from Last 1 Encounters:   03/30/22 34.83 kg/m²   BMI is above normal parameters. Recommendations include: nutrition counseling    Does the patient have evidence of cognitive impairment? No    Physical Exam  Lab Results   Component Value Date    HGBA1C 5.40 02/12/2022            HEALTH RISK ASSESSMENT    Smoking Status:  Social History     Tobacco Use   Smoking Status Passive Smoke Exposure - Never Smoker   Smokeless Tobacco Current User   Tobacco Comment    8/12/18 switch to e-cig     Alcohol Consumption:  Social History     Substance and Sexual Activity   Alcohol Use No     Fall Risk " Screen:    JEDADI Fall Risk Assessment has not been completed.    Depression Screening:  PHQ-2/PHQ-9 Depression Screening 3/30/2022   Retired PHQ-9 Total Score -   Retired Total Score -   Little Interest or Pleasure in Doing Things 1-->several days   Feeling Down, Depressed or Hopeless 0-->not at all   PHQ-9: Brief Depression Severity Measure Score 1       Health Habits and Functional and Cognitive Screening:  Functional & Cognitive Status 3/30/2022   Do you have difficulty preparing food and eating? Yes   Do you have difficulty bathing yourself, getting dressed or grooming yourself? No   Do you have difficulty using the toilet? No   Do you have difficulty moving around from place to place? No   Do you have trouble with steps or getting out of a bed or a chair? Yes   Current Diet Limited Junk Food   Dental Exam Up to date   Eye Exam Up to date   Exercise (times per week) 0 times per week   Current Exercises Include No Regular Exercise   Do you need help using the phone?  No   Are you deaf or do you have serious difficulty hearing?  No   Do you need help with transportation? No   Do you need help shopping? (No Data)   Do you need help preparing meals?  No   Do you need help with housework?  No   Do you need help with laundry? No   Do you need help taking your medications? No   Do you need help managing money? No   Do you ever drive or ride in a car without wearing a seat belt? No   Have you felt unusual stress, anger or loneliness in the last month? No   Who do you live with? Spouse   If you need help, do you have trouble finding someone available to you? No   Have you been bothered in the last four weeks by sexual problems? No   Do you have difficulty concentrating, remembering or making decisions? No       Age-appropriate Screening Schedule:  Refer to the list below for future screening recommendations based on patient's age, sex and/or medical conditions. Orders for these recommended tests are listed in the plan  section. The patient has been provided with a written plan.    Health Maintenance   Topic Date Due   • ZOSTER VACCINE (1 of 2) Never done   • DXA SCAN  12/26/2021   • LIPID PANEL  10/04/2022   • MAMMOGRAM  07/13/2023   • PAP SMEAR  07/08/2024   • TDAP/TD VACCINES (2 - Td or Tdap) 08/05/2025   • INFLUENZA VACCINE  Completed              Assessment/Plan   CMS Preventative Services Quick Reference  Risk Factors Identified During Encounter  Cardiovascular Disease  Chronic Pain   Fall Risk-High or Moderate  Inactivity/Sedentary  Obesity/Overweight   Polypharmacy  The above risks/problems have been discussed with the patient.  Follow up actions/plans if indicated are seen below in the Assessment/Plan Section.  Pertinent information has been shared with the patient in the After Visit Summary.     Diagnosis Plan   1. Encounter for subsequent annual wellness visit (AWV) in Medicare patient     2. Benign essential HTN  CBC & Differential    Ferritin    Comprehensive Metabolic Panel    Lipid Panel With / Chol / HDL Ratio    Iron Profile    amLODIPine (NORVASC) 5 MG tablet    metoprolol succinate XL (TOPROL-XL) 50 MG 24 hr tablet    olmesartan (BENICAR) 40 MG tablet   3. Coronary artery disease involving native coronary artery of native heart without angina pectoris  CBC & Differential    Ferritin    Comprehensive Metabolic Panel    Lipid Panel With / Chol / HDL Ratio    Iron Profile   4. Hyperlipidemia LDL goal <70  CBC & Differential    Ferritin    Comprehensive Metabolic Panel    Lipid Panel With / Chol / HDL Ratio    Iron Profile    atorvastatin (LIPITOR) 40 MG tablet   5. Acquired hypothyroidism     6. Gastroesophageal reflux disease with esophagitis without hemorrhage  CBC & Differential    Comprehensive Metabolic Panel   7. Iron deficiency anemia, unspecified iron deficiency anemia type  CBC & Differential    Ferritin    Iron Profile   8. S/P drug eluting coronary stent placement  Comprehensive Metabolic Panel    Lipid  Panel With / Chol / HDL Ratio   9. Osteopenia of lumbar spine  dexa bone density axial       The patient has read and signed the [facility] Controlled Substance Contract.  I will continue to see patient for regular follow up appointments.  They are well controlled on their medication.  RENO is updated every 3 months. The patient is aware of the potential for addiction and dependence.    Reviewed multiple records from endo, cardio, and Rheumatology.  Will check iron level, not taking Iron for 3 months    Follow Up:       Follow up in 6 months wlabs    An After Visit Summary and PPPS were made available to the patient.

## 2022-03-31 ENCOUNTER — TELEPHONE (OUTPATIENT)
Dept: INTERNAL MEDICINE | Facility: CLINIC | Age: 62
End: 2022-03-31

## 2022-03-31 LAB
ALBUMIN SERPL-MCNC: 4.2 G/DL (ref 3.8–4.9)
ALBUMIN/GLOB SERPL: 1.5 {RATIO} (ref 1.2–2.2)
ALP SERPL-CCNC: 100 IU/L (ref 44–121)
ALT SERPL-CCNC: 14 IU/L (ref 0–32)
AST SERPL-CCNC: 17 IU/L (ref 0–40)
BASOPHILS # BLD AUTO: 0.1 X10E3/UL (ref 0–0.2)
BASOPHILS NFR BLD AUTO: 1 %
BILIRUB SERPL-MCNC: 0.5 MG/DL (ref 0–1.2)
BUN SERPL-MCNC: 7 MG/DL (ref 6–24)
BUN/CREAT SERPL: 10 (ref 9–23)
CALCIUM SERPL-MCNC: 9.7 MG/DL (ref 8.7–10.2)
CHLORIDE SERPL-SCNC: 101 MMOL/L (ref 96–106)
CHOLEST SERPL-MCNC: 160 MG/DL (ref 100–199)
CHOLEST/HDLC SERPL: 2.9 RATIO (ref 0–4.4)
CO2 SERPL-SCNC: 23 MMOL/L (ref 20–29)
CREAT SERPL-MCNC: 0.72 MG/DL (ref 0.57–1)
EGFRCR SERPLBLD CKD-EPI 2021: 96 ML/MIN/1.73
EOSINOPHIL # BLD AUTO: 0.1 X10E3/UL (ref 0–0.4)
EOSINOPHIL NFR BLD AUTO: 1 %
ERYTHROCYTE [DISTWIDTH] IN BLOOD BY AUTOMATED COUNT: 14 % (ref 11.7–15.4)
FERRITIN SERPL-MCNC: 26 NG/ML (ref 15–150)
GLOBULIN SER CALC-MCNC: 2.8 G/DL (ref 1.5–4.5)
GLUCOSE SERPL-MCNC: 100 MG/DL (ref 65–99)
HCT VFR BLD AUTO: 37.9 % (ref 34–46.6)
HDLC SERPL-MCNC: 56 MG/DL
HGB BLD-MCNC: 12.6 G/DL (ref 11.1–15.9)
IMM GRANULOCYTES # BLD AUTO: 0 X10E3/UL (ref 0–0.1)
IMM GRANULOCYTES NFR BLD AUTO: 0 %
IRON SATN MFR SERPL: 26 % (ref 15–55)
IRON SERPL-MCNC: 97 UG/DL (ref 27–159)
LDLC SERPL CALC-MCNC: 74 MG/DL (ref 0–99)
LYMPHOCYTES # BLD AUTO: 2.7 X10E3/UL (ref 0.7–3.1)
LYMPHOCYTES NFR BLD AUTO: 38 %
MCH RBC QN AUTO: 29.3 PG (ref 26.6–33)
MCHC RBC AUTO-ENTMCNC: 33.2 G/DL (ref 31.5–35.7)
MCV RBC AUTO: 88 FL (ref 79–97)
MONOCYTES # BLD AUTO: 0.5 X10E3/UL (ref 0.1–0.9)
MONOCYTES NFR BLD AUTO: 7 %
NEUTROPHILS # BLD AUTO: 3.8 X10E3/UL (ref 1.4–7)
NEUTROPHILS NFR BLD AUTO: 53 %
PLATELET # BLD AUTO: 296 X10E3/UL (ref 150–450)
POTASSIUM SERPL-SCNC: 4.3 MMOL/L (ref 3.5–5.2)
PROT SERPL-MCNC: 7 G/DL (ref 6–8.5)
RBC # BLD AUTO: 4.3 X10E6/UL (ref 3.77–5.28)
SODIUM SERPL-SCNC: 141 MMOL/L (ref 134–144)
TIBC SERPL-MCNC: 367 UG/DL (ref 250–450)
TRIGL SERPL-MCNC: 179 MG/DL (ref 0–149)
UIBC SERPL-MCNC: 270 UG/DL (ref 131–425)
VLDLC SERPL CALC-MCNC: 30 MG/DL (ref 5–40)
WBC # BLD AUTO: 7.2 X10E3/UL (ref 3.4–10.8)

## 2022-03-31 NOTE — TELEPHONE ENCOUNTER
----- Message from KAITLYNN Choi sent at 3/31/2022 11:08 AM EDT -----  No anemia on labs. Iron level normal. Electrolytes normal. Total cholesterol improving with weight loss. Total lowered from 194 to 160.

## 2022-04-08 ENCOUNTER — APPOINTMENT (OUTPATIENT)
Dept: BONE DENSITY | Facility: HOSPITAL | Age: 62
End: 2022-04-08

## 2022-04-08 DIAGNOSIS — M85.88 OSTEOPENIA OF LUMBAR SPINE: ICD-10-CM

## 2022-04-08 PROCEDURE — 77080 DXA BONE DENSITY AXIAL: CPT

## 2022-04-25 ENCOUNTER — TELEPHONE (OUTPATIENT)
Dept: GASTROENTEROLOGY | Facility: CLINIC | Age: 62
End: 2022-04-25

## 2022-04-25 NOTE — TELEPHONE ENCOUNTER
PATIENT CALLED TO CANCEL HER EGD ON 05/04/2022.  NOT HAVING ANY PROBLEMS.  CANCELLED AS REQUESTED.

## 2022-05-02 ENCOUNTER — APPOINTMENT (OUTPATIENT)
Dept: LAB | Facility: HOSPITAL | Age: 62
End: 2022-05-02

## 2022-05-03 RX ORDER — METHOTREXATE 2.5 MG/1
TABLET ORAL
Qty: 32 TABLET | Refills: 0 | OUTPATIENT
Start: 2022-05-03

## 2022-05-22 DIAGNOSIS — I10 BENIGN ESSENTIAL HTN: ICD-10-CM

## 2022-05-25 RX ORDER — METOPROLOL SUCCINATE 50 MG/1
TABLET, EXTENDED RELEASE ORAL
Qty: 90 TABLET | Refills: 1 | Status: SHIPPED | OUTPATIENT
Start: 2022-05-25 | End: 2022-11-21

## 2022-05-25 NOTE — TELEPHONE ENCOUNTER
Rx Refill Note  Requested Prescriptions     Pending Prescriptions Disp Refills   • metoprolol succinate XL (TOPROL-XL) 50 MG 24 hr tablet [Pharmacy Med Name: Metoprolol Succinate ER 50 MG Oral Tablet Extended Release 24 Hour] 90 tablet 0     Sig: Take 1 tablet by mouth once daily      Last office visit with prescribing clinician: 3/30/2022      Next office visit with prescribing clinician: 9/30/2022            Madelin Charles  05/25/22, 14:55 EDT

## 2022-06-03 ENCOUNTER — TRANSCRIBE ORDERS (OUTPATIENT)
Dept: ADMINISTRATIVE | Facility: HOSPITAL | Age: 62
End: 2022-06-03

## 2022-06-03 ENCOUNTER — LAB (OUTPATIENT)
Dept: LAB | Facility: HOSPITAL | Age: 62
End: 2022-06-03

## 2022-06-03 DIAGNOSIS — Z79.899 ENCOUNTER FOR LONG-TERM (CURRENT) USE OF OTHER MEDICATIONS: ICD-10-CM

## 2022-06-03 DIAGNOSIS — Z79.899 ENCOUNTER FOR LONG-TERM (CURRENT) USE OF OTHER MEDICATIONS: Primary | ICD-10-CM

## 2022-06-03 LAB
ALBUMIN SERPL-MCNC: 3.8 G/DL (ref 3.5–5.2)
ALP SERPL-CCNC: 86 U/L (ref 39–117)
ALT SERPL W P-5'-P-CCNC: 12 U/L (ref 1–33)
AST SERPL-CCNC: 13 U/L (ref 1–32)
BASOPHILS # BLD AUTO: 0.06 10*3/MM3 (ref 0–0.2)
BASOPHILS NFR BLD AUTO: 0.9 % (ref 0–1.5)
BILIRUB CONJ SERPL-MCNC: <0.2 MG/DL (ref 0–0.3)
BILIRUB INDIRECT SERPL-MCNC: NORMAL MG/DL
BILIRUB SERPL-MCNC: 0.7 MG/DL (ref 0–1.2)
DEPRECATED RDW RBC AUTO: 44.1 FL (ref 37–54)
EOSINOPHIL # BLD AUTO: 0.13 10*3/MM3 (ref 0–0.4)
EOSINOPHIL NFR BLD AUTO: 2 % (ref 0.3–6.2)
ERYTHROCYTE [DISTWIDTH] IN BLOOD BY AUTOMATED COUNT: 14.6 % (ref 12.3–15.4)
HCT VFR BLD AUTO: 33.5 % (ref 34–46.6)
HGB BLD-MCNC: 11.5 G/DL (ref 12–15.9)
IMM GRANULOCYTES # BLD AUTO: 0.01 10*3/MM3 (ref 0–0.05)
IMM GRANULOCYTES NFR BLD AUTO: 0.2 % (ref 0–0.5)
LYMPHOCYTES # BLD AUTO: 2.85 10*3/MM3 (ref 0.7–3.1)
LYMPHOCYTES NFR BLD AUTO: 44.3 % (ref 19.6–45.3)
MCH RBC QN AUTO: 29 PG (ref 26.6–33)
MCHC RBC AUTO-ENTMCNC: 34.3 G/DL (ref 31.5–35.7)
MCV RBC AUTO: 84.6 FL (ref 79–97)
MONOCYTES # BLD AUTO: 0.49 10*3/MM3 (ref 0.1–0.9)
MONOCYTES NFR BLD AUTO: 7.6 % (ref 5–12)
NEUTROPHILS NFR BLD AUTO: 2.89 10*3/MM3 (ref 1.7–7)
NEUTROPHILS NFR BLD AUTO: 45 % (ref 42.7–76)
NRBC BLD AUTO-RTO: 0 /100 WBC (ref 0–0.2)
PLATELET # BLD AUTO: 266 10*3/MM3 (ref 140–450)
PMV BLD AUTO: 9.8 FL (ref 6–12)
PROT SERPL-MCNC: 7.1 G/DL (ref 6–8.5)
RBC # BLD AUTO: 3.96 10*6/MM3 (ref 3.77–5.28)
WBC NRBC COR # BLD: 6.43 10*3/MM3 (ref 3.4–10.8)

## 2022-06-03 PROCEDURE — 36415 COLL VENOUS BLD VENIPUNCTURE: CPT

## 2022-06-03 PROCEDURE — 85025 COMPLETE CBC W/AUTO DIFF WBC: CPT

## 2022-06-03 PROCEDURE — 80076 HEPATIC FUNCTION PANEL: CPT

## 2022-06-06 RX ORDER — LEVOTHYROXINE SODIUM 75 UG/1
TABLET ORAL
Qty: 30 TABLET | Refills: 0 | Status: SHIPPED | OUTPATIENT
Start: 2022-06-06 | End: 2022-08-30

## 2022-07-18 ENCOUNTER — OFFICE VISIT (OUTPATIENT)
Dept: OBSTETRICS AND GYNECOLOGY | Facility: CLINIC | Age: 62
End: 2022-07-18

## 2022-07-18 VITALS
DIASTOLIC BLOOD PRESSURE: 84 MMHG | BODY MASS INDEX: 35.19 KG/M2 | SYSTOLIC BLOOD PRESSURE: 126 MMHG | HEIGHT: 61 IN | WEIGHT: 186.4 LBS

## 2022-07-18 DIAGNOSIS — Z01.419 PAP SMEAR, LOW-RISK: ICD-10-CM

## 2022-07-18 DIAGNOSIS — Z11.51 ENCOUNTER FOR SCREENING FOR HUMAN PAPILLOMAVIRUS (HPV): ICD-10-CM

## 2022-07-18 DIAGNOSIS — Z01.419 ENCOUNTER FOR GYNECOLOGICAL EXAMINATION: Primary | ICD-10-CM

## 2022-07-18 PROCEDURE — 99396 PREV VISIT EST AGE 40-64: CPT | Performed by: OBSTETRICS & GYNECOLOGY

## 2022-07-18 NOTE — PROGRESS NOTES
GYN Annual Exam     CC- Here for annual exam.     Shayele Wadsworth is a 61 y.o. female who presents for annual well woman exam. Menopause over 10 years ago. No PMPB. MMG 2021. Pt was on HRT for last 9 years. Pt weaned off of HRT last year. She is getting vasomotor symptoms.  Pt has hx of RA and is on Enbrel and MTX. Pt had colonoscopy 3/2020. Pt had a ruptured appendix in 2021- had a laparoscopic appendectomy.    OB History             Para        Term   0            AB        Living           SAB        IAB        Ectopic        Molar        Multiple        Live Births                    Current contraception: post menopausal status  History of abnormal Pap smear: yes - remote  History of abnormal mammogram: yes - remote with no biopsy  Family history of uterine, colon or ovarian cancer: no  Family history of breast cancer: no    Health Maintenance   Topic Date Due   • Pneumococcal Vaccine 0-64 (2 - PCV) 2018   • COVID-19 Vaccine (3 - Booster for Moderna series) 2021   • Annual Gynecologic Pelvic and Breast Exam  2022   • INFLUENZA VACCINE  10/01/2022   • ANNUAL WELLNESS VISIT  2023   • LIPID PANEL  2023   • MAMMOGRAM  2023   • DXA SCAN  2024   • PAP SMEAR  2024   • TDAP/TD VACCINES (2 - Td or Tdap) 2025   • COLORECTAL CANCER SCREENING  2030   • HEPATITIS C SCREENING  Completed   • ZOSTER VACCINE  Discontinued       Past Medical History:   Diagnosis Date   • Abnormal electrocardiogram    • Anemia May 2021   • Anxiety    • Arthritis 2014    RA   • Lainez esophagus    • Benign paroxysmal positional vertigo due to bilateral vestibular disorder    • Chest pain    • Cholelithiasis     Removed   • Colon polyp    • Coronary artery disease 19   • Depression 19   • Disease of thyroid gland    • Dystonia    • Embedded tick of right lower leg    • Esophageal reflux    • Fibromyositis    • Functional movement disorder    • H/O  colonoscopy    • H/O mammogram 08/31/2011    NORMAL    • History of colonoscopy 03/2020   • Hx of bone density study 08/31/2011    OSTEOPENIA    • Hyperlipidemia    • Hyperparathyroidism (HCC)    • Hypertension    • Hypothyroidism May 2021   • Low back pain    • Menopause    • Osteopenia    • Osteopenia    • Ovarian cyst 2000    L ovary removed   • Pancreatitis    • Pancreatitis    • Scoliosis 2/16/13   • Superficial incisional infection of surgical site    • Tremor 4/4/2018       Past Surgical History:   Procedure Laterality Date   • APPENDECTOMY N/A 12/22/2021    Procedure: APPENDECTOMY LAPAROSCOPIC;  Surgeon: Maciel Godoy MD;  Location: [facility];  Service: General;  Laterality: N/A;   • CARDIAC CATHETERIZATION N/A 3/4/2019    Procedure: Coronary angiography;  Surgeon: Jackelyn Dumas MD;  Location:  [facility];  Service: Cardiovascular   • CARDIAC CATHETERIZATION N/A 3/4/2019    Procedure: Left heart cath;  Surgeon: Jackelyn Dumas MD;  Location:  [facility];  Service: Cardiovascular   • CARDIAC CATHETERIZATION N/A 3/4/2019    Procedure: Left ventriculography;  Surgeon: Jackelyn Dumas MD;  Location:  [facility];  Service: Cardiovascular   • CARDIAC CATHETERIZATION N/A 3/4/2019    Procedure: Stent KARLY coronary;  Surgeon: Jackelyn Dumas MD;  Location:  [facility];  Service: Cardiovascular   • CHOLECYSTECTOMY     • COLONOSCOPY  2014   • COLONOSCOPY N/A 3/6/2020    Procedure: COLONOSCOPY, biopsy, polypectomy;  Surgeon: Rain Kirk MD;  Location: [facility];  Service: Gastroenterology;  Laterality: N/A;  Random biopsies; Rectal polyp x 3; Hemorrhoids; Diverticulosis   • CORONARY STENT PLACEMENT     • DILATATION AND CURETTAGE     • HYSTERECTOMY     • LAPAROSCOPIC CHOLECYSTECTOMY  2005   • MOUTH SURGERY      TOOTH EXTRACTION   • OTHER SURGICAL HISTORY  03/27/2015    DIAGNOSTIC ESOPHAGOSCOPY TRANSNASAL FLEXIBLE WITH BIOPSY; ARZATE ESO.  REPEAT EGD 2 YR    • OVARIAN CYST SURGERY  2000    L ovary removed   • OVARY SURGERY Left     NORMAL    • PAP SMEAR  08/23/2010    NORMAL    • PARATHYROIDECTOMY Right 08/17/2016    Dr. Muchael Flynn at [place]   • SUBTOTAL HYSTERECTOMY      Left ovary removed9   • WISDOM TOOTH EXTRACTION  1984         Current Outpatient Medications:   •  amLODIPine (NORVASC) 5 MG tablet, Take 1 tablet by mouth Daily., Disp: 90 tablet, Rfl: 1  •  aspirin 81 MG tablet, Take 81 mg by mouth Daily., Disp: , Rfl:   •  atorvastatin (LIPITOR) 40 MG tablet, Take 1 tablet by mouth Every Night., Disp: 90 tablet, Rfl: 1  •  cyclobenzaprine (FLEXERIL) 10 MG tablet, Take 10 mg by mouth 2 (Two) Times a Day As Needed., Disp: , Rfl:   •  ENBREL SURECLICK 50 MG/ML solution auto-injector, Every 7 (Seven) Days., Disp: , Rfl:   •  esomeprazole (nexIUM) 40 MG capsule, Take 1 capsule by mouth 2 (Two) Times a Day. (Patient taking differently: Take 40 mg by mouth Every Morning Before Breakfast.), Disp: 90 capsule, Rfl: 3  •  Euthyrox 75 MCG tablet, Take 1 tablet by mouth once daily, Disp: 30 tablet, Rfl: 0  •  ferrous sulfate 325 (65 FE) MG tablet, Take 1 tablet by mouth Daily With Breakfast., Disp: 30 tablet, Rfl: 3  •  folic acid (FOLVITE) 1 MG tablet, Take 1 mg by mouth Daily. 1 to 4 tablets daily, Disp: , Rfl:   •  levocetirizine (Xyzal) 5 MG tablet, Take 1 tablet by mouth Every Evening., Disp: , Rfl:   •  methotrexate 2.5 MG tablet, TAKE 4 TABLETS BY MOUTH ONCE A WEEK, Disp: 32 tablet, Rfl: 6  •  metoprolol succinate XL (TOPROL-XL) 50 MG 24 hr tablet, Take 1 tablet by mouth once daily, Disp: 90 tablet, Rfl: 1  •  olmesartan (BENICAR) 40 MG tablet, Take 1 tablet by mouth Daily., Disp: 90 tablet, Rfl: 1  •  potassium chloride (K-DUR,KLOR-CON) 20 MEQ CR tablet, Take 1 tablet by mouth Daily., Disp: 90 tablet, Rfl: 1  •  traMADol (ULTRAM) 50 MG tablet, Take 1 tablet by mouth Every 8 (Eight) Hours As Needed for Severe Pain ., Disp: 30 tablet, Rfl: 0  •   "vitamin D (ERGOCALCIFEROL) 1.25 MG (15994 UT) capsule capsule, Take 1 capsule by mouth 1 (One) Time Per Week., Disp: 12 capsule, Rfl: 3    Allergies   Allergen Reactions   • Meloxicam Swelling     EYES AND FACE SWELLING  EYES AND FACE SWELLING   • Effexor Xr [Venlafaxine Hcl Er] Unknown - Low Severity     Caused weight gain       Social History     Tobacco Use   • Smoking status: Passive Smoke Exposure - Never Smoker   • Smokeless tobacco: Current User   • Tobacco comment: 8/12/18 switch to e-cig   Vaping Use   • Vaping Use: Every day   Substance Use Topics   • Alcohol use: No   • Drug use: No       Family History   Problem Relation Age of Onset   • Diabetes Mother    • Hyperlipidemia Mother    • Hypertension Mother    • Heart disease Mother    • Kidney disease Mother    • Cancer Mother    • Heart attack Mother    • Multiple myeloma Mother    • Melanoma Mother         [place] water contamination   • Coronary artery disease Mother    • Arthritis Father    • Anxiety disorder Father    • COPD Father    • Glaucoma Father    • Hyperlipidemia Father    • Hypertension Father    • Vision loss Father    • Heart disease Father    • Heart attack Father    • Heart disease Sister    • Breast cancer Neg Hx    • Colon cancer Neg Hx    • Colon polyps Neg Hx        Review of Systems   Constitutional: Negative for appetite change, chills, fatigue, fever and unexpected weight change.   Gastrointestinal: Negative for abdominal distention, abdominal pain, anal bleeding, blood in stool, constipation, diarrhea, nausea and vomiting.   Genitourinary: Negative for dyspareunia, dysuria, menstrual problem, pelvic pain, vaginal bleeding, vaginal discharge and vaginal pain.       /84   Ht 154.9 cm (60.98\")   Wt 84.6 kg (186 lb 6.4 oz)   LMP  (LMP Unknown) Comment: partial hysterectomy   BMI 35.24 kg/m²     Physical Exam  Vitals reviewed.   Constitutional:       General: She is not in acute distress.     Appearance: Normal appearance. " She is well-developed. She is not ill-appearing, toxic-appearing or diaphoretic.   HENT:      Mouth/Throat:      Dentition: Normal dentition. No dental caries.   Cardiovascular:      Rate and Rhythm: Normal rate and regular rhythm.      Heart sounds: Normal heart sounds.   Pulmonary:      Effort: Pulmonary effort is normal. No respiratory distress.      Breath sounds: Normal breath sounds. No stridor. No wheezing.   Chest:   Breasts:      Right: No inverted nipple, mass, nipple discharge, skin change or tenderness.      Left: No inverted nipple, mass, nipple discharge, skin change or tenderness.       Abdominal:      General: There is no distension.      Palpations: Abdomen is soft. There is no mass.      Tenderness: There is no abdominal tenderness.   Genitourinary:     General: Normal vulva.      Labia:         Right: No rash, tenderness or lesion.         Left: No rash, tenderness or lesion.       Urethra: No prolapse, urethral pain, urethral swelling or urethral lesion.      Vagina: No vaginal discharge, tenderness or bleeding.      Cervix: No cervical motion tenderness, discharge or friability.      Uterus: Not deviated, not enlarged, not fixed and not tender.       Adnexa:         Right: No mass, tenderness or fullness.          Left: No mass, tenderness or fullness.        Rectum: No tenderness or external hemorrhoid.   Musculoskeletal:         General: No tenderness. Normal range of motion.   Skin:     General: Skin is warm.      Findings: No erythema or rash.   Neurological:      General: No focal deficit present.      Mental Status: She is alert and oriented to person, place, and time. Mental status is at baseline.      Cranial Nerves: No cranial nerve deficit.      Motor: No weakness.      Coordination: Coordination normal.      Gait: Gait normal.   Psychiatric:         Mood and Affect: Mood normal.         Behavior: Behavior normal.         Thought Content: Thought content normal.         Judgment:  Judgment normal.            Assessment/Plan    1) GYN HM: Check pap smear. SBE demonstrated and encouraged. MMG 7/2021.   2) STD screening: not sexually active  3) Bone health - Weight bearing exercise, dietary calcium recommendations and vitamin D reviewed. Boned density 4/2022 with osteopenia.  4) Diet and Exercise discussed  5) Smoking Status: e cig- has been a smoker for 30+ years. Pt switched to e cig in 2018 to try and cut back on her smoking. Shaylee Wadsworth  reports that she is a non-smoker but has been exposed to tobacco smoke. She has been exposed to 0.50 packs per day for the past 30.00 years. She uses smokeless tobacco.. I have educated her on the risk of diseases from using tobacco products such as cancer, COPD and heart disease.  I advised her to quit and she is not willing to quit.  I spent less than 3 minutes counseling the patient.  6) RA: On Enbrel and MTX. Will need a pap every year due to immunosuppression meds.  7) : Pt is off HRT. Having trouble sleeping and vasomotor sx. Discussed Prometrium at night. Pt decliens at this time.   8) Follow up prn and 1 year       Diagnoses and all orders for this visit:    Encounter for gynecological examination    Pap smear, low-risk  -     IgP, Aptima HPV    Encounter for screening for human papillomavirus (HPV)  -     IgP, Aptima HPV        Meryl Ojeda DO  7/18/2022  13:09 EDT   Yes

## 2022-07-28 DIAGNOSIS — E21.0 PRIMARY HYPERPARATHYROIDISM: Primary | ICD-10-CM

## 2022-07-30 ENCOUNTER — LAB (OUTPATIENT)
Dept: LAB | Facility: HOSPITAL | Age: 62
End: 2022-07-30

## 2022-07-30 ENCOUNTER — TRANSCRIBE ORDERS (OUTPATIENT)
Dept: ADMINISTRATIVE | Facility: HOSPITAL | Age: 62
End: 2022-07-30

## 2022-07-30 DIAGNOSIS — Z79.899 ENCOUNTER FOR LONG-TERM (CURRENT) USE OF OTHER MEDICATIONS: Primary | ICD-10-CM

## 2022-07-30 DIAGNOSIS — Z79.899 ENCOUNTER FOR LONG-TERM (CURRENT) USE OF OTHER MEDICATIONS: ICD-10-CM

## 2022-07-30 LAB
ALBUMIN SERPL-MCNC: 4.3 G/DL (ref 3.5–5.2)
ALP SERPL-CCNC: 88 U/L (ref 39–117)
ALT SERPL W P-5'-P-CCNC: 10 U/L (ref 1–33)
AST SERPL-CCNC: 10 U/L (ref 1–32)
BASOPHILS # BLD AUTO: 0.06 10*3/MM3 (ref 0–0.2)
BASOPHILS NFR BLD AUTO: 1 % (ref 0–1.5)
BILIRUB CONJ SERPL-MCNC: <0.2 MG/DL (ref 0–0.3)
BILIRUB INDIRECT SERPL-MCNC: NORMAL MG/DL
BILIRUB SERPL-MCNC: 0.5 MG/DL (ref 0–1.2)
DEPRECATED RDW RBC AUTO: 46.1 FL (ref 37–54)
EOSINOPHIL # BLD AUTO: 0.1 10*3/MM3 (ref 0–0.4)
EOSINOPHIL NFR BLD AUTO: 1.6 % (ref 0.3–6.2)
ERYTHROCYTE [DISTWIDTH] IN BLOOD BY AUTOMATED COUNT: 14.4 % (ref 12.3–15.4)
HCT VFR BLD AUTO: 35.3 % (ref 34–46.6)
HGB BLD-MCNC: 11.5 G/DL (ref 12–15.9)
IMM GRANULOCYTES # BLD AUTO: 0.03 10*3/MM3 (ref 0–0.05)
IMM GRANULOCYTES NFR BLD AUTO: 0.5 % (ref 0–0.5)
LYMPHOCYTES # BLD AUTO: 2.07 10*3/MM3 (ref 0.7–3.1)
LYMPHOCYTES NFR BLD AUTO: 33.8 % (ref 19.6–45.3)
MCH RBC QN AUTO: 28.7 PG (ref 26.6–33)
MCHC RBC AUTO-ENTMCNC: 32.6 G/DL (ref 31.5–35.7)
MCV RBC AUTO: 88 FL (ref 79–97)
MONOCYTES # BLD AUTO: 0.48 10*3/MM3 (ref 0.1–0.9)
MONOCYTES NFR BLD AUTO: 7.8 % (ref 5–12)
NEUTROPHILS NFR BLD AUTO: 3.38 10*3/MM3 (ref 1.7–7)
NEUTROPHILS NFR BLD AUTO: 55.3 % (ref 42.7–76)
NRBC BLD AUTO-RTO: 0.2 /100 WBC (ref 0–0.2)
PLATELET # BLD AUTO: 263 10*3/MM3 (ref 140–450)
PMV BLD AUTO: 9.5 FL (ref 6–12)
PROT SERPL-MCNC: 6.7 G/DL (ref 6–8.5)
RBC # BLD AUTO: 4.01 10*6/MM3 (ref 3.77–5.28)
WBC NRBC COR # BLD: 6.12 10*3/MM3 (ref 3.4–10.8)

## 2022-07-30 PROCEDURE — 80076 HEPATIC FUNCTION PANEL: CPT

## 2022-07-30 PROCEDURE — 85025 COMPLETE CBC W/AUTO DIFF WBC: CPT

## 2022-07-30 PROCEDURE — 82306 VITAMIN D 25 HYDROXY: CPT | Performed by: INTERNAL MEDICINE

## 2022-07-30 PROCEDURE — 83970 ASSAY OF PARATHORMONE: CPT | Performed by: INTERNAL MEDICINE

## 2022-07-30 PROCEDURE — 84443 ASSAY THYROID STIM HORMONE: CPT | Performed by: INTERNAL MEDICINE

## 2022-07-30 PROCEDURE — 84439 ASSAY OF FREE THYROXINE: CPT | Performed by: INTERNAL MEDICINE

## 2022-07-30 PROCEDURE — 80048 BASIC METABOLIC PNL TOTAL CA: CPT | Performed by: INTERNAL MEDICINE

## 2022-08-08 DIAGNOSIS — E78.5 HYPERLIPIDEMIA LDL GOAL <70: ICD-10-CM

## 2022-08-08 RX ORDER — ATORVASTATIN CALCIUM 40 MG/1
TABLET, FILM COATED ORAL
Qty: 90 TABLET | Refills: 0 | Status: SHIPPED | OUTPATIENT
Start: 2022-08-08 | End: 2022-11-01

## 2022-08-08 RX ORDER — ESOMEPRAZOLE MAGNESIUM 40 MG/1
CAPSULE, DELAYED RELEASE ORAL
Qty: 90 CAPSULE | Refills: 0 | Status: SHIPPED | OUTPATIENT
Start: 2022-08-08 | End: 2022-11-01

## 2022-08-30 RX ORDER — LEVOTHYROXINE SODIUM 75 UG/1
TABLET ORAL
Qty: 30 TABLET | Refills: 0 | Status: SHIPPED | OUTPATIENT
Start: 2022-08-30 | End: 2022-09-20

## 2022-09-18 DIAGNOSIS — E21.0 PRIMARY HYPERPARATHYROIDISM: Primary | ICD-10-CM

## 2022-09-18 DIAGNOSIS — E87.6 HYPOKALEMIA: ICD-10-CM

## 2022-09-18 DIAGNOSIS — I10 ESSENTIAL HYPERTENSION: ICD-10-CM

## 2022-09-20 RX ORDER — POTASSIUM CHLORIDE 20 MEQ/1
TABLET, EXTENDED RELEASE ORAL
Qty: 90 TABLET | Refills: 0 | Status: SHIPPED | OUTPATIENT
Start: 2022-09-20 | End: 2023-01-10

## 2022-09-20 RX ORDER — LEVOTHYROXINE SODIUM 75 UG/1
TABLET ORAL
Qty: 30 TABLET | Refills: 0 | Status: SHIPPED | OUTPATIENT
Start: 2022-09-20 | End: 2022-11-01

## 2022-09-20 NOTE — TELEPHONE ENCOUNTER
Rx Refill Note  Requested Prescriptions     Pending Prescriptions Disp Refills    potassium chloride (K-DUR,KLOR-CON) 20 MEQ CR tablet [Pharmacy Med Name: Potassium Chloride Eunice ER 20 MEQ Oral Tablet Extended Release] 90 tablet 0     Sig: Take 1 tablet by mouth once daily      Last office visit with prescribing clinician: 3/30/2022      Next office visit with prescribing clinician: 9/30/2022            TEJINDER ROJO MA  09/20/22, 12:26 EDT

## 2022-09-21 ENCOUNTER — LAB (OUTPATIENT)
Dept: LAB | Facility: HOSPITAL | Age: 62
End: 2022-09-21

## 2022-09-21 ENCOUNTER — TRANSCRIBE ORDERS (OUTPATIENT)
Dept: ADMINISTRATIVE | Facility: HOSPITAL | Age: 62
End: 2022-09-21

## 2022-09-21 DIAGNOSIS — M05.79 SEROPOSITIVE RHEUMATOID ARTHRITIS OF MULTIPLE SITES: ICD-10-CM

## 2022-09-21 DIAGNOSIS — M05.79 SEROPOSITIVE RHEUMATOID ARTHRITIS OF MULTIPLE SITES: Primary | ICD-10-CM

## 2022-09-21 DIAGNOSIS — Z79.899 ENCOUNTER FOR LONG-TERM (CURRENT) USE OF OTHER MEDICATIONS: ICD-10-CM

## 2022-09-21 LAB
ALT SERPL W P-5'-P-CCNC: 12 U/L (ref 1–33)
AST SERPL-CCNC: 12 U/L (ref 1–32)
BASOPHILS # BLD AUTO: 0.07 10*3/MM3 (ref 0–0.2)
BASOPHILS NFR BLD AUTO: 1 % (ref 0–1.5)
CREAT SERPL-MCNC: 0.78 MG/DL (ref 0.57–1)
DEPRECATED RDW RBC AUTO: 48.7 FL (ref 37–54)
EGFRCR SERPLBLD CKD-EPI 2021: 87.6 ML/MIN/1.73
EOSINOPHIL # BLD AUTO: 0.12 10*3/MM3 (ref 0–0.4)
EOSINOPHIL NFR BLD AUTO: 1.7 % (ref 0.3–6.2)
ERYTHROCYTE [DISTWIDTH] IN BLOOD BY AUTOMATED COUNT: 15.1 % (ref 12.3–15.4)
HCT VFR BLD AUTO: 34.6 % (ref 34–46.6)
HGB BLD-MCNC: 11.3 G/DL (ref 12–15.9)
IMM GRANULOCYTES # BLD AUTO: 0.02 10*3/MM3 (ref 0–0.05)
IMM GRANULOCYTES NFR BLD AUTO: 0.3 % (ref 0–0.5)
LYMPHOCYTES # BLD AUTO: 2.68 10*3/MM3 (ref 0.7–3.1)
LYMPHOCYTES NFR BLD AUTO: 37.7 % (ref 19.6–45.3)
MCH RBC QN AUTO: 29.2 PG (ref 26.6–33)
MCHC RBC AUTO-ENTMCNC: 32.7 G/DL (ref 31.5–35.7)
MCV RBC AUTO: 89.4 FL (ref 79–97)
MONOCYTES # BLD AUTO: 0.45 10*3/MM3 (ref 0.1–0.9)
MONOCYTES NFR BLD AUTO: 6.3 % (ref 5–12)
NEUTROPHILS NFR BLD AUTO: 3.77 10*3/MM3 (ref 1.7–7)
NEUTROPHILS NFR BLD AUTO: 53 % (ref 42.7–76)
NRBC BLD AUTO-RTO: 0.1 /100 WBC (ref 0–0.2)
PLATELET # BLD AUTO: 287 10*3/MM3 (ref 140–450)
PMV BLD AUTO: 9.9 FL (ref 6–12)
RBC # BLD AUTO: 3.87 10*6/MM3 (ref 3.77–5.28)
WBC NRBC COR # BLD: 7.11 10*3/MM3 (ref 3.4–10.8)

## 2022-09-21 PROCEDURE — 84450 TRANSFERASE (AST) (SGOT): CPT

## 2022-09-21 PROCEDURE — 82565 ASSAY OF CREATININE: CPT

## 2022-09-21 PROCEDURE — 85025 COMPLETE CBC W/AUTO DIFF WBC: CPT

## 2022-09-21 PROCEDURE — 84460 ALANINE AMINO (ALT) (SGPT): CPT

## 2022-09-21 PROCEDURE — 36415 COLL VENOUS BLD VENIPUNCTURE: CPT

## 2022-09-30 ENCOUNTER — OFFICE VISIT (OUTPATIENT)
Dept: INTERNAL MEDICINE | Facility: CLINIC | Age: 62
End: 2022-09-30

## 2022-09-30 VITALS
SYSTOLIC BLOOD PRESSURE: 126 MMHG | RESPIRATION RATE: 18 BRPM | OXYGEN SATURATION: 98 % | HEIGHT: 61 IN | BODY MASS INDEX: 35.57 KG/M2 | TEMPERATURE: 96.8 F | DIASTOLIC BLOOD PRESSURE: 84 MMHG | HEART RATE: 54 BPM | WEIGHT: 188.4 LBS

## 2022-09-30 DIAGNOSIS — D64.9 ANEMIA OF UNKNOWN ETIOLOGY: ICD-10-CM

## 2022-09-30 DIAGNOSIS — I25.10 CORONARY ARTERY DISEASE INVOLVING NATIVE CORONARY ARTERY OF NATIVE HEART WITHOUT ANGINA PECTORIS: ICD-10-CM

## 2022-09-30 DIAGNOSIS — I10 BENIGN ESSENTIAL HTN: Primary | ICD-10-CM

## 2022-09-30 DIAGNOSIS — K21.00 GASTROESOPHAGEAL REFLUX DISEASE WITH ESOPHAGITIS WITHOUT HEMORRHAGE: ICD-10-CM

## 2022-09-30 DIAGNOSIS — E03.9 HYPOTHYROIDISM, UNSPECIFIED TYPE: ICD-10-CM

## 2022-09-30 DIAGNOSIS — E78.5 HYPERLIPIDEMIA LDL GOAL <70: ICD-10-CM

## 2022-09-30 PROCEDURE — 99214 OFFICE O/P EST MOD 30 MIN: CPT | Performed by: NURSE PRACTITIONER

## 2022-09-30 NOTE — PROGRESS NOTES
Chief Complaint   Patient presents with   • Hypothyroidism     6 month follow up visit        Subjective     Shaylee Wadsworth is a 61 y.o. female being seen for a follow up appointment today regarding HTN, Hyperlipidemia, GERD, CAD, Hypothyroidism, FMD, and Vit D def. She is currently on Justin (Norvasc and Benicar), Toprol XL for HTN and CAD. She is followed by Dr. Dumas. S/P drug eluting stent Distal left circumflex. Plavix was stopped April 2020. SHe is on an 81 mg aspirin therapy.      She has thoracic back pain, daily pain rated a 3 of 10. She uses ultram sparingly for pain as needed (last refill 9 months ago). She is doing home traction for therapy.     She is on Nexium 40mg daily for GERD. She reports some burping, but no ingestion or stomach pain.    She is being followed by Dr. Dunn for TAMANNA. She has been off her iron for 3 months     She has FMD since April 2018. She has been evaluated by 2 neurologists as well as [facility] and an ENT. Last Neurologist was Dr. LaFavre at [facility]. She has been thru PT and OT with [facility]. She feels like traction helps more than any other modality. She is currently receiving Traction therapy with [facility] in [place].     She is followed by Dr. Caraballo ([facility]) for hyperparathyroidism and hypothyroidism. She is on Euthyrox 75mcgs daily.       She is followed by Rheumatology for RA. RHEUMATOLOGY - SCAN by [facility] [place] (02/16/2022 00:00) She is on Methotrexate.    She has hitsory of TAMANNA. She stopped her iron since last visit due to constipation. She had an EGD 10-6-2021 to zain.      History of Present Illness     Allergies   Allergen Reactions   • Meloxicam Swelling     EYES AND FACE SWELLING  EYES AND FACE SWELLING   • Effexor Xr [Venlafaxine Hcl Er] Unknown - Low Severity     Caused weight gain         Current Outpatient Medications:   •  amLODIPine (NORVASC) 5 MG tablet, Take 1 tablet by mouth Daily., Disp: 90 tablet, Rfl: 1  •  aspirin  81 MG tablet, Take 81 mg by mouth Daily., Disp: , Rfl:   •  atorvastatin (LIPITOR) 40 MG tablet, TAKE 1 TABLET BY MOUTH ONCE DAILY AT NIGHT, Disp: 90 tablet, Rfl: 0  •  cyclobenzaprine (FLEXERIL) 10 MG tablet, Take 10 mg by mouth 2 (Two) Times a Day As Needed., Disp: , Rfl:   •  ENBREL SURECLICK 50 MG/ML solution auto-injector, Every 7 (Seven) Days., Disp: , Rfl:   •  esomeprazole (nexIUM) 40 MG capsule, Take 1 capsule by mouth twice daily, Disp: 90 capsule, Rfl: 0  •  Euthyrox 75 MCG tablet, Take 1 tablet by mouth once daily, Disp: 30 tablet, Rfl: 0  •  folic acid (FOLVITE) 1 MG tablet, Take 1 mg by mouth Daily. 1 to 4 tablets daily, Disp: , Rfl:   •  levocetirizine (Xyzal) 5 MG tablet, Take 1 tablet by mouth Every Evening., Disp: , Rfl:   •  methotrexate 2.5 MG tablet, TAKE 4 TABLETS BY MOUTH ONCE A WEEK, Disp: 32 tablet, Rfl: 6  •  metoprolol succinate XL (TOPROL-XL) 50 MG 24 hr tablet, Take 1 tablet by mouth once daily, Disp: 90 tablet, Rfl: 1  •  olmesartan (BENICAR) 40 MG tablet, Take 1 tablet by mouth Daily., Disp: 90 tablet, Rfl: 1  •  potassium chloride (K-DUR,KLOR-CON) 20 MEQ CR tablet, Take 1 tablet by mouth once daily, Disp: 90 tablet, Rfl: 0  •  traMADol (ULTRAM) 50 MG tablet, Take 1 tablet by mouth Every 8 (Eight) Hours As Needed for Severe Pain ., Disp: 30 tablet, Rfl: 0  •  vitamin D (ERGOCALCIFEROL) 1.25 MG (66604 UT) capsule capsule, Take 1 capsule by mouth 1 (One) Time Per Week., Disp: 12 capsule, Rfl: 3  •  ferrous sulfate 325 (65 FE) MG tablet, Take 1 tablet by mouth Daily With Breakfast., Disp: 30 tablet, Rfl: 3    The following portions of the patient's history were reviewed and updated as appropriate: allergies, current medications, past family history, past medical history, past social history, past surgical history and problem list.    Review of Systems   Constitutional: Negative.    HENT: Negative.    Eyes: Negative.    Respiratory: Negative.  Negative for shortness of breath, wheezing and  stridor.    Cardiovascular: Negative.  Negative for chest pain, palpitations and leg swelling.   Gastrointestinal: Positive for constipation.   Endocrine: Negative.    Genitourinary: Negative.    Musculoskeletal: Positive for back pain. Negative for arthralgias.   Allergic/Immunologic: Negative.    Neurological: Positive for tremors.   Psychiatric/Behavioral: Negative.    All other systems reviewed and are negative.      Assessment     Physical Exam  Vitals reviewed.   Constitutional:       Appearance: Normal appearance. She is not ill-appearing.   HENT:      Head: Normocephalic.      Right Ear: Tympanic membrane normal.      Left Ear: Tympanic membrane normal.   Cardiovascular:      Rate and Rhythm: Normal rate and regular rhythm.      Pulses: Normal pulses.      Heart sounds: Normal heart sounds. No murmur heard.  Pulmonary:      Effort: Pulmonary effort is normal. No respiratory distress.      Breath sounds: Normal breath sounds. No stridor.   Musculoskeletal:      Cervical back: Neck supple.      Right lower leg: No edema.      Left lower leg: No edema.   Skin:     General: Skin is warm and dry.   Neurological:      General: No focal deficit present.      Mental Status: She is alert and oriented to person, place, and time.      Cranial Nerves: No cranial nerve deficit.      Comments: Tremors right hand   Psychiatric:         Mood and Affect: Mood normal.         Behavior: Behavior normal.         Thought Content: Thought content normal.         Plan     Her fasting labs were reviewed with the patient from last week, not all labs completed     Diagnoses and all orders for this visit:    1. Benign essential HTN (Primary)  -     Lipid Panel With / Chol / HDL Ratio  -     Comprehensive Metabolic Panel  -     Comprehensive Metabolic Panel  -     Lipid Panel With / Chol / HDL Ratio    2. Coronary artery disease involving native coronary artery of native heart without angina pectoris  -     Lipid Panel With / Chol /  HDL Ratio  -     Comprehensive Metabolic Panel  -     Comprehensive Metabolic Panel  -     Lipid Panel With / Chol / HDL Ratio    3. Hyperlipidemia LDL goal <70  -     Lipid Panel With / Chol / HDL Ratio  -     Comprehensive Metabolic Panel  -     Comprehensive Metabolic Panel  -     Lipid Panel With / Chol / HDL Ratio    4. Hypothyroidism, unspecified type  -     T4, Free  -     TSH  -     TSH  -     T4, Free    5. Gastroesophageal reflux disease with esophagitis without hemorrhage  -     Lipid Panel With / Chol / HDL Ratio  -     Comprehensive Metabolic Panel  -     Comprehensive Metabolic Panel    6. Anemia of unknown etiology  -     Ferritin  -     Iron Profile  -     Vitamin B12    BP well controlled on current medications.     FMD. Will henrique a better exercise regimen, suggested Yoga.     Will check thyroid level today on Euthyrox 75mcg daily, follow up with endo.    Evaluate iron level due to TAMANNA, adda OTC fliinstones with iron daily    The patient has read and signed the [facility] Controlled Substance Contract.  I will continue to see patient for regular follow up appointments.  They are well controlled on their medication.  [place] is updated every 3 months. The patient is aware of the potential for addiction and dependence.    Set up an AWV in 6 months w labs

## 2022-10-01 LAB
ALBUMIN SERPL-MCNC: 4.3 G/DL (ref 3.5–5.2)
ALBUMIN/GLOB SERPL: 2.3 G/DL
ALP SERPL-CCNC: 97 U/L (ref 39–117)
ALT SERPL-CCNC: 11 U/L (ref 1–33)
AST SERPL-CCNC: 12 U/L (ref 1–32)
BILIRUB SERPL-MCNC: 0.6 MG/DL (ref 0–1.2)
BUN SERPL-MCNC: 9 MG/DL (ref 6–20)
BUN/CREAT SERPL: 11.3 (ref 7–25)
CALCIUM SERPL-MCNC: 9.7 MG/DL (ref 8.6–10.5)
CHLORIDE SERPL-SCNC: 102 MMOL/L (ref 98–107)
CHOLEST SERPL-MCNC: 158 MG/DL (ref 0–200)
CHOLEST/HDLC SERPL: 2.93 {RATIO}
CO2 SERPL-SCNC: 28.6 MMOL/L (ref 22–29)
CREAT SERPL-MCNC: 0.8 MG/DL (ref 0.57–1)
EGFRCR SERPLBLD CKD-EPI 2021: 85 ML/MIN/1.73
FERRITIN SERPL-MCNC: 18.3 NG/ML (ref 13–150)
GLOBULIN SER CALC-MCNC: 1.9 GM/DL
GLUCOSE SERPL-MCNC: 107 MG/DL (ref 65–99)
HDLC SERPL-MCNC: 54 MG/DL (ref 40–60)
IRON SATN MFR SERPL: 20 % (ref 20–50)
IRON SERPL-MCNC: 97 MCG/DL (ref 37–145)
LDLC SERPL CALC-MCNC: 80 MG/DL (ref 0–100)
POTASSIUM SERPL-SCNC: 4.4 MMOL/L (ref 3.5–5.2)
PROT SERPL-MCNC: 6.2 G/DL (ref 6–8.5)
SODIUM SERPL-SCNC: 140 MMOL/L (ref 136–145)
T4 FREE SERPL-MCNC: 1.82 NG/DL (ref 0.93–1.7)
TIBC SERPL-MCNC: 493 MCG/DL
TRIGL SERPL-MCNC: 140 MG/DL (ref 0–150)
TSH SERPL DL<=0.005 MIU/L-ACNC: 0.83 UIU/ML (ref 0.27–4.2)
UIBC SERPL-MCNC: 396 MCG/DL (ref 112–346)
VIT B12 SERPL-MCNC: 296 PG/ML (ref 211–946)
VLDLC SERPL CALC-MCNC: 24 MG/DL (ref 5–40)

## 2022-10-03 ENCOUNTER — OFFICE VISIT (OUTPATIENT)
Dept: ENDOCRINOLOGY | Age: 62
End: 2022-10-03

## 2022-10-03 VITALS
BODY MASS INDEX: 37.5 KG/M2 | WEIGHT: 191 LBS | OXYGEN SATURATION: 93 % | TEMPERATURE: 96.5 F | HEART RATE: 75 BPM | HEIGHT: 60 IN | DIASTOLIC BLOOD PRESSURE: 70 MMHG | SYSTOLIC BLOOD PRESSURE: 118 MMHG

## 2022-10-03 DIAGNOSIS — E78.2 HYPERLIPEMIA, MIXED: ICD-10-CM

## 2022-10-03 DIAGNOSIS — E03.9 ACQUIRED HYPOTHYROIDISM: ICD-10-CM

## 2022-10-03 DIAGNOSIS — E21.0 PRIMARY HYPERPARATHYROIDISM: Primary | ICD-10-CM

## 2022-10-03 PROCEDURE — 99214 OFFICE O/P EST MOD 30 MIN: CPT | Performed by: INTERNAL MEDICINE

## 2022-10-03 NOTE — PROGRESS NOTES
"Chief Complaint  Primary hyperparathyroidism (HCC) (Patient states that her energy levels havent been good she has no family hx of thyroid disease her weight is stable but is higher today due to clothing )    Subjective            History of Present Illness  Shaylee Wadsworth,61 y.o. is here as a is here for as F/u for the evaluation of hyperparathyroidism, hypothyroidism.      Hypothyroidism - on levothyroxine 75 mcg oral daily.   Energy levels are low, believes she also has anemia and has pending - EGD and colonoscopy.      Hx of RA - sees rheumatologist.      Hyperparathyroidism - patient still continues to complain of fatigue but it is no worse or better than last time.  No kidney stones.  Does have osteopenia, bone density scan was performed by primary care in 2019.  She is due for another bone density scan but would like to proceed with that through the primary care.  No history of fragility fractures.    # Pt was seen by the chiropractor and reports that her symptoms with tremor improved a lot after the chiropractor saw.       Reviewed primary care physician's/consulting physician documentation and lab results     I have reviewed the patient's allergies, medicines, past medical hx, family hx and social hx in detail.    Objective   Vital Signs:   /70   Pulse 75   Temp 96.5 °F (35.8 °C) (Temporal)   Ht 152.4 cm (60\")   Wt 86.6 kg (191 lb)   SpO2 93%   BMI 37.30 kg/m²     Physical Exam   General appearance - no distress  Eyes- anicteric sclera  Ear nose and throat-external ears and nose normal.    Respiratory-normal chest on inspection.  No respiratory distress noted.  Skin-no rashes.  Neuro-alert and oriented x3            Result Review :{ Labs  Result Review  Imaging  Med Tab  Media :23}   The following data was reviewed by: Marcel Caraballo MD on 10/03/2022:  Office Visit on 09/30/2022   Component Date Value Ref Range Status   • Glucose 09/30/2022 107 (A) 65 - 99 mg/dL Final   • BUN 09/30/2022 9  " 6 - 20 mg/dL Final   • Creatinine 09/30/2022 0.80  0.57 - 1.00 mg/dL Final   • EGFR Result 09/30/2022 85.0  >60.0 mL/min/1.73 Final    Comment: National Kidney Foundation and American Society of  Nephrology (ASN) Task Force recommended calculation based  on the Chronic Kidney Disease Epidemiology Collaboration  (CKD-EPI) equation refit without adjustment for race.  GFR Normal >60  Chronic Kidney Disease <60  Kidney Failure <15     • BUN/Creatinine Ratio 09/30/2022 11.3  7.0 - 25.0 Final   • Sodium 09/30/2022 140  136 - 145 mmol/L Final   • Potassium 09/30/2022 4.4  3.5 - 5.2 mmol/L Final   • Chloride 09/30/2022 102  98 - 107 mmol/L Final   • Total CO2 09/30/2022 28.6  22.0 - 29.0 mmol/L Final   • Calcium 09/30/2022 9.7  8.6 - 10.5 mg/dL Final   • Total Protein 09/30/2022 6.2  6.0 - 8.5 g/dL Final   • Albumin 09/30/2022 4.30  3.50 - 5.20 g/dL Final   • Globulin 09/30/2022 1.9  gm/dL Final   • A/G Ratio 09/30/2022 2.3  g/dL Final   • Total Bilirubin 09/30/2022 0.6  0.0 - 1.2 mg/dL Final   • Alkaline Phosphatase 09/30/2022 97  39 - 117 U/L Final   • AST (SGOT) 09/30/2022 12  1 - 32 U/L Final   • ALT (SGPT) 09/30/2022 11  1 - 33 U/L Final   • Total Cholesterol 09/30/2022 158  0 - 200 mg/dL Final    Comment: Cholesterol Reference Ranges  (U.S. Department of Health and Human Services ATP III  Classifications)  Desirable          <200 mg/dL  Borderline High    200-239 mg/dL  High Risk          >240 mg/dL  Triglyceride Reference Ranges  (U.S. Department of Health and Human Services ATP III  Classifications)  Normal           <150 mg/dL  Borderline High  150-199 mg/dL  High             200-499 mg/dL  Very High        >500 mg/dL  HDL Reference Ranges  (U.S. Department of Health and Human Services ATP III  Classifications)  Low     <40 mg/dl (major risk factor for CHD)  High    >60 mg/dl ('negative' risk factor for CHD)  LDL Reference Ranges  (U.S. Department of Health and Human Services ATP III  Classifications)  Optimal           <100 mg/dL  Near Optimal     100-129 mg/dL  Borderline High  130-159 mg/dL  High             160-189 mg/dL  Very High        >189 mg/dL     • Triglycerides 09/30/2022 140  0 - 150 mg/dL Final   • HDL Cholesterol 09/30/2022 54  40 - 60 mg/dL Final   • VLDL Cholesterol Chung 09/30/2022 24  5 - 40 mg/dL Final   • LDL Chol Calc (NIH) 09/30/2022 80  0 - 100 mg/dL Final   • Chol/HDL Ratio 09/30/2022 2.93   Final   • TSH 09/30/2022 0.833  0.270 - 4.200 uIU/mL Final   • Free T4 09/30/2022 1.82 (A) 0.93 - 1.70 ng/dL Final    Results may be falsely increased if patient taking Biotin.   • Ferritin 09/30/2022 18.30  13.00 - 150.00 ng/mL Final    Results may be falsely decreased if patient taking Biotin.   • TIBC 09/30/2022 493  mcg/dL Final   • UIBC 09/30/2022 396 (A) 112 - 346 mcg/dL Final   • Iron 09/30/2022 97  37 - 145 mcg/dL Final   • Iron Saturation 09/30/2022 20  20 - 50 % Final   • Vitamin B-12 09/30/2022 296  211 - 946 pg/mL Final    Results may be falsely increased if patient taking Biotin.     Data reviewed: pcp and endocrine notes        I reviewed the patient's new clinical results and mentioned them above in HPI and in plan as well.          Assessment and Plan    Problem List Items Addressed This Visit        Other    Hypothyroidism    Relevant Orders    TSH    T4, Free    T3, Free    Basic Metabolic Panel    Hemoglobin A1c    Lipid Panel    Vitamin B12 & Folate    Vitamin D 25 Hydroxy    PTH, Intact & Calcium      Other Visit Diagnoses     Primary hyperparathyroidism (HCC)    -  Primary    Relevant Orders    TSH    T4, Free    T3, Free    Basic Metabolic Panel    Hemoglobin A1c    Lipid Panel    Vitamin B12 & Folate    Vitamin D 25 Hydroxy    PTH, Intact & Calcium    Hyperlipemia, mixed        Relevant Orders    TSH    T4, Free    T3, Free    Basic Metabolic Panel    Hemoglobin A1c    Lipid Panel    Vitamin B12 & Folate    Vitamin D 25 Hydroxy    PTH, Intact & Calcium        Hypothyroidism - chronic problem    TSH - stable.   FT4 was a bit elevated, will monitor for now.   Continue euthyrox 75 mcg oral daily.     Hyperparathyroidism -   Continue to monitor the Ca and PTH       Follow Up   No follow-ups on file.    Refills/Meds sent to pharmacy    Interpreted the blood work-up/imaging results performed by the primary care/consulting physician -    Patient was given instructions and counseling regarding her condition or for health maintenance advice. Please see specific information pulled into the AVS if appropriate.

## 2022-10-11 ENCOUNTER — TELEPHONE (OUTPATIENT)
Dept: INTERNAL MEDICINE | Facility: CLINIC | Age: 62
End: 2022-10-11

## 2022-10-11 NOTE — TELEPHONE ENCOUNTER
Hub to share with pt,Her labs are showing a mildly elevated blood sugar level at 107. Her thyroid function is laos mildly elevated, but this is followed by endocrinology. Total cholesterol is 158, < 200 is normal. LDL goal < 70, currently at 80. Vitamin B12 is low. Add a B12 spray OTC once daily.

## 2022-10-17 DIAGNOSIS — I10 BENIGN ESSENTIAL HTN: ICD-10-CM

## 2022-10-18 ENCOUNTER — OFFICE VISIT (OUTPATIENT)
Dept: CARDIOLOGY | Facility: CLINIC | Age: 62
End: 2022-10-18

## 2022-10-18 VITALS
BODY MASS INDEX: 37.81 KG/M2 | HEART RATE: 66 BPM | HEIGHT: 60 IN | SYSTOLIC BLOOD PRESSURE: 132 MMHG | DIASTOLIC BLOOD PRESSURE: 80 MMHG | WEIGHT: 192.6 LBS

## 2022-10-18 DIAGNOSIS — I25.10 CORONARY ARTERY DISEASE INVOLVING NATIVE CORONARY ARTERY OF NATIVE HEART WITHOUT ANGINA PECTORIS: Primary | ICD-10-CM

## 2022-10-18 DIAGNOSIS — R00.2 PALPITATIONS: ICD-10-CM

## 2022-10-18 DIAGNOSIS — E78.5 HYPERLIPIDEMIA LDL GOAL <70: ICD-10-CM

## 2022-10-18 DIAGNOSIS — M05.79 RHEUMATOID ARTHRITIS WITH RHEUMATOID FACTOR OF MULTIPLE SITES WITHOUT ORGAN OR SYSTEMS INVOLVEMENT: ICD-10-CM

## 2022-10-18 DIAGNOSIS — Z95.5 S/P DRUG ELUTING CORONARY STENT PLACEMENT: ICD-10-CM

## 2022-10-18 DIAGNOSIS — I10 BENIGN ESSENTIAL HTN: ICD-10-CM

## 2022-10-18 PROCEDURE — 93000 ELECTROCARDIOGRAM COMPLETE: CPT | Performed by: INTERNAL MEDICINE

## 2022-10-18 PROCEDURE — 99214 OFFICE O/P EST MOD 30 MIN: CPT | Performed by: INTERNAL MEDICINE

## 2022-10-18 NOTE — PROGRESS NOTES
Subjective:     Encounter Date:10/18/2022      Patient ID: Shaylee Wadsworth is a 61 y.o. female.    Chief Complaint:  History of Present Illness    This is a 61-year-old with a history of hypertension, hyperlipidemia, rheumatoid arthritis, functional movement disorder resulting amanda tremor, coronary artery disease status post drug eluting stent placement of the distal left circumflex artery, obstructive sleep apnea, who presents for telephone follow up.      She presents today for annual follow-up.  Her last office visit with KAITLYNN Younger in 10/2021 she was doing well and no changes were made to her management.  From a cardiac standpoint she continues to do well.  She denies any chest pain, shortness of breath, palpitation, orthopnea, near-syncope or syncope, or worsening of occasional lower extremity edema.  She reports brief episodes recently that she attributes to changes in her thyroid medication dosage.  Her mobility is primarily limited by her rheumatoid arthritis.     Prior History:  I saw the patient initially in 2/2019 for an abnormal stress test and chest pain.  At that time she and her  reported she had been under a lot of stress after developing a right sided tremor and balance issues in 4/2018.  As a results she had been to several different doctors including to the [facility].  There she was seen both in the functional movement disorder clinic and by ENT who felt that her symptoms may be due to dystonia and BPPV.  They recommended that she start physical therapy for this.  Following her return from her [place] clinic visit the patient developed recurrent chest burning symptoms on 2/12/2019 which prompted her to go to the emergency room.  She reported that the symptoms woke her up from sleep and lasted about 2 minutes each and resolved on their own.  She had about 3 episodes before she went to the emergency room.  Workup in the emergency room was apparently unremarkable.  Prior  to that she had 2 or 3 episodes about a week or so prior.  She was given Carafate in the emergency room which she has been taking about once daily in addition to daily omeprazole which she has been on chronically.  She was then seen by KAITLYNN Choi on 2/14/2019 and was set up with a treadmill stress test.  This was performed on 2/21/2019.  She only exercised about 5 minutes and had no significant changes with exercise nor did she have any symptoms.  However in recovery the patient developed ST segment elevation in her inferior leads with reciprocal ST depression in 1 and aVL.  These findings resolved at the end of the recovery period.  She had no symptoms associated with the EKG changes.      Based on the high risk findings on her stress test and recommended proceeding with a cardiac catheterization.  This was performed on 3/4/2019 and showed 80% stenosis of her distal left circumflex artery.  Her main coronary artery showed mild nonobstructive disease.  Left ventricular function was normal with an EF of greater than 60%.  She subsequently underwent drug-eluting stent placement and was discharged later that same day.       In 2/2020 she complained of palpitations.   She was set up with a monitor which showed one 16 beat run of SVT but was overall unremarkable.    Since then her symptoms of palpitations improved.  Clopidogrel was discontinued at a telephone follow-up in 4/2020.    She reported some issues with lower extremity edema that was attributed to amlodipine.  That dosage was decreased and she was started on spironolactone with improvement in her swelling.        Review of Systems   Constitutional: Negative for malaise/fatigue.   HENT: Negative for hearing loss, hoarse voice, nosebleeds and sore throat.    Eyes: Negative for pain.   Cardiovascular: Positive for leg swelling and palpitations. Negative for chest pain, claudication, cyanosis, dyspnea on exertion, irregular heartbeat, near-syncope, orthopnea,  paroxysmal nocturnal dyspnea and syncope.   Respiratory: Negative for shortness of breath and snoring.    Endocrine: Negative for cold intolerance, heat intolerance, polydipsia, polyphagia and polyuria.   Skin: Negative for itching and rash.   Musculoskeletal: Positive for joint pain. Negative for arthritis, falls, joint swelling, muscle cramps, muscle weakness and myalgias.   Gastrointestinal: Negative for constipation, diarrhea, dysphagia, heartburn, hematemesis, hematochezia, melena, nausea and vomiting.   Genitourinary: Negative for frequency, hematuria and hesitancy.   Neurological: Negative for excessive daytime sleepiness, dizziness, headaches, light-headedness, numbness and weakness.   Psychiatric/Behavioral: Negative for depression. The patient is not nervous/anxious.          Current Outpatient Medications:   •  amLODIPine (NORVASC) 5 MG tablet, Take 1 tablet by mouth Daily., Disp: 90 tablet, Rfl: 1  •  aspirin 81 MG tablet, Take 81 mg by mouth Daily., Disp: , Rfl:   •  atorvastatin (LIPITOR) 40 MG tablet, TAKE 1 TABLET BY MOUTH ONCE DAILY AT NIGHT, Disp: 90 tablet, Rfl: 0  •  cyclobenzaprine (FLEXERIL) 10 MG tablet, Take 10 mg by mouth 2 (Two) Times a Day As Needed., Disp: , Rfl:   •  ENBREL SURECLICK 50 MG/ML solution auto-injector, Every 7 (Seven) Days., Disp: , Rfl:   •  esomeprazole (nexIUM) 40 MG capsule, Take 1 capsule by mouth twice daily, Disp: 90 capsule, Rfl: 0  •  Euthyrox 75 MCG tablet, Take 1 tablet by mouth once daily, Disp: 30 tablet, Rfl: 0  •  folic acid (FOLVITE) 1 MG tablet, Take 1 mg by mouth Daily. 1 to 4 tablets daily, Disp: , Rfl:   •  levocetirizine (Xyzal) 5 MG tablet, Take 1 tablet by mouth Every Evening., Disp: , Rfl:   •  methotrexate 2.5 MG tablet, TAKE 4 TABLETS BY MOUTH ONCE A WEEK, Disp: 32 tablet, Rfl: 6  •  metoprolol succinate XL (TOPROL-XL) 50 MG 24 hr tablet, Take 1 tablet by mouth once daily, Disp: 90 tablet, Rfl: 1  •  olmesartan (BENICAR) 40 MG tablet, Take 1 tablet  by mouth Daily., Disp: 90 tablet, Rfl: 1  •  potassium chloride (K-DUR,KLOR-CON) 20 MEQ CR tablet, Take 1 tablet by mouth once daily, Disp: 90 tablet, Rfl: 0  •  traMADol (ULTRAM) 50 MG tablet, Take 1 tablet by mouth Every 8 (Eight) Hours As Needed for Severe Pain ., Disp: 30 tablet, Rfl: 0  •  vitamin D (ERGOCALCIFEROL) 1.25 MG (91126 UT) capsule capsule, Take 1 capsule by mouth 1 (One) Time Per Week., Disp: 12 capsule, Rfl: 3    Past Medical History:   Diagnosis Date   • Abnormal electrocardiogram    • Anemia May 2021   • Anxiety    • Arthritis 5/1/2014    RA   • Lainez esophagus    • Benign paroxysmal positional vertigo due to bilateral vestibular disorder    • Chest pain    • Cholelithiasis 2007    Removed   • Colon polyp    • Coronary artery disease 2/26/19   • Depression 6/12/19   • Disease of thyroid gland    • Dystonia    • Embedded tick of right lower leg    • Esophageal reflux    • Fibromyositis    • Functional movement disorder    • H/O colonoscopy    • H/O mammogram 08/31/2011    NORMAL    • History of colonoscopy 03/2020   • Hx of bone density study 08/31/2011    OSTEOPENIA    • Hyperlipidemia    • Hyperparathyroidism (HCC)    • Hypertension    • Hypothyroidism May 2021   • Low back pain    • Menopause    • Osteopenia    • Osteopenia    • Ovarian cyst 2000    L ovary removed   • Pancreatitis    • Pancreatitis    • Scoliosis 2/16/13   • Superficial incisional infection of surgical site    • Tremor 4/4/2018       Past Surgical History:   Procedure Laterality Date   • APPENDECTOMY N/A 12/22/2021    Procedure: APPENDECTOMY LAPAROSCOPIC;  Surgeon: Maciel Godoy MD;  Location: [facility] OR;  Service: General;  Laterality: N/A;   • CARDIAC CATHETERIZATION N/A 3/4/2019    Procedure: Coronary angiography;  Surgeon: Jackelyn Dumas MD;  Location:  [facility];  Service: Cardiovascular   • CARDIAC CATHETERIZATION N/A 3/4/2019    Procedure: Left heart cath;  Surgeon: Jackelyn Dumas MD;   Location:  [facility];  Service: Cardiovascular   • CARDIAC CATHETERIZATION N/A 3/4/2019    Procedure: Left ventriculography;  Surgeon: Jackelyn Dumas MD;  Location:  [facility];  Service: Cardiovascular   • CARDIAC CATHETERIZATION N/A 3/4/2019    Procedure: Stent KARLY coronary;  Surgeon: Jackelyn Dumas MD;  Location:  [facility];  Service: Cardiovascular   • CHOLECYSTECTOMY     • COLONOSCOPY  2014   • COLONOSCOPY N/A 3/6/2020    Procedure: COLONOSCOPY, biopsy, polypectomy;  Surgeon: Rain Kirk MD;  Location:  [facility] OR;  Service: Gastroenterology;  Laterality: N/A;  Random biopsies; Rectal polyp x 3; Hemorrhoids; Diverticulosis   • CORONARY STENT PLACEMENT     • DILATATION AND CURETTAGE     • HYSTERECTOMY     • LAPAROSCOPIC CHOLECYSTECTOMY  2005   • MOUTH SURGERY      TOOTH EXTRACTION   • OTHER SURGICAL HISTORY  03/27/2015    DIAGNOSTIC ESOPHAGOSCOPY TRANSNASAL FLEXIBLE WITH BIOPSY; ARZATE ESO. REPEAT EGD 2 YR    • OVARIAN CYST SURGERY  2000    L ovary removed   • OVARY SURGERY Left     NORMAL    • PAP SMEAR  08/23/2010    NORMAL    • PARATHYROIDECTOMY Right 08/17/2016    Dr. Muchael Flynn at [place]   • SUBTOTAL HYSTERECTOMY      Left ovary removed9   • WISDOM TOOTH EXTRACTION  1984       Family History   Problem Relation Age of Onset   • Diabetes Mother    • Hyperlipidemia Mother    • Hypertension Mother    • Heart disease Mother    • Kidney disease Mother    • Cancer Mother    • Heart attack Mother    • Multiple myeloma Mother    • Melanoma Mother         [place] water contamination   • Coronary artery disease Mother    • Arthritis Father    • Anxiety disorder Father    • COPD Father    • Glaucoma Father    • Hyperlipidemia Father    • Hypertension Father    • Vision loss Father    • Heart disease Father    • Heart attack Father    • Heart disease Sister    • Breast cancer Neg Hx    • Colon cancer Neg Hx    • Colon polyps Neg Hx        Social History  "    Tobacco Use   • Smoking status: Never     Passive exposure: Yes   • Smokeless tobacco: Current   • Tobacco comments:     8/12/18 switch to e-cig   Vaping Use   • Vaping Use: Every day   Substance Use Topics   • Alcohol use: No   • Drug use: No         ECG 12 Lead    Date/Time: 10/18/2022 3:19 PM  Performed by: Jackelyn Dumas MD  Authorized by: Jackelyn Dumas MD   Comparison: compared with previous ECG   Similar to previous ECG  Rhythm: sinus rhythm  T inversion: I and aVL  T flattening: V1, V2, V3, V4, V5 and V6                 Objective:     Visit Vitals  /80 (BP Location: Right arm, Patient Position: Sitting, Cuff Size: Adult)   Pulse 66   Ht 152.4 cm (60\")   Wt 87.4 kg (192 lb 9.6 oz)   LMP  (LMP Unknown) Comment: partial hysterectomy    BMI 37.61 kg/m²         Constitutional:       Appearance: Normal appearance. Well-developed.   Eyes:      General: Lids are normal.      Conjunctiva/sclera: Conjunctivae normal.      Pupils: Pupils are equal, round, and reactive to light.   HENT:      Head: Normocephalic and atraumatic.   Neck:      Vascular: No carotid bruit or JVD.      Lymphadenopathy: No cervical adenopathy.   Pulmonary:      Effort: Pulmonary effort is normal.      Breath sounds: Normal breath sounds.   Cardiovascular:      Normal rate. Regular rhythm.      No gallop.   Pulses:     Radial: 2+ bilaterally.  Edema:     Peripheral edema absent.   Abdominal:      Palpations: Abdomen is soft.   Musculoskeletal:      Cervical back: Full passive range of motion without pain, normal range of motion and neck supple. Skin:     General: Skin is warm and dry.   Neurological:      Mental Status: Alert and oriented to person, place, and time.             Assessment:          Diagnosis Plan   1. Coronary artery disease involving native coronary artery of native heart without angina pectoris        2. S/P drug eluting coronary stent placement        3. Benign essential HTN        4. Palpitations        5. " Hyperlipidemia LDL goal <70        6. Rheumatoid arthritis with rheumatoid factor of multiple sites without organ or systems involvement (HCC)               Plan:       1.  Coronary artery disease.  Pursue stable and asymptomatic.  Continue current medical management including aspirin and statin.  2.  Hyperlipidemia.  On atorvastatin for goal LDL of around 70 or below.  Her last lipid panel showed that her LDL was near goal at 80.  Continue current dose.  3.  Hypertension.  Well-controlled on current regimen medications.  4.  Rheumatoid arthritis  5.  Hypothyroidism.    We will plan on seeing the patient back again in 1 year or sooner if further issues arise.

## 2022-10-19 RX ORDER — OLMESARTAN MEDOXOMIL 40 MG/1
TABLET ORAL
Qty: 90 TABLET | Refills: 0 | Status: SHIPPED | OUTPATIENT
Start: 2022-10-19 | End: 2023-01-10

## 2022-10-19 RX ORDER — AMLODIPINE BESYLATE 5 MG/1
TABLET ORAL
Qty: 90 TABLET | Refills: 0 | Status: SHIPPED | OUTPATIENT
Start: 2022-10-19 | End: 2023-01-10

## 2022-10-30 DIAGNOSIS — E21.0 PRIMARY HYPERPARATHYROIDISM: ICD-10-CM

## 2022-10-30 DIAGNOSIS — E78.5 HYPERLIPIDEMIA LDL GOAL <70: ICD-10-CM

## 2022-11-01 RX ORDER — ATORVASTATIN CALCIUM 40 MG/1
TABLET, FILM COATED ORAL
Qty: 90 TABLET | Refills: 0 | Status: SHIPPED | OUTPATIENT
Start: 2022-11-01

## 2022-11-01 RX ORDER — LEVOTHYROXINE SODIUM 0.07 MG/1
TABLET ORAL
Qty: 30 TABLET | Refills: 0 | Status: SHIPPED | OUTPATIENT
Start: 2022-11-01 | End: 2022-12-02

## 2022-11-01 RX ORDER — ESOMEPRAZOLE MAGNESIUM 40 MG/1
CAPSULE, DELAYED RELEASE ORAL
Qty: 90 CAPSULE | Refills: 0 | Status: SHIPPED | OUTPATIENT
Start: 2022-11-01 | End: 2023-01-12 | Stop reason: SDUPTHER

## 2022-11-21 DIAGNOSIS — I10 BENIGN ESSENTIAL HTN: ICD-10-CM

## 2022-11-21 DIAGNOSIS — M85.80 OSTEOPENIA: ICD-10-CM

## 2022-11-21 RX ORDER — METOPROLOL SUCCINATE 50 MG/1
TABLET, EXTENDED RELEASE ORAL
Qty: 90 TABLET | Refills: 0 | Status: SHIPPED | OUTPATIENT
Start: 2022-11-21 | End: 2023-01-10

## 2022-11-21 RX ORDER — ERGOCALCIFEROL 1.25 MG/1
CAPSULE ORAL
Qty: 12 CAPSULE | Refills: 1 | Status: SHIPPED | OUTPATIENT
Start: 2022-11-21

## 2022-11-21 NOTE — TELEPHONE ENCOUNTER
Rx Refill Note  Requested Prescriptions     Pending Prescriptions Disp Refills    vitamin D (ERGOCALCIFEROL) 1.25 MG (44918 UT) capsule capsule [Pharmacy Med Name: Vitamin D (Ergocalciferol) 1.25 MG (51748 UT) Oral Capsule] 12 capsule 0     Sig: Take 1 capsule by mouth once a week     Signed Prescriptions Disp Refills    metoprolol succinate XL (TOPROL-XL) 50 MG 24 hr tablet 90 tablet 0     Sig: Take 1 tablet by mouth once daily     Authorizing Provider: RYANN FRENCH     Ordering User: CRISTIAN AVILA      Last office visit with prescribing clinician: 9/30/2022      Next office visit with prescribing clinician: 5/2/2023            Cristian Avila MA  11/21/22, 09:14 EST

## 2022-11-26 NOTE — PROGRESS NOTES
See exercise session comments  
Pt came c/o abdominal pain x 3 days, denies fever, vomiting or diarrhea.

## 2022-12-01 ENCOUNTER — LAB (OUTPATIENT)
Dept: LAB | Facility: HOSPITAL | Age: 62
End: 2022-12-01

## 2022-12-01 ENCOUNTER — TRANSCRIBE ORDERS (OUTPATIENT)
Dept: ADMINISTRATIVE | Facility: HOSPITAL | Age: 62
End: 2022-12-01

## 2022-12-01 DIAGNOSIS — E21.0 PRIMARY HYPERPARATHYROIDISM: ICD-10-CM

## 2022-12-01 DIAGNOSIS — M05.79 SEROPOSITIVE RHEUMATOID ARTHRITIS OF MULTIPLE SITES: ICD-10-CM

## 2022-12-01 DIAGNOSIS — Z79.899 ENCOUNTER FOR LONG-TERM (CURRENT) USE OF OTHER MEDICATIONS: Primary | ICD-10-CM

## 2022-12-01 DIAGNOSIS — Z79.899 ENCOUNTER FOR LONG-TERM (CURRENT) USE OF OTHER MEDICATIONS: ICD-10-CM

## 2022-12-01 LAB
ALT SERPL W P-5'-P-CCNC: 12 U/L (ref 1–33)
AST SERPL-CCNC: 14 U/L (ref 1–32)
BASOPHILS # BLD AUTO: 0.07 10*3/MM3 (ref 0–0.2)
BASOPHILS NFR BLD AUTO: 0.8 % (ref 0–1.5)
CREAT UR-MCNC: 34.5 MG/DL
DEPRECATED RDW RBC AUTO: 44.5 FL (ref 37–54)
EOSINOPHIL # BLD AUTO: 0.1 10*3/MM3 (ref 0–0.4)
EOSINOPHIL NFR BLD AUTO: 1.1 % (ref 0.3–6.2)
ERYTHROCYTE [DISTWIDTH] IN BLOOD BY AUTOMATED COUNT: 14.8 % (ref 12.3–15.4)
HCT VFR BLD AUTO: 35.6 % (ref 34–46.6)
HGB BLD-MCNC: 11.9 G/DL (ref 12–15.9)
IMM GRANULOCYTES # BLD AUTO: 0.03 10*3/MM3 (ref 0–0.05)
IMM GRANULOCYTES NFR BLD AUTO: 0.3 % (ref 0–0.5)
LYMPHOCYTES # BLD AUTO: 2.21 10*3/MM3 (ref 0.7–3.1)
LYMPHOCYTES NFR BLD AUTO: 25 % (ref 19.6–45.3)
MCH RBC QN AUTO: 28.1 PG (ref 26.6–33)
MCHC RBC AUTO-ENTMCNC: 33.4 G/DL (ref 31.5–35.7)
MCV RBC AUTO: 84.2 FL (ref 79–97)
MONOCYTES # BLD AUTO: 0.59 10*3/MM3 (ref 0.1–0.9)
MONOCYTES NFR BLD AUTO: 6.7 % (ref 5–12)
NEUTROPHILS NFR BLD AUTO: 5.85 10*3/MM3 (ref 1.7–7)
NEUTROPHILS NFR BLD AUTO: 66.1 % (ref 42.7–76)
NRBC BLD AUTO-RTO: 0 /100 WBC (ref 0–0.2)
PLATELET # BLD AUTO: 320 10*3/MM3 (ref 140–450)
PMV BLD AUTO: 9.8 FL (ref 6–12)
RBC # BLD AUTO: 4.23 10*6/MM3 (ref 3.77–5.28)
WBC NRBC COR # BLD: 8.85 10*3/MM3 (ref 3.4–10.8)

## 2022-12-01 PROCEDURE — 82306 VITAMIN D 25 HYDROXY: CPT | Performed by: INTERNAL MEDICINE

## 2022-12-01 PROCEDURE — 84481 FREE ASSAY (FT-3): CPT | Performed by: INTERNAL MEDICINE

## 2022-12-01 PROCEDURE — 84450 TRANSFERASE (AST) (SGOT): CPT

## 2022-12-01 PROCEDURE — 82570 ASSAY OF URINE CREATININE: CPT

## 2022-12-01 PROCEDURE — 83036 HEMOGLOBIN GLYCOSYLATED A1C: CPT | Performed by: INTERNAL MEDICINE

## 2022-12-01 PROCEDURE — 85025 COMPLETE CBC W/AUTO DIFF WBC: CPT

## 2022-12-01 PROCEDURE — 82746 ASSAY OF FOLIC ACID SERUM: CPT | Performed by: INTERNAL MEDICINE

## 2022-12-01 PROCEDURE — 84443 ASSAY THYROID STIM HORMONE: CPT | Performed by: INTERNAL MEDICINE

## 2022-12-01 PROCEDURE — 80061 LIPID PANEL: CPT | Performed by: INTERNAL MEDICINE

## 2022-12-01 PROCEDURE — 36415 COLL VENOUS BLD VENIPUNCTURE: CPT

## 2022-12-01 PROCEDURE — 82607 VITAMIN B-12: CPT | Performed by: INTERNAL MEDICINE

## 2022-12-01 PROCEDURE — 84460 ALANINE AMINO (ALT) (SGPT): CPT

## 2022-12-01 PROCEDURE — 80048 BASIC METABOLIC PNL TOTAL CA: CPT | Performed by: INTERNAL MEDICINE

## 2022-12-01 PROCEDURE — 84439 ASSAY OF FREE THYROXINE: CPT | Performed by: INTERNAL MEDICINE

## 2022-12-01 PROCEDURE — 83970 ASSAY OF PARATHORMONE: CPT | Performed by: INTERNAL MEDICINE

## 2022-12-02 RX ORDER — LEVOTHYROXINE SODIUM 0.07 MG/1
TABLET ORAL
Qty: 30 TABLET | Refills: 0 | Status: SHIPPED | OUTPATIENT
Start: 2022-12-02 | End: 2022-12-29

## 2022-12-26 DIAGNOSIS — E21.0 PRIMARY HYPERPARATHYROIDISM: ICD-10-CM

## 2022-12-30 RX ORDER — LEVOTHYROXINE SODIUM 0.07 MG/1
TABLET ORAL
Qty: 90 TABLET | Refills: 2 | Status: SHIPPED | OUTPATIENT
Start: 2022-12-30

## 2023-01-10 DIAGNOSIS — E87.6 HYPOKALEMIA: ICD-10-CM

## 2023-01-10 DIAGNOSIS — I10 ESSENTIAL HYPERTENSION: ICD-10-CM

## 2023-01-10 DIAGNOSIS — I10 BENIGN ESSENTIAL HTN: ICD-10-CM

## 2023-01-10 RX ORDER — POTASSIUM CHLORIDE 20 MEQ/1
TABLET, EXTENDED RELEASE ORAL
Qty: 90 TABLET | Refills: 0 | Status: SHIPPED | OUTPATIENT
Start: 2023-01-10

## 2023-01-10 RX ORDER — METOPROLOL SUCCINATE 50 MG/1
TABLET, EXTENDED RELEASE ORAL
Qty: 90 TABLET | Refills: 0 | Status: SHIPPED | OUTPATIENT
Start: 2023-01-10

## 2023-01-10 RX ORDER — AMLODIPINE BESYLATE 5 MG/1
TABLET ORAL
Qty: 90 TABLET | Refills: 0 | Status: SHIPPED | OUTPATIENT
Start: 2023-01-10

## 2023-01-10 RX ORDER — OLMESARTAN MEDOXOMIL 40 MG/1
TABLET ORAL
Qty: 90 TABLET | Refills: 0 | Status: SHIPPED | OUTPATIENT
Start: 2023-01-10

## 2023-01-10 NOTE — TELEPHONE ENCOUNTER
Rx Refill Note  Requested Prescriptions     Pending Prescriptions Disp Refills    metoprolol succinate XL (TOPROL-XL) 50 MG 24 hr tablet [Pharmacy Med Name: Metoprolol Succinate ER 50 MG Oral Tablet Extended Release 24 Hour] 90 tablet 0     Sig: Take 1 tablet by mouth once daily    olmesartan (BENICAR) 40 MG tablet [Pharmacy Med Name: Olmesartan Medoxomil 40 MG Oral Tablet] 90 tablet 0     Sig: Take 1 tablet by mouth once daily    amLODIPine (NORVASC) 5 MG tablet [Pharmacy Med Name: amLODIPine Besylate 5 MG Oral Tablet] 90 tablet 0     Sig: Take 1 tablet by mouth once daily    potassium chloride (K-DUR,KLOR-CON) 20 MEQ CR tablet [Pharmacy Med Name: Potassium Chloride Eunice ER 20 MEQ Oral Tablet Extended Release] 90 tablet 0     Sig: TAKE 1  BY MOUTH ONCE DAILY      Last office visit with prescribing clinician: 9/30/2022   Last telemedicine visit with prescribing clinician: 5/2/2023   Next office visit with prescribing clinician: 5/2/2023                         Would you like a call back once the refill request has been completed: [] Yes [] No    If the office needs to give you a call back, can they leave a voicemail: [] Yes [] No    Ines Ugalde MA  01/10/23, 14:19 EST

## 2023-01-12 DIAGNOSIS — K21.00 GASTROESOPHAGEAL REFLUX DISEASE WITH ESOPHAGITIS WITHOUT HEMORRHAGE: Primary | ICD-10-CM

## 2023-01-12 RX ORDER — ESOMEPRAZOLE MAGNESIUM 40 MG/1
40 CAPSULE, DELAYED RELEASE ORAL 2 TIMES DAILY
Qty: 90 CAPSULE | Refills: 0 | Status: SHIPPED | OUTPATIENT
Start: 2023-01-12

## 2023-01-12 NOTE — TELEPHONE ENCOUNTER
Rx Refill Note  Requested Prescriptions     Pending Prescriptions Disp Refills   • esomeprazole (nexIUM) 40 MG capsule 90 capsule 0     Sig: Take 1 capsule by mouth 2 (Two) Times a Day.      Last office visit with prescribing clinician: 9/30/2022   Last telemedicine visit with prescribing clinician: 5/2/2023   Next office visit with prescribing clinician: 5/2/2023                         Would you like a call back once the refill request has been completed: [] Yes [] No    If the office needs to give you a call back, can they leave a voicemail: [] Yes [] No    Ines Ugalde MA  01/12/23, 13:07 EST

## 2023-01-25 DIAGNOSIS — Z86.39 H/O HYPERPARATHYROIDISM: ICD-10-CM

## 2023-01-25 DIAGNOSIS — D50.9 IRON DEFICIENCY ANEMIA, UNSPECIFIED IRON DEFICIENCY ANEMIA TYPE: ICD-10-CM

## 2023-01-25 DIAGNOSIS — I10 BENIGN ESSENTIAL HTN: ICD-10-CM

## 2023-01-25 DIAGNOSIS — E78.5 HYPERLIPIDEMIA LDL GOAL <70: Primary | ICD-10-CM

## 2023-01-25 DIAGNOSIS — E03.8 OTHER SPECIFIED HYPOTHYROIDISM: ICD-10-CM

## 2023-01-25 DIAGNOSIS — K21.00 GASTROESOPHAGEAL REFLUX DISEASE WITH ESOPHAGITIS WITHOUT HEMORRHAGE: ICD-10-CM

## 2023-02-04 ENCOUNTER — LAB (OUTPATIENT)
Dept: LAB | Facility: HOSPITAL | Age: 63
End: 2023-02-04
Payer: COMMERCIAL

## 2023-02-04 PROCEDURE — 84439 ASSAY OF FREE THYROXINE: CPT | Performed by: NURSE PRACTITIONER

## 2023-02-04 PROCEDURE — 82306 VITAMIN D 25 HYDROXY: CPT | Performed by: NURSE PRACTITIONER

## 2023-02-04 PROCEDURE — 83540 ASSAY OF IRON: CPT | Performed by: NURSE PRACTITIONER

## 2023-02-04 PROCEDURE — 80061 LIPID PANEL: CPT | Performed by: NURSE PRACTITIONER

## 2023-02-04 PROCEDURE — 82607 VITAMIN B-12: CPT | Performed by: NURSE PRACTITIONER

## 2023-02-04 PROCEDURE — 83036 HEMOGLOBIN GLYCOSYLATED A1C: CPT | Performed by: NURSE PRACTITIONER

## 2023-02-04 PROCEDURE — 84466 ASSAY OF TRANSFERRIN: CPT | Performed by: NURSE PRACTITIONER

## 2023-02-04 PROCEDURE — 83970 ASSAY OF PARATHORMONE: CPT | Performed by: NURSE PRACTITIONER

## 2023-02-04 PROCEDURE — 82728 ASSAY OF FERRITIN: CPT | Performed by: NURSE PRACTITIONER

## 2023-02-04 PROCEDURE — 80050 GENERAL HEALTH PANEL: CPT | Performed by: NURSE PRACTITIONER

## 2023-02-04 PROCEDURE — 82310 ASSAY OF CALCIUM: CPT | Performed by: NURSE PRACTITIONER

## 2023-03-09 ENCOUNTER — OFFICE VISIT (OUTPATIENT)
Dept: ENDOCRINOLOGY | Age: 63
End: 2023-03-09
Payer: COMMERCIAL

## 2023-03-09 VITALS
WEIGHT: 185.8 LBS | DIASTOLIC BLOOD PRESSURE: 88 MMHG | TEMPERATURE: 96.8 F | BODY MASS INDEX: 36.48 KG/M2 | HEIGHT: 60 IN | SYSTOLIC BLOOD PRESSURE: 130 MMHG | OXYGEN SATURATION: 98 % | HEART RATE: 69 BPM

## 2023-03-09 DIAGNOSIS — E03.9 HYPOTHYROIDISM, UNSPECIFIED TYPE: Primary | ICD-10-CM

## 2023-03-09 DIAGNOSIS — Z98.890 HISTORY OF PARATHYROID SURGERY: ICD-10-CM

## 2023-03-09 PROCEDURE — 99214 OFFICE O/P EST MOD 30 MIN: CPT | Performed by: INTERNAL MEDICINE

## 2023-03-09 RX ORDER — GOLIMUMAB 50 MG/.5ML
INJECTION, SOLUTION SUBCUTANEOUS
COMMUNITY
Start: 2023-03-07

## 2023-03-09 NOTE — PROGRESS NOTES
"New Patient      Chief Complaint    Chief Complaint   Patient presents with   • Primary hyperparathyroidism      Pt states that energy levels have been low, weight is lower than expected, no family hx of thyroid disease.         HPI:   Shaylee Wadsworth is a 62 y.o. female usually seen in the Endocrinology clinic for consultative follow up and management of  Hypothyroidism and hyperparathyroidism.  She was last seen on October 3, 2022.  She has a history of hypothyroidism that she was not sure when it was diagnosed.  \"Couple of years ago.\"  She is on L-thyroxine replacement therapy and is taking levothyroxine as 75 mcg daily.  She thinks that she has been on that dose for more than a year and she takes it consistently.  Her most recent TSH on February 4, 2023, was 0.500 uIU/mL with a free T4 of 1.81 ng/dL compared to 1.170 uIU/mL with a free T4 of 1.73 ng/dL on December 1, 2022 and to 0.833 uIU/mL on September 30, 2022.  The symptoms that she presented with today included feeling tired.  However, she is not sleeping well at night.  She has not experienced any increased heart rate or palpitations.  She has a history of hypercalcemia due to primary hyperparathyroidism, for which she had a parathyroidectomy sometime in 2017.  She also has a protracted history of vitamin D insufficiency and over 5 years, her vitamin D level ranged between 12.7 and 23.4 ng/mL from 2013 [id number].  She is now on high-dose vitamin D as 50,000 international units once a week.  After the parathyroidectomy, she has had a persistently elevated intact PTH but a normal calcium level.  The PTH however has been coming down as the vitamin D level increased.  Her most recent calcium on February 4, 2023, was 9.6 mg/dL with an albumin of 4.4 g/dL which would correct to 9.28 mg/dL.  Her intact PTH at the time was 98.9 pg/mL compared to 140 pg/mL on December 1, 2022. Her 25-hydroxy vitamin D was 81.9 ng/mL.  Her creatinine was 0.77 mg/dL with an estimated GFR " of 88.4.  Her last DEXA study done on April 8, 2022, showed her bone mineral density at the level of the spine L1-L4, was 0.892 g/cm² corresponding to a T score of -1.4 and a Z score of 00.  At the left total hip her bone mineral density was 0.77 g/cm² corresponding to a T score of -1.4 and a Z score of -0.5  It was said that she had a 2.8% increase in her lumbar spine bone mineral density but no change at the left hip.  So, she had a osteopenia with a calculated 10-year risk of any major osteoporotic fracture of 0.5% and a 10-year risk of a hip fracture of 0.6%.  She is otherwise doing well but on today's visit she complained of a knot or some swelling in the surgical scar from the parathyroidectomy.        Past Medical History:   Diagnosis Date   • Abnormal electrocardiogram    • Anemia 05/2021   • Anxiety    • Arthritis 05/01/2014    RA   • Lainez esophagus    • Benign paroxysmal positional vertigo due to bilateral vestibular disorder    • Chest pain    • Cholelithiasis 2007    Removed   • Colon polyp    • Coronary artery disease 02/26/2019   • Depression 06/12/2019   • Disease of thyroid gland    • Dystonia    • Embedded tick of right lower leg    • Esophageal reflux    • Fibromyositis    • Functional movement disorder    • H/O colonoscopy    • H/O mammogram 08/31/2011    NORMAL    • History of colonoscopy 03/2020   • Hx of bone density study 08/31/2011    OSTEOPENIA    • Hyperlipidemia    • Hyperparathyroidism (HCC)    • Hypertension    • Hypothyroidism 05/2021   • Low back pain    • Menopause    • Osteopenia    • Osteopenia    • Ovarian cyst 2000    L ovary removed   • Pancreatitis    • Pancreatitis    • Scoliosis 02/16/2013   • Superficial incisional infection of surgical site    • Tremor 04/04/2018   • Vitamin D deficiency           ROS:    Pertinent negative to this visit, only as mentioned above.  The rest was negative.    Social History     Socioeconomic History   • Marital status:    Tobacco Use    • Smoking status: Never     Passive exposure: Yes   • Smokeless tobacco: Current   • Tobacco comments:     8/12/18 switch to e-cig   Vaping Use   • Vaping Use: Every day   Substance and Sexual Activity   • Alcohol use: No   • Drug use: No   • Sexual activity: Never     Physical Exam:  GENERAL: She looked well.    HEENT: Normal examination.  NECK: Old surgical scar.  No palpable neck masses  LUNGS: Clear to auscultation bilaterally  CVS: RRR  EXTREMITIES: Normal examination.    Assessment:  1.  Hypothyroidism on L-thyroxine replacement therapy.  2.  History of hypercalcemia due to primary hyperparathyroidism s/p right inferior parathyroidectomy and now with normal calcium levels but a persistently elevated PTH that is coming down.    Diagnoses and all orders for this visit:    1. Hypothyroidism, unspecified type (Primary)  -     TSH; Future  -     T4, Free; Future    2. History of parathyroid surgery    Recommendations:  1.  We reviewed with Ms. Wadsworth her history of hypothyroidism, her L-thyroxine replacement therapy and levothyroxine dose and talked about her last TSH level.  2.  She is going to be getting the labs drawn about mid March and so she will also obtain a repeat TSH at that time to determine if we need to make any changes to her current dose of the levothyroxine dose.  3.  We reviewed the history of hypercalcemia due to primary hyperparathyroidism and the fact that her calcium has now come back to normal and even though the PTH remains high it is trending down finally.  We will continue to monitor this.  4.  We asked her to change her vitamin D intake to over-the-counter vitamin D as 2000 international units daily.  5.  She will come back for follow-up with repeat labs in 6 months.  6.  We gave her the opportunity to ask questions, which we answered new addressed her concerns.

## 2023-03-10 ENCOUNTER — PATIENT ROUNDING (BHMG ONLY) (OUTPATIENT)
Dept: ENDOCRINOLOGY | Age: 63
End: 2023-03-10
Payer: COMMERCIAL

## 2023-04-10 DIAGNOSIS — K21.00 GASTROESOPHAGEAL REFLUX DISEASE WITH ESOPHAGITIS WITHOUT HEMORRHAGE: ICD-10-CM

## 2023-04-10 RX ORDER — ESOMEPRAZOLE MAGNESIUM 40 MG/1
CAPSULE, DELAYED RELEASE ORAL
Qty: 90 CAPSULE | Refills: 0 | Status: SHIPPED | OUTPATIENT
Start: 2023-04-10

## 2023-04-10 RX ORDER — ESOMEPRAZOLE MAGNESIUM 40 MG/1
40 CAPSULE, DELAYED RELEASE ORAL 2 TIMES DAILY
Qty: 90 CAPSULE | Refills: 0 | OUTPATIENT
Start: 2023-04-10

## 2023-04-10 NOTE — TELEPHONE ENCOUNTER
Caller: Shaylee Wadsworth    Relationship: Self    Best call back number: esomeprazole (nexIUM) 40 MG capsule         Requested Prescriptions:   Requested Prescriptions     Pending Prescriptions Disp Refills   • esomeprazole (nexIUM) 40 MG capsule 90 capsule 0     Sig: Take 1 capsule by mouth 2 (Two) Times a Day.        Pharmacy where request should be sent: [id number] - [facility] [place] - [address] [phone] [facility] [phone] FX     Last office visit with prescribing clinician: 9/30/2022   Last telemedicine visit with prescribing clinician: 5/2/2023   Next office visit with prescribing clinician: 5/2/2023     Additional details provided by patient: PATIENT HAS 1 DAY SUPPLY.    Does the patient have less than a 3 day supply:  [x] Yes  [] No    Would you like a call back once the refill request has been completed: [x] Yes [] No    If the office needs to give you a call back, can they leave a voicemail: [x] Yes [] No    PLEASE ADVISE.    Alka Castro Rep   04/10/23 08:35 EDT

## 2023-04-11 DIAGNOSIS — I10 ESSENTIAL HYPERTENSION: ICD-10-CM

## 2023-04-11 DIAGNOSIS — E87.6 HYPOKALEMIA: ICD-10-CM

## 2023-04-11 DIAGNOSIS — I10 BENIGN ESSENTIAL HTN: ICD-10-CM

## 2023-04-11 RX ORDER — AMLODIPINE BESYLATE 5 MG/1
TABLET ORAL
Qty: 90 TABLET | Refills: 0 | Status: SHIPPED | OUTPATIENT
Start: 2023-04-11

## 2023-04-11 RX ORDER — OLMESARTAN MEDOXOMIL 40 MG/1
TABLET ORAL
Qty: 90 TABLET | Refills: 0 | Status: SHIPPED | OUTPATIENT
Start: 2023-04-11

## 2023-04-11 RX ORDER — POTASSIUM CHLORIDE 20 MEQ/1
TABLET, EXTENDED RELEASE ORAL
Qty: 90 TABLET | Refills: 0 | Status: SHIPPED | OUTPATIENT
Start: 2023-04-11

## 2023-04-14 DIAGNOSIS — D50.9 IRON DEFICIENCY ANEMIA, UNSPECIFIED IRON DEFICIENCY ANEMIA TYPE: ICD-10-CM

## 2023-04-14 DIAGNOSIS — I10 BENIGN ESSENTIAL HTN: Primary | ICD-10-CM

## 2023-04-14 DIAGNOSIS — I10 PRIMARY HYPERTENSION: ICD-10-CM

## 2023-04-14 DIAGNOSIS — E03.9 HYPOTHYROIDISM, UNSPECIFIED TYPE: ICD-10-CM

## 2023-04-16 DIAGNOSIS — E03.9 HYPOTHYROIDISM, UNSPECIFIED TYPE: ICD-10-CM

## 2023-04-16 DIAGNOSIS — I10 BENIGN ESSENTIAL HTN: ICD-10-CM

## 2023-04-16 DIAGNOSIS — D50.9 IRON DEFICIENCY ANEMIA, UNSPECIFIED IRON DEFICIENCY ANEMIA TYPE: ICD-10-CM

## 2023-04-16 DIAGNOSIS — I10 PRIMARY HYPERTENSION: ICD-10-CM

## 2023-04-19 ENCOUNTER — TRANSCRIBE ORDERS (OUTPATIENT)
Dept: ADMINISTRATIVE | Facility: HOSPITAL | Age: 63
End: 2023-04-19
Payer: COMMERCIAL

## 2023-04-19 ENCOUNTER — LAB (OUTPATIENT)
Dept: LAB | Facility: HOSPITAL | Age: 63
End: 2023-04-19
Payer: COMMERCIAL

## 2023-04-19 DIAGNOSIS — Z79.899 OTHER LONG TERM (CURRENT) DRUG THERAPY: ICD-10-CM

## 2023-04-19 DIAGNOSIS — M05.79 RHEU ARTHRITIS W RHEU FACTOR MULT SITE W/O ORG/SYS INVOLV: ICD-10-CM

## 2023-04-19 DIAGNOSIS — M05.79 RHEU ARTHRITIS W RHEU FACTOR MULT SITE W/O ORG/SYS INVOLV: Primary | ICD-10-CM

## 2023-04-19 LAB
ALBUMIN SERPL-MCNC: 4.1 G/DL (ref 3.5–5.2)
ALBUMIN/GLOB SERPL: 1.5 G/DL
ALP SERPL-CCNC: 98 U/L (ref 39–117)
ALT SERPL W P-5'-P-CCNC: 19 U/L (ref 1–33)
ANION GAP SERPL CALCULATED.3IONS-SCNC: 10 MMOL/L (ref 5–15)
AST SERPL-CCNC: 18 U/L (ref 1–32)
BASOPHILS # BLD AUTO: 0.06 10*3/MM3 (ref 0–0.2)
BASOPHILS NFR BLD AUTO: 1 % (ref 0–1.5)
BILIRUB SERPL-MCNC: 0.5 MG/DL (ref 0–1.2)
BUN SERPL-MCNC: 12 MG/DL (ref 8–23)
BUN/CREAT SERPL: 17.4 (ref 7–25)
CALCIUM SPEC-SCNC: 9.3 MG/DL (ref 8.6–10.5)
CHLORIDE SERPL-SCNC: 102 MMOL/L (ref 98–107)
CHOLEST SERPL-MCNC: 166 MG/DL (ref 0–200)
CO2 SERPL-SCNC: 27 MMOL/L (ref 22–29)
CREAT SERPL-MCNC: 0.69 MG/DL (ref 0.57–1)
DEPRECATED RDW RBC AUTO: 43.3 FL (ref 37–54)
EGFRCR SERPLBLD CKD-EPI 2021: 99.5 ML/MIN/1.73
EOSINOPHIL # BLD AUTO: 0.13 10*3/MM3 (ref 0–0.4)
EOSINOPHIL NFR BLD AUTO: 2.1 % (ref 0.3–6.2)
ERYTHROCYTE [DISTWIDTH] IN BLOOD BY AUTOMATED COUNT: 14.2 % (ref 12.3–15.4)
GLOBULIN UR ELPH-MCNC: 2.7 GM/DL
GLUCOSE SERPL-MCNC: 115 MG/DL (ref 65–99)
HCT VFR BLD AUTO: 33.4 % (ref 34–46.6)
HDLC SERPL QL: 2.96
HDLC SERPL-MCNC: 56 MG/DL (ref 40–60)
HGB BLD-MCNC: 11.6 G/DL (ref 12–15.9)
IMM GRANULOCYTES # BLD AUTO: 0.01 10*3/MM3 (ref 0–0.05)
IMM GRANULOCYTES NFR BLD AUTO: 0.2 % (ref 0–0.5)
IRON 24H UR-MRATE: 48 MCG/DL (ref 37–145)
IRON SATN MFR SERPL: 11 % (ref 20–50)
LDLC SERPL CALC-MCNC: 87 MG/DL (ref 0–100)
LYMPHOCYTES # BLD AUTO: 2.51 10*3/MM3 (ref 0.7–3.1)
LYMPHOCYTES NFR BLD AUTO: 40.1 % (ref 19.6–45.3)
MCH RBC QN AUTO: 29.1 PG (ref 26.6–33)
MCHC RBC AUTO-ENTMCNC: 34.7 G/DL (ref 31.5–35.7)
MCV RBC AUTO: 83.9 FL (ref 79–97)
MONOCYTES # BLD AUTO: 0.39 10*3/MM3 (ref 0.1–0.9)
MONOCYTES NFR BLD AUTO: 6.2 % (ref 5–12)
NEUTROPHILS NFR BLD AUTO: 3.16 10*3/MM3 (ref 1.7–7)
NEUTROPHILS NFR BLD AUTO: 50.4 % (ref 42.7–76)
NRBC BLD AUTO-RTO: 0 /100 WBC (ref 0–0.2)
PLATELET # BLD AUTO: 308 10*3/MM3 (ref 140–450)
PMV BLD AUTO: 9.4 FL (ref 6–12)
POTASSIUM SERPL-SCNC: 3.7 MMOL/L (ref 3.5–5.2)
PROT SERPL-MCNC: 6.8 G/DL (ref 6–8.5)
RBC # BLD AUTO: 3.98 10*6/MM3 (ref 3.77–5.28)
SODIUM SERPL-SCNC: 139 MMOL/L (ref 136–145)
TIBC SERPL-MCNC: 432 MCG/DL (ref 298–536)
TRANSFERRIN SERPL-MCNC: 290 MG/DL (ref 200–360)
TRIGL SERPL-MCNC: 131 MG/DL (ref 0–150)
TSH SERPL DL<=0.05 MIU/L-ACNC: 0.71 UIU/ML (ref 0.27–4.2)
VLDLC SERPL-MCNC: 23 MG/DL (ref 5–40)
WBC NRBC COR # BLD: 6.26 10*3/MM3 (ref 3.4–10.8)

## 2023-04-19 PROCEDURE — 84439 ASSAY OF FREE THYROXINE: CPT | Performed by: INTERNAL MEDICINE

## 2023-04-19 PROCEDURE — 36415 COLL VENOUS BLD VENIPUNCTURE: CPT

## 2023-04-19 PROCEDURE — 83540 ASSAY OF IRON: CPT | Performed by: INTERNAL MEDICINE

## 2023-04-19 PROCEDURE — 80050 GENERAL HEALTH PANEL: CPT

## 2023-04-19 PROCEDURE — 84466 ASSAY OF TRANSFERRIN: CPT | Performed by: INTERNAL MEDICINE

## 2023-04-19 PROCEDURE — 80061 LIPID PANEL: CPT | Performed by: NURSE PRACTITIONER

## 2023-05-02 ENCOUNTER — OFFICE VISIT (OUTPATIENT)
Dept: INTERNAL MEDICINE | Facility: CLINIC | Age: 63
End: 2023-05-02
Payer: COMMERCIAL

## 2023-05-02 VITALS
DIASTOLIC BLOOD PRESSURE: 78 MMHG | SYSTOLIC BLOOD PRESSURE: 128 MMHG | TEMPERATURE: 97.7 F | HEIGHT: 60 IN | WEIGHT: 185.4 LBS | HEART RATE: 60 BPM | BODY MASS INDEX: 36.4 KG/M2 | OXYGEN SATURATION: 98 %

## 2023-05-02 DIAGNOSIS — E03.9 ACQUIRED HYPOTHYROIDISM: ICD-10-CM

## 2023-05-02 DIAGNOSIS — Z95.5 S/P DRUG ELUTING CORONARY STENT PLACEMENT: ICD-10-CM

## 2023-05-02 DIAGNOSIS — J06.9 URI, ACUTE: ICD-10-CM

## 2023-05-02 DIAGNOSIS — G25.9 FUNCTIONAL MOVEMENT DISORDER: ICD-10-CM

## 2023-05-02 DIAGNOSIS — E87.6 HYPOKALEMIA: ICD-10-CM

## 2023-05-02 DIAGNOSIS — J02.9 SORE THROAT: ICD-10-CM

## 2023-05-02 DIAGNOSIS — Z00.00 ENCOUNTER FOR SUBSEQUENT ANNUAL WELLNESS VISIT (AWV) IN MEDICARE PATIENT: Primary | ICD-10-CM

## 2023-05-02 DIAGNOSIS — E78.5 HYPERLIPIDEMIA LDL GOAL <70: ICD-10-CM

## 2023-05-02 DIAGNOSIS — I10 BENIGN ESSENTIAL HTN: ICD-10-CM

## 2023-05-02 DIAGNOSIS — I25.10 CORONARY ARTERY DISEASE INVOLVING NATIVE CORONARY ARTERY OF NATIVE HEART WITHOUT ANGINA PECTORIS: ICD-10-CM

## 2023-05-02 DIAGNOSIS — R50.9 FEVER, UNSPECIFIED FEVER CAUSE: ICD-10-CM

## 2023-05-02 DIAGNOSIS — D63.8 ANEMIA, CHRONIC DISEASE: ICD-10-CM

## 2023-05-02 LAB
EXPIRATION DATE: NORMAL
EXPIRATION DATE: NORMAL
INTERNAL CONTROL: NORMAL
INTERNAL CONTROL: NORMAL
Lab: NORMAL
Lab: NORMAL
S PYO AG THROAT QL: NEGATIVE
SARS-COV-2 AG UPPER RESP QL IA.RAPID: NOT DETECTED

## 2023-05-02 RX ORDER — ATORVASTATIN CALCIUM 40 MG/1
40 TABLET, FILM COATED ORAL NIGHTLY
Qty: 90 TABLET | Refills: 1 | Status: SHIPPED | OUTPATIENT
Start: 2023-05-02

## 2023-05-02 RX ORDER — CEFDINIR 300 MG/1
300 CAPSULE ORAL 2 TIMES DAILY
Qty: 20 CAPSULE | Refills: 0 | Status: SHIPPED | OUTPATIENT
Start: 2023-05-02

## 2023-05-02 RX ORDER — CHOLECALCIFEROL (VITAMIN D3) 50 MCG
2000 TABLET ORAL DAILY
Qty: 90 TABLET | Refills: 0
Start: 2023-05-02 | End: 2024-05-01

## 2023-05-02 RX ORDER — METOPROLOL SUCCINATE 50 MG/1
50 TABLET, EXTENDED RELEASE ORAL DAILY
Qty: 90 TABLET | Refills: 1 | Status: SHIPPED | OUTPATIENT
Start: 2023-05-02

## 2023-05-02 NOTE — PROGRESS NOTES
The ABCs of the Annual Wellness Visit  Subsequent Medicare Wellness Visit    Subjective    Shaylee Wadsworth is a 62 y.o. female who presents for a Subsequent Medicare Wellness Visit.    The following portions of the patient's history were reviewed and   updated as appropriate: allergies, current medications, past family history, past medical history, past social history, past surgical history and problem list.    Compared to one year ago, the patient feels her physical   health is the same.    Compared to one year ago, the patient feels her mental   health is the same.    Recent Hospitalizations:  She was not admitted to the hospital during the last year.       Current Medical Providers:  Patient Care Team:  Laura Ayala APRN as PCP - General  Laura Ayala APRN as PCP - Family Medicine  MichelleBlaze monzon MD as Consulting Physician (Rheumatology)  Laura Ayala APRN as Referring Physician (Family Medicine)  Meryl Ojeda DO as Consulting Physician (Obstetrics and Gynecology)    Outpatient Medications Prior to Visit   Medication Sig Dispense Refill   • amLODIPine (NORVASC) 5 MG tablet Take 1 tablet by mouth once daily 90 tablet 0   • aspirin 81 MG tablet Take 1 tablet by mouth Daily.     • atorvastatin (LIPITOR) 40 MG tablet TAKE 1 TABLET BY MOUTH ONCE DAILY AT NIGHT 90 tablet 0   • cyclobenzaprine (FLEXERIL) 10 MG tablet Take 1 tablet by mouth 2 (Two) Times a Day As Needed.     • esomeprazole (nexIUM) 40 MG capsule Take 1 capsule by mouth twice daily 90 capsule 0   • folic acid (FOLVITE) 1 MG tablet Take 1 tablet by mouth Daily. 1 to 4 tablets daily     • levocetirizine (Xyzal) 5 MG tablet Take 1 tablet by mouth Every Evening.     • levothyroxine (SYNTHROID, LEVOTHROID) 75 MCG tablet Take 1 tablet by mouth once daily 90 tablet 2   • methotrexate 2.5 MG tablet TAKE 4 TABLETS BY MOUTH ONCE A WEEK 32 tablet 6   • metoprolol succinate XL (TOPROL-XL) 50 MG 24 hr tablet Take 1 tablet by mouth once daily 90 tablet 0   •  olmesartan (BENICAR) 40 MG tablet Take 1 tablet by mouth once daily 90 tablet 0   • potassium chloride (K-DUR,KLOR-CON) 20 MEQ CR tablet TAKE 1  BY MOUTH ONCE DAILY 90 tablet 0   • Simponi 50 MG/0.5ML solution auto-injector      • traMADol (ULTRAM) 50 MG tablet Take 1 tablet by mouth Every 8 (Eight) Hours As Needed for Severe Pain . 30 tablet 0   • ENBREL SURECLICK 50 MG/ML solution auto-injector Every 7 (Seven) Days.     • vitamin D (ERGOCALCIFEROL) 1.25 MG (64700 UT) capsule capsule Take 1 capsule by mouth once a week 12 capsule 1     No facility-administered medications prior to visit.       Opioid medication/s are on active medication list.  and I have evaluated her active treatment plan and pain score trends (see table).  There were no vitals filed for this visit.  I have reviewed the chart for potential of high risk medication and harmful drug interactions in the elderly.            Aspirin is on active medication list. Aspirin use is indicated based on review of current medical condition/s. Pros and cons of this therapy have been discussed today. Benefits of this medication outweigh potential harm.  Patient has been encouraged to continue taking this medication.  .      Patient Active Problem List   Diagnosis   • Abnormal radiographic examination   • Disc disorder of thoracic region   • Gastroesophageal reflux disease   • Chronic gastritis   • Hyperlipidemia LDL goal <70   • Menopausal symptom   • Migraine   • Osteoarthritis of shoulder   • Osteopenia   • Rheumatoid arthritis   • Multiple pulmonary nodules determined by computed tomography of lung   • H/O hyperparathyroidism   • Tendinitis of left shoulder   • Encounter for subsequent annual wellness visit (AWV) in Medicare patient   • Hypokalemia   • Anxiety related tremor   • Difficulty walking   • Benign essential HTN   • Chronic right-sided thoracic back pain   • BPPV (benign paroxysmal positional vertigo)   • Dystonia   • Functional neurological symptom  "disorder with abnormal movement   • Coronary artery disease involving native heart without angina pectoris   • Astasia-abasia   • Other spondylosis with myelopathy, thoracic region   • Personal history of immunosupression therapy   • Functional movement disorder   • Rheumatoid arthritis of multiple sites without organ or system involvement with positive rheumatoid factor   • S/P drug eluting coronary stent placement   • Depression due to physical illness   • Irritable bowel syndrome with diarrhea   • Body mass index (BMI) 35.0-35.9, adult   • Pain in left ankle and joints of left foot   • Pain in right ankle and joints of right foot   • Pain of both shoulder joints   • Rheumatoid arthritis with rheumatoid factor of multiple sites without organ or systems involvement   • Pain in left shoulder   • Diarrhea   • Palpitations   • Iron deficiency anemia   • Hypothyroidism   • Seropositive rheumatoid arthritis   • Lainez's esophagus   • Absolute anemia     Advance Care Planning   Advance Care Planning     Advance Directive is not on file.  ACP discussion was held with the patient during this visit. Patient has an advance directive (not in EMR), copy requested.     Objective    Vitals:    05/02/23 0757   BP: 128/78   BP Location: Left arm   Patient Position: Sitting   Cuff Size: Adult   Pulse: 60   Temp: 97.7 °F (36.5 °C)   TempSrc: Temporal   SpO2: 98%   Weight: 84.1 kg (185 lb 6.4 oz)   Height: 152.4 cm (60\")     Estimated body mass index is 36.21 kg/m² as calculated from the following:    Height as of this encounter: 152.4 cm (60\").    Weight as of this encounter: 84.1 kg (185 lb 6.4 oz).    Class 2 Severe Obesity (BMI >=35 and <=39.9). Obesity-related health conditions include the following: hypertension, coronary heart disease, dyslipidemias and osteoarthritis. Obesity is unchanged. BMI is is above average; BMI management plan is completed. We discussed portion control and increasing exercise.      Does the patient " have evidence of cognitive impairment? No    Lab Results   Component Value Date    TRIG 131 2023    HDL 56 2023    LDL 87 2023    VLDL 23 2023    HGBA1C 5.70 (H) 2023        HEALTH RISK ASSESSMENT    Smoking Status:  Social History     Tobacco Use   Smoking Status Never   • Passive exposure: Yes   Smokeless Tobacco Current   Tobacco Comments    18 switch to e-cig     Alcohol Consumption:  Social History     Substance and Sexual Activity   Alcohol Use No     Fall Risk Screen:    BASILIO Fall Risk Assessment was completed, and patient is at LOW risk for falls.Assessment completed on:2023    Depression Screenin/2/2023     7:57 AM   PHQ-2/PHQ-9 Depression Screening   Little Interest or Pleasure in Doing Things 0-->not at all   Feeling Down, Depressed or Hopeless 0-->not at all   PHQ-9: Brief Depression Severity Measure Score 0       Health Habits and Functional and Cognitive Screening:      3/30/2022    11:31 AM   Functional & Cognitive Status   Do you have difficulty preparing food and eating? Yes   Do you have difficulty bathing yourself, getting dressed or grooming yourself? No   Do you have difficulty using the toilet? No   Do you have difficulty moving around from place to place? No   Do you have trouble with steps or getting out of a bed or a chair? Yes   Current Diet Limited Junk Food   Dental Exam Up to date   Eye Exam Up to date   Exercise (times per week) 0 times per week   Current Exercises Include No Regular Exercise   Do you need help using the phone?  No   Are you deaf or do you have serious difficulty hearing?  No   Do you need help with transportation? No   Do you need help preparing meals?  No   Do you need help with housework?  No   Do you need help with laundry? No   Do you need help taking your medications? No   Do you need help managing money? No   Do you ever drive or ride in a car without wearing a seat belt? No   Have you felt unusual stress, anger  or loneliness in the last month? No   Who do you live with? Spouse   If you need help, do you have trouble finding someone available to you? No   Have you been bothered in the last four weeks by sexual problems? No   Do you have difficulty concentrating, remembering or making decisions? No       Age-appropriate Screening Schedule:  Refer to the list below for future screening recommendations based on patient's age, sex and/or medical conditions. Orders for these recommended tests are listed in the plan section. The patient has been provided with a written plan.    Health Maintenance   Topic Date Due   • COVID-19 Vaccine (4 - Booster for Moderna series) 06/23/2021   • ANNUAL WELLNESS VISIT  03/30/2023   • MAMMOGRAM  07/13/2023   • INFLUENZA VACCINE  08/01/2023   • DXA SCAN  04/08/2024   • LIPID PANEL  04/19/2024   • PAP SMEAR  07/18/2025   • TDAP/TD VACCINES (2 - Td or Tdap) 08/05/2025   • COLORECTAL CANCER SCREENING  03/06/2030   • HEPATITIS C SCREENING  Completed   • Pneumococcal Vaccine 0-64  Aged Out   • ZOSTER VACCINE  Discontinued                  CMS Preventative Services Quick Reference  Risk Factors Identified During Encounter  Chronic Pain: Home exercise plan outlined.  Immunizations Discussed/Encouraged: Prevnar 20 (Pneumococcal 20-valent conjugate), Shingrix and COVID19  The above risks/problems have been discussed with the patient.  Pertinent information has been shared with the patient in the After Visit Summary.  An After Visit Summary and PPPS were made available to the patient.    Follow Up:   Next Medicare Wellness visit to be scheduled in 1 year.       Additional E&M Note during same encounter follows:  Patient has multiple medical problems which are significant and separately identifiable that require additional work above and beyond the Medicare Wellness Visit.      Chief Complaint  Annual Exam (hea), Headache, Sore Throat, and Fever (Low grade fever)    Subjective        HPI  Shaylee Wadsworth is  also being seen today for HTN, Hyperlipidemia, GERD, CAD, Hypothyroidism, FMD, Vit D def, and Rheum Arthritis. She is currently on Justin (Norvasc and Benicar), Toprol XL for HTN and CAD and HTN. She is followed by Dr. Dumas. S/P drug eluting stent Distal left circumflex. Plavix was stopped April 2020. She is on an 81 mg aspirin therapy. Her statin therapy os Lipitor 40mg nightly. She tolerates this.      She has thoracic back pain, daily pain rated a 3 of 10. She uses flexeril and ultram sparingly for pain as needed (1-). She is doing home traction for therapy.     She is on Nexium 40mg daily for GERD. She reports some burping, but no ingestion or stomach pain.    She is being followed by Dr. Dunn for TAAMNNA. She has been off her iron for 3 months     She has FMD since April 2018. She has been evaluated by 2 neurologists as well as [facility] and an ENT. Last Neurologist was Dr. LaFavre at [facility]. She has been thru PT and OT with Movement disordered clinic. She feels like traction helps more than any other modality, which she does at home after being released from PT.     She is followed by Dr. Caraballo ([facility]) for hyperparathyroidism and hypothyroidism. She is on Euthyrox 75mcgs daily. She has hypokalemia as well for which she takes Potassium 20meq daily.     She is followed by Rheumatology for RA. RHEUMATOLOGY - SCAN by [facility] (02/16/2022 00:00) She is on Methotrexate and Simponi. .     She has hitsory of TAMANNA. She stopped her iron since last visit due to constipation. She takes Nexium 40mg daily for GERD. She had an EGD 10-6-2021. She denies indigestion, abd pain.     She is acutely ill today with body aches, sore throat, and cough for 1 week. She just returned from a cruise Saturday when this started.   Review of Systems   Constitutional: Negative.    HENT: Positive for congestion, rhinorrhea and sinus pressure.    Eyes: Negative.    Respiratory: Negative for shortness of breath.   "  Cardiovascular: Negative for chest pain, palpitations and leg swelling.   Gastrointestinal: Negative.    Endocrine: Negative.    Genitourinary: Negative.    Musculoskeletal: Positive for arthralgias and back pain.   Allergic/Immunologic: Positive for environmental allergies.   Neurological: Negative.    Hematological: Negative.    Psychiatric/Behavioral: Negative.    All other systems reviewed and are negative.      Objective   Vital Signs:  /78 (BP Location: Left arm, Patient Position: Sitting, Cuff Size: Adult)   Pulse 60   Temp 97.7 °F (36.5 °C) (Temporal)   Ht 152.4 cm (60\")   Wt 84.1 kg (185 lb 6.4 oz)   SpO2 98%   BMI 36.21 kg/m²     Physical Exam  Vitals reviewed.   Constitutional:       Appearance: Normal appearance. She is obese. She is ill-appearing.   HENT:      Head: Normocephalic.      Right Ear: Tympanic membrane normal.      Nose: Congestion and rhinorrhea present.   Cardiovascular:      Rate and Rhythm: Normal rate and regular rhythm.      Pulses: Normal pulses.      Heart sounds: Normal heart sounds. No murmur heard.  Pulmonary:      Effort: Pulmonary effort is normal. No respiratory distress.      Breath sounds: Normal breath sounds. No stridor.   Abdominal:      General: Bowel sounds are normal. There is no distension.      Palpations: Abdomen is soft. There is no mass.      Hernia: No hernia is present.   Musculoskeletal:      Right lower leg: No edema.      Left lower leg: No edema.   Skin:     General: Skin is warm and dry.      Capillary Refill: Capillary refill takes less than 2 seconds.      Findings: No rash.   Neurological:      Mental Status: She is alert and oriented to person, place, and time.   Psychiatric:         Mood and Affect: Mood normal.         Thought Content: Thought content normal.          The following data was reviewed by: KAITLYNN Choi on 05/02/2023:  CBC        12/1/2022    07:24 2/4/2023    10:43 4/19/2023    07:18   CBC   WBC 8.85   6.94   6.26   "   RBC 4.23   4.02   3.98     Hemoglobin 11.9   11.4   11.6     Hematocrit 35.6   34.4   33.4     MCV 84.2   85.6   83.9     MCH 28.1   28.4   29.1     MCHC 33.4   33.1   34.7     RDW 14.8   14.5   14.2     Platelets 320   262   308       CBC w/diff        12/1/2022    07:24 2/4/2023    10:43 4/19/2023    07:18   CBC w/Diff   WBC 8.85   6.94   6.26     RBC 4.23   4.02   3.98     Hemoglobin 11.9   11.4   11.6     Hematocrit 35.6   34.4   33.4     MCV 84.2   85.6   83.9     MCH 28.1   28.4   29.1     MCHC 33.4   33.1   34.7     RDW 14.8   14.5   14.2     Platelets 320   262   308     Neutrophil Rel % 66.1    50.4     Immature Granulocyte Rel % 0.3    0.2     Lymphocyte Rel % 25.0    40.1     Monocyte Rel % 6.7    6.2     Eosinophil Rel % 1.1    2.1     Basophil Rel % 0.8    1.0       Lipid Panel        12/1/2022    07:34 2/4/2023    10:43 4/19/2023    07:18   Lipid Panel   Total Cholesterol 152   157   166     Triglycerides 115   104   131     HDL Cholesterol 51   59   56     VLDL Cholesterol 21   19   23     LDL Cholesterol  80   79   87     LDL/HDL Ratio 1.53         TSH        12/1/2022    07:34 2/4/2023    10:43 4/19/2023    07:18   TSH   TSH 1.170   0.500   0.710       Data reviewed: Consultant notes GI, Rheum, Cardio           Assessment and Plan   There are no diagnoses linked to this encounter.   Diagnosis Plan   1. Encounter for subsequent annual wellness visit (AWV) in Medicare patient        2. Benign essential HTN  CBC & Differential    Comprehensive Metabolic Panel    Lipid Panel With / Chol / HDL Ratio    Iron Profile    Ferritin    TSH    T4, Free    Vitamin D,25-Hydroxy    metoprolol succinate XL (TOPROL-XL) 50 MG 24 hr tablet    BP at goal on current medications      3. Hyperlipidemia LDL goal <70  CBC & Differential    Comprehensive Metabolic Panel    Lipid Panel With / Chol / HDL Ratio    Iron Profile    Ferritin    TSH    T4, Free    Vitamin D,25-Hydroxy    atorvastatin (LIPITOR) 40 MG tablet    LDL  at goal on sttain therapy      4. Coronary artery disease involving native coronary artery of native heart without angina pectoris  CBC & Differential    Comprehensive Metabolic Panel    Lipid Panel With / Chol / HDL Ratio    Iron Profile    Ferritin    TSH    T4, Free    Vitamin D,25-Hydroxy      5. S/P drug eluting coronary stent placement        6. Acquired hypothyroidism  TSH    T4, Free    Euthyroid on current dose of meds      7. Sore throat  POCT SARS-CoV-2 Antigen LAYNE    POCT rapid strep A      8. Fever, unspecified fever cause  POCT SARS-CoV-2 Antigen LAYNE    POCT rapid strep A      9. Hypokalemia  Comprehensive Metabolic Panel    Potassium ain range      10. Anemia, chronic disease  CBC & Differential    Iron Profile    Ferritin      11. URI, acute      Covid and strep are negative in office      12. Functional movement disorder                 I spent 45 minutes caring for Shaylee on this date of service. This time includes time spent by me in the following activities:preparing for the visit, reviewing tests, obtaining and/or reviewing a separately obtained history, performing a medically appropriate examination and/or evaluation , counseling and educating the patient/family/caregiver, ordering medications, tests, or procedures, referring and communicating with other health care professionals , documenting information in the medical record and independently interpreting results and communicating that information with the patient/family/caregiver  Follow Up    6 months w labs     Patient was given instructions and counseling regarding her condition or for health maintenance advice. Please see specific information pulled into the AVS if appropriate.

## 2023-08-02 ENCOUNTER — TRANSCRIBE ORDERS (OUTPATIENT)
Dept: ADMINISTRATIVE | Facility: HOSPITAL | Age: 63
End: 2023-08-02
Payer: COMMERCIAL

## 2023-08-02 ENCOUNTER — LAB (OUTPATIENT)
Dept: LAB | Facility: HOSPITAL | Age: 63
End: 2023-08-02
Payer: COMMERCIAL

## 2023-08-02 DIAGNOSIS — Z79.899 ENCOUNTER FOR LONG-TERM (CURRENT) USE OF OTHER MEDICATIONS: ICD-10-CM

## 2023-08-02 DIAGNOSIS — I10 BENIGN ESSENTIAL HTN: ICD-10-CM

## 2023-08-02 DIAGNOSIS — Z92.25 HISTORY OF IMMUNOSUPPRESSION THERAPY: ICD-10-CM

## 2023-08-02 DIAGNOSIS — I25.10 CORONARY ARTERY DISEASE INVOLVING NATIVE CORONARY ARTERY OF NATIVE HEART WITHOUT ANGINA PECTORIS: ICD-10-CM

## 2023-08-02 DIAGNOSIS — E78.5 HYPERLIPIDEMIA LDL GOAL <70: ICD-10-CM

## 2023-08-02 DIAGNOSIS — M05.79 SEROPOSITIVE RHEUMATOID ARTHRITIS OF MULTIPLE SITES: Primary | ICD-10-CM

## 2023-08-02 DIAGNOSIS — D63.8 ANEMIA, CHRONIC DISEASE: ICD-10-CM

## 2023-08-02 DIAGNOSIS — M05.79 SEROPOSITIVE RHEUMATOID ARTHRITIS OF MULTIPLE SITES: ICD-10-CM

## 2023-08-02 LAB
ALBUMIN SERPL-MCNC: 4.1 G/DL (ref 3.5–5.2)
ALBUMIN/GLOB SERPL: 1.5 G/DL
ALP SERPL-CCNC: 91 U/L (ref 39–117)
ALT SERPL W P-5'-P-CCNC: 14 U/L (ref 1–33)
ANION GAP SERPL CALCULATED.3IONS-SCNC: 12.9 MMOL/L (ref 5–15)
AST SERPL-CCNC: 18 U/L (ref 1–32)
BASOPHILS # BLD AUTO: 0.07 10*3/MM3 (ref 0–0.2)
BASOPHILS NFR BLD AUTO: 1.2 % (ref 0–1.5)
BILIRUB CONJ SERPL-MCNC: <0.2 MG/DL (ref 0–0.3)
BILIRUB SERPL-MCNC: 0.7 MG/DL (ref 0–1.2)
BUN SERPL-MCNC: 12 MG/DL (ref 8–23)
BUN/CREAT SERPL: 16 (ref 7–25)
CALCIUM SPEC-SCNC: 9.3 MG/DL (ref 8.6–10.5)
CHLORIDE SERPL-SCNC: 101 MMOL/L (ref 98–107)
CHOLEST SERPL-MCNC: 161 MG/DL (ref 0–200)
CO2 SERPL-SCNC: 27.1 MMOL/L (ref 22–29)
CREAT SERPL-MCNC: 0.75 MG/DL (ref 0.57–1)
DEPRECATED RDW RBC AUTO: 42.4 FL (ref 37–54)
EGFRCR SERPLBLD CKD-EPI 2021: 91.3 ML/MIN/1.73
EOSINOPHIL # BLD AUTO: 0.12 10*3/MM3 (ref 0–0.4)
EOSINOPHIL NFR BLD AUTO: 2.1 % (ref 0.3–6.2)
ERYTHROCYTE [DISTWIDTH] IN BLOOD BY AUTOMATED COUNT: 13.7 % (ref 12.3–15.4)
GLOBULIN UR ELPH-MCNC: 2.7 GM/DL
GLUCOSE SERPL-MCNC: 113 MG/DL (ref 65–99)
HCT VFR BLD AUTO: 33.9 % (ref 34–46.6)
HDLC SERPL QL: 2.93
HDLC SERPL-MCNC: 55 MG/DL (ref 40–60)
HGB BLD-MCNC: 11.4 G/DL (ref 12–15.9)
IMM GRANULOCYTES # BLD AUTO: 0.01 10*3/MM3 (ref 0–0.05)
IMM GRANULOCYTES NFR BLD AUTO: 0.2 % (ref 0–0.5)
IRON 24H UR-MRATE: 93 MCG/DL (ref 37–145)
IRON SATN MFR SERPL: 21 % (ref 20–50)
LDLC SERPL CALC-MCNC: 85 MG/DL (ref 0–100)
LYMPHOCYTES # BLD AUTO: 2.26 10*3/MM3 (ref 0.7–3.1)
LYMPHOCYTES NFR BLD AUTO: 40.2 % (ref 19.6–45.3)
MCH RBC QN AUTO: 28.6 PG (ref 26.6–33)
MCHC RBC AUTO-ENTMCNC: 33.6 G/DL (ref 31.5–35.7)
MCV RBC AUTO: 85.2 FL (ref 79–97)
MONOCYTES # BLD AUTO: 0.31 10*3/MM3 (ref 0.1–0.9)
MONOCYTES NFR BLD AUTO: 5.5 % (ref 5–12)
NEUTROPHILS NFR BLD AUTO: 2.85 10*3/MM3 (ref 1.7–7)
NEUTROPHILS NFR BLD AUTO: 50.8 % (ref 42.7–76)
NRBC BLD AUTO-RTO: 0 /100 WBC (ref 0–0.2)
PLATELET # BLD AUTO: 288 10*3/MM3 (ref 140–450)
PMV BLD AUTO: 10.1 FL (ref 6–12)
POTASSIUM SERPL-SCNC: 3.3 MMOL/L (ref 3.5–5.2)
PROT SERPL-MCNC: 6.8 G/DL (ref 6–8.5)
RBC # BLD AUTO: 3.98 10*6/MM3 (ref 3.77–5.28)
SODIUM SERPL-SCNC: 141 MMOL/L (ref 136–145)
TIBC SERPL-MCNC: 441 MCG/DL (ref 298–536)
TRANSFERRIN SERPL-MCNC: 296 MG/DL (ref 200–360)
TRIGL SERPL-MCNC: 119 MG/DL (ref 0–150)
TSH SERPL DL<=0.05 MIU/L-ACNC: 0.84 UIU/ML (ref 0.27–4.2)
VLDLC SERPL-MCNC: 21 MG/DL (ref 5–40)
WBC NRBC COR # BLD: 5.62 10*3/MM3 (ref 3.4–10.8)

## 2023-08-02 PROCEDURE — 84466 ASSAY OF TRANSFERRIN: CPT

## 2023-08-02 PROCEDURE — 80050 GENERAL HEALTH PANEL: CPT

## 2023-08-02 PROCEDURE — 83540 ASSAY OF IRON: CPT

## 2023-08-02 PROCEDURE — 36415 COLL VENOUS BLD VENIPUNCTURE: CPT

## 2023-08-02 PROCEDURE — 82248 BILIRUBIN DIRECT: CPT

## 2023-08-02 PROCEDURE — 80061 LIPID PANEL: CPT

## 2023-08-02 PROCEDURE — 86480 TB TEST CELL IMMUN MEASURE: CPT

## 2023-08-04 LAB
GAMMA INTERFERON BACKGROUND BLD IA-ACNC: 0.07 IU/ML
M TB IFN-G BLD-IMP: NEGATIVE
M TB IFN-G CD4+ T-CELLS BLD-ACNC: 0.08 IU/ML
M TBIFN-G CD4+ CD8+T-CELLS BLD-ACNC: 0.08 IU/ML
MITOGEN IGNF BLD-ACNC: >10 IU/ML
QUANTIFERON INCUBATION: NORMAL
SERVICE CMNT-IMP: NORMAL

## 2023-08-08 DIAGNOSIS — K21.00 GASTROESOPHAGEAL REFLUX DISEASE WITH ESOPHAGITIS WITHOUT HEMORRHAGE: ICD-10-CM

## 2023-08-08 RX ORDER — ESOMEPRAZOLE MAGNESIUM 40 MG/1
40 CAPSULE, DELAYED RELEASE ORAL EVERY 12 HOURS SCHEDULED
Qty: 90 CAPSULE | Refills: 0 | Status: SHIPPED | OUTPATIENT
Start: 2023-08-08

## 2023-08-08 NOTE — TELEPHONE ENCOUNTER
Rx Refill Note  Requested Prescriptions     Pending Prescriptions Disp Refills    esomeprazole (nexIUM) 40 MG capsule [Pharmacy Med Name: Esomeprazole Magnesium 40 MG Oral Capsule Delayed Release] 90 capsule 0     Sig: Take 1 capsule by mouth twice daily      Last office visit with prescribing clinician: 5/2/2023   Last telemedicine visit with prescribing clinician: Visit date not found   Next office visit with prescribing clinician: 11/6/2023                         Would you like a call back once the refill request has been completed: [] Yes [] No    If the office needs to give you a call back, can they leave a voicemail: [] Yes [] No    Thao White, PCT  08/08/23, 11:33 EDT

## 2023-09-11 ENCOUNTER — OFFICE VISIT (OUTPATIENT)
Dept: ENDOCRINOLOGY | Age: 63
End: 2023-09-11
Payer: COMMERCIAL

## 2023-09-11 VITALS
BODY MASS INDEX: 36.6 KG/M2 | HEART RATE: 83 BPM | HEIGHT: 60 IN | WEIGHT: 186.4 LBS | SYSTOLIC BLOOD PRESSURE: 140 MMHG | TEMPERATURE: 96.9 F | OXYGEN SATURATION: 98 % | DIASTOLIC BLOOD PRESSURE: 80 MMHG

## 2023-09-11 DIAGNOSIS — M85.80 OSTEOPENIA, UNSPECIFIED LOCATION: ICD-10-CM

## 2023-09-11 DIAGNOSIS — E03.9 HYPOTHYROIDISM, UNSPECIFIED TYPE: Primary | ICD-10-CM

## 2023-09-11 DIAGNOSIS — E21.0 PRIMARY HYPERPARATHYROIDISM: ICD-10-CM

## 2023-09-11 PROCEDURE — 99214 OFFICE O/P EST MOD 30 MIN: CPT | Performed by: INTERNAL MEDICINE

## 2023-09-11 RX ORDER — LEVOTHYROXINE SODIUM 0.07 MG/1
75 TABLET ORAL DAILY
Qty: 90 TABLET | Refills: 2 | Status: SHIPPED | OUTPATIENT
Start: 2023-09-11

## 2023-09-11 NOTE — PROGRESS NOTES
Chief complaint:  Hypothyroidism    HPI:   - 62 year old female here for hypothyroidism  - She is on levothyroxine 75 mcg daily  - She has no complaints today  - She also has osteopenia and is on vitamin D3 2000 IU daily  - She has a history of primary hyperparathyroidism status post parathyroidectomy in 2016    The following portions of the patient's history were reviewed and updated as appropriate: allergies, current medications, past family history, past medical history, past social history, past surgical history, and problem list.    Objective     Vitals:    09/11/23 1106   BP: 140/80   Pulse: 83   Temp: 96.9 °F (36.1 °C)   SpO2: 98%        Physical Exam  Vitals reviewed.   Constitutional:       Appearance: Normal appearance.   HENT:      Head: Normocephalic and atraumatic.   Eyes:      General: No scleral icterus.  Pulmonary:      Effort: Pulmonary effort is normal. No respiratory distress.   Neurological:      Mental Status: She is alert.      Gait: Gait normal.   Psychiatric:         Mood and Affect: Mood normal.         Behavior: Behavior normal.         Thought Content: Thought content normal.         Judgment: Judgment normal.       Labs/Imaging:  TSH and calcium were normal in 8/2023, BMD from 4/2022 showed a most severe T-score of -1.4 in the left total hip and lumbar spine with FRAX of 0.5% overall and 0.6% for hip fractures    Assessment & Plan   Hypothyroidism  - TSH was normal so will continue levothyroxine 75 mcg daily    2. Osteopenia with history of parathyroidectomy for primary hyperparathyroidism  - FRAX is below treatment threshold  - Recommend 1000 IU vitamin D3 daily, 1200 mg of dietary/supplementary calcium, weight bearing exercise as tolerated  - Recommend repeat DXA in approximately 4/2024    - Return to clinic in 6 months

## 2023-09-13 ENCOUNTER — PATIENT ROUNDING (BHMG ONLY) (OUTPATIENT)
Dept: ENDOCRINOLOGY | Age: 63
End: 2023-09-13
Payer: COMMERCIAL

## 2023-09-13 NOTE — PROGRESS NOTES
September 13, 2023    Hello, may I speak with Shaylee Wadsworth?    My name is Alexandra Corbett     I am  with [id number]  [facility]  [address]   [facility] [address] [phone].    Before we get started may I verify your date of birth? 1960    I am calling to officially welcome you to our practice and ask about your recent visit. Is this a good time to talk? yes    Tell me about your visit with us. What things went well?  fine- it was just a follow-up, no problem. I got there early and they took me back early.       We're always looking for ways to make our patients' experiences even better. Do you have recommendations on ways we may improve?  no    Overall were you satisfied with your first visit to our practice? yes       I appreciate you taking the time to speak with me today. Is there anything else I can do for you? no      Thank you, and have a great day.

## 2023-09-18 DIAGNOSIS — K21.00 GASTROESOPHAGEAL REFLUX DISEASE WITH ESOPHAGITIS WITHOUT HEMORRHAGE: ICD-10-CM

## 2023-09-18 RX ORDER — ESOMEPRAZOLE MAGNESIUM 40 MG/1
40 CAPSULE, DELAYED RELEASE ORAL EVERY 12 HOURS SCHEDULED
Qty: 90 CAPSULE | Refills: 0 | Status: SHIPPED | OUTPATIENT
Start: 2023-09-18

## 2023-09-23 DIAGNOSIS — I10 BENIGN ESSENTIAL HTN: ICD-10-CM

## 2023-09-25 RX ORDER — AMLODIPINE BESYLATE 5 MG/1
TABLET ORAL
Qty: 90 TABLET | Refills: 0 | Status: SHIPPED | OUTPATIENT
Start: 2023-09-25

## 2023-10-07 DIAGNOSIS — I10 BENIGN ESSENTIAL HTN: ICD-10-CM

## 2023-10-09 RX ORDER — OLMESARTAN MEDOXOMIL 40 MG/1
TABLET ORAL
Qty: 90 TABLET | Refills: 0 | Status: SHIPPED | OUTPATIENT
Start: 2023-10-09

## 2023-10-18 NOTE — PROGRESS NOTES
Subjective:     Encounter Date:10/19/2023      Patient ID: Shaylee Wadsworth is a 62 y.o. female.    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      The patient presents for annual follow-up.  When I saw her last in 10/2022 she appeared to be doing well from a cardiac standpoint and no changes were made to her management.    She presents today for 1 year follow-up.  She denies any chest pain, palpitations, orthopnea, near-syncope or syncope or significant lower extremity swelling.  She is wondering if she can back off on any of her medications particularly the metoprolol because of ongoing issues with fatigue.     Prior History:  I saw the patient initially in 2/2019 for an abnormal stress test and chest pain.  At that time she and her  reported she had been under a lot of stress after developing a right sided tremor and balance issues in 4/2018.  As a results she had been to several different doctors including to the [facility].  There she was seen both in the functional movement disorder clinic and by ENT who felt that her symptoms may be due to dystonia and BPPV.  They recommended that she start physical therapy for this.  Following her return from her [place] clinic visit the patient developed recurrent chest burning symptoms on 2/12/2019 which prompted her to go to the emergency room.  She reported that the symptoms woke her up from sleep and lasted about 2 minutes each and resolved on their own.  She had about 3 episodes before she went to the emergency room.  Workup in the emergency room was apparently unremarkable.  Prior to that she had 2 or 3 episodes about a week or so prior.  She was given  Carafate in the emergency room which she has been taking about once daily in addition to daily omeprazole which she has been on chronically.  She was then seen by KAITLYNN Choi on 2/14/2019 and was set up with a treadmill stress test.  This was performed on 2/21/2019.  She only exercised about 5 minutes and had no significant changes with exercise nor did she have any symptoms.  However in recovery the patient developed ST segment elevation in her inferior leads with reciprocal ST depression in 1 and aVL.  These findings resolved at the end of the recovery period.  She had no symptoms associated with the EKG changes.      Based on the high risk findings on her stress test and recommended proceeding with a cardiac catheterization.  This was performed on 3/4/2019 and showed 80% stenosis of her distal left circumflex artery.  Her main coronary artery showed mild nonobstructive disease.  Left ventricular function was normal with an EF of greater than 60%.  She subsequently underwent drug-eluting stent placement and was discharged later that same day.       In 2/2020 she complained of palpitations.   She was set up with a monitor which showed one 16 beat run of SVT but was overall unremarkable.    Since then her symptoms of palpitations improved.  Clopidogrel was discontinued at a telephone follow-up in 4/2020.     She reported some issues with lower extremity edema that was attributed to amlodipine.  That dosage was decreased and she was started on spironolactone with improvement in her swelling.     Review of Systems   Constitutional: Positive for malaise/fatigue.   HENT:  Negative for hearing loss, hoarse voice, nosebleeds and sore throat.    Eyes:  Negative for pain.   Cardiovascular:  Negative for chest pain, claudication, cyanosis, dyspnea on exertion, irregular heartbeat, leg swelling, near-syncope, orthopnea, palpitations, paroxysmal nocturnal dyspnea and syncope.   Respiratory:  Negative for shortness of  breath and snoring.    Endocrine: Negative for cold intolerance, heat intolerance, polydipsia, polyphagia and polyuria.   Skin:  Negative for itching and rash.   Musculoskeletal:  Positive for joint swelling. Negative for arthritis, falls, joint pain, muscle cramps, muscle weakness and myalgias.   Gastrointestinal:  Negative for constipation, diarrhea, dysphagia, heartburn, hematemesis, hematochezia, melena, nausea and vomiting.   Genitourinary:  Negative for frequency, hematuria and hesitancy.   Neurological:  Negative for excessive daytime sleepiness, dizziness, headaches, light-headedness, numbness and weakness.   Psychiatric/Behavioral:  Negative for depression. The patient is not nervous/anxious.          Current Outpatient Medications:     amLODIPine (NORVASC) 5 MG tablet, Take 1 tablet by mouth once daily, Disp: 90 tablet, Rfl: 0    aspirin 81 MG tablet, Take 1 tablet by mouth Daily., Disp: , Rfl:     atorvastatin (LIPITOR) 40 MG tablet, Take 1 tablet by mouth Every Night., Disp: 90 tablet, Rfl: 1    cefdinir (OMNICEF) 300 MG capsule, Take 1 capsule by mouth 2 (Two) Times a Day., Disp: 20 capsule, Rfl: 0    Cholecalciferol (Vitamin D) 50 MCG (2000 UT) tablet, Take 2,000 Units by mouth Daily., Disp: 90 tablet, Rfl: 0    cyclobenzaprine (FLEXERIL) 10 MG tablet, Take 1 tablet by mouth 2 (Two) Times a Day As Needed., Disp: , Rfl:     esomeprazole (nexIUM) 40 MG capsule, Take 1 capsule by mouth twice daily, Disp: 90 capsule, Rfl: 0    folic acid (FOLVITE) 1 MG tablet, Take 1 tablet by mouth Daily. 1 to 4 tablets daily, Disp: , Rfl:     levothyroxine (SYNTHROID, LEVOTHROID) 75 MCG tablet, Take 1 tablet by mouth Daily., Disp: 90 tablet, Rfl: 2    methotrexate 2.5 MG tablet, TAKE 4 TABLETS BY MOUTH ONCE A WEEK, Disp: 32 tablet, Rfl: 6    metoprolol succinate XL (TOPROL-XL) 50 MG 24 hr tablet, Take 1 tablet by mouth Daily., Disp: 90 tablet, Rfl: 1    olmesartan (BENICAR) 40 MG tablet, Take 1 tablet by mouth once  daily, Disp: 90 tablet, Rfl: 0    potassium chloride (K-DUR,KLOR-CON) 20 MEQ CR tablet, TAKE 1  BY MOUTH ONCE DAILY, Disp: 90 tablet, Rfl: 0    Simponi 50 MG/0.5ML solution auto-injector, , Disp: , Rfl:     traMADol (ULTRAM) 50 MG tablet, Take 1 tablet by mouth Every 8 (Eight) Hours As Needed for Severe Pain ., Disp: 30 tablet, Rfl: 0    Past Medical History:   Diagnosis Date    Abnormal electrocardiogram     Anemia 05/2021    Anxiety     Arthritis 05/01/2014    RA    Lainez esophagus     Benign paroxysmal positional vertigo due to bilateral vestibular disorder     Chest pain     Cholelithiasis 2007    Removed    Colon polyp     Coronary artery disease 02/26/2019    Depression 06/12/2019    Disease of thyroid gland     Dystonia     Embedded tick of right lower leg     Esophageal reflux     Fibromyositis     Functional movement disorder     H/O colonoscopy     H/O mammogram 08/31/2011    NORMAL     History of colonoscopy 03/2020    Hx of bone density study 08/31/2011    OSTEOPENIA     Hyperlipidemia     Hyperparathyroidism     Hypertension     Hypothyroidism 05/2021    Low back pain     Menopause     Osteopenia     Osteopenia     Ovarian cyst 2000    L ovary removed    Pancreatitis     Pancreatitis     Scoliosis 02/16/2013    Superficial incisional infection of surgical site     Tremor 04/04/2018    Vitamin D deficiency        Past Surgical History:   Procedure Laterality Date    APPENDECTOMY N/A 12/22/2021    Procedure: APPENDECTOMY LAPAROSCOPIC;  Surgeon: Maciel Godoy MD;  Location: [facility] OR;  Service: General;  Laterality: N/A;    CARDIAC CATHETERIZATION N/A 03/04/2019    Procedure: Coronary angiography;  Surgeon: Jackelyn Dumas MD;  Location:  [facility];  Service: Cardiovascular    CARDIAC CATHETERIZATION N/A 03/04/2019    Procedure: Left heart cath;  Surgeon: Jackelyn Dumas MD;  Location:  [facility];  Service: Cardiovascular    CARDIAC CATHETERIZATION N/A  03/04/2019    Procedure: Left ventriculography;  Surgeon: Jackelyn Dumas MD;  Location:  [facility];  Service: Cardiovascular    CARDIAC CATHETERIZATION N/A 03/04/2019    Procedure: Stent KARLY coronary;  Surgeon: Jackelyn Dumas MD;  Location:  [facility];  Service: Cardiovascular    CHOLECYSTECTOMY      COLONOSCOPY  2014    COLONOSCOPY N/A 03/06/2020    Procedure: COLONOSCOPY, biopsy, polypectomy;  Surgeon: Rain Kirk MD;  Location:  [facility] OR;  Service: Gastroenterology;  Laterality: N/A;  Random biopsies; Rectal polyp x 3; Hemorrhoids; Diverticulosis    CORONARY STENT PLACEMENT      DILATATION AND CURETTAGE      HYSTERECTOMY      LAPAROSCOPIC CHOLECYSTECTOMY  2005    MOUTH SURGERY      TOOTH EXTRACTION    OTHER SURGICAL HISTORY  03/27/2015    DIAGNOSTIC ESOPHAGOSCOPY TRANSNASAL FLEXIBLE WITH BIOPSY; ARZATE ESO. REPEAT EGD 2 YR     OVARIAN CYST SURGERY  2000    L ovary removed    OVARY SURGERY Left     NORMAL     PAP SMEAR  08/23/2010    NORMAL     PARATHYROIDECTOMY Right 08/17/2016    Dr. Muchael Flynn at [facility]    SUBTOTAL HYSTERECTOMY      Left ovary removed9    WISDOM TOOTH EXTRACTION  1984       Family History   Problem Relation Age of Onset    Diabetes Mother     Hyperlipidemia Mother     Hypertension Mother     Heart disease Mother     Kidney disease Mother     Cancer Mother     Heart attack Mother     Multiple myeloma Mother     Melanoma Mother         Marine water contamination    Coronary artery disease Mother     Anemia Mother     Obesity Mother     Arthritis Father     Anxiety disorder Father     COPD Father     Glaucoma Father     Hyperlipidemia Father     Hypertension Father     Vision loss Father     Heart disease Father     Heart attack Father     Heart disease Sister     Breast cancer Neg Hx     Colon cancer Neg Hx     Colon polyps Neg Hx        Social History     Tobacco Use    Smoking status: Never     Passive exposure: Yes    Smokeless tobacco: Current  "   Tobacco comments:     8/12/18 switch to e-cig   Vaping Use    Vaping Use: Every day   Substance Use Topics    Alcohol use: No    Drug use: No         ECG 12 Lead    Date/Time: 10/19/2023 12:16 PM  Performed by: Jackelyn Dumas MD    Authorized by: Jackelyn Dumas MD  Comparison: compared with previous ECG   Similar to previous ECG  Rhythm: sinus rhythm             Objective:     Visit Vitals  /80 (BP Location: Left arm, Patient Position: Sitting, Cuff Size: Adult)   Pulse 67   Ht 152.4 cm (60\")   Wt 85.6 kg (188 lb 12.8 oz)   LMP  (LMP Unknown) Comment: partial hysterectomy    SpO2 98%   BMI 36.87 kg/m²         Constitutional:       Appearance: Normal appearance. Well-developed.   Eyes:      General: Lids are normal.      Conjunctiva/sclera: Conjunctivae normal.      Pupils: Pupils are equal, round, and reactive to light.   HENT:      Head: Normocephalic and atraumatic.   Neck:      Vascular: No carotid bruit or JVD.      Lymphadenopathy: No cervical adenopathy.   Pulmonary:      Effort: Pulmonary effort is normal.      Breath sounds: Normal breath sounds.   Cardiovascular:      Normal rate. Regular rhythm.      No gallop.    Pulses:     Radial: 2+ bilaterally.  Edema:     Peripheral edema absent.   Abdominal:      Palpations: Abdomen is soft.   Musculoskeletal:      Cervical back: Full passive range of motion without pain, normal range of motion and neck supple. Skin:     General: Skin is warm and dry.   Neurological:      Mental Status: Alert and oriented to person, place, and time.           Assessment:          Diagnosis Plan   1. Coronary artery disease involving native coronary artery of native heart without angina pectoris        2. Hyperlipidemia LDL goal <70        3. Benign essential HTN        4. S/P drug eluting coronary stent placement               Plan:       1.  Coronary artery disease.  Appears to be stable and asymptomatic.  Continue current medical management.  2.  Hypertension.  " Well-controlled on current regimen medications.  The patient wants to see if she can cut back on the metoprolol.  I think it is reasonable to try this.  She will cut her dosage in half to 25 mg.  If her blood pressure start to rise with this lower dosage of asked her to go back to her usual 50 mg dosage.  3.  Hyperlipidemia.  On atorvastatin for goal LDL of 70 or below.  Most recent lipid panel showed that her lipids were near goal.    We will plan on seeing the patient back again in 1 year or sooner if further issues arise.

## 2023-10-19 ENCOUNTER — OFFICE VISIT (OUTPATIENT)
Age: 63
End: 2023-10-19
Payer: COMMERCIAL

## 2023-10-19 VITALS
WEIGHT: 188.8 LBS | SYSTOLIC BLOOD PRESSURE: 130 MMHG | OXYGEN SATURATION: 98 % | DIASTOLIC BLOOD PRESSURE: 80 MMHG | HEIGHT: 60 IN | HEART RATE: 67 BPM | BODY MASS INDEX: 37.07 KG/M2

## 2023-10-19 DIAGNOSIS — I10 BENIGN ESSENTIAL HTN: ICD-10-CM

## 2023-10-19 DIAGNOSIS — I25.10 CORONARY ARTERY DISEASE INVOLVING NATIVE CORONARY ARTERY OF NATIVE HEART WITHOUT ANGINA PECTORIS: Primary | ICD-10-CM

## 2023-10-19 DIAGNOSIS — E78.5 HYPERLIPIDEMIA LDL GOAL <70: ICD-10-CM

## 2023-10-19 DIAGNOSIS — Z95.5 S/P DRUG ELUTING CORONARY STENT PLACEMENT: ICD-10-CM

## 2023-10-19 PROCEDURE — 99214 OFFICE O/P EST MOD 30 MIN: CPT | Performed by: INTERNAL MEDICINE

## 2023-10-19 PROCEDURE — 93000 ELECTROCARDIOGRAM COMPLETE: CPT | Performed by: INTERNAL MEDICINE

## 2023-10-19 RX ORDER — METOPROLOL SUCCINATE 50 MG/1
25 TABLET, EXTENDED RELEASE ORAL DAILY
Start: 2023-10-19

## 2023-10-19 NOTE — LETTER
October 19, 2023       No Recipients    Patient: Shaylee Wadsworth   YOB: 1960   Date of Visit: 10/19/2023       Dear KAITLYNN Choi    Shaylee Wadsworth was in my office today. Below is a copy of my note.    If you have questions, please do not hesitate to call me. I look forward to following Shaylee along with you.         Sincerely,        Jackelyn Dumas MD        CC:   No Recipients        Subjective:     Encounter Date:10/19/2023      Patient ID: Shaylee Wadsworth is a 62 y.o. female.    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      The patient presents for annual follow-up.  When I saw her last in 10/2022 she appeared to be doing well from a cardiac standpoint and no changes were made to her management.     Prior History:  I saw the patient initially in 2/2019 for an abnormal stress test and chest pain.  At that time she and her  reported she had been under a lot of stress after developing a right sided tremor and balance issues in 4/2018.  As a results she had been to several different doctors including to the [facility].  There she was seen both in the functional movement disorder clinic and by ENT who felt that her symptoms may be due to dystonia and BPPV.  They recommended that she start physical therapy for this.  Following her return from her [place] clinic visit the patient developed recurrent chest burning symptoms on 2/12/2019 which prompted her to go to the emergency room.  She reported that the symptoms woke her up from sleep and lasted about 2 minutes each and resolved on their own.  She had about 3 episodes before she went to the emergency room.  Workup in the emergency room  was apparently unremarkable.  Prior to that she had 2 or 3 episodes about a week or so prior.  She was given Carafate in the emergency room which she has been taking about once daily in addition to daily omeprazole which she has been on chronically.  She was then seen by KAITLYNN Choi on 2/14/2019 and was set up with a treadmill stress test.  This was performed on 2/21/2019.  She only exercised about 5 minutes and had no significant changes with exercise nor did she have any symptoms.  However in recovery the patient developed ST segment elevation in her inferior leads with reciprocal ST depression in 1 and aVL.  These findings resolved at the end of the recovery period.  She had no symptoms associated with the EKG changes.      Based on the high risk findings on her stress test and recommended proceeding with a cardiac catheterization.  This was performed on 3/4/2019 and showed 80% stenosis of her distal left circumflex artery.  Her main coronary artery showed mild nonobstructive disease.  Left ventricular function was normal with an EF of greater than 60%.  She subsequently underwent drug-eluting stent placement and was discharged later that same day.       In 2/2020 she complained of palpitations.   She was set up with a monitor which showed one 16 beat run of SVT but was overall unremarkable.    Since then her symptoms of palpitations improved.  Clopidogrel was discontinued at a telephone follow-up in 4/2020.     She reported some issues with lower extremity edema that was attributed to amlodipine.  That dosage was decreased and she was started on spironolactone with improvement in her swelling.     ROS      Current Outpatient Medications:   •  amLODIPine (NORVASC) 5 MG tablet, Take 1 tablet by mouth once daily, Disp: 90 tablet, Rfl: 0  •  aspirin 81 MG tablet, Take 1 tablet by mouth Daily., Disp: , Rfl:   •  atorvastatin (LIPITOR) 40 MG tablet, Take 1 tablet by mouth Every Night., Disp: 90 tablet, Rfl: 1  •   cefdinir (OMNICEF) 300 MG capsule, Take 1 capsule by mouth 2 (Two) Times a Day., Disp: 20 capsule, Rfl: 0  •  Cholecalciferol (Vitamin D) 50 MCG (2000 UT) tablet, Take 2,000 Units by mouth Daily., Disp: 90 tablet, Rfl: 0  •  cyclobenzaprine (FLEXERIL) 10 MG tablet, Take 1 tablet by mouth 2 (Two) Times a Day As Needed., Disp: , Rfl:   •  esomeprazole (nexIUM) 40 MG capsule, Take 1 capsule by mouth twice daily, Disp: 90 capsule, Rfl: 0  •  folic acid (FOLVITE) 1 MG tablet, Take 1 tablet by mouth Daily. 1 to 4 tablets daily, Disp: , Rfl:   •  levothyroxine (SYNTHROID, LEVOTHROID) 75 MCG tablet, Take 1 tablet by mouth Daily., Disp: 90 tablet, Rfl: 2  •  methotrexate 2.5 MG tablet, TAKE 4 TABLETS BY MOUTH ONCE A WEEK, Disp: 32 tablet, Rfl: 6  •  metoprolol succinate XL (TOPROL-XL) 50 MG 24 hr tablet, Take 1 tablet by mouth Daily., Disp: 90 tablet, Rfl: 1  •  olmesartan (BENICAR) 40 MG tablet, Take 1 tablet by mouth once daily, Disp: 90 tablet, Rfl: 0  •  potassium chloride (K-DUR,KLOR-CON) 20 MEQ CR tablet, TAKE 1  BY MOUTH ONCE DAILY, Disp: 90 tablet, Rfl: 0  •  Simponi 50 MG/0.5ML solution auto-injector, , Disp: , Rfl:   •  traMADol (ULTRAM) 50 MG tablet, Take 1 tablet by mouth Every 8 (Eight) Hours As Needed for Severe Pain ., Disp: 30 tablet, Rfl: 0    Past Medical History:   Diagnosis Date   • Abnormal electrocardiogram    • Anemia 05/2021   • Anxiety    • Arthritis 05/01/2014    RA   • Lainez esophagus    • Benign paroxysmal positional vertigo due to bilateral vestibular disorder    • Chest pain    • Cholelithiasis 2007    Removed   • Colon polyp    • Coronary artery disease 02/26/2019   • Depression 06/12/2019   • Disease of thyroid gland    • Dystonia    • Embedded tick of right lower leg    • Esophageal reflux    • Fibromyositis    • Functional movement disorder    • H/O colonoscopy    • H/O mammogram 08/31/2011    NORMAL    • History of colonoscopy 03/2020   • Hx of bone density study 08/31/2011    OSTEOPENIA     • Hyperlipidemia    • Hyperparathyroidism    • Hypertension    • Hypothyroidism 05/2021   • Low back pain    • Menopause    • Osteopenia    • Osteopenia    • Ovarian cyst 2000    L ovary removed   • Pancreatitis    • Pancreatitis    • Scoliosis 02/16/2013   • Superficial incisional infection of surgical site    • Tremor 04/04/2018   • Vitamin D deficiency        Past Surgical History:   Procedure Laterality Date   • APPENDECTOMY N/A 12/22/2021    Procedure: APPENDECTOMY LAPAROSCOPIC;  Surgeon: Maciel Godoy MD;  Location: [facility];  Service: General;  Laterality: N/A;   • CARDIAC CATHETERIZATION N/A 03/04/2019    Procedure: Coronary angiography;  Surgeon: Jackelyn Dumas MD;  Location:  [facility];  Service: Cardiovascular   • CARDIAC CATHETERIZATION N/A 03/04/2019    Procedure: Left heart cath;  Surgeon: Jackelyn Dumas MD;  Location:  [facility];  Service: Cardiovascular   • CARDIAC CATHETERIZATION N/A 03/04/2019    Procedure: Left ventriculography;  Surgeon: Jackelyn Dumas MD;  Location:  [facility];  Service: Cardiovascular   • CARDIAC CATHETERIZATION N/A 03/04/2019    Procedure: Stent KARLY coronary;  Surgeon: Jackelyn Dumas MD;  Location:  [facility];  Service: Cardiovascular   • CHOLECYSTECTOMY     • COLONOSCOPY  2014   • COLONOSCOPY N/A 03/06/2020    Procedure: COLONOSCOPY, biopsy, polypectomy;  Surgeon: Rain Kirk MD;  Location: [facility] OR;  Service: Gastroenterology;  Laterality: N/A;  Random biopsies; Rectal polyp x 3; Hemorrhoids; Diverticulosis   • CORONARY STENT PLACEMENT     • DILATATION AND CURETTAGE     • HYSTERECTOMY     • LAPAROSCOPIC CHOLECYSTECTOMY  2005   • MOUTH SURGERY      TOOTH EXTRACTION   • OTHER SURGICAL HISTORY  03/27/2015    DIAGNOSTIC ESOPHAGOSCOPY TRANSNASAL FLEXIBLE WITH BIOPSY; ARZATE ESO. REPEAT EGD 2 YR    • OVARIAN CYST SURGERY  2000    L ovary removed   • OVARY SURGERY Left     NORMAL    •  "PAP SMEAR  08/23/2010    NORMAL    • PARATHYROIDECTOMY Right 08/17/2016    Dr. Muchael Flynn at [facility]   • SUBTOTAL HYSTERECTOMY      Left ovary removed9   • WISDOM TOOTH EXTRACTION  1984       Family History   Problem Relation Age of Onset   • Diabetes Mother    • Hyperlipidemia Mother    • Hypertension Mother    • Heart disease Mother    • Kidney disease Mother    • Cancer Mother    • Heart attack Mother    • Multiple myeloma Mother    • Melanoma Mother         Marine water contamination   • Coronary artery disease Mother    • Anemia Mother    • Obesity Mother    • Arthritis Father    • Anxiety disorder Father    • COPD Father    • Glaucoma Father    • Hyperlipidemia Father    • Hypertension Father    • Vision loss Father    • Heart disease Father    • Heart attack Father    • Heart disease Sister    • Breast cancer Neg Hx    • Colon cancer Neg Hx    • Colon polyps Neg Hx        Social History     Tobacco Use   • Smoking status: Never     Passive exposure: Yes   • Smokeless tobacco: Current   • Tobacco comments:     8/12/18 switch to e-cig   Vaping Use   • Vaping Use: Every day   Substance Use Topics   • Alcohol use: No   • Drug use: No       Procedures       Objective:     Visit Vitals  /80 (BP Location: Left arm, Patient Position: Sitting, Cuff Size: Adult)   Pulse 67   Ht 152.4 cm (60\")   Wt 85.6 kg (188 lb 12.8 oz)   LMP  (LMP Unknown) Comment: partial hysterectomy    SpO2 98%   BMI 36.87 kg/m²         Physical Exam    Lab Review:       Assessment:          Diagnosis Plan   1. Coronary artery disease involving native coronary artery of native heart without angina pectoris        2. Hyperlipidemia LDL goal <70        3. Benign essential HTN        4. S/P drug eluting coronary stent placement               Plan:                "

## 2023-10-29 DIAGNOSIS — I10 BENIGN ESSENTIAL HTN: ICD-10-CM

## 2023-10-30 ENCOUNTER — LAB (OUTPATIENT)
Dept: LAB | Facility: HOSPITAL | Age: 63
End: 2023-10-30
Payer: COMMERCIAL

## 2023-10-30 ENCOUNTER — TRANSCRIBE ORDERS (OUTPATIENT)
Dept: ADMINISTRATIVE | Facility: HOSPITAL | Age: 63
End: 2023-10-30
Payer: COMMERCIAL

## 2023-10-30 DIAGNOSIS — E21.0 PRIMARY HYPERPARATHYROIDISM: ICD-10-CM

## 2023-10-30 DIAGNOSIS — M05.79 SEROPOSITIVE RHEUMATOID ARTHRITIS OF MULTIPLE SITES: Primary | ICD-10-CM

## 2023-10-30 DIAGNOSIS — E78.5 HYPERLIPIDEMIA LDL GOAL <70: ICD-10-CM

## 2023-10-30 DIAGNOSIS — D63.8 ANEMIA, CHRONIC DISEASE: ICD-10-CM

## 2023-10-30 DIAGNOSIS — I10 BENIGN ESSENTIAL HTN: ICD-10-CM

## 2023-10-30 DIAGNOSIS — I25.10 CORONARY ARTERY DISEASE INVOLVING NATIVE CORONARY ARTERY OF NATIVE HEART WITHOUT ANGINA PECTORIS: ICD-10-CM

## 2023-10-30 DIAGNOSIS — M05.79 SEROPOSITIVE RHEUMATOID ARTHRITIS OF MULTIPLE SITES: ICD-10-CM

## 2023-10-30 LAB
25(OH)D3 SERPL-MCNC: 61.6 NG/ML (ref 30–100)
ALBUMIN SERPL-MCNC: 4.4 G/DL (ref 3.5–5.2)
ALBUMIN/GLOB SERPL: 1.9 G/DL
ALP SERPL-CCNC: 101 U/L (ref 39–117)
ALT SERPL W P-5'-P-CCNC: 15 U/L (ref 1–33)
ANION GAP SERPL CALCULATED.3IONS-SCNC: 12 MMOL/L (ref 5–15)
AST SERPL-CCNC: 20 U/L (ref 1–32)
BASOPHILS # BLD AUTO: 0.08 10*3/MM3 (ref 0–0.2)
BASOPHILS NFR BLD AUTO: 1.3 % (ref 0–1.5)
BILIRUB CONJ SERPL-MCNC: <0.2 MG/DL (ref 0–0.3)
BILIRUB SERPL-MCNC: 0.6 MG/DL (ref 0–1.2)
BUN SERPL-MCNC: 9 MG/DL (ref 8–23)
BUN/CREAT SERPL: 11.4 (ref 7–25)
CALCIUM SPEC-SCNC: 9.7 MG/DL (ref 8.6–10.5)
CHLORIDE SERPL-SCNC: 102 MMOL/L (ref 98–107)
CO2 SERPL-SCNC: 26 MMOL/L (ref 22–29)
CREAT SERPL-MCNC: 0.79 MG/DL (ref 0.57–1)
DEPRECATED RDW RBC AUTO: 43.4 FL (ref 37–54)
EGFRCR SERPLBLD CKD-EPI 2021: 85.8 ML/MIN/1.73
EOSINOPHIL # BLD AUTO: 0.1 10*3/MM3 (ref 0–0.4)
EOSINOPHIL NFR BLD AUTO: 1.6 % (ref 0.3–6.2)
ERYTHROCYTE [DISTWIDTH] IN BLOOD BY AUTOMATED COUNT: 14.3 % (ref 12.3–15.4)
GLOBULIN UR ELPH-MCNC: 2.3 GM/DL
GLUCOSE SERPL-MCNC: 118 MG/DL (ref 65–99)
HCT VFR BLD AUTO: 35.4 % (ref 34–46.6)
HGB BLD-MCNC: 12.1 G/DL (ref 12–15.9)
IMM GRANULOCYTES # BLD AUTO: 0.02 10*3/MM3 (ref 0–0.05)
IMM GRANULOCYTES NFR BLD AUTO: 0.3 % (ref 0–0.5)
LYMPHOCYTES # BLD AUTO: 2.41 10*3/MM3 (ref 0.7–3.1)
LYMPHOCYTES NFR BLD AUTO: 39.1 % (ref 19.6–45.3)
MCH RBC QN AUTO: 29.1 PG (ref 26.6–33)
MCHC RBC AUTO-ENTMCNC: 34.2 G/DL (ref 31.5–35.7)
MCV RBC AUTO: 85.1 FL (ref 79–97)
MONOCYTES # BLD AUTO: 0.31 10*3/MM3 (ref 0.1–0.9)
MONOCYTES NFR BLD AUTO: 5 % (ref 5–12)
NEUTROPHILS NFR BLD AUTO: 3.25 10*3/MM3 (ref 1.7–7)
NEUTROPHILS NFR BLD AUTO: 52.7 % (ref 42.7–76)
NRBC BLD AUTO-RTO: 0 /100 WBC (ref 0–0.2)
PHOSPHATE SERPL-MCNC: 3.3 MG/DL (ref 2.5–4.5)
PLATELET # BLD AUTO: 319 10*3/MM3 (ref 140–450)
PMV BLD AUTO: 10.1 FL (ref 6–12)
POTASSIUM SERPL-SCNC: 3.6 MMOL/L (ref 3.5–5.2)
PROT SERPL-MCNC: 6.7 G/DL (ref 6–8.5)
PTH-INTACT SERPL-MCNC: 148 PG/ML (ref 15–65)
RBC # BLD AUTO: 4.16 10*6/MM3 (ref 3.77–5.28)
SODIUM SERPL-SCNC: 140 MMOL/L (ref 136–145)
T4 FREE SERPL-MCNC: 1.73 NG/DL (ref 0.93–1.7)
TSH SERPL DL<=0.05 MIU/L-ACNC: 0.74 UIU/ML (ref 0.27–4.2)
WBC NRBC COR # BLD: 6.17 10*3/MM3 (ref 3.4–10.8)

## 2023-10-30 PROCEDURE — 84100 ASSAY OF PHOSPHORUS: CPT

## 2023-10-30 PROCEDURE — 80050 GENERAL HEALTH PANEL: CPT

## 2023-10-30 PROCEDURE — 36415 COLL VENOUS BLD VENIPUNCTURE: CPT

## 2023-10-30 PROCEDURE — 83970 ASSAY OF PARATHORMONE: CPT

## 2023-10-30 PROCEDURE — 84439 ASSAY OF FREE THYROXINE: CPT

## 2023-10-30 PROCEDURE — 82248 BILIRUBIN DIRECT: CPT

## 2023-10-30 PROCEDURE — 82306 VITAMIN D 25 HYDROXY: CPT

## 2023-10-30 PROCEDURE — 86480 TB TEST CELL IMMUN MEASURE: CPT

## 2023-10-30 RX ORDER — METOPROLOL SUCCINATE 50 MG/1
50 TABLET, EXTENDED RELEASE ORAL DAILY
Qty: 90 TABLET | Refills: 0 | Status: SHIPPED | OUTPATIENT
Start: 2023-10-30

## 2023-11-01 LAB
GAMMA INTERFERON BACKGROUND BLD IA-ACNC: 0.02 IU/ML
M TB IFN-G BLD-IMP: NEGATIVE
M TB IFN-G CD4+ T-CELLS BLD-ACNC: 0.05 IU/ML
M TBIFN-G CD4+ CD8+T-CELLS BLD-ACNC: 0.07 IU/ML
MITOGEN IGNF BLD-ACNC: >10 IU/ML
QUANTIFERON INCUBATION: NORMAL
SERVICE CMNT-IMP: NORMAL

## 2023-11-06 ENCOUNTER — OFFICE VISIT (OUTPATIENT)
Dept: INTERNAL MEDICINE | Facility: CLINIC | Age: 63
End: 2023-11-06
Payer: COMMERCIAL

## 2023-11-06 VITALS
SYSTOLIC BLOOD PRESSURE: 148 MMHG | HEART RATE: 74 BPM | TEMPERATURE: 97.5 F | WEIGHT: 186.6 LBS | BODY MASS INDEX: 36.63 KG/M2 | OXYGEN SATURATION: 97 % | DIASTOLIC BLOOD PRESSURE: 92 MMHG | HEIGHT: 60 IN

## 2023-11-06 DIAGNOSIS — E87.6 HYPOKALEMIA: ICD-10-CM

## 2023-11-06 DIAGNOSIS — I25.10 CORONARY ARTERY DISEASE INVOLVING NATIVE CORONARY ARTERY OF NATIVE HEART WITHOUT ANGINA PECTORIS: ICD-10-CM

## 2023-11-06 DIAGNOSIS — M05.79 RHEUMATOID ARTHRITIS WITH RHEUMATOID FACTOR OF MULTIPLE SITES WITHOUT ORGAN OR SYSTEMS INVOLVEMENT: ICD-10-CM

## 2023-11-06 DIAGNOSIS — E03.8 HYPOTHYROIDISM DUE TO HASHIMOTO'S THYROIDITIS: ICD-10-CM

## 2023-11-06 DIAGNOSIS — Z86.39 HISTORY OF IRON DEFICIENCY: ICD-10-CM

## 2023-11-06 DIAGNOSIS — Z12.31 ENCOUNTER FOR SCREENING MAMMOGRAM FOR MALIGNANT NEOPLASM OF BREAST: ICD-10-CM

## 2023-11-06 DIAGNOSIS — I10 BENIGN ESSENTIAL HTN: Primary | ICD-10-CM

## 2023-11-06 DIAGNOSIS — I10 BENIGN ESSENTIAL HTN: ICD-10-CM

## 2023-11-06 DIAGNOSIS — E78.5 HYPERLIPIDEMIA LDL GOAL <70: ICD-10-CM

## 2023-11-06 DIAGNOSIS — Z86.39 H/O HYPERPARATHYROIDISM: ICD-10-CM

## 2023-11-06 DIAGNOSIS — E06.3 HYPOTHYROIDISM DUE TO HASHIMOTO'S THYROIDITIS: ICD-10-CM

## 2023-11-06 PROBLEM — J06.9 URI, ACUTE: Status: RESOLVED | Noted: 2023-05-02 | Resolved: 2023-11-06

## 2023-11-06 PROBLEM — J02.9 SORE THROAT: Status: RESOLVED | Noted: 2023-05-02 | Resolved: 2023-11-06

## 2023-11-06 PROCEDURE — 99214 OFFICE O/P EST MOD 30 MIN: CPT | Performed by: NURSE PRACTITIONER

## 2023-11-06 NOTE — PROGRESS NOTES
Chief Complaint   Patient presents with    Hypertension    Hyperlipidemia    Hypothyroidism       Subjective     Shaylee Wadsworth is a 62 y.o. female being seen for a follow up appointment today regarding HTN, Hyperlipidemia, GERD, CAD, Hypothyroidism, FMD, Vit D def, and Rheum Arthritis. She is currently on Norvasc 5mg, Benicar 40mg, and Toprol XL 50mg for HTN and CAD and HTN. She is followed by Dr. Dumas. S/P drug eluting stent Distal left circumflex. Plavix was stopped April 2020. She is on an 81 mg aspirin therapy. Her statin therapy os Lipitor 40mg nightly. She tolerates this. She takes potassium for hypokalemia.      She has thoracic back pain, daily pain rated a 3 of 10. She uses flexeril and ultram sparingly for pain as needed, last refill 1-5-2022.  She is doing home traction for therapy.     She is on Nexium 40mg daily for GERD. She reports some burping, but no ingestion or stomach pain.      She has FMD since April 2018. She has been evaluated by 2 neurologists as well as [facility] and an ENT. Last Neurologist was Dr. LaFavre at [facility]. She has been thru PT and OT with [facility]. She feels like traction helps more than any other modality, which she does at home after being released from PT.     She is followed by Dr. Caraballo ([facility]) for hyperparathyroidism and hypothyroidism. She is on Euthyrox 75mcgs daily. She has hypokalemia as well for which she takes Potassium 20meq daily.     She is followed by Rheumatology for RA. RHEUMATOLOGY - SCAN by [facility] [place] (02/16/2022 00:00) She is on Methotrexate and Simponi.      She has hitsory of TAMANNA. Followed by Dr. Dunn. She stopped her iron since last visit due to constipation. She takes Nexium 40mg daily for GERD. She had an EGD 10-6-2021. She denies indigestion, abd pain.        History of Present Illness     Allergies   Allergen Reactions    Meloxicam Swelling     EYES AND FACE SWELLING  EYES AND FACE SWELLING    Effexor Xr  [Venlafaxine Hcl Er] Unknown - Low Severity     Caused weight gain         Current Outpatient Medications:     amLODIPine (NORVASC) 5 MG tablet, Take 1 tablet by mouth once daily, Disp: 90 tablet, Rfl: 0    aspirin 81 MG tablet, Take 1 tablet by mouth Daily., Disp: , Rfl:     atorvastatin (LIPITOR) 40 MG tablet, Take 1 tablet by mouth Every Night., Disp: 90 tablet, Rfl: 1    Cholecalciferol (Vitamin D) 50 MCG (2000 UT) tablet, Take 2,000 Units by mouth Daily., Disp: 90 tablet, Rfl: 0    cyclobenzaprine (FLEXERIL) 10 MG tablet, Take 1 tablet by mouth 2 (Two) Times a Day As Needed., Disp: , Rfl:     esomeprazole (nexIUM) 40 MG capsule, Take 1 capsule by mouth twice daily, Disp: 90 capsule, Rfl: 0    folic acid (FOLVITE) 1 MG tablet, Take 1 tablet by mouth Daily. 1 to 4 tablets daily, Disp: , Rfl:     levothyroxine (SYNTHROID, LEVOTHROID) 75 MCG tablet, Take 1 tablet by mouth Daily., Disp: 90 tablet, Rfl: 2    methotrexate 2.5 MG tablet, TAKE 4 TABLETS BY MOUTH ONCE A WEEK, Disp: 32 tablet, Rfl: 6    metoprolol succinate XL (TOPROL-XL) 50 MG 24 hr tablet, Take 1 tablet by mouth once daily, Disp: 90 tablet, Rfl: 0    olmesartan (BENICAR) 40 MG tablet, Take 1 tablet by mouth once daily, Disp: 90 tablet, Rfl: 0    potassium chloride (K-DUR,KLOR-CON) 20 MEQ CR tablet, TAKE 1  BY MOUTH ONCE DAILY, Disp: 90 tablet, Rfl: 0    traMADol (ULTRAM) 50 MG tablet, Take 1 tablet by mouth Every 8 (Eight) Hours As Needed for Severe Pain ., Disp: 30 tablet, Rfl: 0    Simponi 50 MG/0.5ML solution auto-injector, , Disp: , Rfl:     The following portions of the patient's history were reviewed and updated as appropriate: allergies, current medications, past family history, past medical history, past social history, past surgical history, and problem list.    Review of Systems   Constitutional: Negative.    Eyes: Negative.    Respiratory: Negative.  Negative for cough, choking and shortness of breath.    Cardiovascular: Negative.  Negative  for chest pain, palpitations and leg swelling.   Gastrointestinal: Negative.    Endocrine: Negative.    Musculoskeletal:  Positive for arthralgias and back pain.   Allergic/Immunologic: Negative.    Neurological: Negative.    Hematological: Negative.    Psychiatric/Behavioral: Negative.     All other systems reviewed and are negative.      Assessment     Physical Exam  Vitals reviewed.   Constitutional:       Appearance: Normal appearance. She is obese. She is not ill-appearing.   HENT:      Head: Normocephalic.      Left Ear: Tympanic membrane normal.   Cardiovascular:      Rate and Rhythm: Normal rate and regular rhythm.      Pulses: Normal pulses.      Heart sounds: Normal heart sounds. No murmur heard.  Pulmonary:      Effort: Pulmonary effort is normal. No respiratory distress.      Breath sounds: Normal breath sounds. No stridor.   Musculoskeletal:      Cervical back: Neck supple.      Right lower leg: No edema.      Left lower leg: No edema.   Skin:     General: Skin is warm and dry.   Neurological:      General: No focal deficit present.      Mental Status: She is alert and oriented to person, place, and time.   Psychiatric:         Mood and Affect: Mood normal.         Behavior: Behavior normal.         Thought Content: Thought content normal.         Plan     Her labs were reviewed with the patient from last week and August 2023. Reviewed multiple specialists notes, cardio, rheumatology.     Diagnoses and all orders for this visit:    1. Benign essential HTN (Primary)  Comments:  BP at goal on current medications  Orders:  -     Comprehensive Metabolic Panel; Future  -     CBC & Differential; Future  -     Lipid Panel With / Chol / HDL Ratio; Future    2. Hyperlipidemia LDL goal <70  Comments:  LDL goal < 100 on statin therapy lipitor 40mg nightly  Orders:  -     Comprehensive Metabolic Panel; Future  -     CBC & Differential; Future  -     Lipid Panel With / Chol / HDL Ratio; Future    3. Coronary artery  disease involving native coronary artery of native heart without angina pectoris  -     Comprehensive Metabolic Panel; Future  -     CBC & Differential; Future  -     Hemoglobin A1c; Future    4. Hypothyroidism due to Hashimoto's thyroiditis  -     Hemoglobin A1c; Future  -     TSH; Future  -     T4, Free; Future    5. H/O hyperparathyroidism  -     Hemoglobin A1c; Future    6. Rheumatoid arthritis with rheumatoid factor of multiple sites without organ or systems involvement  -     Comprehensive Metabolic Panel; Future  -     CBC & Differential; Future    7. Encounter for screening mammogram for malignant neoplasm of breast  -     Mammo Screening Digital Tomosynthesis Bilateral With CAD; Future    8. Hypokalemia  Comments:  CMP reviewed from last week.    9. History of iron deficiency  -     CBC & Differential; Future  -     Ferritin; Future  -     Iron Profile; Future        She will get Flu and Covid vaccine sta the pharmacy together.     Follow up in 6 months matthew QURESHI

## 2023-12-02 DIAGNOSIS — I10 ESSENTIAL HYPERTENSION: ICD-10-CM

## 2023-12-02 DIAGNOSIS — E87.6 HYPOKALEMIA: ICD-10-CM

## 2023-12-04 RX ORDER — POTASSIUM CHLORIDE 20 MEQ/1
20 TABLET, EXTENDED RELEASE ORAL DAILY
Qty: 90 TABLET | Refills: 0 | Status: SHIPPED | OUTPATIENT
Start: 2023-12-04

## 2023-12-06 ENCOUNTER — HOSPITAL ENCOUNTER (OUTPATIENT)
Dept: MAMMOGRAPHY | Facility: HOSPITAL | Age: 63
Discharge: HOME | End: 2023-12-06
Admitting: NURSE PRACTITIONER
Payer: COMMERCIAL

## 2023-12-06 DIAGNOSIS — Z12.31 ENCOUNTER FOR SCREENING MAMMOGRAM FOR MALIGNANT NEOPLASM OF BREAST: ICD-10-CM

## 2023-12-06 PROCEDURE — 77063 BREAST TOMOSYNTHESIS BI: CPT

## 2023-12-06 PROCEDURE — 77067 SCR MAMMO BI INCL CAD: CPT

## 2023-12-21 DIAGNOSIS — I10 BENIGN ESSENTIAL HTN: ICD-10-CM

## 2023-12-21 RX ORDER — AMLODIPINE BESYLATE 5 MG/1
TABLET ORAL
Qty: 90 TABLET | Refills: 0 | Status: SHIPPED | OUTPATIENT
Start: 2023-12-21

## 2023-12-21 NOTE — TELEPHONE ENCOUNTER
Rx Refill Note  Requested Prescriptions     Pending Prescriptions Disp Refills    amLODIPine (NORVASC) 5 MG tablet [Pharmacy Med Name: amLODIPine Besylate 5 MG Oral Tablet] 90 tablet 0     Sig: Take 1 tablet by mouth once daily      Last office visit with prescribing clinician: 1/5/2022   Last telemedicine visit with prescribing clinician: Visit date not found   Next office visit with prescribing clinician: Visit date not found                         Would you like a call back once the refill request has been completed: [] Yes [] No    If the office needs to give you a call back, can they leave a voicemail: [] Yes [] No    Thao White, PCT  12/21/23, 09:50 EST

## 2023-12-30 DIAGNOSIS — E78.5 HYPERLIPIDEMIA LDL GOAL <70: ICD-10-CM

## 2023-12-30 DIAGNOSIS — I10 BENIGN ESSENTIAL HTN: ICD-10-CM

## 2024-01-03 RX ORDER — ATORVASTATIN CALCIUM 40 MG/1
40 TABLET, FILM COATED ORAL NIGHTLY
Qty: 90 TABLET | Refills: 0 | Status: SHIPPED | OUTPATIENT
Start: 2024-01-03

## 2024-01-03 RX ORDER — OLMESARTAN MEDOXOMIL 40 MG/1
TABLET ORAL
Qty: 90 TABLET | Refills: 0 | Status: SHIPPED | OUTPATIENT
Start: 2024-01-03

## 2024-01-17 ENCOUNTER — TELEMEDICINE (OUTPATIENT)
Dept: INTERNAL MEDICINE | Facility: CLINIC | Age: 64
End: 2024-01-17
Payer: COMMERCIAL

## 2024-01-17 VITALS — BODY MASS INDEX: 36.52 KG/M2 | WEIGHT: 186 LBS | HEIGHT: 60 IN

## 2024-01-17 DIAGNOSIS — U07.1 COVID-19: Primary | ICD-10-CM

## 2024-01-17 PROCEDURE — 99213 OFFICE O/P EST LOW 20 MIN: CPT | Performed by: NURSE PRACTITIONER

## 2024-01-17 RX ORDER — BROMPHENIRAMINE MALEATE, PSEUDOEPHEDRINE HYDROCHLORIDE, AND DEXTROMETHORPHAN HYDROBROMIDE 2; 30; 10 MG/5ML; MG/5ML; MG/5ML
5 SYRUP ORAL 4 TIMES DAILY PRN
Qty: 473 ML | Refills: 0 | Status: SHIPPED | OUTPATIENT
Start: 2024-01-17

## 2024-01-17 NOTE — PROGRESS NOTES
Chief Complaint   Patient presents with    Cough      tested positive yesterday, pt's started yesterday    Sore Throat    Generalized Body Aches       Subjective     Shaylee Wadsworth is a 63 y.o. female being seen for an acute visit for Covid. Her  was in the office yesterday, and tested positive for Covid. Then, she developed sore throat, nasal congestion, body aches. She denies fever, chest pain or SOA.    Cough  Associated symptoms include rhinorrhea and a sore throat. Pertinent negatives include no chest pain.   Sore Throat   Associated symptoms include congestion and coughing.        Allergies   Allergen Reactions    Meloxicam Swelling     EYES AND FACE SWELLING  EYES AND FACE SWELLING    Effexor Xr [Venlafaxine Hcl Er] Unknown - Low Severity     Caused weight gain         Current Outpatient Medications:     amLODIPine (NORVASC) 5 MG tablet, Take 1 tablet by mouth once daily, Disp: 90 tablet, Rfl: 0    aspirin 81 MG tablet, Take 1 tablet by mouth Daily., Disp: , Rfl:     atorvastatin (LIPITOR) 40 MG tablet, TAKE 1 TABLET BY MOUTH ONCE DAILY AT NIGHT, Disp: 90 tablet, Rfl: 0    cyclobenzaprine (FLEXERIL) 10 MG tablet, Take 1 tablet by mouth 2 (Two) Times a Day As Needed., Disp: , Rfl:     esomeprazole (nexIUM) 40 MG capsule, Take 1 capsule by mouth twice daily, Disp: 90 capsule, Rfl: 0    folic acid (FOLVITE) 1 MG tablet, Take 1 tablet by mouth Daily. 1 to 4 tablets daily, Disp: , Rfl:     levothyroxine (SYNTHROID, LEVOTHROID) 75 MCG tablet, Take 1 tablet by mouth Daily., Disp: 90 tablet, Rfl: 2    methotrexate 2.5 MG tablet, TAKE 4 TABLETS BY MOUTH ONCE A WEEK, Disp: 32 tablet, Rfl: 6    metoprolol succinate XL (TOPROL-XL) 50 MG 24 hr tablet, Take 1 tablet by mouth once daily, Disp: 90 tablet, Rfl: 0    olmesartan (BENICAR) 40 MG tablet, Take 1 tablet by mouth once daily, Disp: 90 tablet, Rfl: 0    potassium chloride (K-DUR,KLOR-CON) 20 MEQ CR tablet, Take 1 tablet by mouth once daily, Disp: 90  tablet, Rfl: 0    traMADol (ULTRAM) 50 MG tablet, Take 1 tablet by mouth Every 8 (Eight) Hours As Needed for Severe Pain ., Disp: 30 tablet, Rfl: 0    The following portions of the patient's history were reviewed and updated as appropriate: allergies, current medications, past family history, past medical history, past social history, past surgical history, and problem list.    Review of Systems   Constitutional:  Positive for fatigue.   HENT:  Positive for congestion, rhinorrhea, sinus pressure, sinus pain and sore throat.    Respiratory:  Positive for cough.    Cardiovascular: Negative.  Negative for chest pain, palpitations and leg swelling.   All other systems reviewed and are negative.      Assessment     Physical Exam  Vitals reviewed.   Constitutional:       Appearance: Normal appearance.   Neurological:      Mental Status: She is alert.   Psychiatric:         Mood and Affect: Mood normal.         Thought Content: Thought content normal.         Plan         Diagnoses and all orders for this visit:    1. COVID-19 (Primary)  -     brompheniramine-pseudoephedrine-DM 30-2-10 MG/5ML syrup; Take 5 mL by mouth 4 (Four) Times a Day As Needed for Cough or Congestion.  Dispense: 473 mL; Refill: 0        We dicussed Paxlovid, but due to RA medication and Lipitor, decided the risk/benefit does not qualify a script due to mild symptoms. She will call if s/s worsen or do not resolve. SHe is aware of quarantine protocol.    Follow up as needed

## 2024-01-25 DIAGNOSIS — I10 BENIGN ESSENTIAL HTN: ICD-10-CM

## 2024-01-25 RX ORDER — METOPROLOL SUCCINATE 50 MG/1
50 TABLET, EXTENDED RELEASE ORAL DAILY
Qty: 90 TABLET | Refills: 0 | Status: SHIPPED | OUTPATIENT
Start: 2024-01-25

## 2024-01-25 NOTE — TELEPHONE ENCOUNTER
Rx Refill Note  Requested Prescriptions     Pending Prescriptions Disp Refills    metoprolol succinate XL (TOPROL-XL) 50 MG 24 hr tablet [Pharmacy Med Name: Metoprolol Succinate ER 50 MG Oral Tablet Extended Release 24 Hour] 90 tablet 0     Sig: Take 1 tablet by mouth once daily      Last office visit with prescribing clinician: Visit date not found   Last telemedicine visit with prescribing clinician: 1/17/2024   Next office visit with prescribing clinician: Visit date not found                         Would you like a call back once the refill request has been completed: [] Yes [] No    If the office needs to give you a call back, can they leave a voicemail: [] Yes [] No    Thao White, PCT  01/25/24, 10:07 EST

## 2024-02-03 ENCOUNTER — LAB (OUTPATIENT)
Dept: LAB | Facility: HOSPITAL | Age: 64
End: 2024-02-03
Payer: MEDICARE

## 2024-02-03 LAB
ALBUMIN SERPL-MCNC: 4.3 G/DL (ref 3.5–5.2)
ALBUMIN/GLOB SERPL: 2.3 G/DL
ALP SERPL-CCNC: 69 U/L (ref 39–117)
ALT SERPL W P-5'-P-CCNC: 13 U/L (ref 1–33)
ANION GAP SERPL CALCULATED.3IONS-SCNC: 12 MMOL/L (ref 5–15)
AST SERPL-CCNC: 16 U/L (ref 1–32)
BASOPHILS # BLD AUTO: 0.06 10*3/MM3 (ref 0–0.2)
BASOPHILS NFR BLD AUTO: 1.3 % (ref 0–1.5)
BILIRUB SERPL-MCNC: 0.7 MG/DL (ref 0–1.2)
BUN SERPL-MCNC: 10 MG/DL (ref 8–23)
BUN/CREAT SERPL: 12.2 (ref 7–25)
CALCIUM SPEC-SCNC: 9 MG/DL (ref 8.6–10.5)
CHLORIDE SERPL-SCNC: 102 MMOL/L (ref 98–107)
CHOLEST SERPL-MCNC: 184 MG/DL (ref 0–200)
CO2 SERPL-SCNC: 27 MMOL/L (ref 22–29)
CREAT SERPL-MCNC: 0.82 MG/DL (ref 0.57–1)
DEPRECATED RDW RBC AUTO: 47.7 FL (ref 37–54)
EGFRCR SERPLBLD CKD-EPI 2021: 81.5 ML/MIN/1.73
EOSINOPHIL # BLD AUTO: 0.1 10*3/MM3 (ref 0–0.4)
EOSINOPHIL NFR BLD AUTO: 2.1 % (ref 0.3–6.2)
ERYTHROCYTE [DISTWIDTH] IN BLOOD BY AUTOMATED COUNT: 14.7 % (ref 12.3–15.4)
FERRITIN SERPL-MCNC: 18.8 NG/ML (ref 13–150)
GLOBULIN UR ELPH-MCNC: 1.9 GM/DL
GLUCOSE SERPL-MCNC: 107 MG/DL (ref 65–99)
HBA1C MFR BLD: 5.8 % (ref 4.8–5.6)
HCT VFR BLD AUTO: 35.1 % (ref 34–46.6)
HDLC SERPL QL: 3.47
HDLC SERPL-MCNC: 53 MG/DL (ref 40–60)
HGB BLD-MCNC: 11.8 G/DL (ref 12–15.9)
IMM GRANULOCYTES # BLD AUTO: 0.02 10*3/MM3 (ref 0–0.05)
IMM GRANULOCYTES NFR BLD AUTO: 0.4 % (ref 0–0.5)
IRON 24H UR-MRATE: 86 MCG/DL (ref 37–145)
IRON SATN MFR SERPL: 20 % (ref 20–50)
LDLC SERPL CALC-MCNC: 108 MG/DL (ref 0–100)
LYMPHOCYTES # BLD AUTO: 1.9 10*3/MM3 (ref 0.7–3.1)
LYMPHOCYTES NFR BLD AUTO: 40.7 % (ref 19.6–45.3)
MCH RBC QN AUTO: 29.4 PG (ref 26.6–33)
MCHC RBC AUTO-ENTMCNC: 33.6 G/DL (ref 31.5–35.7)
MCV RBC AUTO: 87.3 FL (ref 79–97)
MONOCYTES # BLD AUTO: 0.42 10*3/MM3 (ref 0.1–0.9)
MONOCYTES NFR BLD AUTO: 9 % (ref 5–12)
NEUTROPHILS NFR BLD AUTO: 2.17 10*3/MM3 (ref 1.7–7)
NEUTROPHILS NFR BLD AUTO: 46.5 % (ref 42.7–76)
NRBC BLD AUTO-RTO: 0 /100 WBC (ref 0–0.2)
PLATELET # BLD AUTO: 273 10*3/MM3 (ref 140–450)
PMV BLD AUTO: 10.4 FL (ref 6–12)
POTASSIUM SERPL-SCNC: 3.5 MMOL/L (ref 3.5–5.2)
PROT SERPL-MCNC: 6.2 G/DL (ref 6–8.5)
RBC # BLD AUTO: 4.02 10*6/MM3 (ref 3.77–5.28)
SODIUM SERPL-SCNC: 141 MMOL/L (ref 136–145)
T4 FREE SERPL-MCNC: 1.99 NG/DL (ref 0.93–1.7)
TIBC SERPL-MCNC: 435 MCG/DL (ref 298–536)
TRANSFERRIN SERPL-MCNC: 292 MG/DL (ref 200–360)
TRIGL SERPL-MCNC: 131 MG/DL (ref 0–150)
TSH SERPL DL<=0.05 MIU/L-ACNC: 0.74 UIU/ML (ref 0.27–4.2)
VLDLC SERPL-MCNC: 23 MG/DL (ref 5–40)
WBC NRBC COR # BLD AUTO: 4.67 10*3/MM3 (ref 3.4–10.8)

## 2024-02-03 PROCEDURE — 80061 LIPID PANEL: CPT | Performed by: NURSE PRACTITIONER

## 2024-02-03 PROCEDURE — 82728 ASSAY OF FERRITIN: CPT | Performed by: NURSE PRACTITIONER

## 2024-02-03 PROCEDURE — 84439 ASSAY OF FREE THYROXINE: CPT | Performed by: NURSE PRACTITIONER

## 2024-02-03 PROCEDURE — 84466 ASSAY OF TRANSFERRIN: CPT | Performed by: NURSE PRACTITIONER

## 2024-02-03 PROCEDURE — 83540 ASSAY OF IRON: CPT | Performed by: NURSE PRACTITIONER

## 2024-02-03 PROCEDURE — 80050 GENERAL HEALTH PANEL: CPT | Performed by: NURSE PRACTITIONER

## 2024-02-03 PROCEDURE — 83036 HEMOGLOBIN GLYCOSYLATED A1C: CPT | Performed by: NURSE PRACTITIONER

## 2024-02-12 DIAGNOSIS — K21.00 GASTROESOPHAGEAL REFLUX DISEASE WITH ESOPHAGITIS WITHOUT HEMORRHAGE: ICD-10-CM

## 2024-02-12 RX ORDER — ESOMEPRAZOLE MAGNESIUM 40 MG/1
40 CAPSULE, DELAYED RELEASE ORAL EVERY 12 HOURS SCHEDULED
Qty: 90 CAPSULE | Refills: 0 | Status: SHIPPED | OUTPATIENT
Start: 2024-02-12

## 2024-02-26 ENCOUNTER — HOSPITAL ENCOUNTER (OUTPATIENT)
Dept: GENERAL RADIOLOGY | Facility: HOSPITAL | Age: 64
Discharge: HOME | End: 2024-02-26
Admitting: NURSE PRACTITIONER
Payer: MEDICARE

## 2024-02-26 ENCOUNTER — OFFICE VISIT (OUTPATIENT)
Dept: INTERNAL MEDICINE | Facility: CLINIC | Age: 64
End: 2024-02-26
Payer: MEDICARE

## 2024-02-26 VITALS
HEIGHT: 60 IN | WEIGHT: 187.8 LBS | TEMPERATURE: 97.8 F | DIASTOLIC BLOOD PRESSURE: 88 MMHG | BODY MASS INDEX: 36.87 KG/M2 | OXYGEN SATURATION: 96 % | HEART RATE: 62 BPM | SYSTOLIC BLOOD PRESSURE: 124 MMHG

## 2024-02-26 DIAGNOSIS — E78.5 HYPERLIPIDEMIA LDL GOAL <70: ICD-10-CM

## 2024-02-26 DIAGNOSIS — M25.531 WRIST PAIN, ACUTE, RIGHT: ICD-10-CM

## 2024-02-26 DIAGNOSIS — E03.8 HYPOTHYROIDISM DUE TO HASHIMOTO'S THYROIDITIS: ICD-10-CM

## 2024-02-26 DIAGNOSIS — E06.3 HYPOTHYROIDISM DUE TO HASHIMOTO'S THYROIDITIS: ICD-10-CM

## 2024-02-26 DIAGNOSIS — D63.8 ANEMIA, CHRONIC DISEASE: ICD-10-CM

## 2024-02-26 DIAGNOSIS — M65.4 DE QUERVAIN'S TENOSYNOVITIS, RIGHT: ICD-10-CM

## 2024-02-26 DIAGNOSIS — M25.531 CHRONIC PAIN OF RIGHT WRIST: Primary | ICD-10-CM

## 2024-02-26 DIAGNOSIS — I25.10 CORONARY ARTERY DISEASE INVOLVING NATIVE CORONARY ARTERY OF NATIVE HEART WITHOUT ANGINA PECTORIS: ICD-10-CM

## 2024-02-26 DIAGNOSIS — M25.531 WRIST PAIN, ACUTE, RIGHT: Primary | ICD-10-CM

## 2024-02-26 DIAGNOSIS — G89.29 CHRONIC PAIN OF RIGHT WRIST: Primary | ICD-10-CM

## 2024-02-26 DIAGNOSIS — I10 BENIGN ESSENTIAL HTN: ICD-10-CM

## 2024-02-26 PROCEDURE — 99214 OFFICE O/P EST MOD 30 MIN: CPT | Performed by: NURSE PRACTITIONER

## 2024-02-26 PROCEDURE — 3079F DIAST BP 80-89 MM HG: CPT | Performed by: NURSE PRACTITIONER

## 2024-02-26 PROCEDURE — 73110 X-RAY EXAM OF WRIST: CPT

## 2024-02-26 PROCEDURE — 3074F SYST BP LT 130 MM HG: CPT | Performed by: NURSE PRACTITIONER

## 2024-02-26 RX ORDER — TOCILIZUMAB 180 MG/ML
INJECTION, SOLUTION SUBCUTANEOUS
COMMUNITY
Start: 2024-02-19

## 2024-02-26 NOTE — PROGRESS NOTES
"Chief Complaint   Patient presents with    Wrist Pain     Going on for over a month       Subjective     Shaylee Wadsworth is a 63 y.o. female being seen for a follow up appointment today regarding HTN, Hyperlipidemia, GERD, CAD, Hypothyroidism, FMD, Vit D def, and Rheum Arthritis. She has a new complaint of wrist pain.     She is currently on Norvasc 5mg, Benicar 40mg, and Toprol XL 50mg for HTN and CAD and HTN. She is followed by Dr. Dumas. S/P drug eluting stent Distal left circumflex. Plavix was stopped April 2020. She is on an 81 mg aspirin therapy. Her statin therapy os Lipitor 40mg nightly. She tolerates this. She takes potassium for hypokalemia.       She is on Nexium 40mg daily for GERD. She reports some burping, but no ingestion or stomach pain.       She has FMD since April 2018. She has been evaluated by 2 neurologists as well as [facility] and an ENT. Last Neurologist was Dr. LaFavre at [facility]. She has been thru PT and OT with [facility]. She feels like traction helps more than any other modality, which she does at home after being released from PT. She has thoracic back pain, daily pain rated a 3 of 10. She uses flexeril and ultram sparingly for pain as needed, last refill 1-5-2022.     She is followed by Dr. Caraballo ([facility]) for hyperparathyroidism and hypothyroidism. She is on Euthyrox 75mcgs daily. She has hypokalemia as well for which she takes Potassium 20meq daily.     She is followed by Rheumatology for RA. She is on Actemra, for RA. She was concerned due to right wrist pain for 1 month. She has been wearing a wrist brace. Pain in right outer wrist,described as \"sharp\" pain. No known injury, but reports that it has been there for a while. Associated intermittent humbness in her last 2 fingers and weak . Pain worse with grabbing anything.     She has hitsory of TAAMNNA. Followed by Dr. Dunn. She stopped her iron since last visit due to constipation. She takes Nexium " 40mg daily for GERD. She had an EGD 10-6-2021. She denies indigestion, abd pain.     History of Present Illness     Allergies   Allergen Reactions    Meloxicam Swelling     EYES AND FACE SWELLING  EYES AND FACE SWELLING    Effexor Xr [Venlafaxine Hcl Er] Unknown - Low Severity     Caused weight gain         Current Outpatient Medications:     Actemra ACTPen 162 MG/0.9ML solution auto-injector injection, , Disp: , Rfl:     amLODIPine (NORVASC) 5 MG tablet, Take 1 tablet by mouth once daily, Disp: 90 tablet, Rfl: 0    aspirin 81 MG tablet, Take 1 tablet by mouth Daily., Disp: , Rfl:     atorvastatin (LIPITOR) 40 MG tablet, TAKE 1 TABLET BY MOUTH ONCE DAILY AT NIGHT, Disp: 90 tablet, Rfl: 0    cyclobenzaprine (FLEXERIL) 10 MG tablet, Take 1 tablet by mouth 2 (Two) Times a Day As Needed., Disp: , Rfl:     esomeprazole (nexIUM) 40 MG capsule, Take 1 capsule by mouth twice daily, Disp: 90 capsule, Rfl: 0    folic acid (FOLVITE) 1 MG tablet, Take 1 tablet by mouth Daily. 1 to 4 tablets daily, Disp: , Rfl:     levothyroxine (SYNTHROID, LEVOTHROID) 75 MCG tablet, Take 1 tablet by mouth Daily., Disp: 90 tablet, Rfl: 2    methotrexate 2.5 MG tablet, TAKE 4 TABLETS BY MOUTH ONCE A WEEK, Disp: 32 tablet, Rfl: 6    metoprolol succinate XL (TOPROL-XL) 50 MG 24 hr tablet, Take 1 tablet by mouth once daily, Disp: 90 tablet, Rfl: 0    olmesartan (BENICAR) 40 MG tablet, Take 1 tablet by mouth once daily, Disp: 90 tablet, Rfl: 0    potassium chloride (K-DUR,KLOR-CON) 20 MEQ CR tablet, Take 1 tablet by mouth once daily, Disp: 90 tablet, Rfl: 0    brompheniramine-pseudoephedrine-DM 30-2-10 MG/5ML syrup, Take 5 mL by mouth 4 (Four) Times a Day As Needed for Cough or Congestion. (Patient not taking: Reported on 2/26/2024), Disp: 473 mL, Rfl: 0    traMADol (ULTRAM) 50 MG tablet, Take 1 tablet by mouth Every 8 (Eight) Hours As Needed for Severe Pain . (Patient not taking: Reported on 2/26/2024), Disp: 30 tablet, Rfl: 0    The following  portions of the patient's history were reviewed and updated as appropriate: allergies, current medications, past family history, past medical history, past social history, past surgical history, and problem list.    Review of Systems   Constitutional:  Positive for fatigue.   Eyes: Negative.    Cardiovascular:  Negative for chest pain, palpitations and leg swelling.   Gastrointestinal: Negative.    Endocrine: Negative.    Genitourinary: Negative.    Musculoskeletal:  Positive for arthralgias and back pain.   Allergic/Immunologic: Negative.  Negative for environmental allergies and food allergies.   Neurological:  Positive for numbness.   Hematological: Negative.  Negative for adenopathy. Does not bruise/bleed easily.   Psychiatric/Behavioral: Negative.     All other systems reviewed and are negative.      Assessment     Physical Exam  Vitals reviewed.   Constitutional:       Appearance: Normal appearance.   HENT:      Head: Normocephalic.      Right Ear: Tympanic membrane normal.      Left Ear: Tympanic membrane normal.   Cardiovascular:      Rate and Rhythm: Normal rate and regular rhythm.      Pulses: Normal pulses.      Heart sounds: Normal heart sounds.   Musculoskeletal:      Right hand: Normal. No tenderness or bony tenderness. Normal range of motion. Normal strength. Normal sensation. There is no disruption of two-point discrimination. Normal capillary refill. Normal pulse.      Left hand: Normal.      Right lower leg: No edema.      Left lower leg: No edema.      Comments: Right hand negative Tinels and Phalens signs   Neurological:      Mental Status: She is alert and oriented to person, place, and time.   Psychiatric:         Mood and Affect: Mood normal.         Behavior: Behavior normal.         Thought Content: Thought content normal.         Plan     Her fasting labs were reviewed with the patient from last week.     Diagnoses and all orders for this visit:    1. Wrist pain, acute, right (Primary)  -      XR Wrist 3+ View Right; Future    2. Benign essential HTN  Comments:  BP at goal on current meds  Orders:  -     CBC & Differential; Future  -     Comprehensive Metabolic Panel; Future  -     Lipid Panel With / Chol / HDL Ratio; Future    3. Hyperlipidemia LDL goal <70  Comments:  Recent med change, LDL poorly controlled  Orders:  -     CBC & Differential; Future  -     Comprehensive Metabolic Panel; Future  -     Lipid Panel With / Chol / HDL Ratio; Future    4. Coronary artery disease involving native coronary artery of native heart without angina pectoris  -     CBC & Differential; Future  -     Comprehensive Metabolic Panel; Future  -     Lipid Panel With / Chol / HDL Ratio; Future    5. Hypothyroidism due to Hashimoto's thyroiditis  Comments:  Euthyroid on current meds  Orders:  -     Thyroid Cascade Profile; Future    6. Anemia, chronic disease  -     CBC & Differential; Future  -     Iron Profile; Future        Follow up as scheduled or in 3-6 months w labs

## 2024-02-28 DIAGNOSIS — I10 ESSENTIAL HYPERTENSION: ICD-10-CM

## 2024-02-28 DIAGNOSIS — E87.6 HYPOKALEMIA: ICD-10-CM

## 2024-02-29 RX ORDER — POTASSIUM CHLORIDE 20 MEQ/1
20 TABLET, EXTENDED RELEASE ORAL DAILY
Qty: 90 TABLET | Refills: 0 | Status: SHIPPED | OUTPATIENT
Start: 2024-02-29

## 2024-03-04 ENCOUNTER — LAB (OUTPATIENT)
Dept: LAB | Facility: HOSPITAL | Age: 64
End: 2024-03-04
Payer: MEDICARE

## 2024-03-04 DIAGNOSIS — E21.0 PRIMARY HYPERPARATHYROIDISM: Primary | ICD-10-CM

## 2024-03-04 DIAGNOSIS — E03.8 HYPOTHYROIDISM DUE TO HASHIMOTO'S THYROIDITIS: ICD-10-CM

## 2024-03-04 DIAGNOSIS — E78.5 HYPERLIPIDEMIA LDL GOAL <70: ICD-10-CM

## 2024-03-04 DIAGNOSIS — I10 BENIGN ESSENTIAL HTN: ICD-10-CM

## 2024-03-04 DIAGNOSIS — D63.8 ANEMIA, CHRONIC DISEASE: ICD-10-CM

## 2024-03-04 DIAGNOSIS — E06.3 HYPOTHYROIDISM DUE TO HASHIMOTO'S THYROIDITIS: ICD-10-CM

## 2024-03-04 DIAGNOSIS — E03.9 HYPOTHYROIDISM, UNSPECIFIED TYPE: ICD-10-CM

## 2024-03-04 DIAGNOSIS — I25.10 CORONARY ARTERY DISEASE INVOLVING NATIVE CORONARY ARTERY OF NATIVE HEART WITHOUT ANGINA PECTORIS: ICD-10-CM

## 2024-03-04 LAB
ALBUMIN SERPL-MCNC: 4.3 G/DL (ref 3.5–5.2)
ALBUMIN/GLOB SERPL: 1.8 G/DL
ALP SERPL-CCNC: 61 U/L (ref 39–117)
ALT SERPL W P-5'-P-CCNC: 13 U/L (ref 1–33)
ANION GAP SERPL CALCULATED.3IONS-SCNC: 12.4 MMOL/L (ref 5–15)
AST SERPL-CCNC: 18 U/L (ref 1–32)
BASOPHILS # BLD AUTO: 0.05 10*3/MM3 (ref 0–0.2)
BASOPHILS NFR BLD AUTO: 1 % (ref 0–1.5)
BILIRUB SERPL-MCNC: 0.8 MG/DL (ref 0–1.2)
BUN SERPL-MCNC: 11 MG/DL (ref 8–23)
BUN/CREAT SERPL: 13.6 (ref 7–25)
CALCIUM SPEC-SCNC: 8.9 MG/DL (ref 8.6–10.5)
CHLORIDE SERPL-SCNC: 102 MMOL/L (ref 98–107)
CHOLEST SERPL-MCNC: 161 MG/DL (ref 0–200)
CO2 SERPL-SCNC: 27.6 MMOL/L (ref 22–29)
CREAT SERPL-MCNC: 0.81 MG/DL (ref 0.57–1)
DEPRECATED RDW RBC AUTO: 45.8 FL (ref 37–54)
EGFRCR SERPLBLD CKD-EPI 2021: 82.7 ML/MIN/1.73
EOSINOPHIL # BLD AUTO: 0.08 10*3/MM3 (ref 0–0.4)
EOSINOPHIL NFR BLD AUTO: 1.7 % (ref 0.3–6.2)
ERYTHROCYTE [DISTWIDTH] IN BLOOD BY AUTOMATED COUNT: 14.6 % (ref 12.3–15.4)
GLOBULIN UR ELPH-MCNC: 2.4 GM/DL
GLUCOSE SERPL-MCNC: 120 MG/DL (ref 65–99)
HCT VFR BLD AUTO: 34.5 % (ref 34–46.6)
HDLC SERPL QL: 2.52
HDLC SERPL-MCNC: 64 MG/DL (ref 40–60)
HGB BLD-MCNC: 11.7 G/DL (ref 12–15.9)
IMM GRANULOCYTES # BLD AUTO: 0.02 10*3/MM3 (ref 0–0.05)
IMM GRANULOCYTES NFR BLD AUTO: 0.4 % (ref 0–0.5)
IRON 24H UR-MRATE: 143 MCG/DL (ref 37–145)
IRON SATN MFR SERPL: 32 % (ref 20–50)
LDLC SERPL CALC-MCNC: 77 MG/DL (ref 0–100)
LYMPHOCYTES # BLD AUTO: 1.97 10*3/MM3 (ref 0.7–3.1)
LYMPHOCYTES NFR BLD AUTO: 41.3 % (ref 19.6–45.3)
MCH RBC QN AUTO: 29.4 PG (ref 26.6–33)
MCHC RBC AUTO-ENTMCNC: 33.9 G/DL (ref 31.5–35.7)
MCV RBC AUTO: 86.7 FL (ref 79–97)
MONOCYTES # BLD AUTO: 0.3 10*3/MM3 (ref 0.1–0.9)
MONOCYTES NFR BLD AUTO: 6.3 % (ref 5–12)
NEUTROPHILS NFR BLD AUTO: 2.35 10*3/MM3 (ref 1.7–7)
NEUTROPHILS NFR BLD AUTO: 49.3 % (ref 42.7–76)
NRBC BLD AUTO-RTO: 0 /100 WBC (ref 0–0.2)
PLATELET # BLD AUTO: 263 10*3/MM3 (ref 140–450)
PMV BLD AUTO: 9.7 FL (ref 6–12)
POTASSIUM SERPL-SCNC: 3.5 MMOL/L (ref 3.5–5.2)
PROT SERPL-MCNC: 6.7 G/DL (ref 6–8.5)
RBC # BLD AUTO: 3.98 10*6/MM3 (ref 3.77–5.28)
SODIUM SERPL-SCNC: 142 MMOL/L (ref 136–145)
TIBC SERPL-MCNC: 453 MCG/DL (ref 298–536)
TRANSFERRIN SERPL-MCNC: 304 MG/DL (ref 200–360)
TRIGL SERPL-MCNC: 113 MG/DL (ref 0–150)
VLDLC SERPL-MCNC: 20 MG/DL (ref 5–40)
WBC NRBC COR # BLD AUTO: 4.77 10*3/MM3 (ref 3.4–10.8)

## 2024-03-04 PROCEDURE — 83540 ASSAY OF IRON: CPT

## 2024-03-04 PROCEDURE — 85025 COMPLETE CBC W/AUTO DIFF WBC: CPT

## 2024-03-04 PROCEDURE — 36415 COLL VENOUS BLD VENIPUNCTURE: CPT

## 2024-03-04 PROCEDURE — 84466 ASSAY OF TRANSFERRIN: CPT

## 2024-03-04 PROCEDURE — 80061 LIPID PANEL: CPT

## 2024-03-04 PROCEDURE — 84443 ASSAY THYROID STIM HORMONE: CPT

## 2024-03-04 PROCEDURE — 80053 COMPREHEN METABOLIC PANEL: CPT

## 2024-03-05 LAB — TSH SERPL DL<=0.005 MIU/L-ACNC: 0.87 UIU/ML (ref 0.45–4.5)

## 2024-03-06 ENCOUNTER — TELEPHONE (OUTPATIENT)
Dept: INTERNAL MEDICINE | Facility: CLINIC | Age: 64
End: 2024-03-06
Payer: MEDICARE

## 2024-03-06 NOTE — TELEPHONE ENCOUNTER
"Relay     \"Unable to reach pt. [facility] to discuss results.  XR shows concern for tenosynovitis and osteoarthritis, no acute fracture. Sending to hand surgery to discuss and possibly injection, referral placed. Pt can use a thumb spica splint brace, which she can find on amaz[employer] for about $25.  \"                ----- Message from KAITLYNN Choi sent at 2/28/2024 11:19 AM EST -----  Yes, a thumb spica splint brace, which she can find on amazon for about $25.   "

## 2024-03-08 ENCOUNTER — LAB (OUTPATIENT)
Dept: LAB | Facility: HOSPITAL | Age: 64
End: 2024-03-08
Payer: MEDICARE

## 2024-03-08 PROCEDURE — 80069 RENAL FUNCTION PANEL: CPT | Performed by: INTERNAL MEDICINE

## 2024-03-08 PROCEDURE — 84443 ASSAY THYROID STIM HORMONE: CPT | Performed by: INTERNAL MEDICINE

## 2024-03-08 PROCEDURE — 84439 ASSAY OF FREE THYROXINE: CPT | Performed by: INTERNAL MEDICINE

## 2024-03-08 PROCEDURE — 83970 ASSAY OF PARATHORMONE: CPT | Performed by: INTERNAL MEDICINE

## 2024-03-11 ENCOUNTER — OFFICE VISIT (OUTPATIENT)
Dept: ENDOCRINOLOGY | Age: 64
End: 2024-03-11
Payer: MEDICARE

## 2024-03-11 VITALS
WEIGHT: 189.4 LBS | SYSTOLIC BLOOD PRESSURE: 134 MMHG | BODY MASS INDEX: 37.18 KG/M2 | OXYGEN SATURATION: 99 % | HEIGHT: 60 IN | DIASTOLIC BLOOD PRESSURE: 80 MMHG | HEART RATE: 68 BPM

## 2024-03-11 DIAGNOSIS — E21.0 PRIMARY HYPERPARATHYROIDISM: ICD-10-CM

## 2024-03-11 DIAGNOSIS — E03.9 HYPOTHYROIDISM, UNSPECIFIED TYPE: Primary | ICD-10-CM

## 2024-03-11 DIAGNOSIS — M85.80 OSTEOPENIA, UNSPECIFIED LOCATION: ICD-10-CM

## 2024-03-11 PROCEDURE — 1159F MED LIST DOCD IN RCRD: CPT | Performed by: INTERNAL MEDICINE

## 2024-03-11 PROCEDURE — G2211 COMPLEX E/M VISIT ADD ON: HCPCS | Performed by: INTERNAL MEDICINE

## 2024-03-11 PROCEDURE — 3075F SYST BP GE 130 - 139MM HG: CPT | Performed by: INTERNAL MEDICINE

## 2024-03-11 PROCEDURE — 99214 OFFICE O/P EST MOD 30 MIN: CPT | Performed by: INTERNAL MEDICINE

## 2024-03-11 PROCEDURE — 3079F DIAST BP 80-89 MM HG: CPT | Performed by: INTERNAL MEDICINE

## 2024-03-11 PROCEDURE — 1160F RVW MEDS BY RX/DR IN RCRD: CPT | Performed by: INTERNAL MEDICINE

## 2024-03-11 RX ORDER — LEVOTHYROXINE SODIUM 0.05 MG/1
50 TABLET ORAL DAILY
Qty: 90 TABLET | Refills: 3 | Status: SHIPPED | OUTPATIENT
Start: 2024-03-11

## 2024-03-11 NOTE — PROGRESS NOTES
Chief complaint/Reason for consult:  hypothyroidism    HPI:   - 62 year old female here for hypothyroidism  - She is on levothyroxine 75 mcg daily  - She has changes her hair, nails, has trouble waking up at night  - She also has osteopenia and is on vitamin D3 2000 IU daily  - She has a history of primary hyperparathyroidism status post parathyroidectomy in 2016    The following portions of the patient's history were reviewed and updated as appropriate: allergies, current medications, past family history, past medical history, past social history, past surgical history, and problem list.    Objective     Vitals:    03/11/24 1115   BP: 134/80   Pulse: 68   SpO2: 99%        Physical Exam  Vitals reviewed.   Constitutional:       Appearance: Normal appearance.   HENT:      Head: Normocephalic and atraumatic.   Eyes:      General: No scleral icterus.  Pulmonary:      Effort: Pulmonary effort is normal. No respiratory distress.   Neurological:      Mental Status: She is alert.   Psychiatric:         Mood and Affect: Mood normal.         Behavior: Behavior normal.         Thought Content: Thought content normal.         Judgment: Judgment normal.         Labs/Imaging:  TSH was normal, free T4 was elevated, calcium was normal, potassium was 3.4, PTH was elevated, GFR was normal    Assessment & Plan   Hypothyroidism  - TSH was normal and free T4 is elevated she would like to try a lower dose of levothyroxine to see if this improves her sleep so I sent in a prescription for levothyroxine 50 mcg daily     2. Osteopenia with history of parathyroidectomy for primary hyperparathyroidism  - FRAX is below treatment threshold  - Recommend 1000 IU vitamin D3 daily, 1200 mg of dietary/supplementary calcium, weight bearing exercise as tolerated  - Ordered repeat DXA    3. Hyperparathyroidism status post parathyroidectomy  - Her PTH is elevated with normal calcium  - She takes vitamin D and her GFR is normal  - Will continue to  monitor given normal calcium    4. Hypokalemia  - Potassium was 3.4 so would recommend increasing dietary potassium     - Return to clinic in 6 months

## 2024-03-16 DIAGNOSIS — I10 BENIGN ESSENTIAL HTN: ICD-10-CM

## 2024-03-19 RX ORDER — AMLODIPINE BESYLATE 5 MG/1
TABLET ORAL
Qty: 90 TABLET | Refills: 0 | Status: SHIPPED | OUTPATIENT
Start: 2024-03-19

## 2024-03-19 NOTE — TELEPHONE ENCOUNTER
Normal serum potassium in past 12 months   Rx Refill Note  Requested Prescriptions     Pending Prescriptions Disp Refills    amLODIPine (NORVASC) 5 MG tablet [Pharmacy Med Name: amLODIPine Besylate 5 MG Oral Tablet] 90 tablet 0     Sig: Take 1 tablet by mouth once daily      Last office visit with prescribing clinician: Visit date not found   Last telemedicine visit with prescribing clinician: 1/17/2024   Next office visit with prescribing clinician: Visit date not found                         Would you like a call back once the refill request has been completed: [] Yes [] No    If the office needs to give you a call back, can they leave a voicemail: [] Yes [] No    Thao Landa MA  03/19/24, 13:13 EDT

## 2024-04-03 ENCOUNTER — LAB (OUTPATIENT)
Dept: LAB | Facility: HOSPITAL | Age: 64
End: 2024-04-03
Payer: MEDICARE

## 2024-04-03 DIAGNOSIS — M85.80 OSTEOPENIA, UNSPECIFIED LOCATION: ICD-10-CM

## 2024-04-03 DIAGNOSIS — E03.9 HYPOTHYROIDISM, UNSPECIFIED TYPE: ICD-10-CM

## 2024-04-03 DIAGNOSIS — E21.0 PRIMARY HYPERPARATHYROIDISM: ICD-10-CM

## 2024-04-03 LAB
BASOPHILS # BLD AUTO: 0.05 10*3/MM3 (ref 0–0.2)
BASOPHILS NFR BLD AUTO: 0.9 % (ref 0–1.5)
DEPRECATED RDW RBC AUTO: 46.9 FL (ref 37–54)
EOSINOPHIL # BLD AUTO: 0.13 10*3/MM3 (ref 0–0.4)
EOSINOPHIL NFR BLD AUTO: 2.3 % (ref 0.3–6.2)
ERYTHROCYTE [DISTWIDTH] IN BLOOD BY AUTOMATED COUNT: 14.6 % (ref 12.3–15.4)
HCT VFR BLD AUTO: 33.7 % (ref 34–46.6)
HGB BLD-MCNC: 11.4 G/DL (ref 12–15.9)
IMM GRANULOCYTES # BLD AUTO: 0.02 10*3/MM3 (ref 0–0.05)
IMM GRANULOCYTES NFR BLD AUTO: 0.4 % (ref 0–0.5)
LYMPHOCYTES # BLD AUTO: 1.58 10*3/MM3 (ref 0.7–3.1)
LYMPHOCYTES NFR BLD AUTO: 28.4 % (ref 19.6–45.3)
MCH RBC QN AUTO: 29.8 PG (ref 26.6–33)
MCHC RBC AUTO-ENTMCNC: 33.8 G/DL (ref 31.5–35.7)
MCV RBC AUTO: 88 FL (ref 79–97)
MONOCYTES # BLD AUTO: 0.29 10*3/MM3 (ref 0.1–0.9)
MONOCYTES NFR BLD AUTO: 5.2 % (ref 5–12)
NEUTROPHILS NFR BLD AUTO: 3.49 10*3/MM3 (ref 1.7–7)
NEUTROPHILS NFR BLD AUTO: 62.8 % (ref 42.7–76)
NRBC BLD AUTO-RTO: 0 /100 WBC (ref 0–0.2)
PHOSPHATE SERPL-MCNC: 3.2 MG/DL (ref 2.5–4.5)
PLATELET # BLD AUTO: 261 10*3/MM3 (ref 140–450)
PMV BLD AUTO: 9.8 FL (ref 6–12)
PTH-INTACT SERPL-MCNC: 144 PG/ML (ref 15–65)
RBC # BLD AUTO: 3.83 10*6/MM3 (ref 3.77–5.28)
WBC NRBC COR # BLD AUTO: 5.56 10*3/MM3 (ref 3.4–10.8)

## 2024-04-03 PROCEDURE — 84443 ASSAY THYROID STIM HORMONE: CPT | Performed by: NURSE PRACTITIONER

## 2024-04-03 PROCEDURE — 82728 ASSAY OF FERRITIN: CPT | Performed by: NURSE PRACTITIONER

## 2024-04-03 PROCEDURE — 84100 ASSAY OF PHOSPHORUS: CPT | Performed by: NURSE PRACTITIONER

## 2024-04-03 PROCEDURE — 83970 ASSAY OF PARATHORMONE: CPT

## 2024-04-03 PROCEDURE — 84439 ASSAY OF FREE THYROXINE: CPT | Performed by: NURSE PRACTITIONER

## 2024-04-03 PROCEDURE — 80053 COMPREHEN METABOLIC PANEL: CPT | Performed by: NURSE PRACTITIONER

## 2024-04-03 PROCEDURE — 82306 VITAMIN D 25 HYDROXY: CPT | Performed by: NURSE PRACTITIONER

## 2024-04-04 NOTE — ANESTHESIA POSTPROCEDURE EVALUATION
Patient: Shaylee Wadsworth    Procedure Summary     Date:  03/06/20 Room / Location:  [facility] ENDOSCOPY 2 /  [facility] OR    Anesthesia Start:  1055 Anesthesia Stop:  1127    Procedure:  COLONOSCOPY, biopsy, polypectomy (N/A ) Diagnosis:       Diarrhea, unspecified type      Abdominal pain, unspecified abdominal location      (Diarrhea, unspecified type [R19.7])      (Abdominal pain, unspecified abdominal location [R10.9])    Surgeon:  Rain Kirk MD Provider:  Ang Carreon CRNA    Anesthesia Type:  MAC ASA Status:  3          Anesthesia Type: MAC    Vitals  No vitals data found for the desired time range.          Post Anesthesia Care and Evaluation    Patient location during evaluation: bedside  Patient participation: complete - patient participated  Level of consciousness: awake  Pain score: 0  Pain management: adequate  Airway patency: patent  Anesthetic complications: No anesthetic complications  PONV Status: none  Cardiovascular status: acceptable  Respiratory status: acceptable  Hydration status: acceptable       Cigarettes

## 2024-04-06 DIAGNOSIS — E78.5 HYPERLIPIDEMIA LDL GOAL <70: ICD-10-CM

## 2024-04-06 DIAGNOSIS — I10 BENIGN ESSENTIAL HTN: ICD-10-CM

## 2024-04-08 RX ORDER — ATORVASTATIN CALCIUM 40 MG/1
40 TABLET, FILM COATED ORAL NIGHTLY
Qty: 90 TABLET | Refills: 0 | Status: SHIPPED | OUTPATIENT
Start: 2024-04-08

## 2024-04-08 RX ORDER — OLMESARTAN MEDOXOMIL 40 MG/1
TABLET ORAL
Qty: 90 TABLET | Refills: 0 | Status: SHIPPED | OUTPATIENT
Start: 2024-04-08

## 2024-04-09 NOTE — TELEPHONE ENCOUNTER
----- Message from KAITLYNN Choi sent at 11/7/2017  8:17 AM EST -----  I want her to do a 24 hr urine for potassium. Please have Marv give her the instructions, and make she he has the jug before calling Shaylee.      LEFT DETAILED MESSAGE FOR PT   Trace leukos in urine

## 2024-04-11 ENCOUNTER — APPOINTMENT (OUTPATIENT)
Dept: BONE DENSITY | Facility: HOSPITAL | Age: 64
End: 2024-04-11
Payer: COMMERCIAL

## 2024-04-11 DIAGNOSIS — E21.0 PRIMARY HYPERPARATHYROIDISM: ICD-10-CM

## 2024-04-11 DIAGNOSIS — M85.80 OSTEOPENIA, UNSPECIFIED LOCATION: ICD-10-CM

## 2024-04-11 DIAGNOSIS — E03.9 HYPOTHYROIDISM, UNSPECIFIED TYPE: ICD-10-CM

## 2024-04-11 PROCEDURE — 77080 DXA BONE DENSITY AXIAL: CPT

## 2024-04-20 DIAGNOSIS — I10 BENIGN ESSENTIAL HTN: ICD-10-CM

## 2024-04-21 RX ORDER — METOPROLOL SUCCINATE 50 MG/1
50 TABLET, EXTENDED RELEASE ORAL DAILY
Qty: 90 TABLET | Refills: 0 | Status: SHIPPED | OUTPATIENT
Start: 2024-04-21

## 2024-05-07 ENCOUNTER — OFFICE VISIT (OUTPATIENT)
Dept: INTERNAL MEDICINE | Facility: CLINIC | Age: 64
End: 2024-05-07
Payer: MEDICARE

## 2024-05-07 VITALS
OXYGEN SATURATION: 98 % | HEART RATE: 67 BPM | SYSTOLIC BLOOD PRESSURE: 122 MMHG | TEMPERATURE: 97.7 F | HEIGHT: 60 IN | BODY MASS INDEX: 37.03 KG/M2 | DIASTOLIC BLOOD PRESSURE: 78 MMHG | WEIGHT: 188.6 LBS

## 2024-05-07 DIAGNOSIS — E03.8 HYPOTHYROIDISM DUE TO HASHIMOTO'S THYROIDITIS: ICD-10-CM

## 2024-05-07 DIAGNOSIS — D63.8 ANEMIA, CHRONIC DISEASE: ICD-10-CM

## 2024-05-07 DIAGNOSIS — E78.5 HYPERLIPIDEMIA LDL GOAL <70: ICD-10-CM

## 2024-05-07 DIAGNOSIS — K21.00 GASTROESOPHAGEAL REFLUX DISEASE WITH ESOPHAGITIS WITHOUT HEMORRHAGE: ICD-10-CM

## 2024-05-07 DIAGNOSIS — R73.03 PREDIABETES: ICD-10-CM

## 2024-05-07 DIAGNOSIS — G47.33 OSA (OBSTRUCTIVE SLEEP APNEA): ICD-10-CM

## 2024-05-07 DIAGNOSIS — I10 BENIGN ESSENTIAL HTN: ICD-10-CM

## 2024-05-07 DIAGNOSIS — Z95.5 S/P DRUG ELUTING CORONARY STENT PLACEMENT: ICD-10-CM

## 2024-05-07 DIAGNOSIS — I25.10 CORONARY ARTERY DISEASE INVOLVING NATIVE CORONARY ARTERY OF NATIVE HEART WITHOUT ANGINA PECTORIS: ICD-10-CM

## 2024-05-07 DIAGNOSIS — E06.3 HYPOTHYROIDISM DUE TO HASHIMOTO'S THYROIDITIS: ICD-10-CM

## 2024-05-07 DIAGNOSIS — Z00.00 ENCOUNTER FOR ANNUAL WELLNESS VISIT (AWV) IN MEDICARE PATIENT: Primary | ICD-10-CM

## 2024-05-07 DIAGNOSIS — M17.11 PRIMARY OSTEOARTHRITIS OF RIGHT KNEE: ICD-10-CM

## 2024-05-07 PROBLEM — R50.9 FEVER: Status: RESOLVED | Noted: 2023-05-02 | Resolved: 2024-05-07

## 2024-05-07 PROBLEM — U07.1 COVID-19: Status: RESOLVED | Noted: 2024-01-17 | Resolved: 2024-05-07

## 2024-05-07 PROBLEM — M25.531 WRIST PAIN, ACUTE, RIGHT: Status: RESOLVED | Noted: 2024-02-26 | Resolved: 2024-05-07

## 2024-05-07 PROCEDURE — G0439 PPPS, SUBSEQ VISIT: HCPCS | Performed by: NURSE PRACTITIONER

## 2024-05-07 PROCEDURE — 99214 OFFICE O/P EST MOD 30 MIN: CPT | Performed by: NURSE PRACTITIONER

## 2024-05-07 PROCEDURE — 3074F SYST BP LT 130 MM HG: CPT | Performed by: NURSE PRACTITIONER

## 2024-05-07 PROCEDURE — 3078F DIAST BP <80 MM HG: CPT | Performed by: NURSE PRACTITIONER

## 2024-05-07 PROCEDURE — 1170F FXNL STATUS ASSESSED: CPT | Performed by: NURSE PRACTITIONER

## 2024-05-07 PROCEDURE — 1125F AMNT PAIN NOTED PAIN PRSNT: CPT | Performed by: NURSE PRACTITIONER

## 2024-05-07 RX ORDER — MULTIVIT-MIN/IRON/FOLIC ACID/K 18-600-40
1 CAPSULE ORAL DAILY
COMMUNITY

## 2024-05-17 DIAGNOSIS — K21.00 GASTROESOPHAGEAL REFLUX DISEASE WITH ESOPHAGITIS WITHOUT HEMORRHAGE: ICD-10-CM

## 2024-05-17 RX ORDER — ESOMEPRAZOLE MAGNESIUM 40 MG/1
40 CAPSULE, DELAYED RELEASE ORAL EVERY 12 HOURS SCHEDULED
Qty: 90 CAPSULE | Refills: 0 | Status: SHIPPED | OUTPATIENT
Start: 2024-05-17

## 2024-05-17 NOTE — TELEPHONE ENCOUNTER
Rx Refill Note  Requested Prescriptions     Pending Prescriptions Disp Refills    esomeprazole (nexIUM) 40 MG capsule [Pharmacy Med Name: Esomeprazole Magnesium 40 MG Oral Capsule Delayed Release] 90 capsule 0     Sig: Take 1 capsule by mouth twice daily      Last office visit with prescribing clinician: 5/7/2024   Last telemedicine visit with prescribing clinician: 1/17/2024   Next office visit with prescribing clinician: 11/11/2024                         Would you like a call back once the refill request has been completed: [] Yes [] No    If the office needs to give you a call back, can they leave a voicemail: [] Yes [] No    Mone Hilario MA  05/17/24, 11:43 EDT

## 2024-05-21 ENCOUNTER — OFFICE VISIT (OUTPATIENT)
Dept: GASTROENTEROLOGY | Facility: CLINIC | Age: 64
End: 2024-05-21
Payer: MEDICARE

## 2024-05-21 ENCOUNTER — TELEPHONE (OUTPATIENT)
Dept: GASTROENTEROLOGY | Facility: CLINIC | Age: 64
End: 2024-05-21
Payer: MEDICARE

## 2024-05-21 VITALS
BODY MASS INDEX: 37.15 KG/M2 | HEIGHT: 60 IN | DIASTOLIC BLOOD PRESSURE: 82 MMHG | SYSTOLIC BLOOD PRESSURE: 120 MMHG | WEIGHT: 189.2 LBS

## 2024-05-21 DIAGNOSIS — Z12.11 ENCOUNTER FOR SCREENING FOR MALIGNANT NEOPLASM OF COLON: Primary | ICD-10-CM

## 2024-05-21 DIAGNOSIS — Z87.19 HISTORY OF BARRETT'S ESOPHAGUS: ICD-10-CM

## 2024-05-21 NOTE — TELEPHONE ENCOUNTER
Spoke with patient to schedule.  She states need to in 09/2024 or 10/2024 at [facility].    Scheduled at [facility] 09/13/2024 at 8:45am - arrive 7:30am.  Email prep instructions 2 months prior per patient request.    [email or web address]

## 2024-05-21 NOTE — PROGRESS NOTES
PATIENT INFORMATION  Shaylee Wadsworth       - 1960    CHIEF COMPLAINT  Chief Complaint   Patient presents with    Lainez's esophagus    Heartburn    Iron deficiency anemia       HISTORY OF PRESENT ILLNESS  Here to get her EGD colon repeated this year     Still with rare breakthrough HB , and has been diagnosed in the past with Lainez's but last biopsies were negative and is due a screening colon but no family history    Goes on to describe skiping days and having inefficient BMs and only moving 3-4 days out tof the week            REVIEWED PERTINENT RESULTS/ LABS  Lab Results   Component Value Date    CASEREPORT  2021     Surgical Pathology Report                         Case: [id number]                                  Authorizing Provider:  Maciel Godoy MD        Collected:           2021 06:56 PM          Ordering Location:     [facility]   Received:            2021 08:02 PM                                 OR                                                                           Pathologist:           Jarocho Garza MD                                                         Specimen:    Large Intestine, Appendix                                                                  FINALDX  2021     1. Appendix, Appendectomy:  Benign appendix with   A. Acute appendicitis.   B. Serositis.    Mariano/kds       Lab Results   Component Value Date    HGB 11.4 (L) 2024    MCV 88.0 2024     2024    ALT 13 2024    AST 16 2024    HGBA1C 5.80 (H) 2024    INR 1.00 2018    TRIG 113 2024    FERRITIN 16.80 2024    IRON 143 2024    TIBC 453 2024      No results found.    REVIEW OF SYSTEMS  Review of Systems   Constitutional:  Negative for activity change, chills, fever and unexpected weight change.   HENT:  Negative for congestion.    Eyes:  Negative for visual disturbance.   Respiratory:  Negative for  shortness of breath.    Cardiovascular:  Negative for chest pain and palpitations.   Gastrointestinal:  Positive for abdominal distention, constipation and diarrhea. Negative for abdominal pain and blood in stool.   Endocrine: Negative for cold intolerance and heat intolerance.   Genitourinary:  Negative for hematuria.   Musculoskeletal:  Negative for gait problem.   Skin:  Negative for color change.   Allergic/Immunologic: Negative for immunocompromised state.   Neurological:  Negative for weakness and light-headedness.   Hematological:  Negative for adenopathy.   Psychiatric/Behavioral:  Negative for sleep disturbance. The patient is not nervous/anxious.          ACTIVE PROBLEMS  Patient Active Problem List    Diagnosis     DERRICK (obstructive sleep apnea) [G47.33]     Primary osteoarthritis of right knee [M17.11]     Prediabetes [R73.03]     Anemia, chronic disease [D63.8]     Lainez's esophagus [K22.70]     Iron deficiency anemia [D50.9]     Hypothyroidism [E03.9]     Palpitations [R00.2]     Diarrhea [R19.7]     Pain in left ankle and joints of left foot [M25.572]     Pain in right ankle and joints of right foot [M25.571]     Pain of both shoulder joints [M25.511, M25.512]     Pain in left shoulder [M25.512]     Rheumatoid arthritis with rheumatoid factor of multiple sites without organ or systems involvement [M05.79]     Body mass index (BMI) 35.0-35.9, adult [Z68.35]     Irritable bowel syndrome with diarrhea [K58.0]     Depression due to physical illness [F06.31]     S/P drug eluting coronary stent placement [Z95.5]     Other spondylosis with myelopathy, thoracic region [M47.14]     Personal history of immunosupression therapy [Z92.25]     Rheumatoid arthritis of multiple sites without organ or system involvement with positive rheumatoid factor [M05.79]     Coronary artery disease involving native heart without angina pectoris [I25.10]     Functional neurological symptom disorder with abnormal movement [F44.4]      BPPV (benign paroxysmal positional vertigo) [H81.10]     Dystonia [G24.9]     Chronic right-sided thoracic back pain [M54.6, G89.29]     Benign essential HTN [I10]     Difficulty walking [R26.2]     Seropositive rheumatoid arthritis [M05.9]     Astasia-abasia [F44.4]     Functional movement disorder [G25.9]     Anxiety related tremor [R25.1, F41.9]     Hypokalemia [E87.6]     Encounter for annual wellness visit (AWV) in Medicare patient [Z00.00]     Tendinitis of left shoulder [M77.8]     Multiple pulmonary nodules determined by computed tomography of lung [R91.8]     History of iron deficiency [Z86.39]     Abnormal radiographic examination [R93.89]     Disc disorder of thoracic region [M51.9]     Gastroesophageal reflux disease [K21.9]     Hyperlipidemia LDL goal <70 [E78.5]     Menopausal symptom [N95.1]     Migraine [G43.909]     Osteoarthritis of shoulder [M19.019]     Osteopenia [M85.80]     Rheumatoid arthritis [M06.9]          PAST MEDICAL HISTORY  Past Medical History:   Diagnosis Date    Abnormal electrocardiogram     Anemia 05/2021    Anxiety     Arthritis 05/01/2014    RA    Lainez esophagus     Benign paroxysmal positional vertigo due to bilateral vestibular disorder     Chest pain     Cholelithiasis 2007    Removed    Colon polyp     Coronary artery disease 02/26/2019    Depression 06/12/2019    Disease of thyroid gland     Dystonia     Embedded tick of right lower leg     Esophageal reflux     Fibromyositis     Functional movement disorder     H/O colonoscopy     H/O mammogram 08/31/2011    NORMAL     History of colonoscopy 03/2020    Hx of bone density study 08/31/2011    OSTEOPENIA     Hyperlipidemia     Hyperparathyroidism     Hypertension     Hypothyroidism 05/2021    Low back pain     Menopause     Osteopenia     Osteopenia     Ovarian cyst 2000    L ovary removed    Pancreatitis     Pancreatitis     Scoliosis 02/16/2013    Superficial incisional infection of surgical site     Tremor  04/04/2018    Vitamin D deficiency          SURGICAL HISTORY  Past Surgical History:   Procedure Laterality Date    APPENDECTOMY N/A 12/22/2021    Procedure: APPENDECTOMY LAPAROSCOPIC;  Surgeon: Maciel Godoy MD;  Location: [facility] OR;  Service: General;  Laterality: N/A;    CARDIAC CATHETERIZATION N/A 03/04/2019    Procedure: Coronary angiography;  Surgeon: Jackelyn Dumas MD;  Location:  [facility];  Service: Cardiovascular    CARDIAC CATHETERIZATION N/A 03/04/2019    Procedure: Left heart cath;  Surgeon: Jackelyn Dumas MD;  Location:  [facility];  Service: Cardiovascular    CARDIAC CATHETERIZATION N/A 03/04/2019    Procedure: Left ventriculography;  Surgeon: Jackelyn Dumas MD;  Location:  [facility];  Service: Cardiovascular    CARDIAC CATHETERIZATION N/A 03/04/2019    Procedure: Stent KARLY coronary;  Surgeon: Jackelyn Dumas MD;  Location:  [facility];  Service: Cardiovascular    CHOLECYSTECTOMY      COLONOSCOPY  2014    COLONOSCOPY N/A 03/06/2020    Procedure: COLONOSCOPY, biopsy, polypectomy;  Surgeon: Rain Kirk MD;  Location: [facility] OR;  Service: Gastroenterology;  Laterality: N/A;  Random biopsies; Rectal polyp x 3; Hemorrhoids; Diverticulosis    CORONARY STENT PLACEMENT      DILATATION AND CURETTAGE      HYSTERECTOMY      LAPAROSCOPIC CHOLECYSTECTOMY  2005    MOUTH SURGERY      TOOTH EXTRACTION    OTHER SURGICAL HISTORY  03/27/2015    DIAGNOSTIC ESOPHAGOSCOPY TRANSNASAL FLEXIBLE WITH BIOPSY; ARZATE ESO. REPEAT EGD 2 YR     OVARIAN CYST SURGERY  2000    L ovary removed    OVARY SURGERY Left     NORMAL     PAP SMEAR  08/23/2010    NORMAL     PARATHYROIDECTOMY Right 08/17/2016    Dr. Muchael Flynn at [facility]    SUBTOTAL HYSTERECTOMY      Left ovary removed9    WISDOM TOOTH EXTRACTION  1984         FAMILY HISTORY  Family History   Problem Relation Age of Onset    Diabetes Mother     Hyperlipidemia Mother     Hypertension Mother      Heart disease Mother     Kidney disease Mother     Cancer Mother     Heart attack Mother     Multiple myeloma Mother     Melanoma Mother         Marine water contamination    Coronary artery disease Mother     Anemia Mother     Obesity Mother     Arthritis Father     Anxiety disorder Father     COPD Father     Glaucoma Father     Hyperlipidemia Father     Hypertension Father     Vision loss Father     Heart disease Father     Heart attack Father     Pancreatitis Father     Heart disease Sister     Breast cancer Neg Hx     Colon cancer Neg Hx     Colon polyps Neg Hx          SOCIAL HISTORY  Social History     Occupational History    Not on file   Tobacco Use    Smoking status: Former     Current packs/day: 0.50     Average packs/day: 0.5 packs/day for 15.0 years (7.5 ttl pk-yrs)     Types: Cigarettes, Electronic Cigarette     Passive exposure: Never    Smokeless tobacco: Never    Tobacco comments:     8/12/18 switch to e-cig   Vaping Use    Vaping status: Every Day   Substance and Sexual Activity    Alcohol use: No    Drug use: No    Sexual activity: Not Currently     Partners: Male     Birth control/protection: Post-menopausal, Hysterectomy         CURRENT MEDICATIONS    Current Outpatient Medications:     Actemra ACTPen 162 MG/0.9ML solution auto-injector injection, , Disp: , Rfl:     amLODIPine (NORVASC) 5 MG tablet, Take 1 tablet by mouth once daily, Disp: 90 tablet, Rfl: 0    aspirin 81 MG tablet, Take 1 tablet by mouth Daily., Disp: , Rfl:     atorvastatin (LIPITOR) 40 MG tablet, TAKE 1 TABLET BY MOUTH ONCE DAILY AT NIGHT, Disp: 90 tablet, Rfl: 0    Cholecalciferol (Vitamin D) 50 MCG (2000 UT) capsule, Take 1 capsule by mouth Daily., Disp: , Rfl:     cyclobenzaprine (FLEXERIL) 10 MG tablet, Take 1 tablet by mouth 2 (Two) Times a Day As Needed., Disp: , Rfl:     Diclofenac Sodium (VOLTAREN) 1 % gel gel, Apply 4 g topically to the appropriate area as directed 4 (Four) Times a Day., Disp: 350 g, Rfl: 3     "esomeprazole (nexIUM) 40 MG capsule, Take 1 capsule by mouth twice daily, Disp: 90 capsule, Rfl: 0    folic acid (FOLVITE) 1 MG tablet, Take 1 tablet by mouth Daily. 1 to 4 tablets daily, Disp: , Rfl:     levothyroxine (SYNTHROID, LEVOTHROID) 50 MCG tablet, Take 1 tablet by mouth Daily., Disp: 90 tablet, Rfl: 3    methotrexate 2.5 MG tablet, TAKE 4 TABLETS BY MOUTH ONCE A WEEK, Disp: 32 tablet, Rfl: 6    metoprolol succinate XL (TOPROL-XL) 50 MG 24 hr tablet, Take 1 tablet by mouth once daily, Disp: 90 tablet, Rfl: 0    olmesartan (BENICAR) 40 MG tablet, Take 1 tablet by mouth once daily, Disp: 90 tablet, Rfl: 0    potassium chloride (K-DUR,KLOR-CON) 20 MEQ CR tablet, Take 1 tablet by mouth once daily, Disp: 90 tablet, Rfl: 0    ALLERGIES  Meloxicam and Effexor xr [venlafaxine hcl er]    VITALS  Vitals:    05/21/24 0930   BP: 120/82   BP Location: Left arm   Patient Position: Sitting   Cuff Size: Large Adult   Weight: 85.8 kg (189 lb 3.2 oz)   Height: 152.4 cm (60\")       PHYSICAL EXAM  Debilities/Disabilities Identified: None  Emotional Behavior: Appropriate  Wt Readings from Last 3 Encounters:   05/21/24 85.8 kg (189 lb 3.2 oz)   05/07/24 85.5 kg (188 lb 9.6 oz)   03/11/24 85.9 kg (189 lb 6.4 oz)     Ht Readings from Last 1 Encounters:   05/21/24 152.4 cm (60\")     Body mass index is 36.95 kg/m².  Physical Exam  Constitutional:       Appearance: She is well-developed. She is not diaphoretic.   HENT:      Head: Normocephalic and atraumatic.   Eyes:      General: No scleral icterus.     Conjunctiva/sclera: Conjunctivae normal.      Pupils: Pupils are equal, round, and reactive to light.   Neck:      Thyroid: No thyromegaly.   Cardiovascular:      Rate and Rhythm: Normal rate and regular rhythm.      Heart sounds: Normal heart sounds. No murmur heard.     No gallop.   Pulmonary:      Effort: Pulmonary effort is normal.      Breath sounds: Normal breath sounds. No wheezing or rales.   Abdominal:      General: Bowel " sounds are normal. There is no distension or abdominal bruit.      Palpations: Abdomen is soft. There is no shifting dullness, fluid wave or mass.      Tenderness: There is no abdominal tenderness. There is no guarding. Negative signs include Davies's sign.      Hernia: There is no hernia in the ventral area.   Musculoskeletal:         General: Normal range of motion.      Cervical back: Normal range of motion and neck supple.   Lymphadenopathy:      Cervical: No cervical adenopathy.   Skin:     General: Skin is warm and dry.      Findings: No erythema or rash.   Neurological:      Mental Status: She is alert and oriented to person, place, and time.   Psychiatric:         Mood and Affect: Mood normal.         Behavior: Behavior normal.         CLINICAL DATA REVIEWED   reviewed previous lab results and integrated with today's visit, reviewed notes from other physicians and/or last GI encounter, reviewed previous endoscopy results and available photos, reviewed surgical pathology results from previous biopsies    ASSESSMENT  Diagnoses and all orders for this visit:    Encounter for screening for malignant neoplasm of colon  -     Case Request; Standing  -     Case Request    History of Laienz's esophagus  -     Case Request; Standing  -     Case Request    Other orders  -     Follow Anesthesia Guidelines / Protocol; Future  -     Obtain Informed Consent; Standing          PLAN  Water Fiber exercoise with or without Miralax  Will look to get her scopes done this year 120 / 2024 but will se in follow up in 3 months   Return in about 3 months (around 8/21/2024).    I have discussed the above plan with the patient.  They verbalize understanding and are in agreement with the plan.  They have been advised to contact the office for any questions, concerns, or changes related to their health.

## 2024-05-24 DIAGNOSIS — I10 ESSENTIAL HYPERTENSION: ICD-10-CM

## 2024-05-24 DIAGNOSIS — E87.6 HYPOKALEMIA: ICD-10-CM

## 2024-05-24 RX ORDER — POTASSIUM CHLORIDE 20 MEQ/1
20 TABLET, EXTENDED RELEASE ORAL DAILY
Qty: 90 TABLET | Refills: 0 | Status: SHIPPED | OUTPATIENT
Start: 2024-05-24

## 2024-05-24 NOTE — TELEPHONE ENCOUNTER
Rx Refill Note  Requested Prescriptions     Pending Prescriptions Disp Refills    potassium chloride (KLOR-CON M20) 20 MEQ CR tablet [Pharmacy Med Name: Potassium Chloride Eunice ER 20 MEQ Oral Tablet Extended Release] 90 tablet 0     Sig: Take 1 tablet by mouth once daily      Last office visit with prescribing clinician: 5/7/2024   Last telemedicine visit with prescribing clinician: 1/17/2024   Next office visit with prescribing clinician: 11/11/2024                         Would you like a call back once the refill request has been completed: [] Yes [] No    If the office needs to give you a call back, can they leave a voicemail: [] Yes [] No    Thao Landa MA  05/24/24, 09:50 EDT

## 2024-05-28 DIAGNOSIS — E21.0 PRIMARY HYPERPARATHYROIDISM: ICD-10-CM

## 2024-05-28 RX ORDER — LEVOTHYROXINE SODIUM 0.07 MG/1
75 TABLET ORAL DAILY
Qty: 90 TABLET | Refills: 0 | OUTPATIENT
Start: 2024-05-28

## 2024-05-28 NOTE — TELEPHONE ENCOUNTER
Rx Refill Note  Requested Prescriptions     Pending Prescriptions Disp Refills    levothyroxine (SYNTHROID, LEVOTHROID) 75 MCG tablet [Pharmacy Med Name: Levothyroxine Sodium 75 MCG Oral Tablet] 90 tablet 0     Sig: Take 1 tablet by mouth once daily      Last office visit with prescribing clinician: 3/11/2024   Last telemedicine visit with prescribing clinician: Visit date not found   Next office visit with prescribing clinician: 9/11/2024                         Would you like a call back once the refill request has been completed: [] Yes [] No    If the office needs to give you a call back, can they leave a voicemail: [] Yes [] No    Rahel Avila MA  05/28/24, 07:42 EDT

## 2024-06-13 DIAGNOSIS — I10 BENIGN ESSENTIAL HTN: ICD-10-CM

## 2024-06-13 RX ORDER — AMLODIPINE BESYLATE 5 MG/1
TABLET ORAL
Qty: 90 TABLET | Refills: 0 | Status: SHIPPED | OUTPATIENT
Start: 2024-06-13

## 2024-06-13 NOTE — TELEPHONE ENCOUNTER
Rx Refill Note  Requested Prescriptions     Pending Prescriptions Disp Refills    amLODIPine (NORVASC) 5 MG tablet [Pharmacy Med Name: amLODIPine Besylate 5 MG Oral Tablet] 90 tablet 0     Sig: Take 1 tablet by mouth once daily      Last office visit with prescribing clinician: 5/7/2024   Last telemedicine visit with prescribing clinician: 1/17/2024   Next office visit with prescribing clinician: 11/11/2024                         Would you like a call back once the refill request has been completed: [] Yes [] No    If the office needs to give you a call back, can they leave a voicemail: [] Yes [] No    Thao Landa MA  06/13/24, 09:15 EDT

## 2024-07-02 ENCOUNTER — OFFICE VISIT (OUTPATIENT)
Dept: SLEEP MEDICINE | Facility: HOSPITAL | Age: 64
End: 2024-07-02
Payer: MEDICARE

## 2024-07-02 VITALS
DIASTOLIC BLOOD PRESSURE: 70 MMHG | SYSTOLIC BLOOD PRESSURE: 117 MMHG | HEART RATE: 56 BPM | WEIGHT: 186 LBS | HEIGHT: 60 IN | OXYGEN SATURATION: 97 % | BODY MASS INDEX: 36.52 KG/M2

## 2024-07-02 DIAGNOSIS — G47.33 OSA (OBSTRUCTIVE SLEEP APNEA): Primary | ICD-10-CM

## 2024-07-02 DIAGNOSIS — I10 BENIGN ESSENTIAL HTN: ICD-10-CM

## 2024-07-02 DIAGNOSIS — G47.01 INSOMNIA DUE TO MEDICAL CONDITION: ICD-10-CM

## 2024-07-02 DIAGNOSIS — G43.009 MIGRAINE WITHOUT AURA AND WITHOUT STATUS MIGRAINOSUS, NOT INTRACTABLE: ICD-10-CM

## 2024-07-02 DIAGNOSIS — E03.9 ACQUIRED HYPOTHYROIDISM: ICD-10-CM

## 2024-07-02 PROCEDURE — G0463 HOSPITAL OUTPT CLINIC VISIT: HCPCS

## 2024-07-02 NOTE — PROGRESS NOTES
SLEEP/PULMONARY  CLINIC NOTE      PATIENT IDENTIFICATION:  Name: Shaylee Wadsworth  Age: 63 y.o.  Sex: female  :  1960  MRN: [id number]    DATE OF CONSULTATION:  2024                     CHIEF COMPLAINT: DERRICK    History of Present Illness:   Shaylee Wadsworth is a 63 y.o. female Pt with still multiple wakening up at night with sleepiness fatigue and snoring, witnessed apnea, Hard  to get up in the morning. Daytime fatigue and loss of energy, [place] score of (2 )   Patient presented with chronic insomnia for years she goes to bed she does not have a problem falling asleep but she wakes up after 2 hours she is not able to sleep and she watch TV during the day she is try fatigue and no energy denies taking naps during the day  Patient with chronic arthritis she is on anti-inflammatory medication her pain is relatively not well-controlled but she did not mention that affecting her sleep    Review of Systems:   Constitutional: As above   Eyes: negative   ENT/oropharynx: negative   Cardiovascular: negative   Respiratory: negative   Gastrointestinal: negative   Genitourinary: negative   Neurological: negative   Musculoskeletal: negative   Integument/breast: negative   Endocrine: negative   Allergic/Immunologic: negative     Past Medical History:  Past Medical History:   Diagnosis Date    Abnormal electrocardiogram     Anemia 2021    Anxiety     Arthritis 2014    RA    Lainez esophagus     Benign paroxysmal positional vertigo due to bilateral vestibular disorder     Chest pain     Cholelithiasis     Removed    Colon polyp     Coronary artery disease 2019    Depression 2019    Disease of thyroid gland     Dystonia     Embedded tick of right lower leg     Esophageal reflux     Fibromyositis     Functional movement disorder     H/O colonoscopy     H/O mammogram 2011    NORMAL     History of colonoscopy 2020    Hx of bone density study 2011    OSTEOPENIA     Hyperlipidemia      Hyperparathyroidism     Hypertension     Hypothyroidism 05/2021    Low back pain     Menopause     Osteopenia     Osteopenia     Ovarian cyst 2000    L ovary removed    Pancreatitis     Pancreatitis     Scoliosis 02/16/2013    Superficial incisional infection of surgical site     Tremor 04/04/2018    Vitamin D deficiency        Past Surgical History:  Past Surgical History:   Procedure Laterality Date    APPENDECTOMY N/A 12/22/2021    Procedure: APPENDECTOMY LAPAROSCOPIC;  Surgeon: Maciel Godoy MD;  Location: [facility];  Service: General;  Laterality: N/A;    CARDIAC CATHETERIZATION N/A 03/04/2019    Procedure: Coronary angiography;  Surgeon: Jackelyn Dumas MD;  Location:  [facility];  Service: Cardiovascular    CARDIAC CATHETERIZATION N/A 03/04/2019    Procedure: Left heart cath;  Surgeon: Jackelyn Dumas MD;  Location:  [facility];  Service: Cardiovascular    CARDIAC CATHETERIZATION N/A 03/04/2019    Procedure: Left ventriculography;  Surgeon: Jackelyn Dumas MD;  Location:  [facility];  Service: Cardiovascular    CARDIAC CATHETERIZATION N/A 03/04/2019    Procedure: Stent KARLY coronary;  Surgeon: Jackelyn Dumas MD;  Location:  [facility];  Service: Cardiovascular    CHOLECYSTECTOMY      COLONOSCOPY  2014    COLONOSCOPY N/A 03/06/2020    Procedure: COLONOSCOPY, biopsy, polypectomy;  Surgeon: Rain Kirk MD;  Location: [facility];  Service: Gastroenterology;  Laterality: N/A;  Random biopsies; Rectal polyp x 3; Hemorrhoids; Diverticulosis    CORONARY STENT PLACEMENT      DILATATION AND CURETTAGE      HYSTERECTOMY      LAPAROSCOPIC CHOLECYSTECTOMY  2005    MOUTH SURGERY      TOOTH EXTRACTION    OTHER SURGICAL HISTORY  03/27/2015    DIAGNOSTIC ESOPHAGOSCOPY TRANSNASAL FLEXIBLE WITH BIOPSY; ARZATE ESO. REPEAT EGD 2 YR     OVARIAN CYST SURGERY  2000    L ovary removed    OVARY SURGERY Left     NORMAL     PAP SMEAR  08/23/2010    NORMAL      PARATHYROIDECTOMY Right 08/17/2016    Dr. Muchael Flynn at [place]    SUBTOTAL HYSTERECTOMY      Left ovary removed9    WISDOM TOOTH EXTRACTION  1984        Family History:  Family History   Problem Relation Age of Onset    Diabetes Mother     Hyperlipidemia Mother     Hypertension Mother     Heart disease Mother     Kidney disease Mother     Cancer Mother     Heart attack Mother     Multiple myeloma Mother     Melanoma Mother         Marine water contamination    Coronary artery disease Mother     Anemia Mother     Obesity Mother     Arthritis Father     Anxiety disorder Father     COPD Father     Glaucoma Father     Hyperlipidemia Father     Hypertension Father     Vision loss Father     Heart disease Father     Heart attack Father     Pancreatitis Father     Heart disease Sister     Breast cancer Neg Hx     Colon cancer Neg Hx     Colon polyps Neg Hx         Social History:   Social History     Tobacco Use    Smoking status: Former     Current packs/day: 0.50     Average packs/day: 0.5 packs/day for 15.0 years (7.5 ttl pk-yrs)     Types: Cigarettes, Electronic Cigarette     Passive exposure: Never    Smokeless tobacco: Never    Tobacco comments:     8/12/18 switch to e-cig   Substance Use Topics    Alcohol use: No        Allergies:  Allergies   Allergen Reactions    Meloxicam Swelling     EYES AND FACE SWELLING  EYES AND FACE SWELLING    Effexor Xr [Venlafaxine Hcl Er] Unknown - Low Severity     Caused weight gain       Home Meds:  (Not in a hospital admission)      Objective:    Vitals Ranges:   Heart Rate:  [56] 56  BP: (117)/(70) 117/70  Body mass index is 36.33 kg/m².     Exam:  General Appearance:  WDWN    HEENT:   without obvious abnormality,  Conjunctiva/corneas clear,  Normal external ear canals, no drainage    Clear orsalmucosa,  Mallampati score 3    Neck:  Supple, symmetrical, trachea midline. No JVD.  Lungs:   Bilateral basal rhonchi bilaterally, respirations unlabored symmetrical wall movement.     Chest wall:  No tenderness or deformity.    Heart:  Regular rate and rhythm, S1 and S2 normal.  Extremities: Trace edema no clubbing or Cyanosis        Data Review:  All labs (24hrs): No results found for this or any previous visit (from the past 24 hour(s)).     Imaging:  DEXA Bone Density Axial  Narrative: DEXA BONE DENSITY AXIAL    Date of Exam:  4/11/2024 8:09 AM EDT    Indication:  osteopenia    Comparison: 4/8/2022    Technique:  Lumbar vertebral and proximal femoral Dual-Energy X-ray Absorptiometry (DEXA) was performed on the [place] [facility].    Findings:  According to the World Health Organization criteria, this is classified as osteopenia.    The T-score at the femoral neck is -1.3. This represents a decrease in bone mineral density of 0.6% when compared with the last exam. The T-score for the total spine is -1.8. This represents a decrease in bone mineral density of 4.7% compared with the   last exam.    Using the FRAX scores, which utilized risk factors and femoral neck bone density:  The 10 year probability of major osteoporotic fracture is 5.1%.  The 10 year probability of a hip fracture is 0.7%.  Impression: Impression:    Osteopenia. Based upon the National Osteoporosis Foundation Guide, patient's FRAX score does not meet criteria for pharmacologic treatment to prevent Osteoporosis.  Individual treatment decisions should be made based upon each patient's full clinical   history and risk factors.    Electronically Signed: Roberto Short MD    4/11/2024 2:08 PM EDT    Workstation ID: [id number]       ASSESSMENT:  Diagnoses and all orders for this visit:    DERRICK (obstructive sleep apnea)  -     Polysomnography 4 or More Parameters; Future    Migraine without aura and without status migrainosus, not intractable    Benign essential HTN    Acquired hypothyroidism    Body mass index (BMI) 35.0-35.9, adult    Insomnia due to medical condition        PLAN:   This is patient with symptoms of obstructive sleep  apnea, NPSG study ASAP / split night study, Avoid supine avoid sedative meds in pm, weight loss, Avoid driving. Long discussion with patient about the physiology of DERRICK, and long term and short term   benefit of treating DERRICK     Set a bedtime that is early enough for you to get at least 7 hours of sleep.  Don’t go to bed unless you are sleepy.  If you don’t fall asleep after 20 minutes, get out of bed.  Establish a relaxing bedtime routine.  Use your bed only for sleep and sex.  Make your bedroom quiet and relaxing. Keep the room at a comfortable, cool temperature.  Limit exposure to bright light in the evenings.  Turn off electronic devices at least 30 minutes before bedtime.  Don’t eat a large meal before bedtime. If you are hungry at night, eat a light, healthy snack.  Exercise regularly and maintain a healthy diet.  Avoid consuming caffeine in the late afternoon or evening.  Avoid consuming alcohol before bedtime.  Reduce your fluid intake before bedtime.  Avoid naps during the day time.     Treating DERRICK will improve BP control    It is recommended to keep thyroid function optimized to improve quality of sleep    Follow-up after sleep study    Albertina Sandhu MD. D, ABSM.  7/2/2024  11:54 EDT

## 2024-07-13 DIAGNOSIS — I10 BENIGN ESSENTIAL HTN: ICD-10-CM

## 2024-07-13 DIAGNOSIS — E78.5 HYPERLIPIDEMIA LDL GOAL <70: ICD-10-CM

## 2024-07-15 DIAGNOSIS — I10 BENIGN ESSENTIAL HTN: ICD-10-CM

## 2024-07-15 RX ORDER — OLMESARTAN MEDOXOMIL 40 MG/1
TABLET ORAL
Qty: 90 TABLET | Refills: 0 | Status: SHIPPED | OUTPATIENT
Start: 2024-07-15

## 2024-07-15 RX ORDER — ATORVASTATIN CALCIUM 40 MG/1
40 TABLET, FILM COATED ORAL NIGHTLY
Qty: 90 TABLET | Refills: 0 | Status: SHIPPED | OUTPATIENT
Start: 2024-07-15

## 2024-07-16 RX ORDER — METOPROLOL SUCCINATE 50 MG/1
50 TABLET, EXTENDED RELEASE ORAL DAILY
Qty: 90 TABLET | Refills: 0 | Status: SHIPPED | OUTPATIENT
Start: 2024-07-16

## 2024-07-16 NOTE — TELEPHONE ENCOUNTER
Rx Refill Note  Requested Prescriptions     Pending Prescriptions Disp Refills    metoprolol succinate XL (TOPROL-XL) 50 MG 24 hr tablet [Pharmacy Med Name: Metoprolol Succinate ER 50 MG Oral Tablet Extended Release 24 Hour] 90 tablet 0     Sig: Take 1 tablet by mouth once daily      Last office visit with prescribing clinician: 5/7/2024   Last telemedicine visit with prescribing clinician: 1/17/2024   Next office visit with prescribing clinician: 11/11/2024                         Would you like a call back once the refill request has been completed: [] Yes [] No    If the office needs to give you a call back, can they leave a voicemail: [] Yes [] No    Mone Hilario MA  07/16/24, 16:17 EDT

## 2024-08-15 DIAGNOSIS — K21.00 GASTROESOPHAGEAL REFLUX DISEASE WITH ESOPHAGITIS WITHOUT HEMORRHAGE: ICD-10-CM

## 2024-08-15 RX ORDER — ESOMEPRAZOLE MAGNESIUM 40 MG/1
40 CAPSULE, DELAYED RELEASE ORAL EVERY 12 HOURS SCHEDULED
Qty: 180 CAPSULE | Refills: 1 | Status: SHIPPED | OUTPATIENT
Start: 2024-08-15

## 2024-08-16 DIAGNOSIS — E87.6 HYPOKALEMIA: ICD-10-CM

## 2024-08-16 DIAGNOSIS — I10 ESSENTIAL HYPERTENSION: ICD-10-CM

## 2024-08-16 RX ORDER — POTASSIUM CHLORIDE 20 MEQ/1
20 TABLET, EXTENDED RELEASE ORAL DAILY
Qty: 90 TABLET | Refills: 0 | Status: SHIPPED | OUTPATIENT
Start: 2024-08-16

## 2024-08-17 ENCOUNTER — LAB (OUTPATIENT)
Dept: LAB | Facility: HOSPITAL | Age: 64
End: 2024-08-17
Payer: MEDICARE

## 2024-08-17 DIAGNOSIS — K21.00 GASTROESOPHAGEAL REFLUX DISEASE WITH ESOPHAGITIS WITHOUT HEMORRHAGE: ICD-10-CM

## 2024-08-17 DIAGNOSIS — D63.8 ANEMIA, CHRONIC DISEASE: ICD-10-CM

## 2024-08-17 DIAGNOSIS — I10 BENIGN ESSENTIAL HTN: ICD-10-CM

## 2024-08-17 LAB
BASOPHILS # BLD AUTO: 0.04 10*3/MM3 (ref 0–0.2)
BASOPHILS NFR BLD AUTO: 0.9 % (ref 0–1.5)
DEPRECATED RDW RBC AUTO: 44.2 FL (ref 37–54)
EOSINOPHIL # BLD AUTO: 0.11 10*3/MM3 (ref 0–0.4)
EOSINOPHIL NFR BLD AUTO: 2.5 % (ref 0.3–6.2)
ERYTHROCYTE [DISTWIDTH] IN BLOOD BY AUTOMATED COUNT: 14.3 % (ref 12.3–15.4)
HCT VFR BLD AUTO: 34.9 % (ref 34–46.6)
HGB BLD-MCNC: 11.9 G/DL (ref 12–15.9)
IMM GRANULOCYTES # BLD AUTO: 0.01 10*3/MM3 (ref 0–0.05)
IMM GRANULOCYTES NFR BLD AUTO: 0.2 % (ref 0–0.5)
LYMPHOCYTES # BLD AUTO: 1.59 10*3/MM3 (ref 0.7–3.1)
LYMPHOCYTES NFR BLD AUTO: 35.6 % (ref 19.6–45.3)
MCH RBC QN AUTO: 29.6 PG (ref 26.6–33)
MCHC RBC AUTO-ENTMCNC: 34.1 G/DL (ref 31.5–35.7)
MCV RBC AUTO: 86.8 FL (ref 79–97)
MONOCYTES # BLD AUTO: 0.4 10*3/MM3 (ref 0.1–0.9)
MONOCYTES NFR BLD AUTO: 8.9 % (ref 5–12)
NEUTROPHILS NFR BLD AUTO: 2.32 10*3/MM3 (ref 1.7–7)
NEUTROPHILS NFR BLD AUTO: 51.9 % (ref 42.7–76)
NRBC BLD AUTO-RTO: 0 /100 WBC (ref 0–0.2)
PLATELET # BLD AUTO: 216 10*3/MM3 (ref 140–450)
PMV BLD AUTO: 9.5 FL (ref 6–12)
RBC # BLD AUTO: 4.02 10*6/MM3 (ref 3.77–5.28)
WBC NRBC COR # BLD AUTO: 4.47 10*3/MM3 (ref 3.4–10.8)

## 2024-08-17 PROCEDURE — 85025 COMPLETE CBC W/AUTO DIFF WBC: CPT

## 2024-08-17 PROCEDURE — 36415 COLL VENOUS BLD VENIPUNCTURE: CPT

## 2024-08-19 ENCOUNTER — TELEPHONE (OUTPATIENT)
Dept: GASTROENTEROLOGY | Facility: CLINIC | Age: 64
End: 2024-08-19

## 2024-08-19 ENCOUNTER — OFFICE VISIT (OUTPATIENT)
Dept: GASTROENTEROLOGY | Facility: CLINIC | Age: 64
End: 2024-08-19
Payer: MEDICARE

## 2024-08-19 VITALS
BODY MASS INDEX: 37.03 KG/M2 | HEIGHT: 60 IN | WEIGHT: 188.6 LBS | DIASTOLIC BLOOD PRESSURE: 86 MMHG | SYSTOLIC BLOOD PRESSURE: 130 MMHG

## 2024-08-19 DIAGNOSIS — K21.00 GASTROESOPHAGEAL REFLUX DISEASE WITH ESOPHAGITIS WITHOUT HEMORRHAGE: Primary | ICD-10-CM

## 2024-08-19 DIAGNOSIS — K59.04 CHRONIC IDIOPATHIC CONSTIPATION: ICD-10-CM

## 2024-08-19 PROCEDURE — 99213 OFFICE O/P EST LOW 20 MIN: CPT | Performed by: INTERNAL MEDICINE

## 2024-08-19 PROCEDURE — 1160F RVW MEDS BY RX/DR IN RCRD: CPT | Performed by: INTERNAL MEDICINE

## 2024-08-19 PROCEDURE — 3079F DIAST BP 80-89 MM HG: CPT | Performed by: INTERNAL MEDICINE

## 2024-08-19 PROCEDURE — 3075F SYST BP GE 130 - 139MM HG: CPT | Performed by: INTERNAL MEDICINE

## 2024-08-19 PROCEDURE — 1159F MED LIST DOCD IN RCRD: CPT | Performed by: INTERNAL MEDICINE

## 2024-08-19 RX ORDER — TOCILIZUMAB 20 MG/ML
INJECTION, SOLUTION, CONCENTRATE INTRAVENOUS
COMMUNITY
Start: 2024-07-03

## 2024-08-19 NOTE — TELEPHONE ENCOUNTER
Seen in the office today, 08/19/2024 by Dr. Sheffield.  She does not receive scheduling for 09/13/2024.  Just need to schedule.  Not due u ntil 03/2030.    Canceled as requested.

## 2024-08-19 NOTE — PROGRESS NOTES
PATIENT INFORMATION  Shaylee Wadsworth       - 1960    CHIEF COMPLAINT  Chief Complaint   Patient presents with    Lainez's esophagus       HISTORY OF PRESENT ILLNESS  Her for CIC follow up and Miralax gave her frwequent loose BMs and urgency so stopped Mirlax and is on a Probiotic and not fiber so that wa strongly promoted    Rveiwed her Colon EGD n  and follow up egd [place]  and then a repea tin  both with normal colons so will recall in 2030 for 10 year recall.    Her CIC is moving only 3-4 times a week so no real change add in fiber to see any benefits        REVIEWED PERTINENT RESULTS/ LABS  Lab Results   Component Value Date    CASEREPORT  2021     Surgical Pathology Report                         Case: [id number]                                  Authorizing Provider:  Maciel Godoy MD        Collected:           2021 06:56 PM          Ordering Location:     [facility]   Received:            2021 08:02 PM                                 OR                                                                           Pathologist:           Jarocho Garza MD                                                         Specimen:    Large Intestine, Appendix                                                                  FINALDX  2021     1. Appendix, Appendectomy:  Benign appendix with   A. Acute appendicitis.   B. Serositis.    Mariano/kds       Lab Results   Component Value Date    HGB 11.9 (L) 2024    MCV 86.8 2024     2024    ALT 13 2024    AST 16 2024    HGBA1C 5.80 (H) 2024    INR 1.00 2018    TRIG 113 2024    FERRITIN 16.80 2024    IRON 143 2024    TIBC 453 2024      No results found.    REVIEW OF SYSTEMS  Review of Systems   Constitutional:  Negative for activity change, chills, fever and unexpected weight change.   HENT:  Negative for congestion.    Eyes:  Negative for visual  disturbance.   Respiratory:  Negative for shortness of breath.    Cardiovascular:  Negative for chest pain and palpitations.   Gastrointestinal:  Positive for abdominal distention and abdominal pain. Negative for blood in stool.   Endocrine: Negative for cold intolerance and heat intolerance.   Genitourinary:  Negative for hematuria.   Musculoskeletal:  Negative for gait problem.   Skin:  Negative for color change.   Allergic/Immunologic: Negative for immunocompromised state.   Neurological:  Negative for weakness and light-headedness.   Hematological:  Negative for adenopathy.   Psychiatric/Behavioral:  Negative for sleep disturbance. The patient is not nervous/anxious.          ACTIVE PROBLEMS  Patient Active Problem List    Diagnosis     Chronic idiopathic constipation [K59.04]     Insomnia due to medical condition [G47.01]     DERRICK (obstructive sleep apnea) [G47.33]     Primary osteoarthritis of right knee [M17.11]     Prediabetes [R73.03]     Anemia, chronic disease [D63.8]     Lainez's esophagus [K22.70]     Iron deficiency anemia [D50.9]     Hypothyroidism [E03.9]     Palpitations [R00.2]     Diarrhea [R19.7]     Pain in left ankle and joints of left foot [M25.572]     Pain in right ankle and joints of right foot [M25.571]     Pain of both shoulder joints [M25.511, M25.512]     Pain in left shoulder [M25.512]     Rheumatoid arthritis with rheumatoid factor of multiple sites without organ or systems involvement [M05.79]     Body mass index (BMI) 35.0-35.9, adult [Z68.35]     Irritable bowel syndrome with diarrhea [K58.0]     Depression due to physical illness [F06.31]     S/P drug eluting coronary stent placement [Z95.5]     Other spondylosis with myelopathy, thoracic region [M47.14]     Personal history of immunosupression therapy [Z92.25]     Rheumatoid arthritis of multiple sites without organ or system involvement with positive rheumatoid factor [M05.79]     Coronary artery disease involving native heart  without angina pectoris [I25.10]     Functional neurological symptom disorder with abnormal movement [F44.4]     BPPV (benign paroxysmal positional vertigo) [H81.10]     Dystonia [G24.9]     Chronic right-sided thoracic back pain [M54.6, G89.29]     Benign essential HTN [I10]     Difficulty walking [R26.2]     Seropositive rheumatoid arthritis [M05.9]     Astasia-abasia [F44.4]     Functional movement disorder [G25.9]     Anxiety related tremor [R25.1, F41.9]     Hypokalemia [E87.6]     Encounter for annual wellness visit (AWV) in Medicare patient [Z00.00]     Tendinitis of left shoulder [M77.8]     Multiple pulmonary nodules determined by computed tomography of lung [R91.8]     History of iron deficiency [Z86.39]     Abnormal radiographic examination [R93.89]     Disc disorder of thoracic region [M51.9]     Gastroesophageal reflux disease [K21.9]     Hyperlipidemia LDL goal <70 [E78.5]     Menopausal symptom [N95.1]     Migraine [G43.909]     Osteoarthritis of shoulder [M19.019]     Osteopenia [M85.80]     Rheumatoid arthritis [M06.9]          PAST MEDICAL HISTORY  Past Medical History:   Diagnosis Date    Abnormal electrocardiogram     Anemia 05/2021    Anxiety     Arthritis 05/01/2014    RA    Lainez esophagus     Benign paroxysmal positional vertigo due to bilateral vestibular disorder     Chest pain     Cholelithiasis 2007    Removed    Colon polyp     Coronary artery disease 02/26/2019    Depression 06/12/2019    Disease of thyroid gland     Dystonia     Embedded tick of right lower leg     Esophageal reflux     Fibromyositis     Functional movement disorder     H/O colonoscopy     H/O mammogram 08/31/2011    NORMAL     History of colonoscopy 03/2020    Hx of bone density study 08/31/2011    OSTEOPENIA     Hyperlipidemia     Hyperparathyroidism     Hypertension     Hypothyroidism 05/2021    Low back pain     Menopause     Osteopenia     Osteopenia     Ovarian cyst 2000    L ovary removed    Pancreatitis      Pancreatitis     Scoliosis 02/16/2013    Superficial incisional infection of surgical site     Tremor 04/04/2018    Vitamin D deficiency          SURGICAL HISTORY  Past Surgical History:   Procedure Laterality Date    APPENDECTOMY N/A 12/22/2021    Procedure: APPENDECTOMY LAPAROSCOPIC;  Surgeon: Maciel Godoy MD;  Location: [facility] OR;  Service: General;  Laterality: N/A;    CARDIAC CATHETERIZATION N/A 03/04/2019    Procedure: Coronary angiography;  Surgeon: Jackelyn Dumas MD;  Location:  [facility];  Service: Cardiovascular    CARDIAC CATHETERIZATION N/A 03/04/2019    Procedure: Left heart cath;  Surgeon: Jackelyn Dumas MD;  Location:  [facility];  Service: Cardiovascular    CARDIAC CATHETERIZATION N/A 03/04/2019    Procedure: Left ventriculography;  Surgeon: Jackelyn Dumas MD;  Location:  [facility];  Service: Cardiovascular    CARDIAC CATHETERIZATION N/A 03/04/2019    Procedure: Stent KARLY coronary;  Surgeon: Jackelyn Dumas MD;  Location:  [facility];  Service: Cardiovascular    CHOLECYSTECTOMY      COLONOSCOPY  2014    COLONOSCOPY N/A 03/06/2020    Procedure: COLONOSCOPY, biopsy, polypectomy;  Surgeon: Rain Kirk MD;  Location: [facility] OR;  Service: Gastroenterology;  Laterality: N/A;  Random biopsies; Rectal polyp x 3; Hemorrhoids; Diverticulosis    CORONARY STENT PLACEMENT      DILATATION AND CURETTAGE      HYSTERECTOMY      LAPAROSCOPIC CHOLECYSTECTOMY  2005    MOUTH SURGERY      TOOTH EXTRACTION    OTHER SURGICAL HISTORY  03/27/2015    DIAGNOSTIC ESOPHAGOSCOPY TRANSNASAL FLEXIBLE WITH BIOPSY; ARZATE ESO. REPEAT EGD 2 YR     OVARIAN CYST SURGERY  2000    L ovary removed    OVARY SURGERY Left     NORMAL     PAP SMEAR  08/23/2010    NORMAL     PARATHYROIDECTOMY Right 08/17/2016    Dr. Muchael Flynn at [place]    SUBTOTAL HYSTERECTOMY      Left ovary removed9    WISDOM TOOTH EXTRACTION  1984         FAMILY HISTORY  Family  History   Problem Relation Age of Onset    Diabetes Mother     Hyperlipidemia Mother     Hypertension Mother     Heart disease Mother     Kidney disease Mother     Cancer Mother     Heart attack Mother     Multiple myeloma Mother     Melanoma Mother         [place] water contamination    Coronary artery disease Mother     Anemia Mother     Obesity Mother     Arthritis Father     Anxiety disorder Father     COPD Father     Glaucoma Father     Hyperlipidemia Father     Hypertension Father     Vision loss Father     Heart disease Father     Heart attack Father     Pancreatitis Father     Heart disease Sister     Breast cancer Neg Hx     Colon cancer Neg Hx     Colon polyps Neg Hx          SOCIAL HISTORY  Social History     Occupational History    Not on file   Tobacco Use    Smoking status: Former     Current packs/day: 0.50     Average packs/day: 0.5 packs/day for 15.0 years (7.5 ttl pk-yrs)     Types: Cigarettes, Electronic Cigarette     Passive exposure: Never    Smokeless tobacco: Never    Tobacco comments:     8/12/18 switch to e-cig   Vaping Use    Vaping status: Every Day   Substance and Sexual Activity    Alcohol use: No    Drug use: No    Sexual activity: Not Currently     Partners: Male     Birth control/protection: Post-menopausal, Hysterectomy         CURRENT MEDICATIONS    Current Outpatient Medications:     amLODIPine (NORVASC) 5 MG tablet, Take 1 tablet by mouth once daily, Disp: 90 tablet, Rfl: 0    aspirin 81 MG tablet, Take 1 tablet by mouth Daily., Disp: , Rfl:     atorvastatin (LIPITOR) 40 MG tablet, TAKE 1 TABLET BY MOUTH ONCE DAILY AT NIGHT, Disp: 90 tablet, Rfl: 0    Cholecalciferol (Vitamin D) 50 MCG (2000 UT) capsule, Take 1 capsule by mouth Daily., Disp: , Rfl:     cyclobenzaprine (FLEXERIL) 10 MG tablet, Take 1 tablet by mouth 2 (Two) Times a Day As Needed., Disp: , Rfl:     Diclofenac Sodium (VOLTAREN) 1 % gel gel, Apply 4 g topically to the appropriate area as directed 4 (Four) Times a Day.,  "Disp: 350 g, Rfl: 3    esomeprazole (nexIUM) 40 MG capsule, Take 1 capsule by mouth twice daily, Disp: 180 capsule, Rfl: 1    folic acid (FOLVITE) 1 MG tablet, Take 1 tablet by mouth Daily. 1 to 4 tablets daily, Disp: , Rfl:     levothyroxine (SYNTHROID, LEVOTHROID) 50 MCG tablet, Take 1 tablet by mouth Daily., Disp: 90 tablet, Rfl: 3    methotrexate 2.5 MG tablet, TAKE 4 TABLETS BY MOUTH ONCE A WEEK, Disp: 32 tablet, Rfl: 6    metoprolol succinate XL (TOPROL-XL) 50 MG 24 hr tablet, Take 1 tablet by mouth once daily, Disp: 90 tablet, Rfl: 0    olmesartan (BENICAR) 40 MG tablet, Take 1 tablet by mouth once daily, Disp: 90 tablet, Rfl: 0    potassium chloride (KLOR-CON M20) 20 MEQ CR tablet, Take 1 tablet by mouth once daily, Disp: 90 tablet, Rfl: 0    tocilizumab (Actemra) 200 MG/10ML solution injection, Every 30 (Thirty) Days., Disp: , Rfl:     ALLERGIES  Meloxicam and Effexor xr [venlafaxine hcl er]    VITALS  Vitals:    08/19/24 1418   BP: 130/86   BP Location: Left arm   Patient Position: Sitting   Cuff Size: Adult   Weight: 85.5 kg (188 lb 9.6 oz)   Height: 152.4 cm (60\")       PHYSICAL EXAM  Debilities/Disabilities Identified: None  Emotional Behavior: Appropriate  Wt Readings from Last 3 Encounters:   08/19/24 85.5 kg (188 lb 9.6 oz)   07/02/24 84.4 kg (186 lb)   05/21/24 85.8 kg (189 lb 3.2 oz)     Ht Readings from Last 1 Encounters:   08/19/24 152.4 cm (60\")     Body mass index is 36.83 kg/m².  Physical Exam  Constitutional:       Appearance: She is well-developed. She is not diaphoretic.   HENT:      Head: Normocephalic and atraumatic.   Eyes:      General: No scleral icterus.     Conjunctiva/sclera: Conjunctivae normal.      Pupils: Pupils are equal, round, and reactive to light.   Neck:      Thyroid: No thyromegaly.   Cardiovascular:      Rate and Rhythm: Normal rate and regular rhythm.      Heart sounds: Normal heart sounds. No murmur heard.     No gallop.   Pulmonary:      Effort: Pulmonary effort is " normal.      Breath sounds: Normal breath sounds. No wheezing or rales.   Abdominal:      General: Bowel sounds are normal. There is no distension or abdominal bruit.      Palpations: Abdomen is soft. There is no shifting dullness, fluid wave or mass.      Tenderness: There is no abdominal tenderness. There is no guarding. Negative signs include Davies's sign.      Hernia: There is no hernia in the ventral area.   Musculoskeletal:         General: Normal range of motion.      Cervical back: Normal range of motion and neck supple.   Lymphadenopathy:      Cervical: No cervical adenopathy.   Skin:     General: Skin is warm and dry.      Findings: No erythema or rash.   Neurological:      Mental Status: She is alert and oriented to person, place, and time.   Psychiatric:         Mood and Affect: Mood normal.         Behavior: Behavior normal.         CLINICAL DATA REVIEWED   reviewed previous lab results and integrated with today's visit, reviewed notes from other physicians and/or last GI encounter, reviewed previous endoscopy results and available photos, reviewed surgical pathology results from previous biopsies    ASSESSMENT  Diagnoses and all orders for this visit:    Gastroesophageal reflux disease with esophagitis without hemorrhage    Chronic idiopathic constipation    Other orders  -     tocilizumab (Actemra) 200 MG/10ML solution injection; Every 30 (Thirty) Days.          PLAN  Return in about 1 year (around 8/19/2025).    I have discussed the above plan with the patient.  They verbalize understanding and are in agreement with the plan.  They have been advised to contact the office for any questions, concerns, or changes related to their health.

## 2024-08-27 DIAGNOSIS — G47.33 OBSTRUCTIVE SLEEP APNEA: Primary | ICD-10-CM

## 2024-09-05 DIAGNOSIS — I10 BENIGN ESSENTIAL HTN: ICD-10-CM

## 2024-09-05 RX ORDER — AMLODIPINE BESYLATE 5 MG/1
TABLET ORAL
Qty: 90 TABLET | Refills: 0 | Status: SHIPPED | OUTPATIENT
Start: 2024-09-05

## 2024-09-11 ENCOUNTER — OFFICE VISIT (OUTPATIENT)
Dept: ENDOCRINOLOGY | Age: 64
End: 2024-09-11
Payer: MEDICARE

## 2024-09-11 ENCOUNTER — HOSPITAL ENCOUNTER (OUTPATIENT)
Dept: SLEEP MEDICINE | Facility: HOSPITAL | Age: 64
Discharge: HOME | End: 2024-09-11
Admitting: INTERNAL MEDICINE
Payer: MEDICARE

## 2024-09-11 VITALS
HEART RATE: 66 BPM | OXYGEN SATURATION: 96 % | DIASTOLIC BLOOD PRESSURE: 84 MMHG | HEIGHT: 60 IN | WEIGHT: 193.6 LBS | SYSTOLIC BLOOD PRESSURE: 126 MMHG | BODY MASS INDEX: 38.01 KG/M2

## 2024-09-11 DIAGNOSIS — M85.80 OSTEOPENIA, UNSPECIFIED LOCATION: ICD-10-CM

## 2024-09-11 DIAGNOSIS — G47.33 OBSTRUCTIVE SLEEP APNEA: ICD-10-CM

## 2024-09-11 DIAGNOSIS — E03.9 HYPOTHYROIDISM, UNSPECIFIED TYPE: Primary | ICD-10-CM

## 2024-09-11 PROCEDURE — 1159F MED LIST DOCD IN RCRD: CPT | Performed by: INTERNAL MEDICINE

## 2024-09-11 PROCEDURE — 1160F RVW MEDS BY RX/DR IN RCRD: CPT | Performed by: INTERNAL MEDICINE

## 2024-09-11 PROCEDURE — 3079F DIAST BP 80-89 MM HG: CPT | Performed by: INTERNAL MEDICINE

## 2024-09-11 PROCEDURE — 99214 OFFICE O/P EST MOD 30 MIN: CPT | Performed by: INTERNAL MEDICINE

## 2024-09-11 PROCEDURE — 3074F SYST BP LT 130 MM HG: CPT | Performed by: INTERNAL MEDICINE

## 2024-09-11 PROCEDURE — 95806 SLEEP STUDY UNATT&RESP EFFT: CPT

## 2024-09-11 NOTE — PROGRESS NOTES
Chief complaint/Reason for consult: hypothyroidism    HPI:   - 62 year old female here for hypothyroidism  - Last seen in 3/2024  - Complains of joint pain  - She is on levothyroxine 75 mcg daily  - She has no complaints today  - She also has osteopenia and is on vitamin D3 2000 IU daily  - She has a history of primary hyperparathyroidism status post parathyroidectomy in 2016    The following portions of the patient's history were reviewed and updated as appropriate: allergies, current medications, past family history, past medical history, past social history, past surgical history, and problem list.      Objective     Vitals:    09/11/24 1310   BP: 126/84   Pulse: 66   SpO2: 96%        Physical Exam  Vitals reviewed.   Constitutional:       Appearance: Normal appearance.   HENT:      Head: Normocephalic and atraumatic.   Eyes:      General: No scleral icterus.  Pulmonary:      Effort: Pulmonary effort is normal. No respiratory distress.   Neurological:      Mental Status: She is alert.      Gait: Gait normal.   Psychiatric:         Mood and Affect: Mood normal.         Behavior: Behavior normal.         Thought Content: Thought content normal.         Judgment: Judgment normal.         Assessment & Plan   Hypothyroidism  - TSH was 4/2024 so will continue levothyroxine 75 mcg daily  - She states she would like to get lab work through her PCP and be followed by her PCP     2. Osteopenia with history of parathyroidectomy for primary hyperparathyroidism  - FRAX is below treatment threshold  - Recommend 1000 IU vitamin D3 daily, 1200 mg of dietary/supplementary calcium, weight bearing exercise as tolerated  - Would repeat DXA in 4/2026     - Return to clinic PRN

## 2024-09-13 ENCOUNTER — OFFICE VISIT (OUTPATIENT)
Dept: INTERNAL MEDICINE | Facility: CLINIC | Age: 64
End: 2024-09-13
Payer: MEDICARE

## 2024-09-13 VITALS
HEART RATE: 58 BPM | OXYGEN SATURATION: 99 % | HEIGHT: 60 IN | DIASTOLIC BLOOD PRESSURE: 86 MMHG | TEMPERATURE: 97.8 F | WEIGHT: 190 LBS | SYSTOLIC BLOOD PRESSURE: 130 MMHG | BODY MASS INDEX: 37.3 KG/M2

## 2024-09-13 DIAGNOSIS — M25.562 CHRONIC PAIN OF BOTH KNEES: ICD-10-CM

## 2024-09-13 DIAGNOSIS — G89.29 CHRONIC RIGHT SHOULDER PAIN: ICD-10-CM

## 2024-09-13 DIAGNOSIS — M25.561 CHRONIC PAIN OF BOTH KNEES: ICD-10-CM

## 2024-09-13 DIAGNOSIS — M25.511 CHRONIC RIGHT SHOULDER PAIN: ICD-10-CM

## 2024-09-13 DIAGNOSIS — H70.92 MASTOIDITIS OF LEFT SIDE: Primary | ICD-10-CM

## 2024-09-13 DIAGNOSIS — G89.29 CHRONIC PAIN OF BOTH KNEES: ICD-10-CM

## 2024-09-13 DIAGNOSIS — M50.30 DDD (DEGENERATIVE DISC DISEASE), CERVICAL: ICD-10-CM

## 2024-09-13 PROBLEM — M54.2 NECK PAIN, CHRONIC: Status: ACTIVE | Noted: 2024-09-13

## 2024-09-13 PROCEDURE — 99214 OFFICE O/P EST MOD 30 MIN: CPT | Performed by: NURSE PRACTITIONER

## 2024-09-13 PROCEDURE — 1125F AMNT PAIN NOTED PAIN PRSNT: CPT | Performed by: NURSE PRACTITIONER

## 2024-09-13 PROCEDURE — 3079F DIAST BP 80-89 MM HG: CPT | Performed by: NURSE PRACTITIONER

## 2024-09-13 PROCEDURE — 3075F SYST BP GE 130 - 139MM HG: CPT | Performed by: NURSE PRACTITIONER

## 2024-09-14 DIAGNOSIS — E21.0 PRIMARY HYPERPARATHYROIDISM: ICD-10-CM

## 2024-09-16 RX ORDER — LEVOTHYROXINE SODIUM 75 UG/1
75 TABLET ORAL DAILY
Qty: 90 TABLET | Refills: 0 | OUTPATIENT
Start: 2024-09-16

## 2024-09-17 DIAGNOSIS — E21.0 PRIMARY HYPERPARATHYROIDISM: Primary | ICD-10-CM

## 2024-09-20 ENCOUNTER — DOCUMENTATION (OUTPATIENT)
Dept: SLEEP MEDICINE | Facility: HOSPITAL | Age: 64
End: 2024-09-20
Payer: MEDICARE

## 2024-09-21 ENCOUNTER — LAB (OUTPATIENT)
Dept: LAB | Facility: HOSPITAL | Age: 64
End: 2024-09-21
Payer: MEDICARE

## 2024-09-21 DIAGNOSIS — Z95.5 S/P DRUG ELUTING CORONARY STENT PLACEMENT: ICD-10-CM

## 2024-09-21 DIAGNOSIS — E78.5 HYPERLIPIDEMIA LDL GOAL <70: ICD-10-CM

## 2024-09-21 DIAGNOSIS — I10 BENIGN ESSENTIAL HTN: ICD-10-CM

## 2024-09-21 DIAGNOSIS — E03.8 HYPOTHYROIDISM DUE TO HASHIMOTO'S THYROIDITIS: ICD-10-CM

## 2024-09-21 DIAGNOSIS — I25.10 CORONARY ARTERY DISEASE INVOLVING NATIVE CORONARY ARTERY OF NATIVE HEART WITHOUT ANGINA PECTORIS: ICD-10-CM

## 2024-09-21 DIAGNOSIS — E06.3 HYPOTHYROIDISM DUE TO HASHIMOTO'S THYROIDITIS: ICD-10-CM

## 2024-09-21 DIAGNOSIS — K21.00 GASTROESOPHAGEAL REFLUX DISEASE WITH ESOPHAGITIS WITHOUT HEMORRHAGE: ICD-10-CM

## 2024-09-21 DIAGNOSIS — R73.03 PREDIABETES: ICD-10-CM

## 2024-09-21 LAB
ALBUMIN SERPL-MCNC: 3.7 G/DL (ref 3.5–5.2)
ALBUMIN/GLOB SERPL: 1.4 G/DL
ALP SERPL-CCNC: 57 U/L (ref 39–117)
ALT SERPL W P-5'-P-CCNC: 21 U/L (ref 1–33)
ANION GAP SERPL CALCULATED.3IONS-SCNC: 9 MMOL/L (ref 5–15)
AST SERPL-CCNC: 17 U/L (ref 1–32)
BILIRUB SERPL-MCNC: 0.6 MG/DL (ref 0–1.2)
BUN SERPL-MCNC: 9 MG/DL (ref 8–23)
BUN/CREAT SERPL: 11.4 (ref 7–25)
CALCIUM SPEC-SCNC: 9.6 MG/DL (ref 8.6–10.5)
CHLORIDE SERPL-SCNC: 104 MMOL/L (ref 98–107)
CHOLEST SERPL-MCNC: 178 MG/DL (ref 0–200)
CO2 SERPL-SCNC: 29 MMOL/L (ref 22–29)
CREAT SERPL-MCNC: 0.79 MG/DL (ref 0.57–1)
EGFRCR SERPLBLD CKD-EPI 2021: 85.2 ML/MIN/1.73
GLOBULIN UR ELPH-MCNC: 2.6 GM/DL
GLUCOSE SERPL-MCNC: 101 MG/DL (ref 65–99)
HBA1C MFR BLD: 5.4 % (ref 4.8–5.6)
HDLC SERPL QL: 4.68
HDLC SERPL-MCNC: 38 MG/DL (ref 40–60)
LDLC SERPL CALC-MCNC: 118 MG/DL (ref 0–100)
POTASSIUM SERPL-SCNC: 3.8 MMOL/L (ref 3.5–5.2)
PROT SERPL-MCNC: 6.3 G/DL (ref 6–8.5)
SODIUM SERPL-SCNC: 142 MMOL/L (ref 136–145)
T4 FREE SERPL-MCNC: 1.72 NG/DL (ref 0.92–1.68)
TRIGL SERPL-MCNC: 124 MG/DL (ref 0–150)
TSH SERPL DL<=0.05 MIU/L-ACNC: 1.41 UIU/ML (ref 0.27–4.2)
VLDLC SERPL-MCNC: 22 MG/DL (ref 5–40)

## 2024-09-21 PROCEDURE — 80053 COMPREHEN METABOLIC PANEL: CPT

## 2024-09-21 PROCEDURE — 80061 LIPID PANEL: CPT

## 2024-09-21 PROCEDURE — 83970 ASSAY OF PARATHORMONE: CPT | Performed by: NURSE PRACTITIONER

## 2024-09-21 PROCEDURE — 84443 ASSAY THYROID STIM HORMONE: CPT

## 2024-09-21 PROCEDURE — 84439 ASSAY OF FREE THYROXINE: CPT

## 2024-09-21 PROCEDURE — 83036 HEMOGLOBIN GLYCOSYLATED A1C: CPT

## 2024-09-24 ENCOUNTER — OFFICE VISIT (OUTPATIENT)
Dept: SLEEP MEDICINE | Facility: HOSPITAL | Age: 64
End: 2024-09-24
Payer: MEDICARE

## 2024-09-24 VITALS
DIASTOLIC BLOOD PRESSURE: 83 MMHG | SYSTOLIC BLOOD PRESSURE: 120 MMHG | WEIGHT: 190 LBS | HEART RATE: 67 BPM | HEIGHT: 60 IN | BODY MASS INDEX: 37.3 KG/M2 | OXYGEN SATURATION: 99 %

## 2024-09-24 DIAGNOSIS — G43.009 MIGRAINE WITHOUT AURA AND WITHOUT STATUS MIGRAINOSUS, NOT INTRACTABLE: ICD-10-CM

## 2024-09-24 DIAGNOSIS — R00.2 PALPITATIONS: ICD-10-CM

## 2024-09-24 DIAGNOSIS — G47.33 OSA (OBSTRUCTIVE SLEEP APNEA): Primary | ICD-10-CM

## 2024-09-24 DIAGNOSIS — I10 BENIGN ESSENTIAL HTN: ICD-10-CM

## 2024-09-24 PROCEDURE — G0463 HOSPITAL OUTPT CLINIC VISIT: HCPCS

## 2024-10-03 ENCOUNTER — OFFICE VISIT (OUTPATIENT)
Dept: ORTHOPEDIC SURGERY | Facility: CLINIC | Age: 64
End: 2024-10-03
Payer: COMMERCIAL

## 2024-10-03 VITALS
DIASTOLIC BLOOD PRESSURE: 78 MMHG | HEART RATE: 64 BPM | BODY MASS INDEX: 35.87 KG/M2 | SYSTOLIC BLOOD PRESSURE: 135 MMHG | HEIGHT: 61 IN | WEIGHT: 190 LBS

## 2024-10-03 DIAGNOSIS — M25.511 RIGHT SHOULDER PAIN, UNSPECIFIED CHRONICITY: Primary | ICD-10-CM

## 2024-10-03 DIAGNOSIS — M19.011 ARTHRITIS OF RIGHT STERNOCLAVICULAR JOINT: ICD-10-CM

## 2024-10-03 DIAGNOSIS — M54.2 NECK PAIN: ICD-10-CM

## 2024-10-03 PROCEDURE — 99214 OFFICE O/P EST MOD 30 MIN: CPT | Performed by: NURSE PRACTITIONER

## 2024-10-03 NOTE — PROGRESS NOTES
Subjective:     Patient ID: Shaylee Wadsworth is a 63 y.o. female.    Chief Complaint:  Right upper extremity pain, new patient to examiner  History of Present Illness  History of Present Illness  The patient is a 63-year-old female who presents to the clinic today for evaluation of her shoulder and neck.    She has been experiencing pain since 01/2024, which she describes as a throbbing and burning sensation, accompanied by stiffness. The discomfort, which she rates as 5 out of 10, is located along the front of the collarbone where it meets the sternum. Her pain intensifies with neck movement and particularly when reaching across her body, causing a worsening of pain at the front of her chest wall. These symptoms occur even during leisure activities.    She reports no pain in her right shoulder. She experiences pain when putting on clothes, reaching overhead, and upon waking up, especially in the morning. Despite trying heat therapy and anti-inflammatory medication, she has not noticed any significant improvement. However, she has found some relief with muscle relaxers.    She reports no numbness or tingling in her right upper extremity and has no other concerns.       Social History     Occupational History    Not on file   Tobacco Use    Smoking status: Former     Current packs/day: 0.50     Average packs/day: 0.5 packs/day for 15.0 years (7.5 ttl pk-yrs)     Types: Cigarettes, Electronic Cigarette     Passive exposure: Never    Smokeless tobacco: Never    Tobacco comments:     8/12/18 switch to e-cig   Vaping Use    Vaping status: Every Day   Substance and Sexual Activity    Alcohol use: No    Drug use: No    Sexual activity: Not Currently     Partners: Male     Birth control/protection: Post-menopausal, Hysterectomy      Past Medical History:   Diagnosis Date    Abnormal electrocardiogram     Anemia 05/2021    Anxiety     Arthritis 05/01/2014    RA    Lainez esophagus     Benign paroxysmal positional vertigo due  to bilateral vestibular disorder     Chest pain     Cholelithiasis 2007    Removed    Colon polyp     Coronary artery disease 02/26/2019    Depression 06/12/2019    Disease of thyroid gland     Dystonia     Embedded tick of right lower leg     Esophageal reflux     Fibromyositis     Functional movement disorder     H/O colonoscopy     H/O mammogram 08/31/2011    NORMAL     History of colonoscopy 03/2020    Hx of bone density study 08/31/2011    OSTEOPENIA     Hyperlipidemia     Hyperparathyroidism     Hypertension     Hypothyroidism 05/2021    Low back pain     Menopause     Osteopenia     Osteopenia     Ovarian cyst 2000    L ovary removed    Pancreatitis     Pancreatitis     Scoliosis 02/16/2013    Superficial incisional infection of surgical site     Tremor 04/04/2018    Vitamin D deficiency      Past Surgical History:   Procedure Laterality Date    APPENDECTOMY N/A 12/22/2021    Procedure: APPENDECTOMY LAPAROSCOPIC;  Surgeon: Maciel Godoy MD;  Location: [facility] OR;  Service: General;  Laterality: N/A;    CARDIAC CATHETERIZATION N/A 03/04/2019    Procedure: Coronary angiography;  Surgeon: Jackelyn Dumas MD;  Location:  [facility];  Service: Cardiovascular    CARDIAC CATHETERIZATION N/A 03/04/2019    Procedure: Left heart cath;  Surgeon: Jackelyn Dumas MD;  Location:  [facility];  Service: Cardiovascular    CARDIAC CATHETERIZATION N/A 03/04/2019    Procedure: Left ventriculography;  Surgeon: Jackelyn Dumas MD;  Location:  [facility];  Service: Cardiovascular    CARDIAC CATHETERIZATION N/A 03/04/2019    Procedure: Stent KARLY coronary;  Surgeon: Jackelyn Dumas MD;  Location:  [facility];  Service: Cardiovascular    CHOLECYSTECTOMY      COLONOSCOPY  2014    COLONOSCOPY N/A 03/06/2020    Procedure: COLONOSCOPY, biopsy, polypectomy;  Surgeon: Rain Kirk MD;  Location: [facility] OR;  Service: Gastroenterology;  Laterality: N/A;  Random  "biopsies; Rectal polyp x 3; Hemorrhoids; Diverticulosis    CORONARY STENT PLACEMENT      DILATATION AND CURETTAGE      HYSTERECTOMY      LAPAROSCOPIC CHOLECYSTECTOMY  2005    MOUTH SURGERY      TOOTH EXTRACTION    OTHER SURGICAL HISTORY  03/27/2015    DIAGNOSTIC ESOPHAGOSCOPY TRANSNASAL FLEXIBLE WITH BIOPSY; ARZATE ESO. REPEAT EGD 2 YR     OVARIAN CYST SURGERY  2000    L ovary removed    OVARY SURGERY Left     NORMAL     PAP SMEAR  08/23/2010    NORMAL     PARATHYROIDECTOMY Right 08/17/2016    Dr. Muchael Flynn at [facility]    SUBTOTAL HYSTERECTOMY      Left ovary removed9    WISDOM TOOTH EXTRACTION  1984       Family History   Problem Relation Age of Onset    Diabetes Mother     Hyperlipidemia Mother     Hypertension Mother     Heart disease Mother     Kidney disease Mother     Cancer Mother     Heart attack Mother     Multiple myeloma Mother     Melanoma Mother         Marine water contamination    Coronary artery disease Mother     Anemia Mother     Obesity Mother     Arthritis Father     Anxiety disorder Father     COPD Father     Glaucoma Father     Hyperlipidemia Father     Hypertension Father     Vision loss Father     Heart disease Father     Heart attack Father     Pancreatitis Father     Heart disease Sister     Breast cancer Neg Hx     Colon cancer Neg Hx     Colon polyps Neg Hx                Objective:  Physical Exam    Vital signs reviewed.   General: No acute distress.  Eyes: conjunctiva clear; pupils equally round and reactive  ENT: external ears and nose atraumatic; oropharynx clear  CV: no peripheral edema  Resp: normal respiratory effort  Skin: no rashes or wounds; normal turgor  Psych: mood and affect appropriate; recent and remote memory intact    Vitals:    10/03/24 0958   BP: 135/78   Pulse: 64   Weight: 86.2 kg (190 lb)   Height: 154.9 cm (61\")         10/03/24  0958   Weight: 86.2 kg (190 lb)     Body mass index is 35.9 kg/m².      Right Shoulder Exam     Range of Motion   External rotation:  " 70   Forward flexion:  170   Internal rotation 0 degrees:  L1     Muscle Strength   Internal rotation: 4/5   External rotation: 4/5   Supraspinatus: 4/5   Subscapularis: 4/5   Biceps: 4/5     Tests   Mabry test: negative  Cross arm: positive  Impingement: negative  Drop arm: negative    Other   Erythema: absent  Sensation: normal  Pulse: present    Comments:  Maximal tenderness present along the sternoclavicular joint right side, positive crossarm exam  negative empty can  negative Marquette's  negative Speed's  positive bear hug exam with pain along sternoclavicular joint               Physical Exam      Imaging:  Right Shoulder X-Ray  Indication: Pain  AP Internal and External Rotation views    Findings:  No fracture  No bony lesion  Normal soft tissues  Normal joint spaces  AC joint degeneration  No prior studies were available for comparison.    Cervical Spine X-Ray    Indication: Pain  Views: AP and Lateral    Findings:  No fracture  No bony lesions  Soft tissues normal  Normal disc spaces  Straightening of the cervical spine, narrowing C5-C6, C6-C7 no evidence of advanced narrowing of the cervical spine  No prior studies are available for comparison.        Assessment:        1. Right shoulder pain, unspecified chronicity    2. Neck pain    3. Arthritis of right sternoclavicular joint         Assessment & Plan  1. Shoulder pain.  Pain has been present since January 2024, rated 5 out of 10, throbbing and burning in nature, with stiffness. Pain is located along the anterior aspect of the clavicle where it meets the sternum and worsens with reaching across the body. Pain is also present with motion of the neck and leisure activities. She has tried heat and anti-inflammatory medication without significant symptom resolution but noted some improvement with muscle relaxers. There is pain with donning clothes, reaching overhead, and upon waking, especially in the morning. No numbness or tingling in the right upper  extremity. No other concerns present.   Treatment options discussed include proceeding with ultrasound-guided injection to the sternoclavicular joint, believed to be more arthritic in nature, but she wishes to hold off on that. Oral steroid taper was also discussed, but she wishes to hold off on that as well. A topical compound cream is recommended to be applied 3-4 times daily along the sternoclavicular joint to help ease her pain. If no improvement, ultrasound-guided injection to the sternoclavicular joint will be considered.      Orders:  Orders Placed This Encounter   Procedures    XR Shoulder 2+ View Right    XR Spine Cervical 2 View     No orders of the defined types were placed in this encounter.          Dragon dictation utilized          Patient or patient representative verbalized consent for the use of Ambient Listening during the visit with  KAITLYNN Brower for chart documentation. 10/3/2024  13:13 EDT

## 2024-10-05 DIAGNOSIS — I10 BENIGN ESSENTIAL HTN: ICD-10-CM

## 2024-10-05 DIAGNOSIS — E78.5 HYPERLIPIDEMIA LDL GOAL <70: ICD-10-CM

## 2024-10-07 ENCOUNTER — PATIENT ROUNDING (BHMG ONLY) (OUTPATIENT)
Dept: ORTHOPEDIC SURGERY | Facility: CLINIC | Age: 64
End: 2024-10-07
Payer: MEDICARE

## 2024-10-07 RX ORDER — OLMESARTAN MEDOXOMIL 40 MG/1
TABLET ORAL
Qty: 90 TABLET | Refills: 1 | Status: SHIPPED | OUTPATIENT
Start: 2024-10-07

## 2024-10-07 RX ORDER — ATORVASTATIN CALCIUM 40 MG/1
40 TABLET, FILM COATED ORAL NIGHTLY
Qty: 90 TABLET | Refills: 1 | Status: SHIPPED | OUTPATIENT
Start: 2024-10-07

## 2024-10-07 NOTE — PROGRESS NOTES
A My-Chart message has been sent to the patient for PATIENT ROUNDING with [facility].

## 2024-10-10 DIAGNOSIS — I10 BENIGN ESSENTIAL HTN: ICD-10-CM

## 2024-10-14 RX ORDER — METOPROLOL SUCCINATE 50 MG/1
50 TABLET, EXTENDED RELEASE ORAL DAILY
Qty: 90 TABLET | Refills: 1 | Status: SHIPPED | OUTPATIENT
Start: 2024-10-14

## 2024-10-14 NOTE — TELEPHONE ENCOUNTER
Rx Refill Note  Requested Prescriptions     Pending Prescriptions Disp Refills    metoprolol succinate XL (TOPROL-XL) 50 MG 24 hr tablet [Pharmacy Med Name: Metoprolol Succinate ER 50 MG Oral Tablet Extended Release 24 Hour] 90 tablet 1     Sig: Take 1 tablet by mouth once daily      Last office visit with prescribing clinician: 9/13/2024   Last telemedicine visit with prescribing clinician: Visit date not found   Next office visit with prescribing clinician: 10/14/2024                         Would you like a call back once the refill request has been completed: [] Yes [] No    If the office needs to give you a call back, can they leave a voicemail: [] Yes [] No    Thao Landa MA  10/14/24, 16:06 EDT

## 2024-10-21 ENCOUNTER — OFFICE VISIT (OUTPATIENT)
Age: 64
End: 2024-10-21
Payer: MEDICARE

## 2024-10-21 VITALS
OXYGEN SATURATION: 98 % | BODY MASS INDEX: 36.06 KG/M2 | SYSTOLIC BLOOD PRESSURE: 128 MMHG | DIASTOLIC BLOOD PRESSURE: 78 MMHG | HEIGHT: 61 IN | HEART RATE: 63 BPM | WEIGHT: 191 LBS

## 2024-10-21 DIAGNOSIS — E78.5 HYPERLIPIDEMIA LDL GOAL <70: ICD-10-CM

## 2024-10-21 PROCEDURE — 93000 ELECTROCARDIOGRAM COMPLETE: CPT | Performed by: INTERNAL MEDICINE

## 2024-10-21 PROCEDURE — 1159F MED LIST DOCD IN RCRD: CPT | Performed by: INTERNAL MEDICINE

## 2024-10-21 PROCEDURE — 99212 OFFICE O/P EST SF 10 MIN: CPT | Performed by: INTERNAL MEDICINE

## 2024-10-21 PROCEDURE — 3078F DIAST BP <80 MM HG: CPT | Performed by: INTERNAL MEDICINE

## 2024-10-21 PROCEDURE — 3074F SYST BP LT 130 MM HG: CPT | Performed by: INTERNAL MEDICINE

## 2024-10-21 PROCEDURE — 1160F RVW MEDS BY RX/DR IN RCRD: CPT | Performed by: INTERNAL MEDICINE

## 2024-10-21 NOTE — PROGRESS NOTES
Subjective:     Encounter Date:10/21/2024      Patient ID: Shaylee Wadsworth is a 63 y.o. female.    Chief Complaint: CAD HLD  HPI:   This is a very pleasant 63-year-old woman who is a patient of Dr. Dumas's, she was erroneously scheduled with me today because of a schedulers mistake.   She has a history of coronary disease with prior circumflex PCI.  Since then she has had no angina.  Her lipids have been well-controlled until recently when she started on Actemra for RA.  Her HDL has dropped by about 50% and LDL has risen over 100.  She admits she has room for improvement in her diet mostly in relation to carbs.    The following portions of the patient's history were reviewed and updated as appropriate: allergies, current medications, past family history, past medical history, past social history, past surgical history and problem list.     REVIEW OF SYSTEMS:   All systems reviewed.  Pertinent positives identified in HPI.  All other systems are negative.    Past Medical History:   Diagnosis Date    Abnormal electrocardiogram     Anemia 05/2021    Anxiety     Arthritis 05/01/2014    RA    Lainez esophagus     Benign paroxysmal positional vertigo due to bilateral vestibular disorder     Chest pain     Cholelithiasis 2007    Removed    Colon polyp     Coronary artery disease 02/26/2019    Depression 06/12/2019    Disease of thyroid gland     Dystonia     Embedded tick of right lower leg     Esophageal reflux     Fibromyositis     Functional movement disorder     H/O colonoscopy     H/O mammogram 08/31/2011    NORMAL     History of colonoscopy 03/2020    Hx of bone density study 08/31/2011    OSTEOPENIA     Hyperlipidemia     Hyperparathyroidism     Hypertension     Hypothyroidism 05/2021    Low back pain     Menopause     Osteopenia     Osteopenia     Ovarian cyst 2000    L ovary removed    Pancreatitis     Pancreatitis     Scoliosis 02/16/2013    Superficial incisional infection of surgical site     Tremor  04/04/2018    Vitamin D deficiency        Family History   Problem Relation Age of Onset    Diabetes Mother     Hyperlipidemia Mother     Hypertension Mother     Heart disease Mother     Kidney disease Mother     Cancer Mother     Heart attack Mother     Multiple myeloma Mother     Melanoma Mother         Marine water contamination    Coronary artery disease Mother     Anemia Mother     Obesity Mother     Arthritis Father     Anxiety disorder Father     COPD Father     Glaucoma Father     Hyperlipidemia Father     Hypertension Father     Vision loss Father     Heart disease Father     Heart attack Father     Pancreatitis Father     Heart disease Sister     Breast cancer Neg Hx     Colon cancer Neg Hx     Colon polyps Neg Hx        Social History     Socioeconomic History    Marital status:    Tobacco Use    Smoking status: Former     Current packs/day: 0.50     Average packs/day: 0.5 packs/day for 15.0 years (7.5 ttl pk-yrs)     Types: Cigarettes, Electronic Cigarette     Passive exposure: Never    Smokeless tobacco: Never    Tobacco comments:     8/12/18 switch to e-cig   Vaping Use    Vaping status: Every Day   Substance and Sexual Activity    Alcohol use: No    Drug use: No    Sexual activity: Not Currently     Partners: Male     Birth control/protection: Post-menopausal, Hysterectomy       Allergies   Allergen Reactions    Meloxicam Swelling     EYES AND FACE SWELLING  EYES AND FACE SWELLING    Effexor Xr [Venlafaxine Hcl Er] Unknown - Low Severity     Caused weight gain       Past Surgical History:   Procedure Laterality Date    APPENDECTOMY N/A 12/22/2021    Procedure: APPENDECTOMY LAPAROSCOPIC;  Surgeon: Maciel Godoy MD;  Location:  [facility] OR;  Service: General;  Laterality: N/A;    CARDIAC CATHETERIZATION N/A 03/04/2019    Procedure: Coronary angiography;  Surgeon: Jackelyn Dumas MD;  Location:  [facility];  Service: Cardiovascular    CARDIAC CATHETERIZATION N/A 03/04/2019     Procedure: Left heart cath;  Surgeon: Jackelyn Dumas MD;  Location:  [facility];  Service: Cardiovascular    CARDIAC CATHETERIZATION N/A 03/04/2019    Procedure: Left ventriculography;  Surgeon: Jackelyn Dumas MD;  Location:  [facility];  Service: Cardiovascular    CARDIAC CATHETERIZATION N/A 03/04/2019    Procedure: Stent KARLY coronary;  Surgeon: Jackelyn Dumas MD;  Location:  [facility];  Service: Cardiovascular    CHOLECYSTECTOMY      COLONOSCOPY  2014    COLONOSCOPY N/A 03/06/2020    Procedure: COLONOSCOPY, biopsy, polypectomy;  Surgeon: Rain Kirk MD;  Location:  [facility];  Service: Gastroenterology;  Laterality: N/A;  Random biopsies; Rectal polyp x 3; Hemorrhoids; Diverticulosis    CORONARY STENT PLACEMENT      DILATATION AND CURETTAGE      HYSTERECTOMY      LAPAROSCOPIC CHOLECYSTECTOMY  2005    MOUTH SURGERY      TOOTH EXTRACTION    OTHER SURGICAL HISTORY  03/27/2015    DIAGNOSTIC ESOPHAGOSCOPY TRANSNASAL FLEXIBLE WITH BIOPSY; ARZATE ESO. REPEAT EGD 2 YR     OVARIAN CYST SURGERY  2000    L ovary removed    OVARY SURGERY Left     NORMAL     PAP SMEAR  08/23/2010    NORMAL     PARATHYROIDECTOMY Right 08/17/2016    Dr. Muchael Flynn at [place]    SUBTOTAL HYSTERECTOMY      Left ovary removed9    WISDOM TOOTH EXTRACTION  1984         ECG 12 Lead    Date/Time: 10/21/2024 4:08 PM  Performed by: Ivana Coffey MD    Authorized by: Ivana Coffey MD  Comparison: compared with previous ECG from 10/19/2023  Rhythm: sinus rhythm  Rate: normal  Conduction: conduction normal  QRS axis: normal  Other findings: non-specific ST-T wave changes    Clinical impression: normal ECG             Objective:         PHYSICAL EXAM:  GEN: VSS, no distress,   Eyes: normal sclera, normal lids and lashes  HENT: moist mucus membranes,   Respiratory: CTAB, no rales or wheezes  CV: RRR, no murmurs, , +2 DP and 2+ carotid pulses b/l  GI: NABS, soft,  Nontender,  nondistended  MSK: no edema, no scoliosis or kyphosis  Skin: no rash, warm, dry  Heme/Lymph: no bruising or bleeding  Psych: organized thought, normal behavior and affect  Neuro: Cranial nerves grossly intact, Alert and Oriented x 3.         Assessment:          Diagnosis Plan   1. Hyperlipidemia LDL goal <70      LDL at goal on sttain therapy             Plan:       1.  CAD: No angina.  2.  Hyperlipidemia: Hyperlipidemia is a side effect of Actemra.  She will be getting her lipid panel repeated in about 10 days to verify it is truly abnormal.  I will defer to her primary cardiologist Dr. Dumas how she prefers to adjust her statin dose.    Laura, thank you very much for referring this kind patient to me. Please call me with any questions or concerns. Dr. Dumas will see the patient in 1 year         Ivana Coffey MD  10/21/24  [facility]    Outpatient Encounter Medications as of 10/21/2024   Medication Sig Dispense Refill    amLODIPine (NORVASC) 5 MG tablet Take 1 tablet by mouth once daily 90 tablet 0    amoxicillin-clavulanate (AUGMENTIN) 875-125 MG per tablet Take 1 tablet by mouth 2 (Two) Times a Day. 20 tablet 0    aspirin 81 MG tablet Take 1 tablet by mouth Daily.      atorvastatin (LIPITOR) 40 MG tablet TAKE 1 TABLET BY MOUTH ONCE DAILY AT NIGHT 90 tablet 1    B Complex-C-Biotin-D-Zinc-FA (VITAL-D RX PO) Take 1 capsule by mouth Every 7 (Seven) Days.      Cholecalciferol (Vitamin D) 50 MCG (2000 UT) capsule Take 1 capsule by mouth Daily.      cyclobenzaprine (FLEXERIL) 10 MG tablet Take 1 tablet by mouth 2 (Two) Times a Day As Needed.      Diclofenac Sodium (VOLTAREN) 1 % gel gel Apply 4 g topically to the appropriate area as directed 4 (Four) Times a Day. 350 g 3    esomeprazole (nexIUM) 40 MG capsule Take 1 capsule by mouth twice daily 180 capsule 1    folic acid (FOLVITE) 1 MG tablet Take 1 tablet by mouth Daily. 1 to 4 tablets daily      levothyroxine (SYNTHROID, LEVOTHROID) 50 MCG tablet  Take 1 tablet by mouth Daily. 90 tablet 3    methotrexate 2.5 MG tablet TAKE 4 TABLETS BY MOUTH ONCE A WEEK 32 tablet 6    metoprolol succinate XL (TOPROL-XL) 50 MG 24 hr tablet Take 1 tablet by mouth once daily 90 tablet 1    olmesartan (BENICAR) 40 MG tablet Take 1 tablet by mouth once daily 90 tablet 1    potassium chloride (KLOR-CON M20) 20 MEQ CR tablet Take 1 tablet by mouth once daily 90 tablet 0    tocilizumab (Actemra) 200 MG/10ML solution injection Every 30 (Thirty) Days.       No facility-administered encounter medications on file as of 10/21/2024.

## 2024-11-06 ENCOUNTER — OFFICE VISIT (OUTPATIENT)
Dept: ORTHOPEDIC SURGERY | Facility: CLINIC | Age: 64
End: 2024-11-06
Payer: COMMERCIAL

## 2024-11-06 VITALS
HEART RATE: 62 BPM | BODY MASS INDEX: 36.06 KG/M2 | SYSTOLIC BLOOD PRESSURE: 118 MMHG | WEIGHT: 191 LBS | DIASTOLIC BLOOD PRESSURE: 81 MMHG | HEIGHT: 61 IN

## 2024-11-06 DIAGNOSIS — M25.562 PAIN IN BOTH KNEES, UNSPECIFIED CHRONICITY: Primary | ICD-10-CM

## 2024-11-06 DIAGNOSIS — M17.0 OSTEOARTHRITIS OF PATELLOFEMORAL JOINTS, BILATERAL: ICD-10-CM

## 2024-11-06 DIAGNOSIS — M25.561 PAIN IN BOTH KNEES, UNSPECIFIED CHRONICITY: Primary | ICD-10-CM

## 2024-11-06 NOTE — PROGRESS NOTES
Subjective:     Patient ID: Shaylee Wadsworth is a 63 y.o. female.    Chief Complaint:  Bilateral knee pain - new issue to examiner  History of Present Illness  History of Present Illness  The patient is a 63-year-old female who presents to clinic today for evaluation of bilateral knees.    She is experiencing pain in both knees, which intensifies when sitting, descending stairs, walking long distances, and standing for extended periods. Despite trying topical treatments, rest, ice, and elevation, her symptoms have not significantly improved. She reports significant crepitus, or a crackling sensation, throughout the front of her knees. She has a history of rheumatoid arthritis. She is currently caring for her mother, who lives with her and requires her to navigate steps. She denies any other concerns at this time.      Social History     Occupational History    Not on file   Tobacco Use    Smoking status: Former     Current packs/day: 0.50     Average packs/day: 0.5 packs/day for 15.0 years (7.5 ttl pk-yrs)     Types: Cigarettes, Electronic Cigarette     Passive exposure: Never    Smokeless tobacco: Never    Tobacco comments:     8/12/18 switch to e-cig   Vaping Use    Vaping status: Every Day   Substance and Sexual Activity    Alcohol use: No    Drug use: No    Sexual activity: Not Currently     Partners: Male     Birth control/protection: Post-menopausal, Hysterectomy      Past Medical History:   Diagnosis Date    Abnormal electrocardiogram     Anemia 05/2021    Anxiety     Arthritis 05/01/2014    RA    Lainez esophagus     Benign paroxysmal positional vertigo due to bilateral vestibular disorder     Chest pain     Cholelithiasis 2007    Removed    Colon polyp     Coronary artery disease 02/26/2019    Depression 06/12/2019    Disease of thyroid gland     Dystonia     Embedded tick of right lower leg     Esophageal reflux     Fibromyositis     Functional movement disorder     H/O colonoscopy     H/O mammogram  08/31/2011    NORMAL     History of colonoscopy 03/2020    Hx of bone density study 08/31/2011    OSTEOPENIA     Hyperlipidemia     Hyperparathyroidism     Hypertension     Hypothyroidism 05/2021    Knee swelling 2018    Low back pain     Menopause     Osteopenia     Osteopenia     Ovarian cyst 2000    L ovary removed    Pancreatitis     Pancreatitis     Scoliosis 02/16/2013    Superficial incisional infection of surgical site     Tremor 04/04/2018    Vitamin D deficiency      Past Surgical History:   Procedure Laterality Date    APPENDECTOMY N/A 12/22/2021    Procedure: APPENDECTOMY LAPAROSCOPIC;  Surgeon: Maciel Godoy MD;  Location: [facility] OR;  Service: General;  Laterality: N/A;    CARDIAC CATHETERIZATION N/A 03/04/2019    Procedure: Coronary angiography;  Surgeon: Jackelyn Dumas MD;  Location:  [facility];  Service: Cardiovascular    CARDIAC CATHETERIZATION N/A 03/04/2019    Procedure: Left heart cath;  Surgeon: Jackelyn Dumas MD;  Location:  [facility];  Service: Cardiovascular    CARDIAC CATHETERIZATION N/A 03/04/2019    Procedure: Left ventriculography;  Surgeon: Jackelyn Dumas MD;  Location:  [facility];  Service: Cardiovascular    CARDIAC CATHETERIZATION N/A 03/04/2019    Procedure: Stent KARLY coronary;  Surgeon: Jackelyn Dumas MD;  Location:  [facility];  Service: Cardiovascular    CHOLECYSTECTOMY      COLONOSCOPY  2014    COLONOSCOPY N/A 03/06/2020    Procedure: COLONOSCOPY, biopsy, polypectomy;  Surgeon: Rain Kirk MD;  Location: [facility] OR;  Service: Gastroenterology;  Laterality: N/A;  Random biopsies; Rectal polyp x 3; Hemorrhoids; Diverticulosis    CORONARY STENT PLACEMENT      DILATATION AND CURETTAGE      HYSTERECTOMY      LAPAROSCOPIC CHOLECYSTECTOMY  2005    MOUTH SURGERY      TOOTH EXTRACTION    NECK SURGERY  2017    PTH Adnomea    OTHER SURGICAL HISTORY  03/27/2015    DIAGNOSTIC ESOPHAGOSCOPY TRANSNASAL  "FLEXIBLE WITH BIOPSY; ARZATE ESO. REPEAT EGD 2 YR     OVARIAN CYST SURGERY  2000    L ovary removed    OVARY SURGERY Left     NORMAL     PAP SMEAR  08/23/2010    NORMAL     PARATHYROIDECTOMY Right 08/17/2016    Dr. Muchael Flynn at [place]    SUBTOTAL HYSTERECTOMY      Left ovary removed9    WISDOM TOOTH EXTRACTION  1984       Family History   Problem Relation Age of Onset    Diabetes Mother     Hyperlipidemia Mother     Hypertension Mother     Heart disease Mother     Kidney disease Mother     Cancer Mother     Heart attack Mother     Multiple myeloma Mother     Melanoma Mother         Marine water contamination    Coronary artery disease Mother     Anemia Mother     Obesity Mother     Arthritis Father     Anxiety disorder Father     COPD Father     Glaucoma Father     Hyperlipidemia Father     Hypertension Father     Vision loss Father     Heart disease Father     Heart attack Father     Pancreatitis Father     Heart disease Sister     Breast cancer Neg Hx     Colon cancer Neg Hx     Colon polyps Neg Hx                Objective:  Physical Exam    Vital signs reviewed.   General: No acute distress.  Eyes: conjunctiva clear; pupils equally round and reactive  ENT: external ears and nose atraumatic; oropharynx clear  CV: no peripheral edema  Resp: normal respiratory effort  Skin: no rashes or wounds; normal turgor  Psych: mood and affect appropriate; recent and remote memory intact    Vitals:    11/06/24 1354   BP: 118/81   Pulse: 62   Weight: 86.6 kg (191 lb)   Height: 154.9 cm (60.98\")         11/06/24  1354   Weight: 86.6 kg (191 lb)     Body mass index is 36.11 kg/m².      Ortho Exam     Physical Exam  Bilateral knees examined:  Positive crepitus throughout arc of motion   Knee range of motion 0-120 degrees  Stable to varus and valgus stress at 0 degrees and 30 degrees  Negative medial lateral Chong's exam  1+ anterior Lachman exam  Negative anterior posterior drawer exam    Imaging:  Bilateral Knee " X-Ray  Indication: Pain    AP, Lateral, and [place] views    Findings:  No fracture  No bony lesion  Normal soft tissues  Mild medial compartment narrowing patellofemoral narrowing    No prior studies were available for comparison.    Assessment:        1. Pain in both knees, unspecified chronicity    2. Osteoarthritis of patellofemoral joints, bilateral         Assessment & Plan  1. Bilateral knee pain.  She is experiencing pain in both knees, which worsens with sitting, descending stairs, walking long distances, and standing for extended periods. She has tried topical treatments, rest, ice, and elevation without significant symptom resolution. Significant crepitus is noted throughout the anterior aspect of the knees. Treatment options discussed include corticosteroid injections versus viscosupplementation injections, but she wishes to hold off on injections at this time. Glucosamine chondroitin was discussed as a treatment option, and she was informed that significant benefits may not be noticed for the first 6 weeks. Heat, alternating with ice, quad strengthening, and hamstring strengthening exercises were demonstrated and provided during the visit. These exercises aim to decrease pressure on the knees by increasing pressure on the muscles. She verbalized understanding and agrees with the plan of care. If there is no improvement, she will return to the clinic.    2. Rheumatoid arthritis.  She has a history of rheumatoid arthritis, which may be contributing to her knee pain. Continued monitoring and management of her rheumatoid arthritis are recommended.      Orders:  Orders Placed This Encounter   Procedures    XR Knee 3 View Bilateral     No orders of the defined types were placed in this encounter.          Dragon dictation utilized          Patient or patient representative verbalized consent for the use of Ambient Listening during the visit with  KAITLYNN Brower for chart documentation. 11/7/2024  16:12  EST

## 2024-11-07 PROBLEM — M17.0 OSTEOARTHRITIS OF PATELLOFEMORAL JOINTS, BILATERAL: Status: ACTIVE | Noted: 2024-11-07

## 2024-11-11 ENCOUNTER — OFFICE VISIT (OUTPATIENT)
Dept: INTERNAL MEDICINE | Facility: CLINIC | Age: 64
End: 2024-11-11
Payer: MEDICARE

## 2024-11-11 VITALS
DIASTOLIC BLOOD PRESSURE: 80 MMHG | OXYGEN SATURATION: 98 % | TEMPERATURE: 97.8 F | SYSTOLIC BLOOD PRESSURE: 118 MMHG | HEART RATE: 62 BPM | BODY MASS INDEX: 36.11 KG/M2 | WEIGHT: 191 LBS

## 2024-11-11 DIAGNOSIS — I10 BENIGN ESSENTIAL HTN: Primary | ICD-10-CM

## 2024-11-11 DIAGNOSIS — R42 VERTIGO: ICD-10-CM

## 2024-11-11 DIAGNOSIS — E03.9 ACQUIRED HYPOTHYROIDISM: ICD-10-CM

## 2024-11-11 DIAGNOSIS — E21.3 HYPERPARATHYROIDISM: ICD-10-CM

## 2024-11-11 DIAGNOSIS — I25.10 CORONARY ARTERY DISEASE INVOLVING NATIVE CORONARY ARTERY OF NATIVE HEART WITHOUT ANGINA PECTORIS: ICD-10-CM

## 2024-11-11 DIAGNOSIS — R73.03 PREDIABETES: ICD-10-CM

## 2024-11-11 DIAGNOSIS — E78.5 HYPERLIPIDEMIA LDL GOAL <70: ICD-10-CM

## 2024-11-11 DIAGNOSIS — G25.9 FUNCTIONAL MOVEMENT DISORDER: ICD-10-CM

## 2024-11-11 PROCEDURE — 99214 OFFICE O/P EST MOD 30 MIN: CPT | Performed by: NURSE PRACTITIONER

## 2024-11-11 PROCEDURE — 3079F DIAST BP 80-89 MM HG: CPT | Performed by: NURSE PRACTITIONER

## 2024-11-11 PROCEDURE — 1125F AMNT PAIN NOTED PAIN PRSNT: CPT | Performed by: NURSE PRACTITIONER

## 2024-11-11 PROCEDURE — 3074F SYST BP LT 130 MM HG: CPT | Performed by: NURSE PRACTITIONER

## 2024-11-11 PROCEDURE — G2211 COMPLEX E/M VISIT ADD ON: HCPCS | Performed by: NURSE PRACTITIONER

## 2024-11-11 RX ORDER — ROSUVASTATIN CALCIUM 40 MG/1
40 TABLET, COATED ORAL DAILY
Qty: 90 TABLET | Refills: 1 | Status: SHIPPED | OUTPATIENT
Start: 2024-11-11

## 2024-11-11 NOTE — PROGRESS NOTES
Chief Complaint   Patient presents with    Hypertension     6 month f/u    Dizziness     Woke up Sunday with vertigo        Subjective     Shaylee Wadsworth is a 63 y.o. female being seen for a follow up appointment today regarding HTN, Hyperlipidemia, prediabetes, GERD, CAD, Hypothyroidism, FMD, Vit D def, and Rheumatoid Arthritis.      She is currently on Norvasc 5mg, Benicar 40mg, and Toprol XL 50mg for HTN and CAD and HTN. She is followed by Dr. Dumas. S/P drug eluting stent Distal left circumflex. Plavix was stopped April 2020. She is on an 81 mg aspirin therapy. Her statin therapy is Lipitor 40mg nightly. She tolerates this w/o myalgias. She takes potassium for hypokalemia.       She is on Nexium 40mg daily for GERD. She reports that her diet has not been good. She is having reflux every night. Denies bleeding or abd pain.      She has FMD since April 2018. She has been evaluated by 2 neurologists as well as [facility] and an ENT. Last Neurologist was Dr. LaFavre at [facility]. She has been thru PT and OT with Movement disordered clinic. She feels like traction helps more than any other modality, which she does at home after being released from PT. She has thoracic back pain, daily pain rated a 3 of 10. She uses flexeril sparingly for pain as needed.      She has both hypothyroidism and hyperparathyroidism. She is followed by Dr. Carpenter ([facility]) for . She is on Euthyrox 50mcgs daily.      She is followed by Rheumatology for RA. She is on Actemra 200mg monthly and methotrexate 2.5mg 4 tablets weekly, for RA.      She has hitsory of TAMANNA and anemia in chronic disease. Followed by Dr. Dunn. She stopped her iron since last visit due to constipation.      She is having difficulty staying alseep. She had a sleep study in 2019, but was unable to use a Cpap machine.      She has had vertigo Sunday morning. Symptoms come and go, and are positional related.     History of Present Illness     Allergies   Allergen  Reactions    Meloxicam Swelling     EYES AND FACE SWELLING  EYES AND FACE SWELLING    Effexor Xr [Venlafaxine Hcl Er] Unknown - Low Severity     Caused weight gain         Current Outpatient Medications:     amLODIPine (NORVASC) 5 MG tablet, Take 1 tablet by mouth once daily, Disp: 90 tablet, Rfl: 0    aspirin 81 MG tablet, Take 1 tablet by mouth Daily., Disp: , Rfl:     atorvastatin (LIPITOR) 40 MG tablet, TAKE 1 TABLET BY MOUTH ONCE DAILY AT NIGHT, Disp: 90 tablet, Rfl: 1    Cholecalciferol (Vitamin D) 50 MCG (2000 UT) capsule, Take 1 capsule by mouth Daily., Disp: , Rfl:     cyclobenzaprine (FLEXERIL) 10 MG tablet, Take 1 tablet by mouth 2 (Two) Times a Day As Needed., Disp: , Rfl:     Diclofenac Sodium (VOLTAREN) 1 % gel gel, Apply 4 g topically to the appropriate area as directed 4 (Four) Times a Day., Disp: 350 g, Rfl: 3    esomeprazole (nexIUM) 40 MG capsule, Take 1 capsule by mouth twice daily, Disp: 180 capsule, Rfl: 1    folic acid (FOLVITE) 1 MG tablet, Take 1 tablet by mouth Daily. 1 to 4 tablets daily, Disp: , Rfl:     levothyroxine (SYNTHROID, LEVOTHROID) 50 MCG tablet, Take 1 tablet by mouth Daily., Disp: 90 tablet, Rfl: 3    methotrexate 2.5 MG tablet, TAKE 4 TABLETS BY MOUTH ONCE A WEEK, Disp: 32 tablet, Rfl: 6    metoprolol succinate XL (TOPROL-XL) 50 MG 24 hr tablet, Take 1 tablet by mouth once daily, Disp: 90 tablet, Rfl: 1    olmesartan (BENICAR) 40 MG tablet, Take 1 tablet by mouth once daily, Disp: 90 tablet, Rfl: 1    potassium chloride (KLOR-CON M20) 20 MEQ CR tablet, Take 1 tablet by mouth once daily, Disp: 90 tablet, Rfl: 0    tocilizumab (Actemra) 200 MG/10ML solution injection, Every 30 (Thirty) Days., Disp: , Rfl:     amoxicillin-clavulanate (AUGMENTIN) 875-125 MG per tablet, Take 1 tablet by mouth 2 (Two) Times a Day. (Patient not taking: Reported on 11/6/2024), Disp: 20 tablet, Rfl: 0    B Complex-C-Biotin-D-Zinc-FA (VITAL-D RX PO), Take 1 capsule by mouth Every 7 (Seven) Days., Disp:  , Rfl:     The following portions of the patient's history were reviewed and updated as appropriate: allergies, current medications, past family history, past medical history, past social history, past surgical history, and problem list.    Review of Systems   Cardiovascular:  Negative for palpitations and leg swelling.   Gastrointestinal: Negative.    Musculoskeletal:  Positive for arthralgias.   Skin: Negative.        Assessment     Physical Exam  Vitals reviewed.   Constitutional:       Appearance: Normal appearance. She is obese. She is not ill-appearing.   HENT:      Head: Normocephalic.      Right Ear: Tympanic membrane normal.      Left Ear: Tympanic membrane normal.   Cardiovascular:      Rate and Rhythm: Normal rate and regular rhythm.      Pulses: Normal pulses.      Heart sounds: Normal heart sounds. No murmur heard.  Pulmonary:      Effort: Pulmonary effort is normal. No respiratory distress.      Breath sounds: Normal breath sounds. No stridor.   Musculoskeletal:      Cervical back: Neck supple.      Right lower leg: No edema.      Left lower leg: No edema.   Skin:     General: Skin is warm and dry.   Neurological:      General: No focal deficit present.      Mental Status: She is alert and oriented to person, place, and time.      Gait: Gait abnormal (slow, unsteady due to vertigo).   Psychiatric:         Mood and Affect: Mood normal.         Behavior: Behavior normal.         Thought Content: Thought content normal.         Plan     Her fasting labs were reviewed with the patient from last week.     Diagnoses and all orders for this visit:    1. Benign essential HTN (Primary)  Comments:  Chronic, BP at goal on current medications  Orders:  -     CBC & Differential; Future  -     Comprehensive Metabolic Panel; Future  -     Lipid Panel With / Chol / HDL Ratio; Future    2. Coronary artery disease involving native coronary artery of native heart without angina pectoris  Comments:  Followed by Cardio,  LDL goal < 70  Orders:  -     rosuvastatin (Crestor) 40 MG tablet; Take 1 tablet by mouth Daily.  Dispense: 90 tablet; Refill: 1  -     Comprehensive Metabolic Panel; Future  -     Lipid Panel With / Chol / HDL Ratio; Future    3. Prediabetes  Comments:  Chronic, Hgb A1c improving  Orders:  -     Comprehensive Metabolic Panel; Future  -     Hemoglobin A1c; Future    4. Vertigo  Comments:  Instructed on epley manuver    5. Acquired hypothyroidism  Comments:  Chronic, Euthyroid on levothyroxine 50mcgs daily  Orders:  -     TSH; Future  -     T4, Free; Future    6. Hyperlipidemia LDL goal <70  Comments:  Chronic, poorly controlled. Stop [id number] due to high LDL and low HDL. Start Crestor 40mg nightly  Orders:  -     rosuvastatin (Crestor) 40 MG tablet; Take 1 tablet by mouth Daily.  Dispense: 90 tablet; Refill: 1  -     Lipid Panel With / Chol / HDL Ratio; Future  -     Hemoglobin A1c; Future    7. Functional movement disorder  Comments:  Chronic Controlled with traction and chiropractic care    8. Hyperparathyroidism  Comments:  Chronic, PTH stable  Orders:  -     PTH, Intact; Future        Follow up in 6 months w TITUS

## 2024-11-16 DIAGNOSIS — E87.6 HYPOKALEMIA: ICD-10-CM

## 2024-11-16 DIAGNOSIS — I10 ESSENTIAL HYPERTENSION: ICD-10-CM

## 2024-11-16 RX ORDER — POTASSIUM CHLORIDE 1500 MG/1
20 TABLET, EXTENDED RELEASE ORAL DAILY
Qty: 90 TABLET | Refills: 1 | Status: SHIPPED | OUTPATIENT
Start: 2024-11-16

## 2024-11-16 NOTE — TELEPHONE ENCOUNTER
Rx Refill Note  Requested Prescriptions     Pending Prescriptions Disp Refills    potassium chloride (KLOR-CON M20) 20 MEQ CR tablet [Pharmacy Med Name: Potassium Chloride Eunice ER 20 MEQ Oral Tablet Extended Release] 90 tablet 1     Sig: Take 1 tablet by mouth once daily      Last office visit with prescribing clinician: 11/11/2024   Last telemedicine visit with prescribing clinician: Visit date not found   Next office visit with prescribing clinician: 5/14/2025                         Would you like a call back once the refill request has been completed: [] Yes [] No    If the office needs to give you a call back, can they leave a voicemail: [] Yes [] No    Mone Hilario MA  11/16/24, 13:25 EST

## 2024-11-19 ENCOUNTER — OFFICE VISIT (OUTPATIENT)
Dept: SLEEP MEDICINE | Facility: HOSPITAL | Age: 64
End: 2024-11-19
Payer: MEDICARE

## 2024-11-19 VITALS
OXYGEN SATURATION: 97 % | HEIGHT: 60 IN | HEART RATE: 63 BPM | SYSTOLIC BLOOD PRESSURE: 133 MMHG | DIASTOLIC BLOOD PRESSURE: 76 MMHG | WEIGHT: 192 LBS | BODY MASS INDEX: 37.69 KG/M2

## 2024-11-19 DIAGNOSIS — R00.2 PALPITATIONS: ICD-10-CM

## 2024-11-19 DIAGNOSIS — G47.33 OSA (OBSTRUCTIVE SLEEP APNEA): Primary | ICD-10-CM

## 2024-11-19 DIAGNOSIS — I10 BENIGN ESSENTIAL HTN: ICD-10-CM

## 2024-11-19 DIAGNOSIS — G47.01 INSOMNIA DUE TO MEDICAL CONDITION: ICD-10-CM

## 2024-11-19 PROCEDURE — G0463 HOSPITAL OUTPT CLINIC VISIT: HCPCS

## 2024-11-19 NOTE — PROGRESS NOTES
SLEEP/PULMONARY  CLINIC NOTE      PATIENT IDENTIFICATION:  Name: Shaylee Wadsworth  Age: 63 y.o.  Sex: female  :  1960  MRN: [id number]    DATE OF CONSULTATION:  2024     Referring physician:    Laura Ayala APRN            CHIEF COMPLAINT: DERRICK    History of Present Illness:   Shaylee Wadsworth is a 63 y.o. female Pt on CPAP feeling better more energy especially the night he use it more than 4 hours, no sleepiness no fatigue no tiredness, no mask irritation no dryness, compliance report reviewed with pt d discussed with the patient the download data and encouarged the patient to continue to use the CPAP. The residual AHI is acceptable.     Patient still having insomnia difficulty falling asleep because of her arthritis, because of her taking care of her mom she has to have multiple wake up at night    Review of Systems:   Constitutional: As above   Eyes: negative   ENT/oropharynx: negative   Cardiovascular: negative   Respiratory: negative   Gastrointestinal: negative   Genitourinary: negative   Neurological: negative   Musculoskeletal: negative   Integument/breast: negative   Endocrine: negative   Allergic/Immunologic: negative     Past Medical History:  Past Medical History:   Diagnosis Date    Abnormal electrocardiogram     Anemia 2021    Anxiety     Arthritis 2014    RA    Lainez esophagus     Benign paroxysmal positional vertigo due to bilateral vestibular disorder     Chest pain     Cholelithiasis 2007    Removed    Colon polyp     Coronary artery disease 2019    Depression 2019    Disease of thyroid gland     Dystonia     Embedded tick of right lower leg     Esophageal reflux     Fibromyositis     Functional movement disorder     H/O colonoscopy     H/O mammogram 2011    NORMAL     History of colonoscopy 2020    Hx of bone density study 2011    OSTEOPENIA     Hyperlipidemia     Hyperparathyroidism     Hypertension     Hypothyroidism 2021    Knee swelling  2018    Low back pain     Menopause     Osteopenia     Osteopenia     Ovarian cyst 2000    L ovary removed    Pancreatitis     Pancreatitis     Scoliosis 02/16/2013    Superficial incisional infection of surgical site     Tremor 04/04/2018    Vitamin D deficiency        Past Surgical History:  Past Surgical History:   Procedure Laterality Date    APPENDECTOMY N/A 12/22/2021    Procedure: APPENDECTOMY LAPAROSCOPIC;  Surgeon: Maciel Godoy MD;  Location: [facility] OR;  Service: General;  Laterality: N/A;    CARDIAC CATHETERIZATION N/A 03/04/2019    Procedure: Coronary angiography;  Surgeon: Jackelyn Dumas MD;  Location:  [facility];  Service: Cardiovascular    CARDIAC CATHETERIZATION N/A 03/04/2019    Procedure: Left heart cath;  Surgeon: Jackelyn Dumas MD;  Location:  [facility];  Service: Cardiovascular    CARDIAC CATHETERIZATION N/A 03/04/2019    Procedure: Left ventriculography;  Surgeon: Jackelyn Dumas MD;  Location:  [facility];  Service: Cardiovascular    CARDIAC CATHETERIZATION N/A 03/04/2019    Procedure: Stent KARLY coronary;  Surgeon: Jackelyn Dumas MD;  Location:  [facility];  Service: Cardiovascular    CHOLECYSTECTOMY      COLONOSCOPY  2014    COLONOSCOPY N/A 03/06/2020    Procedure: COLONOSCOPY, biopsy, polypectomy;  Surgeon: Rain Kirk MD;  Location: [facility] OR;  Service: Gastroenterology;  Laterality: N/A;  Random biopsies; Rectal polyp x 3; Hemorrhoids; Diverticulosis    CORONARY STENT PLACEMENT      DILATATION AND CURETTAGE      HYSTERECTOMY      LAPAROSCOPIC CHOLECYSTECTOMY  2005    MOUTH SURGERY      TOOTH EXTRACTION    NECK SURGERY  2017    PTH Adnomea    OTHER SURGICAL HISTORY  03/27/2015    DIAGNOSTIC ESOPHAGOSCOPY TRANSNASAL FLEXIBLE WITH BIOPSY; ARZATE ESO. REPEAT EGD 2 YR     OVARIAN CYST SURGERY  2000    L ovary removed    OVARY SURGERY Left     NORMAL     PAP SMEAR  08/23/2010    NORMAL     PARATHYROIDECTOMY  Right 08/17/2016    Dr. Muchael Flynn at [place]    SUBTOTAL HYSTERECTOMY      Left ovary removed9    WISDOM TOOTH EXTRACTION  1984        Family History:  Family History   Problem Relation Age of Onset    Diabetes Mother     Hyperlipidemia Mother     Hypertension Mother     Heart disease Mother     Kidney disease Mother     Cancer Mother     Heart attack Mother     Multiple myeloma Mother     Melanoma Mother         Marine water contamination    Coronary artery disease Mother     Anemia Mother     Obesity Mother     Arthritis Father     Anxiety disorder Father     COPD Father     Glaucoma Father     Hyperlipidemia Father     Hypertension Father     Vision loss Father     Heart disease Father     Heart attack Father     Pancreatitis Father     Heart disease Sister     Breast cancer Neg Hx     Colon cancer Neg Hx     Colon polyps Neg Hx         Social History:   Social History     Tobacco Use    Smoking status: Former     Current packs/day: 0.50     Average packs/day: 0.5 packs/day for 15.0 years (7.5 ttl pk-yrs)     Types: Cigarettes, Electronic Cigarette     Passive exposure: Never    Smokeless tobacco: Never    Tobacco comments:     8/12/18 switch to e-cig   Substance Use Topics    Alcohol use: No        Allergies:  Allergies   Allergen Reactions    Meloxicam Swelling     EYES AND FACE SWELLING  EYES AND FACE SWELLING    Effexor Xr [Venlafaxine Hcl Er] Unknown - Low Severity     Caused weight gain       Home Meds:  (Not in a hospital admission)      Objective:    Vitals Ranges:   Heart Rate:  [63] 63  BP: (133)/(76) 133/76  Body mass index is 37.5 kg/m².     Exam:  General Appearance:  WDWN    HEENT:   without obvious abnormality,  Conjunctiva/corneas clear,  Normal external ear canals, no drainage    Clear orsalmucosa,  Mallampati score 3    Neck:  Supple, symmetrical, trachea midline. No JVD.  Lungs:   Bilateral basal rhonchi bilaterally, respirations unlabored symmetrical wall movement.    Chest wall:  No  tenderness or deformity.    Heart:  Regular rate and rhythm, S1 and S2 normal.  Extremities: Trace edema no clubbing or Cyanosis        Data Review:  All labs (24hrs): No results found for this or any previous visit (from the past 24 hours).     Imaging:  XR Knee 3 View Bilateral  Ordering physician's impression is located in the Encounter Note dated 11/06/24.           ASSESSMENT:  Diagnoses and all orders for this visit:    DERRICK (obstructive sleep apnea)    Insomnia due to medical condition    Benign essential HTN    Palpitations        PLAN:  Set a bedtime that is early enough for you to get at least 7 hours of sleep.  Don’t go to bed unless you are sleepy.  If you don’t fall asleep after 20 minutes, get out of bed.  Establish a relaxing bedtime routine.  Use your bed only for sleep and sex.  Make your bedroom quiet and relaxing. Keep the room at a comfortable, cool temperature.  Limit exposure to bright light in the evenings.  Turn off electronic devices at least 30 minutes before bedtime.  Don’t eat a large meal before bedtime. If you are hungry at night, eat a light, healthy snack.  Exercise regularly and maintain a healthy diet.  Avoid consuming caffeine in the late afternoon or evening.  Avoid consuming alcohol before bedtime.  Reduce your fluid intake before bedtime.  Avoid naps during the day time.     This patient with obstructive sleep apnea, compliance is improved. Encourage to use it more frequent, I re-emphasized on pt the long and short term benefit of treating DERRICK. The device is benefiting the patient.  The patient is also instructed to get the CPAP supplies from the [facility] and see me in 1 year for follow-up.    Treating DERRICK will improve BP control and heart rate control     Follow-up 3 months    Albertina Sandhu MD. D, ABSM.  11/19/2024  11:49 EST

## 2024-11-29 DIAGNOSIS — I10 BENIGN ESSENTIAL HTN: ICD-10-CM

## 2024-12-01 RX ORDER — AMLODIPINE BESYLATE 5 MG/1
TABLET ORAL
Qty: 90 TABLET | Refills: 1 | Status: SHIPPED | OUTPATIENT
Start: 2024-12-01

## 2024-12-02 NOTE — TELEPHONE ENCOUNTER
Orders put in as per below.  LM on patient VM to be fasting when she gets labs drawn.    ----- Message from KAITLYNN Choi sent at 1/10/2019  1:00 PM EST -----  Regarding: RE: ADD TO LAB ORDER  Contact: [phone]  Yes, she is due lipid, cmp, cbc, and thyroid labs    ----- Message -----  From: Lily Whelan MA  Sent: 1/10/2019  12:47 PM  To: KAITLYNN Choi  Subject: FW: ADD TO LAB ORDER                             Please clarify labs patient is due for in addition to the TSH/FT4.  There are lab orders entered on , , and .  Is she due for Lipid in addition to the CMP and CBC?  ----- Message -----  From: Ryann Ayala APRN  Sent: 2019   7:38 AM  To: Lily Whelan MA  Subject: RE: ADD TO LAB ORDER                             Okay to add TSH w reflex free T4  ----- Message -----  From: Lily Whelan MA  Sent: 2019   3:51 PM  To: KAITLYNN Choi  Subject: FW: ADD TO LAB ORDER                                 ----- Message -----  From: Christy Branham  Sent: 2019   2:48 PM  To: Grazyna Zarate [facility]  Subject: ADD TO LAB ORDER                                 :  64  RYANN PT.    PATIENT WOULD LIKE THE PTH TEST ADDED TO HER LAB ORDERS BECAUSE SHE HAS HAD THYROID ISSUES IN THE PAST.           Patient called.  Ozempic 0.5 mg needs PA  Patient was on Metformin in the past    He was due to take his dose yesterday    Do we have samples we can offer

## 2025-02-18 ENCOUNTER — OFFICE VISIT (OUTPATIENT)
Dept: SLEEP MEDICINE | Facility: HOSPITAL | Age: 65
End: 2025-02-18
Payer: COMMERCIAL

## 2025-02-18 VITALS
HEIGHT: 60 IN | DIASTOLIC BLOOD PRESSURE: 72 MMHG | BODY MASS INDEX: 38.28 KG/M2 | HEART RATE: 66 BPM | WEIGHT: 195 LBS | OXYGEN SATURATION: 98 % | SYSTOLIC BLOOD PRESSURE: 118 MMHG

## 2025-02-18 DIAGNOSIS — I10 BENIGN ESSENTIAL HTN: ICD-10-CM

## 2025-02-18 DIAGNOSIS — I25.10 CORONARY ARTERY DISEASE INVOLVING NATIVE CORONARY ARTERY OF NATIVE HEART WITHOUT ANGINA PECTORIS: ICD-10-CM

## 2025-02-18 DIAGNOSIS — G47.33 OSA (OBSTRUCTIVE SLEEP APNEA): Primary | ICD-10-CM

## 2025-02-18 DIAGNOSIS — R00.2 PALPITATIONS: ICD-10-CM

## 2025-02-18 DIAGNOSIS — G43.009 MIGRAINE WITHOUT AURA AND WITHOUT STATUS MIGRAINOSUS, NOT INTRACTABLE: ICD-10-CM

## 2025-02-18 PROCEDURE — G0463 HOSPITAL OUTPT CLINIC VISIT: HCPCS

## 2025-02-18 NOTE — PROGRESS NOTES
SLEEP/PULMONARY  CLINIC NOTE      PATIENT IDENTIFICATION:  Name: Shaylee Wadsworth  Age: 64 y.o.  Sex: female  :  1960  MRN: [id number]    DATE OF CONSULTATION:  2025     Referring physician:    Laura Ayala APRN            CHIEF COMPLAINT: DERRICK    History of Present Illness:   Shaylee Wadsworth is a 64 y.o. female Pt on CPAP feeling better more energy especially the night he use it more than 4 hours, no sleepiness no fatigue no tiredness, no mask irritation no dryness, compliance report reviewed with pt d discussed with the patient the download data and encouarged the patient to continue to use the CPAP. The residual AHI is acceptable.         Review of Systems:   Constitutional: negative   Eyes: negative   ENT/oropharynx: negative   Cardiovascular: negative   Respiratory: negative   Gastrointestinal: negative   Genitourinary: negative   Neurological: negative   Musculoskeletal: negative   Integument/breast: negative   Endocrine: negative   Allergic/Immunologic: negative     Past Medical History:  Past Medical History:   Diagnosis Date    Abnormal electrocardiogram     Anemia 2021    Anxiety     Arthritis 2014    RA    Lainez esophagus     Benign paroxysmal positional vertigo due to bilateral vestibular disorder     Chest pain     Cholelithiasis 2007    Removed    Colon polyp     Coronary artery disease 2019    Depression 2019    Disease of thyroid gland     Dystonia     Embedded tick of right lower leg     Esophageal reflux     Fibromyositis     Functional movement disorder     H/O colonoscopy     H/O mammogram 2011    NORMAL     History of colonoscopy 2020    Hx of bone density study 2011    OSTEOPENIA     Hyperlipidemia     Hyperparathyroidism     Hypertension     Hypothyroidism 2021    Knee swelling 2018    Low back pain     Menopause     Osteopenia     Osteopenia     Ovarian cyst 2000    L ovary removed    Pancreatitis     Pancreatitis     Scoliosis 2013     Superficial incisional infection of surgical site     Tremor 04/04/2018    Vitamin D deficiency        Past Surgical History:  Past Surgical History:   Procedure Laterality Date    APPENDECTOMY N/A 12/22/2021    Procedure: APPENDECTOMY LAPAROSCOPIC;  Surgeon: Maciel Godoy MD;  Location: [facility] OR;  Service: General;  Laterality: N/A;    CARDIAC CATHETERIZATION N/A 03/04/2019    Procedure: Coronary angiography;  Surgeon: Jackelyn Dumas MD;  Location:  [facility];  Service: Cardiovascular    CARDIAC CATHETERIZATION N/A 03/04/2019    Procedure: Left heart cath;  Surgeon: Jackelyn Dumas MD;  Location:  [facility];  Service: Cardiovascular    CARDIAC CATHETERIZATION N/A 03/04/2019    Procedure: Left ventriculography;  Surgeon: Jackelyn Dumas MD;  Location:  [facility];  Service: Cardiovascular    CARDIAC CATHETERIZATION N/A 03/04/2019    Procedure: Stent KARLY coronary;  Surgeon: Jackelyn Dumas MD;  Location:  [facility];  Service: Cardiovascular    CHOLECYSTECTOMY      COLONOSCOPY  2014    COLONOSCOPY N/A 03/06/2020    Procedure: COLONOSCOPY, biopsy, polypectomy;  Surgeon: Rain Kirk MD;  Location: [facility] OR;  Service: Gastroenterology;  Laterality: N/A;  Random biopsies; Rectal polyp x 3; Hemorrhoids; Diverticulosis    CORONARY STENT PLACEMENT      DILATATION AND CURETTAGE      HYSTERECTOMY      LAPAROSCOPIC CHOLECYSTECTOMY  2005    MOUTH SURGERY      TOOTH EXTRACTION    NECK SURGERY  2017    PTH Adnomea    OTHER SURGICAL HISTORY  03/27/2015    DIAGNOSTIC ESOPHAGOSCOPY TRANSNASAL FLEXIBLE WITH BIOPSY; ARZATE ESO. REPEAT EGD 2 YR     OVARIAN CYST SURGERY  2000    L ovary removed    OVARY SURGERY Left     NORMAL     PAP SMEAR  08/23/2010    NORMAL     PARATHYROIDECTOMY Right 08/17/2016    Dr. Muchael Flynn at [place]    SUBTOTAL HYSTERECTOMY      Left ovary removed9    WISDOM TOOTH EXTRACTION  1984        Family History:  Family  History   Problem Relation Age of Onset    Diabetes Mother     Hyperlipidemia Mother     Hypertension Mother     Heart disease Mother     Kidney disease Mother     Cancer Mother     Heart attack Mother     Multiple myeloma Mother     Melanoma Mother         [place] water contamination    Coronary artery disease Mother     Anemia Mother     Obesity Mother     Arthritis Father     Anxiety disorder Father     COPD Father     Glaucoma Father     Hyperlipidemia Father     Hypertension Father     Vision loss Father     Heart disease Father     Heart attack Father     Pancreatitis Father     Heart disease Sister     Breast cancer Neg Hx     Colon cancer Neg Hx     Colon polyps Neg Hx         Social History:   Social History     Tobacco Use    Smoking status: Former     Current packs/day: 0.50     Average packs/day: 0.5 packs/day for 15.0 years (7.5 ttl pk-yrs)     Types: Cigarettes, Electronic Cigarette     Passive exposure: Never    Smokeless tobacco: Never    Tobacco comments:     8/12/18 switch to e-cig   Substance Use Topics    Alcohol use: No        Allergies:  Allergies   Allergen Reactions    Meloxicam Swelling     EYES AND FACE SWELLING  EYES AND FACE SWELLING    Effexor Xr [Venlafaxine Hcl Er] Unknown - Low Severity     Caused weight gain       Home Meds:  (Not in a hospital admission)      Objective:    Vitals Ranges:   Heart Rate:  [66] 66  BP: (118)/(72) 118/72  Body mass index is 38.08 kg/m².     Exam:  General Appearance:  WDWN    HEENT:   without obvious abnormality,  Conjunctiva/corneas clear,  Normal external ear canals, no drainage    Clear orsalmucosa,  Mallampati score 3    Neck:  Supple, symmetrical, trachea midline. No JVD.  Lungs:   Bilateral basal rhonchi bilaterally, respirations unlabored symmetrical wall movement.    Chest wall:  No tenderness or deformity.    Heart:  Regular rate and rhythm, S1 and S2 normal.  Extremities: Trace edema no clubbing or Cyanosis        Data Review:  All labs (24hrs):  No results found for this or any previous visit (from the past 24 hours).     Imaging:  XR Knee 3 View Bilateral  Ordering physician's impression is located in the Encounter Note dated 11/06/24.           ASSESSMENT:  Diagnoses and all orders for this visit:    DERRICK (obstructive sleep apnea)    Migraine without aura and without status migrainosus, not intractable    Benign essential HTN    Palpitations    Coronary artery disease involving native coronary artery of native heart without angina pectoris        PLAN:       This patient with obstructive sleep apnea, compliance is improved. Encourage to use it more frequent, I re-emphasized on pt the long and short term benefit of treating DERRICK. The device is benefiting the patient.  The patient is also instructed to get the CPAP supplies from the [employer] and see me in 1 year for follow-up.    Treating DERRICK will improve BP control and decrease the risk for recurrent cardiac events and heart rate control    Also treating sleep apnea improved migraine headache    Follow-up 3 months    Albertina Sandhu MD. D, ABSM.  2/18/2025  14:57 EST

## 2025-02-21 DIAGNOSIS — G47.01 INSOMNIA DUE TO MEDICAL CONDITION: Primary | ICD-10-CM

## 2025-02-21 DIAGNOSIS — E03.9 ACQUIRED HYPOTHYROIDISM: ICD-10-CM

## 2025-02-26 ENCOUNTER — LAB (OUTPATIENT)
Dept: LAB | Facility: HOSPITAL | Age: 65
End: 2025-02-26
Payer: COMMERCIAL

## 2025-02-26 DIAGNOSIS — E78.5 HYPERLIPIDEMIA LDL GOAL <70: ICD-10-CM

## 2025-02-26 DIAGNOSIS — E21.3 HYPERPARATHYROIDISM: ICD-10-CM

## 2025-02-26 DIAGNOSIS — I25.10 CORONARY ARTERY DISEASE INVOLVING NATIVE CORONARY ARTERY OF NATIVE HEART WITHOUT ANGINA PECTORIS: ICD-10-CM

## 2025-02-26 DIAGNOSIS — I10 BENIGN ESSENTIAL HTN: ICD-10-CM

## 2025-02-26 DIAGNOSIS — E03.9 ACQUIRED HYPOTHYROIDISM: ICD-10-CM

## 2025-02-26 DIAGNOSIS — R73.03 PREDIABETES: ICD-10-CM

## 2025-02-26 LAB
ALBUMIN SERPL-MCNC: 4.3 G/DL (ref 3.5–5.2)
ALBUMIN/GLOB SERPL: 2 G/DL
ALP SERPL-CCNC: 61 U/L (ref 39–117)
ALT SERPL W P-5'-P-CCNC: 23 U/L (ref 1–33)
ANION GAP SERPL CALCULATED.3IONS-SCNC: 11 MMOL/L (ref 5–15)
AST SERPL-CCNC: 22 U/L (ref 1–32)
BASOPHILS # BLD AUTO: 0.05 10*3/MM3 (ref 0–0.2)
BASOPHILS NFR BLD AUTO: 1.3 % (ref 0–1.5)
BILIRUB SERPL-MCNC: 0.9 MG/DL (ref 0–1.2)
BUN SERPL-MCNC: 13 MG/DL (ref 8–23)
BUN/CREAT SERPL: 14.6 (ref 7–25)
CALCIUM SPEC-SCNC: 9.7 MG/DL (ref 8.6–10.5)
CHLORIDE SERPL-SCNC: 106 MMOL/L (ref 98–107)
CHOLEST SERPL-MCNC: 158 MG/DL (ref 0–200)
CO2 SERPL-SCNC: 25 MMOL/L (ref 22–29)
CREAT SERPL-MCNC: 0.89 MG/DL (ref 0.57–1)
DEPRECATED RDW RBC AUTO: 46.8 FL (ref 37–54)
EGFRCR SERPLBLD CKD-EPI 2021: 73.4 ML/MIN/1.73
EOSINOPHIL # BLD AUTO: 0.09 10*3/MM3 (ref 0–0.4)
EOSINOPHIL NFR BLD AUTO: 2.3 % (ref 0.3–6.2)
ERYTHROCYTE [DISTWIDTH] IN BLOOD BY AUTOMATED COUNT: 15 % (ref 12.3–15.4)
GLOBULIN UR ELPH-MCNC: 2.2 GM/DL
GLUCOSE SERPL-MCNC: 93 MG/DL (ref 65–99)
HBA1C MFR BLD: 5.4 % (ref 4.8–5.6)
HCT VFR BLD AUTO: 33.1 % (ref 34–46.6)
HDLC SERPL QL: 2.16
HDLC SERPL-MCNC: 73 MG/DL (ref 40–60)
HGB BLD-MCNC: 11 G/DL (ref 12–15.9)
IMM GRANULOCYTES # BLD AUTO: 0 10*3/MM3 (ref 0–0.05)
IMM GRANULOCYTES NFR BLD AUTO: 0 % (ref 0–0.5)
LDLC SERPL CALC-MCNC: 64 MG/DL (ref 0–100)
LYMPHOCYTES # BLD AUTO: 1.54 10*3/MM3 (ref 0.7–3.1)
LYMPHOCYTES NFR BLD AUTO: 39.8 % (ref 19.6–45.3)
MCH RBC QN AUTO: 28.4 PG (ref 26.6–33)
MCHC RBC AUTO-ENTMCNC: 33.2 G/DL (ref 31.5–35.7)
MCV RBC AUTO: 85.5 FL (ref 79–97)
MONOCYTES # BLD AUTO: 0.33 10*3/MM3 (ref 0.1–0.9)
MONOCYTES NFR BLD AUTO: 8.5 % (ref 5–12)
NEUTROPHILS NFR BLD AUTO: 1.86 10*3/MM3 (ref 1.7–7)
NEUTROPHILS NFR BLD AUTO: 48.1 % (ref 42.7–76)
NRBC BLD AUTO-RTO: 0 /100 WBC (ref 0–0.2)
PLATELET # BLD AUTO: 164 10*3/MM3 (ref 140–450)
PMV BLD AUTO: 10.2 FL (ref 6–12)
POTASSIUM SERPL-SCNC: 4.2 MMOL/L (ref 3.5–5.2)
PROT SERPL-MCNC: 6.5 G/DL (ref 6–8.5)
PTH-INTACT SERPL-MCNC: 82.2 PG/ML (ref 15–65)
RBC # BLD AUTO: 3.87 10*6/MM3 (ref 3.77–5.28)
SODIUM SERPL-SCNC: 142 MMOL/L (ref 136–145)
T3FREE SERPL-MCNC: 2.76 PG/ML (ref 2–4.4)
T4 FREE SERPL-MCNC: 1.75 NG/DL (ref 0.92–1.68)
T4 SERPL-MCNC: 8.45 MCG/DL (ref 4.5–11.7)
TRIGL SERPL-MCNC: 122 MG/DL (ref 0–150)
TSH SERPL DL<=0.05 MIU/L-ACNC: 1.57 UIU/ML (ref 0.27–4.2)
VLDLC SERPL-MCNC: 21 MG/DL (ref 5–40)
WBC NRBC COR # BLD AUTO: 3.87 10*3/MM3 (ref 3.4–10.8)

## 2025-02-26 PROCEDURE — 84436 ASSAY OF TOTAL THYROXINE: CPT | Performed by: NURSE PRACTITIONER

## 2025-02-26 PROCEDURE — 83970 ASSAY OF PARATHORMONE: CPT

## 2025-02-26 PROCEDURE — 84481 FREE ASSAY (FT-3): CPT | Performed by: NURSE PRACTITIONER

## 2025-02-26 PROCEDURE — 36415 COLL VENOUS BLD VENIPUNCTURE: CPT

## 2025-02-26 PROCEDURE — 80061 LIPID PANEL: CPT

## 2025-02-26 PROCEDURE — 80050 GENERAL HEALTH PANEL: CPT

## 2025-02-26 PROCEDURE — 83036 HEMOGLOBIN GLYCOSYLATED A1C: CPT

## 2025-02-26 PROCEDURE — 84439 ASSAY OF FREE THYROXINE: CPT

## 2025-03-18 DIAGNOSIS — E03.9 HYPOTHYROIDISM, UNSPECIFIED TYPE: ICD-10-CM

## 2025-03-18 DIAGNOSIS — E21.0 PRIMARY HYPERPARATHYROIDISM: ICD-10-CM

## 2025-03-18 DIAGNOSIS — M85.80 OSTEOPENIA, UNSPECIFIED LOCATION: ICD-10-CM

## 2025-03-18 RX ORDER — LEVOTHYROXINE SODIUM 50 UG/1
50 TABLET ORAL DAILY
Qty: 90 TABLET | Refills: 0 | OUTPATIENT
Start: 2025-03-18

## 2025-03-18 NOTE — TELEPHONE ENCOUNTER
Rx Refill Note  Requested Prescriptions     Pending Prescriptions Disp Refills    levothyroxine (SYNTHROID, LEVOTHROID) 50 MCG tablet [Pharmacy Med Name: Levothyroxine Sodium 50 MCG Oral Tablet] 90 tablet 0     Sig: Take 1 tablet by mouth once daily      Last office visit with prescribing clinician: 9/11/2024   Last telemedicine visit with prescribing clinician: Visit date not found   Next office visit with prescribing clinician: Visit date not found                         Would you like a call back once the refill request has been completed: [] Yes [] No    If the office needs to give you a call back, can they leave a voicemail: [] Yes [] No    Rahel Avila MA  03/18/25, 12:40 EDT

## 2025-03-18 NOTE — TELEPHONE ENCOUNTER
Rx Refill Note  Requested Prescriptions     Pending Prescriptions Disp Refills    levothyroxine (SYNTHROID, LEVOTHROID) 50 MCG tablet [Pharmacy Med Name: Levothyroxine Sodium 50 MCG Oral Tablet] 90 tablet 0     Sig: Take 1 tablet by mouth once daily      Last office visit with prescribing clinician: 9/11/2024   Last telemedicine visit with prescribing clinician: Visit date not found   Next office visit with prescribing clinician: Visit date not found                         Would you like a call back once the refill request has been completed: [] Yes [] No    If the office needs to give you a call back, can they leave a voicemail: [] Yes [] No    Rahel Avila MA  03/18/25, 07:44 EDT

## 2025-03-22 DIAGNOSIS — K21.00 GASTROESOPHAGEAL REFLUX DISEASE WITH ESOPHAGITIS WITHOUT HEMORRHAGE: ICD-10-CM

## 2025-03-24 DIAGNOSIS — M85.80 OSTEOPENIA, UNSPECIFIED LOCATION: ICD-10-CM

## 2025-03-24 DIAGNOSIS — E21.0 PRIMARY HYPERPARATHYROIDISM: ICD-10-CM

## 2025-03-24 DIAGNOSIS — E03.9 HYPOTHYROIDISM, UNSPECIFIED TYPE: ICD-10-CM

## 2025-03-24 RX ORDER — ESOMEPRAZOLE MAGNESIUM 40 MG/1
40 CAPSULE, DELAYED RELEASE ORAL EVERY 12 HOURS SCHEDULED
Qty: 180 CAPSULE | Refills: 1 | Status: SHIPPED | OUTPATIENT
Start: 2025-03-24

## 2025-03-24 NOTE — TELEPHONE ENCOUNTER
Rx Refill Note  Requested Prescriptions     Pending Prescriptions Disp Refills    esomeprazole (nexIUM) 40 MG capsule [Pharmacy Med Name: Esomeprazole Magnesium 40 MG Oral Capsule Delayed Release] 180 capsule 1     Sig: Take 1 capsule by mouth twice daily      Last office visit with prescribing clinician: 11/11/2024   Last telemedicine visit with prescribing clinician: Visit date not found   Next office visit with prescribing clinician: 5/14/2025                         Would you like a call back once the refill request has been completed: [] Yes [] No    If the office needs to give you a call back, can they leave a voicemail: [] Yes [] No    Mone Hilario MA  03/24/25, 11:25 EDT

## 2025-03-25 RX ORDER — LEVOTHYROXINE SODIUM 50 UG/1
50 TABLET ORAL DAILY
Qty: 90 TABLET | Refills: 3 | OUTPATIENT
Start: 2025-03-25

## 2025-03-25 NOTE — TELEPHONE ENCOUNTER
Rx Refill Note  Requested Prescriptions     Pending Prescriptions Disp Refills    levothyroxine (SYNTHROID, LEVOTHROID) 50 MCG tablet 90 tablet 3     Sig: Take 1 tablet by mouth Daily.      Last office visit with prescribing clinician: 9/11/2024   Last telemedicine visit with prescribing clinician: Visit date not found   Next office visit with prescribing clinician: Visit date not found                         Would you like a call back once the refill request has been completed: [] Yes [] No    If the office needs to give you a call back, can they leave a voicemail: [] Yes [] No    Rahel Avila MA  03/25/25, 07:49 EDT

## 2025-03-26 DIAGNOSIS — M85.80 OSTEOPENIA, UNSPECIFIED LOCATION: ICD-10-CM

## 2025-03-26 DIAGNOSIS — E03.9 HYPOTHYROIDISM, UNSPECIFIED TYPE: ICD-10-CM

## 2025-03-26 DIAGNOSIS — E21.0 PRIMARY HYPERPARATHYROIDISM: ICD-10-CM

## 2025-03-26 RX ORDER — LEVOTHYROXINE SODIUM 50 UG/1
50 TABLET ORAL DAILY
Qty: 90 TABLET | Refills: 0 | OUTPATIENT
Start: 2025-03-26

## 2025-03-26 NOTE — TELEPHONE ENCOUNTER
Rx Refill Note  Requested Prescriptions     Pending Prescriptions Disp Refills    levothyroxine (SYNTHROID, LEVOTHROID) 50 MCG tablet [Pharmacy Med Name: Levothyroxine Sodium 50 MCG Oral Tablet] 90 tablet 0     Sig: Take 1 tablet by mouth once daily      Last office visit with prescribing clinician: 9/11/2024   Last telemedicine visit with prescribing clinician: Visit date not found   Next office visit with prescribing clinician: Visit date not found                         Would you like a call back once the refill request has been completed: [] Yes [] No    If the office needs to give you a call back, can they leave a voicemail: [] Yes [] No    Rahel Avila MA  03/26/25, 09:34 EDT

## 2025-03-29 DIAGNOSIS — I10 BENIGN ESSENTIAL HTN: ICD-10-CM

## 2025-03-29 RX ORDER — OLMESARTAN MEDOXOMIL 40 MG/1
40 TABLET ORAL DAILY
Qty: 90 TABLET | Refills: 0 | Status: SHIPPED | OUTPATIENT
Start: 2025-03-29

## 2025-03-29 NOTE — TELEPHONE ENCOUNTER
Rx Refill Note  Requested Prescriptions     Pending Prescriptions Disp Refills    olmesartan (BENICAR) 40 MG tablet [Pharmacy Med Name: Olmesartan Medoxomil 40 MG Oral Tablet] 90 tablet 0     Sig: Take 1 tablet by mouth once daily      Last office visit with prescribing clinician: 11/11/2024   Last telemedicine visit with prescribing clinician: Visit date not found   Next office visit with prescribing clinician: 5/14/2025                         Would you like a call back once the refill request has been completed: [] Yes [] No    If the office needs to give you a call back, can they leave a voicemail: [] Yes [] No    Mone Hilario MA  03/29/25, 12:57 EDT

## 2025-03-30 DIAGNOSIS — M85.80 OSTEOPENIA, UNSPECIFIED LOCATION: ICD-10-CM

## 2025-03-30 DIAGNOSIS — E03.9 HYPOTHYROIDISM, UNSPECIFIED TYPE: ICD-10-CM

## 2025-03-30 DIAGNOSIS — E21.0 PRIMARY HYPERPARATHYROIDISM: ICD-10-CM

## 2025-03-31 RX ORDER — LEVOTHYROXINE SODIUM 50 UG/1
50 TABLET ORAL DAILY
Qty: 90 TABLET | Refills: 0 | OUTPATIENT
Start: 2025-03-31

## 2025-03-31 NOTE — TELEPHONE ENCOUNTER
Rx Refill Note  Requested Prescriptions     Pending Prescriptions Disp Refills    levothyroxine (SYNTHROID, LEVOTHROID) 50 MCG tablet [Pharmacy Med Name: Levothyroxine Sodium 50 MCG Oral Tablet] 90 tablet 0     Sig: Take 1 tablet by mouth once daily      Last office visit with prescribing clinician: 9/11/2024   Last telemedicine visit with prescribing clinician: Visit date not found   Next office visit with prescribing clinician: Visit date not found                         Would you like a call back once the refill request has been completed: [] Yes [] No    If the office needs to give you a call back, can they leave a voicemail: [] Yes [] No    Rahel Avila MA  03/31/25, 07:43 EDT

## 2025-04-06 DIAGNOSIS — I10 BENIGN ESSENTIAL HTN: ICD-10-CM

## 2025-04-07 RX ORDER — METOPROLOL SUCCINATE 50 MG/1
50 TABLET, EXTENDED RELEASE ORAL DAILY
Qty: 90 TABLET | Refills: 0 | Status: SHIPPED | OUTPATIENT
Start: 2025-04-07

## 2025-04-07 NOTE — TELEPHONE ENCOUNTER
Rx Refill Note  Requested Prescriptions     Pending Prescriptions Disp Refills    metoprolol succinate XL (TOPROL-XL) 50 MG 24 hr tablet [Pharmacy Med Name: Metoprolol Succinate ER 50 MG Oral Tablet Extended Release 24 Hour] 90 tablet 0     Sig: Take 1 tablet by mouth once daily      Last office visit with prescribing clinician: 11/11/2024   Last telemedicine visit with prescribing clinician: Visit date not found   Next office visit with prescribing clinician: 5/14/2025                         Would you like a call back once the refill request has been completed: [] Yes [] No    If the office needs to give you a call back, can they leave a voicemail: [] Yes [] No    Mone Hilario MA  04/07/25, 07:41 EDT

## 2025-04-25 ENCOUNTER — DOCUMENTATION (OUTPATIENT)
Dept: SLEEP MEDICINE | Facility: HOSPITAL | Age: 65
End: 2025-04-25
Payer: COMMERCIAL

## 2025-04-25 NOTE — PROGRESS NOTES
Pt called and stated that she is turning in her new sleep therapy device that was set up on 10/2024.  Pt stated that she will be bringing in her old device to have [employer] set the pressures to what the new therapy device has.

## 2025-05-06 DIAGNOSIS — I10 ESSENTIAL HYPERTENSION: ICD-10-CM

## 2025-05-06 DIAGNOSIS — E87.6 HYPOKALEMIA: ICD-10-CM

## 2025-05-06 DIAGNOSIS — E78.5 HYPERLIPIDEMIA LDL GOAL <70: ICD-10-CM

## 2025-05-06 DIAGNOSIS — I25.10 CORONARY ARTERY DISEASE INVOLVING NATIVE CORONARY ARTERY OF NATIVE HEART WITHOUT ANGINA PECTORIS: ICD-10-CM

## 2025-05-06 RX ORDER — POTASSIUM CHLORIDE 1500 MG/1
20 TABLET, EXTENDED RELEASE ORAL DAILY
Qty: 90 TABLET | Refills: 0 | Status: SHIPPED | OUTPATIENT
Start: 2025-05-06

## 2025-05-06 RX ORDER — ROSUVASTATIN CALCIUM 40 MG/1
40 TABLET, COATED ORAL DAILY
Qty: 90 TABLET | Refills: 0 | Status: SHIPPED | OUTPATIENT
Start: 2025-05-06

## 2025-05-14 ENCOUNTER — OFFICE VISIT (OUTPATIENT)
Dept: INTERNAL MEDICINE | Facility: CLINIC | Age: 65
End: 2025-05-14
Payer: COMMERCIAL

## 2025-05-14 VITALS
HEART RATE: 64 BPM | DIASTOLIC BLOOD PRESSURE: 82 MMHG | TEMPERATURE: 97.1 F | HEIGHT: 60 IN | SYSTOLIC BLOOD PRESSURE: 110 MMHG | OXYGEN SATURATION: 96 % | BODY MASS INDEX: 38.28 KG/M2 | WEIGHT: 195 LBS

## 2025-05-14 DIAGNOSIS — E78.5 HYPERLIPIDEMIA LDL GOAL <70: ICD-10-CM

## 2025-05-14 DIAGNOSIS — E87.6 HYPOKALEMIA: ICD-10-CM

## 2025-05-14 DIAGNOSIS — I10 PRIMARY HYPERTENSION: ICD-10-CM

## 2025-05-14 DIAGNOSIS — I25.10 CORONARY ARTERY DISEASE INVOLVING NATIVE CORONARY ARTERY OF NATIVE HEART WITHOUT ANGINA PECTORIS: ICD-10-CM

## 2025-05-14 DIAGNOSIS — E21.0 PRIMARY HYPERPARATHYROIDISM: ICD-10-CM

## 2025-05-14 DIAGNOSIS — I10 ESSENTIAL HYPERTENSION: ICD-10-CM

## 2025-05-14 DIAGNOSIS — I10 BENIGN ESSENTIAL HTN: ICD-10-CM

## 2025-05-14 DIAGNOSIS — E03.9 HYPOTHYROIDISM, UNSPECIFIED TYPE: ICD-10-CM

## 2025-05-14 DIAGNOSIS — Z00.00 ENCOUNTER FOR ANNUAL WELLNESS VISIT (AWV) IN MEDICARE PATIENT: Primary | ICD-10-CM

## 2025-05-14 DIAGNOSIS — M85.80 OSTEOPENIA, UNSPECIFIED LOCATION: ICD-10-CM

## 2025-05-14 DIAGNOSIS — G47.33 OSA (OBSTRUCTIVE SLEEP APNEA): ICD-10-CM

## 2025-05-14 RX ORDER — LEVOTHYROXINE SODIUM 50 UG/1
50 TABLET ORAL DAILY
Qty: 90 TABLET | Refills: 3 | Status: SHIPPED | OUTPATIENT
Start: 2025-05-14

## 2025-05-14 RX ORDER — POTASSIUM CHLORIDE 1500 MG/1
20 TABLET, EXTENDED RELEASE ORAL DAILY
Qty: 90 TABLET | Refills: 0 | Status: SHIPPED | OUTPATIENT
Start: 2025-05-14

## 2025-05-14 RX ORDER — AMLODIPINE AND OLMESARTAN MEDOXOMIL 5; 40 MG/1; MG/1
1 TABLET ORAL DAILY
Qty: 90 TABLET | Refills: 1 | Status: SHIPPED | OUTPATIENT
Start: 2025-05-14

## 2025-05-14 RX ORDER — AMLODIPINE AND OLMESARTAN MEDOXOMIL 5; 40 MG/1; MG/1
1 TABLET ORAL DAILY
Qty: 90 TABLET | Refills: 1 | Status: SHIPPED | OUTPATIENT
Start: 2025-05-14 | End: 2025-05-14

## 2025-05-14 RX ORDER — ROSUVASTATIN CALCIUM 40 MG/1
40 TABLET, COATED ORAL DAILY
Qty: 90 TABLET | Refills: 0 | Status: SHIPPED | OUTPATIENT
Start: 2025-05-14

## 2025-05-14 RX ORDER — METOPROLOL SUCCINATE 50 MG/1
50 TABLET, EXTENDED RELEASE ORAL DAILY
Qty: 90 TABLET | Refills: 0 | Status: SHIPPED | OUTPATIENT
Start: 2025-05-14

## 2025-05-14 NOTE — PROGRESS NOTES
Subjective   The ABCs of the Annual Wellness Visit  Medicare Wellness Visit      Shaylee Wadsworth is a 64 y.o. patient who presents for a Medicare Wellness Visit.    The following portions of the patient's history were reviewed and   updated as appropriate: allergies, current medications, past family history, past medical history, past social history, past surgical history, and problem list.    Compared to one year ago, the patient's physical   health is the same.  Compared to one year ago, the patient's mental   health is the same.    Recent Hospitalizations:  She was not admitted to the hospital during the last year.     Current Medical Providers:  Patient Care Team:  Laura Ayala APRN as PCP - Family Medicine  [place], Blaze Espitia MD as Consulting Physician (Rheumatology)  Nettie, Jeff Hoover MD as Consulting Physician (Gastroenterology)  Jackelyn Dumas MD as Consulting Physician (Cardiology)    Outpatient Medications Prior to Visit   Medication Sig Dispense Refill    amLODIPine (NORVASC) 5 MG tablet Take 1 tablet by mouth once daily 90 tablet 1    aspirin 81 MG tablet Take 1 tablet by mouth Daily.      Cholecalciferol (Vitamin D) 50 MCG (2000 UT) capsule Take 1 capsule by mouth Daily.      cyclobenzaprine (FLEXERIL) 10 MG tablet Take 1 tablet by mouth 2 (Two) Times a Day As Needed.      Diclofenac Sodium (VOLTAREN) 1 % gel gel Apply 4 g topically to the appropriate area as directed 4 (Four) Times a Day. 350 g 3    esomeprazole (nexIUM) 40 MG capsule Take 1 capsule by mouth twice daily 180 capsule 1    folic acid (FOLVITE) 1 MG tablet Take 1 tablet by mouth Daily. 1 to 4 tablets daily      methotrexate 2.5 MG tablet TAKE 4 TABLETS BY MOUTH ONCE A WEEK 32 tablet 6    metoprolol succinate XL (TOPROL-XL) 50 MG 24 hr tablet Take 1 tablet by mouth once daily 90 tablet 0    olmesartan (BENICAR) 40 MG tablet Take 1 tablet by mouth once daily 90 tablet 0    potassium chloride (KLOR-CON M20) 20 MEQ CR tablet Take 1  tablet by mouth once daily 90 tablet 0    rosuvastatin (CRESTOR) 40 MG tablet Take 1 tablet by mouth once daily 90 tablet 0    tocilizumab (Actemra) 200 MG/10ML solution injection Every 30 (Thirty) Days.      levothyroxine (SYNTHROID, LEVOTHROID) 50 MCG tablet Take 1 tablet by mouth Daily. (Patient not taking: Reported on 5/14/2025) 90 tablet 3     No facility-administered medications prior to visit.     No opioid medication identified on active medication list. I have reviewed chart for other potential  high risk medication/s and harmful drug interactions in the elderly.      Aspirin is on active medication list. Aspirin use is indicated based on review of current medical condition/s. Pros and cons of this therapy have been discussed today. Benefits of this medication outweigh potential harm.  Patient has been encouraged to continue taking this medication.  .      Patient Active Problem List   Diagnosis    Abnormal radiographic examination    Disc disorder of thoracic region    Gastroesophageal reflux disease    Hyperlipidemia LDL goal <70    Menopausal symptom    Migraine    Osteoarthritis of shoulder    Osteopenia    Rheumatoid arthritis    Multiple pulmonary nodules determined by computed tomography of lung    History of iron deficiency    Tendinitis of left shoulder    Encounter for annual wellness visit (AWV) in Medicare patient    Hypokalemia    Anxiety related tremor    Difficulty walking    Benign essential HTN    Chronic right-sided thoracic back pain    BPPV (benign paroxysmal positional vertigo)    Dystonia    Functional neurological symptom disorder with abnormal movement    Coronary artery disease involving native heart without angina pectoris    Astasia-abasia    Other spondylosis with myelopathy, thoracic region    Personal history of immunosupression therapy    Functional movement disorder    Rheumatoid arthritis of multiple sites without organ or system involvement with positive rheumatoid factor  "   S/P drug eluting coronary stent placement    Depression due to physical illness    Irritable bowel syndrome with diarrhea    Body mass index (BMI) 35.0-35.9, adult    Pain in left ankle and joints of left foot    Pain in right ankle and joints of right foot    Chronic right shoulder pain    Rheumatoid arthritis with rheumatoid factor of multiple sites without organ or systems involvement    Pain in left shoulder    Diarrhea    Palpitations    Iron deficiency anemia    Hypothyroidism    Seropositive rheumatoid arthritis    Lainez's esophagus    Anemia, chronic disease    DERRICK (obstructive sleep apnea)    Primary osteoarthritis of right knee    Prediabetes    Insomnia due to medical condition    Chronic idiopathic constipation    Neck pain, chronic    Mastoiditis of left side    Chronic pain of both knees    Arthritis of right sternoclavicular joint    Osteoarthritis of patellofemoral joints, bilateral    Hyperparathyroidism    Vertigo     Advance Care Planning Advance Directive is not on file.  ACP discussion was held with the patient during this visit. Patient does not have an advance directive, information provided.            Objective   Vitals:    05/14/25 0949   BP: 110/82   BP Location: Left arm   Patient Position: Sitting   Cuff Size: Adult   Pulse: 64   Temp: 97.1 °F (36.2 °C)   TempSrc: Infrared   SpO2: 96%   Weight: 88.5 kg (195 lb)   Height: 152.4 cm (60\")   PainSc: 5    PainLoc: Wrist  Comment: Wrists, back, knees, feet, ankles       Estimated body mass index is 38.08 kg/m² as calculated from the following:    Height as of this encounter: 152.4 cm (60\").    Weight as of this encounter: 88.5 kg (195 lb).    Class 2 Severe Obesity (BMI >=35 and <=39.9). Obesity-related health conditions include the following: obstructive sleep apnea, hypertension, coronary heart disease, dyslipidemias, and osteoarthritis. Obesity is worsening. BMI is is above average; BMI management plan is completed. We discussed " portion control and increasing exercise.           Does the patient have evidence of cognitive impairment? No  Lab Results   Component Value Date    TRIG 122 2025    HDL 73 (H) 2025    LDL 64 2025    VLDL 21 2025    HGBA1C 5.40 2025                                                                                                Health  Risk Assessment    Smoking Status:  Social History     Tobacco Use   Smoking Status Every Day    Types: Electronic Cigarette    Passive exposure: Never   Smokeless Tobacco Never   Tobacco Comments    18 switch to e-cig     Alcohol Consumption:  Social History     Substance and Sexual Activity   Alcohol Use No       Fall Risk Screen  STEADI Fall Risk Assessment was completed, and patient is at LOW risk for falls.Assessment completed on:2025    Depression Screening   Little interest or pleasure in doing things? Not at all   Feeling down, depressed, or hopeless? Not at all   PHQ-2 Total Score 0      Health Habits and Functional and Cognitive Screenin/7/2025     9:40 AM   Functional & Cognitive Status   Do you have difficulty preparing food and eating? No   Do you have difficulty bathing yourself, getting dressed or grooming yourself? No   Do you have difficulty using the toilet? No   Do you have difficulty moving around from place to place? No   Do you have trouble with steps or getting out of a bed or a chair? No   Current Diet Unhealthy Diet   Dental Exam Not up to date   Eye Exam Up to date   Exercise (times per week) 0 times per week   Current Exercises Include No Regular Exercise   Do you need help using the phone?  No   Are you deaf or do you have serious difficulty hearing?  No   Do you need help to go to places out of walking distance? No   Do you need help shopping? No   Do you need help preparing meals?  No   Do you need help with housework?  No   Do you need help with laundry? No   Do you need help taking your medications? No   Do  you need help managing money? No   Do you ever drive or ride in a car without wearing a seat belt? No   Have you felt unusual stress, anger or loneliness in the last month? No   Who do you live with? Spouse   If you need help, do you have trouble finding someone available to you? No   Have you been bothered in the last four weeks by sexual problems? No   Do you have difficulty concentrating, remembering or making decisions? No           Age-appropriate Screening Schedule:  Refer to the list below for future screening recommendations based on patient's age, sex and/or medical conditions. Orders for these recommended tests are listed in the plan section. The patient has been provided with a written plan.    Health Maintenance List  Health Maintenance   Topic Date Due    ANNUAL WELLNESS VISIT  05/07/2025    COVID-19 Vaccine (5 - Moderna risk 2024-25 season) 05/13/2026 (Originally 4/7/2025)    INFLUENZA VACCINE  07/01/2025    TDAP/TD VACCINES (2 - Td or Tdap) 08/05/2025    MAMMOGRAM  12/06/2025    LIPID PANEL  02/26/2026    COLORECTAL CANCER SCREENING  03/06/2030    HEPATITIS C SCREENING  Completed    Pneumococcal Vaccine 50+  Completed    ZOSTER VACCINE  Discontinued                                                                                                                                                CMS Preventative Services Quick Reference  Risk Factors Identified During Encounter  Chronic Pain: Natural history and expected course discussed. Questions answered.  Rheumatology eval  Depression/Dysphoria: Elevated PHQ score reflective of other stress or illness, not depression  Fall Risk-High or Moderate: Discussed Fall Prevention in the home  Hearing Problem: Referral to Audiologist ordered  Immunizations Discussed/Encouraged: Prevnar 20 (Pneumococcal 20-valent conjugate), Shingrix, COVID19, and RSV (Respiratory Syncytial Virus)  Inactivity/Sedentary: Patient was advised to exercise at least 150 minutes a week  per CDC recommendations.  Polypharmacy: Medication List reviewed    The above risks/problems have been discussed with the patient.  Pertinent information has been shared with the patient in the After Visit Summary.  An After Visit Summary and PPPS were made available to the patient.    Follow Up:   Next Medicare Wellness visit to be scheduled in 1 year.         Additional E&M Note during same encounter follows:  Patient has additional, significant, and separately identifiable condition(s)/problem(s) that require work above and beyond the Medicare Wellness Visit     Chief Complaint  Medicare Wellness-subsequent    Subjective   HPI  Shaylee is also being seen today for additional medical problem/s.    Review of Systems   Constitutional: Negative.  Negative for fatigue and fever.   HENT: Negative.     Eyes: Negative.    Respiratory: Negative.     Cardiovascular: Negative.  Negative for chest pain, palpitations and leg swelling.   Musculoskeletal:  Positive for arthralgias and back pain.   Allergic/Immunologic: Negative.  Negative for environmental allergies, food allergies and immunocompromised state.   Neurological: Negative.    Hematological: Negative.    Psychiatric/Behavioral: Negative.     All other systems reviewed and are negative.        She neds a f/u on HTN, Hyperlipidemia, prediabetes, GERD, CAD, Hypothyroidism, FMD, Vit D def, and Rheumatoid Arthritis.      She is currently on Norvasc 5mg, Benicar 40mg, and Toprol XL 50mg for HTN and CAD and HTN. She is followed by Dr. Dumas. S/P drug eluting stent Distal left circumflex. Plavix was stopped April 2020. She is on an 81 mg aspirin therapy. Her statin therapy is Crestor 40mg nightly. She tolerates this w/o myalgias. She takes potassium for hypokalemia.       She is on Nexium 40mg daily for GERD. She reports that her diet has not been good. She is having reflux every night. Denies bleeding or abd pain.      She has FMD since April 2018. She has been evaluated by  "2 neurologists as well as [facility] and an ENT. Last Neurologist was Dr. LaFavre at [facility]. She has been thru PT and OT with [facility] disordered clinic. She feels like traction helps more than any other modality, which she does at home after being released from PT. She has thoracic back pain, daily pain rated a 3 of 10. She uses flexeril sparingly for pain as needed.      She has both hypothyroidism and hyperparathyroidism. She is followed by Dr. Carpenter ([facility]) for hyper- parathyroidism and hypothyroidism. She was on Euthyrox 50mcgs daily, but stopped this when she ran out in March 2025. .      She is followed by Rheumatology for RA. She is on Actemra 200mg monthly and methotrexate 2.5mg 4 tablets weekly, for RA.      She has hitsory of TAMANNA and anemia in chronic disease. Followed by Dr. Dunn. She stopped her iron since last visit due to constipation.      She is having difficulty staying alseep. She had a sleep study in 2019, but was unable to use a Cpap machine.          Objective   Vital Signs:  /82 (BP Location: Left arm, Patient Position: Sitting, Cuff Size: Adult)   Pulse 64   Temp 97.1 °F (36.2 °C) (Infrared)   Ht 152.4 cm (60\")   Wt 88.5 kg (195 lb)   SpO2 96%   BMI 38.08 kg/m²   Physical Exam  Vitals reviewed.   Constitutional:       Appearance: Normal appearance. She is obese. She is not ill-appearing.   HENT:      Head: Normocephalic.      Right Ear: Tympanic membrane normal.      Left Ear: Tympanic membrane normal.      Nose: No congestion or rhinorrhea.   Cardiovascular:      Rate and Rhythm: Normal rate and regular rhythm.      Pulses: Normal pulses.      Heart sounds: Normal heart sounds. No murmur heard.  Pulmonary:      Effort: Pulmonary effort is normal.      Breath sounds: Normal breath sounds. No stridor.   Neurological:      Mental Status: She is alert.         The following data was reviewed by: KAITLYNN Choi on 05/14/2025:  Data reviewed : Radiologic studies DXA " scan and mammo, Cardiology studies ECG, and Consultant notes rheumatology and cardio  CMP          9/21/2024    07:23 2/26/2025    08:48   CMP   Glucose 101  93    BUN 9  13    Creatinine 0.79  0.89    EGFR 85.2  73.4    Sodium 142  142    Potassium 3.8  4.2    Chloride 104  106    Calcium 9.6  9.7    Total Protein 6.3  6.5    Albumin 3.7  4.3    Globulin 2.6  2.2    Total Bilirubin 0.6  0.9    Alkaline Phosphatase 57  61    AST (SGOT) 17  22    ALT (SGPT) 21  23    Albumin/Globulin Ratio 1.4  2.0    BUN/Creatinine Ratio 11.4  14.6    Anion Gap 9.0  11.0      CBC          8/17/2024    06:55 2/26/2025    08:48   CBC   WBC 4.47  3.87    RBC 4.02  3.87    Hemoglobin 11.9  11.0    Hematocrit 34.9  33.1    MCV 86.8  85.5    MCH 29.6  28.4    MCHC 34.1  33.2    RDW 14.3  15.0    Platelets 216  164      CBC w/diff          8/17/2024    06:55 2/26/2025    08:48   CBC w/Diff   WBC 4.47  3.87    RBC 4.02  3.87    Hemoglobin 11.9  11.0    Hematocrit 34.9  33.1    MCV 86.8  85.5    MCH 29.6  28.4    MCHC 34.1  33.2    RDW 14.3  15.0    Platelets 216  164    Neutrophil Rel % 51.9  48.1    Immature Granulocyte Rel % 0.2  0.0    Lymphocyte Rel % 35.6  39.8    Monocyte Rel % 8.9  8.5    Eosinophil Rel % 2.5  2.3    Basophil Rel % 0.9  1.3      Lipid Panel          9/21/2024    07:23 2/26/2025    08:48   Lipid Panel   Total Cholesterol 178  158    Triglycerides 124  122    HDL Cholesterol 38  73    VLDL Cholesterol 22  21    LDL Cholesterol  118  64      Renal Profile          9/21/2024    07:23 2/26/2025    08:48   Renal Profile   BUN 9  13    Creatinine 0.79  0.89      BMP          9/21/2024    07:23 2/26/2025    08:48   BMP   BUN 9  13    Creatinine 0.79  0.89    Sodium 142  142    Potassium 3.8  4.2    Chloride 104  106    CO2 29.0  25.0    Calcium 9.6  9.7      Most Recent A1C          2/26/2025    08:48   HGBA1C Most Recent   Hemoglobin A1C 5.40             Assessment and Plan      Benign essential HTN      Orders:     metoprolol succinate XL (TOPROL-XL) 50 MG 24 hr tablet; Take 1 tablet by mouth Daily.    Primary hypertension           DERRICK (obstructive sleep apnea)         Primary hyperparathyroidism    Orders:    levothyroxine (SYNTHROID, LEVOTHROID) 50 MCG tablet; Take 1 tablet by mouth Daily.    Hypothyroidism, unspecified type    Orders:    levothyroxine (SYNTHROID, LEVOTHROID) 50 MCG tablet; Take 1 tablet by mouth Daily.    Osteopenia, unspecified location    Orders:    levothyroxine (SYNTHROID, LEVOTHROID) 50 MCG tablet; Take 1 tablet by mouth Daily.    Benign essential HTN      Orders:    metoprolol succinate XL (TOPROL-XL) 50 MG 24 hr tablet; Take 1 tablet by mouth Daily.    Essential hypertension      Orders:    potassium chloride (KLOR-CON M20) 20 MEQ CR tablet; Take 1 tablet by mouth Daily.    Hypokalemia    Orders:    potassium chloride (KLOR-CON M20) 20 MEQ CR tablet; Take 1 tablet by mouth Daily.    Coronary artery disease involving native coronary artery of native heart without angina pectoris      Orders:    rosuvastatin (CRESTOR) 40 MG tablet; Take 1 tablet by mouth Daily.    Hyperlipidemia LDL goal <70       Orders:    rosuvastatin (CRESTOR) 40 MG tablet; Take 1 tablet by mouth Daily.    Encounter for annual wellness visit (AWV) in Medicare patient              Diagnosis Plan   1. Encounter for annual wellness visit (AWV) in Medicare patient        2. Benign essential HTN  metoprolol succinate XL (TOPROL-XL) 50 MG 24 hr tablet      3. Primary hypertension        4. DERRICK (obstructive sleep apnea)        5. Primary hyperparathyroidism  levothyroxine (SYNTHROID, LEVOTHROID) 50 MCG tablet      6. Hypothyroidism, unspecified type  levothyroxine (SYNTHROID, LEVOTHROID) 50 MCG tablet      7. Osteopenia, unspecified location  levothyroxine (SYNTHROID, LEVOTHROID) 50 MCG tablet      8. Benign essential HTN  metoprolol succinate XL (TOPROL-XL) 50 MG 24 hr tablet    BP at goal on current medications      9. Essential  hypertension  potassium chloride (KLOR-CON M20) 20 MEQ CR tablet      10. Hypokalemia  potassium chloride (KLOR-CON M20) 20 MEQ CR tablet      11. Coronary artery disease involving native coronary artery of native heart without angina pectoris  rosuvastatin (CRESTOR) 40 MG tablet    Followed by Cardio, LDL goal < 70      12. Hyperlipidemia LDL goal <70  rosuvastatin (CRESTOR) 40 MG tablet    Chronic, poorly controlled. Stop [id number] due to high LDL and low HDL. Start Crestor 40mg nightly            I spent 30 minutes caring for Shyalee on this date of service. This time includes time spent by me in the following activities:preparing for the visit, reviewing tests, obtaining and/or reviewing a separately obtained history, performing a medically appropriate examination and/or evaluation , counseling and educating the patient/family/caregiver, ordering medications, tests, or procedures, referring and communicating with other health care professionals , and documenting information in the medical record  Follow Up   6 months w labs  Patient was given instructions and counseling regarding her condition or for health maintenance advice. Please see specific information pulled into the AVS if appropriate.

## 2025-05-14 NOTE — ASSESSMENT & PLAN NOTE
Orders:    metoprolol succinate XL (TOPROL-XL) 50 MG 24 hr tablet; Take 1 tablet by mouth Daily.

## 2025-05-27 ENCOUNTER — OFFICE VISIT (OUTPATIENT)
Dept: SLEEP MEDICINE | Facility: HOSPITAL | Age: 65
End: 2025-05-27
Payer: COMMERCIAL

## 2025-05-27 VITALS
WEIGHT: 195 LBS | HEART RATE: 68 BPM | DIASTOLIC BLOOD PRESSURE: 76 MMHG | OXYGEN SATURATION: 98 % | BODY MASS INDEX: 36.82 KG/M2 | SYSTOLIC BLOOD PRESSURE: 120 MMHG | HEIGHT: 61 IN

## 2025-05-27 DIAGNOSIS — G47.33 OSA (OBSTRUCTIVE SLEEP APNEA): Primary | ICD-10-CM

## 2025-05-27 DIAGNOSIS — I10 BENIGN ESSENTIAL HTN: ICD-10-CM

## 2025-05-27 DIAGNOSIS — E03.9 ACQUIRED HYPOTHYROIDISM: ICD-10-CM

## 2025-05-27 PROCEDURE — G0463 HOSPITAL OUTPT CLINIC VISIT: HCPCS

## 2025-05-27 NOTE — PROGRESS NOTES
SLEEP/PULMONARY  CLINIC NOTE      PATIENT IDENTIFICATION:  Name: Shaylee Wadsworth  Age: 64 y.o.  Sex: female  :  1960  MRN: [id number]    DATE OF CONSULTATION:  2025     Referring physician:    Provider, No Known            CHIEF COMPLAINT: Obstructive sleep apnea    History of Present Illness:   Shaylee Wadsworth is a 64 y.o. female Pt on CPAP feeling better more energy especially the night he use it more than 4 hours, no sleepiness no fatigue no tiredness, no mask irritation no dryness, compliance report reviewed with pt d discussed with the patient the download data and encouarged the patient to continue to use the CPAP. The residual AHI is acceptable patient currently using her mom's machine because her machine was taken by the DME.         Review of Systems:   Constitutional: As above   Eyes: negative   ENT/oropharynx: negative   Cardiovascular: negative   Respiratory: negative   Gastrointestinal: negative   Genitourinary: negative   Neurological: negative   Musculoskeletal: negative   Integument/breast: negative   Endocrine: negative   Allergic/Immunologic: negative     Past Medical History:  Past Medical History:   Diagnosis Date    Abnormal electrocardiogram     Anemia 2021    Anxiety     Arthritis 2014    RA    Lainez esophagus     Benign paroxysmal positional vertigo due to bilateral vestibular disorder     Chest pain     Cholelithiasis 2007    Removed    Colon polyp     Coronary artery disease 2019    Depression 2019    Disease of thyroid gland 2016    Dystonia     Embedded tick of right lower leg     Esophageal reflux     Fibromyositis     Functional movement disorder     H/O colonoscopy     H/O mammogram 2011    NORMAL     History of colonoscopy 2020    Hx of bone density study 2011    OSTEOPENIA     Hyperlipidemia     Hyperparathyroidism     Hypertension     Hypothyroidism 2021    Parathyroid tumor removed     Knee swelling 2018    Low back pain      Menopause     Osteopenia     Osteopenia     Ovarian cyst 2000    L ovary removed    Pancreatitis     Pancreatitis     Scoliosis 02/16/2013    Superficial incisional infection of surgical site     Tremor 04/04/2018    Varicella 1968    Vitamin D deficiency        Past Surgical History:  Past Surgical History:   Procedure Laterality Date    APPENDECTOMY N/A 12/22/2021    Procedure: APPENDECTOMY LAPAROSCOPIC;  Surgeon: Maciel Godoy MD;  Location: [facility] OR;  Service: General;  Laterality: N/A;    CARDIAC CATHETERIZATION N/A 03/04/2019    Procedure: Coronary angiography;  Surgeon: Jackelyn Dumas MD;  Location:  [facility];  Service: Cardiovascular    CARDIAC CATHETERIZATION N/A 03/04/2019    Procedure: Left heart cath;  Surgeon: Jackelyn Dumas MD;  Location:  [facility];  Service: Cardiovascular    CARDIAC CATHETERIZATION N/A 03/04/2019    Procedure: Left ventriculography;  Surgeon: Jackelyn Dumas MD;  Location:  [facility];  Service: Cardiovascular    CARDIAC CATHETERIZATION N/A 03/04/2019    Procedure: Stent KARLY coronary;  Surgeon: Jackelyn Dumas MD;  Location:  [facility];  Service: Cardiovascular    CHOLECYSTECTOMY  2009    COLONOSCOPY  2014    COLONOSCOPY N/A 03/06/2020    Procedure: COLONOSCOPY, biopsy, polypectomy;  Surgeon: Rain Kirk MD;  Location: [facility] OR;  Service: Gastroenterology;  Laterality: N/A;  Random biopsies; Rectal polyp x 3; Hemorrhoids; Diverticulosis    CORONARY STENT PLACEMENT      DILATATION AND CURETTAGE  2016    HYSTERECTOMY      LAPAROSCOPIC CHOLECYSTECTOMY  2005    MOUTH SURGERY      TOOTH EXTRACTION    NECK SURGERY  2017    PTH Adnomea    OTHER SURGICAL HISTORY  03/27/2015    DIAGNOSTIC ESOPHAGOSCOPY TRANSNASAL FLEXIBLE WITH BIOPSY; ARZATE ESO. REPEAT EGD 2 YR     OVARIAN CYST SURGERY  2000    L ovary removed    OVARY SURGERY Left     NORMAL     PAP SMEAR  08/23/2010    NORMAL      PARATHYROIDECTOMY Right 2016    Dr. Muchael Flynn at [place]    SUBTOTAL HYSTERECTOMY      Left ovary removed9    WISDOM TOOTH EXTRACTION  1984        Family History:  Family History   Problem Relation Age of Onset    Diabetes Mother     Hyperlipidemia Mother     Hypertension Mother     Heart disease Mother     Kidney disease Mother     Cancer Mother     Heart attack Mother     Multiple myeloma Mother     Melanoma Mother         Marine water contamination    Coronary artery disease Mother     Anemia Mother     Obesity Mother     Sleep apnea Mother     Arrhythmia Mother     Arthritis Father     Anxiety disorder Father     COPD Father     Glaucoma Father     Hyperlipidemia Father     Hypertension Father     Vision loss Father     Heart disease Father     Heart attack Father     Pancreatitis Father     Sleep apnea Father     Heart disease Sister     Breast cancer Neg Hx     Colon cancer Neg Hx     Colon polyps Neg Hx         Social History:   Social History     Tobacco Use    Smoking status: Every Day     Current packs/day: 0.00     Types: Electronic Cigarette, Cigarettes     Last attempt to quit: 2018     Years since quittin.4     Passive exposure: Never    Smokeless tobacco: Never    Tobacco comments:     18 switch to e-cig   Substance Use Topics    Alcohol use: No        Allergies:  Allergies   Allergen Reactions    Meloxicam Swelling     EYES AND FACE SWELLING    EYES AND FACE SWELLING   EYES AND FACE SWELLING    Effexor Xr [Venlafaxine Hcl Er] Unknown - Low Severity     Caused weight gain       Home Meds:  (Not in a hospital admission)      Objective:    Vitals Ranges:   Heart Rate:  [68] 68  BP: (120)/(76) 120/76  Body mass index is 37.2 kg/m².     Exam:  General Appearance:  WDWN    HEENT:   without obvious abnormality,  Conjunctiva/corneas clear,  Normal external ear canals, no drainage    Clear orsalmucosa,  Mallampati score 3    Neck:  Supple, symmetrical, trachea midline. No JVD.  Lungs:    Bilateral basal rhonchi bilaterally, respirations unlabored symmetrical wall movement.    Chest wall:  No tenderness or deformity.    Heart:  Regular rate and rhythm, S1 and S2 normal.  Extremities: Trace edema no clubbing or Cyanosis        Data Review:  All labs (24hrs): No results found for this or any previous visit (from the past 24 hours).     Imaging:  XR Knee 3 View Bilateral  Ordering physician's impression is located in the Encounter Note dated 11/06/24.           ASSESSMENT:  Diagnoses and all orders for this visit:    DERRICK (obstructive sleep apnea)    Benign essential HTN    Acquired hypothyroidism        PLAN:    This patient with obstructive sleep apnea, compliance is improved. Encourage to use it more frequent, I re-emphasized on pt the long and short term benefit of treating DERRICK. The device is benefiting the patient.  The patient is also instructed to get the CPAP supplies from the "[employer] and see me in 1 year for follow-up.    Treating DERRICK will improve BP control    It is recommended to keep thyroid function optimized to improve quality of sleep    Follow-up 6    Albertina Sandhu MD. D, ABSM.  5/27/2025  13:42 EDT

## 2025-05-28 DIAGNOSIS — I10 BENIGN ESSENTIAL HTN: ICD-10-CM

## 2025-05-28 RX ORDER — AMLODIPINE BESYLATE 5 MG/1
5 TABLET ORAL DAILY
Qty: 90 TABLET | Refills: 0 | OUTPATIENT
Start: 2025-05-28

## 2025-06-05 ENCOUNTER — OFFICE VISIT (OUTPATIENT)
Dept: ORTHOPEDIC SURGERY | Facility: CLINIC | Age: 65
End: 2025-06-05
Payer: COMMERCIAL

## 2025-06-05 VITALS
DIASTOLIC BLOOD PRESSURE: 73 MMHG | WEIGHT: 195 LBS | HEIGHT: 61 IN | BODY MASS INDEX: 36.82 KG/M2 | SYSTOLIC BLOOD PRESSURE: 109 MMHG | HEART RATE: 68 BPM

## 2025-06-05 DIAGNOSIS — M25.532 PAIN IN WRIST, LEFT: Primary | ICD-10-CM

## 2025-06-05 DIAGNOSIS — M19.032 PRIMARY OSTEOARTHRITIS, LEFT WRIST: ICD-10-CM

## 2025-06-05 PROCEDURE — 99214 OFFICE O/P EST MOD 30 MIN: CPT | Performed by: NURSE PRACTITIONER

## 2025-06-05 RX ORDER — METHYLPREDNISOLONE 4 MG/1
TABLET ORAL
Qty: 21 TABLET | Refills: 0 | Status: SHIPPED | OUTPATIENT
Start: 2025-06-05

## 2025-06-05 NOTE — PROGRESS NOTES
Subjective:     Patient ID: Shaylee Wadsworth is a 64 y.o. female.    Chief Complaint:  Left wrist pain, new issue to examiner  History of Present Illness  History of Present Illness  The patient is a 64-year-old female who presents to the clinic today for evaluation of her left wrist. She does have a history of rheumatoid arthritis, began experiencing pain in March when she is trying to the primary caregiver for her mother who is currently participating with hospice care. She is primary caregiver. She is having to lift, pull and push with the upper extremity, which is exacerbating her symptoms at her wrist. She is right hand dominant. Has been seen in the past for unrelated issue. She is not currently taking any prednisone. She has tried topical cream. She has tried pain relievers all without any significant symptom improvement as well as several braces. She does receive monthly infusions with rheumatology. She is exhibiting swelling of the wrist and pain of the thumb with motion. She does not have any other concerns.    She has a history of rheumatoid arthritis and began experiencing pain in March 2025 while serving as the primary caregiver for her mother who is currently participating with hospice care. The activities of lifting, pulling, and pushing with her upper extremity have exacerbated her wrist symptoms. She is right-hand dominant and has been seen in the past for unrelated issues. She is not currently taking any prednisone. Despite using topical creams, pain relievers, and several braces, she reports no significant improvement in her symptoms. She has not had any recent x-rays but receives monthly infusions with rheumatology. She exhibits swelling of the wrist and pain in the thumb with motion.         Social History     Occupational History    Not on file   Tobacco Use    Smoking status: Every Day     Current packs/day: 0.00     Average packs/day: 0.5 packs/day for 15.0 years (7.5 ttl pk-yrs)     Types:  Electronic Cigarette, Cigarettes     Last attempt to quit: 2018     Years since quittin.4     Passive exposure: Never    Smokeless tobacco: Never    Tobacco comments:     18 switch to e-cig   Vaping Use    Vaping status: Every Day   Substance and Sexual Activity    Alcohol use: No    Drug use: No    Sexual activity: Not Currently     Partners: Male     Birth control/protection: Post-menopausal, Hysterectomy      Past Medical History:   Diagnosis Date    Abnormal electrocardiogram     Anemia 2021    Anxiety     Arthritis 2014    RA    Lainez esophagus     Benign paroxysmal positional vertigo due to bilateral vestibular disorder     Chest pain     Cholelithiasis 2007    Removed    Colon polyp     Coronary artery disease 2019    Depression 2019    Disease of thyroid gland 2016    Dystonia     Embedded tick of right lower leg     Esophageal reflux     Fibromyositis     Functional movement disorder     H/O colonoscopy     H/O mammogram 2011    NORMAL     History of colonoscopy 2020    Hx of bone density study 2011    OSTEOPENIA     Hyperlipidemia     Hyperparathyroidism     Hypertension     Hypothyroidism 2021    Parathyroid tumor removed     Knee swelling 2018    Low back pain     Menopause     Osteopenia     Osteopenia     Ovarian cyst 2000    L ovary removed    Pancreatitis     Pancreatitis     Scoliosis 2013    Superficial incisional infection of surgical site     Tremor 2018    Varicella 1968    Vitamin D deficiency      Past Surgical History:   Procedure Laterality Date    APPENDECTOMY N/A 2021    Procedure: APPENDECTOMY LAPAROSCOPIC;  Surgeon: Maciel Godoy MD;  Location: [facility] OR;  Service: General;  Laterality: N/A;    CARDIAC CATHETERIZATION N/A 2019    Procedure: Coronary angiography;  Surgeon: Jackelyn Dumas MD;  Location:  [facility];  Service: Cardiovascular    CARDIAC CATHETERIZATION N/A 2019     Procedure: Left heart cath;  Surgeon: Jackelyn Dumas MD;  Location:  [facility];  Service: Cardiovascular    CARDIAC CATHETERIZATION N/A 03/04/2019    Procedure: Left ventriculography;  Surgeon: Jackelyn Dumas MD;  Location:  [facility];  Service: Cardiovascular    CARDIAC CATHETERIZATION N/A 03/04/2019    Procedure: Stent KARLY coronary;  Surgeon: Jackelyn Dumas MD;  Location:  [facility];  Service: Cardiovascular    CHOLECYSTECTOMY  2009    COLONOSCOPY  2014    COLONOSCOPY N/A 03/06/2020    Procedure: COLONOSCOPY, biopsy, polypectomy;  Surgeon: Rain Kirk MD;  Location:  [facility] OR;  Service: Gastroenterology;  Laterality: N/A;  Random biopsies; Rectal polyp x 3; Hemorrhoids; Diverticulosis    CORONARY STENT PLACEMENT      DILATATION AND CURETTAGE  2016    HYSTERECTOMY      LAPAROSCOPIC CHOLECYSTECTOMY  2005    MOUTH SURGERY      TOOTH EXTRACTION    NECK SURGERY  2017    PTH Adnomea    OTHER SURGICAL HISTORY  03/27/2015    DIAGNOSTIC ESOPHAGOSCOPY TRANSNASAL FLEXIBLE WITH BIOPSY; ARZATE ESO. REPEAT EGD 2 YR     OVARIAN CYST SURGERY  2000    L ovary removed    OVARY SURGERY Left     NORMAL     PAP SMEAR  08/23/2010    NORMAL     PARATHYROIDECTOMY Right 08/17/2016    Dr. Muchael Flynn at [place]    SUBTOTAL HYSTERECTOMY      Left ovary removed9    WISDOM TOOTH EXTRACTION  1984       Family History   Problem Relation Age of Onset    Diabetes Mother     Hyperlipidemia Mother     Hypertension Mother     Heart disease Mother     Kidney disease Mother     Cancer Mother     Heart attack Mother     Multiple myeloma Mother     Melanoma Mother         Marine water contamination    Coronary artery disease Mother     Anemia Mother     Obesity Mother     Sleep apnea Mother     Arrhythmia Mother     Broken bones Mother     Arthritis Father     Anxiety disorder Father     COPD Father     Glaucoma Father     Hyperlipidemia Father     Hypertension Father     Vision loss  "Father     Heart disease Father     Heart attack Father     Pancreatitis Father     Sleep apnea Father     Heart disease Sister     Breast cancer Neg Hx     Colon cancer Neg Hx     Colon polyps Neg Hx                Objective:  Physical Exam    Vital signs reviewed.   General: No acute distress.  Eyes: conjunctiva clear; pupils equally round and reactive  ENT: external ears and nose atraumatic; oropharynx clear  CV: no peripheral edema  Resp: normal respiratory effort  Skin: no rashes or wounds; normal turgor  Psych: mood and affect appropriate; recent and remote memory intact    Vitals:    06/05/25 0954   BP: 109/73   Pulse: 68   Weight: 88.5 kg (195 lb)   Height: 154.2 cm (60.71\")         06/05/25  0954   Weight: 88.5 kg (195 lb)     Body mass index is 37.2 kg/m².      Ortho Exam     Physical Exam  Left wrist examined  Palpable tenderness along the radiocarpal joint, CMC joint, mild to moderate swelling of the wrist no significant swelling along the CMC joint specifically  Positive tenderness with motion of the thumb  Flexor/extend remaining digits of the left hand  2+ distal radius pulse    Imaging:  Left Wrist X-Ray  Indication: Pain  AP, Lateral, and Oblique views    Findings:  No fracture  No bony lesion  Normal soft tissues  Radiocarpal degeneration no evidence of acute fracture dislocation or other acute osseous abnormality    No prior studies were available for comparison.    Assessment:        1. Pain in wrist, left    2. Primary osteoarthritis, left wrist         Assessment & Plan  1. Left wrist pain.  The patient reports experiencing left wrist pain since March, exacerbated by lifting, pulling, and pushing while caring for her mother. She has a history of rheumatoid arthritis and is currently receiving monthly infusions with rheumatology. She has tried topical creams, pain relievers, and several braces without significant improvement. There is swelling of the wrist and pain in the thumb with motion. This " is likely an exacerbation of underlying rheumatoid arthritis and degeneration throughout her wrist and thumb. No structural abnormalities were noted other than degeneration. A soft wrist immobilizer with thumb spica will be used to see if it offers significant symptom improvement. A Medrol Dosepak will be started to see if it offers any improvement. Injections were discussed as an option, but she wishes to hold off at this time. If there is no improvement, she will follow up in the clinic and encouraged to call with any questions or concerns.    Orders:  Orders Placed This Encounter   Procedures    XR Wrist 3+ View Left     No orders of the defined types were placed in this encounter.          Dragon dictation utilized       Patient or patient representative verbalized consent for the use of Ambient Listening during the visit with  KAITLYNN Brower for chart documentation. 6/5/2025  10:34 EDT

## 2025-08-13 DIAGNOSIS — G89.29 CHRONIC PAIN OF BOTH KNEES: ICD-10-CM

## 2025-08-13 DIAGNOSIS — M25.561 CHRONIC PAIN OF BOTH KNEES: ICD-10-CM

## 2025-08-13 DIAGNOSIS — M25.572 PAIN IN LEFT ANKLE AND JOINTS OF LEFT FOOT: ICD-10-CM

## 2025-08-13 DIAGNOSIS — M25.562 CHRONIC PAIN OF BOTH KNEES: ICD-10-CM

## 2025-08-13 DIAGNOSIS — M05.79 RHEUMATOID ARTHRITIS OF MULTIPLE SITES WITHOUT ORGAN OR SYSTEM INVOLVEMENT WITH POSITIVE RHEUMATOID FACTOR: Primary | ICD-10-CM

## 2025-08-13 DIAGNOSIS — M17.0 OSTEOARTHRITIS OF PATELLOFEMORAL JOINTS, BILATERAL: ICD-10-CM

## (undated) DEVICE — 5 MM BABCOCKS WITH RATCHET HANDLES: Brand: ENDOPATH

## (undated) DEVICE — DECANTER: Brand: UNBRANDED

## (undated) DEVICE — PENCL ES MEGADINE EZ/CLEAN BUTN W/HOLSTR 10FT

## (undated) DEVICE — TREK CORONARY DILATATION CATHETER 3.50 MM X 15 MM / RAPID-EXCHANGE: Brand: TREK

## (undated) DEVICE — SUT ETHIB 0/0 MO6 I8IN CX45D

## (undated) DEVICE — PATIENT RETURN ELECTRODE, SINGLE-USE, CONTACT QUALITY MONITORING, ADULT, WITH 9FT CORD, FOR PATIENTS WEIGING OVER 33LBS. (15KG): Brand: MEGADYNE

## (undated) DEVICE — BNDG ADHS PLSTC 1X3IN LF

## (undated) DEVICE — KT MANIFLD CARDIAC

## (undated) DEVICE — Device

## (undated) DEVICE — GOWN ISOL W/THUMB UNIV BLU BX/15

## (undated) DEVICE — CATH DIAG IMPULSE FR4 5F 100CM

## (undated) DEVICE — BW-412T DISP COMBO CLEANING BRUSH: Brand: SINGLE USE COMBINATION CLEANING BRUSH

## (undated) DEVICE — Device: Brand: CAUTERY TIP CLEANER

## (undated) DEVICE — BNDG ADHS CURAD FLX/FABRC 2X4IN STRL LF

## (undated) DEVICE — LAPAROSCOPIC SMOKE FILTRATION SYSTEM: Brand: PALL LAPAROSHIELD® PLUS LAPAROSCOPIC SMOKE FILTRATION SYSTEM

## (undated) DEVICE — METZENBAUM SCISSOR TIP, DISPOSABLE: Brand: RENEW

## (undated) DEVICE — HI-TORQUE BALANCE MIDDLEWEIGHT GUIDE WIRE .014 STRAIGHT TIP 3.0 CM X 190 CM: Brand: HI-TORQUE BALANCE MIDDLEWEIGHT

## (undated) DEVICE — LAPAROSCOPIC TROCAR SLEEVE/SINGLE USE: Brand: KII® OPTICAL ACCESS SYSTEM

## (undated) DEVICE — TROCAR: Brand: KII® SLEEVE

## (undated) DEVICE — FRCP BX RADJAW4 NDL 2.8 240CM LG OG BX40

## (undated) DEVICE — GW EMR FIX EXCHG J STD .035 3MM 260CM

## (undated) DEVICE — ENDOPATH XCEL BLUNT TIP TROCARS WITH SMOOTH SLEEVES: Brand: ENDOPATH XCEL

## (undated) DEVICE — 6F .070 H-STK 100CM: Brand: VISTA BRITE TIP

## (undated) DEVICE — MASK,FACE,FLUID RESIST,SHLD,EARLOOP: Brand: MEDLINE

## (undated) DEVICE — SPNG GZ WOVN 4X4IN 12PLY 10/BX STRL

## (undated) DEVICE — GLV SURG SENSICARE MICRO PF LF 6 STRL

## (undated) DEVICE — ELECTRD BLD MEGADYNE 2.75IN SS

## (undated) DEVICE — PK CATH CARD 40

## (undated) DEVICE — GLIDESHEATH SLENDER STAINLESS STEEL KIT: Brand: GLIDESHEATH SLENDER

## (undated) DEVICE — ENDOPATH 5MM ENDOSCOPIC BLUNT TIP DISSECTORS (12 POUCHES CONTAINING 3 DISSECTORS EACH): Brand: ENDOPATH

## (undated) DEVICE — GLV SURG SIGNATURE ESSENTIAL PF LTX SZ8

## (undated) DEVICE — ECHELON FLEX  POWERED VASCULAR STAPLER WITH ADVANCED PLACEMENT TIP, 35MM: Brand: ECHELON FLEX

## (undated) DEVICE — TBG INSUFL W FLTR STRL

## (undated) DEVICE — TUBING, SUCTION, 1/4" X 12', STRAIGHT: Brand: MEDLINE

## (undated) DEVICE — BND PRESS RADL COMFRT 14IN STRL

## (undated) DEVICE — CATH VENT MIV RADL PIG ST TIP 5F 110CM

## (undated) DEVICE — SUCTION CANISTER, 3000CC,SAFELINER: Brand: DEROYAL

## (undated) DEVICE — TRAP FLD MINIVAC MEGADYNE 100ML

## (undated) DEVICE — SUT MNCRYL 4/0 PS2 18 IN

## (undated) DEVICE — 2, DISPOSABLE SUCTION/IRRIGATOR WITH DISPOSABLE TIP: Brand: STRYKEFLOW

## (undated) DEVICE — NC TREK CORONARY DILATATION CATHETER 3.5 MM X 20 MM / RAPID-EXCHANGE: Brand: NC TREK

## (undated) DEVICE — CATH DIAG IMPULSE FL3.5 5F 100CM

## (undated) DEVICE — TOTAL TRAY, 16FR 10ML SIL FOLEY, URN: Brand: MEDLINE

## (undated) DEVICE — KT ORCA ORCAPOD DISP STRL

## (undated) DEVICE — ENDOPOUCH RETRIEVER SPECIMEN RETRIEVAL BAGS: Brand: ENDOPOUCH RETRIEVER

## (undated) DEVICE — PK LAP GEN 90

## (undated) DEVICE — VIAL FORMALIN CAP 10P 40ML